# Patient Record
Sex: FEMALE | Race: BLACK OR AFRICAN AMERICAN | NOT HISPANIC OR LATINO | Employment: OTHER | URBAN - METROPOLITAN AREA
[De-identification: names, ages, dates, MRNs, and addresses within clinical notes are randomized per-mention and may not be internally consistent; named-entity substitution may affect disease eponyms.]

---

## 2017-01-02 ENCOUNTER — TELEPHONE (OUTPATIENT)
Dept: FAMILY MEDICINE | Facility: CLINIC | Age: 52
End: 2017-01-02

## 2017-01-03 ENCOUNTER — TRANSFERRED RECORDS (OUTPATIENT)
Dept: HEALTH INFORMATION MANAGEMENT | Facility: CLINIC | Age: 52
End: 2017-01-03

## 2017-01-03 NOTE — TELEPHONE ENCOUNTER
"Received an answering service page from Ms. Álvarez for chest pain which she says started about 5pm and has been constant.  She was not doing anything active at the time.  Has not had pain like this before, but does not think it is her heart and rather more involving her muscles.  She says it feels like her \"blood is boiling on the inside.\" Hx of PE which felt different.     She also is short of breath and feels dizzy like she is constantly going to pass out.  She vomited after dinner and has only been able to drink sips of water since.  She took her insulin, but no other meds since then.  She has a mild cough and no wheezing.  She says the shortness of breath and dizziness are scary to her and because she does not know what's going on and keeps having to catch herself from falling over/passing out.    She also has pain in her left leg described as sharp pain in the inner part of her leg with associated numbness.  She also has dull pain on the outside of her leg.  She has chronic pain in this leg, but says this is much worse than usual.  Her leg does not seem more swollen.    No fevers. Has had chills last few days.  No sore throat.  Has chronic generalized myalgias that is not any different today.  Has had a lot of \"hot flashes\" over the last few days, went through menopause 3 years ago.    Given her history including DM, HTN, HLD, and hx of PE I told her that her symptoms were concerning enough that she should come to the emergency department for evaluation.    Lyndon Matos, DO  PGY2 [facility]    "

## 2017-01-09 DIAGNOSIS — G89.4 CHRONIC PAIN SYNDROME: Primary | ICD-10-CM

## 2017-01-10 ENCOUNTER — OFFICE VISIT (OUTPATIENT)
Dept: FAMILY MEDICINE | Facility: CLINIC | Age: 52
End: 2017-01-10

## 2017-01-10 VITALS — SYSTOLIC BLOOD PRESSURE: 96 MMHG | TEMPERATURE: 97.4 F | DIASTOLIC BLOOD PRESSURE: 65 MMHG | HEART RATE: 85 BPM

## 2017-01-10 DIAGNOSIS — M25.572 BILATERAL ANKLE JOINT PAIN: Primary | ICD-10-CM

## 2017-01-10 DIAGNOSIS — G89.4 CHRONIC PAIN SYNDROME: ICD-10-CM

## 2017-01-10 DIAGNOSIS — R60.0 PEDAL EDEMA: ICD-10-CM

## 2017-01-10 DIAGNOSIS — Z12.11 COLON CANCER SCREENING: ICD-10-CM

## 2017-01-10 DIAGNOSIS — M25.571 BILATERAL ANKLE JOINT PAIN: Primary | ICD-10-CM

## 2017-01-10 DIAGNOSIS — Z00.00 PREVENTATIVE HEALTH CARE: ICD-10-CM

## 2017-01-10 LAB
AMPHETAMINES QUAL: NEGATIVE
BARBITURATES QUAL URINE: NEGATIVE
BENZODIAZEPINE QUAL URINE: NEGATIVE
BUPRENORPHINE QUAL URINE: NEGATIVE
CANNABINOIDS UR QL SCN: NEGATIVE
COCAINE QUAL URINE: NEGATIVE
METHAMPHETAMINE: NEGATIVE
METHODONE QUAL: NEGATIVE
MORPHINE QUAL: NEGATIVE
OXYCODONE QUAL: POSITIVE
TEMPERATURE OF URINE WAS BETWEEN 90-100 DEGREES F: YES

## 2017-01-10 RX ORDER — OXYCODONE AND ACETAMINOPHEN 5; 325 MG/1; MG/1
TABLET ORAL
Qty: 120 TABLET | Refills: 0 | Status: SHIPPED | OUTPATIENT
Start: 2017-01-10 | End: 2017-02-07

## 2017-01-10 RX ORDER — FUROSEMIDE 20 MG
TABLET ORAL
Qty: 90 TABLET | Refills: 3 | Status: SHIPPED | OUTPATIENT
Start: 2017-01-10 | End: 2018-12-11

## 2017-01-10 NOTE — PROGRESS NOTES
Patient Active Problem List    Diagnosis Date Noted     Chronic pain syndrome 10/07/2016     Priority: High     Chronic Pain Diagnosis:  neuropathy  DIRE Total Score(s):14     ORT: 5    4 - 7 =  Moderate Risk   of future problems with Opioids  FAQ5: No flowsheet data found.  Behavioral Health Consultation: date   Personal Care Plan for Chronic Pain: most recent update    Opioid medication:oxycodone   Dose:10mg  Number of pills per month:60  Patient is being prescribed 10mg of oxycodone IR (Percocet) per day this is 15 mg Morphine Equivalents  Benzodiazepines Prescribed? No  Naloxone prescribed? No       Clinic visit frequency required: Q 1 month Next visit due:   Controlled Substance/Opioid Treatment agreement on file (dated <12 months ago)?:    Date(s): pending  Last [place] verification: last visit  Items in red to be updated at each visit  Patient is followed by Data Unavailable for ongoing prescription of pain medication.  All refills should be approved by this provider, or covering partner.Chronic Pain          Microalbuminuria 10/12/2015     Priority: High     Type 2 diabetes mellitus with diabetic polyneuropathy (H) 10/12/2015     Priority: High     Bilateral ankle joint pain 01/15/2016     Priority: Medium     Esophageal reflux 04/07/2015     Priority: Medium     Urinary incontinence 02/19/2015     Priority: Medium     Insomnia 02/19/2015     Priority: Medium     HTN (hypertension) 08/26/2013     Priority: Medium     Hyperlipidemia LDL goal <100 06/21/2013     Priority: Medium     S/P bariatric surgery 06/21/2013     Priority: Medium     Kim-en-Y in 2006       Neck pain 05/16/2013     Priority: Medium     Moderate major depression (H) 01/24/2013     Priority: Medium     Moderate persistent asthma 11/15/2012     Priority: Medium     Pulmonary embolism and infarction (H) 11/15/2012     Priority: Medium     Problem list name updated by automated process. Provider to review       Health Care Home 11/15/2012      Priority: Medium     Tier 3    Status: Accept  Care Coordination Start Date: 02/27/12  State Tier Level: Level 3  Language/Barrier to Learning: No  Significant Mental Health Issues: Yes  Date Care Coordination Discontinued: 05/28/15    DX V65.8 REPLACED WITH 75411 HEALTH CARE HOME (04/08/2013)         S/P hysterectomy 06/21/2013     Priority: Low     2008, For fibroids.  Cervix removed, but still has ovaries.       Carpal tunnel syndrome 11/15/2012     Priority: Low     Necrotizing fasciitis (H) 11/15/2012     Priority: Low     Abnormal Pap smear of cervix      Priority: Low     3-12-15: Pap/HPV neg.  Plan: Given hx (mildly abn pap, hyster), recheck in 5 yrs.  10-11-11: Pap/HPV neg.  Plan: Given ASUS pap in 2010, recommend repeating pap in 1 year.  8/3/2010 ASCUS Pap with +BV- pt treated with Metrogel.  Pt needs repeat Pap in 1yr.  8/4/2008 Pt had total hyst for menorrhagia, pelvic pain, scar tissue, and fibroids.       There are no exam notes on file for this visit.  Chief Complaint   Patient presents with     Medication Refill     Pain medication refill     Referral     would like some PT to help with her leg pain, help strengthen that up      Cough     has pnemonia wants to double check to make sure it is gone, she feels better after the medicaiton is complete      Blood pressure 96/65, pulse 85, temperature 97.4  F (36.3  C), temperature source Oral, not currently breastfeeding.  Results for orders placed or performed in visit on 01/10/17   Rapid Urine Drug Screen ([facility])   Result Value Ref Range    Amphetamines Qual NEGATIVE NEGATIVE    Barbiturates Qual Urine NEGATIVE NEGATIVE    Buprenorphine Qual Urine NEGATIVE NEGATIVE    Benzodiazepine Qual Urine NEGATIVE NEGATIVE    Cocaine Qual Urine NEGATIVE NEGATIVE    Cannabinoids Qual Urine NEGATIVE NEGATIVE    Methamphetamine Qual NEGATIVE NEGATIVE    Methadone Qual NEGATIVE NEGATIVE    Morphine Qual NEGATIVE NEGATIVE    Oxycodone Qual POSITIVE (A) NEGATIVE     Temperature of Urine was Between  Degrees F YES YES     Had pneumonia:  Dizzy, falling, and sob.  Having CP, too.  Sent to ER.    Dizziness gone.  No fever.  No cough--was mild and is now better.  Had n/v, now eating fine.    XR CHEST PA AND LATERAL  1/3/2017 9:45 PM  Normal sinus rhythm  Normal ECG  When compared with ECG of 24-OCT-2014 11:13,  Nonspecific T wave abnormality has replaced inverted T waves in Lateral leads  QT has shortened  Confirmed by TUAN CLAY MD LOC: ([id number]) on 1/4/2017 10:59:36 AM  Basic Metabolic Panel1/3/2017   [facility]   Component Name Value Range   Sodium 139 136 - 145 mmol/L   Potassium 4.8 3.5 - 5.0 mmol/L   Chloride 110 (H) 98 - 107 mmol/L   CO2 22 22 - 31 mmol/L   Anion Gap, Calculation 7 5 - 18 mmol/L   Glucose 276 (H) 70 - 125 mg/dL   Calcium 9.3 8.5 - 10.5 mg/dL   BUN 23 (H) 8 - 22 mg/dL   Creatinine 0.83 0.60 - 1.10 mg/dL   GFR MDRD Af Amer >60 >60 mL/min/1.73m2    GFR MDRD Non Af Amer >60 >60 mL/min/1.73m2      INDICATION: chest pain, cough  COMPARISON: 9/19/2014.  FINDINGS: Heart size appears normal. Right lung appears clear. Mild streaky density left lung could represent atelectasis, scar, or pneumonia. No effusion  SUBJECTIVE:  Ruthy Álvarez is here for followup of chronic pain syndrome.  She is stable on four oxycodone tablets daily.  She takes two at night and one during the day p.r.n.  Her constipation is manageable.  No sedation.  Urine drug screen is appropriate today.  Functionally, she is able to walk short distances with a walker.  Her moods are better.  She is able to engage in ADLs more effectively.  She would like to go to physical therapy, as she has left leg weakness.  The bursitis that she had last time is resolved.  She has edema in the left leg.  She had a fracture there in the remote past.  She takes furosemide daily and needs refill.     She was in the hospital one week ago for cough, chills, and dizziness.  They found a small  pneumonia; white count was elevated at 11.  Her BMP was normal.  Dizziness is resolved and the cough is absent.  She finished the antibiotics.  No further dizziness or lightheadedness.   OBJECTIVE:     GENERAL:  The patient is alert, pleasant, and in no acute distress.  She has a walker today.  She moves slowly but independently from the chair to the exam table.   CHEST:  Clear.   HEART:  Regular rate and rhythm.  Normal S1 and S2.  No murmur.   EXTREMITIES:  She has trace edema on the left.   ASSESSMENT AND PLAN:   1.  Chronic pain syndrome.  This is stable.  We'll review the prescription database.  Urine drug screen is appropriate today.  Prescription was given for one month.  She'll follow up monthly.  She'll need an A1C the next time she returns for diabetes.   2.  Pneumonia.  I reviewed records from [facility].  Her symptoms are resolved.  Her chest x-ray was only mildly abnormal.  No further followup needed.   3.  Hypertension.  Blood pressure is a little soft today, but she has no symptoms.  She is on lisinopril and chlorthalidone.  Her BMP was normal at [facility] one week ago.  She'll follow up monthly.  If her blood pressure remains low, we'll cut back on chlorthalidone from 25 mg to 12.5 mg daily.  We'll get a BMP and A1C when she returns next time.  I refilled furosemide today. She takes it daily for edema, but can use it prn going forward.    Colonoscopy referral written today  PT referral for LE strengthening.

## 2017-01-10 NOTE — PATIENT INSTRUCTIONS
Today we put a referral in for a Colonoscopy, if you do not hear from [facility] in 2 weeks please call and speak to Letitia, she will call [facility] to try and get that set up for you or have them call you to set up the appointment.     If you are not having any swelling you do not have to take the Furosamide everyday you can take it as needed.     Stop at the Referral desk to make an appointment for Physical Therapy and talk about the Colonoscopy Referral.     Also refilled your Pain medication and also your water pills.    Follow up in 1 month for your pain medication refill  and diabetes.     [facility]  Colonoscopy  [phone]  Referral has been faxed they will contact pt to schedule     [facility]  Physical Therapy  [phone]      Addendum 2:49 PM 1/11/2017 : Per [facility] pt scheduled 1/17 @ 3PM Que IRBY/ANH

## 2017-01-10 NOTE — Clinical Note
January 10, 2017      Ruthy TJ Henri  [address]   [place] [place] [address]          Ms Henri,     Your urine test results were as expected.    Please see below for your test results.    Resulted Orders   Rapid Urine Drug Screen ([facility])   Result Value Ref Range    Amphetamines Qual NEGATIVE NEGATIVE    Barbiturates Qual Urine NEGATIVE NEGATIVE    Buprenorphine Qual Urine NEGATIVE NEGATIVE    Benzodiazepine Qual Urine NEGATIVE NEGATIVE    Cocaine Qual Urine NEGATIVE NEGATIVE    Cannabinoids Qual Urine NEGATIVE NEGATIVE    Methamphetamine Qual NEGATIVE NEGATIVE    Methadone Qual NEGATIVE NEGATIVE    Morphine Qual NEGATIVE NEGATIVE    Oxycodone Qual POSITIVE (A) NEGATIVE    Temperature of Urine was Between  Degrees F YES YES      Comment:      This is a preliminary screening test that detects drugs-of-abuse in urine at   specified detection levels.  To confirm preliminary results, a more specific   method such as Gas Chromatography/Mass Spectrometry (GC/MS) must be used.            If you have any questions, please call the clinic to make an appointment.    Sincerely,    Jose Angel Meyer MD

## 2017-01-10 NOTE — MR AVS SNAPSHOT
After Visit Summary   1/10/2017    Ruthy Álvarez    MRN: [id number]           Patient Information     Date Of Birth          1965        Visit Information        Provider Department      1/10/2017 10:00 AM Jose Angel Meyer MD [facility]        Today's Diagnoses     Bilateral ankle joint pain    -  1     Chronic pain syndrome         Preventative health care         Pedal edema           Care Instructions    Today we put a referral in for a Colonoscopy, if you do not hear from [facility] in 2 weeks please call and speak to Letitia, she will call [facility] to try and get that set up for you or have them call you to set up the appointment.     If you are not having any swelling you do not have to take the Furosamide everyday you can take it as needed.     Stop at the Referral desk to make an appointment for Physical Therapy and talk about the Colonoscopy Referral.     Also refilled your Pain medication and also your water pills.    Follow up in 1 month for your pain medication refill  and diabetes.             Follow-ups after your visit        Additional Services     PCS Use: screening colonoscopy (GI) referral       Procedure: colonoscopy  Diagnosis: routine screening  Ordering physician: Dr. Meyer            PHYSICAL THERAPY REFERRAL       PT/OT REFERRAL  Ruthy Álvarez  1965  Phone #: [phone] (home)    needed: No  Language: English    PT/OT  Facility:   [facility], P: [phone], F: [phone]    History: Chronic Leg Pain    Precautions/Contraindications: None    Imaging Studies: EMG    Surgical Procedure/Test Results: None    Treatment Goals:   Increase: Strength    Prognosis: good    Visits: Up to 10    Evaluate: Evaluate and treat    Plan: Flexibility Exercise and Strength Exercise                  Future tests that were ordered for you today     Open Future Orders        Priority Expected Expires Ordered    PHYSICAL THERAPY  REFERRAL Routine  1/10/2018 1/10/2017    PCS Use: screening colonoscopy (GI) referral Routine  1/10/2018 1/10/2017            Who to contact     Please call your clinic at [phone] to:    Ask questions about your health    Make or cancel appointments    Discuss your medicines    Learn about your test results    Speak to your doctor   If you have compliments or concerns about an experience at your clinic, or if you wish to file a complaint, please contact [facility] Patient Relations at [phone] or email us at [email or web address]         Additional Information About Your Visit        VOIP DepotharTraitWare Information     [employer] is an electronic gateway that provides easy, online access to your medical records. With Sprinkle, you can request a clinic appointment, read your test results, renew a prescription or communicate with your care team.     To sign up for Sprinkle visit the website at [email or web address]   You will be asked to enter the access code listed below, as well as some personal information. Please follow the directions to create your username and password.     Your access code is: [email or web address]  Expires: 2017  5:06 PM     Your access code will  in 90 days. If you need help or a new code, please contact your [facility] Clinic or call [phone] for assistance.        Care EveryWhere ID     This is your Care EveryWhere ID. This could be used by other organizations to access your [place] medical records  [id number]        Your Vitals Were     Pulse Temperature                85 97.4  F (36.3  C) (Oral)           Blood Pressure from Last 3 Encounters:   01/10/17 96/65   16 117/73   11/15/16 148/65    Weight from Last 3 Encounters:   16 239 lb 12.8 oz (108.773 kg)   16 237 lb 4 oz (107.616 kg)   16 237 lb 4 oz (107.616 kg)              We Performed the Following     Rapid Urine Drug Screen ([facility])           Where to get your medicines      [facility] [place], [place] - [address], [place] [address]     Phone:  [phone]    - furosemide 20 MG tablet      Some of these will need a paper prescription and others can be bought over the counter.  Ask your nurse if you have questions.     Bring a paper prescription for each of these medications    - oxyCODONE-acetaminophen 5-325 MG per tablet       Primary Care Provider Office Phone # Fax #    Jose Angel Meyer [phone]       [facility] [address] [facility] [address]        Thank you!     Thank you for choosing [facility]  for your care. Our goal is always to provide you with excellent care. Hearing back from our patients is one way we can continue to improve our services. Please take a few minutes to complete the written survey that you may receive in the mail after your visit with us. Thank you!             Your Updated Medication List - Protect others around you: Learn how to safely use, store and throw away your medicines at www.disposemymeds.org.          This list is accurate as of: 1/10/17 11:08 AM.  Always use your most recent med list.                   Brand Name Dispense Instructions for use    * albuterol (2.5 MG/3ML) 0.083% neb solution     1 Box    Take 1 vial (2.5 mg) by nebulization every 6 hours as needed for shortness of breath / dyspnea       * albuterol 108 (90 BASE) MCG/ACT Inhaler    VENTOLIN HFA    3 Inhaler    Inhale 2 puffs into the lungs 4 times daily Inhale 2 puffs every 4-6 hours as needed       aspirin 81 MG tablet     100 tablet    Take 1 tablet (81 mg) by mouth daily       atorvastatin 10 MG tablet    LIPITOR    30 tablet    1 po qday       * B-12 1000 MCG Tbcr     100 tablet    Take 1,000 mcg by mouth daily       * cyanocobalamin 1000 MCG Subl     100 tablet    1 po qd       B-D SINGLE USE SWABS REGULAR Pads     100 each    Apply 1  pad topically 3 times daily Use as directed       benazepril 40 MG tablet    LOTENSIN    30 tablet    Take 1 tablet (40 mg) by mouth daily       blood glucose monitoring test strip    no brand specified    1 Box    1 strip by In Vitro route 3 times daily Test blood glucose 4x daily with meals and at night       capsaicin 0.025 % Crea cream    ZOSTRIX    45 g    Apply 1 g topically 3 times daily       chlorthalidone 25 MG tablet    HYGROTON    30 tablet    Take 1 tablet (25 mg) by mouth daily       diclofenac 1 % Gel topical gel    VOLTAREN    100 g    Apply 4 grams 4 x daily as needed       DULoxetine 60 MG EC capsule    CYMBALTA    90 capsule    Take 1 capsule (60 mg) by mouth daily       famotidine 20 MG tablet    PEPCID    60 tablet    Take 1 tablet (20 mg) by mouth 2 times daily       ferrous sulfate 325 (65 FE) MG tablet    IRON    30 tablet    Take 1 tablet (325 mg) by mouth daily (with breakfast)       fluticasone-salmeterol 230-21 MCG/ACT inhaler    ADVAIR-HFA    36 g    Inhale 2 puffs into the lungs 2 times daily       furosemide 20 MG tablet    LASIX    90 tablet    One daily as needed for swelling       gabapentin 250 MG/5ML solution    NEURONTIN    900 mL    Take 1.5 tsp tid for one week, then increase to 2 tsp tid.       insulin pen needle 29G X 12.7MM    BD ULTRA-FINE    100 each    Use 4 times daily       LANTUS SOLOSTAR 100 UNIT/ML injection   Generic drug:  insulin glargine     3 Month    Take 40 units at bedtime daily       lidocaine 5 % Patch    LIDODERM    40 patch    Apply up to 3 patches to painful area at once for up to 12 h within a 24 h period.  Remove after 12 hours.       [employer] FINEPOINT LANCETS Misc     100 each    Use to test blood sugars 2 times daily or as directed.       loratadine 10 MG tablet    CLARITIN    30 tablet    Take 1 tablet (10 mg) by mouth daily       magnesium oxide 400 (241.3 MG) MG tablet    MAG-OX    30 tablet    Take 1 tablet (400 mg) by mouth daily       NovoLOG  FLEXPEN 100 UNIT/ML injection   Generic drug:  insulin aspart     3 Month    18 units before meals (do not take without a meal).  Plus sliding scale:  Add 2 units for every 40 mg/dl above 140.       * order for DME     1 Device    Equipment being ordered: Wheelchair       * order for DME     1 Device    Equipment being ordered: one shower chair       * order for DME     1 Device    Equipment being ordered: 4 wheeled walker with seat       * order for DME     1 Device    Equipment being ordered: one wheeled walker with seat       oxyCODONE-acetaminophen 5-325 MG per tablet    PERCOCET    120 tablet    2 tabs po qhs, and 1 tab po bid during day, as needed.       risperDAL 0.25 MG tablet   Generic drug:  risperiDONE      Take 1 tablet (0.25 mg) by mouth At Bedtime       senna-docusate 8.6-50 MG per tablet    SENOKOT-S;PERICOLACE    60 tablet    Take 1 tablet by mouth 2 times daily       SUMAtriptan 25 MG tablet    IMITREX    24 tablet    One tab at onset of HA.  May repeat dose in 2 hours.  Do not exceed 200 mg in 24 hours       tolterodine 2 MG 24 hr capsule    DETROL LA    90 capsule    Take 1 capsule (2 mg) by mouth daily       vitamin D 2000 UNITS tablet     100 tablet    Take 2,000 Units by mouth daily       * Notice:  This list has 8 medication(s) that are the same as other medications prescribed for you. Read the directions carefully, and ask your doctor or other care provider to review them with you.

## 2017-01-11 ENCOUNTER — AMBULATORY - HEALTHEAST (OUTPATIENT)
Dept: ADMINISTRATIVE | Facility: REHABILITATION | Age: 52
End: 2017-01-11

## 2017-01-11 DIAGNOSIS — M79.605 LOWER EXTREMITY PAIN, LEFT: ICD-10-CM

## 2017-01-11 DIAGNOSIS — G89.4 CHRONIC PAIN SYNDROME: ICD-10-CM

## 2017-01-17 ENCOUNTER — OFFICE VISIT - HEALTHEAST (OUTPATIENT)
Dept: PHYSICAL THERAPY | Facility: REHABILITATION | Age: 52
End: 2017-01-17

## 2017-01-17 DIAGNOSIS — M79.605 LOWER EXTREMITY PAIN, LEFT: ICD-10-CM

## 2017-01-17 DIAGNOSIS — R26.89 POOR BALANCE: ICD-10-CM

## 2017-01-17 DIAGNOSIS — R29.898 MUSCULAR DECONDITIONING: ICD-10-CM

## 2017-01-17 DIAGNOSIS — Z74.09 DECREASED FUNCTIONAL MOBILITY AND ENDURANCE: ICD-10-CM

## 2017-01-24 ENCOUNTER — TRANSFERRED RECORDS (OUTPATIENT)
Dept: HEALTH INFORMATION MANAGEMENT | Facility: CLINIC | Age: 52
End: 2017-01-24

## 2017-01-24 ENCOUNTER — OFFICE VISIT - HEALTHEAST (OUTPATIENT)
Dept: PHYSICAL THERAPY | Facility: REHABILITATION | Age: 52
End: 2017-01-24

## 2017-01-24 DIAGNOSIS — Z74.09 DECREASED FUNCTIONAL MOBILITY AND ENDURANCE: ICD-10-CM

## 2017-01-24 DIAGNOSIS — R29.898 MUSCULAR DECONDITIONING: ICD-10-CM

## 2017-01-24 DIAGNOSIS — R26.89 POOR BALANCE: ICD-10-CM

## 2017-01-24 DIAGNOSIS — M79.605 LOWER EXTREMITY PAIN, LEFT: ICD-10-CM

## 2017-01-25 ENCOUNTER — TRANSFERRED RECORDS (OUTPATIENT)
Dept: HEALTH INFORMATION MANAGEMENT | Facility: CLINIC | Age: 52
End: 2017-01-25

## 2017-02-07 ENCOUNTER — OFFICE VISIT (OUTPATIENT)
Dept: FAMILY MEDICINE | Facility: CLINIC | Age: 52
End: 2017-02-07

## 2017-02-07 VITALS
HEART RATE: 80 BPM | TEMPERATURE: 97.9 F | SYSTOLIC BLOOD PRESSURE: 134 MMHG | BODY MASS INDEX: 47.8 KG/M2 | WEIGHT: 236.8 LBS | DIASTOLIC BLOOD PRESSURE: 84 MMHG

## 2017-02-07 DIAGNOSIS — R73.9 HYPERGLYCEMIA: ICD-10-CM

## 2017-02-07 DIAGNOSIS — E11.42 TYPE 2 DIABETES MELLITUS WITH DIABETIC POLYNEUROPATHY, WITHOUT LONG-TERM CURRENT USE OF INSULIN (H): ICD-10-CM

## 2017-02-07 DIAGNOSIS — E11.8 TYPE 2 DIABETES MELLITUS WITH COMPLICATION, UNSPECIFIED LONG TERM INSULIN USE STATUS: ICD-10-CM

## 2017-02-07 DIAGNOSIS — I10 ESSENTIAL HYPERTENSION: ICD-10-CM

## 2017-02-07 DIAGNOSIS — G89.4 CHRONIC PAIN SYNDROME: Primary | ICD-10-CM

## 2017-02-07 DIAGNOSIS — G89.4 CHRONIC PAIN SYNDROME: ICD-10-CM

## 2017-02-07 DIAGNOSIS — E78.5 HYPERLIPIDEMIA LDL GOAL <100: ICD-10-CM

## 2017-02-07 DIAGNOSIS — E78.5 HYPERLIPIDEMIA LDL GOAL <100: Primary | ICD-10-CM

## 2017-02-07 LAB
AMPHETAMINES QUAL: NEGATIVE
BARBITURATES QUAL URINE: NEGATIVE
BENZODIAZEPINE QUAL URINE: NEGATIVE
BUN SERPL-MCNC: 41 MG/DL (ref 7–19)
BUPRENORPHINE QUAL URINE: NEGATIVE
CALCIUM SERPL-MCNC: 9.1 MG/DL (ref 8.5–10.1)
CANNABINOIDS UR QL SCN: NEGATIVE
CHLORIDE SERPLBLD-SCNC: 102 MMOL/L (ref 98–110)
CHOLEST SERPL-MCNC: 210.9 MG/DL (ref 0–200)
CHOLEST/HDLC SERPL: 4 {RATIO} (ref 0–5)
CO2 SERPL-SCNC: 18.9 MMOL/L (ref 20–32)
COCAINE QUAL URINE: NEGATIVE
CREAT SERPL-MCNC: 1.1 MG/DL (ref 0.5–1)
GFR SERPL CREATININE-BSD FRML MDRD: 55.7 ML/MIN/1.7 M2
GLUCOSE SERPL-MCNC: 494 MG'DL (ref 70–99)
GLUCOSE SERPL-MCNC: 655 MG'DL (ref 70–99)
HBA1C MFR BLD: 8.6 % (ref 4.1–5.7)
HDLC SERPL-MCNC: 53.1 MG/DL
LDLC SERPL CALC-MCNC: 108 MG/DL (ref 0–129)
METHAMPHETAMINE: NEGATIVE
METHODONE QUAL: NEGATIVE
MORPHINE QUAL: NEGATIVE
OXYCODONE QUAL: POSITIVE
POTASSIUM SERPL-SCNC: 4.6 MMOL/DL (ref 3.2–4.6)
SODIUM SERPL-SCNC: 129.6 MMOL/L (ref 132–142)
TEMPERATURE OF URINE WAS BETWEEN 90-100 DEGREES F: YES
TRIGL SERPL-MCNC: 250.7 MG/DL (ref 0–150)
VLDL CHOLESTEROL: 50.1 MG/DL (ref 7–32)

## 2017-02-07 RX ORDER — OXYCODONE AND ACETAMINOPHEN 5; 325 MG/1; MG/1
TABLET ORAL
Qty: 120 TABLET | Refills: 0 | Status: SHIPPED | OUTPATIENT
Start: 2017-02-07 | End: 2017-03-08

## 2017-02-07 NOTE — PROGRESS NOTES
Patient Active Problem List    Diagnosis Date Noted     Chronic pain syndrome 10/07/2016     Priority: High     Chronic Pain Diagnosis:  neuropathy  DIRE Total Score(s):14     ORT: 5    4 - 7 =  Moderate Risk   of future problems with Opioids  FAQ5: No flowsheet data found.  Behavioral Health Consultation: date   Personal Care Plan for Chronic Pain: most recent update    Opioid medication:oxycodone   Dose:10mg  Number of pills per month:60  Patient is being prescribed 10mg of oxycodone IR (Percocet) per day this is 15 mg Morphine Equivalents  Benzodiazepines Prescribed? No  Naloxone prescribed? No       Clinic visit frequency required: Q 1 month Next visit due:   Controlled Substance/Opioid Treatment agreement on file (dated <12 months ago)?:    Date(s): pending  Last [place] verification: last visit  Items in red to be updated at each visit  Patient is followed by Data Unavailable for ongoing prescription of pain medication.  All refills should be approved by this provider, or covering partner.Chronic Pain          Microalbuminuria 10/12/2015     Priority: High     Type 2 diabetes mellitus with diabetic polyneuropathy (H) 10/12/2015     Priority: High     Bilateral ankle joint pain 01/15/2016     Priority: Medium     Esophageal reflux 04/07/2015     Priority: Medium     Urinary incontinence 02/19/2015     Priority: Medium     Insomnia 02/19/2015     Priority: Medium     HTN (hypertension) 08/26/2013     Priority: Medium     Hyperlipidemia LDL goal <100 06/21/2013     Priority: Medium     S/P bariatric surgery 06/21/2013     Priority: Medium     Kim-en-Y in 2006       Neck pain 05/16/2013     Priority: Medium     Moderate major depression (H) 01/24/2013     Priority: Medium     Moderate persistent asthma 11/15/2012     Priority: Medium     Pulmonary embolism and infarction (H) 11/15/2012     Priority: Medium     Problem list name updated by automated process. Provider to review       Health Care Home 11/15/2012      Priority: Medium     Tier 3    Status: Accept  Care Coordination Start Date: 02/27/12  State Tier Level: Level 3  Language/Barrier to Learning: No  Significant Mental Health Issues: Yes  Date Care Coordination Discontinued: 05/28/15    DX V65.8 REPLACED WITH 29043 HEALTH CARE HOME (04/08/2013)         S/P hysterectomy 06/21/2013     Priority: Low     2008, For fibroids.  Cervix removed, but still has ovaries.       Carpal tunnel syndrome 11/15/2012     Priority: Low     Necrotizing fasciitis (H) 11/15/2012     Priority: Low     Abnormal Pap smear of cervix      Priority: Low     3-12-15: Pap/HPV neg.  Plan: Given hx (mildly abn pap, hyster), recheck in 5 yrs.  10-11-11: Pap/HPV neg.  Plan: Given ASUS pap in 2010, recommend repeating pap in 1 year.  8/3/2010 ASCUS Pap with +BV- pt treated with Metrogel.  Pt needs repeat Pap in 1yr.  8/4/2008 Pt had total hyst for menorrhagia, pelvic pain, scar tissue, and fibroids.       There are no exam notes on file for this visit.  Chief Complaint   Patient presents with     Refill Request     Refill on pain medicaiton      Other     Pt. states the 3 days ago she had a black scab like sore that fell off of her pinky toe on right foot, since then her toe has been very painful      Blood pressure 134/84, pulse 80, temperature 97.9  F (36.6  C), temperature source Oral, weight 236 lb 12.8 oz (107.412 kg), not currently breastfeeding.  Walking 1.5 blocks.  Smiled!  Constipation.  SUBJECTIVE:  Ruthy Álvarez is here for multiple issues.  She has chronic pain syndrome and pain control has been reasonable on oxycodone.  She takes two tablets at night and one tablet b.i.d. p.r.n. during the day.  She now walks about 1-1/2 blocks and is quite proud about that.  She has no chest pain or shortness of breath.  She has blurred vision, polydipsia and polyuria.  She drank two glasses of orange juice today, but she didn't take her insulin.   She also mentions a scab along the right fifth toe  on the medial side that is painful to the touch.   OBJECTIVE   CHEST:  Clear.   HEART:  Regular rate and rhythm.  Normal S1 and S2.  No murmur.   PSYCHIATRIC:  Her affect is bright and appropriate.   GENERAL:  She is alert and in absolutely no acute distress.   EXTREMITIES:  Examination of the foot reveals a callus along the nail medially of the fifth toe.   ASSESSMENT AND PLAN:   1.  Chronic pain syndrome.  I refilled oxycodone.  UDS was appropriate.   2.  Diabetes.  We checked BMP and A1C today because she was due.  Her A1C is surprisingly only mildly elevatd, but blood sugar this morning was 650.  We gave her 28 units of NovoLog and she remained asymptomatic.  Her blood sugar should return to around 400.  She is asymptomatic and not markedly acidotic and I gave her a new sliding scale to use at home.  We'll continue her current dose of Lantus.  She needs to be more faithful in taking NovoLog on the new sliding scale.  She'll follow up in a month with weekly t.i.d.  Accu-Cheks to go through to see if she is responding to this.     3.  Corn.  I gave her reassurance and recommended some corn pads.   4.  Hypertension.  It is well-controlled.     5.  Hyperlipidemia.  Lipid profile was checked.  We'll discuss results next week.     Results for orders placed or performed in visit on 02/07/17   Lipid Panel ([place])   Result Value Ref Range    Cholesterol 210.9 (H) 0.0 - 200.0 mg/dL    Cholesterol/HDL Ratio 4.0 0.0 - 5.0    HDL Cholesterol 53.1 >40.0 mg/dL    LDL Cholesterol Calculated 108 0 - 129 mg/dL    Triglycerides 250.7 (H) 0.0 - 150.0 mg/dL    VLDL Cholesterol 50.1 (H) 7.0 - 32.0 mg/dL   Basic Metabolic Panel ([place])   Result Value Ref Range    Urea Nitrogen 41.0 (H) 7.0 - 19.0 mg/dL    Calcium 9.1 8.5 - 10.1 mg/dL    Chloride 102.0 98.0 - 110.0 mmol/L    Carbon Dioxide 18.9 (L) 20.0 - 32.0 mmol/L    Creatinine 1.1 (H) 0.5 - 1.0 mg/dL    Glucose 655.0 (HH) 70.0 - 99.0 mg'dL    Potassium 4.6 3.2 - 4.6 mmol/dL     Sodium 129.6 (L) 132.0 - 142.0 mmol/L    GFR Estimate 55.7 mL/min/1.7 m2    GFR Estimate If Black 67.3 mL/min/1.7 m2    Narrative    Panic Value reported and repeated back at 2/7/2017 10:22 AM to  Nuzhat .by    Oleksandr   .   Hemoglobin A1c ([facility])   Result Value Ref Range    Hemoglobin A1C 8.6 (H) 4.1 - 5.7 %   Rapid Urine Drug Screen ([facility])   Result Value Ref Range    Amphetamines Qual NEGATIVE NEGATIVE    Barbiturates Qual Urine NEGATIVE NEGATIVE    Buprenorphine Qual Urine NEGATIVE NEGATIVE    Benzodiazepine Qual Urine NEGATIVE NEGATIVE    Cocaine Qual Urine NEGATIVE NEGATIVE    Cannabinoids Qual Urine NEGATIVE NEGATIVE    Methamphetamine Qual NEGATIVE NEGATIVE    Methadone Qual NEGATIVE NEGATIVE    Morphine Qual NEGATIVE NEGATIVE    Oxycodone Qual POSITIVE (A) NEGATIVE    Temperature of Urine was Between  Degrees F YES YES   Glucose (Only)   Result Value Ref Range    Glucose 494.0 (HH) 70.0 - 99.0 mg'dL    Narrative    Panic Value reported and repeated back at 2/7/2017 11:47 AM to Nuzhat  .by      MELQUIADES.

## 2017-02-07 NOTE — MR AVS SNAPSHOT
After Visit Summary   2/7/2017    Ruthy Álvarez    MRN: [id number]           Patient Information     Date Of Birth          1965        Visit Information        Provider Department      2/7/2017 10:00 AM Jose Angel Meyer MD [facility]        Today's Diagnoses     Chronic pain syndrome    -  1     Type 2 diabetes mellitus with complication, unspecified long term insulin use status (H)         Hyperlipidemia LDL goal <100         Essential hypertension         Type 2 diabetes mellitus with diabetic polyneuropathy, without long-term current use of insulin (H)         Bilateral ankle joint pain           Care Instructions    Try corn pads on little toe  Not infected:  Watch for redness, swelling, or pus draining.    :  Give 18 units  141-180:  Give 20  181-220:  Give 22  221-260:  Give 24  261-300:  Give 26  301-340:  Give 28.  >340:  Give 30    Check 3 times daily, before meals, and f/up in 1 week.              Follow-ups after your visit        Who to contact     Please call your clinic at [phone] to:    Ask questions about your health    Make or cancel appointments    Discuss your medicines    Learn about your test results    Speak to your doctor   If you have compliments or concerns about an experience at your clinic, or if you wish to file a complaint, please contact [facility] Patient Relations at [phone] or email us at [email or web address]         Additional Information About Your Visit        MyChart Information     Posterbee is an electronic gateway that provides easy, online access to your medical records. With Posterbee, you can request a clinic appointment, read your test results, renew a prescription or communicate with your care team.     To sign up for [employer] visit the website at [email or web address]   You will be asked to enter the access code listed below, as well as some personal information. Please follow the  directions to create your username and password.     Your access code is: [email or web address]  Expires: 2017  5:06 PM     Your access code will  in 90 days. If you need help or a new code, please contact your [facility] or call [phone] for assistance.        Care EveryWhere ID     This is your Care EveryWhere ID. This could be used by other organizations to access your [facility] medical records  [id number]        Your Vitals Were     Pulse Temperature                80 97.9  F (36.6  C) (Oral)           Blood Pressure from Last 3 Encounters:   17 134/84   01/10/17 96/65   16 117/73    Weight from Last 3 Encounters:   17 236 lb 12.8 oz (107.412 kg)   16 239 lb 12.8 oz (108.773 kg)   16 237 lb 4 oz (107.616 kg)              We Performed the Following     Basic Metabolic Panel ([place])     Glucose ([place])     Hemoglobin A1c ([facility])     Lipid Panel ([place])     Rapid Urine Drug Screen ([facility])          Where to get your medicines      Some of these will need a paper prescription and others can be bought over the counter.  Ask your nurse if you have questions.     Bring a paper prescription for each of these medications    - oxyCODONE-acetaminophen 5-325 MG per tablet       Primary Care Provider Office Phone # Fax #    Jose Angel Meyer [phone]       [address] [address]        Thank you!     Thank you for choosing [facility]  for your care. Our goal is always to provide you with excellent care. Hearing back from our patients is one way we can continue to improve our services. Please take a few minutes to complete the written survey that you may receive in the mail after your visit with us. Thank you!             Your Updated Medication List - Protect others around you: Learn how to safely use, store and throw away your medicines at www.disposemymeds.org.          This list is  accurate as of: 2/7/17 11:56 AM.  Always use your most recent med list.                   Brand Name Dispense Instructions for use    * albuterol (2.5 MG/3ML) 0.083% neb solution     1 Box    Take 1 vial (2.5 mg) by nebulization every 6 hours as needed for shortness of breath / dyspnea       * albuterol 108 (90 BASE) MCG/ACT Inhaler    VENTOLIN HFA    3 Inhaler    Inhale 2 puffs into the lungs 4 times daily Inhale 2 puffs every 4-6 hours as needed       aspirin 81 MG tablet     100 tablet    Take 1 tablet (81 mg) by mouth daily       atorvastatin 10 MG tablet    LIPITOR    30 tablet    1 po qday       * B-12 1000 MCG Tbcr     100 tablet    Take 1,000 mcg by mouth daily       * cyanocobalamin 1000 MCG Subl     100 tablet    1 po qd       B-D SINGLE USE SWABS REGULAR Pads     100 each    Apply 1 pad topically 3 times daily Use as directed       benazepril 40 MG tablet    LOTENSIN    30 tablet    Take 1 tablet (40 mg) by mouth daily       blood glucose monitoring test strip    no brand specified    1 Box    1 strip by In Vitro route 3 times daily Test blood glucose 4x daily with meals and at night       capsaicin 0.025 % Crea cream    ZOSTRIX    45 g    Apply 1 g topically 3 times daily       chlorthalidone 25 MG tablet    HYGROTON    30 tablet    Take 1 tablet (25 mg) by mouth daily       diclofenac 1 % Gel topical gel    VOLTAREN    100 g    Apply 4 grams 4 x daily as needed       DULoxetine 60 MG EC capsule    CYMBALTA    90 capsule    Take 1 capsule (60 mg) by mouth daily       famotidine 20 MG tablet    PEPCID    60 tablet    Take 1 tablet (20 mg) by mouth 2 times daily       ferrous sulfate 325 (65 FE) MG tablet    IRON    30 tablet    Take 1 tablet (325 mg) by mouth daily (with breakfast)       fluticasone-salmeterol 230-21 MCG/ACT inhaler    ADVAIR-HFA    36 g    Inhale 2 puffs into the lungs 2 times daily       furosemide 20 MG tablet    LASIX    90 tablet    One daily as needed for swelling       gabapentin  250 MG/5ML solution    NEURONTIN    900 mL    Take 1.5 tsp tid for one week, then increase to 2 tsp tid.       insulin pen needle 29G X 12.7MM    BD ULTRA-FINE    100 each    Use 4 times daily       LANTUS SOLOSTAR 100 UNIT/ML injection   Generic drug:  insulin glargine     3 Month    Take 40 units at bedtime daily       lidocaine 5 % Patch    LIDODERM    40 patch    Apply up to 3 patches to painful area at once for up to 12 h within a 24 h period.  Remove after 12 hours.       [employer] FINEPOINT LANCETS Misc     100 each    Use to test blood sugars 2 times daily or as directed.       loratadine 10 MG tablet    CLARITIN    30 tablet    Take 1 tablet (10 mg) by mouth daily       magnesium oxide 400 (241.3 MG) MG tablet    MAG-OX    30 tablet    Take 1 tablet (400 mg) by mouth daily       NovoLOG FLEXPEN 100 UNIT/ML injection   Generic drug:  insulin aspart     3 Month    18 units before meals (do not take without a meal).  Plus sliding scale:  Add 2 units for every 40 mg/dl above 140.       * order for DME     1 Device    Equipment being ordered: Wheelchair       * order for DME     1 Device    Equipment being ordered: one shower chair       * order for DME     1 Device    Equipment being ordered: 4 wheeled walker with seat       * order for DME     1 Device    Equipment being ordered: one wheeled walker with seat       oxyCODONE-acetaminophen 5-325 MG per tablet    PERCOCET    120 tablet    2 tabs po qhs, and 1 tab po bid during day, as needed.       risperDAL 0.25 MG tablet   Generic drug:  risperiDONE      Take 1 tablet (0.25 mg) by mouth At Bedtime       senna-docusate 8.6-50 MG per tablet    SENOKOT-S;PERICOLACE    60 tablet    Take 1 tablet by mouth 2 times daily       SUMAtriptan 25 MG tablet    IMITREX    24 tablet    One tab at onset of HA.  May repeat dose in 2 hours.  Do not exceed 200 mg in 24 hours       tolterodine 2 MG 24 hr capsule    DETROL LA    90 capsule    Take 1 capsule (2 mg) by mouth daily        vitamin D 2000 UNITS tablet     100 tablet    Take 2,000 Units by mouth daily       * Notice:  This list has 8 medication(s) that are the same as other medications prescribed for you. Read the directions carefully, and ask your doctor or other care provider to review them with you.

## 2017-02-07 NOTE — PATIENT INSTRUCTIONS
Try corn pads on little toe  Not infected:  Watch for redness, swelling, or pus draining.    :  Give 18 units  141-180:  Give 20  181-220:  Give 22  221-260:  Give 24  261-300:  Give 26  301-340:  Give 28.  >340:  Give 30    Check 3 times daily, before meals, and f/up in 1 week.

## 2017-02-08 NOTE — PROGRESS NOTES
"Quick Note:    This is better. No longer in trouble, thankfully.  She should stay on lantus 40 units daily. She should now check her blood glucose before each meal and adjust her novolog dose based on that reading as we discussed yesterday, using the sliding scale I gave her:  :  Give 18 units novolog  141-180:  Give 20 \"  181-220:  Give 22 \"  221-260:  Give 24 \"  261-300:  Give 26 \"  301-340:  Give 28. \"  >340:  Give 30 \"    Record blood sugar readings and follow-up in 1 week, as planned.  Thanks!    ______  "

## 2017-02-08 NOTE — PROGRESS NOTES
Quick Note:    Please call pt today to check on her blood sugars in the past 24 hours, and to see if she has any questions.  C Betsy  ______

## 2017-02-10 ENCOUNTER — DOCUMENTATION ONLY (OUTPATIENT)
Dept: FAMILY MEDICINE | Facility: CLINIC | Age: 52
End: 2017-02-10

## 2017-02-10 DIAGNOSIS — E11.42 TYPE 2 DIABETES MELLITUS WITH DIABETIC POLYNEUROPATHY, WITH LONG-TERM CURRENT USE OF INSULIN (H): Primary | ICD-10-CM

## 2017-02-10 DIAGNOSIS — Z79.4 TYPE 2 DIABETES MELLITUS WITH DIABETIC POLYNEUROPATHY, WITH LONG-TERM CURRENT USE OF INSULIN (H): Primary | ICD-10-CM

## 2017-02-10 RX ORDER — BLOOD-GLUCOSE METER
EACH MISCELLANEOUS
Qty: 100 EACH | Status: SHIPPED | OUTPATIENT
Start: 2017-02-10 | End: 2019-09-11

## 2017-02-21 ENCOUNTER — OFFICE VISIT - HEALTHEAST (OUTPATIENT)
Dept: PHYSICAL THERAPY | Facility: REHABILITATION | Age: 52
End: 2017-02-21

## 2017-02-21 DIAGNOSIS — R29.898 MUSCULAR DECONDITIONING: ICD-10-CM

## 2017-02-21 DIAGNOSIS — M79.605 LOWER EXTREMITY PAIN, LEFT: ICD-10-CM

## 2017-02-21 DIAGNOSIS — Z74.09 DECREASED FUNCTIONAL MOBILITY AND ENDURANCE: ICD-10-CM

## 2017-02-21 DIAGNOSIS — R26.89 POOR BALANCE: ICD-10-CM

## 2017-03-08 ENCOUNTER — OFFICE VISIT (OUTPATIENT)
Dept: FAMILY MEDICINE | Facility: CLINIC | Age: 52
End: 2017-03-08

## 2017-03-08 VITALS
WEIGHT: 239.2 LBS | DIASTOLIC BLOOD PRESSURE: 84 MMHG | TEMPERATURE: 97.5 F | BODY MASS INDEX: 48.31 KG/M2 | HEART RATE: 80 BPM | SYSTOLIC BLOOD PRESSURE: 141 MMHG

## 2017-03-08 DIAGNOSIS — Z12.11 SCREENING FOR COLON CANCER: ICD-10-CM

## 2017-03-08 DIAGNOSIS — Z79.4 TYPE 2 DIABETES MELLITUS WITH HYPERGLYCEMIA, WITH LONG-TERM CURRENT USE OF INSULIN (H): ICD-10-CM

## 2017-03-08 DIAGNOSIS — G89.4 CHRONIC PAIN SYNDROME: Primary | ICD-10-CM

## 2017-03-08 DIAGNOSIS — E11.8 TYPE 2 DIABETES MELLITUS WITH COMPLICATION, WITH LONG-TERM CURRENT USE OF INSULIN (H): ICD-10-CM

## 2017-03-08 DIAGNOSIS — E11.65 TYPE 2 DIABETES MELLITUS WITH HYPERGLYCEMIA, WITH LONG-TERM CURRENT USE OF INSULIN (H): ICD-10-CM

## 2017-03-08 DIAGNOSIS — Z79.4 TYPE 2 DIABETES MELLITUS WITH COMPLICATION, WITH LONG-TERM CURRENT USE OF INSULIN (H): ICD-10-CM

## 2017-03-08 LAB
AMPHETAMINES QUAL: NEGATIVE
BARBITURATES QUAL URINE: NEGATIVE
BENZODIAZEPINE QUAL URINE: NEGATIVE
BUPRENORPHINE QUAL URINE: NEGATIVE
CANNABINOIDS UR QL SCN: NEGATIVE
COCAINE QUAL URINE: NEGATIVE
METHAMPHETAMINE: NEGATIVE
METHODONE QUAL: NEGATIVE
MORPHINE QUAL: NEGATIVE
OXYCODONE QUAL: NEGATIVE
TEMPERATURE OF URINE WAS BETWEEN 90-100 DEGREES F: YES

## 2017-03-08 RX ORDER — INSULIN ASPART 100 [IU]/ML
INJECTION, SOLUTION INTRAVENOUS; SUBCUTANEOUS
Qty: 180 ML | Refills: 11 | Status: SHIPPED | OUTPATIENT
Start: 2017-03-08 | End: 2018-12-11

## 2017-03-08 RX ORDER — INSULIN GLARGINE 100 [IU]/ML
INJECTION, SOLUTION SUBCUTANEOUS
Qty: 1500 ML | Refills: 11 | Status: SHIPPED | OUTPATIENT
Start: 2017-03-08 | End: 2018-12-11

## 2017-03-08 RX ORDER — METFORMIN HYDROCHLORIDE 500 MG/5ML
500 SOLUTION ORAL 2 TIMES DAILY
Qty: 300 ML | Refills: 12 | Status: SHIPPED | OUTPATIENT
Start: 2017-03-08 | End: 2018-12-11

## 2017-03-08 RX ORDER — OXYCODONE AND ACETAMINOPHEN 5; 325 MG/1; MG/1
TABLET ORAL
Qty: 120 TABLET | Refills: 0 | Status: SHIPPED | OUTPATIENT
Start: 2017-03-08 | End: 2017-04-05

## 2017-03-08 RX ORDER — PEG-3350, SODIUM SULFATE, SODIUM CHLORIDE, POTASSIUM CHLORIDE, SODIUM ASCORBATE AND ASCORBIC ACID 7.5-2.691G
KIT ORAL
Qty: 1 EACH | Refills: 0 | Status: SHIPPED
Start: 2017-03-08 | End: 2018-12-11

## 2017-03-08 NOTE — MR AVS SNAPSHOT
After Visit Summary   3/8/2017    Ruthy Álvarez    MRN: [id number]           Patient Information     Date Of Birth          1965        Visit Information        Provider Department      3/8/2017 11:00 AM Jose Angel Meyer MD [facility]        Today's Diagnoses     Chronic pain syndrome    -  1    Screening for colon cancer        Type 2 diabetes mellitus with hyperglycemia, with long-term current use of insulin (H)        Type 2 diabetes mellitus with complication, with long-term current use of insulin (H)          Care Instructions    Colon prep has been sent to your pharmacy today ([facility]).    Make sure next time you come you bring your LOG BOOK for your blood sugars (PUT BOOK IN YOUR PURSE RIGHT AWAY)!!!!    Increase your Lantus from 40 units to 50 units a day.    Follow up with Dr. Meyer in 1 month.    Start taking 1 tsp two times daily of the Metformin.    Start taking 1 tsp three times daily of the Gabapentin.      Thank you for coming to [facility].  **If you had lab testing today and your results are reassuring or normal they will be be mailed to you within 7 days.   **If the lab tests need quick action we will call you with the results.  The phone number we will call with results is # [phone] (home) . If this is not the best number please call our clinic and change the number.  If you need any refills please call your pharmacy and they will contact us.  If you have any concerns about today's visit or wish to schedule another appointment please call our office during normal business hours [phone] (8-5:00 M-F)  If you have urgent medical concerns please call [phone] at any time of the day.  If you a medical emergency please call [phone]  Again thank you for choosing [facility] and please let us know how we can best partner with you to improve you and your family's health.                      Follow-ups after your visit        Who to contact      Please call your clinic at [phone] to:    Ask questions about your health    Make or cancel appointments    Discuss your medicines    Learn about your test results    Speak to your doctor   If you have compliments or concerns about an experience at your clinic, or if you wish to file a complaint, please contact [facility] Patient Relations at [phone] or email us at [email or web address]         Additional Information About Your Visit        [email or web address] Information     Stublisher is an electronic gateway that provides easy, online access to your medical records. With Stublisher, you can request a clinic appointment, read your test results, renew a prescription or communicate with your care team.     To sign up for Stublisher visit the website at [email or web address]   You will be asked to enter the access code listed below, as well as some personal information. Please follow the directions to create your username and password.     Your access code is: [email or web address]  Expires: 2017 12:08 PM     Your access code will  in 90 days. If you need help or a new code, please contact your [facility] Clinic or call [phone] for assistance.        Care EveryWhere ID     This is your Care EveryWhere ID. This could be used by other organizations to access your [place] medical records  [id number]        Your Vitals Were     Pulse Temperature BMI (Body Mass Index)             80 97.5  F (36.4  C) (Oral) 48.31 kg/m2          Blood Pressure from Last 3 Encounters:   17 141/84   17 134/84   01/10/17 96/65    Weight from Last 3 Encounters:   17 239 lb 3.2 oz (108.5 kg)   17 236 lb 12.8 oz (107.4 kg)   16 239 lb 12.8 oz (108.8 kg)              We Performed the Following     Rapid Urine Drug Screen ([facility])          Today's Medication Changes          These changes are accurate as of: 3/8/17 12:18 PM.  If you have any  questions, ask your nurse or doctor.               Start taking these medicines.        Dose/Directions    metFORMIN 500 MG/5ML Soln solution   Commonly known as:  GLUCOPHAGE   Used for:  Type 2 diabetes mellitus with hyperglycemia, with long-term current use of insulin (H)   Started by:  Jose Angel Meyer MD        Dose:  500 mg   Take 5 mLs (500 mg) by mouth 2 times daily   Quantity:  300 mL   Refills:  12       PEG-KCl-NaCl-NaSulf-Na Asc-C 100 G Solr   Commonly known as:  MOVIPREP   Used for:  Screening for colon cancer   Started by:  Jose Angel Meyer MD        100mg SIml as directed. Use for patients with kidney disease, etc.   Quantity:  1 each   Refills:  0         These medicines have changed or have updated prescriptions.        Dose/Directions    LANTUS SOLOSTAR 100 UNIT/ML injection   This may have changed:  additional instructions   Used for:  Type 2 diabetes mellitus with complication, with long-term current use of insulin (H)   Generic drug:  insulin glargine   Changed by:  Jose Angel Meyer MD        Take 50 units at bedtime daily   Quantity:  1500 mL   Refills:  11       NovoLOG FLEXPEN 100 UNIT/ML injection   This may have changed:  additional instructions   Used for:  Type 2 diabetes mellitus with hyperglycemia, with long-term current use of insulin (H)   Generic drug:  insulin aspart   Changed by:  Jose Angel Meyer MD        18 units before meals (do not take without a meal).   Quantity:  180 mL   Refills:  11            Where to get your medicines      These medications were sent to [facility] - [place], [place] - [address], [place] [address]     Phone:  [phone]     LANTUS SOLOSTAR 100 UNIT/ML injection    metFORMIN 500 MG/5ML Soln solution    NovoLOG FLEXPEN 100 UNIT/ML injection    PEG-KCl-NaCl-NaSulf-Na Asc-C 100 G Solr         Some of these will need a paper prescription and others can be bought over the counter.  Ask your nurse  if you have questions.     Bring a paper prescription for each of these medications     oxyCODONE-acetaminophen 5-325 MG per tablet                Primary Care Provider Office Phone # Fax #    Jose Angel Meyer [phone]       [address] [address]        Thank you!     Thank you for choosing [facility]  for your care. Our goal is always to provide you with excellent care. Hearing back from our patients is one way we can continue to improve our services. Please take a few minutes to complete the written survey that you may receive in the mail after your visit with us. Thank you!             Your Updated Medication List - Protect others around you: Learn how to safely use, store and throw away your medicines at www.disposemymeds.org.          This list is accurate as of: 3/8/17 12:18 PM.  Always use your most recent med list.                   Brand Name Dispense Instructions for use    * albuterol (2.5 MG/3ML) 0.083% neb solution     1 Box    Take 1 vial (2.5 mg) by nebulization every 6 hours as needed for shortness of breath / dyspnea       * albuterol 108 (90 BASE) MCG/ACT Inhaler    VENTOLIN HFA    3 Inhaler    Inhale 2 puffs into the lungs 4 times daily Inhale 2 puffs every 4-6 hours as needed       aspirin 81 MG tablet     100 tablet    Take 1 tablet (81 mg) by mouth daily       atorvastatin 10 MG tablet    LIPITOR    30 tablet    1 po qday       * B-12 1000 MCG Tbcr     100 tablet    Take 1,000 mcg by mouth daily       * cyanocobalamin 1000 MCG Subl     100 tablet    1 po qd       B-D SINGLE USE SWABS REGULAR Pads     100 each    Apply 1 pad topically 3 times daily Use as directed       benazepril 40 MG tablet    LOTENSIN    30 tablet    Take 1 tablet (40 mg) by mouth daily       blood glucose monitoring test strip    no brand specified    1 Box    1 strip by In Vitro route 3 times daily Test blood glucose 4x daily with meals and at night        capsaicin 0.025 % Crea cream    ZOSTRIX    45 g    Apply 1 g topically 3 times daily       chlorthalidone 25 MG tablet    HYGROTON    30 tablet    Take 1 tablet (25 mg) by mouth daily       diclofenac 1 % Gel topical gel    VOLTAREN    100 g    Apply 4 grams 4 x daily as needed       DULoxetine 60 MG EC capsule    CYMBALTA    90 capsule    Take 1 capsule (60 mg) by mouth daily       famotidine 20 MG tablet    PEPCID    60 tablet    Take 1 tablet (20 mg) by mouth 2 times daily       ferrous sulfate 325 (65 FE) MG tablet    IRON    30 tablet    Take 1 tablet (325 mg) by mouth daily (with breakfast)       fluticasone-salmeterol 230-21 MCG/ACT inhaler    ADVAIR-HFA    36 g    Inhale 2 puffs into the lungs 2 times daily       furosemide 20 MG tablet    LASIX    90 tablet    One daily as needed for swelling       gabapentin 250 MG/5ML solution    NEURONTIN    900 mL    Take 1.5 tsp tid for one week, then increase to 2 tsp tid.       insulin pen needle 29G X 12.7MM    BD ULTRA-FINE    100 each    Use 4 times daily       LANTUS SOLOSTAR 100 UNIT/ML injection   Generic drug:  insulin glargine     1500 mL    Take 50 units at bedtime daily       lidocaine 5 % Patch    LIDODERM    40 patch    Apply up to 3 patches to painful area at once for up to 12 h within a 24 h period.  Remove after 12 hours.       ControlCircle FINEPOINT LANCETS Misc     100 each    Use to test blood sugars 2 times daily or as directed.       loratadine 10 MG tablet    CLARITIN    30 tablet    Take 1 tablet (10 mg) by mouth daily       magnesium oxide 400 (241.3 MG) MG tablet    MAG-OX    30 tablet    Take 1 tablet (400 mg) by mouth daily       metFORMIN 500 MG/5ML Soln solution    GLUCOPHAGE    300 mL    Take 5 mLs (500 mg) by mouth 2 times daily       NovoLOG FLEXPEN 100 UNIT/ML injection   Generic drug:  insulin aspart     180 mL    18 units before meals (do not take without a meal).       * order for DME     1 Device    Equipment being ordered: Wheelchair        * order for DME     1 Device    Equipment being ordered: one shower chair       * order for DME     1 Device    Equipment being ordered: 4 wheeled walker with seat       * order for DME     1 Device    Equipment being ordered: one wheeled walker with seat       oxyCODONE-acetaminophen 5-325 MG per tablet    PERCOCET    120 tablet    2 tabs po qhs, and 1 tab po bid during day, as needed.       PEG-KCl-NaCl-NaSulf-Na Asc-C 100 G Solr    MOVIPREP    1 each    100mg SIml as directed. Use for patients with kidney disease, etc.       risperDAL 0.25 MG tablet   Generic drug:  risperiDONE      Take 1 tablet (0.25 mg) by mouth At Bedtime       senna-docusate 8.6-50 MG per tablet    SENOKOT-S;PERICOLACE    60 tablet    Take 1 tablet by mouth 2 times daily       SUMAtriptan 25 MG tablet    IMITREX    24 tablet    One tab at onset of HA.  May repeat dose in 2 hours.  Do not exceed 200 mg in 24 hours       tolterodine 2 MG 24 hr capsule    DETROL LA    90 capsule    Take 1 capsule (2 mg) by mouth daily       vitamin D 2000 UNITS tablet     100 tablet    Take 2,000 Units by mouth daily       * Notice:  This list has 8 medication(s) that are the same as other medications prescribed for you. Read the directions carefully, and ask your doctor or other care provider to review them with you.

## 2017-03-08 NOTE — PROGRESS NOTES
Patient Active Problem List    Diagnosis Date Noted     Chronic pain syndrome 10/07/2016     Priority: High     Chronic Pain Diagnosis:  neuropathy  DIRE Total Score(s):14     ORT: 5    4 - 7 =  Moderate Risk   of future problems with Opioids  FAQ5: No flowsheet data found.  Behavioral Health Consultation: date   Personal Care Plan for Chronic Pain: most recent update    Opioid medication:oxycodone   Dose:10mg  Number of pills per month:60  Patient is being prescribed 10mg of oxycodone IR (Percocet) per day this is 15 mg Morphine Equivalents  Benzodiazepines Prescribed? No  Naloxone prescribed? No       Clinic visit frequency required: Q 1 month Next visit due:   Controlled Substance/Opioid Treatment agreement on file (dated <12 months ago)?:    Date(s): pending  Last [place] verification: last visit  Items in red to be updated at each visit  Patient is followed by Data Unavailable for ongoing prescription of pain medication.  All refills should be approved by this provider, or covering partner.Chronic Pain          Microalbuminuria 10/12/2015     Priority: High     Type 2 diabetes mellitus with diabetic polyneuropathy (H) 10/12/2015     Priority: High     Bilateral ankle joint pain 01/15/2016     Priority: Medium     Esophageal reflux 04/07/2015     Priority: Medium     Urinary incontinence 02/19/2015     Priority: Medium     Insomnia 02/19/2015     Priority: Medium     HTN (hypertension) 08/26/2013     Priority: Medium     Hyperlipidemia LDL goal <100 06/21/2013     Priority: Medium     S/P bariatric surgery 06/21/2013     Priority: Medium     Kim-en-Y in 2006       Neck pain 05/16/2013     Priority: Medium     Moderate major depression (H) 01/24/2013     Priority: Medium     Moderate persistent asthma 11/15/2012     Priority: Medium     Pulmonary embolism and infarction (H) 11/15/2012     Priority: Medium     Problem list name updated by automated process. Provider to review       Health Care Home 11/15/2012      Priority: Medium     Tier 3    Status: Accept  Care Coordination Start Date: 02/27/12  State Tier Level: Level 3  Language/Barrier to Learning: No  Significant Mental Health Issues: Yes  Date Care Coordination Discontinued: 05/28/15    DX V65.8 REPLACED WITH 72289 HEALTH CARE HOME (04/08/2013)         S/P hysterectomy 06/21/2013     Priority: Low     2008, For fibroids.  Cervix removed, but still has ovaries.       Carpal tunnel syndrome 11/15/2012     Priority: Low     Necrotizing fasciitis (H) 11/15/2012     Priority: Low     Abnormal Pap smear of cervix      Priority: Low     3-12-15: Pap/HPV neg.  Plan: Given hx (mildly abn pap, hyster), recheck in 5 yrs.  10-11-11: Pap/HPV neg.  Plan: Given ASUS pap in 2010, recommend repeating pap in 1 year.  8/3/2010 ASCUS Pap with +BV- pt treated with Metrogel.  Pt needs repeat Pap in 1yr.  8/4/2008 Pt had total hyst for menorrhagia, pelvic pain, scar tissue, and fibroids.       There are no exam notes on file for this visit.  Chief Complaint   Patient presents with     Diabetes     DM check up      Refill Request     Medicaiton refill on pain medication      Blood pressure 141/84, pulse 80, temperature 97.5  F (36.4  C), temperature source Oral, weight 239 lb 3.2 oz (108.5 kg), not currently breastfeeding.   SUBJECTIVE: Ruthy Álvarez is here for followup of chronic pain syndrome and diabetes.  Her pain control during the day is adequate.  She takes one or two oxycodone at night.  It is more of a problematic pain at night.  It is a burning, aching, discomfort that affects her sleep, and it is associated with spasm.  Gabapentin doesn't really help.     She takes metformin b.i.d. as well and would like to cut back on the dose because of the GI upset it causes.  I reviewed her blood sugars.  In the morning, they are very high, often in the 300s and sometimes in the 400s.  Postprandial in the morning, they about the same.  In the afternoon, they are in the 200s-300s.  In the  evening, before bedtime, they are a little bit lower, in the 100s.  She can't do sliding scale because she can't figure out what insulin dose to take.  She remembers to take Lantus each night and then Humalog 18 units t.i.d. during the day.  She doesn't have hypoglycemia.   OBJECTIVE:  Exam not repeated today other than to note blood pressure   ASSESSMENT AND PLAN:   1.  Type II diabetes mellitus, suboptimal control.  We'll increase her Lantus insulin from 40 units to 50 units daily and monitor it again in one month.  We'll continue NovoLog.  For the GI intolerance of metformin, we'll cut back the dose from 1000 mg to 500 mg b.i.d.  She takes this in suspension.   2.  Chronic pain.  We're trying to help control pain while using the least amount of medications.  She prioritizes ?? at present, so we'll cut back gabapentin from current dose to one teaspoon t.i.d., but we'll refill oxycodone.  UDS is pending.  There is some constipation.  Her functional status is unchanged.  She is able to walk about a block and a half, which is a source of pride for her.         Take 1 tsp tid of jacquelin  Dec metfromin to 500 bid (was 1000)

## 2017-03-08 NOTE — PATIENT INSTRUCTIONS
Colon prep has been sent to your pharmacy today ([facility]).    Make sure next time you come you bring your LOG BOOK for your blood sugars (PUT BOOK IN YOUR PURSE RIGHT AWAY)!!!!    Increase your Lantus from 40 units to 50 units a day.    Follow up with Dr. Meyer in 1 month.    Start taking 1 tsp two times daily of the Metformin.    Start taking 1 tsp three times daily of the Gabapentin.      Thank you for coming to [facility].  **If you had lab testing today and your results are reassuring or normal they will be be mailed to you within 7 days.   **If the lab tests need quick action we will call you with the results.  The phone number we will call with results is # [phone] (home) . If this is not the best number please call our clinic and change the number.  If you need any refills please call your pharmacy and they will contact us.  If you have any concerns about today's visit or wish to schedule another appointment please call our office during normal business hours [phone] (8-5:00 M-F)  If you have urgent medical concerns please call [phone] at any time of the day.  If you a medical emergency please call [phone]  Again thank you for choosing [facility] and please let us know how we can best partner with you to improve you and your family's health.

## 2017-03-14 ENCOUNTER — OFFICE VISIT (OUTPATIENT)
Dept: FAMILY MEDICINE | Facility: CLINIC | Age: 52
End: 2017-03-14

## 2017-03-14 VITALS
OXYGEN SATURATION: 98 % | WEIGHT: 240 LBS | SYSTOLIC BLOOD PRESSURE: 132 MMHG | DIASTOLIC BLOOD PRESSURE: 78 MMHG | HEIGHT: 60 IN | BODY MASS INDEX: 47.12 KG/M2 | HEART RATE: 93 BPM | TEMPERATURE: 97.9 F

## 2017-03-14 DIAGNOSIS — Z01.818 PREOP GENERAL PHYSICAL EXAM: Primary | ICD-10-CM

## 2017-03-14 ASSESSMENT — MIFFLIN-ST. JEOR: SCORE: 1593.8

## 2017-03-14 NOTE — MR AVS SNAPSHOT
After Visit Summary   3/14/2017    Ruthy Álvarez    MRN: [id number]           Patient Information     Date Of Birth          1965        Visit Information        Provider Department      3/14/2017 2:30 PM Sandi Quick MD [facility]        Today's Diagnoses     Preop general physical exam    -  1      Care Instructions      Presurgery Checklist  You are scheduled to have surgery. The healthcare staff will try to make your stay comfortable. Use the guidelines below to remind yourself what to do before surgery. Be sure to follow any specific pre-op instructions from your surgeon or nurse.   Preparing for Surgery  Ask your surgeon if you ll need a blood transfusion during surgery and if so, how to prepare for it. In some cases, you can donate blood before surgery. If needed, this blood can be given back (transfused) to you during or after surgery.  If you are having abdominal surgery, ask what you need to do to clear your bowel.  Tell your surgeon if you have allergies to any medications or foods.  Arrange for an adult family member or friend to drive you home after surgery. If possible, have someone ready to help you at home as you recover.  Call the surgeon if you get a cold, fever, sore throat, diarrhea, or other health problem just before surgery. Your surgeon can decide whether or not to postpone the surgery.  Medications  Tell your surgeon about all medications you take, including prescription and over-the-counter products such as herbal remedies and vitamins. Ask if you should continue taking them.  If you take ibuprofen, naproxen, or  blood thinners  such as aspirin, clopidogrel (Plavix), or warfarin (Coumadin), ask your surgeon whether you should stop taking them and how long before surgery you should stop.  You may be told to take antibiotics just before surgery to prevent infection. If so, follow instructions carefully on how to take them.  If you are told to take medications  called anticoagulants to prevent blood clots after surgery, be sure to follow the instructions on how to take them.  Stop Smoking  If you smoke, healing may take longer. So at least 2 week(s) before surgery, stop smoking.  Bathing or Showering Before Surgery  If instructed, wash with antibacterial soap. Afterward, do not use lotions or powders.  If you are having surgery on the head, you may be asked to shampoo with antibacterial soap. Follow instructions for doing so.  Do Not Remove Hair from the Surgery Site  Do not shave hair from the incision site, unless you are given specific instructions to do so. Usually, if hair needs to be removed, it will be done at the hospital right before surgery.  Don t Eat or Drink  Your doctor will tell you when to stop eating and drinking. If you do not follow your doctor's instructions, your procedure may be postponed or rescheduled for another day.  If your surgeon tells you to continue any medications, take them with small sips of water.  You can brush your teeth and rinse your mouth, but don t swallow any water.  Day of Surgery  Do not wear makeup. Do not use perfume, deodorant, or hairspray. Remove nail polish and artificial nails.  Leave jewelry (including rings), watches, and other valuables at home.  Be sure to bring health insurance cards or forms and a photo ID.  Bring a list of your medications (include the name, dose, how often you take them, and the time last dose was taken).  Arrive on time at the hospital or surgery facility.        Follow-ups after your visit        Your next 10 appointments already scheduled     Apr 05, 2017 10:20 AM CDT   Return Visit with Jose Angel Meyer MD   [facility])    [address] [place] [address]   [phone]              Who to contact     Please call your clinic at [phone] to:    Ask questions about your health    Make or cancel appointments    Discuss your medicines    Learn about your test  "results    Speak to your doctor   If you have compliments or concerns about an experience at your clinic, or if you wish to file a complaint, please contact [facility] Patient Relations at [phone] or email us at [email or web address]         Additional Information About Your Visit        [employer] Information     My[employer]Palt is an electronic gateway that provides easy, online access to your medical records. With [employer], you can request a clinic appointment, read your test results, renew a prescription or communicate with your care team.     To sign up for [employer] visit the website at [email or web address]   You will be asked to enter the access code listed below, as well as some personal information. Please follow the directions to create your username and password.     Your access code is: [email or web address]  Expires: 2017  1:08 PM     Your access code will  in 90 days. If you need help or a new code, please contact your [facility] Clinic or call [phone] for assistance.        Care EveryWhere ID     This is your Care EveryWhere ID. This could be used by other organizations to access your [place] medical records  [id number]        Your Vitals Were     Pulse Temperature Height Pulse Oximetry BMI (Body Mass Index)       93 97.9  F (36.6  C) (Oral) 4' 11.5\" (151.1 cm) 98% 47.66 kg/m2        Blood Pressure from Last 3 Encounters:   17 132/78   17 141/84   17 134/84    Weight from Last 3 Encounters:   17 240 lb (108.9 kg)   17 239 lb 3.2 oz (108.5 kg)   17 236 lb 12.8 oz (107.4 kg)              Today, you had the following     No orders found for display       Primary Care Provider Office Phone # Fax #    Jose Angel Meyer [phone]       [address] [address]        Thank you!     Thank you for choosing [facility]  for your care. Our goal is always " to provide you with excellent care. Hearing back from our patients is one way we can continue to improve our services. Please take a few minutes to complete the written survey that you may receive in the mail after your visit with us. Thank you!             Your Updated Medication List - Protect others around you: Learn how to safely use, store and throw away your medicines at www.disposemymeds.org.          This list is accurate as of: 3/14/17  3:05 PM.  Always use your most recent med list.                   Brand Name Dispense Instructions for use    * albuterol (2.5 MG/3ML) 0.083% neb solution     1 Box    Take 1 vial (2.5 mg) by nebulization every 6 hours as needed for shortness of breath / dyspnea       * albuterol 108 (90 BASE) MCG/ACT Inhaler    VENTOLIN HFA    3 Inhaler    Inhale 2 puffs into the lungs 4 times daily Inhale 2 puffs every 4-6 hours as needed       aspirin 81 MG tablet     100 tablet    Take 1 tablet (81 mg) by mouth daily       atorvastatin 10 MG tablet    LIPITOR    30 tablet    1 po qday       * B-12 1000 MCG Tbcr     100 tablet    Take 1,000 mcg by mouth daily       * cyanocobalamin 1000 MCG Subl     100 tablet    1 po qd       B-D SINGLE USE SWABS REGULAR Pads     100 each    Apply 1 pad topically 3 times daily Use as directed       benazepril 40 MG tablet    LOTENSIN    30 tablet    Take 1 tablet (40 mg) by mouth daily       blood glucose monitoring test strip    no brand specified    1 Box    1 strip by In Vitro route 3 times daily Test blood glucose 4x daily with meals and at night       capsaicin 0.025 % Crea cream    ZOSTRIX    45 g    Apply 1 g topically 3 times daily       chlorthalidone 25 MG tablet    HYGROTON    30 tablet    Take 1 tablet (25 mg) by mouth daily       diclofenac 1 % Gel topical gel    VOLTAREN    100 g    Apply 4 grams 4 x daily as needed       DULoxetine 60 MG EC capsule    CYMBALTA    90 capsule    Take 1 capsule (60 mg) by mouth daily       famotidine 20 MG  tablet    PEPCID    60 tablet    Take 1 tablet (20 mg) by mouth 2 times daily       ferrous sulfate 325 (65 FE) MG tablet    IRON    30 tablet    Take 1 tablet (325 mg) by mouth daily (with breakfast)       fluticasone-salmeterol 230-21 MCG/ACT inhaler    ADVAIR-HFA    36 g    Inhale 2 puffs into the lungs 2 times daily       furosemide 20 MG tablet    LASIX    90 tablet    One daily as needed for swelling       gabapentin 250 MG/5ML solution    NEURONTIN    900 mL    Take 1.5 tsp tid for one week, then increase to 2 tsp tid.       insulin pen needle 29G X 12.7MM    BD ULTRA-FINE    100 each    Use 4 times daily       LANTUS SOLOSTAR 100 UNIT/ML injection   Generic drug:  insulin glargine     1500 mL    Take 50 units at bedtime daily       lidocaine 5 % Patch    LIDODERM    40 patch    Apply up to 3 patches to painful area at once for up to 12 h within a 24 h period.  Remove after 12 hours.       [employer] [place] FINEPOINT LANCETS Misc     100 each    Use to test blood sugars 2 times daily or as directed.       loratadine 10 MG tablet    CLARITIN    30 tablet    Take 1 tablet (10 mg) by mouth daily       magnesium oxide 400 (241.3 MG) MG tablet    MAG-OX    30 tablet    Take 1 tablet (400 mg) by mouth daily       metFORMIN 500 MG/5ML Soln solution    GLUCOPHAGE    300 mL    Take 5 mLs (500 mg) by mouth 2 times daily       NovoLOG FLEXPEN 100 UNIT/ML injection   Generic drug:  insulin aspart     180 mL    18 units before meals (do not take without a meal).       * order for DME     1 Device    Equipment being ordered: Wheelchair       * order for DME     1 Device    Equipment being ordered: one shower chair       * order for DME     1 Device    Equipment being ordered: 4 wheeled walker with seat       * order for DME     1 Device    Equipment being ordered: one wheeled walker with seat       oxyCODONE-acetaminophen 5-325 MG per tablet    PERCOCET    120 tablet    2 tabs po qhs, and 1 tab po bid during day, as needed.        PEG-KCl-NaCl-NaSulf-Na Asc-C 100 G Solr    MOVIPREP    1 each    100mg SIml as directed. Use for patients with kidney disease, etc.       risperDAL 0.25 MG tablet   Generic drug:  risperiDONE      Take 1 tablet (0.25 mg) by mouth At Bedtime       senna-docusate 8.6-50 MG per tablet    SENOKOT-S;PERICOLACE    60 tablet    Take 1 tablet by mouth 2 times daily       SUMAtriptan 25 MG tablet    IMITREX    24 tablet    One tab at onset of HA.  May repeat dose in 2 hours.  Do not exceed 200 mg in 24 hours       tolterodine 2 MG 24 hr capsule    DETROL LA    90 capsule    Take 1 capsule (2 mg) by mouth daily       vitamin D 2000 UNITS tablet     100 tablet    Take 2,000 Units by mouth daily       * Notice:  This list has 8 medication(s) that are the same as other medications prescribed for you. Read the directions carefully, and ask your doctor or other care provider to review them with you.

## 2017-03-14 NOTE — PROGRESS NOTES
.      [address] [address]  Phone: [phone]  Fax: [phone]    3/14/2017    Adult PRE-OP Evaluation:    Ruthy Álvarez, 1965 presents for pre-operative evaluation and assessment as requested by Dr. Street, prior to undergoing surgery/procedure for treatment of  Colon cancer screening .    Proposed procedure: Colonoscopy     Date of Surgery/ Procedure: 3/15/17  Hospital/Surgical Facility: [facility], Fax: [phone]     Primary Physician: Jose Angel Meyer  Type of Anesthesia Anticipated: MAC  History of anesthesia complications: NONE  History of  abnormal bleeding: NONE   History of blood transfusions: NO  Patient has a Health Care Directive or Living Will:  NO    Preoperative Questions   1. NO - Do you have a history of heart attack, stroke, stent, bypass or surgery on an artery in the head, neck, heart or legs?  3. YES - Have you ever had a severe pain across the front of your chest lasting for half an hour or more? YES - does have occasional chest pains, worked up two months ago and not thought to be her heart   3. NO - Have you ever had a severe pain across the front of your chest lasting for half an hour or more?  4. NO - Do you have a history of Congestive Heart Failure?  5. NO - Are you troubled by shortness of breath when: walking on the level/ up a slight hill/ at night?  6. NO - Does your chest ever sound wheezy or whistling?  7. NO - Do you currently have a cold, bronchitis or other respiratory infection?  8. NO - Have you had a cold, bronchitis or other respiratory infection within the last 2 weeks?  9. NO - Do you usually have a cough?  11. NO - Do you or anyone in your family have previous history of blood clots?  12. NO - Do you or does anyone in your family have a serious bleeding problem such as prolonged bleeding following surgeries or cuts?  13. YES - Have you ever had problems with anemia or been told to take iron pills? Currently on iron  pills.   14. NO - Have you had any abnormal blood loss such as black, tarry or bloody stools, or abnormal vaginal bleeding?  15. YES - Have you ever had a blood transfusion? YES - 2008, with history of necrotizing fascitiis, per patient.  Not since.   16. NO - Have you or any of your relatives ever had problems with anesthesia?  17. NO - Do you have sleep apnea, excessive snoring or daytime drowsiness?  18. NO - Do you have any prosthetic heart valves?  19. NO - Do you have prosthetic joints?  20. NO - Is there any chance that you may be pregnant?    Patient Active Problem List   Diagnosis     Abnormal Pap smear of cervix     Moderate persistent asthma     Carpal tunnel syndrome     Necrotizing fasciitis (H)     Pulmonary embolism and infarction (H)     Health Care Home     Moderate major depression (H)     Neck pain     S/P hysterectomy     Hyperlipidemia LDL goal <100     S/P bariatric surgery     HTN (hypertension)     Urinary incontinence     Insomnia     Esophageal reflux     Microalbuminuria     Type 2 diabetes mellitus with diabetic polyneuropathy (H)     Bilateral ankle joint pain     Chronic pain syndrome         Current Outpatient Prescriptions on File Prior to Visit:  PEG-KCl-NaCl-NaSulf-Na Asc-C (MOVIPREP) 100 G SOLR 100mgSIml as directed.Use for patients with kidney disease, etc.   NOVOLOG FLEXPEN 100 UNIT/ML soln 18 units before meals (do not take without a meal).   LANTUS SOLOSTAR 100 UNIT/ML soln Take 50 units at bedtime daily   oxyCODONE-acetaminophen (PERCOCET) 5-325 MG per tablet 2 tabs po qhs, and 1 tab po bid during day, as needed.   metFORMIN (GLUCOPHAGE) 500 MG/5ML SOLN solution Take 5 mLs (500 mg) by mouth 2 times daily   [place] FINEPOINT LANCETS MISC Use to test blood sugars 2 times daily or as directed.   furosemide (LASIX) 20 MG tablet One daily as needed for swelling   atorvastatin (LIPITOR) 10 MG tablet 1 po qday   senna-docusate (SENOKOT-S;PERICOLACE) 8.6-50 MG per tablet Take  1 tablet by mouth 2 times daily   benazepril (LOTENSIN) 40 MG tablet Take 1 tablet (40 mg) by mouth daily   chlorthalidone (HYGROTON) 25 MG tablet Take 1 tablet (25 mg) by mouth daily   capsaicin (ZOSTRIX) 0.025 % CREA Apply 1 g topically 3 times daily   cyanocobalamin 1000 MCG SUBL 1 po qd   magnesium oxide (MAG-OX) 400 (241.3 MG) MG tablet Take 1 tablet (400 mg) by mouth daily   Cyanocobalamin (B-12) 1000 MCG TBCR Take 1,000 mcg by mouth daily   aspirin 81 MG tablet Take 1 tablet (81 mg) by mouth daily   Cholecalciferol (VITAMIN D) 2000 UNITS tablet Take 2,000 Units by mouth daily   famotidine (PEPCID) 20 MG tablet Take 1 tablet (20 mg) by mouth 2 times daily   gabapentin (NEURONTIN) 250 MG/5ML solution Take 1.5 tsp tid for one week, then increase to 2 tsp tid.   order for DME Equipment being ordered: one wheeled walker with seat   order for DME Equipment being ordered: 4 wheeled walker with seat   insulin pen needle (BD ULTRA-FINE) 29G X 12.7MM Use 4 times daily   order for DME Equipment being ordered: Wheelchair   order for DME Equipment being ordered: one shower chair   tolterodine (DETROL LA) 2 MG 24 hr capsule Take 1 capsule (2 mg) by mouth daily   diclofenac (VOLTAREN) 1 % GEL Apply 4 grams 4 x daily as needed   fluticasone-salmeterol (ADVAIR-HFA) 230-21 MCG/ACT inhaler Inhale 2 puffs into the lungs 2 times daily   lidocaine (LIDODERM) 5 % patch Apply up to 3 patches to painful area at once for up to 12 h within a 24 h period.  Remove after 12 hours.   albuterol (VENTOLIN HFA) 108 (90 BASE) MCG/ACT inhaler Inhale 2 puffs into the lungs 4 times daily Inhale 2 puffs every 4-6 hours as needed   Alcohol Swabs (B-D SINGLE USE SWABS REGULAR) PADS Apply 1 pad topically 3 times daily Use as directed   blood glucose test strip 1 strip by In Vitro route 3 times daily Test blood glucose 4x daily with meals and at night   loratadine (CLARITIN) 10 MG tablet Take 1 tablet (10 mg) by mouth daily   SUMAtriptan (IMITREX) 25  "MG tablet One tab at onset of HA.  May repeat dose in 2 hours.  Do not exceed 200 mg in 24 hours   risperiDONE (RISPERDAL) 0.25 MG tablet Take 1 tablet (0.25 mg) by mouth At Bedtime   ferrous sulfate (IRON) 325 (65 FE) MG tablet Take 1 tablet (325 mg) by mouth daily (with breakfast)   albuterol (2.5 MG/3ML) 0.083% nebulizer solution Take 1 vial (2.5 mg) by nebulization every 6 hours as needed for shortness of breath / dyspnea   DULoxetine (CYMBALTA) 60 MG capsule Take 1 capsule (60 mg) by mouth daily     No current facility-administered medications on file prior to visit.     OTC products: None, except as noted above    Allergies   Allergen Reactions     Nkda [No Known Drug Allergies]      Latex Allergy: NO  Not a smoker     REVIEW OF SYSTEMS:   Constitutional, HEENT, cardiovascular, pulmonary, gi and gu systems are negative, except as otherwise noted.    EXAM:     Patient Vitals for the past 24 hrs:   BP Temp Temp src Pulse SpO2 Height Weight   03/14/17 1416 132/78 97.9  F (36.6  C) Oral 93 98 % 4' 11.5\" (151.1 cm) 240 lb (108.9 kg)     Body mass index is 47.66 kg/(m^2).  GENERAL: healthy, alert and no distress  EYES: Eyes grossly normal to inspection, extraocular movements - intact, and PERRL  HENT: ear canals- normal; TMs- normal; Nose- normal; Mouth- no ulcers, no lesions  RESP: lungs clear to auscultation - no rales, no rhonchi, no wheezes  CV: regular rates and rhythm, normal S1 S2, no S3 or S4 and no murmur, no click or rub -  ABDOMEN: soft, obese   MS: extremities- no gross deformities noted, no edema  SKIN: no suspicious lesions, no rashes  NEURO: strength and tone- normal, sensory exam- grossly normal, mentation- intact, speech- normal, reflexes- symmetric      DIAGNOSTICS:      No labs or EKG required for low risk surgery (cataract, skin procedure, breast biopsy, etc)    Patient with normal electrolyed     RISK ASSESSMENT:     Cardiovascular Risk:  -Patient is able to perform ADL's without assistance " without chest pain. She has chronic leg pain which inhibits her ability to walk   -The patient does not have chest pain with exertion.  -Patient does not have a history of congestive heart failure.    -The patient does not have a history of stroke and does not have a history of valvular disease.    Pulmonary Risk:  -In terms of risk factors for pulmonary complication, the patient has no risk factors    Perioperative Complications:  -The patient does not have a history of bleeding or clotting problems in the past.    -The patient has not had complications from surgeries.    -The patient does not have a family history of any anesthesia or surgical complications.      IMPRESSION:   Reason for surgery/procedure: Colonoscopy     The proposed surgical procedure is considered LOW risk.    For above listed surgery and anesthesia:   Patient is at moderate risk for surgery/procedure and perioperative/procedure complications.    RECOMMENDATIONS:     Labs:  Patient with BMP on 2/7/17  Hemoglobin 10.6 on 9/16 - no concerns for active bleed  EKG not recommended for low risk procedure.     Fasting:  NPO for 12 hours prior to surgery    Preop Plan:  --Approval given to proceed with proposed procedure, without further diagnostic evaluation    Medications:  Patient should take their regular medications the morning of surgery unless otherwise instructed.    Patient is on ASA but has stopped it temporarily for surgery.       Sandi Quick MD    Please contact our office if there are any further questions or information required about this patient.

## 2017-03-15 ENCOUNTER — SURGERY - HEALTHEAST (OUTPATIENT)
Dept: SURGERY | Facility: HOSPITAL | Age: 52
End: 2017-03-15

## 2017-03-15 ENCOUNTER — TRANSFERRED RECORDS (OUTPATIENT)
Dept: HEALTH INFORMATION MANAGEMENT | Facility: CLINIC | Age: 52
End: 2017-03-15

## 2017-03-15 ENCOUNTER — ANESTHESIA - HEALTHEAST (OUTPATIENT)
Dept: SURGERY | Facility: HOSPITAL | Age: 52
End: 2017-03-15

## 2017-03-16 ENCOUNTER — TRANSFERRED RECORDS (OUTPATIENT)
Dept: HEALTH INFORMATION MANAGEMENT | Facility: CLINIC | Age: 52
End: 2017-03-16

## 2017-03-16 ENCOUNTER — SURGERY - HEALTHEAST (OUTPATIENT)
Dept: SURGERY | Facility: HOSPITAL | Age: 52
End: 2017-03-16

## 2017-03-16 ENCOUNTER — ANESTHESIA - HEALTHEAST (OUTPATIENT)
Dept: SURGERY | Facility: HOSPITAL | Age: 52
End: 2017-03-16

## 2017-04-05 ENCOUNTER — OFFICE VISIT (OUTPATIENT)
Dept: FAMILY MEDICINE | Facility: CLINIC | Age: 52
End: 2017-04-05

## 2017-04-05 VITALS
WEIGHT: 237.8 LBS | DIASTOLIC BLOOD PRESSURE: 77 MMHG | BODY MASS INDEX: 47.23 KG/M2 | SYSTOLIC BLOOD PRESSURE: 117 MMHG | HEART RATE: 79 BPM | OXYGEN SATURATION: 98 % | TEMPERATURE: 97.5 F

## 2017-04-05 DIAGNOSIS — R53.81 PHYSICAL DECONDITIONING: ICD-10-CM

## 2017-04-05 DIAGNOSIS — J45.909 UNCOMPLICATED ASTHMA, UNSPECIFIED ASTHMA SEVERITY: ICD-10-CM

## 2017-04-05 DIAGNOSIS — E83.42 HYPOMAGNESEMIA: ICD-10-CM

## 2017-04-05 DIAGNOSIS — I10 ESSENTIAL HYPERTENSION WITH GOAL BLOOD PRESSURE LESS THAN 140/90: ICD-10-CM

## 2017-04-05 DIAGNOSIS — K43.9 VENTRAL HERNIA WITHOUT OBSTRUCTION OR GANGRENE: Primary | ICD-10-CM

## 2017-04-05 DIAGNOSIS — G89.4 CHRONIC PAIN SYNDROME: ICD-10-CM

## 2017-04-05 RX ORDER — OXYCODONE AND ACETAMINOPHEN 5; 325 MG/1; MG/1
TABLET ORAL
Qty: 120 TABLET | Refills: 0 | Status: SHIPPED | OUTPATIENT
Start: 2017-04-05 | End: 2017-05-02

## 2017-04-05 NOTE — LETTER
April 10, 2017      Ruthy Álvarez  [address]   [facility] [address]        Dear Ruthy,    Your urine test was acceptable.   Additionally, upon review of your chart, I don't think I have yet referred you to our behavioral health team.  We require a one-time consult for people with chronic pain, as a part of our pain program.   I have placed a referral, so please expect to hear from our referral team about that as well.     Please see below for your test results.    Resulted Orders   Rapid Urine Drug Screen ([facility])   Result Value Ref Range    Amphetamines Qual NEGATIVE NEGATIVE    Barbiturates Qual Urine NEGATIVE NEGATIVE    Buprenorphine Qual Urine NEGATIVE NEGATIVE    Benzodiazepine Qual Urine NEGATIVE NEGATIVE    Cocaine Qual Urine NEGATIVE NEGATIVE    Cannabinoids Qual Urine NEGATIVE NEGATIVE    Methamphetamine Qual NEGATIVE NEGATIVE    Methadone Qual NEGATIVE NEGATIVE    Morphine Qual NEGATIVE NEGATIVE    Oxycodone Qual POSITIVE (A) NEGATIVE    Temperature of Urine was Between  Degrees F YES YES      Comment:      This is a preliminary screening test that detects drugs-of-abuse in urine at   specified detection levels.  To confirm preliminary results, a more specific   method such as Gas Chromatography/Mass Spectrometry (GC/MS) must be used.            If you have any questions, please call the clinic to make an appointment.    Sincerely,    Jose Angel Meyer MD

## 2017-04-05 NOTE — PROGRESS NOTES
Medication Management Note                                                       Ruthy was referred by Dr. Meyer for pharmacy services for Medication Management    MEDICATION REVIEW:  Discussed all medication indications, dosage and effectiveness, adverse effects, and adherence with patient/caregiver.    Pt had meds with them: no  Pt had med list with them: no  Pt was knowledgeable about meds: Somewhat  Medications set up by: PCA  Medications administered by someone else (e.g., LTC[facility]): No  Pt uses a medication box or automated dispenser: yes  Called pharmacy to obtain or clarify med list:  no  Called HHN or [facility] to obtain or clarify med list:  no      Medication Discrepancies  Medications on EMR med list that pt is NOT taking:  none  Medications pt IS taking that are NOT on EMR med list (e.g., from specialist, hospital): none  OTC meds/ dietary supplements pt taking on own that are NOT on EMR med list:  none  Dosage listed differently than how patient is taking: none  Frequency listed differently than how patient is taking: none  Duplicate medication on list (two occurrences of the same medication):  yes, B12  TOTAL NUMBER OF MEDICATION DISCREPANCIES:  1    Subjective                                                       Patient reports the following problems or concerns with their medications:  none  Patient reports the following adverse reactions to medications:  none  Pt reports missing doses:  2 to 3 times per week  Additional subjective information (e.g., reason for visit, frequency of PRNs, reasons meds were D/C ed):    Pt reports testing blood glucose 3-4 times per day     Averages: 110-130 (last 2 weeks)    Pt reports no hypoglycemia or issues with excessively elevated blood glucose outside of her average range    Pt reports increased use of albuterol nebulizer over the last week (related to being around smoker)    Advair compliance issues: 6-7 doses per week (Prescribed BID dosing)    Pt reports  compliance issues when her PCA is not available to remind her to take her medications.     Current prescription benefits through Medicare Silver Script Choice     Objective                                                       Patient Active Problem List   Diagnosis     Abnormal Pap smear of cervix     Moderate persistent asthma     Carpal tunnel syndrome     Necrotizing fasciitis (H)     Pulmonary embolism and infarction (H)     Health Care Home     Moderate major depression (H)     Neck pain     S/P hysterectomy     Hyperlipidemia LDL goal <100     S/P bariatric surgery     HTN (hypertension)     Urinary incontinence     Insomnia     Esophageal reflux     Microalbuminuria     Type 2 diabetes mellitus with diabetic polyneuropathy (H)     Bilateral ankle joint pain     Chronic pain syndrome       Current Outpatient Prescriptions   Medication Sig Dispense Refill     oxyCODONE-acetaminophen (PERCOCET) 5-325 MG per tablet 2 tabs po qhs, and 1 tab po bid during day, as needed. 120 tablet 0     fluticasone-vilanterol (BREO ELLIPTA) 200-25 MCG/INH oral inhaler Inhale 1 puff into the lungs daily 1 Inhaler 11     PEG-KCl-NaCl-NaSulf-Na Asc-C (MOVIPREP) 100 G SOLR 100mg  SIml as directed.  Use for patients with kidney disease, etc. 1 each 0     NOVOLOG FLEXPEN 100 UNIT/ML soln 18 units before meals (do not take without a meal). 180 mL 11     LANTUS SOLOSTAR 100 UNIT/ML soln Take 50 units at bedtime daily 1500 mL 11     metFORMIN (GLUCOPHAGE) 500 MG/5ML SOLN solution Take 5 mLs (500 mg) by mouth 2 times daily 300 mL 12     [employer] TechnologiesCAN FINEPOINT LANCETS MISC Use to test blood sugars 2 times daily or as directed. 100 each prn     furosemide (LASIX) 20 MG tablet One daily as needed for swelling 90 tablet 3     atorvastatin (LIPITOR) 10 MG tablet 1 po qday 30 tablet 11     senna-docusate (SENOKOT-S;PERICOLACE) 8.6-50 MG per tablet Take 1 tablet by mouth 2 times daily 60 tablet 11     benazepril (LOTENSIN) 40 MG tablet Take 1  tablet (40 mg) by mouth daily 30 tablet 11     chlorthalidone (HYGROTON) 25 MG tablet Take 1 tablet (25 mg) by mouth daily 30 tablet 11     capsaicin (ZOSTRIX) 0.025 % CREA Apply 1 g topically 3 times daily 45 g 2     cyanocobalamin 1000 MCG SUBL 1 po qd 100 tablet 3     magnesium oxide (MAG-OX) 400 (241.3 MG) MG tablet Take 1 tablet (400 mg) by mouth daily 30 tablet 11     Cyanocobalamin (B-12) 1000 MCG TBCR Take 1,000 mcg by mouth daily 100 tablet 3     aspirin 81 MG tablet Take 1 tablet (81 mg) by mouth daily 100 tablet 3     Cholecalciferol (VITAMIN D) 2000 UNITS tablet Take 2,000 Units by mouth daily 100 tablet 3     famotidine (PEPCID) 20 MG tablet Take 1 tablet (20 mg) by mouth 2 times daily 60 tablet 11     gabapentin (NEURONTIN) 250 MG/5ML solution Take 1.5 tsp tid for one week, then increase to 2 tsp tid. 900 mL prn     order for DME Equipment being ordered: one wheeled walker with seat 1 Device 0     order for DME Equipment being ordered: 4 wheeled walker with seat 1 Device 0     insulin pen needle (BD ULTRA-FINE) 29G X 12.7MM Use 4 times daily 100 each prn     order for DME Equipment being ordered: Wheelchair 1 Device 0     order for DME Equipment being ordered: one shower chair 1 Device 0     tolterodine (DETROL LA) 2 MG 24 hr capsule Take 1 capsule (2 mg) by mouth daily 90 capsule 3     diclofenac (VOLTAREN) 1 % GEL Apply 4 grams 4 x daily as needed 100 g 4     lidocaine (LIDODERM) 5 % patch Apply up to 3 patches to painful area at once for up to 12 h within a 24 h period.  Remove after 12 hours. 40 patch 1     albuterol (VENTOLIN HFA) 108 (90 BASE) MCG/ACT inhaler Inhale 2 puffs into the lungs 4 times daily Inhale 2 puffs every 4-6 hours as needed 3 Inhaler 3     Alcohol Swabs (B-D SINGLE USE SWABS REGULAR) PADS Apply 1 pad topically 3 times daily Use as directed 100 each 12     blood glucose test strip 1 strip by In Vitro route 3 times daily Test blood glucose 4x daily with meals and at night 1 Box  prn     loratadine (CLARITIN) 10 MG tablet Take 1 tablet (10 mg) by mouth daily 30 tablet 12     SUMAtriptan (IMITREX) 25 MG tablet One tab at onset of HA.  May repeat dose in 2 hours.  Do not exceed 200 mg in 24 hours 24 tablet 1     risperiDONE (RISPERDAL) 0.25 MG tablet Take 1 tablet (0.25 mg) by mouth At Bedtime       ferrous sulfate (IRON) 325 (65 FE) MG tablet Take 1 tablet (325 mg) by mouth daily (with breakfast) 30 tablet 2     albuterol (2.5 MG/3ML) 0.083% nebulizer solution Take 1 vial (2.5 mg) by nebulization every 6 hours as needed for shortness of breath / dyspnea 1 Box prn     DULoxetine (CYMBALTA) 60 MG capsule Take 1 capsule (60 mg) by mouth daily 90 capsule 3       Social History   Substance Use Topics     Smoking status: Never Smoker     Smokeless tobacco: Never Used     Alcohol use No       Estimated Creatinine Clearance: 103.1 mL/min (based on Cr of 1.1).    Lab Results   Component Value Date    A1C 8.6 02/07/2017    A1C 8.4 10/07/2016    A1C 7.9 07/14/2016    A1C 9.1 02/16/2016    A1C 7.8 10/22/2015     Last Basic Metabolic Panel:  Lab Results   Component Value Date    .6 02/07/2017      Lab Results   Component Value Date    POTASSIUM 4.6 02/07/2017     Lab Results   Component Value Date    CHLORIDE 102.0 02/07/2017     Lab Results   Component Value Date    MIKAELA 9.1 02/07/2017     Lab Results   Component Value Date    CO2 18.9 02/07/2017     Lab Results   Component Value Date    BUN 41.0 02/07/2017     Lab Results   Component Value Date    CR 1.1 02/07/2017     Lab Results   Component Value Date    .0 02/07/2017       /77 (BP Location: Left arm, Patient Position: Chair)  Pulse 79  Temp 97.5  F (36.4  C) (Oral)  Wt 237 lb 12.8 oz (107.9 kg)  SpO2 98%  BMI 47.23 kg/m2  BP Readings from Last 3 Encounters:   04/05/17 117/77   03/14/17 132/78   03/08/17 141/84       The 10-year ASCVD risk score (Framingham JOSE Jr, et al., 2013) is: 6.7%    Values used to calculate the score:      Age:  "51 years      Sex: Female      Is Non- : Yes      Diabetic: Yes      Tobacco smoker: No      Systolic Blood Pressure: 117 mmHg      Is BP treated: Yes      HDL Cholesterol: 53.1 mg/dL      Total Cholesterol: 210.9 mg/dL    PHQ-9 score:    PHQ-9 SCORE 10/7/2016   Total Score -   Total Score 18     Assessment                                                       Asthma:  controlled    Pt has had compliance issues with her maintenance medication (dosed BID)    Alternative maintenance medications available that are dosed once daily    Per pt's insurance formulary Advair is Tier 4    Per pt's insurance formulary Breo Ellipta is Tier 3    Diabetes:  uncontrolled    Goal A1c<7%    Pt reports being compliant with all of her medication, but is getting sick of having to \"stick\" herself 4 times per day.    Plan/Recommendations                                                       Updated medication list in the EMR; deleted meds patient no longer taking and added meds patient is now taking, and changed doses where there was a dose discrepancy.    All medications were reviewed and found to be indicated, effective, safe and convenient/ affordable unless drug therapy problem(s) was/were identified, as are described below.      Completed at this visit  Asthma    Recommend switching to Breo Ellipta from Advair     Switch Advair 230-21mcg puffs BID dose = Breo Ellipta 225mcg (Fluticasone 200mcg and Vilanterol 25 mcg) after discussion    To be completed at a future visit  Diabetes    Possibly consider GLP-1 RA in place of rapid acting insulin if patient struggles to be compliant with meal timing insulin and once next lab work is completed    Options for treatment and/or follow-up care were reviewed with the patient.  Ruthy was engaged and actively involved in the decision making process, verbalized understanding of the options discussed, and was satisfied with the final plan.      Follow-up "                                                       Patient should follow up in 2-3 months with Dr. Meyer.  Patient was provided with written instructions/medication list via AVS.     Dr. Meyer was provided the recommendations above  in clinic today and Dr. Meyer was available for supervision during this visit and is the authorizing prescriber for this visit through the pharmacist collaborative practice agreement.    Denilson Jackson, PharmD Student      Drug therapy problems identified  1. Med: Advair  - Compliance - Pt struggles with remembering to take PM dose - Resolution: Switched to Breo Ellipta; resolved    # of medical conditions addressed: 2  # of medications addressed: 28  # of medication discrepancies identified: 1  # of DTP identified: 1  Time spent: 20 minutes  Level of service: 2nc    I have reviewed and verified the student s documentation and found it to be correct and complete.  Yarelis Dela Cruz, Pharm.D. Resident

## 2017-04-05 NOTE — PROGRESS NOTES
Patient Active Problem List    Diagnosis Date Noted     Chronic pain syndrome 10/07/2016     Priority: High     Chronic Pain Diagnosis:  neuropathy  DIRE Total Score(s):14     ORT: 5    4 - 7 =  Moderate Risk   of future problems with Opioids  FAQ5: No flowsheet data found.  Behavioral Health Consultation: date   Personal Care Plan for Chronic Pain: most recent update    Opioid medication:oxycodone   Dose:10mg  Number of pills per month:60  Patient is being prescribed 10mg of oxycodone IR (Percocet) per day this is 15 mg Morphine Equivalents  Benzodiazepines Prescribed? No  Naloxone prescribed? No       Clinic visit frequency required: Q 1 month Next visit due:   Controlled Substance/Opioid Treatment agreement on file (dated <12 months ago)?:    Date(s): pending  Last [place] verification: last visit  Items in red to be updated at each visit  Patient is followed by Data Unavailable for ongoing prescription of pain medication.  All refills should be approved by this provider, or covering partner.Chronic Pain          Microalbuminuria 10/12/2015     Priority: High     Type 2 diabetes mellitus with diabetic polyneuropathy (H) 10/12/2015     Priority: High     Bilateral ankle joint pain 01/15/2016     Priority: Medium     Esophageal reflux 04/07/2015     Priority: Medium     Urinary incontinence 02/19/2015     Priority: Medium     Insomnia 02/19/2015     Priority: Medium     HTN (hypertension) 08/26/2013     Priority: Medium     Hyperlipidemia LDL goal <100 06/21/2013     Priority: Medium     S/P bariatric surgery 06/21/2013     Priority: Medium     Kim-en-Y in 2006       Neck pain 05/16/2013     Priority: Medium     Moderate major depression (H) 01/24/2013     Priority: Medium     Moderate persistent asthma 11/15/2012     Priority: Medium     Pulmonary embolism and infarction (H) 11/15/2012     Priority: Medium     Problem list name updated by automated process. Provider to review       Health Care Home 11/15/2012      Priority: Medium     Tier 3    Status: Accept  Care Coordination Start Date: 02/27/12  State Tier Level: Level 3  Language/Barrier to Learning: No  Significant Mental Health Issues: Yes  Date Care Coordination Discontinued: 05/28/15    DX V65.8 REPLACED WITH 71542 HEALTH CARE HOME (04/08/2013)         S/P hysterectomy 06/21/2013     Priority: Low     2008, For fibroids.  Cervix removed, but still has ovaries.       Carpal tunnel syndrome 11/15/2012     Priority: Low     Necrotizing fasciitis (H) 11/15/2012     Priority: Low     Abnormal Pap smear of cervix      Priority: Low     3-12-15: Pap/HPV neg.  Plan: Given hx (mildly abn pap, hyster), recheck in 5 yrs.  10-11-11: Pap/HPV neg.  Plan: Given ASUS pap in 2010, recommend repeating pap in 1 year.  8/3/2010 ASCUS Pap with +BV- pt treated with Metrogel.  Pt needs repeat Pap in 1yr.  8/4/2008 Pt had total hyst for menorrhagia, pelvic pain, scar tissue, and fibroids.       There are no exam notes on file for this visit.  Chief Complaint   Patient presents with     Refill Request     Blood pressure 117/77, pulse 79, temperature 97.5  F (36.4  C), temperature source Oral, weight 237 lb 12.8 oz (107.9 kg), SpO2 98 %, not currently breastfeeding.  walking 2 blocks daily.  Interested in pool therapy  Pain control ok  G-kids:  18-4.  Expecting 12th.  Re:  anxiety  Moods ok.  Avoiding problems (Elizabeth gets on nerves).    Sees psychologist @ [facility].  Psychiatrist there, too.  Took oxycodone this morning.   recently had colonoscopy  SUBJECTIVE:  Ruthy Álvarez is here to f/u CPS.  She also needs f/u of hyperlipidemia, hypertension, and asthma.   I reviewed her medication list and she really doesn't take Advair regularly.  She takes it about seven times per week.  She remembers the morning inhaler, but not very often the evening inhaler.   Her chronic pain is reasonably controlled.  She is able to walk about two blocks before stopping to rest.  She takes opioids b.i.d.; the  last time she took it was this morning.  She feels that she gets good relief.  She doesn't have constipation.  She recently had a colonoscopy and has some questions about an abdominal hernia.  She was hospitalized at [facility] for influenza and they noted on a CT a fat-filled, epigastric abdominal wall hernia, possibly a ventral hernia.  She doesn't have symptoms.   OBJECTIVE:     GENERAL:  The patient is alert, pleasant, and in no acute distress.     VITAL SIGNS:  Blood pressure is excellent.   CHEST:  Clear.   HEART:  Regular rate and rhythm.  Normal S1 and S2.  No murmur.   ABDOMEN:  She is obese w/well-healed midline scar.  It is hard to appreciate a hernia, but I reviewed the CAT scan which documented this.   ASSESSMENT AND PLAN:   1.  CPS from uncontrolled neuropathy.  Her prescription database is intact.  FEQ-5 is stable.  UDS is appropriate.  I refilled her chronic pain medications.      2.  Asthma.  We'll switch her to Brilinta for the sake of compliance, even though her control is pretty good.     3.  Ventral hernia.  She really doesn't have symptoms.  I recommended watchful waiting.

## 2017-04-05 NOTE — PATIENT INSTRUCTIONS
Look at home to see if you are taking atorvastatin 10 mg daily.  Please call Yessica to confirm.  Call [phone].      Keep walking!      What Is a Hernia?  A hernia is a weakness or defect in the wall of the abdomen. This weakness may be present at birth. Or, it can be caused by the wear and tear of daily living. If left untreated, a hernia can get worse with time and physical stress.  When a Bulge Forms  A weak area in the abdominal wall allows the contents of the abdomen to push outward. This often causes a noticeable bulge under the skin. The bulge may get bigger when you stand and go away when you lie down. You may also feel pressure or discomfort when lifting, coughing, urinating, or doing other activities.  Type of Hernias  The type of hernia you have depends on its location. Most hernias form in the groin at or near the internal ring. This is the entrance to a canal between the abdomen and groin. Hernias can also occur in the abdomen, thigh, or genitals.    Types of Hernias    An incisional hernia occurs at the site of a previous surgical incision.    An umbilical hernia occurs at the navel.    An indirect inguinal hernia occurs in the groin at the internal ring.    A direct inguinal hernia occurs in the groin near the internal ring.    A femoral hernia occurs just below the groin.    An epigastric hernia occurs in the upper abdomen at the midline.  Surgery: The Best Treatment  A hernia will not heal on its own. Surgery is needed to repair the defect in the abdominal wall. If not treated, a hernia can get larger. It can also lead to serious medical complications. The good news is that hernia surgery can be done quickly and safely. In some cases, you can go home the same day as your surgery.    [phone] [employer]. [address] [place] [address]. All rights reserved. This information is not intended as a substitute for professional medical care. Always follow your healthcare  professional's instructions.      [facility]  Pool Therapy  [phone]  Referral has been faxed they will contact pt to schedule  Lucita Cardona 7:54 AM 4/6/2017    Mental Health Referral  CPM Consult   Date Friday May 12 2017  Time: 9:00 AM Dr Quezada  Pt notified. / TAMMY 9:11 AM 4/12/2017

## 2017-04-05 NOTE — MR AVS SNAPSHOT
After Visit Summary   4/5/2017    Ruthy Álvarez    MRN: [id number]           Patient Information     Date Of Birth          1965        Visit Information        Provider Department      4/5/2017 10:20 AM Jose Angel Meyer MD [facility]        Today's Diagnoses     Ventral hernia without obstruction or gangrene    -  1    Chronic pain syndrome          Care Instructions    Look at home to see if you are taking atorvastatin 10 mg daily.  Please call Yessica to confirm.  Call [phone].      Keep walking!      What Is a Hernia?  A hernia is a weakness or defect in the wall of the abdomen. This weakness may be present at birth. Or, it can be caused by the wear and tear of daily living. If left untreated, a hernia can get worse with time and physical stress.  When a Bulge Forms  A weak area in the abdominal wall allows the contents of the abdomen to push outward. This often causes a noticeable bulge under the skin. The bulge may get bigger when you stand and go away when you lie down. You may also feel pressure or discomfort when lifting, coughing, urinating, or doing other activities.  Type of Hernias  The type of hernia you have depends on its location. Most hernias form in the groin at or near the internal ring. This is the entrance to a canal between the abdomen and groin. Hernias can also occur in the abdomen, thigh, or genitals.    Types of Hernias    An incisional hernia occurs at the site of a previous surgical incision.    An umbilical hernia occurs at the navel.    An indirect inguinal hernia occurs in the groin at the internal ring.    A direct inguinal hernia occurs in the groin near the internal ring.    A femoral hernia occurs just below the groin.    An epigastric hernia occurs in the upper abdomen at the midline.  Surgery: The Best Treatment  A hernia will not heal on its own. Surgery is needed to repair the defect in the abdominal wall. If not treated, a hernia can get  larger. It can also lead to serious medical complications. The good news is that hernia surgery can be done quickly and safely. In some cases, you can go home the same day as your surgery.    [id number] [employer]. [address] [place] [address], [place] [address]. All rights reserved. This information is not intended as a substitute for professional medical care. Always follow your healthcare professional's instructions.              Follow-ups after your visit        Who to contact     Please call your clinic at [phone] to:    Ask questions about your health    Make or cancel appointments    Discuss your medicines    Learn about your test results    Speak to your doctor   If you have compliments or concerns about an experience at your clinic, or if you wish to file a complaint, please contact [facility] Patient Relations at [phone] or email us at [email or web address]         Additional Information About Your Visit        WhatsNexxharHackMyPic Information     [facility] is an electronic gateway that provides easy, online access to your medical records. With [facility], you can request a clinic appointment, read your test results, renew a prescription or communicate with your care team.     To sign up for [facility] visit the website at [email or web address]   You will be asked to enter the access code listed below, as well as some personal information. Please follow the directions to create your username and password.     Your access code is: [email or web address]  Expires: 2017  1:08 PM     Your access code will  in 90 days. If you need help or a new code, please contact your [facility] or call [phone] for assistance.        Care EveryWhere ID     This is your Care EveryWhere ID. This could be used by other organizations to access your [facility] medical records  [id number]        Your Vitals Were     Pulse Temperature Pulse Oximetry BMI  (Body Mass Index)          79 97.5  F (36.4  C) (Oral) 98% 47.23 kg/m2         Blood Pressure from Last 3 Encounters:   04/05/17 117/77   03/14/17 132/78   03/08/17 141/84    Weight from Last 3 Encounters:   04/05/17 237 lb 12.8 oz (107.9 kg)   03/14/17 240 lb (108.9 kg)   03/08/17 239 lb 3.2 oz (108.5 kg)              We Performed the Following     Rapid Urine Drug Screen ([facility])          Where to get your medicines      Some of these will need a paper prescription and others can be bought over the counter.  Ask your nurse if you have questions.     Bring a paper prescription for each of these medications     oxyCODONE-acetaminophen 5-325 MG per tablet          Primary Care Provider Office Phone # Fax #    Jose Angel Meyer [phone]       [address] [address]        Thank you!     Thank you for choosing [facility]  for your care. Our goal is always to provide you with excellent care. Hearing back from our patients is one way we can continue to improve our services. Please take a few minutes to complete the written survey that you may receive in the mail after your visit with us. Thank you!             Your Updated Medication List - Protect others around you: Learn how to safely use, store and throw away your medicines at www.disposemymeds.org.          This list is accurate as of: 4/5/17 11:58 AM.  Always use your most recent med list.                   Brand Name Dispense Instructions for use    * albuterol (2.5 MG/3ML) 0.083% neb solution     1 Box    Take 1 vial (2.5 mg) by nebulization every 6 hours as needed for shortness of breath / dyspnea       * albuterol 108 (90 BASE) MCG/ACT Inhaler    VENTOLIN HFA    3 Inhaler    Inhale 2 puffs into the lungs 4 times daily Inhale 2 puffs every 4-6 hours as needed       aspirin 81 MG tablet     100 tablet    Take 1 tablet (81 mg) by mouth daily       atorvastatin 10 MG tablet    LIPITOR    30 tablet    1 po  qday       * B-12 1000 MCG Tbcr     100 tablet    Take 1,000 mcg by mouth daily       * cyanocobalamin 1000 MCG Subl     100 tablet    1 po qd       B-D SINGLE USE SWABS REGULAR Pads     100 each    Apply 1 pad topically 3 times daily Use as directed       benazepril 40 MG tablet    LOTENSIN    30 tablet    Take 1 tablet (40 mg) by mouth daily       blood glucose monitoring test strip    no brand specified    1 Box    1 strip by In Vitro route 3 times daily Test blood glucose 4x daily with meals and at night       capsaicin 0.025 % Crea cream    ZOSTRIX    45 g    Apply 1 g topically 3 times daily       chlorthalidone 25 MG tablet    HYGROTON    30 tablet    Take 1 tablet (25 mg) by mouth daily       diclofenac 1 % Gel topical gel    VOLTAREN    100 g    Apply 4 grams 4 x daily as needed       DULoxetine 60 MG EC capsule    CYMBALTA    90 capsule    Take 1 capsule (60 mg) by mouth daily       famotidine 20 MG tablet    PEPCID    60 tablet    Take 1 tablet (20 mg) by mouth 2 times daily       ferrous sulfate 325 (65 FE) MG tablet    IRON    30 tablet    Take 1 tablet (325 mg) by mouth daily (with breakfast)       fluticasone-salmeterol 230-21 MCG/ACT inhaler    ADVAIR-HFA    36 g    Inhale 2 puffs into the lungs 2 times daily       furosemide 20 MG tablet    LASIX    90 tablet    One daily as needed for swelling       gabapentin 250 MG/5ML solution    NEURONTIN    900 mL    Take 1.5 tsp tid for one week, then increase to 2 tsp tid.       insulin pen needle 29G X 12.7MM    BD ULTRA-FINE    100 each    Use 4 times daily       LANTUS SOLOSTAR 100 UNIT/ML injection   Generic drug:  insulin glargine     1500 mL    Take 50 units at bedtime daily       lidocaine 5 % Patch    LIDODERM    40 patch    Apply up to 3 patches to painful area at once for up to 12 h within a 24 h period.  Remove after 12 hours.       Curious.comCAN FINEPOINT LANCETS Misc     100 each    Use to test blood sugars 2 times daily or as directed.        loratadine 10 MG tablet    CLARITIN    30 tablet    Take 1 tablet (10 mg) by mouth daily       magnesium oxide 400 (241.3 MG) MG tablet    MAG-OX    30 tablet    Take 1 tablet (400 mg) by mouth daily       metFORMIN 500 MG/5ML Soln solution    GLUCOPHAGE    300 mL    Take 5 mLs (500 mg) by mouth 2 times daily       NovoLOG FLEXPEN 100 UNIT/ML injection   Generic drug:  insulin aspart     180 mL    18 units before meals (do not take without a meal).       * order for DME     1 Device    Equipment being ordered: Wheelchair       * order for DME     1 Device    Equipment being ordered: one shower chair       * order for DME     1 Device    Equipment being ordered: 4 wheeled walker with seat       * order for DME     1 Device    Equipment being ordered: one wheeled walker with seat       oxyCODONE-acetaminophen 5-325 MG per tablet    PERCOCET    120 tablet    2 tabs po qhs, and 1 tab po bid during day, as needed.       PEG-KCl-NaCl-NaSulf-Na Asc-C 100 G Solr    MOVIPREP    1 each    100mg SIml as directed. Use for patients with kidney disease, etc.       risperDAL 0.25 MG tablet   Generic drug:  risperiDONE      Take 1 tablet (0.25 mg) by mouth At Bedtime       senna-docusate 8.6-50 MG per tablet    SENOKOT-S;PERICOLACE    60 tablet    Take 1 tablet by mouth 2 times daily       SUMAtriptan 25 MG tablet    IMITREX    24 tablet    One tab at onset of HA.  May repeat dose in 2 hours.  Do not exceed 200 mg in 24 hours       tolterodine 2 MG 24 hr capsule    DETROL LA    90 capsule    Take 1 capsule (2 mg) by mouth daily       vitamin D 2000 UNITS tablet     100 tablet    Take 2,000 Units by mouth daily       * Notice:  This list has 8 medication(s) that are the same as other medications prescribed for you. Read the directions carefully, and ask your doctor or other care provider to review them with you.

## 2017-04-06 DIAGNOSIS — N32.81 OVERACTIVE BLADDER: ICD-10-CM

## 2017-04-07 RX ORDER — TOLTERODINE 2 MG/1
2 CAPSULE, EXTENDED RELEASE ORAL DAILY
Qty: 90 CAPSULE | Refills: 3 | Status: SHIPPED | OUTPATIENT
Start: 2017-04-07 | End: 2018-12-11

## 2017-04-07 NOTE — PROGRESS NOTES
Patient s case reviewed. I agree with the written assessment and plan of care.    Angelina Martines, PharmD.

## 2017-04-09 NOTE — PROGRESS NOTES
Ms Álvarez  Your urine test was acceptable  Additionally, upon review of your chart, I don't think I have yet referred you to our behavioral health team.  We require a one-time consult for people with chronic pain, as a part of our pain program.  I have placed a referral, so please expect to hear from our referral team about that, as well.    JIGNESH Meyer

## 2017-04-13 ENCOUNTER — TELEPHONE (OUTPATIENT)
Dept: FAMILY MEDICINE | Facility: CLINIC | Age: 52
End: 2017-04-13

## 2017-04-13 DIAGNOSIS — K21.9 GASTROESOPHAGEAL REFLUX DISEASE WITHOUT ESOPHAGITIS: Primary | ICD-10-CM

## 2017-04-13 NOTE — TELEPHONE ENCOUNTER
"PATIENT REQUESTED OMEPRAZOLE DR 20 MG CAPSULE.  PHARMACY NOTE, \"URGENT REQUEST.  NEED REFILLS OKAY ASAP BY TODAY (04/13/2017) IF POSSIBLE.  THANKS.\"   "

## 2017-05-02 ENCOUNTER — OFFICE VISIT (OUTPATIENT)
Dept: FAMILY MEDICINE | Facility: CLINIC | Age: 52
End: 2017-05-02

## 2017-05-02 VITALS
WEIGHT: 237.2 LBS | DIASTOLIC BLOOD PRESSURE: 75 MMHG | TEMPERATURE: 98.4 F | HEART RATE: 83 BPM | BODY MASS INDEX: 47.11 KG/M2 | OXYGEN SATURATION: 95 % | SYSTOLIC BLOOD PRESSURE: 115 MMHG

## 2017-05-02 DIAGNOSIS — R14.3 FLATULENCE, ERUCTATION AND GAS PAIN: ICD-10-CM

## 2017-05-02 DIAGNOSIS — G89.4 CHRONIC PAIN SYNDROME: Primary | ICD-10-CM

## 2017-05-02 DIAGNOSIS — R14.1 FLATULENCE, ERUCTATION AND GAS PAIN: ICD-10-CM

## 2017-05-02 DIAGNOSIS — R14.2 FLATULENCE, ERUCTATION AND GAS PAIN: ICD-10-CM

## 2017-05-02 DIAGNOSIS — E11.42 TYPE 2 DIABETES MELLITUS WITH DIABETIC POLYNEUROPATHY, WITHOUT LONG-TERM CURRENT USE OF INSULIN (H): ICD-10-CM

## 2017-05-02 LAB
AMPHETAMINES QUAL: NEGATIVE
BARBITURATES QUAL URINE: NEGATIVE
BENZODIAZEPINE QUAL URINE: NEGATIVE
BUN SERPL-MCNC: 24 MG/DL (ref 7–19)
BUPRENORPHINE QUAL URINE: NEGATIVE
CALCIUM SERPL-MCNC: 8.9 MG/DL (ref 8.5–10.1)
CANNABINOIDS UR QL SCN: NEGATIVE
CHLORIDE SERPLBLD-SCNC: 109.7 MMOL/L (ref 98–110)
CO2 SERPL-SCNC: 20.6 MMOL/L (ref 20–32)
COCAINE QUAL URINE: NEGATIVE
CREAT SERPL-MCNC: 0.9 MG/DL (ref 0.5–1)
GFR SERPL CREATININE-BSD FRML MDRD: 70.2 ML/MIN/1.7 M2
GLUCOSE SERPL-MCNC: 209.1 MG'DL (ref 70–99)
HBA1C MFR BLD: 8.7 % (ref 4.1–5.7)
METHAMPHETAMINE: NEGATIVE
METHODONE QUAL: NEGATIVE
MORPHINE QUAL: NEGATIVE
OXYCODONE QUAL: POSITIVE
POTASSIUM SERPL-SCNC: 4.4 MMOL/DL (ref 3.2–4.6)
SODIUM SERPL-SCNC: 141.7 MMOL/L (ref 132–142)
TEMPERATURE OF URINE WAS BETWEEN 90-100 DEGREES F: YES

## 2017-05-02 RX ORDER — SIMETHICONE 180 MG
CAPSULE ORAL
Qty: 60 CAPSULE | Refills: 11 | Status: SHIPPED | OUTPATIENT
Start: 2017-05-02 | End: 2018-12-11

## 2017-05-02 RX ORDER — OXYCODONE AND ACETAMINOPHEN 5; 325 MG/1; MG/1
TABLET ORAL
Qty: 120 TABLET | Refills: 0 | Status: SHIPPED | OUTPATIENT
Start: 2017-05-02 | End: 2017-06-06

## 2017-05-02 NOTE — PROGRESS NOTES
Patient Active Problem List    Diagnosis Date Noted     Chronic pain syndrome 10/07/2016     Priority: High     Chronic Pain Diagnosis:  neuropathy  DIRE Total Score(s):14     ORT: 5    4 - 7 =  Moderate Risk   of future problems with Opioids  FAQ5: No flowsheet data found.  Behavioral Health Consultation: date   Personal Care Plan for Chronic Pain: most recent update    Opioid medication:oxycodone   Dose:10mg  Number of pills per month:60  Patient is being prescribed 10mg of oxycodone IR (Percocet) per day this is 15 mg Morphine Equivalents  Benzodiazepines Prescribed? No  Naloxone prescribed? No       Clinic visit frequency required: Q 1 month Next visit due:   Controlled Substance/Opioid Treatment agreement on file (dated <12 months ago)?:    Date(s): pending  Last [place] verification: last visit  Items in red to be updated at each visit  Patient is followed by Data Unavailable for ongoing prescription of pain medication.  All refills should be approved by this provider, or covering partner.Chronic Pain          Microalbuminuria 10/12/2015     Priority: High     Type 2 diabetes mellitus with diabetic polyneuropathy (H) 10/12/2015     Priority: High     Bilateral ankle joint pain 01/15/2016     Priority: Medium     Esophageal reflux 04/07/2015     Priority: Medium     Urinary incontinence 02/19/2015     Priority: Medium     Insomnia 02/19/2015     Priority: Medium     HTN (hypertension) 08/26/2013     Priority: Medium     Hyperlipidemia LDL goal <100 06/21/2013     Priority: Medium     S/P bariatric surgery 06/21/2013     Priority: Medium     Kim-en-Y in 2006       Neck pain 05/16/2013     Priority: Medium     Moderate major depression (H) 01/24/2013     Priority: Medium     Moderate persistent asthma 11/15/2012     Priority: Medium     Pulmonary embolism and infarction (H) 11/15/2012     Priority: Medium     Problem list name updated by automated process. Provider to review       Health Care Home 11/15/2012      Priority: Medium     Tier 3    Status: Accept  Care Coordination Start Date: 02/27/12  State Tier Level: Level 3  Language/Barrier to Learning: No  Significant Mental Health Issues: Yes  Date Care Coordination Discontinued: 05/28/15    DX V65.8 REPLACED WITH 14291 HEALTH CARE HOME (04/08/2013)         S/P hysterectomy 06/21/2013     Priority: Low     2008, For fibroids.  Cervix removed, but still has ovaries.       Carpal tunnel syndrome 11/15/2012     Priority: Low     Necrotizing fasciitis (H) 11/15/2012     Priority: Low     Abnormal Pap smear of cervix      Priority: Low     3-12-15: Pap/HPV neg.  Plan: Given hx (mildly abn pap, hyster), recheck in 5 yrs.  10-11-11: Pap/HPV neg.  Plan: Given ASUS pap in 2010, recommend repeating pap in 1 year.  8/3/2010 ASCUS Pap with +BV- pt treated with Metrogel.  Pt needs repeat Pap in 1yr.  8/4/2008 Pt had total hyst for menorrhagia, pelvic pain, scar tissue, and fibroids.       There are no exam notes on file for this visit.  Chief Complaint   Patient presents with     Refill Request     percocet, new medication for gas relief      Blood pressure 115/75, pulse 83, temperature 98.4  F (36.9  C), temperature source Oral, weight 237 lb 3.2 oz (107.6 kg), SpO2 95 %, not currently breastfeeding.  Results for orders placed or performed in visit on 04/05/17   Rapid Urine Drug Screen ([facility])   Result Value Ref Range    Amphetamines Qual NEGATIVE NEGATIVE    Barbiturates Qual Urine NEGATIVE NEGATIVE    Buprenorphine Qual Urine NEGATIVE NEGATIVE    Benzodiazepine Qual Urine NEGATIVE NEGATIVE    Cocaine Qual Urine NEGATIVE NEGATIVE    Cannabinoids Qual Urine NEGATIVE NEGATIVE    Methamphetamine Qual NEGATIVE NEGATIVE    Methadone Qual NEGATIVE NEGATIVE    Morphine Qual NEGATIVE NEGATIVE    Oxycodone Qual POSITIVE (A) NEGATIVE    Temperature of Urine was Between  Degrees F YES YES     Simethicone.    Planning on moving near daugther in [place] ([place]).  Feels like joint pain  will be better.  meds are working ok.  Needing just a little more, due to foot pain worsening (shooting ) quality.    Using more heat to control it.    No nausea/constipation.  Controlled.    Wants to check kidneys--concerned because pain on left side.  No rash.  Urine sx.    18 tid novolog.  Took 50  -but felt like too much, so went back to 40    SUBJECTIVE:  Ruthy Álvarez is here for followup of her CPS.  First, she has neuropathy that has been refractory to traditional therapies.  She is on oxycodone, a total of 20 mg daily, and tolerates it well.  It helps with sleep and her ability to walk.  She has Simethicone 180 mg daily that she uses for flatulence and she would like that refilled.  She also is worried about her kidneys because she has an achy discomfort in her left side.  She doesn't have any hematuria, frequency. or fever.  Because of the location of the pain, she is worried about her kidneys.  She had normal renal function in the past, but she does have diabetic nephropathy.   REVIEW OF SYSTEMS:  Otherwise unremarkable.   OBJECTIVE:   GENERAL:  Patient is alert, pleasant, and in no acute distress.  She is appropriately groomed.  Eye contact is normal.   CHEST:  Clear.   HEART:  Regular rate and rhythm.  Normal S1 and S2.  No murmur.   EXTREMITIES:  No edema.   ASSESSMENT/PLAN:   1.  CPS.  This is stable.  I refilled her pain medications.  She'll follow up in a month.  She noted that she is going to be moving to [place] in the fall and she is hopeful that her joint pain will be a lot better then.     2.  Flank pain.  I think it is musculoskeletal.  She is due for creatinine and A1C anyway.  She promises that if her clinical glycemic control isn't ideal, she'll start checking blood sugars more often, so that we can monitor her insulin.  She confesses that she is only taking Lantus 40 units and NovoLog 18 units t.i.d. at this point.  We may need to go up on her insulin dosing in the future.     3.   Flatulence.  I refilled her simethicone.  Her insurance only covered ?? tablets.  She can take two at a time.     4.  Hypertension.  Her hypertension is well-controlled.    5.  Microalbuminuria.  She is on maximum dose ACE inhibitor.  She needs an A1C that is as optimal as possible.     6.  Flank pain.  I gave her reassurance that this is unlikely related to her kidneys.

## 2017-05-02 NOTE — MR AVS SNAPSHOT
After Visit Summary   5/2/2017    Ruthy Álvarez    MRN: [id number]           Patient Information     Date Of Birth          1965        Visit Information        Provider Department      5/2/2017 1:50 PM Jose Angel Meyer MD [facility]        Today's Diagnoses     Chronic pain syndrome    -  1    Flatulence, eructation and gas pain        Type 2 diabetes mellitus with diabetic polyneuropathy, without long-term current use of insulin (H)          Care Instructions    Will check AIC today.  If > 8, will need you to do some monitoring 2 times daily before breakfast and supper and send them them to me so that I know how to adjust your insulin.            Follow-ups after your visit        Your next 10 appointments already scheduled     May 12, 2017  9:00 AM CDT   Return Lifestyle with Daisy Quezada PhD   [facility] ([facility])    [address] [place] [address]   [phone]              Who to contact     Please call your clinic at [phone] to:    Ask questions about your health    Make or cancel appointments    Discuss your medicines    Learn about your test results    Speak to your doctor   If you have compliments or concerns about an experience at your clinic, or if you wish to file a complaint, please contact [facility] Patient Relations at [phone] or email us at [email or web address]         Additional Information About Your Visit        MyChart Information     ShootHome is an electronic gateway that provides easy, online access to your medical records. With ShootHome, you can request a clinic appointment, read your test results, renew a prescription or communicate with your care team.     To sign up for Easy Voyaget visit the website at [email or web address]   You will be asked to enter the access code listed below, as well as some personal information. Please follow the directions to create your username and  password.     Your access code is: [email or web address]  Expires: 2017  1:08 PM     Your access code will  in 90 days. If you need help or a new code, please contact your [facility] or call [phone] for assistance.        Care EveryWhere ID     This is your Care EveryWhere ID. This could be used by other organizations to access your [place] medical records  [id number]        Your Vitals Were     Pulse Temperature Pulse Oximetry BMI (Body Mass Index)          83 98.4  F (36.9  C) (Oral) 95% 47.11 kg/m2         Blood Pressure from Last 3 Encounters:   17 115/75   17 117/77   17 132/78    Weight from Last 3 Encounters:   17 237 lb 3.2 oz (107.6 kg)   17 237 lb 12.8 oz (107.9 kg)   17 240 lb (108.9 kg)              We Performed the Following     Basic Metabolic Panel ([facility])     Hemoglobin A1c ([facility])     Rapid Urine Drug Screen ([facility])          Today's Medication Changes          These changes are accurate as of: 17  2:35 PM.  If you have any questions, ask your nurse or doctor.               Start taking these medicines.        Dose/Directions    Simethicone 180 MG Caps   Used for:  Flatulence, eructation and gas pain   Started by:  Jose Angel Meyer MD        Use 1 softgel as needed for gas   Quantity:  60 capsule   Refills:  11            Where to get your medicines      Some of these will need a paper prescription and others can be bought over the counter.  Ask your nurse if you have questions.     Bring a paper prescription for each of these medications     oxyCODONE-acetaminophen 5-325 MG per tablet    Simethicone 180 MG Caps                Primary Care Provider Office Phone # Fax #    Jose Angel Meyer [phone]       [address] [address]        Thank you!     Thank you for choosing [facility]  for your care. Our goal is always to provide you with excellent care.  Hearing back from our patients is one way we can continue to improve our services. Please take a few minutes to complete the written survey that you may receive in the mail after your visit with us. Thank you!             Your Updated Medication List - Protect others around you: Learn how to safely use, store and throw away your medicines at www.disposemymeds.org.          This list is accurate as of: 5/2/17  2:35 PM.  Always use your most recent med list.                   Brand Name Dispense Instructions for use    * albuterol (2.5 MG/3ML) 0.083% neb solution     1 Box    Take 1 vial (2.5 mg) by nebulization every 6 hours as needed for shortness of breath / dyspnea       * albuterol 108 (90 BASE) MCG/ACT Inhaler    VENTOLIN HFA    3 Inhaler    Inhale 2 puffs into the lungs 4 times daily Inhale 2 puffs every 4-6 hours as needed       aspirin 81 MG tablet     100 tablet    Take 1 tablet (81 mg) by mouth daily       atorvastatin 10 MG tablet    LIPITOR    30 tablet    1 po qday       B-12 1000 MCG Tbcr     100 tablet    Take 1,000 mcg by mouth daily       B-D SINGLE USE SWABS REGULAR Pads     100 each    Apply 1 pad topically 3 times daily Use as directed       benazepril 40 MG tablet    LOTENSIN    30 tablet    Take 1 tablet (40 mg) by mouth daily       blood glucose monitoring test strip    no brand specified    1 Box    1 strip by In Vitro route 3 times daily Test blood glucose 4x daily with meals and at night       capsaicin 0.025 % Crea cream    ZOSTRIX    45 g    Apply 1 g topically 3 times daily       chlorthalidone 25 MG tablet    HYGROTON    30 tablet    Take 1 tablet (25 mg) by mouth daily       diclofenac 1 % Gel topical gel    VOLTAREN    100 g    Apply 4 grams 4 x daily as needed       DULoxetine 60 MG EC capsule    CYMBALTA    90 capsule    Take 1 capsule (60 mg) by mouth daily       famotidine 20 MG tablet    PEPCID    60 tablet    Take 1 tablet (20 mg) by mouth 2 times daily       ferrous sulfate 325  (65 FE) MG tablet    IRON    30 tablet    Take 1 tablet (325 mg) by mouth daily (with breakfast)       fluticasone-vilanterol 200-25 MCG/INH oral inhaler    BREO ELLIPTA    1 Inhaler    Inhale 1 puff into the lungs daily       furosemide 20 MG tablet    LASIX    90 tablet    One daily as needed for swelling       gabapentin 250 MG/5ML solution    NEURONTIN    900 mL    Take 1.5 tsp tid for one week, then increase to 2 tsp tid.       insulin pen needle 29G X 12.7MM    BD ULTRA-FINE    100 each    Use 4 times daily       LANTUS SOLOSTAR 100 UNIT/ML injection   Generic drug:  insulin glargine     1500 mL    Take 50 units at bedtime daily       lidocaine 5 % Patch    LIDODERM    40 patch    Apply up to 3 patches to painful area at once for up to 12 h within a 24 h period.  Remove after 12 hours.       [employer] FINEPOINT LANCETS Misc     100 each    Use to test blood sugars 2 times daily or as directed.       loratadine 10 MG tablet    CLARITIN    30 tablet    Take 1 tablet (10 mg) by mouth daily       magnesium oxide 400 (241.3 MG) MG tablet    MAG-OX    30 tablet    Take 1 tablet (400 mg) by mouth daily       metFORMIN 500 MG/5ML Soln solution    GLUCOPHAGE    300 mL    Take 5 mLs (500 mg) by mouth 2 times daily       NovoLOG FLEXPEN 100 UNIT/ML injection   Generic drug:  insulin aspart     180 mL    18 units before meals (do not take without a meal).       omeprazole 20 MG CR capsule    priLOSEC    30 capsule    Take 1 capsule (20 mg) by mouth daily       * order for DME     1 Device    Equipment being ordered: Wheelchair       * order for DME     1 Device    Equipment being ordered: one shower chair       * order for DME     1 Device    Equipment being ordered: 4 wheeled walker with seat       * order for DME     1 Device    Equipment being ordered: one wheeled walker with seat       oxyCODONE-acetaminophen 5-325 MG per tablet    PERCOCET    120 tablet    2 tabs po qhs, and 1 tab po bid during day, as needed.        PEG-KCl-NaCl-NaSulf-Na Asc-C 100 G Solr    MOVIPREP    1 each    100mg SIml as directed. Use for patients with kidney disease, etc.       risperDAL 0.25 MG tablet   Generic drug:  risperiDONE      Take 1 tablet (0.25 mg) by mouth At Bedtime       senna-docusate 8.6-50 MG per tablet    SENOKOT-S;PERICOLACE    60 tablet    Take 1 tablet by mouth 2 times daily       Simethicone 180 MG Caps     60 capsule    Use 1 softgel as needed for gas       SUMAtriptan 25 MG tablet    IMITREX    24 tablet    One tab at onset of HA.  May repeat dose in 2 hours.  Do not exceed 200 mg in 24 hours       tolterodine 2 MG 24 hr capsule    DETROL LA    90 capsule    Take 1 capsule (2 mg) by mouth daily       vitamin D 2000 UNITS tablet     100 tablet    Take 2,000 Units by mouth daily       * Notice:  This list has 6 medication(s) that are the same as other medications prescribed for you. Read the directions carefully, and ask your doctor or other care provider to review them with you.

## 2017-05-02 NOTE — PATIENT INSTRUCTIONS
Will check AIC today.  If > 8, will need you to do some monitoring 2 times daily before breakfast and supper and send them them to me so that I know how to adjust your insulin.

## 2017-05-03 ENCOUNTER — MEDICAL CORRESPONDENCE (OUTPATIENT)
Dept: HEALTH INFORMATION MANAGEMENT | Facility: CLINIC | Age: 52
End: 2017-05-03

## 2017-05-03 NOTE — PROGRESS NOTES
Please call pt:  Her kidney tests and potassium tests were normal, but her AIC test is higher than I would like.    It's 8.7 and I'd like it under 8, if possible.  She'd close.    I'd like her to increase her lantus from 40 to 45 units daily.  I'd like her to check her blood sugars each morning before breakfast and each evening before supper, write them down for a week, then call us with the results so that we can make additional adjustments.    JIGNESH Meyer

## 2017-05-10 DIAGNOSIS — K21.9 GASTROESOPHAGEAL REFLUX DISEASE WITHOUT ESOPHAGITIS: ICD-10-CM

## 2017-05-12 ENCOUNTER — OFFICE VISIT (OUTPATIENT)
Dept: PSYCHOLOGY | Facility: CLINIC | Age: 52
End: 2017-05-12

## 2017-05-12 DIAGNOSIS — G89.4 CHRONIC PAIN SYNDROME: Primary | ICD-10-CM

## 2017-05-12 ASSESSMENT — PATIENT HEALTH QUESTIONNAIRE - PHQ9: 5. POOR APPETITE OR OVEREATING: NEARLY EVERY DAY

## 2017-05-12 ASSESSMENT — ANXIETY QUESTIONNAIRES
5. BEING SO RESTLESS THAT IT IS HARD TO SIT STILL: NEARLY EVERY DAY
3. WORRYING TOO MUCH ABOUT DIFFERENT THINGS: MORE THAN HALF THE DAYS
6. BECOMING EASILY ANNOYED OR IRRITABLE: NEARLY EVERY DAY
2. NOT BEING ABLE TO STOP OR CONTROL WORRYING: MORE THAN HALF THE DAYS
1. FEELING NERVOUS, ANXIOUS, OR ON EDGE: NOT AT ALL
GAD7 TOTAL SCORE: 13
IF YOU CHECKED OFF ANY PROBLEMS ON THIS QUESTIONNAIRE, HOW DIFFICULT HAVE THESE PROBLEMS MADE IT FOR YOU TO DO YOUR WORK, TAKE CARE OF THINGS AT HOME, OR GET ALONG WITH OTHER PEOPLE: SOMEWHAT DIFFICULT
7. FEELING AFRAID AS IF SOMETHING AWFUL MIGHT HAPPEN: NOT AT ALL

## 2017-05-12 NOTE — Clinical Note
See my note for CPM assessment and personal care plan today.  Happy to see her for follow up if she would like this, but my schedule is tight in May and June.  Seeing you on 6/6.  Sleep is terrible and I am wondering what we might be able to do for her there as this can certainly make pain worse (and vice versa).  Let me know if you have any questions.  Thanks!  Daisy

## 2017-05-12 NOTE — MR AVS SNAPSHOT
After Visit Summary   5/12/2017    Ruthy Álvarez    MRN: [id number]           Patient Information     Date Of Birth          1965        Visit Information        Provider Department      5/12/2017 9:00 AM Daisy Quezada, PhD [facility]        Care Instructions    Personal Care Plan for Chronic Pain    1.  Personal Goals:     * Walking more.   * More time with grandchildren.   * I'd like to go back to work.    Consider contacting [employer] at [phone] to get support in reconnecting with employment some time when you feel ready to do so.    2.  Sleep:  Dr. Quezada will consult with Dr. Meyer about how we might start working on a sleep plan.   *  Basic sleep plan:    *reduce or eliminate caffeine and daytime naps    * relaxation before bed    * limit screen time 1-2 hours prior to bed    * establish dark/quiet sleep environment    * go to bed at target bedtime    * keep consistent wake time   *  Nighttime medications including the following: none    3.  Physical Activity:     * Formal physical rehabilitation:    * Pool therapy 1 day/week.  Ask if you can go more than once per week at your next session.    * Strength training may also help boost metabolism and help with healthy weight management.   * Home/community based activity:    * Daily walking.    * Sit and Stand activities.      * Consider starting to get in your pool at home on a regular basis.     * Listen to your body.  Pace yourself for success.  Don't over-do it.     4.  Nutrition/Weight:     * Limit processed foods and foods high in sugar, sodium and fat.   * Maintain healthy daily diet   * Continue to pursue a healthy weight, but be kind and patient with ourself in the process.    5.  Mood/Stress Management:     * Formal interventions:  Schedule follow up with Dr. Quezada in about one month so we can keep working on your pain plan and ways of managing the stress of this experience.   *  Home/community based interventions:  * Relaxation techniques  * Meditation  * Creative activity - cross stitch  * Spiritual /prayer  * Service-based activity.   * Medications: As prescribed by Dr. Meyer.    6.  Tobacco/Alcohol/Drug Use:       * Keep up the great job being tobacco, drug and alcohol free.      7.  Pain:     * Non-medication treatments:    * heating pads    8.  Pain Medications: As prescribed by Dr. Meyer              Follow-ups after your visit        Your next 10 appointments already scheduled     2017  1:10 PM CDT   Return Visit with Jose Angel Meyer MD   [facility] ([facility])    [address] [place] [address]   [phone]              Who to contact     Please call your clinic at [phone] to:    Ask questions about your health    Make or cancel appointments    Discuss your medicines    Learn about your test results    Speak to your doctor   If you have compliments or concerns about an experience at your clinic, or if you wish to file a complaint, please contact [facility] Patient Relations at [phone] or email us at [email or web address]         Additional Information About Your Visit        Itaroh[employer] Information     EcoIntense is an electronic gateway that provides easy, online access to your medical records. With EcoIntense, you can request a clinic appointment, read your test results, renew a prescription or communicate with your care team.     To sign up for EcoIntense visit the website at [email or web address]   You will be asked to enter the access code listed below, as well as some personal information. Please follow the directions to create your username and password.     Your access code is: [email or web address]  Expires: 2017  1:08 PM     Your access code will  in 90 days. If you need help or a new code, please contact your [facility] or call [phone] for assistance.         Care EveryWhere ID     This is your Care EveryWhere ID. This could be used by other organizations to access your [place] medical records  [id number]         Blood Pressure from Last 3 Encounters:   05/02/17 115/75   04/05/17 117/77   03/14/17 132/78    Weight from Last 3 Encounters:   05/02/17 237 lb 3.2 oz (107.6 kg)   04/05/17 237 lb 12.8 oz (107.9 kg)   03/14/17 240 lb (108.9 kg)              Today, you had the following     No orders found for display       Primary Care Provider Office Phone # Fax #    Jose Angel Meyer [phone]       [address] [address]        Thank you!     Thank you for choosing [facility]  for your care. Our goal is always to provide you with excellent care. Hearing back from our patients is one way we can continue to improve our services. Please take a few minutes to complete the written survey that you may receive in the mail after your visit with us. Thank you!             Your Updated Medication List - Protect others around you: Learn how to safely use, store and throw away your medicines at www.disposemymeds.org.          This list is accurate as of: 5/12/17 10:06 AM.  Always use your most recent med list.                   Brand Name Dispense Instructions for use    * albuterol (2.5 MG/3ML) 0.083% neb solution     1 Box    Take 1 vial (2.5 mg) by nebulization every 6 hours as needed for shortness of breath / dyspnea       * albuterol 108 (90 BASE) MCG/ACT Inhaler    VENTOLIN HFA    3 Inhaler    Inhale 2 puffs into the lungs 4 times daily Inhale 2 puffs every 4-6 hours as needed       aspirin 81 MG tablet     100 tablet    Take 1 tablet (81 mg) by mouth daily       atorvastatin 10 MG tablet    LIPITOR    30 tablet    1 po qday       B-12 1000 MCG Tbcr     100 tablet    Take 1,000 mcg by mouth daily       B-D SINGLE USE SWABS REGULAR Pads     100 each    Apply 1 pad topically 3 times daily Use as directed        benazepril 40 MG tablet    LOTENSIN    30 tablet    Take 1 tablet (40 mg) by mouth daily       blood glucose monitoring test strip    no brand specified    1 Box    1 strip by In Vitro route 3 times daily Test blood glucose 4x daily with meals and at night       capsaicin 0.025 % Crea cream    ZOSTRIX    45 g    Apply 1 g topically 3 times daily       chlorthalidone 25 MG tablet    HYGROTON    30 tablet    Take 1 tablet (25 mg) by mouth daily       diclofenac 1 % Gel topical gel    VOLTAREN    100 g    Apply 4 grams 4 x daily as needed       DULoxetine 60 MG EC capsule    CYMBALTA    90 capsule    Take 1 capsule (60 mg) by mouth daily       famotidine 20 MG tablet    PEPCID    60 tablet    Take 1 tablet (20 mg) by mouth 2 times daily       ferrous sulfate 325 (65 FE) MG tablet    IRON    30 tablet    Take 1 tablet (325 mg) by mouth daily (with breakfast)       fluticasone-vilanterol 200-25 MCG/INH oral inhaler    BREO ELLIPTA    1 Inhaler    Inhale 1 puff into the lungs daily       furosemide 20 MG tablet    LASIX    90 tablet    One daily as needed for swelling       gabapentin 250 MG/5ML solution    NEURONTIN    900 mL    Take 1.5 tsp tid for one week, then increase to 2 tsp tid.       insulin pen needle 29G X 12.7MM    BD ULTRA-FINE    100 each    Use 4 times daily       LANTUS SOLOSTAR 100 UNIT/ML injection   Generic drug:  insulin glargine     1500 mL    Take 50 units at bedtime daily       lidocaine 5 % Patch    LIDODERM    40 patch    Apply up to 3 patches to painful area at once for up to 12 h within a 24 h period.  Remove after 12 hours.       [employer] FINEPOINT LANCETS Misc     100 each    Use to test blood sugars 2 times daily or as directed.       loratadine 10 MG tablet    CLARITIN    30 tablet    Take 1 tablet (10 mg) by mouth daily       magnesium oxide 400 (241.3 MG) MG tablet    MAG-OX    30 tablet    Take 1 tablet (400 mg) by mouth daily       metFORMIN 500 MG/5ML Soln solution    GLUCOPHAGE    300  mL    Take 5 mLs (500 mg) by mouth 2 times daily       NovoLOG FLEXPEN 100 UNIT/ML injection   Generic drug:  insulin aspart     180 mL    18 units before meals (do not take without a meal).       omeprazole 20 MG CR capsule    priLOSEC    30 capsule    Take 1 capsule (20 mg) by mouth daily       * order for DME     1 Device    Equipment being ordered: Wheelchair       * order for DME     1 Device    Equipment being ordered: one shower chair       * order for DME     1 Device    Equipment being ordered: 4 wheeled walker with seat       * order for DME     1 Device    Equipment being ordered: one wheeled walker with seat       oxyCODONE-acetaminophen 5-325 MG per tablet    PERCOCET    120 tablet    2 tabs po qhs, and 1 tab po bid during day, as needed.       PEG-KCl-NaCl-NaSulf-Na Asc-C 100 G Solr    MOVIPREP    1 each    100mg SIml as directed. Use for patients with kidney disease, etc.       risperDAL 0.25 MG tablet   Generic drug:  risperiDONE      Take 1 tablet (0.25 mg) by mouth At Bedtime       senna-docusate 8.6-50 MG per tablet    SENOKOT-S;PERICOLACE    60 tablet    Take 1 tablet by mouth 2 times daily       * Simethicone 180 MG Caps     60 capsule    Use 1 softgel as needed for gas       * simethicone 80 MG Tabs     90 tablet    1-2 po q 6 hours prn flatulence       SUMAtriptan 25 MG tablet    IMITREX    24 tablet    One tab at onset of HA.  May repeat dose in 2 hours.  Do not exceed 200 mg in 24 hours       tolterodine 2 MG 24 hr capsule    DETROL LA    90 capsule    Take 1 capsule (2 mg) by mouth daily       vitamin D 2000 UNITS tablet     100 tablet    Take 2,000 Units by mouth daily       * Notice:  This list has 8 medication(s) that are the same as other medications prescribed for you. Read the directions carefully, and ask your doctor or other care provider to review them with you.

## 2017-05-12 NOTE — PROGRESS NOTES
"Behavioral Health Consult for Chronic Pain Management    Visit type: initial  Meeting lasted: 60 minutes  Others present: none     Anthonyshan TJ Álvarez is a 51 year old,  female referred by Dr. Meyer for behavioral health evaluation as part of the Chronic Pain Management protocol at [facility].  Ms. Álvarez is familiar to this provider as we met previously in lifestyle clinic back in August 2014 in order to address weight concerns.  The goal of today's behavioral health visit is to assist PCP with assessment and to co-create a plan to help patient with psychosocial aspects of pain.     Topics Discussed:  Oriented patient to team model of care for chronic pain and scope and purpose of assessment today.    Patient Goals:  \"I love walking.\"  Would love to walk more.  Has 12 grandkids - would love to spend more time with them.    Pain History/Understanding of Illness: Ms. Álvarez reports that she experiences pain from the middle of her feet to the back of her heel. Had surgery \"to open my nerves up\" (had tarsal tunnel syndrome in May 2016) and this has \"made things worse.\"  Had neuropathy in both feet before, but much more tolerable prior to surgery.  Ms. Álvarez describes being more functional before this surgery (e.g., could walk with just a cane rather than walker in the past).  Experiences tingling and numbness in feet and feels like someone is \"cutting off my ankle.\"  Has constant burning sensation in left leg.  In worse pain today than usual.  Left leg is more swollen.  Has tried to talk to surgeon about her concerns related to tarsal tunnel surgery, but feels he is not listening to her.  Also experiences migraines 1-2 per week (more often with stress) for which she takes Imitrex.     Self-management strategies:    Past:  Has had acupuncture which she did not find helpful.  Has seen chiropractor which helped a little initially, but then pulled a muscle on machine that seemed to " "exacerbate pain and did not return.  Physical therapy (one course)   Current:  Started pool therapy last week (5/3, has had one session - will be weekly).   Walks.  Uses heating pad.  Wraps feet tightly and this helps a little sometimes.  Uses lidocaine patch, but thinks this doesn't help.  Uses 3 different creams at night (Abundio Vidal, two prescribed by Dr. Meyer).  Takes pain medicine, Percocet at night to see if this will help with sleep.  Tries to limit Percocet in day as she is worried about addiction potential (Rx 2 pills in AM and 2 at night, but only takes 1 in AM and 2 at night if she can tolerate).   Cuts morning dose in half 2-3 days per week - may take second pill later in afternoon, followed by regular pm dose.    Sleep: Has terrible sleep.  Reports she did not get any sleep last night and is exhausted.  On other nights, she may get one hour of sleep.  Up and down with pain all night.  Has tried to use pillows to get more comfortable, but this isn't helping. Laying down hurts more than pacing, so often paces at night.    Nutrition:  When asked about nutrition, Ms. Álvarez initially stated \"I hate my diet.\"  Has gained 100 lbs since gastric bypass in 2006 and is very frustrated by this.  However, additional review of nutrition reveals that she has pretty good nutrition overall.  Doesn't eat sweets.  Rarely eats out.  Potato chips perhaps 2-3 times per month.  Ice cream perhaps once per month.  Eats a lot of boiled and baked chicken.  Loves vegetables (brussel sprouts, green beans, carrots) - steamed.  Loves water.  No sweet drinks.  Perceives herself as continuing to gain weight and we did review graph of weight in the past year which revealed that weight has leveled off and come down slightly in the past couple of years.  Encouraged self-compassion for this challenge and strategies for boosting metabolism (strength training) given already relatively healthy diet.  Could consider food journal in the future " "for better assessment of portion sizes, etc.     Physical Activity:  Has stated pool therapy once per week recently, but would like to go more often.  Developed plan to ask about this at next pool therapy visit.  She also has a pool at her building and would consider using this more often now that the weather is warming up.  Ms. Álvarez does enjoy walking and spend a good amount of time \"pacing.\"  She also tries to do some \"sit to stand\" exercises at home (recommended by previous physical therapist).  Given concerns about balance, strength and flexibility, we discussed a possible referral for yoga, but she has tried this in the past and did not like it.  Given weight concerns we discussed potential benefits of adding strength training to boost metabolism and she agreed to discuss this with her physical therapist.    Psychiatric History:  Had been attending weekly therapy visits at [facility], but did not find this helpful.  \"He never said anything.\"  She discontinued therapy about 6 months ago per her report.  She had also been seeing a psychiatrist at [facility] (a woman, but did not recall name), but did not find this helpful either and is no longer working with this provider.  She does not appear interested in another referral for therapy at this time, but would likely benefit from a therapist with a more active/problem solving orientation in the future should we re-refer.  Review of PHQ9 and GAD7 today suggests that she is still having some problems with depression and anxiety, but Ms. Álvarez reports her mood and anxiety are better than in the past.  She attributes some of this positive change to setting limits with a daughter with whom she had been experiencing conflict.  She reports she does take some medication prescribed by Dr. Meyer for her mood, but this picture is a bit unclear to me.  She has both Risperdal and Cymbalta on her med list, but thinks she is only taking Resperidone at this time.  " "Reports taking this twice per day.  She did take Cymbalta in the past, but discontinued this as it did not help her.     ANA for current providers?  No    Trauma History:  Ms. Álvarez has denied past trauma history on a number of occasions in the past and did not re-ask this question today in light of this history.     History of Substance Use:      Alcohol: Denies any current alcohol use or history of problematic alcohol use.    Tobacco: Denies any history of or current tobacco use.  She does endorse weekly second hand exposure (her son smokes).   Caffeine: Denies any caffeine use.   Illicit Drugs: Denies any drug use.   Misuse of Prescription Drugs: Denies any prescription drug abuse.  Voices concern about getting addicted to pain medicines and tries to limit use when able.     Family History of Substance Use:  Not explored today.     Work History:  Hasn't worked since 2002.  Would love to go back to work.  Worked at a bank in the past.  Stopped working due to poorly controlled diabetes (had to go bathroom too often).  Likes to be independent.  We discussed possible referral for vocational rehabilitation services.    Other Relevant Information: Ms. Álvarez appears quite socially isolated at least in part due to her pain.  She states that she gets tired of people commenting that she is \"sick all the time\" and doesn't want to \"be a burden to anyone.\"  She denies having any supportive friends or family that she can talk to.  We discussed the risks of social isolation and the potential value of reconnecting to social support.  Ms. Álvarez does have 12 grandchildren and enjoys time with them.  Will see if we can continue to build on that.    Patient Active Problem List   Diagnosis     Abnormal Pap smear of cervix     Moderate persistent asthma     Carpal tunnel syndrome     Necrotizing fasciitis (H)     Pulmonary embolism and infarction (H)     Health Care Home     Moderate major depression (H)     Neck pain     S/P " hysterectomy     Hyperlipidemia LDL goal <100     S/P bariatric surgery     HTN (hypertension)     Urinary incontinence     Insomnia     Esophageal reflux     Microalbuminuria     Type 2 diabetes mellitus with diabetic polyneuropathy (H)     Bilateral ankle joint pain     Chronic pain syndrome       Current Outpatient Prescriptions   Medication     omeprazole (PRILOSEC) 20 MG CR capsule     Simethicone 180 MG CAPS     oxyCODONE-acetaminophen (PERCOCET) 5-325 MG per tablet     simethicone 80 MG TABS     tolterodine (DETROL LA) 2 MG 24 hr capsule     fluticasone-vilanterol (BREO ELLIPTA) 200-25 MCG/INH oral inhaler     aspirin 81 MG tablet     magnesium oxide (MAG-OX) 400 (241.3 MG) MG tablet     PEG-KCl-NaCl-NaSulf-Na Asc-C (MOVIPREP) 100 G SOLR     NOVOLOG FLEXPEN 100 UNIT/ML soln     LANTUS SOLOSTAR 100 UNIT/ML soln     metFORMIN (GLUCOPHAGE) 500 MG/5ML SOLN solution     LIFESCAN FINEPOINT LANCETS MISC     furosemide (LASIX) 20 MG tablet     atorvastatin (LIPITOR) 10 MG tablet     senna-docusate (SENOKOT-S;PERICOLACE) 8.6-50 MG per tablet     benazepril (LOTENSIN) 40 MG tablet     chlorthalidone (HYGROTON) 25 MG tablet     capsaicin (ZOSTRIX) 0.025 % CREA     Cyanocobalamin (B-12) 1000 MCG TBCR     Cholecalciferol (VITAMIN D) 2000 UNITS tablet     famotidine (PEPCID) 20 MG tablet     gabapentin (NEURONTIN) 250 MG/5ML solution     order for DME     order for DME     insulin pen needle (BD ULTRA-FINE) 29G X 12.7MM     order for DME     order for DME     diclofenac (VOLTAREN) 1 % GEL     lidocaine (LIDODERM) 5 % patch     albuterol (VENTOLIN HFA) 108 (90 BASE) MCG/ACT inhaler     Alcohol Swabs (B-D SINGLE USE SWABS REGULAR) PADS     blood glucose test strip     loratadine (CLARITIN) 10 MG tablet     SUMAtriptan (IMITREX) 25 MG tablet     risperiDONE (RISPERDAL) 0.25 MG tablet     ferrous sulfate (IRON) 325 (65 FE) MG tablet     albuterol (2.5 MG/3ML) 0.083% nebulizer solution     DULoxetine (CYMBALTA) 60 MG capsule      No current facility-administered medications for this visit.        Objective: Ms. Álvarez appears to be awake and alert, but somewhat lethargic initially for today's visit.    Functional Assessment  Questionnaire:  40/100 on 11/8/16      Lifestyle Risk Screening Tool  5/12/2017   How many hours of sleep do you get most days? 6 or less   How many times a day do you eat sweets or fried/processed foods? 0   How many 8 oz servings of sugared drinks (soda, juice, etc.) do you have per day? 0   How many servings of fruit and vegetables do you eat a day? 3   How many hours of screen time (TV, Tablet, Video Games, phone, etc.) do you have per day? 1   How many days a week do you exercise enough to make your heart beat faster? 7   How many minutes a day do you exercise enough to make your heart beat faster? 30 - 60   How often are you around others who are smoking? Weekly   How often do you use tobacco products of any kind? Never   How many alcoholic drinks do you have in one week? 0 to 7   How many alcoholic drinks do you have in one day? 0-1       PHQ-9 SCORE 2/16/2016 10/7/2016 5/12/2017   Total Score - - -   Total Score 16 18 19     JUAN-7 SCORE 8/1/2014 5/12/2017   Total Score 15 -   Total Score - 13       Assessment:   Ms. Álvarez was referred by Dr. Meyer for a behavioral health evaluation to address chronic pain syndrome. Ms. Álvarez would likely benefit from continued support in her management of chronic pain.  In particular, help with sleep, mood/stress management and social isolation may be helpful.  Ms. Álvarez was open to this recommendation and voiced willingness to schedule follow up with this provider to work on these areas of concern.    Stage of change: Action  Diagnosis: chronic pain syndrome.    Plan:   1. Provided psychoeducation about the relationships between chronic pain, sleep, physical activity, tobacco and other alcohol/drug use, nutrition/weight and stress.   2. Developed Personal Care Plan for  Chronic Pain for self-management strategies (see patient care instructions).  3. While Ms. Álvarez was not interested in additional psychotherapy for mood and anxiety, she was open to the idea of additional health and behavior visits with this provider to focus on functional management of her current pain experience.  Agreed to schedule this in about one month given limitations in this providers schedule in May and June.  Review of EPIC after our visit indicated that she did not schedule this on her way out of clinic today.  Will reach out to her on this if not scheduled by early June.  4.  Follow up with PCP as recommended.  She is currently scheduled to see Dr. Meyer on 6/6/17.  Will route the note from today so he can be aware of our conversation and plan from today.  Will ask for Dr. Meyer's thoughts on how we might better address sleep given current problems in this area and interaction between sleep and pain experience.

## 2017-05-12 NOTE — PATIENT INSTRUCTIONS
Personal Care Plan for Chronic Pain    1.  Personal Goals:     * Walking more.   * More time with grandchildren.   * I'd like to go back to work.    Consider contacting [facility] [facility] at [phone] to get support in reconnecting with employment some time when you feel ready to do so.    2.  Sleep:  Dr. Quezada will consult with Dr. Meyer about how we might start working on a sleep plan.   *  Basic sleep plan:    *reduce or eliminate caffeine and daytime naps    * relaxation before bed    * limit screen time 1-2 hours prior to bed    * establish dark/quiet sleep environment    * go to bed at target bedtime    * keep consistent wake time   *  Nighttime medications including the following: none    3.  Physical Activity:     * Formal physical rehabilitation:    * Pool therapy 1 day/week.  Ask if you can go more than once per week at your next session.    * Strength training may also help boost metabolism and help with healthy weight management.   * Home/community based activity:    * Daily walking.    * Sit and Stand activities.      * Consider starting to get in your pool at home on a regular basis.     * Listen to your body.  Pace yourself for success.  Don't over-do it.     4.  Nutrition/Weight:     * Limit processed foods and foods high in sugar, sodium and fat.   * Maintain healthy daily diet   * Continue to pursue a healthy weight, but be kind and patient with ourself in the process.    5.  Mood/Stress Management:     * Formal interventions:  Schedule follow up with Dr. Quezada in about one month so we can keep working on your pain plan and ways of managing the stress of this experience.   * Home/community based interventions:  * Relaxation techniques  * Meditation  * Creative activity - cross stitch  * Spiritual /prayer  * Service-based activity.   * Medications: As prescribed by Dr. Meyer.    6.  Tobacco/Alcohol/Drug Use:       * Keep up the great job being tobacco, drug and  alcohol free.      7.  Pain:     * Non-medication treatments:    * heating pads    8.  Pain Medications: As prescribed by Dr. Meyer

## 2017-05-13 ASSESSMENT — PATIENT HEALTH QUESTIONNAIRE - PHQ9: SUM OF ALL RESPONSES TO PHQ QUESTIONS 1-9: 19

## 2017-05-13 ASSESSMENT — ANXIETY QUESTIONNAIRES: GAD7 TOTAL SCORE: 13

## 2017-06-06 ENCOUNTER — OFFICE VISIT (OUTPATIENT)
Dept: FAMILY MEDICINE | Facility: CLINIC | Age: 52
End: 2017-06-06

## 2017-06-06 VITALS
WEIGHT: 245 LBS | BODY MASS INDEX: 48.66 KG/M2 | SYSTOLIC BLOOD PRESSURE: 122 MMHG | DIASTOLIC BLOOD PRESSURE: 81 MMHG | HEART RATE: 84 BPM | TEMPERATURE: 98.1 F

## 2017-06-06 DIAGNOSIS — G89.4 CHRONIC PAIN SYNDROME: ICD-10-CM

## 2017-06-06 DIAGNOSIS — E11.42 DIABETIC POLYNEUROPATHY ASSOCIATED WITH TYPE 2 DIABETES MELLITUS (H): ICD-10-CM

## 2017-06-06 RX ORDER — GABAPENTIN 250 MG/5ML
500 SOLUTION ORAL 2 TIMES DAILY
Qty: 900 ML | Refills: 2 | Status: SHIPPED | OUTPATIENT
Start: 2017-06-06 | End: 2017-07-10

## 2017-06-06 RX ORDER — OXYCODONE AND ACETAMINOPHEN 5; 325 MG/1; MG/1
TABLET ORAL
Qty: 120 TABLET | Refills: 0 | Status: SHIPPED | OUTPATIENT
Start: 2017-06-06 | End: 2017-07-10

## 2017-06-06 NOTE — PROGRESS NOTES
Patient Active Problem List    Diagnosis Date Noted     Chronic pain syndrome 10/07/2016     Priority: High     Chronic Pain Diagnosis:  neuropathy  DIRE Total Score(s):14     ORT: 5    4 - 7 =  Moderate Risk   of future problems with Opioids  FAQ5: 45/100 on 9/9/16, 40/100 on 11/15/16  Behavioral Health Consultation: 5/12/17 with Dr. Daisy Quezada  Personal Care Plan for Chronic Pain: 5/12/17  Opioid medication:oxycodone   Dose:10mg  Number of pills per month:60  Patient is being prescribed 10mg of oxycodone IR (Percocet) per day this is 15 mg Morphine Equivalents  Benzodiazepines Prescribed? No  Naloxone prescribed? No       Clinic visit frequency required: Q 1 month Next visit due:   Controlled Substance/Opioid Treatment agreement on file (dated <12 months ago)?:    Date(s): pending  Last [place] verification: last visit  Items in red to be updated at each visit  Patient is followed by Data Unavailable for ongoing prescription of pain medication.  All refills should be approved by this provider, or covering partner.Chronic Pain          Microalbuminuria 10/12/2015     Priority: High     Type 2 diabetes mellitus with diabetic polyneuropathy (H) 10/12/2015     Priority: High     Bilateral ankle joint pain 01/15/2016     Priority: Medium     Esophageal reflux 04/07/2015     Priority: Medium     Urinary incontinence 02/19/2015     Priority: Medium     Insomnia 02/19/2015     Priority: Medium     HTN (hypertension) 08/26/2013     Priority: Medium     Hyperlipidemia LDL goal <100 06/21/2013     Priority: Medium     S/P bariatric surgery 06/21/2013     Priority: Medium     Kim-en-Y in 2006       Neck pain 05/16/2013     Priority: Medium     Moderate major depression (H) 01/24/2013     Priority: Medium     Moderate persistent asthma 11/15/2012     Priority: Medium     Pulmonary embolism and infarction (H) 11/15/2012     Priority: Medium     Problem list name updated by automated process. Provider to review       Health  Care Home 11/15/2012     Priority: Medium     Tier 3    Status: Accept  Care Coordination Start Date: 02/27/12  State Tier Level: Level 3  Language/Barrier to Learning: No  Significant Mental Health Issues: Yes  Date Care Coordination Discontinued: 05/28/15    DX V65.8 REPLACED WITH 80027 HEALTH CARE HOME (04/08/2013)         S/P hysterectomy 06/21/2013     Priority: Low     2008, For fibroids.  Cervix removed, but still has ovaries.       Carpal tunnel syndrome 11/15/2012     Priority: Low     Necrotizing fasciitis (H) 11/15/2012     Priority: Low     Abnormal Pap smear of cervix      Priority: Low     3-12-15: Pap/HPV neg.  Plan: Given hx (mildly abn pap, hyster), recheck in 5 yrs.  10-11-11: Pap/HPV neg.  Plan: Given ASUS pap in 2010, recommend repeating pap in 1 year.  8/3/2010 ASCUS Pap with +BV- pt treated with Metrogel.  Pt needs repeat Pap in 1yr.  8/4/2008 Pt had total hyst for menorrhagia, pelvic pain, scar tissue, and fibroids.       Nursing Notes:   Zbigniew Hoffman, CMA  6/6/2017  1:17 PM  Signed  Decline height today, does not want shoes off  Decline PHQ9 today  Chief Complaint   Patient presents with     Refill Request     refill of pain medication     Blood pressure 122/81, pulse 84, temperature 98.1  F (36.7  C), temperature source Oral, weight 245 lb (111.1 kg), not currently breastfeeding.     .SUBJECTIVE:  Ruthy Álvarez is here for chronic pain syndrome that is polyfactorial, primarily from OA in multiple joints.  She is functioning reasonably well.  She has pain medication that without it she has a hard time sleeping.  She is obese and in pool therapy, currently going about once a week.  She has missed a few visits, but she is planning on doing it two weeks, if at all possible.  She finds it helps with her balance, strength, and stamina.  She doesn't have constipation.  She provided UDS today.   OBJECTIVE:  The patient is alert, pleasant, and in no acute distress.  She is appropriately groomed.   Eye contact is normal.  She is a bit tired today, but otherwise she is at her baseline.  Blood pressure is 122/81.  Exam is otherwise not done.  She mentioned that her blood sugars are better, but she didn't bring any records today.  She is going to send them to me via mail or fax.   ASSESSMENT/PLAN:   1.  Chronic pain syndrome, controlled.  Continue oxycodone 5 mg a.m. and afternoon and 10 mg at night, quantity of 120 w/no refills given.  UDS is pending.  Follow up in one month.  At that time, she'll need an A1C.      2.  Diabetes.  She'll show me her readings and we'll adjust her insulin doses accordingly.

## 2017-06-06 NOTE — MR AVS SNAPSHOT
After Visit Summary   2017    Ruthy Álvarez    MRN: [id number]           Patient Information     Date Of Birth          1965        Visit Information        Provider Department      2017 1:10 PM Jose Angel Meyer MD [facility]        Today's Diagnoses     Diabetic polyneuropathy associated with type 2 diabetes mellitus (H)        Chronic pain syndrome          Care Instructions    No change in meds    Send me blood sugar readings!          Follow-ups after your visit        Follow-up notes from your care team     Return in about 1 month (around 2017).      Who to contact     Please call your clinic at [phone] to:    Ask questions about your health    Make or cancel appointments    Discuss your medicines    Learn about your test results    Speak to your doctor   If you have compliments or concerns about an experience at your clinic, or if you wish to file a complaint, please contact [facility] Patient Relations at [phone] or email us at [email or web address]         Additional Information About Your Visit        MyChart Information     Deemelo is an electronic gateway that provides easy, online access to your medical records. With Deemelo, you can request a clinic appointment, read your test results, renew a prescription or communicate with your care team.     To sign up for Deemelo visit the website at [email or web address]   You will be asked to enter the access code listed below, as well as some personal information. Please follow the directions to create your username and password.     Your access code is: [email or web address]  Expires: 2017  1:32 PM     Your access code will  in 90 days. If you need help or a new code, please contact your [facility] or call [phone] for assistance.        Care EveryWhere ID     This is your Care EveryWhere ID. This could be used by other  organizations to access your [place] medical records  [id number]        Your Vitals Were     Pulse Temperature BMI (Body Mass Index)             84 98.1  F (36.7  C) (Oral) 48.66 kg/m2          Blood Pressure from Last 3 Encounters:   06/06/17 122/81   05/02/17 115/75   04/05/17 117/77    Weight from Last 3 Encounters:   06/06/17 245 lb (111.1 kg)   05/02/17 237 lb 3.2 oz (107.6 kg)   04/05/17 237 lb 12.8 oz (107.9 kg)              Today, you had the following     No orders found for display         Today's Medication Changes          These changes are accurate as of: 6/6/17  1:32 PM.  If you have any questions, ask your nurse or doctor.               These medicines have changed or have updated prescriptions.        Dose/Directions    gabapentin 250 MG/5ML solution   Commonly known as:  NEURONTIN   This may have changed:    - how much to take  - how to take this  - when to take this  - additional instructions   Used for:  Diabetic polyneuropathy associated with type 2 diabetes mellitus (H)   Changed by:  Jose Angel Meyer MD        Dose:  500 mg   Take 10 mLs (500 mg) by mouth 2 times daily   Quantity:  900 mL   Refills:  2            Where to get your medicines      [facility] [place] [place] - [address], [place] [address]     Phone:  [phone]     gabapentin 250 MG/5ML solution         Some of these will need a paper prescription and others can be bought over the counter.  Ask your nurse if you have questions.     Bring a paper prescription for each of these medications     oxyCODONE-acetaminophen 5-325 MG per tablet                Primary Care Provider Office Phone # Fax #    Jose Angel Meyer [phone]       [address] [address]        Thank you!     Thank you for choosing [facility]  for your care. Our goal is always to provide you with excellent care. Hearing back from our  patients is one way we can continue to improve our services. Please take a few minutes to complete the written survey that you may receive in the mail after your visit with us. Thank you!             Your Updated Medication List - Protect others around you: Learn how to safely use, store and throw away your medicines at www.disposemymeds.org.          This list is accurate as of: 6/6/17  1:32 PM.  Always use your most recent med list.                   Brand Name Dispense Instructions for use    * albuterol (2.5 MG/3ML) 0.083% neb solution     1 Box    Take 1 vial (2.5 mg) by nebulization every 6 hours as needed for shortness of breath / dyspnea       * albuterol 108 (90 BASE) MCG/ACT Inhaler    VENTOLIN HFA    3 Inhaler    Inhale 2 puffs into the lungs 4 times daily Inhale 2 puffs every 4-6 hours as needed       aspirin 81 MG tablet     100 tablet    Take 1 tablet (81 mg) by mouth daily       atorvastatin 10 MG tablet    LIPITOR    30 tablet    1 po qday       B-12 1000 MCG Tbcr     100 tablet    Take 1,000 mcg by mouth daily       B-D SINGLE USE SWABS REGULAR Pads     100 each    Apply 1 pad topically 3 times daily Use as directed       benazepril 40 MG tablet    LOTENSIN    30 tablet    Take 1 tablet (40 mg) by mouth daily       blood glucose monitoring test strip    no brand specified    1 Box    1 strip by In Vitro route 3 times daily Test blood glucose 4x daily with meals and at night       capsaicin 0.025 % Crea cream    ZOSTRIX    45 g    Apply 1 g topically 3 times daily       chlorthalidone 25 MG tablet    HYGROTON    30 tablet    Take 1 tablet (25 mg) by mouth daily       diclofenac 1 % Gel topical gel    VOLTAREN    100 g    Apply 4 grams 4 x daily as needed       DULoxetine 60 MG EC capsule    CYMBALTA    90 capsule    Take 1 capsule (60 mg) by mouth daily       famotidine 20 MG tablet    PEPCID    60 tablet    Take 1 tablet (20 mg) by mouth 2 times daily       ferrous sulfate 325 (65 FE) MG tablet     IRON    30 tablet    Take 1 tablet (325 mg) by mouth daily (with breakfast)       fluticasone-vilanterol 200-25 MCG/INH oral inhaler    BREO ELLIPTA    1 Inhaler    Inhale 1 puff into the lungs daily       furosemide 20 MG tablet    LASIX    90 tablet    One daily as needed for swelling       gabapentin 250 MG/5ML solution    NEURONTIN    900 mL    Take 10 mLs (500 mg) by mouth 2 times daily       insulin pen needle 29G X 12.7MM    BD ULTRA-FINE    100 each    Use 4 times daily       LANTUS SOLOSTAR 100 UNIT/ML injection   Generic drug:  insulin glargine     1500 mL    Take 50 units at bedtime daily       lidocaine 5 % Patch    LIDODERM    40 patch    Apply up to 3 patches to painful area at once for up to 12 h within a 24 h period.  Remove after 12 hours.       Whistlestop FINEPOINT LANCETS Misc     100 each    Use to test blood sugars 2 times daily or as directed.       loratadine 10 MG tablet    CLARITIN    30 tablet    Take 1 tablet (10 mg) by mouth daily       magnesium oxide 400 (241.3 MG) MG tablet    MAG-OX    30 tablet    Take 1 tablet (400 mg) by mouth daily       metFORMIN 500 MG/5ML Soln solution    GLUCOPHAGE    300 mL    Take 5 mLs (500 mg) by mouth 2 times daily       NovoLOG FLEXPEN 100 UNIT/ML injection   Generic drug:  insulin aspart     180 mL    18 units before meals (do not take without a meal).       omeprazole 20 MG CR capsule    priLOSEC    30 capsule    Take 1 capsule (20 mg) by mouth daily       * order for DME     1 Device    Equipment being ordered: Wheelchair       * order for DME     1 Device    Equipment being ordered: one shower chair       * order for DME     1 Device    Equipment being ordered: 4 wheeled walker with seat       * order for DME     1 Device    Equipment being ordered: one wheeled walker with seat       oxyCODONE-acetaminophen 5-325 MG per tablet    PERCOCET    120 tablet    2 tabs po qhs, and 1 tab po bid during day, as needed.       PEG-KCl-NaCl-NaSulf-Na Asc-C 100 G  Solr    MOVIPREP    1 each    100mg SIml as directed. Use for patients with kidney disease, etc.       risperDAL 0.25 MG tablet   Generic drug:  risperiDONE      Take 1 tablet (0.25 mg) by mouth At Bedtime       senna-docusate 8.6-50 MG per tablet    SENOKOT-S;PERICOLACE    60 tablet    Take 1 tablet by mouth 2 times daily       * Simethicone 180 MG Caps     60 capsule    Use 1 softgel as needed for gas       * simethicone 80 MG Tabs     90 tablet    1-2 po q 6 hours prn flatulence       SUMAtriptan 25 MG tablet    IMITREX    24 tablet    One tab at onset of HA.  May repeat dose in 2 hours.  Do not exceed 200 mg in 24 hours       tolterodine 2 MG 24 hr capsule    DETROL LA    90 capsule    Take 1 capsule (2 mg) by mouth daily       vitamin D 2000 UNITS tablet     100 tablet    Take 2,000 Units by mouth daily       * Notice:  This list has 8 medication(s) that are the same as other medications prescribed for you. Read the directions carefully, and ask your doctor or other care provider to review them with you.

## 2017-06-07 ASSESSMENT — ASTHMA QUESTIONNAIRES: ACT_TOTALSCORE: 12

## 2017-07-10 ENCOUNTER — OFFICE VISIT (OUTPATIENT)
Dept: FAMILY MEDICINE | Facility: CLINIC | Age: 52
End: 2017-07-10

## 2017-07-10 VITALS
DIASTOLIC BLOOD PRESSURE: 69 MMHG | TEMPERATURE: 97.6 F | WEIGHT: 239.5 LBS | SYSTOLIC BLOOD PRESSURE: 105 MMHG | BODY MASS INDEX: 47.56 KG/M2

## 2017-07-10 DIAGNOSIS — E11.42 DIABETIC POLYNEUROPATHY ASSOCIATED WITH TYPE 2 DIABETES MELLITUS (H): ICD-10-CM

## 2017-07-10 DIAGNOSIS — G89.4 CHRONIC PAIN SYNDROME: ICD-10-CM

## 2017-07-10 DIAGNOSIS — R10.9 FLANK PAIN: Primary | ICD-10-CM

## 2017-07-10 DIAGNOSIS — G89.4 CHRONIC PAIN SYNDROME: Primary | ICD-10-CM

## 2017-07-10 LAB
AMPHETAMINES QUAL: NEGATIVE
BARBITURATES QUAL URINE: NEGATIVE
BENZODIAZEPINE QUAL URINE: NEGATIVE
BILIRUBIN UR: NEGATIVE
BLOOD UR: NEGATIVE
BUPRENORPHINE QUAL URINE: NEGATIVE
CANNABINOIDS UR QL SCN: NEGATIVE
COCAINE QUAL URINE: NEGATIVE
GLUCOSE URINE: NEGATIVE
KETONES UR QL: NEGATIVE
LEUKOCYTE ESTERASE UR: NEGATIVE
METHAMPHETAMINE: NEGATIVE
METHODONE QUAL: NEGATIVE
MORPHINE QUAL: NEGATIVE
NITRITE UR QL STRIP: NEGATIVE
OXYCODONE QUAL: POSITIVE
PH UR STRIP: 5 [PH] (ref 5–7)
PROTEIN UR: ABNORMAL
SP GR UR STRIP: 1.02
TEMPERATURE OF URINE WAS BETWEEN 90-100 DEGREES F: YES
UROBILINOGEN UR STRIP-ACNC: ABNORMAL

## 2017-07-10 RX ORDER — GABAPENTIN 250 MG/5ML
500 SOLUTION ORAL 3 TIMES DAILY
Qty: 940 ML | Refills: 2 | Status: SHIPPED | OUTPATIENT
Start: 2017-07-10 | End: 2018-12-11

## 2017-07-10 RX ORDER — OXYCODONE AND ACETAMINOPHEN 5; 325 MG/1; MG/1
TABLET ORAL
Qty: 120 TABLET | Refills: 0 | Status: SHIPPED | OUTPATIENT
Start: 2017-07-10 | End: 2017-09-01

## 2017-07-10 NOTE — PROGRESS NOTES
Mrs Henri  Your urine test did NOT show any sign of kidney stone or infection, so I think the side pain is from muscle strain, but not a kidney problem  Your urine drug test was normal.  JIGNESH Meyer

## 2017-07-10 NOTE — MR AVS SNAPSHOT
After Visit Summary   7/10/2017    Ruthy Álvarez    MRN: [id number]           Patient Information     Date Of Birth          1965        Visit Information        Provider Department      7/10/2017 10:40 AM Jose Angel Meyer MD [facility]        Today's Diagnoses     Flank pain    -  1    Chronic pain syndrome        Diabetic polyneuropathy associated with type 2 diabetes mellitus (H)           Follow-ups after your visit        Who to contact     Please call your clinic at [phone] to:    Ask questions about your health    Make or cancel appointments    Discuss your medicines    Learn about your test results    Speak to your doctor   If you have compliments or concerns about an experience at your clinic, or if you wish to file a complaint, please contact [facility] Patient Relations at [phone] or email us at [email or web address]         Additional Information About Your Visit        MyChart Information     [email or web address] is an electronic gateway that provides easy, online access to your medical records. With [email or web address], you can request a clinic appointment, read your test results, renew a prescription or communicate with your care team.     To sign up for [employer] visit the website at [email or web address]   You will be asked to enter the access code listed below, as well as some personal information. Please follow the directions to create your username and password.     Your access code is: [email or web address]  Expires: 2017  1:32 PM     Your access code will  in 90 days. If you need help or a new code, please contact your [facility] or call [phone] for assistance.        Care EveryWhere ID     This is your Care EveryWhere ID. This could be used by other organizations to access your [facility] medical records  [id number]        Your Vitals Were     Temperature Breastfeeding? BMI (Body Mass Index)              97.6  F (36.4  C) (Oral) No 47.56 kg/m2          Blood Pressure from Last 3 Encounters:   07/10/17 105/69   06/06/17 122/81   05/02/17 115/75    Weight from Last 3 Encounters:   07/10/17 239 lb 8 oz (108.6 kg)   06/06/17 245 lb (111.1 kg)   05/02/17 237 lb 3.2 oz (107.6 kg)              We Performed the Following     Rapid Urine Drug Screen ([facility])     Urinalysis([facility])          Today's Medication Changes          These changes are accurate as of: 7/10/17 12:15 PM.  If you have any questions, ask your nurse or doctor.               These medicines have changed or have updated prescriptions.        Dose/Directions    gabapentin 250 MG/5ML solution   Commonly known as:  NEURONTIN   This may have changed:  when to take this   Used for:  Diabetic polyneuropathy associated with type 2 diabetes mellitus (H)   Changed by:  Jose Angel Meyer MD        Dose:  500 mg   Take 10 mLs (500 mg) by mouth 3 times daily   Quantity:  940 mL   Refills:  2            Where to get your medicines      These medications were sent to [facility] - [place], [place] - [address], [place] [address]     Phone:  [phone]     gabapentin 250 MG/5ML solution         Some of these will need a paper prescription and others can be bought over the counter.  Ask your nurse if you have questions.     Bring a paper prescription for each of these medications     oxyCODONE-acetaminophen 5-325 MG per tablet                Primary Care Provider Office Phone # Fax #    Jose Angel Meyer [phone]       [address] [address]        Equal Access to Services     KELLEY AUGUSTE AH: Hadcaden rincon Sovarghese, waaxda luqadaha, qaybta kaalmada vijaya kenney. So [facility] [phone].    ATENCIÓN: Si habla español, tiene a willis disposición servicios gratuitos de asistencia lingüística. Llame al [phone].    We comply with  applicable federal civil rights laws and [place] laws. We do not discriminate on the basis of race, color, national origin, age, disability sex, sexual orientation or gender identity.            Thank you!     Thank you for choosing [facility]  for your care. Our goal is always to provide you with excellent care. Hearing back from our patients is one way we can continue to improve our services. Please take a few minutes to complete the written survey that you may receive in the mail after your visit with us. Thank you!             Your Updated Medication List - Protect others around you: Learn how to safely use, store and throw away your medicines at www.disposemymeds.org.          This list is accurate as of: 7/10/17 12:15 PM.  Always use your most recent med list.                   Brand Name Dispense Instructions for use Diagnosis    * albuterol (2.5 MG/3ML) 0.083% neb solution     1 Box    Take 1 vial (2.5 mg) by nebulization every 6 hours as needed for shortness of breath / dyspnea    Moderate persistent asthma       * albuterol 108 (90 BASE) MCG/ACT Inhaler    VENTOLIN HFA    3 Inhaler    Inhale 2 puffs into the lungs 4 times daily Inhale 2 puffs every 4-6 hours as needed    Moderate persistent asthma       aspirin 81 MG tablet     100 tablet    Take 1 tablet (81 mg) by mouth daily    Essential hypertension with goal blood pressure less than 140/90       atorvastatin 10 MG tablet    LIPITOR    30 tablet    1 po qday    Benign essential hypertension       B-12 1000 MCG Tbcr     100 tablet    Take 1,000 mcg by mouth daily    Vitamin B12 deficiency (non anemic)       B-D SINGLE USE SWABS REGULAR Pads     100 each    Apply 1 pad topically 3 times daily Use as directed    Type II or unspecified type diabetes mellitus without mention of complication, not stated as uncontrolled       benazepril 40 MG tablet    LOTENSIN    30 tablet    Take 1 tablet (40 mg) by mouth daily    Essential hypertension with goal  blood pressure less than 140/90       blood glucose monitoring test strip    no brand specified    1 Box    1 strip by In Vitro route 3 times daily Test blood glucose 4x daily with meals and at night    Type II or unspecified type diabetes mellitus without mention of complication, not stated as uncontrolled       capsaicin 0.025 % Crea cream    ZOSTRIX    45 g    Apply 1 g topically 3 times daily    Neuropathy (H)       chlorthalidone 25 MG tablet    HYGROTON    30 tablet    Take 1 tablet (25 mg) by mouth daily    Essential hypertension with goal blood pressure less than 140/90       diclofenac 1 % Gel topical gel    VOLTAREN    100 g    Apply 4 grams 4 x daily as needed    S/P gastric bypass       DULoxetine 60 MG EC capsule    CYMBALTA    90 capsule    Take 1 capsule (60 mg) by mouth daily    Moderate major depression (H)       famotidine 20 MG tablet    PEPCID    60 tablet    Take 1 tablet (20 mg) by mouth 2 times daily    Gastroesophageal reflux disease, esophagitis presence not specified       ferrous sulfate 325 (65 FE) MG tablet    IRON    30 tablet    Take 1 tablet (325 mg) by mouth daily (with breakfast)        fluticasone-vilanterol 200-25 MCG/INH oral inhaler    BREO ELLIPTA    1 Inhaler    Inhale 1 puff into the lungs daily    Uncomplicated asthma, unspecified asthma severity       furosemide 20 MG tablet    LASIX    90 tablet    One daily as needed for swelling    Pedal edema       gabapentin 250 MG/5ML solution    NEURONTIN    940 mL    Take 10 mLs (500 mg) by mouth 3 times daily    Diabetic polyneuropathy associated with type 2 diabetes mellitus (H)       insulin pen needle 29G X 12.7MM    BD ULTRA-FINE    100 each    Use 4 times daily    Type 2 diabetes mellitus with diabetic neuropathy (H)       LANTUS SOLOSTAR 100 UNIT/ML injection   Generic drug:  insulin glargine     1500 mL    Take 50 units at bedtime daily    Type 2 diabetes mellitus with complication, with long-term current use of insulin (H)        lidocaine 5 % Patch    LIDODERM    40 patch    Apply up to 3 patches to painful area at once for up to 12 h within a 24 h period.  Remove after 12 hours.    Back pain       LIFESCAN FINEPOINT LANCETS Misc     100 each    Use to test blood sugars 2 times daily or as directed.    Type 2 diabetes mellitus with diabetic polyneuropathy, with long-term current use of insulin (H)       loratadine 10 MG tablet    CLARITIN    30 tablet    Take 1 tablet (10 mg) by mouth daily    Seasonal allergies       magnesium oxide 400 (241.3 MG) MG tablet    MAG-OX    30 tablet    Take 1 tablet (400 mg) by mouth daily    Hypomagnesemia       metFORMIN 500 MG/5ML Soln solution    GLUCOPHAGE    300 mL    Take 5 mLs (500 mg) by mouth 2 times daily    Type 2 diabetes mellitus with hyperglycemia, with long-term current use of insulin (H)       NovoLOG FLEXPEN 100 UNIT/ML injection   Generic drug:  insulin aspart     180 mL    18 units before meals (do not take without a meal).    Type 2 diabetes mellitus with hyperglycemia, with long-term current use of insulin (H)       omeprazole 20 MG CR capsule    priLOSEC    30 capsule    Take 1 capsule (20 mg) by mouth daily    Gastroesophageal reflux disease without esophagitis       * order for DME     1 Device    Equipment being ordered: Wheelchair    Left foot pain       * order for DME     1 Device    Equipment being ordered: one shower chair    Left foot pain       * order for DME     1 Device    Equipment being ordered: 4 wheeled walker with seat    Left foot pain       * order for DME     1 Device    Equipment being ordered: one wheeled walker with seat    Left foot pain, Neuropathy (H)       oxyCODONE-acetaminophen 5-325 MG per tablet    PERCOCET    120 tablet    2 tabs po qhs, and 1 tab po bid during day, as needed.    Chronic pain syndrome       PEG-KCl-NaCl-NaSulf-Na Asc-C 100 G Solr    MOVIPREP    1 each    100mg SIml as directed. Use for patients with kidney disease, etc.     Screening for colon cancer       risperDAL 0.25 MG tablet   Generic drug:  risperiDONE      Take 1 tablet (0.25 mg) by mouth At Bedtime    Diabetes mellitus, type 2 (H), Benign essential hypertension, Type II or unspecified type diabetes mellitus without mention of complication, not stated as uncontrolled       senna-docusate 8.6-50 MG per tablet    SENOKOT-S;PERICOLACE    60 tablet    Take 1 tablet by mouth 2 times daily    Chronic pain syndrome       * Simethicone 180 MG Caps     60 capsule    Use 1 softgel as needed for gas    Flatulence, eructation and gas pain       * simethicone 80 MG Tabs     90 tablet    1-2 po q 6 hours prn flatulence    Flatulence, eructation and gas pain       SUMAtriptan 25 MG tablet    IMITREX    24 tablet    One tab at onset of HA.  May repeat dose in 2 hours.  Do not exceed 200 mg in 24 hours    Headache(784.0)       tolterodine 2 MG 24 hr capsule    DETROL LA    90 capsule    Take 1 capsule (2 mg) by mouth daily    Overactive bladder       vitamin D 2000 UNITS tablet     100 tablet    Take 2,000 Units by mouth daily    Vitamin D deficiency       * Notice:  This list has 8 medication(s) that are the same as other medications prescribed for you. Read the directions carefully, and ask your doctor or other care provider to review them with you.

## 2017-07-10 NOTE — LETTER
July 11, 2017      Ruthy Álvarez  [address]   [facility] [address]        Dear Ruthy,     Your urine test did NOT show any sign of kidney stone or infection, so I think the side pain is from muscle strain, but not a kidney problem   Your urine drug test was normal.     Resulted Orders   Rapid Urine Drug Screen ([facility])   Result Value Ref Range    Amphetamines Qual NEGATIVE NEGATIVE    Barbiturates Qual Urine NEGATIVE NEGATIVE    Buprenorphine Qual Urine NEGATIVE NEGATIVE    Benzodiazepine Qual Urine NEGATIVE NEGATIVE    Cocaine Qual Urine NEGATIVE NEGATIVE    Cannabinoids Qual Urine NEGATIVE NEGATIVE    Methamphetamine Qual NEGATIVE NEGATIVE    Methadone Qual NEGATIVE NEGATIVE    Morphine Qual NEGATIVE NEGATIVE    Oxycodone Qual POSITIVE (A) NEGATIVE    Temperature of Urine was Between  Degrees F YES YES      Comment:      This is a preliminary screening test that detects drugs-of-abuse in urine at   specified detection levels.  To confirm preliminary results, a more specific   method such as Gas Chromatography/Mass Spectrometry (GC/MS) must be used.        Urinalysis([facility])   Result Value Ref Range    Specific Gravity Urine 1.025 1.005 - 1.030    pH Urine 5.0 4.5 - 8.0    Leukocyte Esterase UR Negative NEGATIVE    Nitrite Urine Negative NEGATIVE    Protein UR 2+ (A) NEGATIVE    Glucose Urine Negative NEGATIVE    Ketones Urine Negative NEGATIVE    Urobilinogen mg/dL 0.2 E.U./dL 0.2 E.U./dL    Bilirubin UR Negative NEGATIVE    Blood UR Negative NEGATIVE       If you have any questions, please call the clinic to make an appointment.    Sincerely,    Jose Angel Meyer MD

## 2017-07-10 NOTE — PROGRESS NOTES
ASSESSMENT AND PLAN   (R10.9) Flank pain  (primary encounter diagnosis)  Comment: likely muscular, but will get UA  Plan: Urinalysis([facility])        Ordered.  cx if abnormal    (G89.4) Chronic pain syndrome  Comment: stable.  New pain is likely meralgia parestheca. Will try increasing gabapentin to 500 mg tid (was bid).  Refilled oxycodone.  Need to consider PT again to improve functional status  Plan: oxyCODONE-acetaminophen (PERCOCET) 5-325 MG per        tablet            (E11.42) Diabetic polyneuropathy associated with type 2 diabetes mellitus (H)  Comment: see above  Plan: gabapentin (NEURONTIN) 250 MG/5ML solution        Increased to tid.              Chief Complaint   Patient presents with     Pain Management     burning/stabbing pain in left thigh with numberness for 3 or 4 days now starting from the outside of the left knee     Body Feels Heavy     like when getting out of the pool x 2-3 weeks          SUBJECTIVE       Ruthy Álvarez is a 51 year old  female with a PMH significant for:     Patient Active Problem List    Diagnosis Date Noted     Chronic pain syndrome 10/07/2016     Priority: High     Chronic Pain Diagnosis:  neuropathy  DIRE Total Score(s):14     ORT: 5    4 - 7 =  Moderate Risk   of future problems with Opioids  FAQ5: 45/100 on 9/9/16, 40/100 on 11/15/16  Behavioral Health Consultation: 5/12/17 with Dr. Daisy Quezada  Personal Care Plan for Chronic Pain: 5/12/17  Opioid medication:oxycodone   Dose:10mg  Number of pills per month:60  Patient is being prescribed 10mg of oxycodone IR (Percocet) per day this is 15 mg Morphine Equivalents  Benzodiazepines Prescribed? No  Naloxone prescribed? No       Clinic visit frequency required: Q 1 month Next visit due:   Controlled Substance/Opioid Treatment agreement on file (dated <12 months ago)?:    Date(s): pending  Last [place] verification: last visit  Items in red to be updated at each visit  Patient is followed by Data Unavailable for  ongoing prescription of pain medication.  All refills should be approved by this provider, or covering partner.Chronic Pain          Microalbuminuria 10/12/2015     Priority: High     Type 2 diabetes mellitus with diabetic polyneuropathy (H) 10/12/2015     Priority: High     Bilateral ankle joint pain 01/15/2016     Priority: Medium     Esophageal reflux 04/07/2015     Priority: Medium     Urinary incontinence 02/19/2015     Priority: Medium     Insomnia 02/19/2015     Priority: Medium     HTN (hypertension) 08/26/2013     Priority: Medium     Hyperlipidemia LDL goal <100 06/21/2013     Priority: Medium     S/P bariatric surgery 06/21/2013     Priority: Medium     Kim-en-Y in 2006       Neck pain 05/16/2013     Priority: Medium     Moderate major depression (H) 01/24/2013     Priority: Medium     Moderate persistent asthma 11/15/2012     Priority: Medium     Pulmonary embolism and infarction (H) 11/15/2012     Priority: Medium     Problem list name updated by automated process. Provider to review       Health Care Home 11/15/2012     Priority: Medium     Tier 3    Status: Accept  Care Coordination Start Date: 02/27/12  State Tier Level: Level 3  Language/Barrier to Learning: No  Significant Mental Health Issues: Yes  Date Care Coordination Discontinued: 05/28/15    DX V65.8 REPLACED WITH 00435 HEALTH CARE HOME (04/08/2013)         S/P hysterectomy 06/21/2013     Priority: Low     2008, For fibroids.  Cervix removed, but still has ovaries.       Carpal tunnel syndrome 11/15/2012     Priority: Low     Necrotizing fasciitis (H) 11/15/2012     Priority: Low     Abnormal Pap smear of cervix      Priority: Low     3-12-15: Pap/HPV neg.  Plan: Given hx (mildly abn pap, hyster), recheck in 5 yrs.  10-11-11: Pap/HPV neg.  Plan: Given ASUS pap in 2010, recommend repeating pap in 1 year.  8/3/2010 ASCUS Pap with +BV- pt treated with Metrogel.  Pt needs repeat Pap in 1yr.  8/4/2008 Pt had total hyst for menorrhagia, pelvic  pain, scar tissue, and fibroids.       Current Outpatient Prescriptions   Medication     gabapentin (NEURONTIN) 250 MG/5ML solution     oxyCODONE-acetaminophen (PERCOCET) 5-325 MG per tablet     omeprazole (PRILOSEC) 20 MG CR capsule     Simethicone 180 MG CAPS     simethicone 80 MG TABS     tolterodine (DETROL LA) 2 MG 24 hr capsule     fluticasone-vilanterol (BREO ELLIPTA) 200-25 MCG/INH oral inhaler     aspirin 81 MG tablet     magnesium oxide (MAG-OX) 400 (241.3 MG) MG tablet     PEG-KCl-NaCl-NaSulf-Na Asc-C (MOVIPREP) 100 G SOLR     NOVOLOG FLEXPEN 100 UNIT/ML soln     LANTUS SOLOSTAR 100 UNIT/ML soln     metFORMIN (GLUCOPHAGE) 500 MG/5ML SOLN solution     [employer] FINEPOINT LANCETS MISC     furosemide (LASIX) 20 MG tablet     atorvastatin (LIPITOR) 10 MG tablet     senna-docusate (SENOKOT-S;PERICOLACE) 8.6-50 MG per tablet     benazepril (LOTENSIN) 40 MG tablet     chlorthalidone (HYGROTON) 25 MG tablet     capsaicin (ZOSTRIX) 0.025 % CREA     Cyanocobalamin (B-12) 1000 MCG TBCR     Cholecalciferol (VITAMIN D) 2000 UNITS tablet     famotidine (PEPCID) 20 MG tablet     order for DME     order for DME     insulin pen needle (BD ULTRA-FINE) 29G X 12.7MM     order for DME     order for DME     diclofenac (VOLTAREN) 1 % GEL     lidocaine (LIDODERM) 5 % patch     albuterol (VENTOLIN HFA) 108 (90 BASE) MCG/ACT inhaler     Alcohol Swabs (B-D SINGLE USE SWABS REGULAR) PADS     blood glucose test strip     loratadine (CLARITIN) 10 MG tablet     SUMAtriptan (IMITREX) 25 MG tablet     risperiDONE (RISPERDAL) 0.25 MG tablet     ferrous sulfate (IRON) 325 (65 FE) MG tablet     albuterol (2.5 MG/3ML) 0.083% nebulizer solution     DULoxetine (CYMBALTA) 60 MG capsule     No current facility-administered medications for this visit.      She presents with f/up of chronic pain.  FAQ 5 30.  More leg pain--located on left anterior and lateral thigh and assoc w/ numbness.  Constant and present for weeks.  Tingling/stabbing/burning  pain on side of left leg.  Feels numbOn gabapentin 500 mg bid already and not clearly helping, although hasnt tried stopping this.  No back or leg pain.  Overall generally satisfied with pain control.    Soc:  Moving to [place] in September--hoping abel twarmer weather will help painfaq 5 is just 30 today--less than in past.        REVIEW OF SYSTEMS     General: No fevers, chills, sweats, unexplained weight loss  Endo:  Polydypsia, polyuria, hypoglycemia  CV: No chest pain, palpitations  Resp: No cough or shortness of breath.    GI:  No nausea or vomiting. No constipation or diarrhea.  No blood in stool  : No urinary c/o, but has left flank pain.    Neuro:  No headache, weakness, or numbness          OBJECTIVE     Vitals:    07/10/17 1129   BP: 105/69   Temp: 97.6  F (36.4  C)   TempSrc: Oral   Weight: 239 lb 8 oz (108.6 kg)     Body mass index is 47.56 kg/(m^2).    Gen:  Well nourished and in NAD.  Appears tired  CV:  RRR  - no murmurs, rubs, or gallups,   Pulm:  CTAB, no wheezes/rales/rhonchi   ABD: Non-distended.  Normal BS.  Soft, nontender, no masses or HSM.  Mild left cva tenderness   Extrem: no cyanosis, no edema   Psych: alert, affect appropriate.    Neuro:  Numbness to light touch over anterior and lateral thigh.  Extends onto upper portion of lower leg.  Strength normal.    Results for orders placed or performed in visit on 07/10/17 (from the past 24 hour(s))   Rapid Urine Drug Screen ([facility])   Result Value Ref Range    Amphetamines Qual NEGATIVE NEGATIVE    Barbiturates Qual Urine NEGATIVE NEGATIVE    Buprenorphine Qual Urine NEGATIVE NEGATIVE    Benzodiazepine Qual Urine NEGATIVE NEGATIVE    Cocaine Qual Urine NEGATIVE NEGATIVE    Cannabinoids Qual Urine NEGATIVE NEGATIVE    Methamphetamine Qual NEGATIVE NEGATIVE    Methadone Qual NEGATIVE NEGATIVE    Morphine Qual NEGATIVE NEGATIVE    Oxycodone Qual POSITIVE (A) NEGATIVE    Temperature of Urine was Between  Degrees F YES YES            Jose Angel Meyer

## 2017-08-07 ENCOUNTER — TELEPHONE (OUTPATIENT)
Dept: FAMILY MEDICINE | Facility: CLINIC | Age: 52
End: 2017-08-07

## 2017-08-07 NOTE — TELEPHONE ENCOUNTER
[facility] phone call message- general phone call:    Reason for call: Pt out of town and needs a refill of her pain meds, I explained to pt that she would need appt for this, she made appt for 08/22/2017. Would like meds up to this date.     Return call needed: Yes    OK to leave a message on voice mail? Yes    Primary language: English      needed? No    Call taken on August 7, 2017 at 10:21 AM by Livia Westbrook

## 2017-08-26 ENCOUNTER — HEALTH MAINTENANCE LETTER (OUTPATIENT)
Age: 52
End: 2017-08-26

## 2017-09-01 ENCOUNTER — OFFICE VISIT (OUTPATIENT)
Dept: FAMILY MEDICINE | Facility: CLINIC | Age: 52
End: 2017-09-01

## 2017-09-01 VITALS
HEART RATE: 88 BPM | RESPIRATION RATE: 16 BRPM | SYSTOLIC BLOOD PRESSURE: 153 MMHG | DIASTOLIC BLOOD PRESSURE: 84 MMHG | TEMPERATURE: 98.2 F

## 2017-09-01 DIAGNOSIS — E55.9 VITAMIN D DEFICIENCY: ICD-10-CM

## 2017-09-01 DIAGNOSIS — E11.42 TYPE 2 DIABETES MELLITUS WITH DIABETIC POLYNEUROPATHY, WITHOUT LONG-TERM CURRENT USE OF INSULIN (H): ICD-10-CM

## 2017-09-01 DIAGNOSIS — G89.4 CHRONIC PAIN SYNDROME: ICD-10-CM

## 2017-09-01 DIAGNOSIS — G89.4 CHRONIC PAIN SYNDROME: Primary | ICD-10-CM

## 2017-09-01 RX ORDER — OXYCODONE AND ACETAMINOPHEN 5; 325 MG/1; MG/1
TABLET ORAL
Qty: 120 TABLET | Refills: 0 | Status: SHIPPED | OUTPATIENT
Start: 2017-09-01 | End: 2018-12-11

## 2017-09-01 RX ORDER — CHOLECALCIFEROL (VITAMIN D3) 50 MCG
2000 TABLET ORAL DAILY
Qty: 100 TABLET | Refills: 3 | Status: SHIPPED | OUTPATIENT
Start: 2017-09-01 | End: 2017-09-05

## 2017-09-01 ASSESSMENT — PAIN SCALES - GENERAL: PAINLEVEL: SEVERE PAIN (6)

## 2017-09-01 NOTE — MR AVS SNAPSHOT
After Visit Summary   2017    Ruthy Álvarez    MRN: [id number]           Patient Information     Date Of Birth          1965        Visit Information        Provider Department      2017 11:00 AM Jose Angel Meyer MD [facility]        Today's Diagnoses     Chronic pain syndrome        Type 2 diabetes mellitus with diabetic polyneuropathy, without long-term current use of insulin (H)          Care Instructions    Apply bacitracin to hand 2 times daily.      Stop the naproxen, ibu, and excedrine:  Can really upset stomach and cause ulcers.      Restart the oxycodone,    See you next month.          Follow-ups after your visit        Follow-up notes from your care team     Return in about 1 month (around 10/1/2017).      Who to contact     Please call your clinic at [phone] to:    Ask questions about your health    Make or cancel appointments    Discuss your medicines    Learn about your test results    Speak to your doctor   If you have compliments or concerns about an experience at your clinic, or if you wish to file a complaint, please contact [facility] Patient Relations at [phone] or email us at [email or web address]         Additional Information About Your Visit        MyChart Information     [employer] is an electronic gateway that provides easy, online access to your medical records. With [employer], you can request a clinic appointment, read your test results, renew a prescription or communicate with your care team.     To sign up for My[employer]t visit the website at [email or web address]   You will be asked to enter the access code listed below, as well as some personal information. Please follow the directions to create your username and password.     Your access code is: [email or web address]  Expires: 2017  1:32 PM     Your access code will  in 90 days. If you need help or a new code, please contact your [facility] [facility] or call [phone] for assistance.        Care EveryWhere ID     This is your Care EveryWhere ID. This could be used by other organizations to access your [place] medical records  [id number]        Your Vitals Were     Pulse Temperature Respirations Breastfeeding?          88 98.2  F (36.8  C) (Oral) 16 No         Blood Pressure from Last 3 Encounters:   09/01/17 153/84   07/10/17 105/69   06/06/17 122/81    Weight from Last 3 Encounters:   07/10/17 239 lb 8 oz (108.6 kg)   06/06/17 245 lb (111.1 kg)   05/02/17 237 lb 3.2 oz (107.6 kg)              We Performed the Following     Basic Metabolic Panel ([facility])     Hemoglobin A1c ([facility])     Rapid Urine Drug Screen ([facility])          Where to get your medicines      Some of these will need a paper prescription and others can be bought over the counter.  Ask your nurse if you have questions.     Bring a paper prescription for each of these medications     oxyCODONE-acetaminophen 5-325 MG per tablet          Primary Care Provider Office Phone # Fax #    Jose Angel Meyer [phone]       [place] [address]        Equal Access to Services     KELLEY AUGUSTE : Gaetano youngo Sovarghese, waaxda troy, qaybta kaalmada pablito, vijaya brannon. So [facility] [phone].    ATENCIÓN: Si habla español, tiene a willis disposición servicios gratuitos de asistencia lingüística. Llame al [phone].    We comply with applicable federal civil rights laws and [place] laws. We do not discriminate on the basis of race, color, national origin, age, disability sex, sexual orientation or gender identity.            Thank you!     Thank you for choosing [facility]  for your care. Our goal is always to provide you with excellent care. Hearing back from our patients is one way we can continue to improve our services. Please take a few minutes to complete the written  survey that you may receive in the mail after your visit with us. Thank you!             Your Updated Medication List - Protect others around you: Learn how to safely use, store and throw away your medicines at www.disposemymeds.org.          This list is accurate as of: 9/1/17 12:14 PM.  Always use your most recent med list.                   Brand Name Dispense Instructions for use Diagnosis    * albuterol (2.5 MG/3ML) 0.083% neb solution     1 Box    Take 1 vial (2.5 mg) by nebulization every 6 hours as needed for shortness of breath / dyspnea    Moderate persistent asthma       * albuterol 108 (90 BASE) MCG/ACT Inhaler    VENTOLIN HFA    3 Inhaler    Inhale 2 puffs into the lungs 4 times daily Inhale 2 puffs every 4-6 hours as needed    Moderate persistent asthma       aspirin 81 MG tablet     100 tablet    Take 1 tablet (81 mg) by mouth daily    Essential hypertension with goal blood pressure less than 140/90       atorvastatin 10 MG tablet    LIPITOR    30 tablet    1 po qday    Benign essential hypertension       B-12 1000 MCG Tbcr     100 tablet    Take 1,000 mcg by mouth daily    Vitamin B12 deficiency (non anemic)       B-D SINGLE USE SWABS REGULAR Pads     100 each    Apply 1 pad topically 3 times daily Use as directed    Type II or unspecified type diabetes mellitus without mention of complication, not stated as uncontrolled       benazepril 40 MG tablet    LOTENSIN    30 tablet    Take 1 tablet (40 mg) by mouth daily    Essential hypertension with goal blood pressure less than 140/90       blood glucose monitoring test strip    no brand specified    1 Box    1 strip by In Vitro route 3 times daily Test blood glucose 4x daily with meals and at night    Type II or unspecified type diabetes mellitus without mention of complication, not stated as uncontrolled       capsaicin 0.025 % Crea cream    ZOSTRIX    45 g    Apply 1 g topically 3 times daily    Neuropathy (H)       chlorthalidone 25 MG tablet     HYGROTON    30 tablet    Take 1 tablet (25 mg) by mouth daily    Essential hypertension with goal blood pressure less than 140/90       diclofenac 1 % Gel topical gel    VOLTAREN    100 g    Apply 4 grams 4 x daily as needed    S/P gastric bypass       DULoxetine 60 MG EC capsule    CYMBALTA    90 capsule    Take 1 capsule (60 mg) by mouth daily    Moderate major depression (H)       famotidine 20 MG tablet    PEPCID    60 tablet    Take 1 tablet (20 mg) by mouth 2 times daily    Gastroesophageal reflux disease, esophagitis presence not specified       ferrous sulfate 325 (65 FE) MG tablet    IRON    30 tablet    Take 1 tablet (325 mg) by mouth daily (with breakfast)        fluticasone-vilanterol 200-25 MCG/INH oral inhaler    BREO ELLIPTA    1 Inhaler    Inhale 1 puff into the lungs daily    Uncomplicated asthma, unspecified asthma severity       furosemide 20 MG tablet    LASIX    90 tablet    One daily as needed for swelling    Pedal edema       gabapentin 250 MG/5ML solution    NEURONTIN    940 mL    Take 10 mLs (500 mg) by mouth 3 times daily    Diabetic polyneuropathy associated with type 2 diabetes mellitus (H)       insulin pen needle 29G X 12.7MM    BD ULTRA-FINE    100 each    Use 4 times daily    Type 2 diabetes mellitus with diabetic neuropathy (H)       LANTUS SOLOSTAR 100 UNIT/ML injection   Generic drug:  insulin glargine     1500 mL    Take 50 units at bedtime daily    Type 2 diabetes mellitus with complication, with long-term current use of insulin (H)       lidocaine 5 % Patch    LIDODERM    40 patch    Apply up to 3 patches to painful area at once for up to 12 h within a 24 h period.  Remove after 12 hours.    Back pain       [employer] FINEPOINT LANCETS Misc     100 each    Use to test blood sugars 2 times daily or as directed.    Type 2 diabetes mellitus with diabetic polyneuropathy, with long-term current use of insulin (H)       loratadine 10 MG tablet    CLARITIN    30 tablet    Take 1 tablet  (10 mg) by mouth daily    Seasonal allergies       magnesium oxide 400 (241.3 MG) MG tablet    MAG-OX    30 tablet    Take 1 tablet (400 mg) by mouth daily    Hypomagnesemia       metFORMIN 500 MG/5ML Soln solution    GLUCOPHAGE    300 mL    Take 5 mLs (500 mg) by mouth 2 times daily    Type 2 diabetes mellitus with hyperglycemia, with long-term current use of insulin (H)       [employer] FLEXPEN 100 UNIT/ML injection   Generic drug:  insulin aspart     180 mL    18 units before meals (do not take without a meal).    Type 2 diabetes mellitus with hyperglycemia, with long-term current use of insulin (H)       omeprazole 20 MG CR capsule    priLOSEC    30 capsule    Take 1 capsule (20 mg) by mouth daily    Gastroesophageal reflux disease without esophagitis       * order for DME     1 Device    Equipment being ordered: Wheelchair    Left foot pain       * order for DME     1 Device    Equipment being ordered: one shower chair    Left foot pain       * order for DME     1 Device    Equipment being ordered: 4 wheeled walker with seat    Left foot pain       * order for DME     1 Device    Equipment being ordered: one wheeled walker with seat    Left foot pain, Neuropathy (H)       oxyCODONE-acetaminophen 5-325 MG per tablet    PERCOCET    120 tablet    2 tabs po qhs, and 1 tab po bid during day, as needed.    Chronic pain syndrome       PEG-KCl-NaCl-NaSulf-Na Asc-C 100 G Solr    MOVIPREP    1 each    100mg SIml as directed. Use for patients with kidney disease, etc.    Screening for colon cancer       risperDAL 0.25 MG tablet   Generic drug:  risperiDONE      Take 1 tablet (0.25 mg) by mouth At Bedtime    Diabetes mellitus, type 2 (H), Benign essential hypertension, Type II or unspecified type diabetes mellitus without mention of complication, not stated as uncontrolled       senna-docusate 8.6-50 MG per tablet    SENOKOT-S;PERICOLACE    60 tablet    Take 1 tablet by mouth 2 times daily    Chronic pain syndrome        * Simethicone 180 MG Caps     60 capsule    Use 1 softgel as needed for gas    Flatulence, eructation and gas pain       * simethicone 80 MG Tabs     90 tablet    1-2 po q 6 hours prn flatulence    Flatulence, eructation and gas pain       SUMAtriptan 25 MG tablet    IMITREX    24 tablet    One tab at onset of HA.  May repeat dose in 2 hours.  Do not exceed 200 mg in 24 hours    Headache(784.0)       tolterodine 2 MG 24 hr capsule    DETROL LA    90 capsule    Take 1 capsule (2 mg) by mouth daily    Overactive bladder       vitamin D 2000 UNITS tablet     100 tablet    Take 2,000 Units by mouth daily    Vitamin D deficiency       * Notice:  This list has 8 medication(s) that are the same as other medications prescribed for you. Read the directions carefully, and ask your doctor or other care provider to review them with you.

## 2017-09-01 NOTE — PROGRESS NOTES
Patient Active Problem List    Diagnosis Date Noted     Chronic pain syndrome 10/07/2016     Priority: High     Chronic Pain Diagnosis:  neuropathy  DIRE Total Score(s):14     ORT: 5    4 - 7 =  Moderate Risk   of future problems with Opioids  FAQ5: 45/100 on 9/9/16, 40/100 on 11/15/16  Behavioral Health Consultation: 5/12/17 with Dr. Daisy Quezada  Personal Care Plan for Chronic Pain: 5/12/17  Opioid medication:oxycodone   Dose:10mg  Number of pills per month:60  Patient is being prescribed 10mg of oxycodone IR (Percocet) per day this is 15 mg Morphine Equivalents  Benzodiazepines Prescribed? No  Naloxone prescribed? No       Clinic visit frequency required: Q 1 month Next visit due:   Controlled Substance/Opioid Treatment agreement on file (dated <12 months ago)?:    Date(s): pending  Last [place] verification: last visit  Items in red to be updated at each visit  Patient is followed by Data Unavailable for ongoing prescription of pain medication.  All refills should be approved by this provider, or covering partner.Chronic Pain          Microalbuminuria 10/12/2015     Priority: High     Type 2 diabetes mellitus with diabetic polyneuropathy (H) 10/12/2015     Priority: High     Bilateral ankle joint pain 01/15/2016     Priority: Medium     Esophageal reflux 04/07/2015     Priority: Medium     Urinary incontinence 02/19/2015     Priority: Medium     Insomnia 02/19/2015     Priority: Medium     HTN (hypertension) 08/26/2013     Priority: Medium     Hyperlipidemia LDL goal <100 06/21/2013     Priority: Medium     S/P bariatric surgery 06/21/2013     Priority: Medium     Kim-en-Y in 2006       Neck pain 05/16/2013     Priority: Medium     Moderate major depression (H) 01/24/2013     Priority: Medium     Moderate persistent asthma 11/15/2012     Priority: Medium     Pulmonary embolism and infarction (H) 11/15/2012     Priority: Medium     Problem list name updated by automated process. Provider to review       Health  Care Home 11/15/2012     Priority: Medium     Tier 3    Status: Accept  Care Coordination Start Date: 02/27/12  State Tier Level: Level 3  Language/Barrier to Learning: No  Significant Mental Health Issues: Yes  Date Care Coordination Discontinued: 05/28/15    DX V65.8 REPLACED WITH [id number] HEALTH CARE HOME (04/08/2013)         S/P hysterectomy 06/21/2013     Priority: Low     2008, For fibroids.  Cervix removed, but still has ovaries.       Carpal tunnel syndrome 11/15/2012     Priority: Low     Necrotizing fasciitis (H) 11/15/2012     Priority: Low     Abnormal Pap smear of cervix      Priority: Low     3-12-15: Pap/HPV neg.  Plan: Given hx (mildly abn pap, hyster), recheck in 5 yrs.  10-11-11: Pap/HPV neg.  Plan: Given ASUS pap in 2010, recommend repeating pap in 1 year.  8/3/2010 ASCUS Pap with +BV- pt treated with Metrogel.  Pt needs repeat Pap in 1yr.  8/4/2008 Pt had total hyst for menorrhagia, pelvic pain, scar tissue, and fibroids.       There are no exam notes on file for this visit.  Chief Complaint   Patient presents with     Pain Management     Med Rec     unable to complete     Burn     top of right hand a week ago on a skillet     Blood pressure 153/84, pulse 88, temperature 98.2  F (36.8  C), temperature source Oral, resp. rate 16, not currently breastfeeding.    Taking 4 excedrine and 4 aleve every 2 hours, then takes oxy.  Last oxy 2 weeks ago.          Chronic Pain Follow-Up Visit    Pain Update:  Location of pain: ankles and back  Analgesia/pain control: Recent changes:  same    Overall control: but worse with recent burn on hand.  No blistering or draining    Taking 4 excedrine and 4 aleve every 2 hours, then takes oxy.  Last oxy 2 weeks ago  Adherance     How often do you take extra pain medicine:Never    Did you take your pain medication today? No    Adverse effects: No      Database checked today? Yes. Details: no changes    PHQ-9 SCORE 2/16/2016 10/7/2016 5/12/2017   Total Score - - -    Total Score 16 18 19     JUAN-7 SCORE 8/1/2014 5/12/2017   Total Score 15 -   Total Score - 13           Functional Assessment  Questionnaire -5    Physical Functional Ability Questionnaire:      Physical Functional Ability (FAQ5) Score: 40/100    FUNCTIONAL ASSESSMENT QUESTIONNAIRE SCORE 9/1/2017   Total Score 40          Problem, Medication and Allergy Lists were   reviewed and are current.     Patient Active Problem List    Diagnosis Date Noted     Chronic pain syndrome 10/07/2016     Priority: High     Chronic Pain Diagnosis:  neuropathy  DIRE Total Score(s):14     ORT: 5    4 - 7 =  Moderate Risk   of future problems with Opioids  FAQ5: 45/100 on 9/9/16, 40/100 on 11/15/16  Behavioral Health Consultation: 5/12/17 with Dr. Daisy Quezada  Personal Care Plan for Chronic Pain: 5/12/17  Opioid medication:oxycodone   Dose:10mg  Number of pills per month:60  Patient is being prescribed 10mg of oxycodone IR (Percocet) per day this is 15 mg Morphine Equivalents  Benzodiazepines Prescribed? No  Naloxone prescribed? No       Clinic visit frequency required: Q 1 month Next visit due:   Controlled Substance/Opioid Treatment agreement on file (dated <12 months ago)?:    Date(s): pending  Last MNPMP verification: last visit  Items in red to be updated at each visit  Patient is followed by Data Unavailable for ongoing prescription of pain medication.  All refills should be approved by this provider, or covering partner.Chronic Pain          Microalbuminuria 10/12/2015     Priority: High     Type 2 diabetes mellitus with diabetic polyneuropathy (H) 10/12/2015     Priority: High     Bilateral ankle joint pain 01/15/2016     Priority: Medium     Esophageal reflux 04/07/2015     Priority: Medium     Urinary incontinence 02/19/2015     Priority: Medium     Insomnia 02/19/2015     Priority: Medium     HTN (hypertension) 08/26/2013     Priority: Medium     Hyperlipidemia LDL goal <100 06/21/2013     Priority: Medium     S/P  bariatric surgery 06/21/2013     Priority: Medium     Kim-en-Y in 2006       Neck pain 05/16/2013     Priority: Medium     Moderate major depression (H) 01/24/2013     Priority: Medium     Moderate persistent asthma 11/15/2012     Priority: Medium     Pulmonary embolism and infarction (H) 11/15/2012     Priority: Medium     Problem list name updated by automated process. Provider to review       Health Care Home 11/15/2012     Priority: Medium     Tier 3    Status: Accept  Care Coordination Start Date: 02/27/12  State Tier Level: Level 3  Language/Barrier to Learning: No  Significant Mental Health Issues: Yes  Date Care Coordination Discontinued: 05/28/15    DX V65.8 REPLACED WITH [id number] HEALTH CARE HOME (04/08/2013)         S/P hysterectomy 06/21/2013     Priority: Low     2008, For fibroids.  Cervix removed, but still has ovaries.       Carpal tunnel syndrome 11/15/2012     Priority: Low     Necrotizing fasciitis (H) 11/15/2012     Priority: Low     Abnormal Pap smear of cervix      Priority: Low     3-12-15: Pap/HPV neg.  Plan: Given hx (mildly abn pap, hyster), recheck in 5 yrs.  10-11-11: Pap/HPV neg.  Plan: Given ASUS pap in 2010, recommend repeating pap in 1 year.  8/3/2010 ASCUS Pap with +BV- pt treated with Metrogel.  Pt needs repeat Pap in 1yr.  8/4/2008 Pt had total hyst for menorrhagia, pelvic pain, scar tissue, and fibroids.     .           Physical Exam:     Vitals:    09/01/17 1140 09/01/17 1144   BP: 159/90 153/84   Pulse: 88    Resp: 16    Temp: 98.2  F (36.8  C)    TempSrc: Oral      There is no height or weight on file to calculate BMI.  Vitals were reviewed and were normal  GENERAL: healthy, alert, well nourished, well hydrated, no distress  RESP: lungs clear to auscultation - no rales, no rhonchi, no wheezes  CV: regular rates and rhythm, normal S1 S2, no S3 or S4 and no murmur, no click or rub -  ABDOMEN: soft, no tenderness, no  hepatosplenomegaly, no masses, normal bowel sounds  PSYCH: Alert  and oriented times 3; speech- coherent , normal rate and volume; able to articulate logical thoughts, able to abstract reason, no tangential thoughts, no hallucinations or delusions, affect- normal        Results:     Results for orders placed or performed in visit on 09/01/17   Rapid Urine Drug Screen ([facility])   Result Value Ref Range    Amphetamines Qual NEGATIVE NEGATIVE    Barbiturates Qual Urine NEGATIVE NEGATIVE    Buprenorphine Qual Urine NEGATIVE NEGATIVE    Benzodiazepine Qual Urine NEGATIVE NEGATIVE    Cocaine Qual Urine NEGATIVE NEGATIVE    Cannabinoids Qual Urine NEGATIVE NEGATIVE    Methamphetamine Qual NEGATIVE NEGATIVE    Methadone Qual NEGATIVE NEGATIVE    Morphine Qual NEGATIVE NEGATIVE    Oxycodone Qual NEGATIVE NEGATIVE    Temperature of Urine was Between  Degrees F YES YES      rapid urine drug screen obtained today: Yes    Assessment and Plan    Ruthy Álvarez is here for follow up of chronic pain caused by neuropathy and djd of spine.    Ruthy was seen today for pain management, med rec and burn.    Diagnoses and all orders for this visit:    Chronic pain syndrome  -     Rapid Urine Drug Screen ([facility])  -     oxyCODONE-acetaminophen (PERCOCET) 5-325 MG per tablet; 2 tabs po qhs, and 1 tab po bid during day, as needed.    Type 2 diabetes mellitus with diabetic polyneuropathy, without long-term current use of insulin (H)  -     Cancel: Basic Metabolic Panel ([facility])  -     Cancel: Hemoglobin A1c ([facility])    Vitamin D deficiency  -     Discontinue: Cholecalciferol (VITAMIN D) 2000 UNITS tablet; Take 2,000 Units by mouth daily      Burn:  Bacitracin prn.  Disc sx of infection and reassured it is not infected now.     Jose Angel Meyer MD    Patient Instructions   Apply bacitracin to hand 2 times daily.      Stop the naproxen, ibu, and excedrine:  Can really upset stomach and cause ulcers.      Restart the oxycodone,    See you next month.

## 2017-09-01 NOTE — LETTER
September 5, 2017      Ped[address] E Beckom  [address]   [facility] [address]        Ms Beckesvin  Your test was normal.  JIGNESH Meyer    Please see below for your test results.    Resulted Orders   Rapid Urine Drug Screen ([facility])   Result Value Ref Range    Amphetamines Qual NEGATIVE NEGATIVE    Barbiturates Qual Urine NEGATIVE NEGATIVE    Buprenorphine Qual Urine NEGATIVE NEGATIVE    Benzodiazepine Qual Urine NEGATIVE NEGATIVE    Cocaine Qual Urine NEGATIVE NEGATIVE    Cannabinoids Qual Urine NEGATIVE NEGATIVE    Methamphetamine Qual NEGATIVE NEGATIVE    Methadone Qual NEGATIVE NEGATIVE    Morphine Qual NEGATIVE NEGATIVE    Oxycodone Qual NEGATIVE NEGATIVE    Temperature of Urine was Between  Degrees F YES YES      Comment:      This is a preliminary screening test that detects drugs-of-abuse in urine at   specified detection levels.  To confirm preliminary results, a more specific   method such as Gas Chromatography/Mass Spectrometry (GC/MS) must be used.            If you have any questions, please call the clinic to make an appointment.    Sincerely,    Jose Angel Meyer MD

## 2017-09-05 DIAGNOSIS — E55.9 VITAMIN D DEFICIENCY: ICD-10-CM

## 2017-09-06 RX ORDER — CHOLECALCIFEROL (VITAMIN D3) 50 MCG
2000 TABLET ORAL DAILY
Qty: 100 TABLET | Refills: 3 | Status: SHIPPED | OUTPATIENT
Start: 2017-09-06 | End: 2018-12-11

## 2017-09-07 DIAGNOSIS — Z79.4 TYPE 2 DIABETES MELLITUS WITH COMPLICATION, WITH LONG-TERM CURRENT USE OF INSULIN (H): ICD-10-CM

## 2017-09-07 DIAGNOSIS — E11.8 TYPE 2 DIABETES MELLITUS WITH COMPLICATION, WITH LONG-TERM CURRENT USE OF INSULIN (H): ICD-10-CM

## 2017-09-07 RX ORDER — ISOPROPYL ALCOHOL 0.75 G/1
1 SWAB TOPICAL 3 TIMES DAILY
Qty: 100 EACH | Refills: 12 | Status: SHIPPED | OUTPATIENT
Start: 2017-09-07 | End: 2018-12-11

## 2017-09-18 ENCOUNTER — TRANSFERRED RECORDS (OUTPATIENT)
Dept: HEALTH INFORMATION MANAGEMENT | Facility: CLINIC | Age: 52
End: 2017-09-18

## 2017-11-03 DIAGNOSIS — I10 ESSENTIAL HYPERTENSION WITH GOAL BLOOD PRESSURE LESS THAN 140/90: ICD-10-CM

## 2017-11-03 RX ORDER — CHLORTHALIDONE 25 MG/1
25 TABLET ORAL DAILY
Qty: 90 TABLET | Refills: 3 | Status: SHIPPED | OUTPATIENT
Start: 2017-11-03 | End: 2018-12-11

## 2017-11-03 RX ORDER — BENAZEPRIL HYDROCHLORIDE 40 MG/1
40 TABLET ORAL DAILY
Qty: 90 TABLET | Refills: 3 | Status: SHIPPED | OUTPATIENT
Start: 2017-11-03 | End: 2018-12-11

## 2018-07-05 ENCOUNTER — TRANSFERRED RECORDS (OUTPATIENT)
Dept: HEALTH INFORMATION MANAGEMENT | Facility: CLINIC | Age: 53
End: 2018-07-05

## 2018-07-06 ENCOUNTER — TRANSFERRED RECORDS (OUTPATIENT)
Dept: HEALTH INFORMATION MANAGEMENT | Facility: CLINIC | Age: 53
End: 2018-07-06

## 2018-07-07 ENCOUNTER — TRANSFERRED RECORDS (OUTPATIENT)
Dept: HEALTH INFORMATION MANAGEMENT | Facility: CLINIC | Age: 53
End: 2018-07-07

## 2018-07-08 ENCOUNTER — TRANSFERRED RECORDS (OUTPATIENT)
Dept: HEALTH INFORMATION MANAGEMENT | Facility: CLINIC | Age: 53
End: 2018-07-08

## 2018-07-09 ENCOUNTER — TRANSFERRED RECORDS (OUTPATIENT)
Dept: HEALTH INFORMATION MANAGEMENT | Facility: CLINIC | Age: 53
End: 2018-07-09

## 2018-09-02 ENCOUNTER — HEALTH MAINTENANCE LETTER (OUTPATIENT)
Age: 53
End: 2018-09-02

## 2018-12-11 ENCOUNTER — OFFICE VISIT (OUTPATIENT)
Dept: FAMILY MEDICINE | Facility: CLINIC | Age: 53
End: 2018-12-11
Payer: COMMERCIAL

## 2018-12-11 VITALS
SYSTOLIC BLOOD PRESSURE: 125 MMHG | TEMPERATURE: 98 F | OXYGEN SATURATION: 100 % | RESPIRATION RATE: 16 BRPM | HEART RATE: 96 BPM | DIASTOLIC BLOOD PRESSURE: 85 MMHG

## 2018-12-11 DIAGNOSIS — G89.4 CHRONIC PAIN SYNDROME: ICD-10-CM

## 2018-12-11 DIAGNOSIS — R14.2 FLATULENCE, ERUCTATION AND GAS PAIN: ICD-10-CM

## 2018-12-11 DIAGNOSIS — I10 ESSENTIAL HYPERTENSION WITH GOAL BLOOD PRESSURE LESS THAN 140/90: ICD-10-CM

## 2018-12-11 DIAGNOSIS — E11.65 TYPE 2 DIABETES MELLITUS WITH HYPERGLYCEMIA, WITH LONG-TERM CURRENT USE OF INSULIN (H): ICD-10-CM

## 2018-12-11 DIAGNOSIS — I10 BENIGN ESSENTIAL HYPERTENSION: ICD-10-CM

## 2018-12-11 DIAGNOSIS — G89.4 CHRONIC PAIN SYNDROME: Primary | ICD-10-CM

## 2018-12-11 DIAGNOSIS — E11.42 TYPE 2 DIABETES MELLITUS WITH DIABETIC POLYNEUROPATHY, WITHOUT LONG-TERM CURRENT USE OF INSULIN (H): Primary | ICD-10-CM

## 2018-12-11 DIAGNOSIS — F32.1 MODERATE MAJOR DEPRESSION (H): ICD-10-CM

## 2018-12-11 DIAGNOSIS — E11.8 TYPE 2 DIABETES MELLITUS WITH COMPLICATION, WITH LONG-TERM CURRENT USE OF INSULIN (H): ICD-10-CM

## 2018-12-11 DIAGNOSIS — R14.1 FLATULENCE, ERUCTATION AND GAS PAIN: ICD-10-CM

## 2018-12-11 DIAGNOSIS — Z79.4 TYPE 2 DIABETES MELLITUS WITH COMPLICATION, WITH LONG-TERM CURRENT USE OF INSULIN (H): ICD-10-CM

## 2018-12-11 DIAGNOSIS — E53.8 VITAMIN B12 DEFICIENCY (NON ANEMIC): ICD-10-CM

## 2018-12-11 DIAGNOSIS — R60.0 PEDAL EDEMA: ICD-10-CM

## 2018-12-11 DIAGNOSIS — E11.42 DIABETIC POLYNEUROPATHY ASSOCIATED WITH TYPE 2 DIABETES MELLITUS (H): ICD-10-CM

## 2018-12-11 DIAGNOSIS — G62.9 NEUROPATHY: ICD-10-CM

## 2018-12-11 DIAGNOSIS — R14.3 FLATULENCE, ERUCTATION AND GAS PAIN: ICD-10-CM

## 2018-12-11 DIAGNOSIS — Z79.4 TYPE 2 DIABETES MELLITUS WITH HYPERGLYCEMIA, WITH LONG-TERM CURRENT USE OF INSULIN (H): ICD-10-CM

## 2018-12-11 DIAGNOSIS — N32.81 OVERACTIVE BLADDER: ICD-10-CM

## 2018-12-11 DIAGNOSIS — E55.9 VITAMIN D DEFICIENCY: ICD-10-CM

## 2018-12-11 LAB
AMPHETAMINES QUAL: NEGATIVE
ANION GAP SERPL CALCULATED.3IONS-SCNC: 9 MMOL/L (ref 5–18)
BARBITURATES QUAL URINE: NEGATIVE
BENZODIAZEPINE QUAL URINE: NEGATIVE
BUN SERPL-MCNC: 43 MG/DL (ref 8–22)
BUPRENORPHINE QUAL URINE: NEGATIVE
CALCIUM SERPL-MCNC: 8.7 MG/DL (ref 8.5–10.5)
CANNABINOIDS UR QL SCN: NEGATIVE
CHLORIDE SERPL-SCNC: 111 MMOL/L (ref 98–107)
CO2 SERPL-SCNC: 20 MMOL/L (ref 22–31)
COCAINE QUAL URINE: NEGATIVE
CREAT SERPL-MCNC: 1.46 MG/DL (ref 0.6–1.1)
GLUCOSE SERPL-MCNC: 234 MG/DL (ref 70–125)
METHAMPHETAMINE: NEGATIVE
METHODONE QUAL: NEGATIVE
MORPHINE QUAL: NEGATIVE
OXYCODONE QUAL: NEGATIVE
PHENCYCLIDINE: NEGATIVE
POTASSIUM SERPL-SCNC: 5.2 MMOL/L (ref 3.5–5)
PROPOXYPHENE: NEGATIVE
SODIUM SERPL-SCNC: 140 MMOL/L (ref 136–145)
TEMPERATURE OF URINE WAS BETWEEN 90-100 DEGREES F: YES
TRICYCLIC ANTIDEPRESSANTS: NEGATIVE

## 2018-12-11 RX ORDER — BENAZEPRIL HYDROCHLORIDE 40 MG/1
40 TABLET ORAL DAILY
Qty: 90 TABLET | Refills: 3 | Status: SHIPPED | OUTPATIENT
Start: 2018-12-11 | End: 2019-04-29 | Stop reason: SINTOL

## 2018-12-11 RX ORDER — OXYCODONE AND ACETAMINOPHEN 5; 325 MG/1; MG/1
TABLET ORAL
Qty: 120 TABLET | Refills: 0 | Status: SHIPPED | OUTPATIENT
Start: 2018-12-11 | End: 2019-01-10

## 2018-12-11 RX ORDER — GABAPENTIN 250 MG/5ML
500 SOLUTION ORAL 3 TIMES DAILY
Qty: 940 ML | Refills: 2 | Status: SHIPPED | OUTPATIENT
Start: 2018-12-11 | End: 2019-04-02

## 2018-12-11 RX ORDER — DULOXETIN HYDROCHLORIDE 60 MG/1
60 CAPSULE, DELAYED RELEASE ORAL DAILY
Qty: 90 CAPSULE | Refills: 3 | Status: SHIPPED | OUTPATIENT
Start: 2018-12-11 | End: 2019-06-06

## 2018-12-11 RX ORDER — INSULIN GLARGINE 100 [IU]/ML
INJECTION, SOLUTION SUBCUTANEOUS
Qty: 18 ML | Refills: 11 | Status: SHIPPED | OUTPATIENT
Start: 2018-12-11 | End: 2018-12-14

## 2018-12-11 RX ORDER — CHLORTHALIDONE 25 MG/1
25 TABLET ORAL DAILY
Qty: 90 TABLET | Refills: 3 | Status: SHIPPED | OUTPATIENT
Start: 2018-12-11 | End: 2019-11-04 | Stop reason: ALTCHOICE

## 2018-12-11 RX ORDER — ISOPROPYL ALCOHOL 0.75 G/1
1 SWAB TOPICAL 3 TIMES DAILY
Qty: 100 EACH | Refills: 12 | Status: SHIPPED | OUTPATIENT
Start: 2018-12-11 | End: 2021-10-21

## 2018-12-11 RX ORDER — FUROSEMIDE 20 MG
TABLET ORAL
Qty: 90 TABLET | Refills: 3 | Status: SHIPPED | OUTPATIENT
Start: 2018-12-11 | End: 2019-04-01

## 2018-12-11 RX ORDER — INSULIN ASPART 100 [IU]/ML
18 INJECTION, SOLUTION INTRAVENOUS; SUBCUTANEOUS
Qty: 18 ML | Refills: 11 | Status: SHIPPED | OUTPATIENT
Start: 2018-12-11 | End: 2019-01-10

## 2018-12-11 RX ORDER — ATORVASTATIN CALCIUM 10 MG/1
TABLET, FILM COATED ORAL
Qty: 30 TABLET | Refills: 11 | Status: SHIPPED | OUTPATIENT
Start: 2018-12-11 | End: 2018-12-14

## 2018-12-11 RX ORDER — CAPSAICIN 0.025 %
1 CREAM (GRAM) TOPICAL 3 TIMES DAILY
Qty: 45 G | Refills: 2 | Status: SHIPPED | OUTPATIENT
Start: 2018-12-11 | End: 2019-08-27

## 2018-12-11 RX ORDER — TOLTERODINE 2 MG/1
2 CAPSULE, EXTENDED RELEASE ORAL DAILY
Qty: 90 CAPSULE | Refills: 3 | Status: SHIPPED | OUTPATIENT
Start: 2018-12-11 | End: 2019-12-03

## 2018-12-11 RX ORDER — AMOXICILLIN 250 MG
1 CAPSULE ORAL 2 TIMES DAILY
Qty: 60 TABLET | Refills: 11 | Status: SHIPPED | OUTPATIENT
Start: 2018-12-11 | End: 2019-10-03

## 2018-12-11 RX ORDER — SIMETHICONE 180 MG
CAPSULE ORAL
Qty: 60 CAPSULE | Refills: 11 | Status: SHIPPED | OUTPATIENT
Start: 2018-12-11 | End: 2019-07-25

## 2018-12-11 RX ORDER — CHOLECALCIFEROL (VITAMIN D3) 50 MCG
2000 TABLET ORAL DAILY
Qty: 100 TABLET | Refills: 3 | Status: SHIPPED | OUTPATIENT
Start: 2018-12-11 | End: 2019-11-25

## 2018-12-11 NOTE — PROGRESS NOTES
Patient Active Problem List    Diagnosis Date Noted     Chronic pain syndrome 10/07/2016     Priority: High     Chronic Pain Diagnosis:  neuropathy  DIRE Total Score(s):14     ORT: 5    4 - 7 =  Moderate Risk   of future problems with Opioids  FAQ5: 45/100 on 9/9/16, 40/100 on 11/15/16  Behavioral Health Consultation: 5/12/17 with Dr. Daisy Quezada  Personal Care Plan for Chronic Pain: 5/12/17  Opioid medication:oxycodone   Dose:10mg  Number of pills per month:60  Patient is being prescribed 10mg of oxycodone IR (Percocet) per day this is 15 mg Morphine Equivalents  Benzodiazepines Prescribed? No  Naloxone prescribed? No       Clinic visit frequency required: Q 1 month Next visit due:   Controlled Substance/Opioid Treatment agreement on file (dated <12 months ago)?:    Date(s): pending  Last [place]NPMP verification: last visit  Items in red to be updated at each visit  Patient is followed by Data Unavailable for ongoing prescription of pain medication.  All refills should be approved by this provider, or covering partner.Chronic Pain          Microalbuminuria 10/12/2015     Priority: High     Type 2 diabetes mellitus with diabetic polyneuropathy (H) 10/12/2015     Priority: High     Bilateral ankle joint pain 01/15/2016     Priority: Medium     Esophageal reflux 04/07/2015     Priority: Medium     Urinary incontinence 02/19/2015     Priority: Medium     Insomnia 02/19/2015     Priority: Medium     HTN (hypertension) 08/26/2013     Priority: Medium     Hyperlipidemia LDL goal <100 06/21/2013     Priority: Medium     S/P bariatric surgery 06/21/2013     Priority: Medium     Kim-en-Y in 2006       Neck pain 05/16/2013     Priority: Medium     Moderate major depression (H) 01/24/2013     Priority: Medium     Moderate persistent asthma 11/15/2012     Priority: Medium     Pulmonary embolism and infarction (H) 11/15/2012     Priority: Medium     Problem list name updated by automated process. Provider to review       Health  Care Home 11/15/2012     Priority: Medium     Tier 3    Status: Accept  Care Coordination Start Date: 02/27/12  State Tier Level: Level 3  Language/Barrier to Learning: No  Significant Mental Health Issues: Yes  Date Care Coordination Discontinued: 05/28/15    DX V65.8 REPLACED WITH [id number] [facility]ALTH CARE HOME (04/08/2013)         S/P hysterectomy 06/21/2013     Priority: Low     2008, For fibroids.  Cervix removed, but still has ovaries.       Carpal tunnel syndrome 11/15/2012     Priority: Low     Necrotizing fasciitis (H) 11/15/2012     Priority: Low     Abnormal Pap smear of cervix      Priority: Low     3-12-15: Pap/HPV neg.  Plan: Given hx (mildly abn pap, hyster), recheck in 5 yrs.  10-11-11: Pap/HPV neg.  Plan: Given ASUS pap in 2010, recommend repeating pap in 1 year.  8/3/2010 ASCUS Pap with +BV- pt treated with Metrogel.  Pt needs repeat Pap in 1yr.  8/4/2008 Pt had total hyst for menorrhagia, pelvic pain, scar tissue, and fibroids.       There are no exam notes on file for this visit.  Chief Complaint   Patient presents with     Pain Management     CPM and refill meds     other     Pt wanted A1C      Pain     Pt has pain on hips and lower back. Pt would like X-rays done     Knee Pain     When pt turn she gets sharp pain that aches. Pt would like X-rays done     Blood pressure 125/85, pulse 96, temperature 98  F (36.7  C), temperature source Oral, resp. rate 16, SpO2 100 %, not currently breastfeeding.   SUBJECTIVE:  Ruthy Álvarez is here to reestablish care.  She had been a longtime patient of [facility], with both me and Dr. Patel in the past.  She moved to [place] in the fall of 2017 and is now back.  She said that, essentially, with a few exceptions her medications have been about the same as when she left, and she needs refills of all of them.  She has chronic pain that is polyfactorial in place.  She has degenerative arthritis in multiple locations, specifically in her knees and back.   She tried steroid injections most recently in December, without complete relief.  She had been on oxycodone five mg pills t.i.d. while in [place].  When she was here, she took two pills at night and one pill b.i.d. during the day and had adequate relief of her symptoms.  She has to go back on that dose.  She denies any other drugs of abuse.  Her last dose of medications was several days ago.  There is no information in the [place] Prescription database and no old records.  She doesn't have a history of substance abuse.  She feels like her pain is actually getting worse.  It is in her back, hips, and knees.  She would actually like x-rays of all of those things today, and we can accomplish that.  Recently, she had x-rays at the orthopedist and I don't have those reports quite yet.     She has diabetes and requests an A1C.  She doesn't complain of any polydipsia, polyuria, or hypoglycemia.  She didn't bring any blood sugars in.  She is on Lantus 55 units at night and NovoLog 18 units with meals.  She isn't on metformin because the liquid preparation wasn't covered in [place].   She has hypertension with microalbuminuria.  She is on chlorthalidone and benazepril and needs refills of those.  She has dyslipidemia and has taken atorvastatin in the past.     She has reflux but doesn't know exactly what she has been taking.     She has peripheral neuropathy and gets some help with capsaicin cream, and she would like that refilled.  She has edema, probably as a result of her body habitus and vitamin B12 insufficiency.  She takes Lasix regularly for that, and is due for a BMP today.     She feels that her depression is reasonably well-controlled, and she would like refills of duloxetine, which also should help the peripheral neuropathy.  When I reviewed her medication list, she stated that she would like a refill for Risperdal, but I don't have record that she has had that prescription filled since 2015, so I didn't  refill that today.   REVIEW OF SYSTEMS:  Negative for nausea or vomiting.  Positive for overall body pain, particularly in her hips and lower back.  I reviewed her chart.  She had x-rays of the left hip that were negative.  Bilateral knees in 2015 showed medial joint line narrowing.   OBJECTIVE:  Patient is alert and in no acute distress.     VITAL SIGNS:  Her BMI is elevated.  Blood pressure is 125/80.     PSYCH:  Her affect is appropriate.  Eye contact is normal.  Speech is normal.   CHEST:  Clear.   HEART:  Regular rate and rhythm.  Normal S1 and S2.  No murmur.   EXTREMITIES:  1+ ankle edema bilaterally.     LABS:  UDS is appropriate given her history.     ASSESSMENT/PLAN:    1.  Chronic pain syndrome.  Database shows no information, which is expected.  She has been a reliable patient the past.  I simply renewed her oxycodone as I had been prescribing it in the past, two 5 mg tablets at night and then one tablet b.i.d. during the day.  She'll follow up in one month.  We'll need UDS at that time as well.  We'll ask for old records from [place], which I didn't do today.     2.  Diabetes with neuropathy that is part of the pain, I refilled her gabapentin liquid and refilled her insulin.  She'll stay off of metformin for now, pending A1C.     3.  Hypertension.  She is at high risk.  I refilled benazepril and chlorthalidone and checked BMP today.    4.  Recent B12 deficiency, I put her on B12 replacement, given context of her remote gastric bypass surgery.    5.  Depression.  I refilled her Cymbalta.     I also refilled her simethicone, vitamin D, and Detrol today as well.   I spent more than 45 minutes with the patient, with greater than 50% in counseling and coordination of care.       Results for orders placed or performed in visit on 12/11/18   Rapid Urine Drug Screen ([facility])   Result Value Ref Range    Phencyclidine NEGATIVE NEGATIVE    Propoxyphene NEGATIVE NEGATIVE    Tricyclic Antidepressants NEGATIVE  NEGATIVE    Amphetamines Qual NEGATIVE NEGATIVE    Barbiturates Qual Urine NEGATIVE NEGATIVE    Buprenorphine Qual Urine NEGATIVE NEGATIVE    Benzodiazepine Qual Urine NEGATIVE NEGATIVE    Cocaine Qual Urine NEGATIVE NEGATIVE    Cannabinoids Qual Urine NEGATIVE NEGATIVE    Methamphetamine Qual NEGATIVE NEGATIVE    Methadone Qual NEGATIVE NEGATIVE    Morphine Qual NEGATIVE NEGATIVE    Oxycodone Qual NEGATIVE NEGATIVE    Temperature of Urine was Between  Degrees F YES YES

## 2018-12-12 LAB — HBA1C MFR BLD: 9 % (ref 4.2–6.1)

## 2018-12-12 NOTE — RESULT ENCOUNTER NOTE
Please call pt re:  Her results.  1.  Her AIC was 9.0, which is too high--we'd like it under 8 to reduce her risk of complications of diabetes over the long term.  Her insulin may need adjustment, and I'd like there to monitor her blood sugars before breakfast, lunch, and dinner, and at bed time for the next week, write them ALL down, and bring them in to review in 10-14 days to make sure we make the right adjustment.  No change in insulin dose for now.  2.  Her kidney tests showed some significant changes since checked a year ago.  It could be due to several things (dehydration from too many water pills/diabetes not under control, and taking too many anti-inflammatory pills like ibuprofen/aleve/aspirin).  I want her to stop taking furosemide and any stop any ibuprofen/aleve/asa for pain, particularly since we restarted her oxycodone.  I want her to follow-unit(s) pin 2 weeks to go over her blood sugars and recheck her kidney tests.  Thanks  JIGNESH Meyer

## 2018-12-13 DIAGNOSIS — I10 BENIGN ESSENTIAL HYPERTENSION: ICD-10-CM

## 2018-12-13 DIAGNOSIS — E11.8 TYPE 2 DIABETES MELLITUS WITH COMPLICATION, WITH LONG-TERM CURRENT USE OF INSULIN (H): ICD-10-CM

## 2018-12-13 DIAGNOSIS — Z79.4 TYPE 2 DIABETES MELLITUS WITH COMPLICATION, WITH LONG-TERM CURRENT USE OF INSULIN (H): ICD-10-CM

## 2018-12-13 NOTE — TELEPHONE ENCOUNTER
LANTUS SOLOSTAR 100 UNIT/ML soln is not covered by insurance. Alternatives are: Levemirinjflextouch, basaglarinjunit, levermirinj, tresibaflexinjunit

## 2018-12-14 DIAGNOSIS — J45.40 MODERATE PERSISTENT ASTHMA WITHOUT COMPLICATION: ICD-10-CM

## 2018-12-14 DIAGNOSIS — J45.40 MODERATE PERSISTENT ASTHMA: ICD-10-CM

## 2018-12-14 RX ORDER — ATORVASTATIN CALCIUM 10 MG/1
TABLET, FILM COATED ORAL
Qty: 30 TABLET | Refills: 11 | Status: SHIPPED | OUTPATIENT
Start: 2018-12-14 | End: 2019-12-30

## 2018-12-14 RX ORDER — INSULIN GLARGINE 100 [IU]/ML
INJECTION, SOLUTION SUBCUTANEOUS
Qty: 18 ML | Refills: 11 | Status: SHIPPED | OUTPATIENT
Start: 2018-12-14 | End: 2018-12-20

## 2018-12-14 RX ORDER — ALBUTEROL SULFATE 90 UG/1
2 AEROSOL, METERED RESPIRATORY (INHALATION) 4 TIMES DAILY
Qty: 3 INHALER | Refills: 3 | Status: SHIPPED | OUTPATIENT
Start: 2018-12-14 | End: 2020-05-21

## 2018-12-14 NOTE — TELEPHONE ENCOUNTER
[facility] phone call message- patient requesting a refill:    Full Medication Name: inhaler     Dose: ?    Pharmacy confirmed as   [id number] - [place], [place] - [address] [place] [address]  Phone: [phone] Fax: [phone]  : Yes    Additional Comments: She needs this refilled.     OK to leave a message on voice mail? Yes    Primary language: English      needed? No    Call taken on December 14, 2018 at 11:07 AM by Ginger Ventura

## 2018-12-20 DIAGNOSIS — E11.8 TYPE 2 DIABETES MELLITUS WITH COMPLICATION, WITH LONG-TERM CURRENT USE OF INSULIN (H): ICD-10-CM

## 2018-12-20 DIAGNOSIS — Z79.4 TYPE 2 DIABETES MELLITUS WITH COMPLICATION, WITH LONG-TERM CURRENT USE OF INSULIN (H): ICD-10-CM

## 2018-12-20 RX ORDER — INSULIN GLARGINE 100 [IU]/ML
INJECTION, SOLUTION SUBCUTANEOUS
Qty: 18 ML | Refills: 11 | Status: SHIPPED | OUTPATIENT
Start: 2018-12-20 | End: 2018-12-21

## 2018-12-21 ENCOUNTER — TELEPHONE (OUTPATIENT)
Dept: FAMILY MEDICINE | Facility: CLINIC | Age: 53
End: 2018-12-21

## 2018-12-21 DIAGNOSIS — E11.8 TYPE 2 DIABETES MELLITUS WITH COMPLICATION, WITH LONG-TERM CURRENT USE OF INSULIN (H): ICD-10-CM

## 2018-12-21 DIAGNOSIS — Z79.4 TYPE 2 DIABETES MELLITUS WITH COMPLICATION, WITH LONG-TERM CURRENT USE OF INSULIN (H): ICD-10-CM

## 2018-12-21 RX ORDER — INSULIN GLARGINE 100 [IU]/ML
INJECTION, SOLUTION SUBCUTANEOUS
Qty: 18 ML | Refills: 11 | Status: SHIPPED | OUTPATIENT
Start: 2018-12-21 | End: 2018-12-31

## 2018-12-21 NOTE — TELEPHONE ENCOUNTER
LANTUS SOLOSTAR 100 UNIT/ML soln is not covered by insurance.     Levemirnijflextouc, Basaglarinjunit, levemrinij, tresibaflexinjunit is covered by insurance

## 2018-12-21 NOTE — TELEPHONE ENCOUNTER
[facility] phone call message- medication clarification/question:    Full Medication Name:     LANTUS SOLOSTAR 100 UNIT/ML soln    Question: This medication is not covered under their insurance plan, please send a new RX change.     Pharmacy confirmed as      [id number] - [place], [place] - [address] AT [facility]: Yes    OK to leave a message on voice mail? Yes    Primary language: English      needed? No    Call taken on December 21, 2018 at 3:05 PM by Lenore Patton

## 2018-12-24 ENCOUNTER — TRANSFERRED RECORDS (OUTPATIENT)
Dept: HEALTH INFORMATION MANAGEMENT | Facility: CLINIC | Age: 53
End: 2018-12-24

## 2018-12-28 ENCOUNTER — TELEPHONE (OUTPATIENT)
Dept: FAMILY MEDICINE | Facility: CLINIC | Age: 53
End: 2018-12-28

## 2018-12-28 DIAGNOSIS — I26.99 OTHER ACUTE PULMONARY EMBOLISM WITHOUT ACUTE COR PULMONALE (H): Primary | ICD-10-CM

## 2018-12-28 DIAGNOSIS — Z79.4 TYPE 2 DIABETES MELLITUS WITH DIABETIC POLYNEUROPATHY, WITH LONG-TERM CURRENT USE OF INSULIN (H): ICD-10-CM

## 2018-12-28 DIAGNOSIS — E11.42 TYPE 2 DIABETES MELLITUS WITH DIABETIC POLYNEUROPATHY, WITH LONG-TERM CURRENT USE OF INSULIN (H): ICD-10-CM

## 2018-12-28 DIAGNOSIS — I26.99 PULMONARY EMBOLISM AND INFARCTION (H): Primary | ICD-10-CM

## 2018-12-28 DIAGNOSIS — I26.99 OTHER ACUTE PULMONARY EMBOLISM WITHOUT ACUTE COR PULMONALE (H): ICD-10-CM

## 2018-12-28 LAB
INR PPP: 1.6
INR PPP: 1.6

## 2018-12-28 RX ORDER — WARFARIN SODIUM 7.5 MG/1
TABLET ORAL
Qty: 30 TABLET | Refills: 0 | Status: SHIPPED | OUTPATIENT
Start: 2018-12-28 | End: 2019-01-03

## 2018-12-28 NOTE — RESULT ENCOUNTER NOTE
Please create flow sheet for future INR's--thanks    Documentation:    Pt was d/c'd from [facility] for a PE.  Tx w/ lovenox 1mg/kg bid and warfarin 7.5 mg daily beginning on 12/25.  INR today subtherapeutic, but rising.  Plan:  Rx sent for lovenox @ same dose.  Cont warfarin 7.5 mg daily--rx for #30 sent to her pharmacy.  Please create flow sheet--thanks.  F/up planned on 12/31/2018 w/ me.  Pt did stop by clinic and was with a family member when she requested refills for both the warfarin and the lovenox.  I insisted on the INR today, which is listed as above.  Pt left before we could discuss warfarin dosing, but I gave specific dosing on the rx sent to pharmacy.  I attempted calling her listed phone number, her son Gerhard's phone number, and her daughter Elizabeth--no answer possible.  Pt stated that she has an appt w/ me on 12/31 at time of discussion today.

## 2018-12-28 NOTE — TELEPHONE ENCOUNTER
Attempted to call  to see how pt is doing after hospital stay but unable to leave message. Both phone numbers on file is a non working number. Ronni TESFAYEA

## 2018-12-28 NOTE — TELEPHONE ENCOUNTER
Lantus Solostar pen injection are not covered by pts insurance. Alternatives are Levemir Inj Flex touch, Basaglar inj unit or Tresiba inj unit. Please send new RX to pt pharmacy.       Cathleen Awan MA

## 2018-12-31 ENCOUNTER — OFFICE VISIT (OUTPATIENT)
Dept: FAMILY MEDICINE | Facility: CLINIC | Age: 53
End: 2018-12-31
Payer: COMMERCIAL

## 2018-12-31 ENCOUNTER — DOCUMENTATION ONLY (OUTPATIENT)
Dept: FAMILY MEDICINE | Facility: CLINIC | Age: 53
End: 2018-12-31

## 2018-12-31 ENCOUNTER — TELEPHONE (OUTPATIENT)
Dept: FAMILY MEDICINE | Facility: CLINIC | Age: 53
End: 2018-12-31

## 2018-12-31 VITALS
SYSTOLIC BLOOD PRESSURE: 108 MMHG | DIASTOLIC BLOOD PRESSURE: 70 MMHG | HEART RATE: 90 BPM | OXYGEN SATURATION: 93 % | TEMPERATURE: 97.9 F | RESPIRATION RATE: 16 BRPM

## 2018-12-31 DIAGNOSIS — Z79.4 TYPE 2 DIABETES MELLITUS WITH DIABETIC POLYNEUROPATHY, WITH LONG-TERM CURRENT USE OF INSULIN (H): Primary | ICD-10-CM

## 2018-12-31 DIAGNOSIS — E11.42 TYPE 2 DIABETES MELLITUS WITH DIABETIC POLYNEUROPATHY, WITH LONG-TERM CURRENT USE OF INSULIN (H): Primary | ICD-10-CM

## 2018-12-31 DIAGNOSIS — I26.99 PULMONARY EMBOLISM AND INFARCTION (H): ICD-10-CM

## 2018-12-31 DIAGNOSIS — I26.99 OTHER ACUTE PULMONARY EMBOLISM WITHOUT ACUTE COR PULMONALE (H): ICD-10-CM

## 2018-12-31 LAB — INR PPP: 2.3

## 2018-12-31 RX ORDER — INSULIN GLARGINE 100 [IU]/ML
55 INJECTION, SOLUTION SUBCUTANEOUS DAILY
Qty: 18 ML | Refills: 3 | Status: SHIPPED | OUTPATIENT
Start: 2018-12-31 | End: 2019-01-10

## 2018-12-31 NOTE — PROGRESS NOTES
Patient Active Problem List    Diagnosis Date Noted     Chronic pain syndrome 10/07/2016     Priority: High     Chronic Pain Diagnosis:  neuropathy  DIRE Total Score(s):14     ORT: 5    4 - 7 =  Moderate Risk   of future problems with Opioids  FAQ5: 45/100 on 9/9/16, 40/100 on 11/15/16  Behavioral Health Consultation: 5/12/17 with Dr. Daisy Quezada  Personal Care Plan for Chronic Pain: 5/12/17  Opioid medication:oxycodone   Dose:10mg  Number of pills per month:60  Patient is being prescribed 10mg of oxycodone IR (Percocet) per day this is 15 mg Morphine Equivalents  Benzodiazepines Prescribed? No  Naloxone prescribed? No       Clinic visit frequency required: Q 1 month Next visit due:   Controlled Substance/Opioid Treatment agreement on file (dated <12 months ago)?:    Date(s): pending  Last [place] verification: last visit  Items in red to be updated at each visit  Patient is followed by Data Unavailable for ongoing prescription of pain medication.  All refills should be approved by this provider, or covering partner.Chronic Pain          Microalbuminuria 10/12/2015     Priority: High     Type 2 diabetes mellitus with diabetic polyneuropathy (H) 10/12/2015     Priority: High     Bilateral ankle joint pain 01/15/2016     Priority: Medium     Esophageal reflux 04/07/2015     Priority: Medium     Urinary incontinence 02/19/2015     Priority: Medium     Insomnia 02/19/2015     Priority: Medium     HTN (hypertension) 08/26/2013     Priority: Medium     Hyperlipidemia LDL goal <100 06/21/2013     Priority: Medium     S/P bariatric surgery 06/21/2013     Priority: Medium     Kim-en-Y in 2006       Neck pain 05/16/2013     Priority: Medium     Moderate major depression (H) 01/24/2013     Priority: Medium     Moderate persistent asthma without complication 11/15/2012     Priority: Medium     Pulmonary embolism and infarction (H) 11/15/2012     Priority: Medium     Problem list name updated by automated process. Provider  to review       Health Care Home 11/15/2012     Priority: Medium     Tier 3    Status: Accept  Care Coordination Start Date: 02/27/12  State Tier Level: Level 3  Language/Barrier to Learning: No  Significant Mental Health Issues: Yes  Date Care Coordination Discontinued: 05/28/15    DX V65.8 REPLACED WITH 45132 HEALTH CARE HOME (04/08/2013)         S/P hysterectomy 06/21/2013     Priority: Low     2008, For fibroids.  Cervix removed, but still has ovaries.       Carpal tunnel syndrome 11/15/2012     Priority: Low     Necrotizing fasciitis (H) 11/15/2012     Priority: Low     Abnormal Pap smear of cervix      Priority: Low     3-12-15: Pap/HPV neg.  Plan: Given hx (mildly abn pap, hyster), recheck in 5 yrs.  10-11-11: Pap/HPV neg.  Plan: Given ASUS pap in 2010, recommend repeating pap in 1 year.  8/3/2010 ASCUS Pap with +BV- pt treated with Metrogel.  Pt needs repeat Pap in 1yr.  8/4/2008 Pt had total hyst for menorrhagia, pelvic pain, scar tissue, and fibroids.       There are no exam notes on file for this visit.  Chief Complaint   Patient presents with     RECHECK     Pt following up for kidney and recheck a1c     Blood pressure 108/70, pulse 90, temperature 97.9  F (36.6  C), temperature source Oral, resp. rate 16, SpO2 93 %, not currently breastfeeding.   Mild chest pain here and there.  Breathing is nearly back to normal.  Having a lot of leg swelling--more in left than in right.  Having some pain around heel, as well.  Presented to ER--with chest pain and left arm pain and shortness of breath..   Had been under pressure due to sister's death.  Traveled from [place] to [place] in November.  Had been having swelling in both legs.    2 sisters and mom have had blood clots  No fam hx.      SUBJECTIVE:  Ruthy Álvarez has no prior history of DVT.  Her primary risk factor seems to be family history of DVT, but there is no proven coagulopathy.  She also has obesity.  She had prolonged immobilization, but that was way  back in November about six weeks before this event.  She is still complaining of some leg swelling, but she had a negative ultrasound in the hospital.  She has been taking warfarin and Lovenox, and she would like to stop both.   Regarding her diabetes, she is taking Basaglar instead of Lantus.  She is also taking NovoLog.  Her insurance didn't cover Lantus.  She hasn't been doing any monitoring, and she needs equipment to do so.   Her chronic pain is reasonably controlled on her current regimen.   OBJECTIVE:  Patient is alert, pleasant, and in no acute distress.   CHEST:  Clear.   HEART:  Regular rate and rhythm.  Normal S1 and S2.  No murmur.     VITAL SIGNS:  Her O2 sats are 93%.  She isn't tachycardic.   EXTREMITIES:  She has asymmetric edema, 1+ on the left and absent on the right.     I reviewed her chest CT and lower extremity ultrasound today.   ASSESSMENT/PLAN:   1.  Acute PE, clinically improving.  She has a therapeutic INR today.  She has been essentially on 7.5 mg of warfarin daily since she was admitted on 12/25.  She has a therapeutic INR, so I stopped her Lovenox today.  She'll follow up Thursday for another INR and additional education by our team.  She has an adequate supply of Warfarin 7.5 mg tablets.  I'll plan to continue this for six months.     2.  Diabetes.  I gave her a prescription for a glucometer, test strips, and lancets.  She'll check t.i.d., record them, and follow up in two weeks.  Her last A1C was 9, which is suboptimal.         Results for orders placed or performed in visit on 12/31/18   INR ([facility])   Result Value Ref Range    INR 2.3

## 2018-12-31 NOTE — PATIENT INSTRUCTIONS
Stop the shot  Continue warfarin 7.5 mg (1 pill) every day.  F/up with our pharmacist on Thursday    Patient Education   Diet Guidelines for Patients Taking Warfarin (Coumadin)  The foods you eat and drink can affect how your medicine works. Here is what you should know about warfarin and your diet.  Vitamin K in foods  While you are on warfarin, your intake of vitamin K should stay the same from day to day. Your warfarin dose is based on your daily intake of vitamin K foods. So it is important to get the same amount of vitamin K each day.  Warfarin helps to thin your blood. Vitamin K helps to clot your blood. When there is a sudden increase or decrease in vitamin K intake, the warfarin may not work as well.  If you ate vitamin K foods before starting warfarin, you should continue to eat them. If you plan to eat more or less of the vitamin K foods, you must see your doctor. Your doctor will change your dose to match your vitamin K intake.   Dietary supplements  Some dietary supplements have vitamin K. We do not know how these interact with warfarin. To be safe, don't take dietary supplements (including your daily multi-vitamin) unless your doctor approves.  Vitamin E and fish oil are often taken by people with heart problems. Both of these have blood-thinning effects. If you take these, be sure to tell your doctor.  These foods are high in vitamin K   (serving size is   cup) These foods are medium-high in Vitamin K  (serving size is 1 cup, except brussels sprouts =   cup)   Collards (cooked) [place] sprouts (cooked)   Kale (cooked) Broccoli   Mustard greens (cooked) Endive (raw)   Parsley (raw) Green leaf lettuce   Spinach (cooked) [place] lettuce   Swiss chard (cooked) Spinach (raw)   Turnip greens (cooked) Turnip greens (raw)   Alcohol   If you choose to drink, have no more than 1 to 2 drinks in 24 hours. One drink equals:     5 ounces of wine    12 ounces of beer    1 1/2 ounces of hard liquor  Drinking too much  alcohol will increase your risk for bleeding. Ask your doctor how much alcohol is safe for you. Some doctors advise no alcohol while taking warfarin.  Cranberry juice  You may wish to limit how much cranberry juice you drink each day.  The makers of warfarin state that cranberry juice may increase your risk of bleeding. Studies do not support this. If you have questions about cranberry juice, please speak with your doctor or pharmacist.  Green tea  Green tea is often found on lists of foods that are high in Vitamin K. The tea leaves themselves are high in Vitamin K, but the tea provides only a small amount. You can drink a couple cups of green tea--just do not drink gallons of it.  Resources  American Dietetic Association. ADA Nutrition Care Manual. Available at http://nutritioncaremanual.org. Accessed 2005.  [employer] Patient's Guide to Using Coumadin, [place], [place]: 2003.  National [place] of Health. Drug-nutrient interactions: Coumadin (warfarin) and vitamin K. Last updated: December 2003. Accessed October 2006.  USDA, Human Nutrition Information Service. Provisional table on the vitamin K content of foods. HNIS/PT-104. Revised 1994.  For informational purposes only. Not to replace the advice of your health care provider.   Copyright   2006 [facility]. All rights reserved. [employer] 141777 - REV 09/15.

## 2019-01-03 ENCOUNTER — OFFICE VISIT (OUTPATIENT)
Dept: FAMILY MEDICINE | Facility: CLINIC | Age: 54
End: 2019-01-03
Payer: COMMERCIAL

## 2019-01-03 ENCOUNTER — OFFICE VISIT (OUTPATIENT)
Dept: PHARMACY | Facility: CLINIC | Age: 54
End: 2019-01-03
Payer: COMMERCIAL

## 2019-01-03 ENCOUNTER — RECORDS - HEALTHEAST (OUTPATIENT)
Dept: ADMINISTRATIVE | Facility: OTHER | Age: 54
End: 2019-01-03

## 2019-01-03 ENCOUNTER — DOCUMENTATION ONLY (OUTPATIENT)
Dept: FAMILY MEDICINE | Facility: CLINIC | Age: 54
End: 2019-01-03

## 2019-01-03 VITALS
TEMPERATURE: 98.1 F | SYSTOLIC BLOOD PRESSURE: 125 MMHG | OXYGEN SATURATION: 99 % | RESPIRATION RATE: 20 BRPM | HEART RATE: 92 BPM | DIASTOLIC BLOOD PRESSURE: 77 MMHG

## 2019-01-03 VITALS
RESPIRATION RATE: 20 BRPM | OXYGEN SATURATION: 99 % | DIASTOLIC BLOOD PRESSURE: 77 MMHG | TEMPERATURE: 98.1 F | HEART RATE: 92 BPM | SYSTOLIC BLOOD PRESSURE: 125 MMHG

## 2019-01-03 DIAGNOSIS — I26.99 OTHER PULMONARY EMBOLISM WITHOUT ACUTE COR PULMONALE, UNSPECIFIED CHRONICITY (H): Primary | ICD-10-CM

## 2019-01-03 DIAGNOSIS — Z79.4 TYPE 2 DIABETES MELLITUS WITH DIABETIC POLYNEUROPATHY, WITH LONG-TERM CURRENT USE OF INSULIN (H): ICD-10-CM

## 2019-01-03 DIAGNOSIS — I26.99 PULMONARY EMBOLISM AND INFARCTION (H): Primary | ICD-10-CM

## 2019-01-03 DIAGNOSIS — I26.99 OTHER ACUTE PULMONARY EMBOLISM WITHOUT ACUTE COR PULMONALE (H): ICD-10-CM

## 2019-01-03 DIAGNOSIS — E11.42 TYPE 2 DIABETES MELLITUS WITH DIABETIC POLYNEUROPATHY, WITH LONG-TERM CURRENT USE OF INSULIN (H): ICD-10-CM

## 2019-01-03 DIAGNOSIS — Z00.00 PREVENTATIVE HEALTH CARE: Primary | ICD-10-CM

## 2019-01-03 PROBLEM — E66.9 OBESITY: Status: ACTIVE | Noted: 2019-01-03

## 2019-01-03 PROBLEM — K46.9 HERNIA: Status: ACTIVE | Noted: 2017-01-27

## 2019-01-03 PROBLEM — K52.9 GASTROENTERITIS: Status: ACTIVE | Noted: 2019-01-03

## 2019-01-03 PROBLEM — G62.9 NEUROPATHY: Status: ACTIVE | Noted: 2019-01-03

## 2019-01-03 PROBLEM — J10.1 INFLUENZA A: Status: ACTIVE | Noted: 2017-01-27

## 2019-01-03 PROBLEM — G89.29 CHRONIC PAIN: Status: ACTIVE | Noted: 2017-01-27

## 2019-01-03 PROBLEM — R19.7 DIARRHEA: Status: ACTIVE | Noted: 2017-01-27

## 2019-01-03 PROBLEM — K43.9 HERNIA OF ANTERIOR ABDOMINAL WALL: Status: ACTIVE | Noted: 2017-01-28

## 2019-01-03 PROBLEM — R07.89 CHEST WALL PAIN: Status: ACTIVE | Noted: 2019-01-03

## 2019-01-03 PROBLEM — R11.2 NAUSEA & VOMITING: Status: ACTIVE | Noted: 2017-01-25

## 2019-01-03 PROBLEM — I82.403 DVT OF LOWER EXTREMITY, BILATERAL (H): Status: ACTIVE | Noted: 2019-01-03

## 2019-01-03 PROBLEM — R10.84 GENERALIZED ABDOMINAL PAIN: Status: ACTIVE | Noted: 2019-01-03

## 2019-01-03 LAB
GLUCOSE BLDC GLUCOMTR-MCNC: 125 MG/DL (ref 51–109)
INR PPP: 2.2

## 2019-01-03 NOTE — PROGRESS NOTES
"Patient was here to see me for INR follow up.  Patient does not like her warfarin and thinks it is causing her \"delerium\" or \"delusions\". She describes feeling \"out of her head\" and very confused daily. When she gets these, she has loss of speech as well. She describes it kind of like having too low of a blood glucose. She describes a situation yesterday when she was at [employer], and she didn't know where she was and what she was there for. States that her sister was able to come and get her. They cat in her car for about an hour before she felt better.    Also states that this AM she decided to not take the warfarin and took a shot of enoxaparin instead.  She has not had one of these episodes today.    Because her symptoms were atypical of warfarin, and somewhat consistent with either hypoglycemia or a CVA/TIA I triaged her to Dr. Ahn, who was able to see her and rule out such causes. Please see his note for further detail.     Discussed the option of a DOAC with Dr. Ahn, and Dr. Meyer was consulted by phone.  Patient is 105kg, and about 5', which puts her BMI at 45 kg/m2. This is higher than the package labeling.  There is an up to date article that suggests that Eliquis can be dosed up to 120kg, and there is this article: https://www.ncbi.nlm.nih.gov/pubmed/34352331 that states that anti-factor Xa levels stay consistent to wnl body weights as weight increases.  All parties agreed that this would be an appropriate switch.  Patient was instructed to stop warfarin and start Eliquis.     Angelina Martines, Pharm.D.    "

## 2019-01-03 NOTE — NURSING NOTE
Chief Complaint   Patient presents with     Recheck Medication     Medication Side Effects        Vitals transferred from Dr. Alfaro's Visit today.    Bijan Brewer, CMA

## 2019-01-03 NOTE — PATIENT INSTRUCTIONS
- Stop Warfarin and Lovenox  - Start Eliquis, 1 tablet, 2 times a day, for the next 6 months  - Use website below for a coupon  - Follow up if any concerns about bleeding/side effects  - Follow up in at least 2 months with PCP    [email or web address]    Go to link above to print coupon for $10 copay.      MAMMOGRAM  January 3, 2019 faxed order and demographics to [facility] Scheduling at [phone] who will contact patient to assist.   Bijan Brewer, CMA

## 2019-01-03 NOTE — PROGRESS NOTES
Preceptor Attestation:   Patient seen, evaluated and discussed with the resident. I have verified the content of the note, which accurately reflects my assessment of the patient and the plan of care.   Supervising Physician:  Isaias Carreon MD

## 2019-01-03 NOTE — PROGRESS NOTES
[facility] Visit    Subjective:  Ruthy Álvarez is a 53 year old female with a PMHx significant for   Patient Active Problem List   Diagnosis     Abnormal Pap smear of cervix     Moderate persistent asthma without complication     Carpal tunnel syndrome     Necrotizing fasciitis (H)     Pulmonary embolism and infarction (H)     Health Care Home     Moderate major depression (H)     Neck pain     S/P hysterectomy     Hyperlipidemia LDL goal <100     S/P bariatric surgery     HTN (hypertension)     Urinary incontinence     Insomnia     Esophageal reflux     Microalbuminuria     Type 2 diabetes mellitus with diabetic polyneuropathy (H)     Bilateral ankle joint pain     Chronic pain syndrome     Other acute pulmonary embolism without acute cor pulmonale (H)     Asthma     Chest wall pain     Chronic pain     Compression neuropathy of left lower extremity     Diarrhea     Generalized abdominal pain     Hernia     Gastroenteritis     DVT of lower extremity, bilateral (H)     Hernia of anterior abdominal wall     History of injury     History of pulmonary embolism     Influenza A     Nausea & vomiting     Neuropathy     Obesity     Osteoarthritis of both knees     Pain in left lower leg     Polyneuropathy due to secondary diabetes mellitus (H)     Tarsal tunnel syndrome of left side    who presents with concerns about a medication.     Patient was diagnosed with an ?unprovoked PE on 12/24/18. Of note, her EMR indicates a history of DVT/PE in 2011 but patient is unaware of this. She was discharged on subcutaneous Lovenox and Warfarin. She followed up 3 days ago on 12/31/18 with PCP Dr. Meyer, and stopped the Lovenox and continued the Warfarin 7.5mg daily.     She reports several odd symptoms over the past 2 days after taking the Warfarin. Two days ago, about 1 hr after her Warfarin dose, she reports gradual development of tongue tingling and face/lip numbness. Then she feels like her upper  extremities start to feel shaky. Patient is worried because she has minimal memory of these episodes. She reports that her sisters have witnessed these abnormal behaviors and think that she's hallucinating. These symptoms spontaneously resolve over the day and she feels back to normal eventually. This entire history was repeated yesterday as well. Denies any falls, head injury, loss of consciousness, current focal neurologic deficits. No significant leafy vegetable consumption.     Today, patient was so distressed that she decided to take the Lovenox again and not the Warfarin. She denies any of the strange symptoms today and feels normal. Currently denies any chest pain, shortness of breath. Has a dry cough but no hemoptysis. Currently satting 99% on room air.     Objective:  Vitals:    01/03/19 1447   BP: 125/77   Pulse: 92   Resp: 20   Temp: 98.1  F (36.7  C)   TempSrc: Oral   SpO2: 99%     GEN: NAD, alert, pleasant, cooperative  RESP: CTAB, no w/r/r  CV: RRR, nl S1/S2, no m/r/g, no peripheral edema  ABD: soft, NT/ND, no rebound  MSK: no MSK defects noted, gait is age appropriate w/o ataxia  SKIN: no suspicious lesions or rashes, no obvious bruising or bleeding  NEURO: CN2-12 grossly intact, normal strength and tone of upper extremities, sensory exam intact, mentation intact, speech normal w/o slurring, no facial droop, normal pupils   PSYCH: mentation appears normal, affect normal/bright    Results for orders placed or performed in visit on 01/03/19 (from the past 24 hour(s))   INR ([facility])   Result Value Ref Range    INR 2.2        Assessment/Plan:  Ruthy was seen today for recheck medication.    Diagnoses and all orders for this visit:    Acute pulmonary embolism without acute cor pulmonale (H)  Appears patient cannot tolerate Warfarin due to transient neurologic/psychologic side effects that resolve spontaneously. Repeated exactly the same symptoms 2 days in a row, and no symptoms today without taking  Warfarin. Concern for acute bleed causing these symptoms in setting of anticoagulation but unlikely to resolve in this pattern and no history of head injury. Reassuring normal neuro exam today. I could not find any listing of this constellation of side effects on a brief online search. No obvious medication or diet interaction. Nonetheless, patient is quite distressed and would rather take Lovenox than return to Warfarin. Will try Eliquis instead. Patient is obese with BMI >40, but her weight is below 120kg. Discussed with PharmD Dr. Martines and patient is fine to skip loading dose and go straight to maintenance dosing. Given website for coupon. Follow up with PCP in at least 2 months. Could explore further work up of underlying clotting etiology.   -     INR ([facility])  -     apixaban ANTICOAGULANT (ELIQUIS) 5 MG tablet; Take 1 tablet (5 mg) by mouth 2 times daily    Preventative health care  [facility] will contact patient to set up.   -     PCS Use: Screening Digital Bilateral Mammogram; Future    Options for treatment and follow-up care were reviewed with the patient who was engaged and actively involved in the decision making process, verbalized understanding of the options discussed, and satisfied with the final plan.    Patient was staffed with supervising physician, Dr. Carreon.     Rafael Ahn MD, PGY-2  [facility]

## 2019-01-04 ENCOUNTER — TRANSFERRED RECORDS (OUTPATIENT)
Dept: HEALTH INFORMATION MANAGEMENT | Facility: CLINIC | Age: 54
End: 2019-01-04

## 2019-01-07 ENCOUNTER — TELEPHONE (OUTPATIENT)
Dept: FAMILY MEDICINE | Facility: CLINIC | Age: 54
End: 2019-01-07

## 2019-01-07 NOTE — TELEPHONE ENCOUNTER
Called patient and left a message, checking in to see how she was doing since she was discharged from [facility]. Will contact her later this afternoon.

## 2019-01-10 ENCOUNTER — OFFICE VISIT (OUTPATIENT)
Dept: PHARMACY | Facility: CLINIC | Age: 54
End: 2019-01-10
Payer: COMMERCIAL

## 2019-01-10 ENCOUNTER — OFFICE VISIT (OUTPATIENT)
Dept: FAMILY MEDICINE | Facility: CLINIC | Age: 54
End: 2019-01-10
Payer: COMMERCIAL

## 2019-01-10 VITALS
OXYGEN SATURATION: 94 % | HEART RATE: 87 BPM | WEIGHT: 233.4 LBS | TEMPERATURE: 97.7 F | DIASTOLIC BLOOD PRESSURE: 67 MMHG | BODY MASS INDEX: 46.35 KG/M2 | SYSTOLIC BLOOD PRESSURE: 120 MMHG | RESPIRATION RATE: 16 BRPM

## 2019-01-10 VITALS
WEIGHT: 233.4 LBS | OXYGEN SATURATION: 94 % | BODY MASS INDEX: 46.35 KG/M2 | TEMPERATURE: 97.7 F | RESPIRATION RATE: 16 BRPM | SYSTOLIC BLOOD PRESSURE: 120 MMHG | DIASTOLIC BLOOD PRESSURE: 67 MMHG | HEART RATE: 87 BPM

## 2019-01-10 DIAGNOSIS — E11.42 TYPE 2 DIABETES MELLITUS WITH DIABETIC POLYNEUROPATHY, WITH LONG-TERM CURRENT USE OF INSULIN (H): Primary | ICD-10-CM

## 2019-01-10 DIAGNOSIS — E11.65 TYPE 2 DIABETES MELLITUS WITH HYPERGLYCEMIA, WITH LONG-TERM CURRENT USE OF INSULIN (H): ICD-10-CM

## 2019-01-10 DIAGNOSIS — Z79.4 TYPE 2 DIABETES MELLITUS WITH DIABETIC POLYNEUROPATHY, WITH LONG-TERM CURRENT USE OF INSULIN (H): Primary | ICD-10-CM

## 2019-01-10 DIAGNOSIS — Z79.4 TYPE 2 DIABETES MELLITUS WITH HYPERGLYCEMIA, WITH LONG-TERM CURRENT USE OF INSULIN (H): ICD-10-CM

## 2019-01-10 DIAGNOSIS — G89.4 CHRONIC PAIN SYNDROME: Primary | ICD-10-CM

## 2019-01-10 DIAGNOSIS — E16.2 HYPOGLYCEMIA: ICD-10-CM

## 2019-01-10 DIAGNOSIS — Z79.4 TYPE 2 DIABETES MELLITUS WITH DIABETIC POLYNEUROPATHY, WITH LONG-TERM CURRENT USE OF INSULIN (H): ICD-10-CM

## 2019-01-10 DIAGNOSIS — E11.42 TYPE 2 DIABETES MELLITUS WITH DIABETIC POLYNEUROPATHY, WITH LONG-TERM CURRENT USE OF INSULIN (H): ICD-10-CM

## 2019-01-10 LAB
AMPHETAMINES QUAL: NEGATIVE
BARBITURATES QUAL URINE: NEGATIVE
BENZODIAZEPINE QUAL URINE: NEGATIVE
BUPRENORPHINE QUAL URINE: NEGATIVE
CANNABINOIDS UR QL SCN: NEGATIVE
COCAINE QUAL URINE: NEGATIVE
METHAMPHETAMINE: NEGATIVE
METHODONE QUAL: NEGATIVE
MORPHINE QUAL: NEGATIVE
OXYCODONE QUAL: POSITIVE
PHENCYCLIDINE: NEGATIVE
PROPOXYPHENE: NEGATIVE
TEMPERATURE OF URINE WAS BETWEEN 90-100 DEGREES F: YES
TRICYCLIC ANTIDEPRESSANTS: NEGATIVE

## 2019-01-10 RX ORDER — INSULIN GLARGINE 100 [IU]/ML
40 INJECTION, SOLUTION SUBCUTANEOUS DAILY
Qty: 18 ML | Refills: 3 | Status: SHIPPED | OUTPATIENT
Start: 2019-01-10 | End: 2019-09-05

## 2019-01-10 RX ORDER — OXYCODONE AND ACETAMINOPHEN 5; 325 MG/1; MG/1
TABLET ORAL
Qty: 120 TABLET | Refills: 0 | Status: SHIPPED | OUTPATIENT
Start: 2019-01-10 | End: 2019-02-08

## 2019-01-10 RX ORDER — INSULIN ASPART 100 [IU]/ML
12 INJECTION, SOLUTION INTRAVENOUS; SUBCUTANEOUS
Qty: 18 ML | Refills: 11 | Status: SHIPPED | OUTPATIENT
Start: 2019-01-10 | End: 2019-08-27 | Stop reason: ALTCHOICE

## 2019-01-10 ASSESSMENT — PAIN SCALES - GENERAL: PAINLEVEL: EXTREME PAIN (8)

## 2019-01-10 NOTE — PROGRESS NOTES
OBP30 Study Note                                                       Ruthy is a 53 year old female here for the OBP-30 Study, IRB [id number], Dr. Angelina Martines PharmD, Principle Investigator.    Informed consent addressed with patient and patient signed: {yes no:[id number]}  Patient number: ***  Visit number: ***    SUBJECTIVE:  Current blood pressure medications, including dose, directions and last dose:    ***    Total number of blood pressure medications: ***  Race/ethnicity: ***  Language: ***    Assessment of adherence  Over the past 7 days...    I took all doses of my blood pressure medication: {stronglydisagree5:221019}    I missed or skipped at least one dose of my blood pressure medication: {stronglyagree5:649983}    I was not able to take all of my blood pressure medication: {stronglyagree5:978943}  Average Score: ***    The following were positive reasons identified for non-adherence:   {reasonsnonadherencereal:273688}      OBJECTIVE:  Patient has the following medical conditions:  Atrial fibrillation or irregular heart rate: {yes no:037031}  Obstructive sleep apnea:  {yes no:445806}  Chronic kidney disease:  {yes no:276105}, Stage: ***    [place] Readings  Panel: {[facility] panel:[id number]}  BP: ***/***     Last three clinic blood pressure readings  BP Readings from Last 3 Encounters:   01/10/19 120/67   01/10/19 120/67   01/03/19 125/77       Vitals today:   /67   Pulse 87   Temp 97.7  F (36.5  C) (Oral)   Resp 16   Wt 105.9 kg (233 lb 6.4 oz)   SpO2 94%   BMI 46.35 kg/m      OBP-30 Readings  BP 1: ***/*** (not included in average)  BP 2: ***/*** (not included in average)  BP 3: ***/***   BP 4: ***/***   BP 5: ***/***   BP 6: ***/***   BP 7: ***/***     OBP30 BP: ***/***     ASSESSMENT AND PLAN:  Ruthy has {met / not met:6346371} the [facility] Measures blood pressure goal of <140/90 based on OBP30 reading.    Pharmacotherapy intervention if not met: ***    This note has been routed to  Jose Angel Meyer for their consideration.    Aviva Dahl, {[place] Title:104727}

## 2019-01-10 NOTE — PROGRESS NOTES
"Pt presented to clinic for appt with Dr Meyer. She had forgotten we had scheduled her OBP30 study 60 minutes beforehand. Ellyn (student researcher) consented her and attempted to get readings with the monitor. Attempted twice and got errors both times.      At that time I went in to see patient as Dr. Meyer was close to seeing her. Asked her how the Eliquis was going and she said fine.  Also asked about her ER visit. This visit was a \"pre-syncopal\" event and her blood sugar was wnl.  Asked her if this was similar to how she was feeling when she was on the warfarin. She said yes and proceeded to tell me that Tuesday she was viewing an apartment and started to feel the same way and almost passed out.  EMS was called and she was found to have a blood sugar of 19.  She states that she was treated in the ambulance with glucose tablets and refused transport to the hospital. She tells me this is a frequent occurrence where her blood sugars will continue to drop after taking her insulin, despite a substantial meal. During the telling of this story she began to become confused, like she couldn't find her words.  Asked that she test her blood sugar; it was found to be 56. Patient had a granola bar in her purse and ate that.  I procured her 2 boxes of apple juice. Within 30 minutes her sugar was at 191.      Dr. Meyer relayed to patient that we will call her and reschedule her OBP appt.    Angelina Martines, Pharm.D.    "

## 2019-01-10 NOTE — PATIENT INSTRUCTIONS
Keep checking blood sugars before each meal and at bed time.    Change basaglar from 55 to 40 units daily.  Change novolog from 18 to 12 with each meal.    Call me Monday--ask for triage nurse. And give them blood sugar readings.

## 2019-01-10 NOTE — PROGRESS NOTES
Patient Active Problem List    Diagnosis Date Noted     Chronic pain syndrome 10/07/2016     Priority: High     Chronic Pain Diagnosis:  neuropathy  DIRE Total Score(s):14     ORT: 5    4 - 7 =  Moderate Risk   of future problems with Opioids  FAQ5: 45/100 on 9/9/16, 40/100 on 11/15/16  Behavioral Health Consultation: 5/12/17 with Dr. Daisy Quezada  Personal Care Plan for Chronic Pain: 5/12/17  Opioid medication:oxycodone   Dose:10mg  Number of pills per month:60  Patient is being prescribed 10mg of oxycodone IR (Percocet) per day this is 15 mg Morphine Equivalents  Benzodiazepines Prescribed? No  Naloxone prescribed? No       Clinic visit frequency required: Q 1 month Next visit due:   Controlled Substance/Opioid Treatment agreement on file (dated <12 months ago)?:    Date(s): pending  Last [place]MP verification: last visit  Items in red to be updated at each visit  Patient is followed by [facility] for ongoing prescription of pain medication.  All refills should be approved by this provider, or covering partner.Chronic Pain          Microalbuminuria 10/12/2015     Priority: High     Type 2 diabetes mellitus with diabetic polyneuropathy (H) 10/12/2015     Priority: High     Chest wall pain 01/03/2019     Priority: Medium     Generalized abdominal pain 01/03/2019     Priority: Medium     Gastroenteritis 01/03/2019     Priority: Medium     DVT of lower extremity, bilateral (H) 01/03/2019     Priority: Medium     Neuropathy 01/03/2019     Priority: Medium     Obesity 01/03/2019     Priority: Medium     Other acute pulmonary embolism without acute cor pulmonale (H) 12/31/2018     Priority: Medium     Hernia of anterior abdominal wall 01/28/2017     Priority: Medium     Chronic pain 01/27/2017     Priority: Medium     Diarrhea 01/27/2017     Priority: Medium     Hernia 01/27/2017     Priority: Medium     Influenza A 01/27/2017     Priority: Medium     Nausea & vomiting 01/25/2017     Priority: Medium      Osteoarthritis of both knees 10/31/2016     Priority: Medium     Pain in left lower leg 10/31/2016     Priority: Medium     Polyneuropathy due to secondary diabetes mellitus (H) 10/31/2016     Priority: Medium     Compression neuropathy of left lower extremity 02/19/2016     Priority: Medium     Tarsal tunnel syndrome of left side 02/19/2016     Priority: Medium     Bilateral ankle joint pain 01/15/2016     Priority: Medium     Esophageal reflux 04/07/2015     Priority: Medium     Urinary incontinence 02/19/2015     Priority: Medium     Insomnia 02/19/2015     Priority: Medium     HTN (hypertension) 08/26/2013     Priority: Medium     Asthma 08/04/2013     Priority: Medium     History of injury 08/04/2013     Priority: Medium     History of pulmonary embolism 08/04/2013     Priority: Medium     Hyperlipidemia LDL goal <100 06/21/2013     Priority: Medium     S/P bariatric surgery 06/21/2013     Priority: Medium     Kim-en-Y in 2006       Neck pain 05/16/2013     Priority: Medium     Moderate major depression (H) 01/24/2013     Priority: Medium     Moderate persistent asthma without complication 11/15/2012     Priority: Medium     Pulmonary embolism and infarction (H) 11/15/2012     Priority: Medium     12/24/2018  Unprovoked.  + fam hx of DVT.  Plan warfarin for 6 months.       Health Care Home 11/15/2012     Priority: Medium     Tier 3    Status: Accept  Care Coordination Start Date: 02/27/12  State Tier Level: Level 3  Language/Barrier to Learning: No  Significant Mental Health Issues: Yes  Date Care Coordination Discontinued: 05/28/15    DX V65.8 REPLACED WITH 14913 [facility] CARE HOME (04/08/2013)         S/P hysterectomy 06/21/2013     Priority: Low     2008, For fibroids.  Cervix removed, but still has ovaries.       Carpal tunnel syndrome 11/15/2012     Priority: Low     Necrotizing fasciitis (H) 11/15/2012     Priority: Low     Abnormal Pap smear of cervix      Priority: Low     3-12-15: Pap/HPV neg.  Plan:  Given hx (mildly abn pap, hyster), recheck in 5 yrs.  10-11-11: Pap/HPV neg.  Plan: Given ASUS pap in 2010, recommend repeating pap in 1 year.  8/3/2010 ASCUS Pap with +BV- pt treated with Metrogel.  Pt needs repeat Pap in 1yr.  8/4/2008 Pt had total hyst for menorrhagia, pelvic pain, scar tissue, and fibroids.       There are no exam notes on file for this visit.  Chief Complaint   Patient presents with     Other     Chronic Pain Management per patient      Blood pressure 120/67, pulse 87, temperature 97.7  F (36.5  C), temperature source Oral, resp. rate 16, weight 105.9 kg (233 lb 6.4 oz), SpO2 94 %, not currently breastfeeding.   SUBJECTIVE:  Ruthy Álvarez is here for several issues.  First of all, she has had short periods of confusion on a daily basis for several days.  It starts with tingling in the tongue and then she has a sense of confusion and dizziness.  Sometimes they are self-limited.  She notes that her blood sugars have been dropping after every single meal.  She takes NovoLog 18 units with meals.  Recently, she is in the process of switching from glargine two ays ago, I prescribed Basaglar for her on 12/31.  She hasn't picked it up yet, but she did borrow her sister's insulin.  I'm not sure which one it was.  She thinks it is glargine, but she isn't sure; nevertheless, she didn't even take her basal insulin yesterday.  Just before I came into the room, she was having a reoccurrence of her symptoms. with confusion and tingling of her tongue.  We checked her blood sugar at that time and it was 51.  She states that she had a similar episode and the paramedics were called and she had a blood sugar of 19.  She was hospitalized with another episode of confusion, but we couldn't correlate that with hypoglycemia.  She had an extensive neurologic workup that was unremarkable.  Incidentally, she had a CT PE run at that time and it actually showed resolution of her PE.  She thinks a lot of her symptoms are  due to the switches between warfarin, Lovenox, and apixaban.  Her first episode of hypoglycemia actually began when she was on warfarin and Lovenox before she began apixaban, so I can't see a clearcut correlation here.  She has had no fever or UTI symptoms.  No cough.   Regarding chronic pain, she is actually quite pleased with how she is doing on the current dose of opioids.  She takes oxycodone, a total of 20 mg daily and feels that this has substantially improved her quality of life substantially.  Her UDS is appropriate today.  Database was reviewed and it is appropriate as well.  Her FAQ-5 today is 35, but this also is hard to interpret in light of her hypoglycemic symptoms.   OBJECTIVE:     GENERAL:  By the time I saw her she had two glasses of juice and was feeling better.  Sugar is 191.  She is alert, pleasant, and in no acute distress.   CHEST:  Clear.   HEART:  Regular rate and rhythm without murmurs.   PSYCH:  Mental status was completely clear and intact and she seemed to have insight.   ASSESSMENT/PLAN:   1.  Chronic pain syndrome, stable.  Database was reviewed.  UDS was appropriate.  She reports functional improvement as a result of her pain treatment, without clearly adverse effects.  We'll continue oxycodone at current doses.  Follow up monthly.     2.  Hypoglycemia.  I think we need to cut back dramatically on her insulin for now to resolve this problem.  I changed the Basaglar prescription from what was a planned 55 units to only 40 units.  I recommended that she cut back and NovoLog from 18 units to 12 units t.i.d. I gave her a book to monitor her blood sugars in and asked her to follow up on Monday with a phone call about how her blood sugars are doing.  Follow up with me in a month.  If she runs a little high (i.e. 100s-200s over the next few days) I think that is satisfactory as long as we resolve her severe hypoglycemia.     I spent more than 30 minutes with the patient, with greater than 50%  in counseling and coordination of care.         Results for orders placed or performed in visit on 01/10/19   Rapid Urine Drug Screen ([facility])   Result Value Ref Range    Phencyclidine NEGATIVE NEGATIVE    Propoxyphene NEGATIVE NEGATIVE    Tricyclic Antidepressants NEGATIVE NEGATIVE    Amphetamines Qual NEGATIVE NEGATIVE    Barbiturates Qual Urine NEGATIVE NEGATIVE    Buprenorphine Qual Urine NEGATIVE NEGATIVE    Benzodiazepine Qual Urine NEGATIVE NEGATIVE    Cocaine Qual Urine NEGATIVE NEGATIVE    Cannabinoids Qual Urine NEGATIVE NEGATIVE    Methamphetamine Qual NEGATIVE NEGATIVE    Methadone Qual NEGATIVE NEGATIVE    Morphine Qual NEGATIVE NEGATIVE    Oxycodone Qual POSITIVE (A) NEGATIVE    Temperature of Urine was Between  Degrees F YES YES

## 2019-01-24 ENCOUNTER — TELEPHONE (OUTPATIENT)
Dept: FAMILY MEDICINE | Facility: CLINIC | Age: 54
End: 2019-01-24

## 2019-02-08 ENCOUNTER — OFFICE VISIT (OUTPATIENT)
Dept: FAMILY MEDICINE | Facility: CLINIC | Age: 54
End: 2019-02-08
Payer: COMMERCIAL

## 2019-02-08 VITALS
HEART RATE: 92 BPM | TEMPERATURE: 98 F | BODY MASS INDEX: 47.38 KG/M2 | DIASTOLIC BLOOD PRESSURE: 67 MMHG | WEIGHT: 238.6 LBS | SYSTOLIC BLOOD PRESSURE: 122 MMHG | RESPIRATION RATE: 16 BRPM | OXYGEN SATURATION: 100 %

## 2019-02-08 DIAGNOSIS — G89.4 CHRONIC PAIN SYNDROME: Primary | ICD-10-CM

## 2019-02-08 DIAGNOSIS — L84 CORNS AND CALLOSITIES: ICD-10-CM

## 2019-02-08 DIAGNOSIS — Z98.84 S/P BARIATRIC SURGERY: ICD-10-CM

## 2019-02-08 DIAGNOSIS — E11.42 TYPE 2 DIABETES MELLITUS WITH DIABETIC POLYNEUROPATHY, WITH LONG-TERM CURRENT USE OF INSULIN (H): ICD-10-CM

## 2019-02-08 DIAGNOSIS — Z79.4 TYPE 2 DIABETES MELLITUS WITH DIABETIC POLYNEUROPATHY, WITH LONG-TERM CURRENT USE OF INSULIN (H): ICD-10-CM

## 2019-02-08 DIAGNOSIS — M54.50 MIDLINE LOW BACK PAIN WITHOUT SCIATICA, UNSPECIFIED CHRONICITY: ICD-10-CM

## 2019-02-08 RX ORDER — OXYCODONE AND ACETAMINOPHEN 5; 325 MG/1; MG/1
TABLET ORAL
Qty: 150 TABLET | Refills: 0 | Status: SHIPPED | OUTPATIENT
Start: 2019-02-08 | End: 2019-03-07

## 2019-02-08 RX ORDER — CYANOCOBALAMIN 1000 UG/ML
1000 INJECTION, SOLUTION INTRAMUSCULAR; SUBCUTANEOUS
Status: DISCONTINUED | OUTPATIENT
Start: 2019-02-08 | End: 2021-09-09

## 2019-02-08 RX ADMIN — CYANOCOBALAMIN 1000 MCG: 1000 INJECTION, SOLUTION INTRAMUSCULAR; SUBCUTANEOUS at 11:03

## 2019-02-08 NOTE — LETTER
February 8, 2019      Ruthy Álvarez  [address]  [place] [address]        Dear Ms Henri Bliss   Your urine test was normal.   JIGNESH Meyer     Please see below for your test results.    Resulted Orders   Rapid Urine Drug Screen ([facility])   Result Value Ref Range    Phencyclidine NEGATIVE NEGATIVE    Propoxyphene NEGATIVE NEGATIVE    Tricyclic Antidepressants NEGATIVE NEGATIVE    Amphetamines Qual NEGATIVE NEGATIVE    Barbiturates Qual Urine NEGATIVE NEGATIVE    Buprenorphine Qual Urine NEGATIVE NEGATIVE    Benzodiazepine Qual Urine NEGATIVE NEGATIVE    Cocaine Qual Urine NEGATIVE NEGATIVE    Cannabinoids Qual Urine NEGATIVE NEGATIVE    Methamphetamine Qual NEGATIVE NEGATIVE    Methadone Qual NEGATIVE NEGATIVE    Morphine Qual NEGATIVE NEGATIVE    Oxycodone Qual POSITIVE (A) NEGATIVE    Temperature of Urine was Between  Degrees F YES YES      Comment:      This is a preliminary screening test that detects drugs-of-abuse in urine at   specified detection levels.  To confirm preliminary results, a more specific   method such as Gas Chromatography/Mass Spectrometry (GC/MS) must be used.            If you have any questions, please call the clinic to make an appointment.    Sincerely,    Jose Angel Meyer MD

## 2019-02-08 NOTE — PATIENT INSTRUCTIONS
Increase basaglar from 40 to 44 units daily.    Check before breakfast, before lunch, before supper, and at bedtime   And record them.    PT REFERRAL for pool therapy  February 8, 2019 at 11:29 am Faxed referral and demographics to [facility] at [phone] they will contact patient to schedule. [facility]   Phone:  [phone]  Fax: [phone]

## 2019-02-08 NOTE — RESULT ENCOUNTER NOTE
Please mail labs to patient with this message.    Ms Álvarez  Your urine test was normal.  JIGNESH Meyer

## 2019-02-08 NOTE — NURSING NOTE
Prior to injection, verified patient identity using patient's name and date of birth.  Due to injection administration, patient instructed to remain in clinic for 15 minutes  afterwards, and to report any adverse reaction to me immediately.    Cyanocobalamin 1000mcg    Drug Amount Wasted:  None.  Vial/Syringe: Single dose vial  Expiration Date:  02/19 Lot [id number]

## 2019-02-08 NOTE — PROGRESS NOTES
Patient Active Problem List    Diagnosis Date Noted     Chronic pain syndrome 10/07/2016     Priority: High     Chronic Pain Diagnosis:  neuropathy  DIRE Total Score(s):14     ORT: 5    4 - 7 =  Moderate Risk   of future problems with Opioids  FAQ5: 45/100 on 9/9/16, 40/100 on 11/15/16  Behavioral Health Consultation: 5/12/17 with Dr. Daisy Quezada  Personal Care Plan for Chronic Pain: 5/12/17  Opioid medication:oxycodone   Dose:10mg  Number of pills per month:60  Patient is being prescribed 10mg of oxycodone IR (Percocet) per day this is 15 mg Morphine Equivalents  Benzodiazepines Prescribed? No  Naloxone prescribed? No       Clinic visit frequency required: Q 1 month Next visit due:   Controlled Substance/Opioid Treatment agreement on file (dated <12 months ago)?:    Date(s): pending  Last MNPMP verification: last visit  Items in red to be updated at each visit  Patient is followed by [facility] for ongoing prescription of pain medication.  All refills should be approved by this provider, or covering partner.Chronic Pain          Microalbuminuria 10/12/2015     Priority: High     Type 2 diabetes mellitus with diabetic polyneuropathy (H) 10/12/2015     Priority: High     Chest wall pain 01/03/2019     Priority: Medium     Generalized abdominal pain 01/03/2019     Priority: Medium     Gastroenteritis 01/03/2019     Priority: Medium     DVT of lower extremity, bilateral (H) 01/03/2019     Priority: Medium     Neuropathy 01/03/2019     Priority: Medium     Obesity 01/03/2019     Priority: Medium     Other acute pulmonary embolism without acute cor pulmonale (H) 12/31/2018     Priority: Medium     Hernia of anterior abdominal wall 01/28/2017     Priority: Medium     Chronic pain 01/27/2017     Priority: Medium     Diarrhea 01/27/2017     Priority: Medium     Hernia 01/27/2017     Priority: Medium     Influenza A 01/27/2017     Priority: Medium     Nausea & vomiting 01/25/2017     Priority: Medium      Osteoarthritis of both knees 10/31/2016     Priority: Medium     Pain in left lower leg 10/31/2016     Priority: Medium     Polyneuropathy due to secondary diabetes mellitus (H) 10/31/2016     Priority: Medium     Compression neuropathy of left lower extremity 02/19/2016     Priority: Medium     Tarsal tunnel syndrome of left side 02/19/2016     Priority: Medium     Bilateral ankle joint pain 01/15/2016     Priority: Medium     Esophageal reflux 04/07/2015     Priority: Medium     Urinary incontinence 02/19/2015     Priority: Medium     Insomnia 02/19/2015     Priority: Medium     HTN (hypertension) 08/26/2013     Priority: Medium     Asthma 08/04/2013     Priority: Medium     History of injury 08/04/2013     Priority: Medium     History of pulmonary embolism 08/04/2013     Priority: Medium     Hyperlipidemia LDL goal <100 06/21/2013     Priority: Medium     S/P bariatric surgery 06/21/2013     Priority: Medium     Kim-en-Y in 2006       Neck pain 05/16/2013     Priority: Medium     Moderate major depression (H) 01/24/2013     Priority: Medium     Moderate persistent asthma without complication 11/15/2012     Priority: Medium     Pulmonary embolism and infarction (H) 11/15/2012     Priority: Medium     12/24/2018  Unprovoked.  + fam hx of DVT.  Plan warfarin for 6 months.       Health Care Home 11/15/2012     Priority: Medium     Tier 3    Status: Accept  Care Coordination Start Date: 02/27/12  State Tier Level: Level 3  Language/Barrier to Learning: No  Significant Mental Health Issues: Yes  Date Care Coordination Discontinued: 05/28/15    DX V65.8 REPLACED WITH 23490 [facility] (04/08/2013)         S/P hysterectomy 06/21/2013     Priority: Low     2008, For fibroids.  Cervix removed, but still has ovaries.       Carpal tunnel syndrome 11/15/2012     Priority: Low     Necrotizing fasciitis (H) 11/15/2012     Priority: Low     Abnormal Pap smear of cervix      Priority: Low     3-12-15: Pap/HPV neg.  Plan:  Given hx (mildly abn pap, hyster), recheck in 5 yrs.  10-11-11: Pap/HPV neg.  Plan: Given ASUS pap in 2010, recommend repeating pap in 1 year.  8/3/2010 ASCUS Pap with +BV- pt treated with Metrogel.  Pt needs repeat Pap in 1yr.  8/4/2008 Pt had total hyst for menorrhagia, pelvic pain, scar tissue, and fibroids.       There are no exam notes on file for this visit.  Chief Complaint   Patient presents with     Pain Management     CPM     other     Waist down to her hip. She has a hard time getting up. She feels like she is falling     other     Hard callice on left foot and toe on right foot. Pt feels sharp on right toe. Throbbing pain on left foot. Pt clipped some off     Refill Request     Pt wants the shot for vitamin b-12. Also refill meds. Refill aspirin     Blood pressure 122/67, pulse 92, temperature 98  F (36.7  C), temperature source Oral, resp. rate 16, weight 108.2 kg (238 lb 9.6 oz), SpO2 100 %, not currently breastfeeding.  Results for orders placed or performed in visit on 01/10/19   Rapid Urine Drug Screen ([facility])   Result Value Ref Range    Phencyclidine NEGATIVE NEGATIVE    Propoxyphene NEGATIVE NEGATIVE    Tricyclic Antidepressants NEGATIVE NEGATIVE    Amphetamines Qual NEGATIVE NEGATIVE    Barbiturates Qual Urine NEGATIVE NEGATIVE    Buprenorphine Qual Urine NEGATIVE NEGATIVE    Benzodiazepine Qual Urine NEGATIVE NEGATIVE    Cocaine Qual Urine NEGATIVE NEGATIVE    Cannabinoids Qual Urine NEGATIVE NEGATIVE    Methamphetamine Qual NEGATIVE NEGATIVE    Methadone Qual NEGATIVE NEGATIVE    Morphine Qual NEGATIVE NEGATIVE    Oxycodone Qual POSITIVE (A) NEGATIVE    Temperature of Urine was Between  Degrees F YES YES     Few weeks.  Feels it in spine and waist.  Left more than right.  'waves' will last 30-45 min and occur 2-3 times/week.  Seems to start in the LS spine and wraps around waist feeling like it locks up her body.  Then goes away on it's own.  Pain meds seem to help, too.  Look at  feet--callouses.  Trimmed on own and helped.    Re:  B12--hard to get them.  Would like shot.    SUBJECTIVE:  Ruthy Álvarez is here for multiple issues.   First, she is complaining of episodic pain that begins at the waistline and radiates down the left leg.  It will last about 45 minutes and occur about 2-3 times a week.  It wraps around the waist, feels like it locks the body up, and then goes away on its own.  Pain medication seemed to help a little bit.  There is no bowel or bladder incontinence.  No radiculopathy.  There is no clear trigger or relieving factor.   She would like me look at her feet as well.  She has diabetes, and she has some calluses on her left great toe and her right fifth toe.  She trimmed them on her own.  She has had no redness or swelling.  She has had foot surgery in the past and wants to avoid that desperately.   She has diabetes.  She brings in profuse blood sugar readings.  She checked 4-5 times a day over the past few weeks.  She is on Basaglar 40 units and NovoLog 12 units with meals.  No hypoglycemia.  Blood sugars varied, but were typically in the 200s.  There is no clear change pre or postprandial.  Average morning blood sugar is in the low 200s at this point in time.  Thankfully, she has had no hypoglycemia or spells as she has had in the past.   Regarding her chronic pain, she feels that generally the control is okay, but her oxycodone doesn't quite last throughout the day as is.  She would like to stay in pool therapy and she wonders if a stronger dose would be helpful.  Her sleep is okay, but not optimal.   OBJECTIVE:     GENERAL:  Patient is alert, pleasant, and in no acute distress.   VITAL SIGNS:  Blood pressures are excellent.   CHEST:  Clear.   HEART:  Regular rate and rhythm.  Normal S1 and S2.  No murmur.   ABDOMEN:  Soft and nontender.   MUSCULOSKELETAL:  She has increased lumbar lordosis.  Negative straight leg raise.  No pain with hip and external rotation.   Negative Ren's maneuver.  Neurologically, she is strong in the lower extremity.     EXTEMITIES.  On the first MTP joint she has a bony prominence with a callus over the top of it.  It appears clean, dry and intact.  There is also a callus vs a corn on the fifth toe [unclear] medially, neither of which look infected.   ASSESSMENT/PLAN:   1.  Chronic pain syndrome, suboptimal control.   We'll refer her to pool therapy.  I'm going to increase her oxycodone to 5 tablets q4h during the day and 10 at night, quantity of 150 per month.  UDS was appropriate today.  Database was reviewed and is appropriate as well.  She is very interested in pool therapy, which is outstanding.   2.  History of bariatric surgery.  She wishes to switch from oral B12 to IM B12, which is completely reasonable.  I reviewed her chart.  She had a mild macrocytic anemia, which would suggest that she might be mildly B12 deficient.  She states that at one point she was told she was copper deficient, which puzzles me.  Nevertheless, we'll give her B12 and recheck her CBC in about three months and see how things go.     3.  Corns and calluses.  I discussed appropriate care for them.  I recommended some corn pads and showed them to her via [employer].     4.  Episodic back pain.  The etiology is unclear.  We'll watch and wait.     5.  Diabetes.  Increase her Basaglar from 40 units to 44 units.  Continue NovoLog at the same doses.  Decrease the frequency of checking to q.i.d.  Reevaluate in one month.

## 2019-02-25 ENCOUNTER — COMMUNICATION - HEALTHEAST (OUTPATIENT)
Dept: TELEHEALTH | Facility: CLINIC | Age: 54
End: 2019-02-25

## 2019-02-25 ENCOUNTER — HOSPITAL ENCOUNTER (OUTPATIENT)
Dept: MAMMOGRAPHY | Facility: CLINIC | Age: 54
Discharge: HOME | End: 2019-02-25
Attending: FAMILY MEDICINE

## 2019-02-25 DIAGNOSIS — Z12.31 VISIT FOR SCREENING MAMMOGRAM: ICD-10-CM

## 2019-03-07 ENCOUNTER — OFFICE VISIT (OUTPATIENT)
Dept: FAMILY MEDICINE | Facility: CLINIC | Age: 54
End: 2019-03-07
Payer: COMMERCIAL

## 2019-03-07 VITALS
OXYGEN SATURATION: 99 % | TEMPERATURE: 97.4 F | DIASTOLIC BLOOD PRESSURE: 81 MMHG | HEART RATE: 89 BPM | SYSTOLIC BLOOD PRESSURE: 119 MMHG | RESPIRATION RATE: 16 BRPM

## 2019-03-07 DIAGNOSIS — Z98.84 S/P BARIATRIC SURGERY: Primary | ICD-10-CM

## 2019-03-07 DIAGNOSIS — G89.4 CHRONIC PAIN SYNDROME: ICD-10-CM

## 2019-03-07 DIAGNOSIS — M17.0 PRIMARY OSTEOARTHRITIS OF BOTH KNEES: ICD-10-CM

## 2019-03-07 DIAGNOSIS — I10 ESSENTIAL HYPERTENSION: ICD-10-CM

## 2019-03-07 DIAGNOSIS — E11.42 TYPE 2 DIABETES MELLITUS WITH DIABETIC POLYNEUROPATHY, WITH LONG-TERM CURRENT USE OF INSULIN (H): ICD-10-CM

## 2019-03-07 DIAGNOSIS — E11.42 TYPE 2 DIABETES MELLITUS WITH DIABETIC POLYNEUROPATHY, WITH LONG-TERM CURRENT USE OF INSULIN (H): Primary | ICD-10-CM

## 2019-03-07 DIAGNOSIS — Z79.4 TYPE 2 DIABETES MELLITUS WITH DIABETIC POLYNEUROPATHY, WITH LONG-TERM CURRENT USE OF INSULIN (H): Primary | ICD-10-CM

## 2019-03-07 DIAGNOSIS — G89.4 CHRONIC PAIN SYNDROME: Primary | ICD-10-CM

## 2019-03-07 DIAGNOSIS — Z79.4 TYPE 2 DIABETES MELLITUS WITH DIABETIC POLYNEUROPATHY, WITH LONG-TERM CURRENT USE OF INSULIN (H): ICD-10-CM

## 2019-03-07 LAB
AMPHETAMINES QUAL: NEGATIVE
BARBITURATES QUAL URINE: NEGATIVE
BENZODIAZEPINE QUAL URINE: NEGATIVE
BUN SERPL-MCNC: 36.1 MG/DL (ref 7–19)
BUPRENORPHINE QUAL URINE: NEGATIVE
CALCIUM SERPL-MCNC: 8.9 MG/DL (ref 8.5–10.1)
CANNABINOIDS UR QL SCN: NEGATIVE
CHLORIDE SERPLBLD-SCNC: 107.4 MMOL/L (ref 98–110)
CHOLEST SERPL-MCNC: 172.1 MG/DL (ref 0–200)
CHOLEST/HDLC SERPL: 3.6 {RATIO} (ref 0–5)
CO2 SERPL-SCNC: 16.6 MMOL/L (ref 20–32)
COCAINE QUAL URINE: NEGATIVE
CREAT SERPL-MCNC: 1.2 MG/DL (ref 0.5–1)
GFR SERPL CREATININE-BSD FRML MDRD: 49.9 ML/MIN/1.7 M2
GLUCOSE SERPL-MCNC: 336.8 MG'DL (ref 70–99)
HBA1C MFR BLD: 7.7 % (ref 4.1–5.7)
HDLC SERPL-MCNC: 47.2 MG/DL
LDLC SERPL CALC-MCNC: 86 MG/DL (ref 0–129)
METHAMPHETAMINE: NEGATIVE
METHODONE QUAL: NEGATIVE
MORPHINE QUAL: NEGATIVE
OXYCODONE QUAL: POSITIVE
PHENCYCLIDINE: NEGATIVE
POTASSIUM SERPL-SCNC: 5.6 MMOL/DL (ref 3.2–4.6)
PROPOXYPHENE: NEGATIVE
SODIUM SERPL-SCNC: 135.8 MMOL/L (ref 132–142)
TEMPERATURE OF URINE WAS BETWEEN 90-100 DEGREES F: YES
TRICYCLIC ANTIDEPRESSANTS: NEGATIVE
TRIGL SERPL-MCNC: 194.2 MG/DL (ref 0–150)
VLDL CHOLESTEROL: 38.8 MG/DL (ref 7–32)

## 2019-03-07 RX ORDER — OXYCODONE AND ACETAMINOPHEN 5; 325 MG/1; MG/1
TABLET ORAL
Qty: 150 TABLET | Refills: 0 | Status: SHIPPED | OUTPATIENT
Start: 2019-03-07 | End: 2019-04-01

## 2019-03-07 RX ADMIN — CYANOCOBALAMIN 1000 MCG: 1000 INJECTION, SOLUTION INTRAMUSCULAR; SUBCUTANEOUS at 14:30

## 2019-03-07 ASSESSMENT — PAIN SCALES - GENERAL: PAINLEVEL: SEVERE PAIN (7)

## 2019-03-07 NOTE — NURSING NOTE
Prior to medication administration, I verified the patient identity using patient's name and date of birth. Patient was instructed to report any adverse reaction to me immediately.    I administered cyanocobalamin 100mcg/mL to Ruthy Álvarez.    For injections only, was entire vial of medication used? Yes    Did the patient bring this medication to the clinic to be administered? No    Name of provider who requested the medication administration: Dr. Meyer  Name of provider on site (faculty or community preceptor) at the time of performing the medication administration: Dr. Meyer    Date of next administration: 04/27/2019  Date of next office visit with provider to renew medication plan (must be seen annually)  Viridiana Loaiza MA

## 2019-03-07 NOTE — PATIENT INSTRUCTIONS
BARIATRIC ADULT REFERRAL  March 7, 2019 [facility] [phone] will contact patient to schedule appointment. giorgio

## 2019-03-07 NOTE — PROGRESS NOTES
Patient Active Problem List    Diagnosis Date Noted     Chronic pain syndrome 10/07/2016     Priority: High     Chronic Pain Diagnosis:  neuropathy  DIRE Total Score(s):14     ORT: 5    4 - 7 =  Moderate Risk   of future problems with Opioids  FAQ5: 45/100 on 9/9/16, 40/100 on 11/15/16  Behavioral Health Consultation: 5/12/17 with Dr. Daisy Quezada  Personal Care Plan for Chronic Pain: 5/12/17  Opioid medication:oxycodone   Dose:10mg  Number of pills per month:60  Patient is being prescribed 10mg of oxycodone IR (Percocet) per day this is 15 mg Morphine Equivalents  Benzodiazepines Prescribed? No  Naloxone prescribed? No       Clinic visit frequency required: Q 1 month Next visit due:   Controlled Substance/Opioid Treatment agreement on file (dated <12 months ago)?:    Date(s): pending  Last MNPMP verification: last visit  Items in red to be updated at each visit  Patient is followed by Data Unavailable for ongoing prescription of pain medication.  All refills should be approved by this provider, or covering partner.Chronic Pain          Microalbuminuria 10/12/2015     Priority: High     Type 2 diabetes mellitus with diabetic polyneuropathy (H) 10/12/2015     Priority: High     Chest wall pain 01/03/2019     Priority: Medium     Generalized abdominal pain 01/03/2019     Priority: Medium     Gastroenteritis 01/03/2019     Priority: Medium     DVT of lower extremity, bilateral (H) 01/03/2019     Priority: Medium     Neuropathy 01/03/2019     Priority: Medium     Obesity 01/03/2019     Priority: Medium     Other acute pulmonary embolism without acute cor pulmonale (H) 12/31/2018     Priority: Medium     Hernia of anterior abdominal wall 01/28/2017     Priority: Medium     Chronic pain 01/27/2017     Priority: Medium     Diarrhea 01/27/2017     Priority: Medium     Hernia 01/27/2017     Priority: Medium     Influenza A 01/27/2017     Priority: Medium     Nausea & vomiting 01/25/2017     Priority: Medium      Osteoarthritis of both knees 10/31/2016     Priority: Medium     Pain in left lower leg 10/31/2016     Priority: Medium     Polyneuropathy due to secondary diabetes mellitus (H) 10/31/2016     Priority: Medium     Compression neuropathy of left lower extremity 02/19/2016     Priority: Medium     Tarsal tunnel syndrome of left side 02/19/2016     Priority: Medium     Bilateral ankle joint pain 01/15/2016     Priority: Medium     Esophageal reflux 04/07/2015     Priority: Medium     Urinary incontinence 02/19/2015     Priority: Medium     Insomnia 02/19/2015     Priority: Medium     HTN (hypertension) 08/26/2013     Priority: Medium     Asthma 08/04/2013     Priority: Medium     History of injury 08/04/2013     Priority: Medium     History of pulmonary embolism 08/04/2013     Priority: Medium     Hyperlipidemia LDL goal <100 06/21/2013     Priority: Medium     S/P bariatric surgery 06/21/2013     Priority: Medium     Kim-en-Y in 2006       Neck pain 05/16/2013     Priority: Medium     Moderate major depression (H) 01/24/2013     Priority: Medium     Moderate persistent asthma without complication 11/15/2012     Priority: Medium     Pulmonary embolism and infarction (H) 11/15/2012     Priority: Medium     12/24/2018  Unprovoked.  + fam hx of DVT.  Plan warfarin for 6 months.       Health Care Home 11/15/2012     Priority: Medium     Tier 3    Status: Accept  Care Coordination Start Date: 02/27/12  State Tier Level: Level 3  Language/Barrier to Learning: No  Significant Mental Health Issues: Yes  Date Care Coordination Discontinued: 05/28/15    DX V65.8 REPLACED WITH [id number] [facility]ALTH CARE HOME (04/08/2013)         S/P hysterectomy 06/21/2013     Priority: Low     2008, For fibroids.  Cervix removed, but still has ovaries.       Carpal tunnel syndrome 11/15/2012     Priority: Low     Necrotizing fasciitis (H) 11/15/2012     Priority: Low     Abnormal Pap smear of cervix      Priority: Low     3-12-15: Pap/HPV neg.  Plan:  Given hx (mildly abn pap, hyster), recheck in 5 yrs.  10-11-11: Pap/HPV neg.  Plan: Given ASUS pap in 2010, recommend repeating pap in 1 year.  8/3/2010 ASCUS Pap with +BV- pt treated with Metrogel.  Pt needs repeat Pap in 1yr.  8/4/2008 Pt had total hyst for menorrhagia, pelvic pain, scar tissue, and fibroids.       There are no exam notes on file for this visit.  Chief Complaint   Patient presents with     Pain Management     CPM     Imm/Inj     B-12 shot     Blood pressure 119/81, pulse 89, temperature 97.4  F (36.3  C), temperature source Oral, resp. rate 16, SpO2 99 %, not currently breastfeeding.   Started B12  Going to pool therapy 2 times/week.  Can sit better/straighter  rigth shoulder not as bad, but left is a little more than mild.  PT has definitely helped, only with 1 visit, but it was really long fvisit.   meds are ok--constipation controlled  Most painful joints are knees and ankles. But back and hip are troublesome, too.  Hands hurt, too--thumb is the worst area.  Bariatric surg  [employer]    [id number]  Used to file claims for schools      SUBJECTIVE:  Rutyh Álvarez is here for followup of chronic pain syndrome.  She is on oxycodone, a total of 150 per month, and tolerates it well and it gives her functional improvement.  She had a real nice response to her first visit with pool therapy, which has already helped her right shoulder pain, and the left shoulder pain has lessened.  She has bilateral knee pain from arthritis and wonders if she needs an x-ray.  Her last DEXA in 2015 showed mild arthritis in both joints.     She has some sores on her hand as well that I didn't examine today.     Her breathing is well-controlled.     Her constipation is manageable with Senokot.     She said that her blood sugars are under better control.  She is due for A1C today.  She didn't mention any hypoglycemia.     She is very interested in going back to the bariatric team.  She lost a tremendous amount of weight  with Kim-en-Y gastric bypass, but she has gained some of it back.  She wants to consider other options.  She wonders if a gastric sleeve on top of her gastric bypass would be reasonable.   OBJECTIVE:  The patient alert, pleasant, and in no acute distress.  UDS was reviewed and is negative.  Her A1C and BMP are pending.   ASSESSMENT/PLAN:   1.  Chronic pain syndrome.  She is in appropriate therapy for the pain.  She is engaged in a treatment plan.  She'll follow up in one month.  I refilled oxycodone today, 5 mg tablets # ??.   2.  Hypertension.  It is well-controlled.  Blood pressure was reviewed.   3.  Diabetes.  We'll check A1C, lipids, and BMP and contact her with results.  We'll discuss these the next time if these are markedly abnormal.   4.  History of B12 deficiency due to gastric bypass.  Injection was given today.  She'll follow up monthly for that.     5.  Obesity.  Referral to Bariatric Surgery at [facility] is entirely reasonable.  She had a previous surgery at [place].  I think she'll be refreshed by their fairly comprehensive approach.  I encouraged her to consider options, both pharmaceutical and surgical.       Results for orders placed or performed in visit on 02/08/19   Rapid Urine Drug Screen ([facility])   Result Value Ref Range    Phencyclidine NEGATIVE NEGATIVE    Propoxyphene NEGATIVE NEGATIVE    Tricyclic Antidepressants NEGATIVE NEGATIVE    Amphetamines Qual NEGATIVE NEGATIVE    Barbiturates Qual Urine NEGATIVE NEGATIVE    Buprenorphine Qual Urine NEGATIVE NEGATIVE    Benzodiazepine Qual Urine NEGATIVE NEGATIVE    Cocaine Qual Urine NEGATIVE NEGATIVE    Cannabinoids Qual Urine NEGATIVE NEGATIVE    Methamphetamine Qual NEGATIVE NEGATIVE    Methadone Qual NEGATIVE NEGATIVE    Morphine Qual NEGATIVE NEGATIVE    Oxycodone Qual POSITIVE (A) NEGATIVE    Temperature of Urine was Between  Degrees F YES YES

## 2019-03-10 DIAGNOSIS — R80.9 MICROALBUMINURIA: Primary | ICD-10-CM

## 2019-03-10 NOTE — RESULT ENCOUNTER NOTE
Please call on 3/11:    Her urine test was appropriate.  Her AIC test was much better--goal is less than 8 and she was 7.7--that's great news.  Her cholesterol test looks much better than two year ago.  She should continue to take atorvastatin 10 mg daily to keep it that way.  Her kidney tests were slightly better than last time, but still not correct, and her potassium is high and her CO2 is low, meaning she probably has something called 'renal tubular acidosis'.  I need to clarify her medications before I make any changes:  Please confirm that she takes benazepril 40 mg daily, chlorthalidone 25 mg daily, and furosemide 20 mg as needed (please clarify how often she takes it, as well).  Additionally, I'd like her to come in and leave us a urine sample next week--she has had protein in her urine in the past, and we haven't checked it since late 2016.  She is on the proper treat her kidneys, but that treatment may be causing her high potassium, and we need the information we can get from her urine.  Lab orders are in.  Tx!  CF

## 2019-03-12 ENCOUNTER — AMBULATORY - HEALTHEAST (OUTPATIENT)
Dept: SURGERY | Facility: CLINIC | Age: 54
End: 2019-03-12

## 2019-03-12 DIAGNOSIS — Z98.84 S/P BARIATRIC SURGERY: ICD-10-CM

## 2019-03-13 ENCOUNTER — TRANSFERRED RECORDS (OUTPATIENT)
Dept: HEALTH INFORMATION MANAGEMENT | Facility: CLINIC | Age: 54
End: 2019-03-13

## 2019-03-18 ENCOUNTER — OFFICE VISIT (OUTPATIENT)
Dept: FAMILY MEDICINE | Facility: CLINIC | Age: 54
End: 2019-03-18
Payer: COMMERCIAL

## 2019-03-18 VITALS
BODY MASS INDEX: 46.23 KG/M2 | HEART RATE: 93 BPM | DIASTOLIC BLOOD PRESSURE: 74 MMHG | RESPIRATION RATE: 16 BRPM | TEMPERATURE: 98 F | OXYGEN SATURATION: 99 % | SYSTOLIC BLOOD PRESSURE: 114 MMHG | WEIGHT: 232.8 LBS

## 2019-03-18 DIAGNOSIS — R80.9 MICROALBUMINURIA: ICD-10-CM

## 2019-03-18 DIAGNOSIS — Z79.4 TYPE 2 DIABETES MELLITUS WITH DIABETIC POLYNEUROPATHY, WITH LONG-TERM CURRENT USE OF INSULIN (H): ICD-10-CM

## 2019-03-18 DIAGNOSIS — E11.42 TYPE 2 DIABETES MELLITUS WITH DIABETIC POLYNEUROPATHY, WITH LONG-TERM CURRENT USE OF INSULIN (H): ICD-10-CM

## 2019-03-18 DIAGNOSIS — D64.9 ANEMIA, UNSPECIFIED TYPE: ICD-10-CM

## 2019-03-18 DIAGNOSIS — E87.5 HYPERKALEMIA: Primary | ICD-10-CM

## 2019-03-18 DIAGNOSIS — Z79.4 TYPE 2 DIABETES MELLITUS WITH DIABETIC POLYNEUROPATHY, WITH LONG-TERM CURRENT USE OF INSULIN (H): Primary | ICD-10-CM

## 2019-03-18 DIAGNOSIS — E11.42 TYPE 2 DIABETES MELLITUS WITH DIABETIC POLYNEUROPATHY, WITH LONG-TERM CURRENT USE OF INSULIN (H): Primary | ICD-10-CM

## 2019-03-18 LAB
BILIRUBIN UR: ABNORMAL
BLOOD UR: ABNORMAL
BUN SERPL-MCNC: 27.5 MG/DL (ref 7–19)
CALCIUM SERPL-MCNC: 8.5 MG/DL (ref 8.5–10.1)
CHLORIDE SERPLBLD-SCNC: 109 MMOL/L (ref 98–110)
CO2 SERPL-SCNC: 22 MMOL/L (ref 20–32)
CREAT SERPL-MCNC: 1.2 MG/DL (ref 0.5–1)
CREAT UR-MCNC: 194.1 MG/DL
FERRITIN SERPL-MCNC: 83 NG/ML (ref 10–130)
FOLATE SERPL-MCNC: 7.6 NG/ML
GFR SERPL CREATININE-BSD FRML MDRD: 49.9 ML/MIN/1.7 M2
GLUCOSE SERPL-MCNC: 194 MG'DL (ref 70–99)
GLUCOSE URINE: NEGATIVE
HCT VFR BLD AUTO: 34 % (ref 35–47)
HEMOGLOBIN: 10.8 G/DL (ref 11.7–15.7)
IRON SATN MFR SERPL: 24 % (ref 20–50)
IRON SERPL-MCNC: 78 UG/DL (ref 42–175)
KETONES UR QL: NEGATIVE
LEUKOCYTE ESTERASE UR: NEGATIVE
MCH RBC QN AUTO: 32.6 PG (ref 26.5–35)
MCHC RBC AUTO-ENTMCNC: 31.8 G/DL (ref 32–36)
MCV RBC AUTO: 102.7 FL (ref 78–100)
MICROALBUMIN UR-MCNC: 72.46 MG/DL (ref 0–1.99)
MICROALBUMIN/CREAT UR: 373.3 MG/G
NITRITE UR QL STRIP: NEGATIVE
PH UR STRIP: 5 [PH] (ref 5–7)
PLATELET # BLD AUTO: 293 K/UL (ref 150–450)
POTASSIUM SERPL-SCNC: 4.3 MMOL/DL (ref 3.2–4.6)
PROTEIN UR: ABNORMAL
RBC # BLD AUTO: 3.3 M/UL (ref 3.8–5.2)
SODIUM SERPL-SCNC: 137.9 MMOL/L (ref 132–142)
SP GR UR STRIP: >=1.03
TIBC SERPL-MCNC: 328 UG/DL (ref 313–563)
TRANSFERRIN SERPL-MCNC: 263 MG/DL (ref 212–360)
UROBILINOGEN UR STRIP-ACNC: ABNORMAL
WBC # BLD AUTO: 9 K/UL (ref 4–11)

## 2019-03-18 NOTE — RESULT ENCOUNTER NOTE
Please call 3/19 AM (I tried to call Mon night, but numbers in demographics are not answering or are disconnected)  Good news:  Her kidney tests and potassium are now normal, so the abnormalities we were seeing before seem to be false--this is great news.  She can continue her current medications for her blood pressure.  Her urine test did show protein in the urine,but it's less than in the past, and controlling her BP with benazepril is the best treatment, so we should continue it!    Her anemia is better now that she is on B12 shots--so we should continue them monthly.  Her iron and folic acid levels seem acceptable.  She does NOT need to make any changes and can follow-up in early April, as planned.  JIGNESH Meyer

## 2019-03-18 NOTE — PROGRESS NOTES
Patient Active Problem List    Diagnosis Date Noted     Chronic pain syndrome 10/07/2016     Priority: High     Chronic Pain Diagnosis:  neuropathy  DIRE Total Score(s):14     ORT: 5    4 - 7 =  Moderate Risk   of future problems with Opioids  FAQ5: 45/100 on 9/9/16, 40/100 on 11/15/16  Behavioral Health Consultation: 5/12/17 with Dr. Daisy Quezada  Personal Care Plan for Chronic Pain: 5/12/17  Opioid medication:oxycodone   Dose:10mg  Number of pills per month:60  Patient is being prescribed 10mg of oxycodone IR (Percocet) per day this is 15 mg Morphine Equivalents  Benzodiazepines Prescribed? No  Naloxone prescribed? No       Clinic visit frequency required: Q 1 month Next visit due:   Controlled Substance/Opioid Treatment agreement on file (dated <12 months ago)?:    Date(s): pending  Last MNPMP verification: last visit  Items in red to be updated at each visit  Patient is followed by Data Unavailable for ongoing prescription of pain medication.  All refills should be approved by this provider, or covering partner.Chronic Pain          Microalbuminuria 10/12/2015     Priority: High     Type 2 diabetes mellitus with diabetic polyneuropathy (H) 10/12/2015     Priority: High     Chest wall pain 01/03/2019     Priority: Medium     Generalized abdominal pain 01/03/2019     Priority: Medium     Gastroenteritis 01/03/2019     Priority: Medium     DVT of lower extremity, bilateral (H) 01/03/2019     Priority: Medium     Neuropathy 01/03/2019     Priority: Medium     Obesity 01/03/2019     Priority: Medium     Other acute pulmonary embolism without acute cor pulmonale (H) 12/31/2018     Priority: Medium     Hernia of anterior abdominal wall 01/28/2017     Priority: Medium     Chronic pain 01/27/2017     Priority: Medium     Diarrhea 01/27/2017     Priority: Medium     Hernia 01/27/2017     Priority: Medium     Influenza A 01/27/2017     Priority: Medium     Nausea & vomiting 01/25/2017     Priority: Medium      Osteoarthritis of both knees 10/31/2016     Priority: Medium     Pain in left lower leg 10/31/2016     Priority: Medium     Polyneuropathy due to secondary diabetes mellitus (H) 10/31/2016     Priority: Medium     Compression neuropathy of left lower extremity 02/19/2016     Priority: Medium     Tarsal tunnel syndrome of left side 02/19/2016     Priority: Medium     Bilateral ankle joint pain 01/15/2016     Priority: Medium     Esophageal reflux 04/07/2015     Priority: Medium     Urinary incontinence 02/19/2015     Priority: Medium     Insomnia 02/19/2015     Priority: Medium     Essential hypertension 08/26/2013     Priority: Medium     Asthma 08/04/2013     Priority: Medium     History of injury 08/04/2013     Priority: Medium     History of pulmonary embolism 08/04/2013     Priority: Medium     Hyperlipidemia LDL goal <100 06/21/2013     Priority: Medium     S/P bariatric surgery 06/21/2013     Priority: Medium     Kim-en-Y in 2006       Neck pain 05/16/2013     Priority: Medium     Moderate major depression (H) 01/24/2013     Priority: Medium     Moderate persistent asthma without complication 11/15/2012     Priority: Medium     Pulmonary embolism and infarction (H) 11/15/2012     Priority: Medium     12/24/2018  Unprovoked.  + fam hx of DVT.  Plan warfarin for 6 months.       Health Care Home 11/15/2012     Priority: Medium     Tier 3    Status: Accept  Care Coordination Start Date: 02/27/12  State Tier Level: Level 3  Language/Barrier to Learning: No  Significant Mental Health Issues: Yes  Date Care Coordination Discontinued: 05/28/15    DX V65.8 REPLACED WITH [id number] [facility] CARE [facility] (04/08/2013)         S/P hysterectomy 06/21/2013     Priority: Low     2008, For fibroids.  Cervix removed, but still has ovaries.       Carpal tunnel syndrome 11/15/2012     Priority: Low     Necrotizing fasciitis (H) 11/15/2012     Priority: Low     Abnormal Pap smear of cervix      Priority: Low     3-12-15: Pap/HPV neg.   Plan: Given hx (mildly abn pap, hyster), recheck in 5 yrs.  10-11-11: Pap/HPV neg.  Plan: Given ASUS pap in 2010, recommend repeating pap in 1 year.  8/3/2010 ASCUS Pap with +BV- pt treated with Metrogel.  Pt needs repeat Pap in 1yr.  8/4/2008 Pt had total hyst for menorrhagia, pelvic pain, scar tissue, and fibroids.       There are no exam notes on file for this visit.  Chief Complaint   Patient presents with     other     Follow up  from last visit     Blood pressure 114/74, pulse 93, temperature 98  F (36.7  C), temperature source Oral, resp. rate 16, weight 105.6 kg (232 lb 12.8 oz), SpO2 99 %, not currently breastfeeding.  Results for orders placed or performed in visit on 03/18/19   Urinalysis([facility])   Result Value Ref Range    Specific Gravity Urine >=1.030 1.005 - 1.030    pH Urine 5.0 4.5 - 8.0    Leukocyte Esterase UR Negative NEGATIVE    Nitrite Urine Negative NEGATIVE    Protein UR 2+ (A) NEGATIVE    Glucose Urine Negative NEGATIVE    Ketones Urine Negative NEGATIVE    Urobilinogen mg/dL 1.0 E.U./dL (A) 0.2 E.U./dL    Bilirubin UR 1+ (A) NEGATIVE    Blood UR Trace-intact (A) NEGATIVE

## 2019-03-18 NOTE — PROGRESS NOTES
Patient Active Problem List    Diagnosis Date Noted     Chronic pain syndrome 10/07/2016     Priority: High     Chronic Pain Diagnosis:  neuropathy  DIRE Total Score(s):14     ORT: 5    4 - 7 =  Moderate Risk   of future problems with Opioids  FAQ5: 45/100 on 9/9/16, 40/100 on 11/15/16  Behavioral Health Consultation: 5/12/17 with Dr. Daisy Quezada  Personal Care Plan for Chronic Pain: 5/12/17  Opioid medication:oxycodone   Dose:10mg  Number of pills per month:60  Patient is being prescribed 10mg of oxycodone IR (Percocet) per day this is 15 mg Morphine Equivalents  Benzodiazepines Prescribed? No  Naloxone prescribed? No       Clinic visit frequency required: Q 1 month Next visit due:   Controlled Substance/Opioid Treatment agreement on file (dated <12 months ago)?:    Date(s): pending  Last MNPMP verification: last visit  Items in red to be updated at each visit  Patient is followed by Data Unavailable for ongoing prescription of pain medication.  All refills should be approved by this provider, or covering partner.Chronic Pain          Microalbuminuria 10/12/2015     Priority: High     Type 2 diabetes mellitus with diabetic polyneuropathy (H) 10/12/2015     Priority: High     Chest wall pain 01/03/2019     Priority: Medium     Generalized abdominal pain 01/03/2019     Priority: Medium     Gastroenteritis 01/03/2019     Priority: Medium     DVT of lower extremity, bilateral (H) 01/03/2019     Priority: Medium     Neuropathy 01/03/2019     Priority: Medium     Obesity 01/03/2019     Priority: Medium     Other acute pulmonary embolism without acute cor pulmonale (H) 12/31/2018     Priority: Medium     Hernia of anterior abdominal wall 01/28/2017     Priority: Medium     Chronic pain 01/27/2017     Priority: Medium     Diarrhea 01/27/2017     Priority: Medium     Hernia 01/27/2017     Priority: Medium     Influenza A 01/27/2017     Priority: Medium     Nausea & vomiting 01/25/2017     Priority: Medium      Osteoarthritis of both knees 10/31/2016     Priority: Medium     Pain in left lower leg 10/31/2016     Priority: Medium     Polyneuropathy due to secondary diabetes mellitus (H) 10/31/2016     Priority: Medium     Compression neuropathy of left lower extremity 02/19/2016     Priority: Medium     Tarsal tunnel syndrome of left side 02/19/2016     Priority: Medium     Bilateral ankle joint pain 01/15/2016     Priority: Medium     Esophageal reflux 04/07/2015     Priority: Medium     Urinary incontinence 02/19/2015     Priority: Medium     Insomnia 02/19/2015     Priority: Medium     Essential hypertension 08/26/2013     Priority: Medium     Asthma 08/04/2013     Priority: Medium     History of injury 08/04/2013     Priority: Medium     History of pulmonary embolism 08/04/2013     Priority: Medium     Hyperlipidemia LDL goal <100 06/21/2013     Priority: Medium     S/P bariatric surgery 06/21/2013     Priority: Medium     Kim-en-Y in 2006       Neck pain 05/16/2013     Priority: Medium     Moderate major depression (H) 01/24/2013     Priority: Medium     Moderate persistent asthma without complication 11/15/2012     Priority: Medium     Pulmonary embolism and infarction (H) 11/15/2012     Priority: Medium     12/24/2018  Unprovoked.  + fam hx of DVT.  Plan warfarin for 6 months.       Health Care Home 11/15/2012     Priority: Medium     Tier 3    Status: Accept  Care Coordination Start Date: 02/27/12  State Tier Level: Level 3  Language/Barrier to Learning: No  Significant Mental Health Issues: Yes  Date Care Coordination Discontinued: 05/28/15    DX V65.8 REPLACED WITH [id number] [facility] (04/08/2013)         S/P hysterectomy 06/21/2013     Priority: Low     2008, For fibroids.  Cervix removed, but still has ovaries.       Carpal tunnel syndrome 11/15/2012     Priority: Low     Necrotizing fasciitis (H) 11/15/2012     Priority: Low     Abnormal Pap smear of cervix      Priority: Low     3-12-15: Pap/HPV neg.   Plan: Given hx (mildly abn pap, hyster), recheck in 5 yrs.  10-11-11: Pap/HPV neg.  Plan: Given ASUS pap in 2010, recommend repeating pap in 1 year.  8/3/2010 ASCUS Pap with +BV- pt treated with Metrogel.  Pt needs repeat Pap in 1yr.  8/4/2008 Pt had total hyst for menorrhagia, pelvic pain, scar tissue, and fibroids.       There are no exam notes on file for this visit.  Chief Complaint   Patient presents with     other     Follow up  from last visit     Blood pressure 114/74, pulse 93, temperature 98  F (36.7  C), temperature source Oral, resp. rate 16, weight 105.6 kg (232 lb 12.8 oz), SpO2 99 %, not currently breastfeeding.   SUBJECTIVE:  Ruthy Álvarez is here for followup of hyperkalemia, microalbuminuria, and CKD.  For her hypertension, she is on benazepril 40 mg, chlorthalidone 25 mg, and Lasix 20 mg p.r.n.  She feels fine.  No palpitation, SOB, or dizziness.  She doesn't complain of any problems with urination.  She has substantial microalbuminuria which hasn't been checked for a while and I want to check this again in light of the hyperkalemia and metabolic acidosis on her last labs.     She feels fine.  She has a little bit more energy now that she is getting her B12 back.  She was found to have mildly macrocytic anemia.  She feels a little bit better with more energy now that she is back on her B12 shots.  She has a series of three in January, February, and March.  She hasn't had her hemoglobin checked since then.  Interestingly, she says she was hospitalized at [facility] in [place] with anemia and was treated with copper.  I don't have those records.      OBJECTIVE:  The patient is alert, pleasant, and in no distress.   VITAL SIGNS:  Blood pressure is outstanding today.   CHEST:  Clear.   HEART:  Regular rate and rhythm without murmurs.   EXTREMITIES:  Extremities have just trace ankle edema.     LABS:  Her urine is quite concentrated today, but does show signs of proteinuria.  [facility] is back at this  time and is thankfully now normal.  Her hyperkalemia has resolved.  Her creatinine is stable around 1.2, but overall she is much better.  Her CBC is improved as well.  Her hemoglobin has gone from the 9 range to the upper 10 range.  Her MCV remains elevated.  I ordered folate and iron studies and these are quite normal as well.      ASSESSMENT/PLAN:   1.  Hyperkalemia, resolved.  This may have been a false positive from hemolysis, which is the most likely diagnosis at this point in that her potassium has normalized.  Hopefully, we can continue her ACE inhibitor and thiazide diuretic, as her microalbuminuria has improved and this is the appropriate treatment.     2.  Anemia . It is likely due to B12 deficiency and I think she just needs to stay on B12 monthly indefinitely.  She might be able to do home injections in the future.  She'll follow up in April.  We'll call her with results on 03/19.       Results for orders placed or performed in visit on 03/18/19   Urinalysis([facility])   Result Value Ref Range    Specific Gravity Urine >=1.030 1.005 - 1.030    pH Urine 5.0 4.5 - 8.0    Leukocyte Esterase UR Negative NEGATIVE    Nitrite Urine Negative NEGATIVE    Protein UR 2+ (A) NEGATIVE    Glucose Urine Negative NEGATIVE    Ketones Urine Negative NEGATIVE    Urobilinogen mg/dL 1.0 E.U./dL (A) 0.2 E.U./dL    Bilirubin UR 1+ (A) NEGATIVE    Blood UR Trace-intact (A) NEGATIVE

## 2019-03-18 NOTE — PATIENT INSTRUCTIONS
Will call you with blood tests.  Likely change:  Stop benazepril  Start losartan 50 mg daily.  Probably will recheck the blood in April when you come back.  We are checking the anemia, b12 levels, iron levels, too.    Kidney tests back today--will call you this evening.

## 2019-03-20 LAB — MMA SERUM/PLASMA, VITAMIN B12 STATUS: 0.15 UMOL/L (ref 0–0.4)

## 2019-03-21 DIAGNOSIS — E11.8 TYPE 2 DIABETES MELLITUS WITH COMPLICATION, WITH LONG-TERM CURRENT USE OF INSULIN (H): ICD-10-CM

## 2019-03-21 DIAGNOSIS — Z79.4 TYPE 2 DIABETES MELLITUS WITH COMPLICATION, WITH LONG-TERM CURRENT USE OF INSULIN (H): ICD-10-CM

## 2019-04-01 ENCOUNTER — OFFICE VISIT (OUTPATIENT)
Dept: FAMILY MEDICINE | Facility: CLINIC | Age: 54
End: 2019-04-01
Payer: COMMERCIAL

## 2019-04-01 VITALS
WEIGHT: 239.8 LBS | TEMPERATURE: 97.5 F | OXYGEN SATURATION: 99 % | BODY MASS INDEX: 47.62 KG/M2 | HEART RATE: 88 BPM | SYSTOLIC BLOOD PRESSURE: 100 MMHG | RESPIRATION RATE: 16 BRPM | DIASTOLIC BLOOD PRESSURE: 64 MMHG

## 2019-04-01 DIAGNOSIS — M25.50 ARTHRALGIA, UNSPECIFIED JOINT: Primary | ICD-10-CM

## 2019-04-01 DIAGNOSIS — I10 ESSENTIAL HYPERTENSION: ICD-10-CM

## 2019-04-01 DIAGNOSIS — M25.512 CHRONIC LEFT SHOULDER PAIN: ICD-10-CM

## 2019-04-01 DIAGNOSIS — G89.29 CHRONIC LEFT SHOULDER PAIN: ICD-10-CM

## 2019-04-01 DIAGNOSIS — G89.4 CHRONIC PAIN SYNDROME: ICD-10-CM

## 2019-04-01 RX ORDER — OXYCODONE AND ACETAMINOPHEN 5; 325 MG/1; MG/1
TABLET ORAL
Qty: 150 TABLET | Refills: 0 | Status: SHIPPED | OUTPATIENT
Start: 2019-04-01 | End: 2019-05-02

## 2019-04-01 ASSESSMENT — PAIN SCALES - GENERAL: PAINLEVEL: SEVERE PAIN (7)

## 2019-04-01 NOTE — LETTER
April 5, 2019      Ruthy Álvarez  [address]  [place] [address]        Dear Ruthy,    Your urine test was appropriate.   Your hip x-rays show some evidence of arthritis--they describe it as mild (x-rays sometimes don't correlate well with symptoms).   Your knee and shoulder x-rays also show moderate amounts of arthritis.   So, we have osteoarthritis (from aging and 'wear and tear') in multiple places.   We can talk more about it next time, when you return.   JIGNESH Meyer     Please see below for your test results.    No results found from the In Basket message.    If you have any questions, please call the clinic to make an appointment.    Sincerely,    Jose Angel Meyer MD

## 2019-04-01 NOTE — PROGRESS NOTES
ASSESSMENT AND PLAN              CHAZ Álvarez is a 53 year old  female with a PMH significant for:     Patient Active Problem List    Diagnosis Date Noted     Chronic pain syndrome 10/07/2016     Priority: High     Chronic Pain Diagnosis:  neuropathy  DIRE Total Score(s):14     ORT: 5    4 - 7 =  Moderate Risk   of future problems with Opioids  FAQ5: 45/100 on 9/9/16, 40/100 on 11/15/16  Behavioral Health Consultation: 5/12/17 with Dr. Daisy Quezada  Personal Care Plan for Chronic Pain: 5/12/17  Opioid medication:oxycodone   Dose:10mg  Number of pills per month:60  Patient is being prescribed 10mg of oxycodone IR (Percocet) per day this is 15 mg Morphine Equivalents  Benzodiazepines Prescribed? No  Naloxone prescribed? No       Clinic visit frequency required: Q 1 month Next visit due:   Controlled Substance/Opioid Treatment agreement on file (dated <12 months ago)?:    Date(s): pending  Last [place] verification: last visit  Items in red to be updated at each visit  Patient is followed by Data Unavailable for ongoing prescription of pain medication.  All refills should be approved by this provider, or covering partner.Chronic Pain          Microalbuminuria 10/12/2015     Priority: High     Type 2 diabetes mellitus with diabetic polyneuropathy (H) 10/12/2015     Priority: High     Chest wall pain 01/03/2019     Priority: Medium     Generalized abdominal pain 01/03/2019     Priority: Medium     Gastroenteritis 01/03/2019     Priority: Medium     DVT of lower extremity, bilateral (H) 01/03/2019     Priority: Medium     Neuropathy 01/03/2019     Priority: Medium     Obesity 01/03/2019     Priority: Medium     Other acute pulmonary embolism without acute cor pulmonale (H) 12/31/2018     Priority: Medium     Hernia of anterior abdominal wall 01/28/2017     Priority: Medium     Chronic pain 01/27/2017     Priority: Medium     Diarrhea 01/27/2017     Priority: Medium     Hernia 01/27/2017      Priority: Medium     Influenza A 01/27/2017     Priority: Medium     Nausea & vomiting 01/25/2017     Priority: Medium     Osteoarthritis of both knees 10/31/2016     Priority: Medium     Pain in left lower leg 10/31/2016     Priority: Medium     Polyneuropathy due to secondary diabetes mellitus (H) 10/31/2016     Priority: Medium     Compression neuropathy of left lower extremity 02/19/2016     Priority: Medium     Tarsal tunnel syndrome of left side 02/19/2016     Priority: Medium     Bilateral ankle joint pain 01/15/2016     Priority: Medium     Esophageal reflux 04/07/2015     Priority: Medium     Urinary incontinence 02/19/2015     Priority: Medium     Insomnia 02/19/2015     Priority: Medium     Essential hypertension 08/26/2013     Priority: Medium     Asthma 08/04/2013     Priority: Medium     History of injury 08/04/2013     Priority: Medium     History of pulmonary embolism 08/04/2013     Priority: Medium     Hyperlipidemia LDL goal <100 06/21/2013     Priority: Medium     S/P bariatric surgery 06/21/2013     Priority: Medium     Kim-en-Y in 2006       Neck pain 05/16/2013     Priority: Medium     Moderate major depression (H) 01/24/2013     Priority: Medium     Moderate persistent asthma without complication 11/15/2012     Priority: Medium     Pulmonary embolism and infarction (H) 11/15/2012     Priority: Medium     12/24/2018  Unprovoked.  + fam hx of DVT.  Plan warfarin for 6 months.       Health Care Home 11/15/2012     Priority: Medium     Tier 3    Status: Accept  Care Coordination Start Date: 02/27/12  State Tier Level: Level 3  Language/Barrier to Learning: No  Significant Mental Health Issues: Yes  Date Care Coordination Discontinued: 05/28/15    DX V65.8 REPLACED WITH [id number] CARE HOME (04/08/2013)         S/P hysterectomy 06/21/2013     Priority: Low     2008, For fibroids.  Cervix removed, but still has ovaries.       Carpal tunnel syndrome 11/15/2012     Priority: Low     Necrotizing  fasciitis (H) 11/15/2012     Priority: Low     Abnormal Pap smear of cervix      Priority: Low     3-12-15: Pap/HPV neg.  Plan: Given hx (mildly abn pap, hyster), recheck in 5 yrs.  10-11-11: Pap/HPV neg.  Plan: Given ASUS pap in 2010, recommend repeating pap in 1 year.  8/3/2010 ASCUS Pap with +BV- pt treated with Metrogel.  Pt needs repeat Pap in 1yr.  8/4/2008 Pt had total hyst for menorrhagia, pelvic pain, scar tissue, and fibroids.       Current Outpatient Medications   Medication     albuterol (2.5 MG/3ML) 0.083% nebulizer solution     albuterol (VENTOLIN HFA) 108 (90 Base) MCG/ACT inhaler     Alcohol Swabs (B-D SINGLE USE SWABS REGULAR) PADS     apixaban ANTICOAGULANT (ELIQUIS) 5 MG tablet     atorvastatin (LIPITOR) 10 MG tablet     benazepril (LOTENSIN) 40 MG tablet     blood glucose (VIC CONTOUR) test strip     blood glucose (NO BRAND SPECIFIED) lancets standard     blood glucose monitoring (VIC CONTOUR MONITOR) meter device kit     capsaicin (ZOSTRIX) 0.025 % external cream     chlorthalidone (HYGROTON) 25 MG tablet     DULoxetine (CYMBALTA) 60 MG capsule     famotidine (PEPCID) 20 MG tablet     ferrous sulfate (IRON) 325 (65 FE) MG tablet     fluticasone-vilanterol (BREO ELLIPTA) 200-25 MCG/INH oral inhaler     furosemide (LASIX) 20 MG tablet     gabapentin (NEURONTIN) 250 MG/5ML solution     insulin glargine (BASAGLAR KWIKPEN) 100 UNIT/ML pen     insulin pen needle (BD ULTRA-FINE) 29G X 12.7MM miscellaneous     LIFESCAN FINEPOINT LANCETS MISC     magnesium oxide (MAG-OX) 400 (241.3 MG) MG tablet     NOVOLOG FLEXPEN 100 UNIT/ML soln     omeprazole (PRILOSEC) 20 MG CR capsule     order for DME     order for DME     order for DME     order for DME     oxyCODONE-acetaminophen (PERCOCET) 5-325 MG tablet     risperiDONE (RISPERDAL) 0.25 MG tablet     senna-docusate (SENOKOT-S/PERICOLACE) 8.6-50 MG tablet     Simethicone 180 MG CAPS     SUMAtriptan (IMITREX) 25 MG tablet     tolterodine ER (DETROL LA) 2 MG  "24 hr capsule     vitamin D3 (CHOLECALCIFEROL) 2000 units tablet     Current Facility-Administered Medications   Medication     vitamin B-12 (CYANOCOBALAMIN) injection 1,000 mcg     Chief Complaint   Patient presents with     Pain Management     CPM     Pain     Pt wants Xrays of waist down. When pt sits down everything hurts.      She presents with 'feels like body is falling off from below the waist\".  Sharp/dull/throbbing/stabbing pain located from waist, across buttocks, down back and side of hip. Hurts to get up from bed.  Duration about a month.  Getting up from sitting is hard.  Has felt like falling.    Has some bowel urgency and difficulaty w/ control at times.  Some double voiding.   5 deliveries.  Some stress incont.    Pain in front of groin and knees.     Knee xray 10/2015:  Arthritis.  Left>right  Left shoulder pain for 2 weeks.  Hands get numb an tingly.  + empty can. .    PMH, Medications and Allergies were reviewed and updated as needed.        REVIEW OF SYSTEMS     General: No fevers, chills, sweats, unexplained weight loss  Endo:  Polydypsia, polyuria, hypoglycemia, heat or cold intolerance  CV: No chest pain, palpitations, orthopnea or pnd  Resp: No cough or shortness of breath.    GI:  No abdominal pain, nausea or vomiting. No constipation or diarrhea.  No blood in stool.  : No nocturia, frequency, urgency,   Neuro:  No headache, weakness, or numbness          OBJECTIVE     Vitals:    04/01/19 1329   BP: 100/64   Pulse: 88   Resp: 16   Temp: 97.5  F (36.4  C)   TempSrc: Oral   SpO2: 99%   Weight: 108.8 kg (239 lb 12.8 oz)     Body mass index is 47.62 kg/m .  OBJECTIVE:  Affect appears flat.  Today, her blood pressure is excellent.  Her left shoulder is tender diffusely over her achromium.   She has pain with empty can maneuver.  She did not have much pain with Dee maneuver.  She has limited range of motion but also limited effort.  Her gait is very slow.  She has excessive lordosis of her " lumbar spine.  She is tender diffusely over her lower back.  Exam of her hips reveals difficulty flexing her hips beyond about 80 degrees.  She has pain and limited internal rotation.  She also has pain along the medial joint line of her knee.   EXTREMITIES:  Left hip range of motion is much better, only slight discomfort.  The left knee is mildly tender medially.      ASSESSMENT/PLAN:   1.  Chronic pain syndrome.  Her UDS is appropriate.  Her database review is appropriate.  Refilled her oxycodone.  Given her shoulder, hip and knee pain I chose x-ray all 3 areas, left shoulder, bilateral knees and bilateral pelvis.  She was concerned about what the diagnosis might be and I think we needed to take some diagnostic steps in that direction.   Regarding her  hypertension she had some conflicting data points with recent kidney tests.  I ordered a BMP today, but she was unable to get a blood drawn today, so we will cancel that test and get it done next time.         Results for orders placed or performed in visit on 04/01/19 (from the past 24 hour(s))   Rapid Urine Drug Screen ([facility])   Result Value Ref Range    Phencyclidine NEGATIVE NEGATIVE    Propoxyphene NEGATIVE NEGATIVE    Tricyclic Antidepressants NEGATIVE NEGATIVE    Amphetamines Qual NEGATIVE NEGATIVE    Barbiturates Qual Urine NEGATIVE NEGATIVE    Buprenorphine Qual Urine NEGATIVE NEGATIVE    Benzodiazepine Qual Urine NEGATIVE NEGATIVE    Cocaine Qual Urine NEGATIVE NEGATIVE    Cannabinoids Qual Urine NEGATIVE NEGATIVE    Methamphetamine Qual NEGATIVE NEGATIVE    Methadone Qual NEGATIVE NEGATIVE    Morphine Qual NEGATIVE NEGATIVE    Oxycodone Qual POSITIVE (A) NEGATIVE    Temperature of Urine was Between  Degrees F YES YES       Jose Angel Meyer

## 2019-04-02 ENCOUNTER — TRANSFERRED RECORDS (OUTPATIENT)
Dept: HEALTH INFORMATION MANAGEMENT | Facility: CLINIC | Age: 54
End: 2019-04-02

## 2019-04-02 DIAGNOSIS — E11.42 DIABETIC POLYNEUROPATHY ASSOCIATED WITH TYPE 2 DIABETES MELLITUS (H): ICD-10-CM

## 2019-04-02 RX ORDER — GABAPENTIN 250 MG/5ML
500 SOLUTION ORAL 3 TIMES DAILY
Qty: 940 ML | Refills: 2 | Status: SHIPPED | OUTPATIENT
Start: 2019-04-02 | End: 2019-08-01

## 2019-04-05 ENCOUNTER — TELEPHONE (OUTPATIENT)
Dept: FAMILY MEDICINE | Facility: CLINIC | Age: 54
End: 2019-04-05

## 2019-04-05 DIAGNOSIS — E61.0 COPPER DEFICIENCY: Primary | ICD-10-CM

## 2019-04-05 NOTE — TELEPHONE ENCOUNTER
Spoke with patient and gave her Dr. Meyer's message (see below).  Pt verbalizes understanding of the directions and information. Gave pt opportunity to ask additional questions or address concerns. Pt is directed to call back if any further questions or concerns.    Routed to Dr. Meyer/JOE Gleason RN

## 2019-04-05 NOTE — RESULT ENCOUNTER NOTE
Please mail labs to patient with this message.    Ms Álvarez  Your urine test was appropriate.  Your hip x-rays show some evidence of arthritis--they describe it as mild (x-rays sometimes don't correlate well with symptoms).  Your knee and shoulder x-rays also show moderate amounts of arthritis.  So, we have osteoarthritis (from aging and 'wear and tear') in multiple places.   We can talk more about it next time, when you return.  JIGNESH Meyer

## 2019-04-05 NOTE — TELEPHONE ENCOUNTER
Please call pt:  I did rev her discharge sum from [facility] in [place].  They did find a copper deficiency, which may be from your previous gastric surgery.    This can cause a nerve problems, balance problems, and anemia. So I do think it is important to treat this!  I have sent a prescription for copper gluconate to her pharmacy.  She should take it 3 times daily for 2 weeks, then once daily indefinitely.  We should recheck this level in 2-3 months.  JIGNESH Meyer

## 2019-04-20 ENCOUNTER — TRANSFERRED RECORDS (OUTPATIENT)
Dept: HEALTH INFORMATION MANAGEMENT | Facility: CLINIC | Age: 54
End: 2019-04-20

## 2019-04-22 ENCOUNTER — TRANSFERRED RECORDS (OUTPATIENT)
Dept: HEALTH INFORMATION MANAGEMENT | Facility: CLINIC | Age: 54
End: 2019-04-22

## 2019-04-22 ENCOUNTER — OFFICE VISIT - HEALTHEAST (OUTPATIENT)
Dept: SURGERY | Facility: CLINIC | Age: 54
End: 2019-04-22

## 2019-04-22 DIAGNOSIS — E66.01 OBESITY, CLASS III, BMI 40-49.9 (MORBID OBESITY) (H): ICD-10-CM

## 2019-04-22 DIAGNOSIS — E65 BUFFALO HUMP: ICD-10-CM

## 2019-04-22 DIAGNOSIS — Z98.84 WEIGHT GAIN FOLLOWING GASTRIC BYPASS SURGERY: ICD-10-CM

## 2019-04-22 DIAGNOSIS — F32.A DEPRESSION, UNSPECIFIED DEPRESSION TYPE: ICD-10-CM

## 2019-04-22 DIAGNOSIS — K91.2 POSTOPERATIVE MALABSORPTION: ICD-10-CM

## 2019-04-22 DIAGNOSIS — Z98.84 HX OF GASTRIC BYPASS: ICD-10-CM

## 2019-04-22 DIAGNOSIS — G47.19 EXCESSIVE DAYTIME SLEEPINESS: ICD-10-CM

## 2019-04-22 DIAGNOSIS — G62.9 NEUROPATHY: ICD-10-CM

## 2019-04-22 DIAGNOSIS — R63.5 WEIGHT GAIN FOLLOWING GASTRIC BYPASS SURGERY: ICD-10-CM

## 2019-04-22 ASSESSMENT — MIFFLIN-ST. JEOR: SCORE: 1551.83

## 2019-04-24 ENCOUNTER — TRANSFERRED RECORDS (OUTPATIENT)
Dept: HEALTH INFORMATION MANAGEMENT | Facility: CLINIC | Age: 54
End: 2019-04-24

## 2019-04-24 ENCOUNTER — AMBULATORY - HEALTHEAST (OUTPATIENT)
Dept: SLEEP MEDICINE | Facility: CLINIC | Age: 54
End: 2019-04-24

## 2019-04-26 DIAGNOSIS — M25.50 ARTHRALGIA, UNSPECIFIED JOINT: ICD-10-CM

## 2019-04-26 DIAGNOSIS — I10 ESSENTIAL HYPERTENSION: ICD-10-CM

## 2019-04-26 LAB
ANION GAP SERPL CALCULATED.3IONS-SCNC: 13 MMOL/L (ref 5–18)
BUN SERPL-MCNC: 48 MG/DL (ref 8–22)
CALCIUM SERPL-MCNC: 9.2 MG/DL (ref 8.5–10.5)
CHLORIDE SERPL-SCNC: 113 MMOL/L (ref 98–107)
CK SERPL-CCNC: 117 U/L (ref 30–190)
CO2 SERPL-SCNC: 15 MMOL/L (ref 22–31)
CREAT SERPL-MCNC: 1.31 MG/DL (ref 0.6–1.1)
GLUCOSE SERPL-MCNC: 217 MG/DL (ref 70–125)
POTASSIUM SERPL-SCNC: 5.9 MMOL/L (ref 3.5–5)
SODIUM SERPL-SCNC: 141 MMOL/L (ref 136–145)

## 2019-04-29 ENCOUNTER — TELEPHONE (OUTPATIENT)
Dept: FAMILY MEDICINE | Facility: CLINIC | Age: 54
End: 2019-04-29

## 2019-04-29 ENCOUNTER — OFFICE VISIT (OUTPATIENT)
Dept: FAMILY MEDICINE | Facility: CLINIC | Age: 54
End: 2019-04-29

## 2019-04-29 DIAGNOSIS — Z53.9 ERRONEOUS ENCOUNTER--DISREGARD: Primary | ICD-10-CM

## 2019-04-30 NOTE — TELEPHONE ENCOUNTER
Please call:  I reviewed her kidney tests from last week, and she has high potassium levels, which are to the point that we need to adjust her medications, at least temporarily, but stopping benazpril.  We will recheck this on 5/2 when she returns for a follow-up visit.  CF

## 2019-04-30 NOTE — TELEPHONE ENCOUNTER
Spoke with patient and relayed Dr. Meyer's message below Re: adjusting medications d/t high Potassium level.  Instructed to stop her benazpril and that we will recheck this on 5/2 when she comes for a f/u visit.    Pt verbalizes understanding of the directions and information. Gave pt opportunity to ask additional questions or address concerns. Pt is directed to call back if any questions or concerns.    Routed to Dr. Meyer/JOE Gleason RN

## 2019-05-01 ENCOUNTER — MEDICAL CORRESPONDENCE (OUTPATIENT)
Dept: HEALTH INFORMATION MANAGEMENT | Facility: CLINIC | Age: 54
End: 2019-05-01

## 2019-05-02 ENCOUNTER — OFFICE VISIT (OUTPATIENT)
Dept: FAMILY MEDICINE | Facility: CLINIC | Age: 54
End: 2019-05-02
Payer: COMMERCIAL

## 2019-05-02 VITALS
TEMPERATURE: 97.9 F | RESPIRATION RATE: 16 BRPM | HEART RATE: 77 BPM | DIASTOLIC BLOOD PRESSURE: 75 MMHG | SYSTOLIC BLOOD PRESSURE: 157 MMHG | WEIGHT: 235 LBS | OXYGEN SATURATION: 94 % | BODY MASS INDEX: 46.67 KG/M2

## 2019-05-02 DIAGNOSIS — I10 ESSENTIAL HYPERTENSION: ICD-10-CM

## 2019-05-02 DIAGNOSIS — G89.4 CHRONIC PAIN SYNDROME: ICD-10-CM

## 2019-05-02 DIAGNOSIS — E11.42 TYPE 2 DIABETES MELLITUS WITH DIABETIC POLYNEUROPATHY, WITH LONG-TERM CURRENT USE OF INSULIN (H): Primary | ICD-10-CM

## 2019-05-02 DIAGNOSIS — Z79.4 TYPE 2 DIABETES MELLITUS WITH DIABETIC POLYNEUROPATHY, WITH LONG-TERM CURRENT USE OF INSULIN (H): Primary | ICD-10-CM

## 2019-05-02 DIAGNOSIS — E87.5 HYPERKALEMIA: ICD-10-CM

## 2019-05-02 LAB
AMPHETAMINES QUAL: NEGATIVE
BARBITURATES QUAL URINE: NEGATIVE
BENZODIAZEPINE QUAL URINE: NEGATIVE
BUN SERPL-MCNC: 35.5 MG/DL (ref 7–19)
BUPRENORPHINE QUAL URINE: NEGATIVE
CALCIUM SERPL-MCNC: 8.7 MG/DL (ref 8.5–10.1)
CANNABINOIDS UR QL SCN: NEGATIVE
CHLORIDE SERPLBLD-SCNC: 109.1 MMOL/L (ref 98–110)
CO2 SERPL-SCNC: 21.4 MMOL/L (ref 20–32)
COCAINE QUAL URINE: NEGATIVE
CREAT SERPL-MCNC: 1 MG/DL (ref 0.5–1)
GFR SERPL CREATININE-BSD FRML MDRD: 61.6 ML/MIN/1.7 M2
GLUCOSE SERPL-MCNC: 162.1 MG'DL (ref 70–99)
METHAMPHETAMINE: NEGATIVE
METHODONE QUAL: NEGATIVE
MORPHINE QUAL: NEGATIVE
OXYCODONE QUAL: POSITIVE
PHENCYCLIDINE: NEGATIVE
POTASSIUM SERPL-SCNC: 5.9 MMOL/DL (ref 3.2–4.6)
PROPOXYPHENE: NEGATIVE
SODIUM SERPL-SCNC: 142.1 MMOL/L (ref 132–142)
TEMPERATURE OF URINE WAS BETWEEN 90-100 DEGREES F: YES
TRICYCLIC ANTIDEPRESSANTS: NEGATIVE

## 2019-05-02 RX ORDER — OXYCODONE AND ACETAMINOPHEN 5; 325 MG/1; MG/1
TABLET ORAL
Qty: 150 TABLET | Refills: 0 | Status: SHIPPED | OUTPATIENT
Start: 2019-05-02 | End: 2019-06-06

## 2019-05-02 RX ORDER — DILTIAZEM HYDROCHLORIDE 240 MG/1
240 CAPSULE, EXTENDED RELEASE ORAL DAILY
Qty: 30 CAPSULE | Refills: 1 | Status: SHIPPED | OUTPATIENT
Start: 2019-05-02 | End: 2019-06-06

## 2019-05-02 RX ADMIN — CYANOCOBALAMIN 1000 MCG: 1000 INJECTION, SOLUTION INTRAMUSCULAR; SUBCUTANEOUS at 14:34

## 2019-05-02 NOTE — RESULT ENCOUNTER NOTE
Please call on 5/3 ( I tried 5/2 @ 6 pm but phone not connected)  Her blood kidney test was better (Cr went down from 1.3 to 1.0--now on upper end of normal!), but her potassium remains high.  I want her to continue chlorthalidone 25 mg daily and start diltiazem 240 mg daily.  The diltiazem is new and will not increase her potassium but should help protect her kidneys.  She should stay off of the benazepril, as recommended earlier this week.  I want her to follow-up for a lab visit in 2 weeks to recheck the potassium/kidney tests and in 1 month with me again for a med check.  JIGNESH Meyer

## 2019-05-02 NOTE — PATIENT INSTRUCTIONS
Try the diclofenac gel--use it 4 times daily    Will start losartan 25 mg daily if your potassium is better.

## 2019-05-02 NOTE — NURSING NOTE
Prior to medication administration, I verified the patient identity using patient's name and date of birth. Patient was instructed to report any adverse reaction to me immediately.    I administered 1,000 mcg/mL Cyanocobalamin to Ruthy Álvarez.    For injections only, was entire vial of medication used? Yes    Did the patient bring this medication to the clinic to be administered? No    Name of provider who requested the medication administration: Dr. Meyer  Name of provider on site (faculty or community preceptor) at the time of performing the medication administration: Dr. Meyer    Date of next administration: 2 weeks  Date of next office visit with provider to renew medication plan (must be seen annually): 1 year    BARBARA Garcia

## 2019-05-02 NOTE — PROGRESS NOTES
Patient Active Problem List    Diagnosis Date Noted     Chronic pain syndrome 10/07/2016     Priority: High     Chronic Pain Diagnosis:  neuropathy  DIRE Total Score(s):14     ORT: 5    4 - 7 =  Moderate Risk   of future problems with Opioids  FAQ5: 45/100 on 9/9/16, 40/100 on 11/15/16  Behavioral Health Consultation: 5/12/17 with Dr. Daisy Quezada  Personal Care Plan for Chronic Pain: 5/12/17  Opioid medication:oxycodone   Dose:10mg  Number of pills per month:60  Patient is being prescribed 10mg of oxycodone IR (Percocet) per day this is 15 mg Morphine Equivalents  Benzodiazepines Prescribed? No  Naloxone prescribed? No       Clinic visit frequency required: Q 1 month Next visit due:   Controlled Substance/Opioid Treatment agreement on file (dated <12 months ago)?:    Date(s): pending  Last MNPMP verification: last visit  Items in red to be updated at each visit  Patient is followed by Data Unavailable for ongoing prescription of pain medication.  All refills should be approved by this provider, or covering partner.Chronic Pain          Microalbuminuria 10/12/2015     Priority: High     Type 2 diabetes mellitus with diabetic polyneuropathy (H) 10/12/2015     Priority: High     Copper deficiency 04/05/2019     Priority: Medium     Identified in Aug, 2018--see discharge summary from [facility] in [place].       Chest wall pain 01/03/2019     Priority: Medium     Generalized abdominal pain 01/03/2019     Priority: Medium     Gastroenteritis 01/03/2019     Priority: Medium     DVT of lower extremity, bilateral (H) 01/03/2019     Priority: Medium     Neuropathy 01/03/2019     Priority: Medium     Obesity 01/03/2019     Priority: Medium     Other acute pulmonary embolism without acute cor pulmonale (H) 12/31/2018     Priority: Medium     Hernia of anterior abdominal wall 01/28/2017     Priority: Medium     Chronic pain 01/27/2017     Priority: Medium     Diarrhea 01/27/2017     Priority: Medium     Hernia 01/27/2017      Priority: Medium     Influenza A 01/27/2017     Priority: Medium     Nausea & vomiting 01/25/2017     Priority: Medium     Osteoarthritis of both knees 10/31/2016     Priority: Medium     Pain in left lower leg 10/31/2016     Priority: Medium     Polyneuropathy due to secondary diabetes mellitus (H) 10/31/2016     Priority: Medium     Compression neuropathy of left lower extremity 02/19/2016     Priority: Medium     Tarsal tunnel syndrome of left side 02/19/2016     Priority: Medium     Bilateral ankle joint pain 01/15/2016     Priority: Medium     Esophageal reflux 04/07/2015     Priority: Medium     Urinary incontinence 02/19/2015     Priority: Medium     Insomnia 02/19/2015     Priority: Medium     Essential hypertension 08/26/2013     Priority: Medium     Asthma 08/04/2013     Priority: Medium     History of injury 08/04/2013     Priority: Medium     History of pulmonary embolism 08/04/2013     Priority: Medium     Hyperlipidemia LDL goal <100 06/21/2013     Priority: Medium     S/P bariatric surgery 06/21/2013     Priority: Medium     Kim-en-Y in 2006       Neck pain 05/16/2013     Priority: Medium     Moderate major depression (H) 01/24/2013     Priority: Medium     Moderate persistent asthma without complication 11/15/2012     Priority: Medium     Pulmonary embolism and infarction (H) 11/15/2012     Priority: Medium     12/24/2018  Unprovoked.  + fam hx of DVT.  Plan warfarin for 6 months.       Health Care Home 11/15/2012     Priority: Medium     Tier 3    Status: Accept  Care Coordination Start Date: 02/27/12  State Tier Level: Level 3  Language/Barrier to Learning: No  Significant Mental Health Issues: Yes  Date Care Coordination Discontinued: 05/28/15    DX V65.8 REPLACED WITH [id number] (04/08/2013)         S/P hysterectomy 06/21/2013     Priority: Low     2008, For fibroids.  Cervix removed, but still has ovaries.       Carpal tunnel syndrome 11/15/2012     Priority: Low     Necrotizing  fasciitis (H) 11/15/2012     Priority: Low     Abnormal Pap smear of cervix      Priority: Low     3-12-15: Pap/HPV neg.  Plan: Given hx (mildly abn pap, hyster), recheck in 5 yrs.  10-11-11: Pap/HPV neg.  Plan: Given ASUS pap in 2010, recommend repeating pap in 1 year.  8/3/2010 ASCUS Pap with +BV- pt treated with Metrogel.  Pt needs repeat Pap in 1yr.  8/4/2008 Pt had total hyst for menorrhagia, pelvic pain, scar tissue, and fibroids.       There are no exam notes on file for this visit.  Chief Complaint   Patient presents with     Pain Management     CPM     other     Recheck kidney     Imm/Inj     B-12 shot     pain     Pt has been feeling dizzy, adn having chest pains     Blood pressure 157/75, pulse 77, temperature 97.9  F (36.6  C), temperature source Oral, resp. rate 16, weight 106.6 kg (235 lb), SpO2 94 %, not currently breastfeeding.   SUBJECTIVE:  Ruthy lÁvarez is here for her hypertension and chronic pain.  Chronic pain is managed on her current regimen.  She is up to five oxycodone tablets per day.  She needs followup of her hypertension and her diabetic nephropathy.  I called her earlier this week and stopped benazepril because she had potassium of 5.9 and an increase in creatinine.  She is still taking her chlorthalidone, but she has only been off of the benazepril for a few days.  She wants to know what is going on and what she can do to help prevent that.  I reviewed her chart extensively, and her creatinine in the past had been normal between 0.6 and 1.0 over the past two years, but her creatinine has crept from 1.1 to the mid-1 and the GFR has decreased to the 40-60 range.  She said that for a while she was without her blood pressure medication, which may have contributed to this.      OBJECTIVE:   GENERAL:  Patient is alert, pleasant, and in no acute distress.   VITAL SIGNS:  Blood pressure is higher again today at 157/77.  Exam is otherwise not repeated.      ASSESSMENT:   1.  Hypertension  not controlled.   2.  Diabetic nephropathy.   3.  Hyperkalemia.   4.  Chronic pain syndrome.     PLAN:  Regarding her chronic pain, I simply refilled her oxycodone, as UDS was appropriate and database was negative.  She has been reliable in our chronic pain process.     Regarding her hyperkalemia and diabetic nephropathy, ACEs and ARBs are likely to be contraindicated given that potassium remains at 5.9 today.  I'm going to start diltiazem  mg daily now and have her follow up in one month.  I would expect that her potassium and blood pressure would be improved by then. I'll ask her to return for labs in between, at two weeks, to make sure that her potassium is down trending in the interim.  I discussed with her that glycemic control and blood pressure control with appropriate medications (I would actually prefer ACEs and ARBs) is the best up-to-date, given the reference that diltiazem and verapamil are also associated with reductions proteinuria, so diltiazem would be appropriate for her to start today. Follow up with labs in two weeks and an office visit with me in one month.  I reviewed her kidney tests and her metabolic acidosis is clinically improved.       Results for orders placed or performed in visit on 05/02/19   Rapid Urine Drug Screen ([facility])   Result Value Ref Range    Phencyclidine NEGATIVE -ATIVE    Propoxyphene NEGATIVE -ATIVE    Tricyclic Antidepressants NEGATIVE -ATIVE    Amphetamines Qual NEGATIVE -ATIVE    Barbiturates Qual Urine NEGATIVE -ATIVE    Buprenorphine Qual Urine NEGATIVE -ATIVE    Benzodiazepine Qual Urine NEGATIVE -ATIVE    Cocaine Qual Urine NEGATIVE -ATIVE    Cannabinoids Qual Urine NEGATIVE -ATIVE    Methamphetamine Qual NEGATIVE -ATIVE    Methadone Qual NEGATIVE -ATIVE    Morphine Qual NEGATIVE -ATIVE    Oxycodone Qual POSITIVE (A) -ATIVE    Temperature of Urine was Between  Degrees F YES YES

## 2019-05-08 ENCOUNTER — OFFICE VISIT - HEALTHEAST (OUTPATIENT)
Dept: BEHAVIORAL HEALTH | Facility: CLINIC | Age: 54
End: 2019-05-08

## 2019-05-08 DIAGNOSIS — F25.9 SCHIZOAFFECTIVE DISORDER, CHRONIC CONDITION (H): ICD-10-CM

## 2019-05-08 DIAGNOSIS — F33.9 MAJOR DEPRESSION, RECURRENT, CHRONIC (H): ICD-10-CM

## 2019-05-20 ENCOUNTER — COMMUNICATION - HEALTHEAST (OUTPATIENT)
Dept: SURGERY | Facility: CLINIC | Age: 54
End: 2019-05-20

## 2019-05-20 ENCOUNTER — HOSPITAL ENCOUNTER (OUTPATIENT)
Dept: RADIOLOGY | Facility: HOSPITAL | Age: 54
Discharge: HOME | End: 2019-05-20
Attending: EMERGENCY MEDICINE

## 2019-05-20 DIAGNOSIS — K91.2 POSTOPERATIVE MALABSORPTION: ICD-10-CM

## 2019-05-28 ENCOUNTER — AMBULATORY - HEALTHEAST (OUTPATIENT)
Dept: BEHAVIORAL HEALTH | Facility: CLINIC | Age: 54
End: 2019-05-28

## 2019-06-04 ENCOUNTER — OFFICE VISIT - HEALTHEAST (OUTPATIENT)
Dept: BEHAVIORAL HEALTH | Facility: CLINIC | Age: 54
End: 2019-06-04

## 2019-06-04 ENCOUNTER — RECORDS - HEALTHEAST (OUTPATIENT)
Dept: SLEEP MEDICINE | Facility: CLINIC | Age: 54
End: 2019-06-04

## 2019-06-04 DIAGNOSIS — E66.01 MORBID (SEVERE) OBESITY DUE TO EXCESS CALORIES (H): ICD-10-CM

## 2019-06-04 DIAGNOSIS — Z98.84 BARIATRIC SURGERY STATUS: ICD-10-CM

## 2019-06-04 DIAGNOSIS — G47.19 OTHER HYPERSOMNIA: ICD-10-CM

## 2019-06-04 DIAGNOSIS — F25.9 SCHIZOAFFECTIVE DISORDER, CHRONIC CONDITION (H): ICD-10-CM

## 2019-06-06 ENCOUNTER — TELEPHONE (OUTPATIENT)
Dept: FAMILY MEDICINE | Facility: CLINIC | Age: 54
End: 2019-06-06

## 2019-06-06 ENCOUNTER — OFFICE VISIT (OUTPATIENT)
Dept: FAMILY MEDICINE | Facility: CLINIC | Age: 54
End: 2019-06-06
Payer: COMMERCIAL

## 2019-06-06 VITALS
WEIGHT: 237.4 LBS | SYSTOLIC BLOOD PRESSURE: 137 MMHG | HEART RATE: 105 BPM | TEMPERATURE: 98.3 F | RESPIRATION RATE: 20 BRPM | BODY MASS INDEX: 47.15 KG/M2 | DIASTOLIC BLOOD PRESSURE: 79 MMHG

## 2019-06-06 DIAGNOSIS — I10 ESSENTIAL HYPERTENSION: ICD-10-CM

## 2019-06-06 DIAGNOSIS — G89.4 CHRONIC PAIN SYNDROME: ICD-10-CM

## 2019-06-06 DIAGNOSIS — Z79.4 TYPE 2 DIABETES MELLITUS WITH DIABETIC POLYNEUROPATHY, WITH LONG-TERM CURRENT USE OF INSULIN (H): Primary | ICD-10-CM

## 2019-06-06 DIAGNOSIS — E11.42 TYPE 2 DIABETES MELLITUS WITH DIABETIC POLYNEUROPATHY, WITH LONG-TERM CURRENT USE OF INSULIN (H): Primary | ICD-10-CM

## 2019-06-06 DIAGNOSIS — D64.9 ANEMIA, UNSPECIFIED TYPE: ICD-10-CM

## 2019-06-06 DIAGNOSIS — E87.5 HYPERKALEMIA: ICD-10-CM

## 2019-06-06 LAB
% GRANULOCYTES: 69.7 %G (ref 40–75)
AMPHETAMINES QUAL: NEGATIVE
BARBITURATES QUAL URINE: NEGATIVE
BENZODIAZEPINE QUAL URINE: NEGATIVE
BUN SERPL-MCNC: 36.8 MG/DL (ref 7–19)
BUPRENORPHINE QUAL URINE: NEGATIVE
CALCIUM SERPL-MCNC: 9.1 MG/DL (ref 8.5–10.1)
CANNABINOIDS UR QL SCN: NEGATIVE
CHLORIDE SERPLBLD-SCNC: 117.9 MMOL/L (ref 98–110)
CO2 SERPL-SCNC: 14.6 MMOL/L (ref 20–32)
COCAINE QUAL URINE: NEGATIVE
CREAT SERPL-MCNC: 1.1 MG/DL (ref 0.5–1)
GFR SERPL CREATININE-BSD FRML MDRD: 55.2 ML/MIN/1.7 M2
GLUCOSE SERPL-MCNC: 192.4 MG'DL (ref 70–99)
GRANULOCYTES #: 9.1 K/UL (ref 1.6–8.3)
HBA1C MFR BLD: 7.8 % (ref 4.1–5.7)
HCT VFR BLD AUTO: 36.8 % (ref 35–47)
HEMOGLOBIN: 10.4 G/DL (ref 11.7–15.7)
LYMPHOCYTES # BLD AUTO: 3 K/UL (ref 0.8–5.3)
LYMPHOCYTES NFR BLD AUTO: 23 %L (ref 20–48)
MCH RBC QN AUTO: 31.8 PG (ref 26.5–35)
MCHC RBC AUTO-ENTMCNC: 28.3 G/DL (ref 32–36)
MCV RBC AUTO: 112.5 FL (ref 78–100)
METHAMPHETAMINE: NEGATIVE
METHODONE QUAL: NEGATIVE
MID #: 0.9 K/UL (ref 0–2.2)
MID %: 7.3 %M (ref 0–20)
MORPHINE QUAL: NEGATIVE
OXYCODONE QUAL: POSITIVE
PHENCYCLIDINE: NEGATIVE
PLATELET # BLD AUTO: 280 K/UL (ref 150–450)
POTASSIUM SERPL-SCNC: 5.5 MMOL/DL (ref 3.2–4.6)
PROPOXYPHENE: NEGATIVE
RBC # BLD AUTO: 3.3 M/UL (ref 3.8–5.2)
SODIUM SERPL-SCNC: 141.4 MMOL/L (ref 132–142)
TEMPERATURE OF URINE WAS BETWEEN 90-100 DEGREES F: YES
TRICYCLIC ANTIDEPRESSANTS: NEGATIVE
WBC # BLD AUTO: 13 K/UL (ref 4–11)

## 2019-06-06 RX ORDER — DILTIAZEM HYDROCHLORIDE 240 MG/1
240 CAPSULE, EXTENDED RELEASE ORAL DAILY
Qty: 30 CAPSULE | Refills: 11 | Status: SHIPPED | OUTPATIENT
Start: 2019-06-06 | End: 2019-09-25

## 2019-06-06 RX ORDER — OXYCODONE AND ACETAMINOPHEN 5; 325 MG/1; MG/1
TABLET ORAL
Qty: 150 TABLET | Refills: 0 | Status: SHIPPED | OUTPATIENT
Start: 2019-06-06 | End: 2019-08-27

## 2019-06-06 RX ORDER — CYANOCOBALAMIN 1000 UG/ML
1000 INJECTION, SOLUTION INTRAMUSCULAR; SUBCUTANEOUS
Status: ACTIVE | OUTPATIENT
Start: 2019-06-06

## 2019-06-06 NOTE — PROGRESS NOTES
Patient Active Problem List    Diagnosis Date Noted     Chronic pain syndrome 10/07/2016     Priority: High     Chronic Pain Diagnosis:  neuropathy  DIRE Total Score(s):14     ORT: 5    4 - 7 =  Moderate Risk   of future problems with Opioids  FAQ5: 45/100 on 9/9/16, 40/100 on 11/15/16  Behavioral Health Consultation: 5/12/17 with Dr. Daisy Quezada  Personal Care Plan for Chronic Pain: 5/12/17  Opioid medication:oxycodone   Dose:10mg  Number of pills per month:60  Patient is being prescribed 10mg of oxycodone IR (Percocet) per day this is 15 mg Morphine Equivalents  Benzodiazepines Prescribed? No  Naloxone prescribed? No       Clinic visit frequency required: Q 1 month Next visit due:   Controlled Substance/Opioid Treatment agreement on file (dated <12 months ago)?:    Date(s): pending  Last MNPMP verification: last visit  Items in red to be updated at each visit  Patient is followed by Data Unavailable for ongoing prescription of pain medication.  All refills should be approved by this provider, or covering partner.Chronic Pain          Microalbuminuria 10/12/2015     Priority: High     Type 2 diabetes mellitus with diabetic polyneuropathy (H) 10/12/2015     Priority: High     Copper deficiency 04/05/2019     Priority: Medium     Identified in Aug, 2018--see discharge summary from [facility] in [place].       Chest wall pain 01/03/2019     Priority: Medium     Generalized abdominal pain 01/03/2019     Priority: Medium     Gastroenteritis 01/03/2019     Priority: Medium     DVT of lower extremity, bilateral (H) 01/03/2019     Priority: Medium     Neuropathy 01/03/2019     Priority: Medium     Obesity 01/03/2019     Priority: Medium     Other acute pulmonary embolism without acute cor pulmonale (H) 12/31/2018     Priority: Medium     Hernia of anterior abdominal wall 01/28/2017     Priority: Medium     Chronic pain 01/27/2017     Priority: Medium     Diarrhea 01/27/2017     Priority: Medium     Hernia 01/27/2017      Priority: Medium     Influenza A 01/27/2017     Priority: Medium     Nausea & vomiting 01/25/2017     Priority: Medium     Osteoarthritis of both knees 10/31/2016     Priority: Medium     Pain in left lower leg 10/31/2016     Priority: Medium     Polyneuropathy due to secondary diabetes mellitus (H) 10/31/2016     Priority: Medium     Compression neuropathy of left lower extremity 02/19/2016     Priority: Medium     Tarsal tunnel syndrome of left side 02/19/2016     Priority: Medium     Bilateral ankle joint pain 01/15/2016     Priority: Medium     Esophageal reflux 04/07/2015     Priority: Medium     Urinary incontinence 02/19/2015     Priority: Medium     Insomnia 02/19/2015     Priority: Medium     Essential hypertension 08/26/2013     Priority: Medium     Asthma 08/04/2013     Priority: Medium     History of injury 08/04/2013     Priority: Medium     History of pulmonary embolism 08/04/2013     Priority: Medium     Hyperlipidemia LDL goal <100 06/21/2013     Priority: Medium     S/P bariatric surgery 06/21/2013     Priority: Medium     Kim-en-Y in 2006       Neck pain 05/16/2013     Priority: Medium     Moderate major depression (H) 01/24/2013     Priority: Medium     Moderate persistent asthma without complication 11/15/2012     Priority: Medium     Pulmonary embolism and infarction (H) 11/15/2012     Priority: Medium     12/24/2018  Unprovoked.  + fam hx of DVT.  Plan warfarin for 6 months.       Health Care Home 11/15/2012     Priority: Medium     Tier 3    Status: Accept  Care Coordination Start Date: 02/27/12  State Tier Level: Level 3  Language/Barrier to Learning: No  Significant Mental Health Issues: Yes  Date Care Coordination Discontinued: 05/28/15    DX V65.8 REPLACED WITH [id number] HEALTH CARE HOME (04/08/2013)         S/P hysterectomy 06/21/2013     Priority: Low     2008, For fibroids.  Cervix removed, but still has ovaries.       Carpal tunnel syndrome 11/15/2012     Priority: Low     Necrotizing  fasciitis (H) 11/15/2012     Priority: Low     Abnormal Pap smear of cervix      Priority: Low     3-12-15: Pap/HPV neg.  Plan: Given hx (mildly abn pap, hyster), recheck in 5 yrs.  10-11-11: Pap/HPV neg.  Plan: Given ASUS pap in 2010, recommend repeating pap in 1 year.  8/3/2010 ASCUS Pap with +BV- pt treated with Metrogel.  Pt needs repeat Pap in 1yr.  8/4/2008 Pt had total hyst for menorrhagia, pelvic pain, scar tissue, and fibroids.       There are no exam notes on file for this visit.  Chief Complaint   Patient presents with     Pain     CPM     Blood pressure 137/79, pulse 105, temperature 98.3  F (36.8  C), resp. rate 20, weight 107.7 kg (237 lb 6.4 oz), not currently breastfeeding.   SUBJECTIVE: Ruthy Álvarez is here for followup of her chronic pain syndrome.  She feels that the pain control is okay when it is on board, but it does wear off after 2-3 hours.  She has back pain that radiates around the lower abdomen.  She has no new neurologic symptoms.  It doesn't wake her from sleep.  She is on oxycodone and has had no aberrant behavior.   Regarding het hypertension, she is on diltiazem and chlorthalidone.     She needs followup of her hyperkalemia.  This should have resolved when we stopped her benazepril.   She also is due for an A1C and a CBC.  She has been on B12 injections monthly and is due for that today.  We didn't discuss her home blood sugars today.  She is a difficult drawn and she would like the A1C draw today as well.   She is interested in pool therapy, but her insurance isn't covering it at this point in time.  She had some nice pain relief in the past when she was able to attend.     She denies any problems with constipation.  No dizziness or lightheadedness.      OBJECTIVE:   GENERAL:  Vitals were reviewed.  Exam is otherwise not done.  She is appropriately groomed.  Her affect is appropriate.  Eye contact is normal.   ASSESSMENT/PLAN:   1.  Chronic pain syndrome, stable.  UDS is  appropriate.  Database was reviewed.  She'll follow up monthly.  No changes were made.  Hopefully, therapy will help her get better pain control.  We could consider switching to a long-acting preparation, but I'm reluctant to do that at this point in time since she carries some risk.   2.  Hyperkalemia.  This should resolve will discontinuation of her ACE inhibitor.  We'll recheck that today.   3.  Hypertension currently controlled.  Continue diltiazem and chlorthalidone.   4.  Diabetes.  I didn't review her blood sugars, but did check an A1C today.  We'll contact her with results.   5.  Anemia.  B12 injection was given today.  CBC is pending.  We'll contact her with the results.       Results for orders placed or performed in visit on 06/06/19   Rapid Urine Drug Screen ([facility])   Result Value Ref Range    Phencyclidine NEGATIVE -ATIVE    Propoxyphene NEGATIVE -ATIVE    Tricyclic Antidepressants NEGATIVE -ATIVE    Amphetamines Qual NEGATIVE -ATIVE    Barbiturates Qual Urine NEGATIVE -ATIVE    Buprenorphine Qual Urine NEGATIVE -ATIVE    Benzodiazepine Qual Urine NEGATIVE -ATIVE    Cocaine Qual Urine NEGATIVE -ATIVE    Cannabinoids Qual Urine NEGATIVE -ATIVE    Methamphetamine Qual NEGATIVE -ATIVE    Methadone Qual NEGATIVE -ATIVE    Morphine Qual NEGATIVE -ATIVE    Oxycodone Qual POSITIVE (A) -ATIVE    Temperature of Urine was Between  Degrees F YES YES   not doing pool therapy anymore.  Waiting for insurance to cover it.

## 2019-06-06 NOTE — TELEPHONE ENCOUNTER
[facility] phone call message- general phone call:    Reason for call: Dr Meyer wrote this patient two prescriptions today. When she took them to the pharmacy, they told her that her doctor needed to give a PA on both of them. Please call pt     Return call needed: Yes    OK to leave a message on voice mail? Yes    Primary language: English      needed? No    Call taken on June 6, 2019 at 1:11 PM by Aristides Bueno

## 2019-06-07 ENCOUNTER — TELEPHONE (OUTPATIENT)
Dept: FAMILY MEDICINE | Facility: CLINIC | Age: 54
End: 2019-06-07

## 2019-06-07 NOTE — NURSING NOTE
I administered the following to Ruthy Álvarez.    MEDICATION: Vitamin B12  1000mcg  ROUTE: IM  SITE: Deltoid - Left  DOSE: 1000mcg  LOT #: [id number]  :  [employer]  EXPIRATION DATE:  08/20  NDC#: 7457-6178-23     Was entire vial of medication used? Yes    Did the patient bring this medication to the clinic to be injected? No    Name of provider who requested the injection: Dr. JIGNESH Meyer  Name of provider on site (faculty or community preceptor) at the time of performing the injection: Dr. ANTONIO Robb, [facility]

## 2019-06-07 NOTE — TELEPHONE ENCOUNTER
Pavel from [facility] is calling to get clarifications on diltiazem ER (DILT-XR) 240 MG 24 hr ER beaded capsule.     [phone]

## 2019-06-11 ENCOUNTER — COMMUNICATION - HEALTHEAST (OUTPATIENT)
Dept: SLEEP MEDICINE | Facility: CLINIC | Age: 54
End: 2019-06-11

## 2019-06-12 NOTE — RESULT ENCOUNTER NOTE
Triage  Please call pt with her labs:  1.  Her AIC is great--no change in her insulin is needed.  2.  Her urine test is totally fine.  3.  Her anemia is stable.  I'd expected her labs to improve on B12 shots, and they haven't improved as much as expected.  We'll recheck them again in 3months.  She should continue her B12 shots.  4.  Her kidney tests and blood salts are confusing.  I think they are slightly off because she is a very tough stick, which can increase the potassium and perhaps lower the HCo3, but they are better than before.  I want to repeat them again next month.  Please ask her to come in well-hydrated, if possible.    Thanks  CF

## 2019-06-13 ENCOUNTER — COMMUNICATION - HEALTHEAST (OUTPATIENT)
Dept: SLEEP MEDICINE | Facility: CLINIC | Age: 54
End: 2019-06-13

## 2019-06-13 ENCOUNTER — TRANSFERRED RECORDS (OUTPATIENT)
Dept: HEALTH INFORMATION MANAGEMENT | Facility: CLINIC | Age: 54
End: 2019-06-13

## 2019-06-14 ENCOUNTER — TRANSFERRED RECORDS (OUTPATIENT)
Dept: HEALTH INFORMATION MANAGEMENT | Facility: CLINIC | Age: 54
End: 2019-06-14

## 2019-06-17 ENCOUNTER — COMMUNICATION - HEALTHEAST (OUTPATIENT)
Dept: SURGERY | Facility: CLINIC | Age: 54
End: 2019-06-17

## 2019-06-26 ENCOUNTER — OFFICE VISIT - HEALTHEAST (OUTPATIENT)
Dept: BEHAVIORAL HEALTH | Facility: CLINIC | Age: 54
End: 2019-06-26

## 2019-06-26 DIAGNOSIS — F33.9 MAJOR DEPRESSION, RECURRENT, CHRONIC (H): ICD-10-CM

## 2019-06-26 DIAGNOSIS — F25.9 SCHIZOAFFECTIVE DISORDER, CHRONIC CONDITION (H): ICD-10-CM

## 2019-07-08 ENCOUNTER — OFFICE VISIT (OUTPATIENT)
Dept: FAMILY MEDICINE | Facility: CLINIC | Age: 54
End: 2019-07-08
Payer: COMMERCIAL

## 2019-07-08 VITALS
DIASTOLIC BLOOD PRESSURE: 84 MMHG | HEART RATE: 98 BPM | RESPIRATION RATE: 16 BRPM | TEMPERATURE: 98.8 F | SYSTOLIC BLOOD PRESSURE: 131 MMHG | OXYGEN SATURATION: 97 %

## 2019-07-08 DIAGNOSIS — G89.4 CHRONIC PAIN SYNDROME: Primary | ICD-10-CM

## 2019-07-08 DIAGNOSIS — E11.42 TYPE 2 DIABETES MELLITUS WITH DIABETIC POLYNEUROPATHY, WITH LONG-TERM CURRENT USE OF INSULIN (H): Primary | ICD-10-CM

## 2019-07-08 DIAGNOSIS — D50.9 IRON DEFICIENCY ANEMIA, UNSPECIFIED IRON DEFICIENCY ANEMIA TYPE: ICD-10-CM

## 2019-07-08 DIAGNOSIS — G89.4 CHRONIC PAIN SYNDROME: ICD-10-CM

## 2019-07-08 DIAGNOSIS — E87.20 METABOLIC ACIDOSIS: ICD-10-CM

## 2019-07-08 DIAGNOSIS — Z79.4 TYPE 2 DIABETES MELLITUS WITH DIABETIC POLYNEUROPATHY, WITH LONG-TERM CURRENT USE OF INSULIN (H): Primary | ICD-10-CM

## 2019-07-08 LAB
AMPHETAMINES QUAL: NEGATIVE
BARBITURATES QUAL URINE: NEGATIVE
BENZODIAZEPINE QUAL URINE: NEGATIVE
BUN SERPL-MCNC: 28.3 MG/DL (ref 7–19)
BUPRENORPHINE QUAL URINE: NEGATIVE
CALCIUM SERPL-MCNC: 8.5 MG/DL (ref 8.5–10.1)
CANNABINOIDS UR QL SCN: NEGATIVE
CHLORIDE SERPLBLD-SCNC: 111.4 MMOL/L (ref 98–110)
CO2 SERPL-SCNC: 19.2 MMOL/L (ref 20–32)
COCAINE QUAL URINE: NEGATIVE
CREAT SERPL-MCNC: 1 MG/DL (ref 0.5–1)
GFR SERPL CREATININE-BSD FRML MDRD: 61.6 ML/MIN/1.7 M2
GLUCOSE SERPL-MCNC: 172.1 MG'DL (ref 70–99)
HEMOGLOBIN: 10.2 G/DL (ref 11.7–15.7)
METHAMPHETAMINE: NEGATIVE
METHODONE QUAL: NEGATIVE
MORPHINE QUAL: NEGATIVE
OXYCODONE QUAL: POSITIVE
PHENCYCLIDINE: NEGATIVE
POTASSIUM SERPL-SCNC: 5.1 MMOL/DL (ref 3.2–4.6)
PROPOXYPHENE: NEGATIVE
SODIUM SERPL-SCNC: 137.6 MMOL/L (ref 132–142)
TEMPERATURE OF URINE WAS BETWEEN 90-100 DEGREES F: YES
TRICYCLIC ANTIDEPRESSANTS: NEGATIVE

## 2019-07-08 RX ORDER — CYANOCOBALAMIN 1000 UG/ML
1000 INJECTION, SOLUTION INTRAMUSCULAR; SUBCUTANEOUS
Status: DISCONTINUED | OUTPATIENT
Start: 2019-07-08 | End: 2021-09-09

## 2019-07-08 RX ORDER — OXYCODONE AND ACETAMINOPHEN 10; 325 MG/1; MG/1
1 TABLET ORAL EVERY 6 HOURS PRN
Qty: 90 TABLET | Refills: 0 | Status: SHIPPED | OUTPATIENT
Start: 2019-07-08 | End: 2019-07-25

## 2019-07-08 RX ADMIN — CYANOCOBALAMIN 1000 MCG: 1000 INJECTION, SOLUTION INTRAMUSCULAR; SUBCUTANEOUS at 12:07

## 2019-07-08 NOTE — PROGRESS NOTES
Patient Active Problem List    Diagnosis Date Noted     Chronic pain syndrome 10/07/2016     Priority: High     Chronic Pain Diagnosis:  neuropathy  DIRE Total Score(s):14     ORT: 5    4 - 7 =  Moderate Risk   of future problems with Opioids  FAQ5: 45/100 on 9/9/16, 40/100 on 11/15/16  Behavioral Health Consultation: 5/12/17 with Dr. Daisy Quezada  Personal Care Plan for Chronic Pain: 5/12/17  Opioid medication:oxycodone   Dose:10mg  Number of pills per month:60  Patient is being prescribed 10mg of oxycodone IR (Percocet) per day this is 15 mg Morphine Equivalents  Benzodiazepines Prescribed? No  Naloxone prescribed? No       Clinic visit frequency required: Q 1 month Next visit due:   Controlled Substance/Opioid Treatment agreement on file (dated <12 months ago)?:    Date(s): pending  Last MNPMP verification: last visit  Items in red to be updated at each visit  Patient is followed by Data Unavailable for ongoing prescription of pain medication.  All refills should be approved by this provider, or covering partner.Chronic Pain          Microalbuminuria 10/12/2015     Priority: High     Type 2 diabetes mellitus with diabetic polyneuropathy (H) 10/12/2015     Priority: High     Copper deficiency 04/05/2019     Priority: Medium     Identified in Aug, 2018--see discharge summary from [facility] in [place].       Chest wall pain 01/03/2019     Priority: Medium     Generalized abdominal pain 01/03/2019     Priority: Medium     Gastroenteritis 01/03/2019     Priority: Medium     DVT of lower extremity, bilateral (H) 01/03/2019     Priority: Medium     Neuropathy 01/03/2019     Priority: Medium     Obesity 01/03/2019     Priority: Medium     Other acute pulmonary embolism without acute cor pulmonale (H) 12/31/2018     Priority: Medium     Hernia of anterior abdominal wall 01/28/2017     Priority: Medium     Chronic pain 01/27/2017     Priority: Medium     Diarrhea 01/27/2017     Priority: Medium     Hernia 01/27/2017      Priority: Medium     Influenza A 01/27/2017     Priority: Medium     Nausea & vomiting 01/25/2017     Priority: Medium     Osteoarthritis of both knees 10/31/2016     Priority: Medium     Pain in left lower leg 10/31/2016     Priority: Medium     Polyneuropathy due to secondary diabetes mellitus (H) 10/31/2016     Priority: Medium     Compression neuropathy of left lower extremity 02/19/2016     Priority: Medium     Tarsal tunnel syndrome of left side 02/19/2016     Priority: Medium     Bilateral ankle joint pain 01/15/2016     Priority: Medium     Esophageal reflux 04/07/2015     Priority: Medium     Urinary incontinence 02/19/2015     Priority: Medium     Insomnia 02/19/2015     Priority: Medium     Essential hypertension 08/26/2013     Priority: Medium     Asthma 08/04/2013     Priority: Medium     History of injury 08/04/2013     Priority: Medium     History of pulmonary embolism 08/04/2013     Priority: Medium     Hyperlipidemia LDL goal <100 06/21/2013     Priority: Medium     S/P bariatric surgery 06/21/2013     Priority: Medium     Kim-en-Y in 2006       Neck pain 05/16/2013     Priority: Medium     Moderate major depression (H) 01/24/2013     Priority: Medium     Moderate persistent asthma without complication 11/15/2012     Priority: Medium     Pulmonary embolism and infarction (H) 11/15/2012     Priority: Medium     12/24/2018  Unprovoked.  + fam hx of DVT.  Plan warfarin for 6 months.       Health Care Home 11/15/2012     Priority: Medium     Tier 3    Status: Accept  Care Coordination Start Date: 02/27/12  State Tier Level: Level 3  Language/Barrier to Learning: No  Significant Mental Health Issues: Yes  Date Care Coordination Discontinued: 05/28/15    DX V65.8 REPLACED WITH [id number] HEALTH CARE HOME (04/08/2013)         S/P hysterectomy 06/21/2013     Priority: Low     2008, For fibroids.  Cervix removed, but still has ovaries.       Carpal tunnel syndrome 11/15/2012     Priority: Low     Necrotizing  fasciitis (H) 11/15/2012     Priority: Low     Abnormal Pap smear of cervix      Priority: Low     3-12-15: Pap/HPV neg.  Plan: Given hx (mildly abn pap, hyster), recheck in 5 yrs.  10-11-11: Pap/HPV neg.  Plan: Given ASUS pap in 2010, recommend repeating pap in 1 year.  8/3/2010 ASCUS Pap with +BV- pt treated with Metrogel.  Pt needs repeat Pap in 1yr.  8/4/2008 Pt had total hyst for menorrhagia, pelvic pain, scar tissue, and fibroids.       There are no exam notes on file for this visit.  Chief Complaint   Patient presents with     RECHECK     Follow up meds/ kidney check     Blood pressure 131/84, pulse 98, temperature 98.8  F (37.1  C), temperature source Oral, resp. rate 16, SpO2 97 %, not currently breastfeeding.     SUBJECTIVE:  Ruthy Álvarez is here for followup of chronic pain syndrome.  She has polyarticular OA and a previous gastric bypass, which prevents the use of NSAIDs and acetaminophen hasn't been helpful.  Despite that, she has been taking Excedrin, two tablets in the morning, one tablet in the afternoon, and two tablets at hs.  This helps some, but causes some GI upset.  She had one black stool, but no hematemesis.  No dizziness or lightheadedness.  She finds that the oxycodone that she takes lasts only 2-3 hours at most.  It does help her sleep and helps her general sense of function.  She has had no abnormal urine drug tests or aberrant behavior in the past.   She is in a fair amount of stress in that she isn't getting along with her daughter.  Her daughter hasn't talked to her in the last three months; apparently, her daughter has mood swings are severe.  Thankfully, she does have a psychologist that she sees three times a month, Brandie Tello at [facility].  Otherwise, her moods seem to be down and she stays in the house quite a bit.  She also has difficulty with transportation, and it is a barrier for her as well.  She likes to go to [place], but there is no [place] bus.  She has a pool at  home and thankfully, she is using exercises at home from her previous pool therapy experience, and she is happy with us.  She wonders if CBD oil might be effective or safe for her to use.   She has hypertension and a curious metabolic acidosis that we've  been following.  The metabolic acidosis has been associated with hyperkalemia.  I've tried stopping her ACE to at least treat the hyperkalemia.  She is due for a metabolic profile again today.  Her current meds for hypertension include diltiazem and chlorthalidone.  The diltiazem is new as of 06/06 and the chlorthalidone is chronic.  She doesn't take metformin.      OBJECTIVE:   GENERAL:  Patient is alert, pleasant, and nontoxic.  Blood pressure is excellent.  Moods seem to be at her baseline.  She seems somewhat tired, but it is nonspecific.   The rest of the exam isn't done.  I reviewed her UDS which is appropriately positive for oxycodone.  She deferred a B12 shot today.      ASSESSMENT/PLAN:   1.  Chronic pain syndrome.  Her symptoms are relatively stable, but she has been using Excedrin and I wonder if that is the source of her metabolic acidosis.  I'm going to stop the Excedrin because of potential dyspepsia and metabolic acidosis, and increase oxycodone to 10 mg t.i.d.  We'll reevaluate in one month when she returns.     2.  Diabetes.  No changes were made.   3.  Anemia.  B12 wasn't given because of her black stool.  We'll check a CBC today. Results are pending.   4.  Situational stress.  She is coping with it well, with her current psychologist.  I supported her self-use of pain of pool therapy, even though suboptimal.  She asked me about CBD oil topically. I think she can give it a try, although from a safety perspective I don't see any clearly proven benefit.  She'll try it over the next month to see what happens.     Results for orders placed or performed in visit on 07/08/19   Rapid Urine Drug Screen ([facility])   Result Value Ref Range    Phencyclidine  NEGATIVE NEGATIVE    Propoxyphene NEGATIVE NEGATIVE    Tricyclic Antidepressants NEGATIVE NEGATIVE    Amphetamines Qual NEGATIVE NEGATIVE    Barbiturates Qual Urine NEGATIVE NEGATIVE    Buprenorphine Qual Urine NEGATIVE NEGATIVE    Benzodiazepine Qual Urine NEGATIVE NEGATIVE    Cocaine Qual Urine NEGATIVE NEGATIVE    Cannabinoids Qual Urine NEGATIVE NEGATIVE    Methamphetamine Qual NEGATIVE NEGATIVE    Methadone Qual NEGATIVE NEGATIVE    Morphine Qual NEGATIVE NEGATIVE    Oxycodone Qual POSITIVE (A) NEGATIVE    Temperature of Urine was Between  Degrees F YES YES   Basic Metabolic Panel ([place])   Result Value Ref Range    Urea Nitrogen 28.3 (H) 7.0 - 19.0 mg/dL    Calcium 8.5 8.5 - 10.1 mg/dL    Chloride 111.4 (H) 98.0 - 110.0 mmol/L    Carbon Dioxide 19.2 (L) 20.0 - 32.0 mmol/L    Creatinine 1.0 0.5 - 1.0 mg/dL    Glucose 172.1 (H) 70.0 - 99.0 mg'dL    Potassium 5.1 (H) 3.2 - 4.6 mmol/dL    Sodium 137.6 132.0 - 142.0 mmol/L    GFR Estimate 61.6 >60.0 mL/min/1.7 m2    GFR Estimate If Black 74.6 >60.0 mL/min/1.7 m2   Hemoglobin (HGB) ([facility])   Result Value Ref Range    Hemoglobin 10.2 (L) 11.7 - 15.7 g/dL

## 2019-07-08 NOTE — LETTER
July 10, 2019      Ruthy Álvarez  [address]  [place] [id number]        Dear Ruthy Bliss   Your blood tests are better.  Your kidney function is now normal, thankfully.  Your potassium is high, but not as high, which is good.  You still have something called 'metabolic acidosis', and the cause is not yet clear.  One potential cause is the aspirin that you were taking, and that we have now stopped.  If that is the cause, it should be better next time when we recheck it, which will be again next month.     So, no additional changes, other than stopping the aspirin.   Please call if you have questions.   See you next month.   C Fallert     Please see below for your test results.    Resulted Orders   Rapid Urine Drug Screen ([facility])   Result Value Ref Range    Phencyclidine NEGATIVE NEGATIVE    Propoxyphene NEGATIVE NEGATIVE    Tricyclic Antidepressants NEGATIVE NEGATIVE    Amphetamines Qual NEGATIVE NEGATIVE    Barbiturates Qual Urine NEGATIVE NEGATIVE    Buprenorphine Qual Urine NEGATIVE NEGATIVE    Benzodiazepine Qual Urine NEGATIVE NEGATIVE    Cocaine Qual Urine NEGATIVE NEGATIVE    Cannabinoids Qual Urine NEGATIVE NEGATIVE    Methamphetamine Qual NEGATIVE NEGATIVE    Methadone Qual NEGATIVE NEGATIVE    Morphine Qual NEGATIVE NEGATIVE    Oxycodone Qual POSITIVE (A) NEGATIVE    Temperature of Urine was Between  Degrees F YES YES      Comment:      This is a preliminary screening test that detects drugs-of-abuse in urine at   specified detection levels.  To confirm preliminary results, a more specific   method such as Gas Chromatography/Mass Spectrometry (GC/MS) must be used.        Basic Metabolic Panel ([place])   Result Value Ref Range    Urea Nitrogen 28.3 (H) 7.0 - 19.0 mg/dL    Calcium 8.5 8.5 - 10.1 mg/dL    Chloride 111.4 (H) 98.0 - 110.0 mmol/L    Carbon Dioxide 19.2 (L) 20.0 - 32.0 mmol/L    Creatinine 1.0 0.5 - 1.0 mg/dL    Glucose 172.1 (H) 70.0 - 99.0 mg'dL     Potassium 5.1 (H) 3.2 - 4.6 mmol/dL    Sodium 137.6 132.0 - 142.0 mmol/L    GFR Estimate 61.6 >60.0 mL/min/1.7 m2    GFR Estimate If Black 74.6 >60.0 mL/min/1.7 m2   Hemoglobin (HGB) (P )   Result Value Ref Range    Hemoglobin 10.2 (L) 11.7 - 15.7 g/dL       If you have any questions, please call the clinic to make an appointment.    Sincerely,    Jose Angel Meyer MD

## 2019-07-10 NOTE — RESULT ENCOUNTER NOTE
Please mail labs to patient with this message.    Anthonyauraissa  Your blood tests are better.  Your kidney function is now normal, thankfully.  Your potassium is high, but not as high, which is good.  You still have something called 'metabolic acidosis', and the cause is not yet clear.  One potential cause is the aspirin that you were taking, and that we have now stopped.  If that is the cause, it should be better next time when we recheck it, which will be again next month.    So, no additional changes, other than stopping the aspirin.  Please call if you have questions.   See you next month.  JIGNESH Meyer

## 2019-07-17 ENCOUNTER — TRANSFERRED RECORDS (OUTPATIENT)
Dept: HEALTH INFORMATION MANAGEMENT | Facility: CLINIC | Age: 54
End: 2019-07-17

## 2019-07-18 ENCOUNTER — COMMUNICATION - HEALTHEAST (OUTPATIENT)
Dept: BEHAVIORAL HEALTH | Facility: CLINIC | Age: 54
End: 2019-07-18

## 2019-07-18 ENCOUNTER — AMBULATORY - HEALTHEAST (OUTPATIENT)
Dept: BEHAVIORAL HEALTH | Facility: CLINIC | Age: 54
End: 2019-07-18

## 2019-07-25 ENCOUNTER — OFFICE VISIT (OUTPATIENT)
Dept: FAMILY MEDICINE | Facility: CLINIC | Age: 54
End: 2019-07-25
Payer: COMMERCIAL

## 2019-07-25 VITALS
OXYGEN SATURATION: 96 % | WEIGHT: 237 LBS | RESPIRATION RATE: 16 BRPM | BODY MASS INDEX: 47.07 KG/M2 | SYSTOLIC BLOOD PRESSURE: 117 MMHG | DIASTOLIC BLOOD PRESSURE: 73 MMHG | HEART RATE: 84 BPM | TEMPERATURE: 98.1 F

## 2019-07-25 DIAGNOSIS — R14.3 FLATULENCE, ERUCTATION AND GAS PAIN: ICD-10-CM

## 2019-07-25 DIAGNOSIS — G89.4 CHRONIC PAIN SYNDROME: ICD-10-CM

## 2019-07-25 DIAGNOSIS — E87.20 METABOLIC ACIDOSIS: Primary | ICD-10-CM

## 2019-07-25 DIAGNOSIS — R13.13 PHARYNGEAL DYSPHAGIA: ICD-10-CM

## 2019-07-25 DIAGNOSIS — Z23 NEED FOR VACCINATION: ICD-10-CM

## 2019-07-25 DIAGNOSIS — R14.2 FLATULENCE, ERUCTATION AND GAS PAIN: ICD-10-CM

## 2019-07-25 DIAGNOSIS — R14.1 FLATULENCE, ERUCTATION AND GAS PAIN: ICD-10-CM

## 2019-07-25 LAB
AMPHETAMINES QUAL: NEGATIVE
BARBITURATES QUAL URINE: NEGATIVE
BENZODIAZEPINE QUAL URINE: NEGATIVE
BUN SERPL-MCNC: 31.7 MG/DL (ref 7–19)
BUPRENORPHINE QUAL URINE: NEGATIVE
CALCIUM SERPL-MCNC: 8.6 MG/DL (ref 8.5–10.1)
CANNABINOIDS UR QL SCN: NEGATIVE
CHLORIDE SERPLBLD-SCNC: 106.4 MMOL/L (ref 98–110)
CO2 SERPL-SCNC: 20.6 MMOL/L (ref 20–32)
COCAINE QUAL URINE: NEGATIVE
CREAT SERPL-MCNC: 1 MG/DL (ref 0.5–1)
GFR SERPL CREATININE-BSD FRML MDRD: 61.6 ML/MIN/1.7 M2
GLUCOSE SERPL-MCNC: 35.5 MG'DL (ref 70–99)
METHAMPHETAMINE: NEGATIVE
METHODONE QUAL: NEGATIVE
MORPHINE QUAL: NEGATIVE
OXYCODONE QUAL: POSITIVE
PHENCYCLIDINE: NEGATIVE
POTASSIUM SERPL-SCNC: 3.4 MMOL/DL (ref 3.2–4.6)
PROPOXYPHENE: NEGATIVE
SODIUM SERPL-SCNC: 140.4 MMOL/L (ref 132–142)
TEMPERATURE OF URINE WAS BETWEEN 90-100 DEGREES F: YES
TRICYCLIC ANTIDEPRESSANTS: NEGATIVE

## 2019-07-25 RX ORDER — SIMETHICONE 180 MG
CAPSULE ORAL
Qty: 60 CAPSULE | Refills: 11 | Status: SHIPPED | OUTPATIENT
Start: 2019-07-25 | End: 2024-04-18

## 2019-07-25 RX ORDER — OXYCODONE AND ACETAMINOPHEN 10; 325 MG/1; MG/1
1 TABLET ORAL EVERY 6 HOURS PRN
Qty: 90 TABLET | Refills: 0 | Status: SHIPPED | OUTPATIENT
Start: 2019-07-25 | End: 2019-09-05

## 2019-07-25 RX ORDER — CYANOCOBALAMIN 1000 UG/ML
1000 INJECTION, SOLUTION INTRAMUSCULAR; SUBCUTANEOUS
Status: DISCONTINUED | OUTPATIENT
Start: 2019-07-25 | End: 2021-09-09

## 2019-07-25 RX ADMIN — CYANOCOBALAMIN 1000 MCG: 1000 INJECTION, SOLUTION INTRAMUSCULAR; SUBCUTANEOUS at 14:47

## 2019-07-25 NOTE — PATIENT INSTRUCTIONS
Consider trying amitriptylline for sleep.      OTOLARYNGOLOGY REFERRAL   [facility]  Phone: [phone]  Fax: [phone]    [place] [address]      Appointment:  Tuesday August 6th  Arrival Time: 8:30am  Provider: Dr. Lea    Please bring a copy of your insurance card and photo ID    If you cannot make this appointment, please call [phone] to reschedule    Referral, demographics and office note faxed to [phone].    Anaid Jerome

## 2019-07-25 NOTE — PROGRESS NOTES
Patient Active Problem List    Diagnosis Date Noted     Chronic pain syndrome 10/07/2016     Priority: High     Chronic Pain Diagnosis:  neuropathy  DIRE Total Score(s):14     ORT: 5    4 - 7 =  Moderate Risk   of future problems with Opioids  FAQ5: 45/100 on 9/9/16, 40/100 on 11/15/16  Behavioral Health Consultation: 5/12/17 with Dr. Daisy Quezada  Personal Care Plan for Chronic Pain: 5/12/17  Opioid medication:oxycodone   Dose:10mg  Number of pills per month:60  Patient is being prescribed 10mg of oxycodone IR (Percocet) per day this is 15 mg Morphine Equivalents  Benzodiazepines Prescribed? No  Naloxone prescribed? No       Clinic visit frequency required: Q 1 month Next visit due:   Controlled Substance/Opioid Treatment agreement on file (dated <12 months ago)?:    Date(s): pending  Last [place] verification: last visit  Items in red to be updated at each visit  Patient is followed by Data Unavailable for ongoing prescription of pain medication.  All refills should be approved by this provider, or covering partner.Chronic Pain          Microalbuminuria 10/12/2015     Priority: High     Type 2 diabetes mellitus with diabetic polyneuropathy (H) 10/12/2015     Priority: High     Copper deficiency 04/05/2019     Priority: Medium     Identified in Aug, 2018--see discharge summary from [facility] in [place].       Chest wall pain 01/03/2019     Priority: Medium     Generalized abdominal pain 01/03/2019     Priority: Medium     Gastroenteritis 01/03/2019     Priority: Medium     DVT of lower extremity, bilateral (H) 01/03/2019     Priority: Medium     Neuropathy 01/03/2019     Priority: Medium     Obesity 01/03/2019     Priority: Medium     Other acute pulmonary embolism without acute cor pulmonale (H) 12/31/2018     Priority: Medium     Hernia of anterior abdominal wall 01/28/2017     Priority: Medium     Chronic pain 01/27/2017     Priority: Medium     Diarrhea 01/27/2017     Priority: Medium     Hernia 01/27/2017      Priority: Medium     Influenza A 01/27/2017     Priority: Medium     Nausea & vomiting 01/25/2017     Priority: Medium     Osteoarthritis of both knees 10/31/2016     Priority: Medium     Pain in left lower leg 10/31/2016     Priority: Medium     Polyneuropathy due to secondary diabetes mellitus (H) 10/31/2016     Priority: Medium     Compression neuropathy of left lower extremity 02/19/2016     Priority: Medium     Tarsal tunnel syndrome of left side 02/19/2016     Priority: Medium     Bilateral ankle joint pain 01/15/2016     Priority: Medium     Esophageal reflux 04/07/2015     Priority: Medium     Urinary incontinence 02/19/2015     Priority: Medium     Insomnia 02/19/2015     Priority: Medium     Essential hypertension 08/26/2013     Priority: Medium     Asthma 08/04/2013     Priority: Medium     History of injury 08/04/2013     Priority: Medium     History of pulmonary embolism 08/04/2013     Priority: Medium     Hyperlipidemia LDL goal <100 06/21/2013     Priority: Medium     S/P bariatric surgery 06/21/2013     Priority: Medium     Kim-en-Y in 2006       Neck pain 05/16/2013     Priority: Medium     Moderate major depression (H) 01/24/2013     Priority: Medium     Moderate persistent asthma without complication 11/15/2012     Priority: Medium     Pulmonary embolism and infarction (H) 11/15/2012     Priority: Medium     12/24/2018  Unprovoked.  + fam hx of DVT.  Plan warfarin for 6 months.       Health Care Home 11/15/2012     Priority: Medium     Tier 3    Status: Accept  Care Coordination Start Date: 02/27/12  State Tier Level: Level 3  Language/Barrier to Learning: No  Significant Mental Health Issues: Yes  Date Care Coordination Discontinued: 05/28/15    DX V65.8 REPLACED WITH [facility] (04/08/2013)         S/P hysterectomy 06/21/2013     Priority: Low     2008, For fibroids.  Cervix removed, but still has ovaries.       Carpal tunnel syndrome 11/15/2012     Priority: Low     Necrotizing  fasciitis (H) 11/15/2012     Priority: Low     Abnormal Pap smear of cervix      Priority: Low     3-12-15: Pap/HPV neg.  Plan: Given hx (mildly abn pap, hyster), recheck in 5 yrs.  10-11-11: Pap/HPV neg.  Plan: Given ASUS pap in 2010, recommend repeating pap in 1 year.  8/3/2010 ASCUS Pap with +BV- pt treated with Metrogel.  Pt needs repeat Pap in 1yr.  8/4/2008 Pt had total hyst for menorrhagia, pelvic pain, scar tissue, and fibroids.       Nursing Notes:   Viridiana Loaiza CMA  7/25/2019  2:49 PM  Signed  Clinic Administered Medication Documentation      Injectable Medication Documentation    Patient was given Cyanocobalamin (B-12). Prior to medication administration, verified patients identity using patient s name and date of birth. Please see MAR and medication order for additional information. Patient instructed to remain in clinic for 15 minutes.      Was entire vial of medication used? Yes  Vial/Syringe: Single dose vial  Expiration Date:  08/30/2020  Was this medication supplied by the patient? No  Chief Complaint   Patient presents with     Pain Management     CPM     Blood pressure 117/73, pulse 84, temperature 98.1  F (36.7  C), temperature source Oral, resp. rate 16, weight 107.5 kg (237 lb), SpO2 96 %, not currently breastfeeding.   'choke a lot'  Feels like choking on saliva during sleep.  Happens when sleeping primarily.  If sleeping and wake up choking/coughing/gagging.    Live alone  Sleep 8 PM.  Wakes up hourly.  Get up 5-6 AM  Sleep apnea  Test negative.  No problem with eating.    Heartburn controlled.  No acid brash.    SUBJECTIVE:  Ruthy Álvarez is here for followup of her chronic pain syndrome, refills of some of her medications, followup of her metabolic acidosis, and to discuss the above problems.  Of note, she had a sleep study within the past two months that was negative.   Her chronic pain is well-controlled with her current regimen, although her sleep is still suboptimal.  She would  like to get that a little bit better if she could.     She has flatulence and bloating and would like a refill of simethicone, which has been helpful.     She has a metabolic acidosis that has been difficult to sort out.  She was taking aspirin, but she did stop that at last visit.  She is due for followup.  She doesn't have orthopnea, PND, or worsening edema.  She denies any palpitations.  She also denies any sense of sweating or unusual hunger.      OBJECTIVE:   VITAL SIGNS:  Blood pressure is excellent.   CHEST:  Clear.   HEART:  Regular rate and rhythm.  No murmur.   EXTREMITIES:  Trace edema.   LABS: Labs are below. Her UDS is appropriate.  Her BMP is normal, with the exception of hypoglycemia.  Her hyperkalemia and metabolic acidosis have resolved.      ASSESSMENT/PLAN:   1.  Chronic pain syndrome, stable.  I refilled her oxycodone at current doses.  I reviewed her prescription database and this is stable as well.     2.  Flatulence and gas pains.  I refilled her simethicone.     3.  Metabolic acidosis.  It seems to be resolved with discontinuation of the aspirin.  We'll continue to follow this for another month and then space out visits to q.mos.     4.  Dysphagia.  The etiology isn't clear to me.  She has a sense of choking on her saliva during sleep. I'm going to have her seen by [facility] and ask them to do a direct laryngoscopy to see if they have an explanation or to explore other options.   She doesn't have sleep apnea.   5.  Health maintenance.  She needs a B12 injection today, which was ordered and given.       ENT eval for dysphagia--referral given.      Results for orders placed or performed in visit on 07/25/19   Rapid Urine Drug Screen ([facility])   Result Value Ref Range    Phencyclidine NEGATIVE NEGATIVE    Propoxyphene NEGATIVE NEGATIVE    Tricyclic Antidepressants NEGATIVE NEGATIVE    Amphetamines Qual NEGATIVE NEGATIVE    Barbiturates Qual Urine NEGATIVE NEGATIVE    Buprenorphine Qual Urine  NEGATIVE NEGATIVE    Benzodiazepine Qual Urine NEGATIVE NEGATIVE    Cocaine Qual Urine NEGATIVE NEGATIVE    Cannabinoids Qual Urine NEGATIVE NEGATIVE    Methamphetamine Qual NEGATIVE NEGATIVE    Methadone Qual NEGATIVE NEGATIVE    Morphine Qual NEGATIVE NEGATIVE    Oxycodone Qual POSITIVE (A) NEGATIVE    Temperature of Urine was Between  Degrees F YES YES   Basic Metabolic Panel ([facility] [facility] [place])   Result Value Ref Range    Urea Nitrogen 31.7 (H) 7.0 - 19.0 mg/dL    Calcium 8.6 8.5 - 10.1 mg/dL    Chloride 106.4 98.0 - 110.0 mmol/L    Carbon Dioxide 20.6 20.0 - 32.0 mmol/L    Creatinine 1.0 0.5 - 1.0 mg/dL    Glucose 35.5 (LL) 70.0 - 99.0 mg'dL    Potassium 3.4 3.2 - 4.6 mmol/dL    Sodium 140.4 132.0 - 142.0 mmol/L    GFR Estimate 61.6 >60.0 mL/min/1.7 m2    GFR Estimate If Black 74.6 >60.0 mL/min/1.7 m2    Narrative    Critical Value was reported to and read back by Dr. Meyer at 7/25/2019 3:32   PM. (lx/cma)

## 2019-07-25 NOTE — LETTER
July 26, 2019      Ruthy Álvarez  [address]  [place] [address]        Dear Ruthy,    Please see below for your test results.    Resulted Orders   Rapid Urine Drug Screen ([facility])   Result Value Ref Range    Phencyclidine NEGATIVE NEGATIVE    Propoxyphene NEGATIVE NEGATIVE    Tricyclic Antidepressants NEGATIVE NEGATIVE    Amphetamines Qual NEGATIVE NEGATIVE    Barbiturates Qual Urine NEGATIVE NEGATIVE    Buprenorphine Qual Urine NEGATIVE NEGATIVE    Benzodiazepine Qual Urine NEGATIVE NEGATIVE    Cocaine Qual Urine NEGATIVE NEGATIVE    Cannabinoids Qual Urine NEGATIVE NEGATIVE    Methamphetamine Qual NEGATIVE NEGATIVE    Methadone Qual NEGATIVE NEGATIVE    Morphine Qual NEGATIVE NEGATIVE    Oxycodone Qual POSITIVE (A) NEGATIVE    Temperature of Urine was Between  Degrees F YES YES      Comment:      This is a preliminary screening test that detects drugs-of-abuse in urine at   specified detection levels.  To confirm preliminary results, a more specific   method such as Gas Chromatography/Mass Spectrometry (GC/MS) must be used.        Basic Metabolic Panel ([place])   Result Value Ref Range    Urea Nitrogen 31.7 (H) 7.0 - 19.0 mg/dL    Calcium 8.6 8.5 - 10.1 mg/dL    Chloride 106.4 98.0 - 110.0 mmol/L    Carbon Dioxide 20.6 20.0 - 32.0 mmol/L    Creatinine 1.0 0.5 - 1.0 mg/dL    Glucose 35.5 (LL) 70.0 - 99.0 mg'dL    Potassium 3.4 3.2 - 4.6 mmol/dL    Sodium 140.4 132.0 - 142.0 mmol/L    GFR Estimate 61.6 >60.0 mL/min/1.7 m2    GFR Estimate If Black 74.6 >60.0 mL/min/1.7 m2    Narrative    Critical Value was reported to and read back by Dr. Meyer at 7/25/2019 3:32   PM. (lx/cma)     Ruthy   Your blood sugar was very low at the time of your visit.  If this is a repeated pattern in the future, we will definitely need to adjust your insulin doses!   Your kidney tests, acidosis, and high potassium have all returned to normal since you've stopped taking the aspirin, so that is the most  likely cause.  This is not an 'allergy', but an intolerance, and you should definitely not take it in the future.  We'll need to keep an eye on this in the future.     JIGNESH Meyer

## 2019-07-25 NOTE — NURSING NOTE
Clinic Administered Medication Documentation      Injectable Medication Documentation    Patient was given Cyanocobalamin (B-12). Prior to medication administration, verified patients identity using patient s name and date of birth. Please see MAR and medication order for additional information. Patient instructed to remain in clinic for 15 minutes.      Was entire vial of medication used? Yes  Vial/Syringe: Single dose vial  Expiration Date:  08/30/2020  Was this medication supplied by the patient? No

## 2019-07-26 PROBLEM — J10.1 INFLUENZA A: Status: RESOLVED | Noted: 2017-01-27 | Resolved: 2019-07-26

## 2019-07-26 NOTE — RESULT ENCOUNTER NOTE
Please mail labs to patient with this message.    Twshan  Your blood sugar was very low at the time of your visit.  If this is a repeated pattern in the future, we will definitely need to adjust your insulin doses!  Your kidney tests, acidosis, and high potassium have all returned to normal since you've stopped taking the aspirin, so that is the most likely cause.  This is not an 'allergy', but an intolerance, and you should definitely not take it in the future.  We'll need to keep an eye on this in the future.    JIGNESH Meyer

## 2019-07-31 DIAGNOSIS — E11.42 DIABETIC POLYNEUROPATHY ASSOCIATED WITH TYPE 2 DIABETES MELLITUS (H): ICD-10-CM

## 2019-08-01 RX ORDER — GABAPENTIN 250 MG/5ML
500 SOLUTION ORAL 3 TIMES DAILY
Qty: 940 ML | Refills: 2 | Status: SHIPPED | OUTPATIENT
Start: 2019-08-01 | End: 2019-11-04

## 2019-08-02 ENCOUNTER — AMBULATORY - HEALTHEAST (OUTPATIENT)
Dept: ADMINISTRATIVE | Facility: CLINIC | Age: 54
End: 2019-08-02

## 2019-08-02 DIAGNOSIS — R13.13 PHARYNGEAL DYSPHAGIA: ICD-10-CM

## 2019-08-06 ENCOUNTER — OFFICE VISIT - HEALTHEAST (OUTPATIENT)
Dept: OTOLARYNGOLOGY | Facility: CLINIC | Age: 54
End: 2019-08-06

## 2019-08-06 ENCOUNTER — TRANSFERRED RECORDS (OUTPATIENT)
Dept: HEALTH INFORMATION MANAGEMENT | Facility: CLINIC | Age: 54
End: 2019-08-06

## 2019-08-06 DIAGNOSIS — E66.813 CLASS 3 SEVERE OBESITY DUE TO EXCESS CALORIES WITH SERIOUS COMORBIDITY AND BODY MASS INDEX (BMI) OF 40.0 TO 44.9 IN ADULT (H): ICD-10-CM

## 2019-08-06 DIAGNOSIS — E66.01 CLASS 3 SEVERE OBESITY DUE TO EXCESS CALORIES WITH SERIOUS COMORBIDITY AND BODY MASS INDEX (BMI) OF 40.0 TO 44.9 IN ADULT (H): ICD-10-CM

## 2019-08-06 DIAGNOSIS — J37.0 CHRONIC LARYNGITIS: ICD-10-CM

## 2019-08-06 DIAGNOSIS — K21.9 GASTROESOPHAGEAL REFLUX DISEASE, ESOPHAGITIS PRESENCE NOT SPECIFIED: ICD-10-CM

## 2019-08-14 ENCOUNTER — OFFICE VISIT - HEALTHEAST (OUTPATIENT)
Dept: BEHAVIORAL HEALTH | Facility: CLINIC | Age: 54
End: 2019-08-14

## 2019-08-14 DIAGNOSIS — F33.9 MAJOR DEPRESSION, RECURRENT, CHRONIC (H): ICD-10-CM

## 2019-08-14 DIAGNOSIS — F25.9 SCHIZOAFFECTIVE DISORDER, CHRONIC CONDITION (H): ICD-10-CM

## 2019-08-21 ENCOUNTER — TRANSFERRED RECORDS (OUTPATIENT)
Dept: HEALTH INFORMATION MANAGEMENT | Facility: CLINIC | Age: 54
End: 2019-08-21

## 2019-08-22 ENCOUNTER — TRANSFERRED RECORDS (OUTPATIENT)
Dept: HEALTH INFORMATION MANAGEMENT | Facility: CLINIC | Age: 54
End: 2019-08-22

## 2019-08-22 DIAGNOSIS — I26.99 OTHER ACUTE PULMONARY EMBOLISM WITHOUT ACUTE COR PULMONALE (H): ICD-10-CM

## 2019-08-23 ENCOUNTER — TELEPHONE (OUTPATIENT)
Dept: FAMILY MEDICINE | Facility: CLINIC | Age: 54
End: 2019-08-23

## 2019-08-23 NOTE — TELEPHONE ENCOUNTER
[facility] phone call message- general phone call:    Reason for call:     TCM    Admitted on 08/21/2019  Discharge estimated: 08/24/2019    Pt will see Dr. Verde 08/27/2019 @ 9:20am and 10AM with Dr. Hernandez.    Action desired: fyi    Return call needed: Yes    OK to leave a message on voice mail? Yes    Advised patient to response may take up to 2 business days: Yes    Primary language: English      needed? No    Call taken on August 23, 2019 at 11:47 AM by Megan Quezada CMA

## 2019-08-26 ENCOUNTER — TELEPHONE (OUTPATIENT)
Dept: FAMILY MEDICINE | Facility: CLINIC | Age: 54
End: 2019-08-26

## 2019-08-26 NOTE — TELEPHONE ENCOUNTER
Date of discharge: 8/24/19  Facility of discharge: [facility]  Patient concerns about condition: Concerns include just a little tired.  Patient concerns about medications: No concerns at this time.  Full med reconciliation will be completed at clinic visit.  Patient concerns about transitioning: No concerns at this time.  Clinic office visit appointment date: 8/27/19  Patient reminded to bring all medications (prescription and over-the-counter) to clinic appointment: Yes

## 2019-08-27 ENCOUNTER — OFFICE VISIT (OUTPATIENT)
Dept: FAMILY MEDICINE | Facility: CLINIC | Age: 54
End: 2019-08-27
Payer: COMMERCIAL

## 2019-08-27 ENCOUNTER — OFFICE VISIT (OUTPATIENT)
Dept: PHARMACY | Facility: PHYSICIAN GROUP | Age: 54
End: 2019-08-27
Payer: COMMERCIAL

## 2019-08-27 VITALS
HEART RATE: 92 BPM | BODY MASS INDEX: 47.07 KG/M2 | DIASTOLIC BLOOD PRESSURE: 78 MMHG | RESPIRATION RATE: 16 BRPM | WEIGHT: 237 LBS | TEMPERATURE: 97.5 F | OXYGEN SATURATION: 98 % | SYSTOLIC BLOOD PRESSURE: 123 MMHG

## 2019-08-27 DIAGNOSIS — G89.4 CHRONIC PAIN SYNDROME: ICD-10-CM

## 2019-08-27 DIAGNOSIS — I10 ESSENTIAL HYPERTENSION: ICD-10-CM

## 2019-08-27 DIAGNOSIS — Z79.4 TYPE 2 DIABETES MELLITUS WITH DIABETIC POLYNEUROPATHY, WITH LONG-TERM CURRENT USE OF INSULIN (H): Primary | ICD-10-CM

## 2019-08-27 DIAGNOSIS — I82.403 DVT OF LOWER EXTREMITY, BILATERAL (H): ICD-10-CM

## 2019-08-27 DIAGNOSIS — Z09 HOSPITAL DISCHARGE FOLLOW-UP: Primary | ICD-10-CM

## 2019-08-27 DIAGNOSIS — E11.42 TYPE 2 DIABETES MELLITUS WITH DIABETIC POLYNEUROPATHY, WITH LONG-TERM CURRENT USE OF INSULIN (H): Primary | ICD-10-CM

## 2019-08-27 DIAGNOSIS — M54.2 NECK PAIN: ICD-10-CM

## 2019-08-27 DIAGNOSIS — G62.9 NEUROPATHY: ICD-10-CM

## 2019-08-27 DIAGNOSIS — Z79.4 TYPE 2 DIABETES MELLITUS WITH DIABETIC POLYNEUROPATHY, WITH LONG-TERM CURRENT USE OF INSULIN (H): ICD-10-CM

## 2019-08-27 DIAGNOSIS — K21.9 ESOPHAGEAL REFLUX: ICD-10-CM

## 2019-08-27 DIAGNOSIS — E11.42 TYPE 2 DIABETES MELLITUS WITH DIABETIC POLYNEUROPATHY, WITH LONG-TERM CURRENT USE OF INSULIN (H): ICD-10-CM

## 2019-08-27 PROCEDURE — 99207 ZZC NO CHARGE LOS: CPT | Performed by: PHARMACIST

## 2019-08-27 RX ORDER — CAPSAICIN 0.025 %
1 CREAM (GRAM) TOPICAL 3 TIMES DAILY
Qty: 45 G | Refills: 2 | Status: SHIPPED | OUTPATIENT
Start: 2019-08-27 | End: 2020-04-20

## 2019-08-27 NOTE — PROGRESS NOTES
Hospitalization Follow-up Visit         [facility]       Hospital Follow-up Visit:    Hospital:  [facility]   Date of Admission: 8/21/19  Date of Discharge: 8/24/19  Reason(s) for Admission: Hypoglycemia, ACS rule out            Problems taking medications regularly:  None       Post Discharge Medication Reconciliation: discharge medications reconciled and changed, per note/orders (see AVS).       Problems adhering to non-medication therapy:  None       Medications reviewed by: by PharmD    Summary of hospitalization:  [facility] hospital discharge summary reviewed  Diagnostic Tests/Treatments reviewed.  Follow up needed: none  Other Healthcare Providers Involved in Patient s Care:         None  Update since discharge: stable.   Plan of care communicated with patient                     Review of Systems:   CONSTITUTIONAL: no fatigue, no unexpected change in weight  SKIN: no worrisome rashes, no worrisome moles, no worrisome lesions  EYES: no acute vision problems or changes  ENT: no ear problems, no mouth problems, no throat problems  RESP: no significant cough, no shortness of breath  CV: no chest pain, no palpitations, no new or worsening peripheral edema  GI: no nausea, no vomiting, no constipation, no diarrhea            Physical Exam:     Vitals:    08/27/19 0900   BP: 123/78   BP Location: Left arm   Patient Position: Sitting   Cuff Size: Adult Large   Pulse: 92   Resp: 16   Temp: 97.5  F (36.4  C)   TempSrc: Oral   SpO2: 98%   Weight: 107.5 kg (237 lb)     Body mass index is 47.07 kg/m .    GENERAL: alert and no distress, obese, requiring walker  EYES: Eyes grossly normal to inspection, extraocular movements - intact  HENT: Nose- normal; Mouth- no ulcers, no lesions  NECK: Large posterior neck fat pad, tender to palpation.  RESP: lungs clear to auscultation - no rales, no rhonchi, no wheezes  CV: regular rates and rhythm, normal S1 S2, no S3 or S4 and no murmur, no click or rub   ABDOMEN: obese, soft, no  tenderness, no  hepatosplenomegaly, no masses, normal bowel sounds  MS: extremities- no gross deformities noted, trace edema  SKIN: no suspicious lesions, no rashes  NEURO: strength and tone- normal, sensory exam- grossly normal, mentation- intact, speech- normal  PSYCH: Alert and oriented times 3; speech- coherent , normal rate and volume; able to articulate logical thoughts, able to abstract reason, no tangential thoughts, no hallucinations or delusions, affect- normal      Assessment and Plan      Ruthy was seen today for hospital f/u and medication reconciliation.    Diagnoses and all orders for this visit:    Hospital discharge follow-up  Type 2 diabetes mellitus with diabetic polyneuropathy, with long-term current use of insulin (H)  Doing well, states that her fasting blood sugar checks have been between 160 and 260s.  Denies any hypoglycemic episodes since hospitalization.  Omeprazole discontinued, patient still on famotidine.  Mealtime insulin dosing discontinued and replaced with Trulicity 0.75 mg once weekly.  Continue Lantus 10 units nightly.  Patient not interested in oral antihyperglycemic agents.  -     dulaglutide (TRULICITY) 0.75 MG/0.5ML pen; Inject 0.75 mg Subcutaneous every 7 days    Neck pain  Ongoing for at least 3 months.  Associated symptoms include headache.  Patient has tried Tylenol and Percocet--prescribed for her hip and joint pains, which has not helped.  Patient unable to take NSAIDs due to being on anticoagulation.  Patient is interested in PT.  -     PHYSICAL THERAPY REFERRAL; Future        Options for treatment and follow-up care were reviewed with the patient  Ruthy Álvarez engaged in the decision making process and verbalized understanding of the options discussed and agreed with the final plan.    Follow-up with PCP, Dr. Meyer on September 5--will need to address if patient still needs to be on apixaban.    Parker Verde MD  8/27/2019      Staffed with Dr. He who  agree with assessment and plan

## 2019-08-27 NOTE — PATIENT INSTRUCTIONS
Stop your meal time insulin Novolog.  Stop your omeprazole.    Continue taking lantus 10 units.  Start Trulicity 0.75mg once weekly.       I have placed a referral for physical therapy for your neck pain. Please schedule an appointment with them.    Ask Dr. Meyer about your blood thinner medication to see if you still need to be on them when you meet with him next week.     PHYSICAL THERAPY REFERRAL   August 28, 2019   Demographics and referral for Physical Therapy faxed to [facility] at [phone], they will contact patient to schedule.     [facility]  Phone: [phone]  Fax: [phone]    Anaid Jerome

## 2019-08-27 NOTE — PROGRESS NOTES
SUBJECTIVE/OBJECTIVE:                Ruthy Álvarez is a 53 year old female coming in for a TCM visit.  She was discharged from [facility] on 8/24 for Type 2 DM with hypoglycemia & chest pain and hypomagnesemia.    Chief Complaint: Transition of Care Follow-Up.    Allergies/ADRs: Reviewed in Epic    History   Smoking Status     Never Smoker   Smokeless Tobacco     Never Used     Social History    Substance and Sexual Activity      Alcohol use: No    Medication Adherence/Access:  Patient takes medications 1 time(s) per day.   Medication barriers: remembering to take medications, no pillbox.     Patient reports that she only takes her medications once daily in the morning. She know that she is supposed to take some of her medications twice daily, but she rarely remembers. She might remember to take the evening dose once or twice a week if her PCA reminds her.    Patient had a pillbox in the past but did not find it helpful and declines another one or use of her current one.    Patient also asked for a refill of her sumatriptan, but this was last prescribed at the clinic in 2015. [facility] not have any recent fills for this medication.    Medication changes in the hospital:  -Novolog changed to 4 units TID  -Basaglar changed to 10 units daily    Diabetes:  Pt currently taking Novolog 4 units TID, Basaglar 10 units daily. Patient is experiencing headaches that she thinks might be when her BS begins to get low, but she is not experiencing any lows with symptoms since the hospital.  SMBG: three times daily.  Ranges (patient reported): 160-low 300's  Patient is not experiencing hypoglycemia  Recent symptoms of high blood sugar? None    Patient is concerned that they decreased her insulin by too much after her hospital visit, and she is now having elevated BS's.    Patient has been having frequent low BS levels during the past several months    Patient would be interested in trying a different  agent to manage her diabetes as long as it is not another pill. She would prefer to have an injectable.    ACEi/ARB: No. - stopped recently due to concerns of hyperkalemia, reduced kidney function, metabolic acidosis     Urine Albumin:   Lab Results   Component Value Date    UMALCR 373.3 (H) 03/18/2019      Aspirin: Not taking due to patient's decreased kidney function, metabolic acidosis, and hyperkalemia resolved after stopping aspirin.    Most Recent Immunizations   Administered Date(s) Administered     FLU 6-35 months 03/09/2012     Flu, Unspecified 09/05/2012     Flu-nasal, Unspecified 10/07/2016     HEPA 07/24/2003     HepB-Adult 01/03/2003     Influenza (IIV3) PF 09/24/2013     Influenza Vaccine, 3 YRS +, IM (QUADRIVALENT W/PRESERVATIVES) 10/07/2016     Pneumococcal 23 valent 02/19/2015     Tdap (Adacel,Boostrix) 09/24/2009     Typhoid IM 01/16/2003     The 10-year ASCVD risk score (Alberto GARCIA Jr., et al., 2013) is: 8.9%    Values used to calculate the score:      Age: 53 years      Sex: Female      Is Non- : Yes      Diabetic: Yes      Tobacco smoker: No      Systolic Blood Pressure: 123 mmHg      Is BP treated: Yes      HDL Cholesterol: 47.2 mg/dL      Total Cholesterol: 172.1 mg/dL    GERD: Current medications include: Prilosec (omeprazole) 20 mg daily and Pepcid (famotidine) 20 mg BID but taking once daily. Patient feels that current regimen is effective.    The patient has been on omeprazole for several years. She states that she still has some heartburn but it is mostly controlled and she does not have any complaints about this.    We discussed the negative side effects of omeprazole and how this medication is not recommended long term. Due to the negative side effects the patient expressed that she no longer wanted to take the omeprazole.    Chronic pain:  Current medications include: Percocet  mg TID; she takes this regularly TID every single day.    There is a potnetial  "interaction between Percocet and Diltiazem. Diltiazem (started May 2019) may cause an increase in the oxycodone AUC by 170% and the Cmax by 100%. This was shown with ketoconazole, an other 3A4 inhibitor. Diltiazem is a potent 3A4 inhibitor, so this is a likely interaction with diltiazem.     Patient is currently not prescribed narcan.    Hypertension: Current medications include diltiazem 240 mg daily, chlorthalidone 25 MG daily.  Patient does not self-monitor BP.  Patient reports no current medication side effects.    Patient was previously on an ACEi, but was discontinued due to elevated potassium, metabolic acidosis, and decreased kidney function. Diltiazem was started upon the discontinuation of the ACEi.     DVT:  Current medications include: apixaban 5 mg BID, which she is only taking daily.      The patient was supposed to be taking apixaban BID, but only takes her medications once daily. She reports that she does not double up any of her doses, but is only taking the morning dose. Once daily dosing is not a therapeutic dose for this medication.  After being told it is a twice daily medication, it appears she will still only take pills once daily    It is unclear how long the patient is supposed to take apixaban. She had an unprovoked PE on 12/24/18. It is also reported in the EMR that she had a DVT/PE in 2011 but it is reported that the patient does not remember this and it is not clear. In fact, a note from Dr. Meyer from an office visit on 12/21/18 states no prior DVT and that the plan is to continue anticoagulation for 6 months.    Today's Vitals:   BP Readings from Last 1 Encounters:   08/27/19 123/78     Pulse Readings from Last 1 Encounters:   08/27/19 92     Wt Readings from Last 1 Encounters:   08/27/19 237 lb (107.5 kg)     Ht Readings from Last 1 Encounters:   03/14/17 4' 11.5\" (1.511 m)     Estimated body mass index is 47.07 kg/m  as calculated from the following:    Height as of 3/14/17: 4' " "11.5\" (1.511 m).    Weight as of an earlier encounter on 8/27/19: 237 lb (107.5 kg).    Temp Readings from Last 1 Encounters:   08/27/19 97.5  F (36.4  C) (Oral)       ASSESSMENT:                 Current medications were reviewed today.      Medication Adherence: poor, needs improvement - see below    Patient has trouble remembering to take her evening doses. Discussed how to use a pillbox to help remember, set an alarm on her phone, and working with her PCA to remember.  It appears patient has not been taking sumatriptan, need to review whether this is indicated and appropriate, since we have not prescribed it since 2015.    Diabetes: Needs Improvement. Patient is not meeting A1c goal of < 7%.    Due to patient's recent hospitalization for hypoglycemia, and long time complications with low BS, considered switching to an agent that will decrease the risk for hypoglycemia.    Patient was very receptive to the idea of once weekly trulicity, since it was not another pill and would allow her to stop her mealtime insulin.    GERD: Needs Improvement.  Current treatment is effective, but not necessary.    Patient was wanting to stop her omeprazole after learning about the potential negative side effects. She is not having any bothersome symptoms that she is concerned about.    Patient wanted to trying managing GERD exclusively with famotidine.     Chronic Pain: Need Improvement. Pain is controlled with current medications but would benefit from review of whether the current dose is appropriate or too high.    Diltiazem and Percocet interact and may be resulting in the patient receiving double the dose of oxycodone. This needs to be looked into at future visits.    Hypertension: Stable. Patient is meeting BP goal of < 130/80mmHg.    BP is currently controlled on diltiazem and chlorthalidone    Diltiazem may not be the best agent to control the patient's BP since it is interacting with her Percocet and may be increasing the " levels/effect of oxycodone that she is receiving.    DVT: Unclear if anticoagulation is still needed for DVT.    Need to look into this more, because if the patient has had two unprovoked DVTs then life long anticoagulation may be appropriate. If there is really only 1 DVT/PE that happened 12/2018, the patient may no longer be indicated for anti-coagulation therapy.  Will discuss at next visit with Dr. Meyer.      PLAN:                Post Discharge Medication Reconciliation Status: discharge medications reconciled and changed, per note/orders (see AVS).    1. Stop omeprazole, but continue the famotidine.  2. Start taking Trulicity 0.75 mg every 7 days. Patient will take a morning dose tomorrow (Wednesday), and then continue taking weekly on Mondays (best day for her to remember).  3. Stop Novolog, since starting Trulicity.    Medication issues to be addressed at a future visit      Check patient's BS's now on Trulicity. Determine if the patient has been experiencing any hypoglycemia. After several weeks on the current dose of Trulicity, consider increasing the dose as appropriate.    Need to address the potential interaction between diltiazem and oxycodone, enzyme inhibition may be leading to excess oxycodone exposure. At next visit consider  lowering Percocet dose. Need to exercise caution as if diltiazem is ever discontinued, this could lead to a rapid decline in oxycodone exposure and could lead to withdrawal.    Recommend starting narcan, as her MME is higher than her actual does due to the drug interaction.    Determine whether patient still needs to be on apixaban or if it can be discontinued.  If it is continued, recommend switching to a once daily anticoagulant such as Xarelto, as she prefers to take pills only once daily.     I spent 30 minutes with this patient today. All changes were made via collaborative practice agreement with Dr. Verde.     Will follow up on 9/5 with Dr. Meyer during clinic  visit.    The patient was given a summary of these recommendations as an after visit summary.    Yomaira Colon, PharmD Student    I was present with the pharmacy student who participated in the service and in the documentation of this note. I have verified the history, personally performed the medical decision making, and have verified the content of the note, which accurately reflects my assessment of the patient and the plan of care.   Leatha Hernandez [facility], PharmD

## 2019-08-27 NOTE — PROGRESS NOTES
Preceptor Attestation:   Patient seen, evaluated and discussed with the resident. I have verified the content of the note, which accurately reflects my assessment of the patient and the plan of care.   Supervising Physician:  Santos He MD

## 2019-08-29 ENCOUNTER — AMBULATORY - HEALTHEAST (OUTPATIENT)
Dept: BEHAVIORAL HEALTH | Facility: CLINIC | Age: 54
End: 2019-08-29

## 2019-08-29 ENCOUNTER — AMBULATORY - HEALTHEAST (OUTPATIENT)
Dept: ADMINISTRATIVE | Facility: REHABILITATION | Age: 54
End: 2019-08-29

## 2019-08-29 ENCOUNTER — OFFICE VISIT - HEALTHEAST (OUTPATIENT)
Dept: BEHAVIORAL HEALTH | Facility: CLINIC | Age: 54
End: 2019-08-29

## 2019-08-29 DIAGNOSIS — M54.2 NECK PAIN: ICD-10-CM

## 2019-08-29 DIAGNOSIS — F33.9 MAJOR DEPRESSION, RECURRENT, CHRONIC (H): ICD-10-CM

## 2019-08-29 DIAGNOSIS — F25.9 SCHIZOAFFECTIVE DISORDER, CHRONIC CONDITION (H): ICD-10-CM

## 2019-08-29 DIAGNOSIS — Z09 HOSPITAL DISCHARGE FOLLOW-UP: ICD-10-CM

## 2019-09-05 ENCOUNTER — OFFICE VISIT (OUTPATIENT)
Dept: FAMILY MEDICINE | Facility: CLINIC | Age: 54
End: 2019-09-05
Payer: COMMERCIAL

## 2019-09-05 VITALS
HEART RATE: 68 BPM | TEMPERATURE: 97.8 F | WEIGHT: 231.8 LBS | DIASTOLIC BLOOD PRESSURE: 62 MMHG | SYSTOLIC BLOOD PRESSURE: 118 MMHG | BODY MASS INDEX: 46.03 KG/M2 | RESPIRATION RATE: 12 BRPM | OXYGEN SATURATION: 95 %

## 2019-09-05 DIAGNOSIS — Z79.4 TYPE 2 DIABETES MELLITUS WITH DIABETIC POLYNEUROPATHY, WITH LONG-TERM CURRENT USE OF INSULIN (H): ICD-10-CM

## 2019-09-05 DIAGNOSIS — D17.30 LIPOMA OF SKIN AND SUBCUTANEOUS TISSUE: ICD-10-CM

## 2019-09-05 DIAGNOSIS — M17.0 PRIMARY OSTEOARTHRITIS OF BOTH KNEES: ICD-10-CM

## 2019-09-05 DIAGNOSIS — E11.42 TYPE 2 DIABETES MELLITUS WITH DIABETIC POLYNEUROPATHY, WITH LONG-TERM CURRENT USE OF INSULIN (H): ICD-10-CM

## 2019-09-05 DIAGNOSIS — G89.4 CHRONIC PAIN SYNDROME: Primary | ICD-10-CM

## 2019-09-05 DIAGNOSIS — I10 BENIGN ESSENTIAL HYPERTENSION: ICD-10-CM

## 2019-09-05 DIAGNOSIS — I26.99 PULMONARY EMBOLISM AND INFARCTION (H): ICD-10-CM

## 2019-09-05 LAB
AMPHETAMINES QUAL: NEGATIVE
BARBITURATES QUAL URINE: NEGATIVE
BENZODIAZEPINE QUAL URINE: NEGATIVE
BUPRENORPHINE QUAL URINE: NEGATIVE
CANNABINOIDS UR QL SCN: NEGATIVE
COCAINE QUAL URINE: NEGATIVE
METHAMPHETAMINE: POSITIVE
METHODONE QUAL: NEGATIVE
MORPHINE QUAL: NEGATIVE
OXYCODONE QUAL: POSITIVE
PHENCYCLIDINE: NEGATIVE
PROPOXYPHENE: NEGATIVE
TEMPERATURE OF URINE WAS BETWEEN 90-100 DEGREES F: YES
TRICYCLIC ANTIDEPRESSANTS: NEGATIVE

## 2019-09-05 RX ORDER — OXYCODONE AND ACETAMINOPHEN 10; 325 MG/1; MG/1
1 TABLET ORAL EVERY 6 HOURS PRN
Qty: 90 TABLET | Refills: 0 | Status: SHIPPED | OUTPATIENT
Start: 2019-09-05 | End: 2019-10-03

## 2019-09-05 RX ORDER — INSULIN GLARGINE 100 [IU]/ML
15 INJECTION, SOLUTION SUBCUTANEOUS DAILY
Qty: 0.1 ML | Refills: 0 | COMMUNITY
Start: 2019-09-05 | End: 2019-10-09

## 2019-09-05 NOTE — PROGRESS NOTES
Patient Active Problem List    Diagnosis Date Noted     Chronic pain syndrome 10/07/2016     Priority: High     Chronic Pain Diagnosis:  neuropathy  DIRE Total Score(s):14     ORT: 5    4 - 7 =  Moderate Risk   of future problems with Opioids  FAQ5: 45/100 on 9/9/16, 40/100 on 11/15/16  Behavioral Health Consultation: 5/12/17 with Dr. Daisy Quezada  Personal Care Plan for Chronic Pain: 5/12/17  Opioid medication:oxycodone   Dose:10mg  Number of pills per month:60  Patient is being prescribed 10mg of oxycodone IR (Percocet) per day this is 15 mg Morphine Equivalents  Benzodiazepines Prescribed? No  Naloxone prescribed? No       Clinic visit frequency required: Q 1 month Next visit due:   Controlled Substance/Opioid Treatment agreement on file (dated <12 months ago)?:    Date(s): pending  Last [place] verification: last visit  Items in red to be updated at each visit  Patient is followed by Data Unavailable for ongoing prescription of pain medication.  All refills should be approved by this provider, or covering partner.Chronic Pain          Microalbuminuria 10/12/2015     Priority: High     Type 2 diabetes mellitus with diabetic polyneuropathy (H) 10/12/2015     Priority: High     Pulmonary embolism and infarction (H) 11/15/2012     Priority: High     12/24/2018  Unprovoked.  + fam hx of DVT.  Plan warfarin for 6 months.       Metabolic acidosis 07/26/2019     Priority: Medium     reolved w/ discontinuation of ASA       Copper deficiency 04/05/2019     Priority: Medium     Identified in Aug, 2018--see discharge summary from [facility] in [place].       Chest wall pain 01/03/2019     Priority: Medium     Generalized abdominal pain 01/03/2019     Priority: Medium     Gastroenteritis 01/03/2019     Priority: Medium     DVT of lower extremity, bilateral (H) 01/03/2019     Priority: Medium     Neuropathy 01/03/2019     Priority: Medium     Obesity 01/03/2019     Priority: Medium     Other acute pulmonary embolism without  acute cor pulmonale (H) 12/31/2018     Priority: Medium     Hernia of anterior abdominal wall 01/28/2017     Priority: Medium     Chronic pain 01/27/2017     Priority: Medium     Diarrhea 01/27/2017     Priority: Medium     Hernia 01/27/2017     Priority: Medium     Osteoarthritis of both knees 10/31/2016     Priority: Medium     Pain in left lower leg 10/31/2016     Priority: Medium     Polyneuropathy due to secondary diabetes mellitus (H) 10/31/2016     Priority: Medium     Compression neuropathy of left lower extremity 02/19/2016     Priority: Medium     Bilateral ankle joint pain 01/15/2016     Priority: Medium     Esophageal reflux 04/07/2015     Priority: Medium     Urinary incontinence 02/19/2015     Priority: Medium     Insomnia 02/19/2015     Priority: Medium     Essential hypertension 08/26/2013     Priority: Medium     Asthma 08/04/2013     Priority: Medium     History of injury 08/04/2013     Priority: Medium     Hyperlipidemia LDL goal <100 06/21/2013     Priority: Medium     S/P bariatric surgery 06/21/2013     Priority: Medium     Kim-en-Y in 2006       Neck pain 05/16/2013     Priority: Medium     Moderate major depression (H) 01/24/2013     Priority: Medium     Moderate persistent asthma without complication 11/15/2012     Priority: Medium     S/P hysterectomy 06/21/2013     Priority: Low     2008, For fibroids.  Cervix removed, but still has ovaries.       Carpal tunnel syndrome 11/15/2012     Priority: Low     Necrotizing fasciitis (H) 11/15/2012     Priority: Low     Health Care Home 11/15/2012     Priority: Low     Tier 3    Status: Accept  Care Coordination Start Date: 02/27/12  State Tier Level: Level 3  Language/Barrier to Learning: No  Significant Mental Health Issues: Yes  Date Care Coordination Discontinued: 05/28/15    DX V65.8 REPLACED WITH [facility] (04/08/2013)         Abnormal Pap smear of cervix      Priority: Low     3-12-15: Pap/HPV neg.  Plan: Given hx (mildly abn  pap, hyster), recheck in 5 yrs.  10-11-11: Pap/HPV neg.  Plan: Given ASUS pap in 2010, recommend repeating pap in 1 year.  8/3/2010 ASCUS Pap with +BV- pt treated with Metrogel.  Pt needs repeat Pap in 1yr.  8/4/2008 Pt had total hyst for menorrhagia, pelvic pain, scar tissue, and fibroids.       There are no exam notes on file for this visit.  Chief Complaint   Patient presents with     Follow Up     CPM follow up      Blood pressure 118/62, pulse 68, temperature 97.8  F (36.6  C), temperature source Oral, resp. rate 12, weight 105.1 kg (231 lb 12.8 oz), SpO2 95 %, not currently breastfeeding.  Results for orders placed or performed in visit on 09/05/19   Rapid Urine Drug Screen ([facility])   Result Value Ref Range    Phencyclidine NEGATIVE NEGATIVE    Propoxyphene NEGATIVE NEGATIVE    Tricyclic Antidepressants NEGATIVE NEGATIVE    Amphetamines Qual NEGATIVE NEGATIVE    Barbiturates Qual Urine NEGATIVE NEGATIVE    Buprenorphine Qual Urine NEGATIVE NEGATIVE    Benzodiazepine Qual Urine NEGATIVE NEGATIVE    Cocaine Qual Urine NEGATIVE NEGATIVE    Cannabinoids Qual Urine NEGATIVE NEGATIVE    Methamphetamine Qual POSITIVE (A) NEGATIVE    Methadone Qual NEGATIVE NEGATIVE    Morphine Qual NEGATIVE NEGATIVE    Oxycodone Qual POSITIVE (A) NEGATIVE    Temperature of Urine was Between  Degrees F YES YES   SUBJECTIVE:  Ruthy Álvarez is here for several issues.  She needs followup of her diabetes.  She needs a new prescription for lancets.  Her blood sugars that she brought in today aren't on a glucometer, but they have at remendous improvement from Thursday to Monday.  She is on 10 units of Lantus q.h.s.  She has polyuria, but not polydipsia.   She needs followup of hypertension.  She denies intolerance, lightheadedness, syncope, or chest pain.   She has a lump that has grown on her upper back over the past year or so.  It started as a small bump, but it is increasing in size and feels fluid-filled.  It is associated  with increasing headaches.   She is here to discuss her anticoagulation as well.  She had an unprovoked PE in 12/2018.  She had a prior DVT/PE in 2012, but I don't know the details, but it seemed to be provoked at that time.  She has a negative family history of thromboembolic disease.  She is off of her aspirin.  She is off of her PPI.  She indeed adheres to her Eliquis therapy, and takes it b.i.d.      OBJECTIVE:   VITAL SIGNS:  Patient's blood pressure when taken with a manual cuff at the biceps area is 118/82.  When checked over the wrist, which was the patient's request, it was 81 systolic.  Her pulse is regular.   CHEST:  Clear.     NECK:  She does have a large soft tissue mass just to the left of midline in the upper C-spine that isn't tender to touch.  Her neck ROM is full.      ASSESSMENT/PLAN:   1.  Type II diabetes mellitus, suboptimal control, with considerably less insulin.  She is quite convinced that her blood sugars have been high and that she has been symptomatic over the past two weeks since her insulin has been changed.  I'm going to continue to increase her from 10 units Lantus to 15 units Lantus q.h.s.   We'll check again in two weeks.  I would like to give her a book to record her blood sugars and a prescription for lancets.     2.  Lipoma.  I do think it needs to be excised at some point in time when she is off of anticoagulation. We'll refer her to General Surgery, given its size.   3.  Hypertension.  It is controlled.   4.  Chronic pain.  Urine drug test presently shows methamphetamines today.  We'll confirm this.  She denies any history of drug use outside of what has been prescribed.  I refilled her oxycodone, as I didn't have a UDS at the time of the visit, but we'll contact her when I have the confirmation available.   5.  DVT.  My plan initially in December was to continue anticoagulation for six months only; now that she has had two DVTs, one seemingly provoked and one unprovoked,  I'm  going to rethink this by reviewing the chart and getting back to her when I have all of her test results back next week.   6.  Right shoulder pain.  She has some burning right shoulder pain, but we didn't have time to discuss it.  We'll defer this to the future.   I did educate her about Narcan today and I prescribed this for her in event of an emergency.  PharmD was able to educate her regarding its use.

## 2019-09-05 NOTE — PATIENT INSTRUCTIONS
Increase lantus from 10 to 15 units at night.    Record blood sugars in book    Will review your history of blood clots to determine duration of treatment.    F/up in 1 month.    OPIOID OVERDOSE SAFETY PLAN  Patients taking prescription opioids are at risk for accidental overdose. Overdose from prescription opioid pain medications is a national epidemic. Opioids include: Vicodin (hydrocodone), OxyContin (oxycodone), Dilaudid (hydromorphone), MS Contin (morphine), Fentanyl, Percocet, Methadone, Suboxone, heroin, and others.    Steps to Avoid Overdose  1. Only take medication prescribed to you  2. Don t take more medication than instructed  3. NEVER mix pain medications with alcohol  4. Avoid sleeping pills when taking pain medications  5. Dispose of unused medications  6. Store your medication in a secure place  7. Teach your family and friends how to respond to an overdose      Narcan is being prescribed as part of your opioid overdose safety plan. Narcan is a medication that reverses opioid overdose and saves lives. Opioid overdoses are life threatening and must be handled right away. Narcan reverses overdose for 30-90 minutes, and you must call 911 immediately if you suspect overdose.     STEP 1: RECOGNIZE OVERDOSE  Not breathing or breathing very slowly (less than 1 breath every 5 seconds)  Snoring, gasping, or gurgling sounds  Lips or fingertips turning blue  Very limp body and pale face  Not responding to hard rub of the chest or yelling their name     STEP 2: CALL FOR HELP (DIAL 911)  Always call 911 and tell them  someone is not breathing   You are legally protected when calling for help in [place]     STEP 3: SUPPORT BREATHING  1. Check airway - make sure there is nothing inside their mouth stopping breathing  2. One hand on chin, tilt head back, pinch nose closed  3. Make a seal over mouth and give 2 slow breaths. You should see the chest rise, not stomach.  4. Keep going with one breath every 5 seconds      STEP 4: GIVE NARCAN  Give Narcan if you can give it quickly enough so that the person won t go for too long without your breathing assistance  Follow directions on the package  Spray Narcan into one nostril     STEP 5: MONITOR  Continue rescue breathing until they are breathing on their own  Give another Narcan spray if they are not breathing on their own or still unresponsive within 3 minutes of the first spray  Narcan wears off within 30-90 minutes and the person can overdose again once it wears off because the opioids are still in their system! Be sure to get them medical care right away.

## 2019-09-05 NOTE — LETTER
September 10, 2019      Ruthy Álvarez  [address]  [place] [address]        Dear Ruthy,    Your initial urine test showed possible amphetamines (ie. Meth), but the confirmation test, which is more accurate, was NEGATIVE, thankfully. So, everything's ok.   See you soon.     Please see below for your test results.    Resulted Orders   Rapid Urine Drug Screen ([facility])   Result Value Ref Range    Phencyclidine NEGATIVE NEGATIVE    Propoxyphene NEGATIVE NEGATIVE    Tricyclic Antidepressants NEGATIVE NEGATIVE    Amphetamines Qual NEGATIVE NEGATIVE    Barbiturates Qual Urine NEGATIVE NEGATIVE    Buprenorphine Qual Urine NEGATIVE NEGATIVE    Benzodiazepine Qual Urine NEGATIVE NEGATIVE    Cocaine Qual Urine NEGATIVE NEGATIVE    Cannabinoids Qual Urine NEGATIVE NEGATIVE    Methamphetamine Qual POSITIVE (A) NEGATIVE    Methadone Qual NEGATIVE NEGATIVE    Morphine Qual NEGATIVE NEGATIVE    Oxycodone Qual POSITIVE (A) NEGATIVE    Temperature of Urine was Between  Degrees F YES YES      Comment:      This is a preliminary screening test that detects drugs-of-abuse in urine at   specified detection levels.  To confirm preliminary results, a more specific   method such as Gas Chromatography/Mass Spectrometry (GC/MS) must be used.        Amphetamines, Urine, Quantitative ([facility])   Result Value Ref Range    Amphetamine, Urn, Quant <50 ng/mL      Comment:      INTERPRETIVE INFORMATION: Amphetamines, Urine,                             Quantitative     Methodology: Quantitative Liquid Chromatography-Tandem Mass   Spectrometry     Positive cutoff: 200 ng/mL unless specified below:  Amphetamine     50 ng/mL     For medical purposes only; not valid for forensic use.      The absence of expected drug(s) and/or drug metabolite(s) may   indicate non-compliance, inappropriate timing of specimen   collection relative to drug administration, poor drug absorption,   diluted/adulterated urine, or limitations of  testing. The   concentration value must be greater than or equal to the cutoff to   be reported as positive. Interpretive questions should be directed   to the laboratory.     Test developed and characteristics determined by [facility]. See Compliance Statement B: [employer]/      MDA, Urn, Quant <200 ng/mL    MDEA, Urn, Quant <200 ng/mL    MDMA, Urn, Quant <200 ng/mL    Methamphetamine, Urn, Quant <200 ng/mL    Phentermine, Urn, Quant <200 ng/mL      Comment:      Performed by [facility],  [address] [place] [address] [id number] [phone]  [email or web address], Dayron Lockett MD, Lab. Director      Narrative    Test performed by:  [facility]  [address] [place] [phone]       If you have any questions, please call the clinic to make an appointment.    Sincerely,    Jose Angel Meyer MD

## 2019-09-07 ENCOUNTER — TRANSFERRED RECORDS (OUTPATIENT)
Dept: HEALTH INFORMATION MANAGEMENT | Facility: CLINIC | Age: 54
End: 2019-09-07

## 2019-09-09 LAB
AMPHETAMINE, URN, QUANT: <50 NG/ML
MDA, URN, QUANT: <200 NG/ML
MDEA, URN, QUANT: <200 NG/ML
MDMA, URN, QUANT: <200 NG/ML
METHAMPHETAMINE, URN, QUANT: <200 NG/ML
PHENTERMINE, URN, QUANT: <200 NG/ML

## 2019-09-10 DIAGNOSIS — Z79.4 TYPE 2 DIABETES MELLITUS WITH DIABETIC POLYNEUROPATHY, WITH LONG-TERM CURRENT USE OF INSULIN (H): ICD-10-CM

## 2019-09-10 DIAGNOSIS — E11.42 TYPE 2 DIABETES MELLITUS WITH DIABETIC POLYNEUROPATHY, WITH LONG-TERM CURRENT USE OF INSULIN (H): ICD-10-CM

## 2019-09-10 NOTE — TELEPHONE ENCOUNTER
I am sending you this refill because it came under your preceptor's name or your PCS is out.  Please route it back to your primary PCS.  Althea Real CMA    Pt's insurance or pharmacy is requesting a 90 day supply for Acu-chek Fastclix Lancets.  Please send a new prescription to reflect this request.  Althea Real CMA

## 2019-09-10 NOTE — RESULT ENCOUNTER NOTE
Please mail labs to patient with this message.    Twaundalymejia  Your initial urine test showed possible amphetamines (ie. Meth), but the confirmation test, which is more accurate, was NEGATIVE, thankfully.  So, everything's ok.  See you soon.  JIGNESH Meyer

## 2019-09-11 ENCOUNTER — OFFICE VISIT - HEALTHEAST (OUTPATIENT)
Dept: BEHAVIORAL HEALTH | Facility: CLINIC | Age: 54
End: 2019-09-11

## 2019-09-11 DIAGNOSIS — F25.9 SCHIZOAFFECTIVE DISORDER, CHRONIC CONDITION (H): ICD-10-CM

## 2019-09-11 DIAGNOSIS — F33.9 MAJOR DEPRESSION, RECURRENT, CHRONIC (H): ICD-10-CM

## 2019-09-11 RX ORDER — BLOOD-GLUCOSE METER
EACH MISCELLANEOUS
Qty: 100 EACH | Status: SHIPPED | OUTPATIENT
Start: 2019-09-11 | End: 2019-09-19

## 2019-09-18 ENCOUNTER — TRANSFERRED RECORDS (OUTPATIENT)
Dept: HEALTH INFORMATION MANAGEMENT | Facility: CLINIC | Age: 54
End: 2019-09-18

## 2019-09-18 ENCOUNTER — OFFICE VISIT - HEALTHEAST (OUTPATIENT)
Dept: PHYSICAL THERAPY | Facility: REHABILITATION | Age: 54
End: 2019-09-18

## 2019-09-18 DIAGNOSIS — M54.2 CHRONIC NECK PAIN: ICD-10-CM

## 2019-09-18 DIAGNOSIS — G89.29 CHRONIC NECK PAIN: ICD-10-CM

## 2019-09-18 DIAGNOSIS — M62.81 GENERALIZED MUSCLE WEAKNESS: ICD-10-CM

## 2019-09-18 DIAGNOSIS — R29.898 DECREASED RANGE OF MOTION OF NECK: ICD-10-CM

## 2019-09-19 DIAGNOSIS — E11.42 TYPE 2 DIABETES MELLITUS WITH DIABETIC POLYNEUROPATHY, WITH LONG-TERM CURRENT USE OF INSULIN (H): ICD-10-CM

## 2019-09-19 DIAGNOSIS — Z79.4 TYPE 2 DIABETES MELLITUS WITH DIABETIC POLYNEUROPATHY, WITH LONG-TERM CURRENT USE OF INSULIN (H): ICD-10-CM

## 2019-09-19 RX ORDER — BLOOD-GLUCOSE METER
EACH MISCELLANEOUS
Qty: 100 EACH | Status: SHIPPED | OUTPATIENT
Start: 2019-09-19 | End: 2019-11-25

## 2019-09-20 ENCOUNTER — MEDICAL CORRESPONDENCE (OUTPATIENT)
Dept: HEALTH INFORMATION MANAGEMENT | Facility: CLINIC | Age: 54
End: 2019-09-20

## 2019-09-20 DIAGNOSIS — E11.42 TYPE 2 DIABETES MELLITUS WITH DIABETIC POLYNEUROPATHY, WITH LONG-TERM CURRENT USE OF INSULIN (H): ICD-10-CM

## 2019-09-20 DIAGNOSIS — Z79.4 TYPE 2 DIABETES MELLITUS WITH DIABETIC POLYNEUROPATHY, WITH LONG-TERM CURRENT USE OF INSULIN (H): ICD-10-CM

## 2019-09-23 ENCOUNTER — DOCUMENTATION ONLY (OUTPATIENT)
Dept: FAMILY MEDICINE | Facility: CLINIC | Age: 54
End: 2019-09-23

## 2019-09-23 NOTE — PROGRESS NOTES
To be completed in Nursing note:    Please reference list for forms that require a visit for completion.  Please remind patients that providers are given 3-5 business days to complete and return forms.      Form type:[employer]    Date form received: 2019    Date form completed by Physician:2019    How was form returned to patient (mailed, faxed, or at  for patient to ):[phone]    Date form mailed/faxed/left at  for patient and sent to HIM for [email or web address]      Once form is left for patient, faxed, or mailed PCS will then close the documentation only encounter.

## 2019-09-24 DIAGNOSIS — I10 ESSENTIAL HYPERTENSION: ICD-10-CM

## 2019-09-25 DIAGNOSIS — Z79.4 TYPE 2 DIABETES MELLITUS WITH DIABETIC POLYNEUROPATHY, WITH LONG-TERM CURRENT USE OF INSULIN (H): ICD-10-CM

## 2019-09-25 DIAGNOSIS — E11.42 TYPE 2 DIABETES MELLITUS WITH DIABETIC POLYNEUROPATHY, WITH LONG-TERM CURRENT USE OF INSULIN (H): ICD-10-CM

## 2019-09-25 RX ORDER — DILTIAZEM HYDROCHLORIDE 240 MG/1
240 CAPSULE, EXTENDED RELEASE ORAL DAILY
Qty: 30 CAPSULE | Refills: 11 | Status: SHIPPED | OUTPATIENT
Start: 2019-09-25 | End: 2020-08-10

## 2019-10-03 ENCOUNTER — OFFICE VISIT - HEALTHEAST (OUTPATIENT)
Dept: BEHAVIORAL HEALTH | Facility: CLINIC | Age: 54
End: 2019-10-03

## 2019-10-03 ENCOUNTER — OFFICE VISIT (OUTPATIENT)
Dept: FAMILY MEDICINE | Facility: CLINIC | Age: 54
End: 2019-10-03
Payer: COMMERCIAL

## 2019-10-03 VITALS
DIASTOLIC BLOOD PRESSURE: 70 MMHG | RESPIRATION RATE: 16 BRPM | HEART RATE: 92 BPM | OXYGEN SATURATION: 95 % | TEMPERATURE: 98.3 F | SYSTOLIC BLOOD PRESSURE: 112 MMHG

## 2019-10-03 DIAGNOSIS — G89.4 CHRONIC PAIN SYNDROME: ICD-10-CM

## 2019-10-03 DIAGNOSIS — F25.9 SCHIZOAFFECTIVE DISORDER, CHRONIC CONDITION (H): ICD-10-CM

## 2019-10-03 DIAGNOSIS — I26.99 PULMONARY EMBOLISM AND INFARCTION (H): ICD-10-CM

## 2019-10-03 DIAGNOSIS — D17.30 LIPOMA OF SKIN AND SUBCUTANEOUS TISSUE: ICD-10-CM

## 2019-10-03 DIAGNOSIS — F33.9 MAJOR DEPRESSION, RECURRENT, CHRONIC (H): ICD-10-CM

## 2019-10-03 DIAGNOSIS — Z23 NEED FOR PROPHYLACTIC VACCINATION AND INOCULATION AGAINST INFLUENZA: Primary | ICD-10-CM

## 2019-10-03 RX ORDER — ASPIRIN 325 MG
325 TABLET, DELAYED RELEASE (ENTERIC COATED) ORAL DAILY
Qty: 90 TABLET | Refills: 3 | Status: SHIPPED | OUTPATIENT
Start: 2019-10-03 | End: 2021-09-09

## 2019-10-03 RX ORDER — AMOXICILLIN 250 MG
1 CAPSULE ORAL 2 TIMES DAILY
Qty: 60 TABLET | Refills: 11 | Status: SHIPPED | OUTPATIENT
Start: 2019-10-03 | End: 2020-02-27

## 2019-10-03 RX ORDER — OXYCODONE AND ACETAMINOPHEN 10; 325 MG/1; MG/1
1 TABLET ORAL EVERY 6 HOURS PRN
Qty: 90 TABLET | Refills: 0 | Status: SHIPPED | OUTPATIENT
Start: 2019-10-03 | End: 2019-11-04

## 2019-10-03 RX ADMIN — CYANOCOBALAMIN 1000 MCG: 1000 INJECTION, SOLUTION INTRAMUSCULAR; SUBCUTANEOUS at 12:01

## 2019-10-03 NOTE — LETTER
October 4, 2019      Ruthy Álvarez  [address]  [place] [address]        Dear Ruthy,    [facility]  Phone: [phone]    [facility]  [facility] [address]    Appointment:  Friday October 11th  Arrival Time:  1:00pm   Provider:  Deandre     Please bring a copy of your insurance card and photo ID    If you cannot make this appointment please call [phone] to reschedule    Sincerely,    Anaid Jerome

## 2019-10-03 NOTE — NURSING NOTE
Clinic Administered Medication Documentation    MEDICATION LIST:   Injectable Medication Documentation    Patient was given Cyanocobalamin (B-12). Prior to medication administration, verified patients identity using patient s name and date of birth. Please see MAR and medication order for additional information. Patient instructed to remain in clinic for 15 minutes.      Was entire vial of medication used? Yes  Vial/Syringe: Single dose vial  Expiration Date:  08/20  Was this medication supplied by the patient? No

## 2019-10-04 ENCOUNTER — AMBULATORY - HEALTHEAST (OUTPATIENT)
Dept: SURGERY | Facility: CLINIC | Age: 54
End: 2019-10-04

## 2019-10-04 DIAGNOSIS — D17.30 LIPOMA OF SKIN AND SUBCUTANEOUS TISSUE: ICD-10-CM

## 2019-10-04 NOTE — PATIENT INSTRUCTIONS
GENERAL SURG ADULT REFERRAL  [facility]  Phone: [phone]  Fax: [phone]    [facility]  [address]  [place] [address]    Appointment:  Friday October 11th  Arrival Time:  1:00pm   Provider:  Deandre     Please bring a copy of your insurance card and photo ID    If you cannot make this appointment please call [phone] to reschedule     Referral, demographics and office note faxed to [phone].     Anaid Jerome

## 2019-10-05 PROBLEM — I82.403 DVT OF LOWER EXTREMITY, BILATERAL (H): Status: RESOLVED | Noted: 2019-01-03 | Resolved: 2019-10-05

## 2019-10-05 PROBLEM — R07.89 CHEST WALL PAIN: Status: RESOLVED | Noted: 2019-01-03 | Resolved: 2019-10-05

## 2019-10-05 NOTE — PROGRESS NOTES
Patient Active Problem List    Diagnosis Date Noted     Chronic pain syndrome 10/07/2016     Priority: High     Chronic Pain Diagnosis:  neuropathy  DIRE Total Score(s):14     ORT: 5    4 - 7 =  Moderate Risk   of future problems with Opioids  FAQ5: 45/100 on 9/9/16, 40/100 on 11/15/16  Behavioral Health Consultation: 5/12/17 with Dr. Daisy Quezada  Personal Care Plan for Chronic Pain: 5/12/17  Opioid medication:oxycodone   Dose:10mg  Number of pills per month:60  Patient is being prescribed 10mg of oxycodone IR (Percocet) per day this is 15 mg Morphine Equivalents  Benzodiazepines Prescribed? No  Naloxone prescribed? No       Clinic visit frequency required: Q 1 month Next visit due:   Controlled Substance/Opioid Treatment agreement on file (dated <12 months ago)?:    Date(s): pending  Last [place] verification: last visit  Items in red to be updated at each visit  Patient is followed by Data Unavailable for ongoing prescription of pain medication.  All refills should be approved by this provider, or covering partner.Chronic Pain          Microalbuminuria 10/12/2015     Priority: High     Type 2 diabetes mellitus with diabetic polyneuropathy (H) 10/12/2015     Priority: High     Pulmonary embolism and infarction (H) 11/15/2012     Priority: High     12/24/2018  Unprovoked.  + fam hx of DVT.  Plan warfarin for 6 months.       Metabolic acidosis 07/26/2019     Priority: Medium     reolved w/ discontinuation of ASA       Copper deficiency 04/05/2019     Priority: Medium     Identified in Aug, 2018--see discharge summary from [facility] in [place].       Chest wall pain 01/03/2019     Priority: Medium     Generalized abdominal pain 01/03/2019     Priority: Medium     Gastroenteritis 01/03/2019     Priority: Medium     DVT of lower extremity, bilateral (H) 01/03/2019     Priority: Medium     Neuropathy 01/03/2019     Priority: Medium     Obesity 01/03/2019     Priority: Medium     Other acute pulmonary embolism without  acute cor pulmonale (H) 12/31/2018     Priority: Medium     Hernia of anterior abdominal wall 01/28/2017     Priority: Medium     Chronic pain 01/27/2017     Priority: Medium     Diarrhea 01/27/2017     Priority: Medium     Hernia 01/27/2017     Priority: Medium     Osteoarthritis of both knees 10/31/2016     Priority: Medium     Pain in left lower leg 10/31/2016     Priority: Medium     Polyneuropathy due to secondary diabetes mellitus (H) 10/31/2016     Priority: Medium     Compression neuropathy of left lower extremity 02/19/2016     Priority: Medium     Bilateral ankle joint pain 01/15/2016     Priority: Medium     Esophageal reflux 04/07/2015     Priority: Medium     Urinary incontinence 02/19/2015     Priority: Medium     Insomnia 02/19/2015     Priority: Medium     Essential hypertension 08/26/2013     Priority: Medium     Asthma 08/04/2013     Priority: Medium     History of injury 08/04/2013     Priority: Medium     Hyperlipidemia LDL goal <100 06/21/2013     Priority: Medium     S/P bariatric surgery 06/21/2013     Priority: Medium     Kim-en-Y in 2006       Neck pain 05/16/2013     Priority: Medium     Moderate major depression (H) 01/24/2013     Priority: Medium     Moderate persistent asthma without complication 11/15/2012     Priority: Medium     S/P hysterectomy 06/21/2013     Priority: Low     2008, For fibroids.  Cervix removed, but still has ovaries.       Carpal tunnel syndrome 11/15/2012     Priority: Low     Necrotizing fasciitis (H) 11/15/2012     Priority: Low     Health Care Home 11/15/2012     Priority: Low     Tier 3    Status: Accept  Care Coordination Start Date: 02/27/12  State Tier Level: Level 3  Language/Barrier to Learning: No  Significant Mental Health Issues: Yes  Date Care Coordination Discontinued: 05/28/15    DX V65.8 REPLACED WITH [facility] (04/08/2013)         Abnormal Pap smear of cervix      Priority: Low     3-12-15: Pap/HPV neg.  Plan: Given hx (mildly abn  pap, hyster), recheck in 5 yrs.  10-11-11: Pap/HPV neg.  Plan: Given ASUS pap in 2010, recommend repeating pap in 1 year.  8/3/2010 ASCUS Pap with +BV- pt treated with Metrogel.  Pt needs repeat Pap in 1yr.  8/4/2008 Pt had total hyst for menorrhagia, pelvic pain, scar tissue, and fibroids.       Nursing Notes:   Viridiana Loaiza CMA  10/3/2019 12:12 PM  Signed  Clinic Administered Medication Documentation    MEDICATION LIST:   Injectable Medication Documentation    Patient was given Cyanocobalamin (B-12). Prior to medication administration, verified patients identity using patient s name and date of birth. Please see MAR and medication order for additional information. Patient instructed to remain in clinic for 15 minutes.      Was entire vial of medication used? Yes  Vial/Syringe: Single dose vial  Expiration Date:  08/20  Was this medication supplied by the patient? No    Chief Complaint   Patient presents with     Pain Management     CPM     other     Vitamin B-12 shot needed     Referral     tumor on back of neck     Imm/Inj     Flu Shot     Blood pressure 112/70, pulse 92, temperature 98.3  F (36.8  C), temperature source Oral, resp. rate 16, SpO2 95 %, not currently breastfeeding.  Results for orders placed or performed in visit on 10/03/19   Rapid Urine Drug Screen ([facility])   Result Value Ref Range    Cannabinoids Qual Urine Negative NEGATIVE    Phencyclidine Negative NEGATIVE    Cocaine Qual Urine Negative NEGATIVE    Methamphetamine Qual Negative NEGATIVE    Morphine Qual Negative NEGATIVE    Amphetamines Qual Negative NEGATIVE    Benzodiazepine Qual Urine Negative NEGATIVE    Tricyclic Antidepressants Negative NEGATIVE    Methadone Qual Negative NEGATIVE    Barbiturates Qual Urine Negative NEGATIVE    Oxycodone Qual Positive (A) NEGATIVE    Propoxyphene Negative NEGATIVE    Buprenorphine Qual Urine Negative NEGATIVE    Temperature of Urine was Between  Degrees F YES YES   SUBJECTIVE:   Twaundalyn  Henri is here for chronic pain syndrome, diabetes, evaluation of anticoagulation, and a lipoma.     Re: chronic pain, the etiology is OA in the knees and back.  It is stable on low dose oxycodone.  She gets 90 a month and takes 30 mg daily.  She tolerates this well.  Constipation is managed.   I reviewed the database and it is normal.  UDS is appropriate.   Her blood sugars are as follows:  On Trulicity 7.5 mg weekly and Lantus insulin 20 units daily, in the morning, she is in the 130s.  In the evening, she checked infrequently.  At times, it was normal and at times it was in the 200s.  There has been no hypoglycemia.  No readings higher than 300.  Her last A1C in August was 7.8.     Re: PE, she had aeemingly unprovoked PE in 12/2018, and has been on anticoagulants since then.  She tolerates them well.  She has no family history of known clotting disorder or personal history of clotting disorder.  She had been on a long car ride in the preceding weeks before the event, but had no surgery before, so we're considering it unprovoked, although that isn't entirely clear.   Pertinent to this discussion is that she has a likely lipoma on the left side of her back that she would like taken care of if at all possible.  She has seen PT and she says they are contributing to her neck stiffness and I agree.      OBJECTIVE:   VITAL SIGNS:  Blood pressure is outstanding.  Pulse is normal.     SKIN:  Patient does have a prominent soft tissue mass, midline and left of her lower cervical/ upper thoracic area of the neck and back.   CHEST:  Clear.   HEART:  Regular rate and rhythm without murmur.   LABS:  UDS is appropriate.  Database was reviewed and is appropriate as well.      ASSESSMENT/PLAN:   1.  Chronic pain syndrome, stable.  Continue oxycodone 10 mg t.i.d., which was refilled today.   2.  Health maintenance.  Flu shot was given.   3.  PE.  She had a single event that was unprovoked.  We'll stop her current anticoagulant and  start aspirin 325 mg a day as an alternative.   4.  Lipoma.  I do think it is symptomatic.  We'll refer her for excision.

## 2019-10-09 RX ORDER — INSULIN GLARGINE 100 [IU]/ML
15 INJECTION, SOLUTION SUBCUTANEOUS DAILY
Qty: 6 ML | Refills: 11 | Status: SHIPPED | OUTPATIENT
Start: 2019-10-09 | End: 2019-10-11

## 2019-10-11 ENCOUNTER — OFFICE VISIT - HEALTHEAST (OUTPATIENT)
Dept: SURGERY | Facility: CLINIC | Age: 54
End: 2019-10-11

## 2019-10-11 ENCOUNTER — TRANSFERRED RECORDS (OUTPATIENT)
Dept: HEALTH INFORMATION MANAGEMENT | Facility: CLINIC | Age: 54
End: 2019-10-11

## 2019-10-11 DIAGNOSIS — R22.1 NECK MASS: ICD-10-CM

## 2019-10-11 DIAGNOSIS — Z79.4 TYPE 2 DIABETES MELLITUS WITH DIABETIC POLYNEUROPATHY, WITH LONG-TERM CURRENT USE OF INSULIN (H): ICD-10-CM

## 2019-10-11 DIAGNOSIS — E11.42 TYPE 2 DIABETES MELLITUS WITH DIABETIC POLYNEUROPATHY, WITH LONG-TERM CURRENT USE OF INSULIN (H): ICD-10-CM

## 2019-10-11 ASSESSMENT — MIFFLIN-ST. JEOR: SCORE: 1539.37

## 2019-10-11 NOTE — TELEPHONE ENCOUNTER
Called pt to phylicia and she  wanted 25 units for her insulin glargine (BASAGLAR KWIKPEN) 100 UNIT/ML pen

## 2019-10-15 ENCOUNTER — RESULTS ONLY (OUTPATIENT)
Dept: FAMILY MEDICINE | Facility: CLINIC | Age: 54
End: 2019-10-15

## 2019-10-15 ENCOUNTER — RECORDS - HEALTHEAST (OUTPATIENT)
Dept: ADMINISTRATIVE | Facility: OTHER | Age: 54
End: 2019-10-15

## 2019-10-15 ENCOUNTER — OFFICE VISIT (OUTPATIENT)
Dept: FAMILY MEDICINE | Facility: CLINIC | Age: 54
End: 2019-10-15
Payer: COMMERCIAL

## 2019-10-15 DIAGNOSIS — Z01.818 PRE-OPERATIVE EXAMINATION: Primary | ICD-10-CM

## 2019-10-15 LAB
ANION GAP SERPL CALCULATED.3IONS-SCNC: 10 MMOL/L (ref 5–18)
BUN SERPL-MCNC: 27 MG/DL (ref 8–22)
CALCIUM SERPL-MCNC: 9.2 MG/DL (ref 8.5–10.5)
CHLORIDE SERPL-SCNC: 110 MMOL/L (ref 98–107)
CO2 SERPL-SCNC: 19 MMOL/L (ref 22–31)
CREAT SERPL-MCNC: 1.48 MG/DL (ref 0.6–1.1)
ERYTHROCYTE [DISTWIDTH] IN BLOOD BY AUTOMATED COUNT: 12.3 % (ref 11–14.5)
GLUCOSE SERPL-MCNC: 202 MG/DL (ref 70–125)
HCT VFR BLD AUTO: 37 % (ref 35–47)
HGB BLD-MCNC: 11.5 G/DL (ref 12–16)
MCH RBC QN AUTO: 31.3 PG (ref 27–34)
MCHC RBC AUTO-ENTMCNC: 31.1 G/DL (ref 32–36)
MCV RBC AUTO: 101 FL (ref 80–100)
PLATELET # BLD AUTO: 279 THOU/UL (ref 140–440)
PMV BLD AUTO: 12.2 FL (ref 8.5–12.5)
POTASSIUM SERPL-SCNC: 5.2 MMOL/L (ref 3.5–5)
RBC # BLD AUTO: 3.67 MILL/UL (ref 3.8–5.4)
SODIUM SERPL-SCNC: 139 MMOL/L (ref 136–145)
WBC # BLD AUTO: 11.1 THOU/UL (ref 4–11)

## 2019-10-15 ASSESSMENT — MIFFLIN-ST. JEOR: SCORE: 1544.37

## 2019-10-15 NOTE — LETTER
October 17, 2019      Ruthy Álvarez  [address]  [place] [address]        Dear Ruthy,  Here are the results of the recent lab tests taken at [facility]. Your hemoglobin level was stable at 11.5 g/dL. Your platelet count was normal.     Please call [facility] with any questions or concerns.   Please see below for your test results.    Resulted Orders   CBC w/ Plt. ([facility])   Result Value Ref Range    WBC 11.1 (H) 4.0 - 11.0 thou/uL    RBC 3.67 (L) 3.80 - 5.40 mill/uL    Hemoglobin 11.5 (L) 12.0 - 16.0 g/dL    Hematocrit 37.0 35.0 - 47.0 %     (H) 80 - 100 fL    MCH 31.3 27.0 - 34.0 pg    MCHC 31.1 (L) 32.0 - 36.0 g/dL    RDW 12.3 11.0 - 14.5 %    Platelets 279 140 - 440 thou/uL    Mean Platelet Volume 12.2 8.5 - 12.5 fL    Narrative    Test performed by:  [facility]  [address] [place] [address]       If you have any questions, please call the clinic to make an appointment.    Sincerely,    Israel Sandoval MD  [facility]

## 2019-10-15 NOTE — LETTER
October 17, 2019      Ruthy Álvarez  [address]  [place] [address]        Dear Ruthy,  Here are the results of the recent lab tests taken at [facility]. The electrolyte panel revealed normal sodium level and slightly elevated potassium level. It appears that the potassium level has been this high in the past however. The creatinine level was higher than normal as well which indicates that the kidneys have been stressed. Usually this resolves with time but is something that should be monitored.     Please call [facility] with any questions or concerns.   Please see below for your test results.    Resulted Orders   Basic Metabolic Profile ([facility])   Result Value Ref Range    Sodium 139 136 - 145 mmol/L    Potassium 5.2 (H) 3.5 - 5.0 mmol/L    Chloride 110 (H) 98 - 107 mmol/L    CO2, Total 19 (L) 22 - 31 mmol/L    Anion Gap 10 5 - 18 mmol/L    Glucose 202 (H) 70 - 125 mg/dL    Calcium 9.2 8.5 - 10.5 mg/dL    Urea Nitrogen 27 (H) 8 - 22 mg/dL    Creatinine 1.48 (H) 0.60 - 1.10 mg/dL    GFR Estimate If Black 45 (L) >60 mL/min/1.73m2    GFR Estimate 37 (L) >60 mL/min/1.73m2    Narrative    Test performed by:  [facility]  [address] [place] [address]  Fasting Glucose reference range is 70-99 mg/dL per  American Diabetes Association (ADA) guidelines.       If you have any questions, please call the clinic to make an appointment.    Sincerely,    Israel Sandoval  [facility]

## 2019-10-16 ENCOUNTER — ANESTHESIA - HEALTHEAST (OUTPATIENT)
Dept: SURGERY | Facility: AMBULATORY SURGERY CENTER | Age: 54
End: 2019-10-16

## 2019-10-16 ENCOUNTER — DOCUMENTATION ONLY (OUTPATIENT)
Dept: FAMILY MEDICINE | Facility: CLINIC | Age: 54
End: 2019-10-16

## 2019-10-16 RX ORDER — INSULIN GLARGINE 100 [IU]/ML
25 INJECTION, SOLUTION SUBCUTANEOUS DAILY
Qty: 6 ML | Refills: 11 | Status: SHIPPED | OUTPATIENT
Start: 2019-10-16 | End: 2020-06-22

## 2019-10-16 ASSESSMENT — MIFFLIN-ST. JEOR: SCORE: 1539.37

## 2019-10-16 NOTE — PROGRESS NOTES
[facility] Medicine: Preoperative History and Physical Examination    Date of Examination: 10/15/2019   Proposed Surgery: Lipoma removal  Surgeon: Rhys Lozano MD  Date of Surgery: 10/17/2019  Location of Surgery: [facility]  Fax Number for H&P: [phone]    Pre-operative diagnosis: Lipoma    Dr. Martinez has asked to have Ruthy Álvarez see us for a pre-operative evaluation for the above-mentioned procedure. She  is otherwise in her usual state of health.     Indication for Surgery: Lipoma of the neck    Previous diagnostic and therapeutic evaluation: Referred to [facility] general surgery for neck mass.  Surgical evaluation revealed a 8 x 8 cm midline fatty mass of the upper back.  Patient requested removal of the lipoma.    Past Medical History:   Past Medical History:   Diagnosis Date     Arthritis      Depressive disorder      Diabetes (H)      Gastroenteritis      History of blood transfusion      Hypertension      Necrotizing fasciitis (H) 11/15/2012     Osteoarthritis of both knees 10/31/2016     Uncomplicated asthma         Patient Active Problem List   Diagnosis     Abnormal Pap smear of cervix     Moderate persistent asthma without complication     Carpal tunnel syndrome     Necrotizing fasciitis (H)     Pulmonary embolism and infarction (H)     Health Care Home     Moderate major depression (H)     Neck pain     S/P hysterectomy     Hyperlipidemia LDL goal <100     S/P bariatric surgery     Essential hypertension     Urinary incontinence     Insomnia     Esophageal reflux     Microalbuminuria     Type 2 diabetes mellitus with diabetic polyneuropathy (H)     Bilateral ankle joint pain     Chronic pain syndrome     Other acute pulmonary embolism without acute cor pulmonale (H)     Asthma     Chronic pain     Compression neuropathy of left lower extremity     Diarrhea     Generalized abdominal pain     Hernia     Gastroenteritis     Hernia of  anterior abdominal wall     History of injury     Neuropathy     Obesity     Osteoarthritis of both knees     Polyneuropathy due to secondary diabetes mellitus (H)     Copper deficiency     Metabolic acidosis        Current Outpatient Medications   Medication     albuterol (2.5 MG/3ML) 0.083% nebulizer solution     albuterol (VENTOLIN HFA) 108 (90 Base) MCG/ACT inhaler     Alcohol Swabs (B-D SINGLE USE SWABS REGULAR) PADS     aspirin (ASA) 325 MG EC tablet     atorvastatin (LIPITOR) 10 MG tablet     blood glucose (VIC CONTOUR) test strip     blood glucose (iLoop Mobile FINEPOINT) lancets     blood glucose (NO BRAND SPECIFIED) lancets standard     blood glucose monitoring (VIC CONTOUR MONITOR) meter device kit     capsaicin (ZOSTRIX) 0.025 % external cream     chlorthalidone (HYGROTON) 25 MG tablet     Copper Gluconate 2 MG CAPS     diltiazem ER (DILT-XR) 240 MG 24 hr ER beaded capsule     dulaglutide (TRULICITY) 0.75 MG/0.5ML pen     famotidine (PEPCID) 20 MG tablet     ferrous sulfate (IRON) 325 (65 FE) MG tablet     fluticasone-vilanterol (BREO ELLIPTA) 200-25 MCG/INH oral inhaler     gabapentin (NEURONTIN) 250 MG/5ML solution     insulin glargine (BASAGLAR KWIKPEN) 100 UNIT/ML pen     insulin pen needle (BD ULTRA-FINE) 29G X 12.7MM miscellaneous     magnesium oxide (MAG-OX) 400 (241.3 MG) MG tablet     naloxone (NARCAN) 4 MG/0.1ML nasal spray     order for DME     oxyCODONE-acetaminophen (PERCOCET)  MG per tablet     risperiDONE (RISPERDAL) 0.25 MG tablet     senna-docusate (SENOKOT-S/PERICOLACE) 8.6-50 MG tablet     Simethicone 180 MG CAPS     SUMAtriptan (IMITREX) 25 MG tablet     tolterodine ER (DETROL LA) 2 MG 24 hr capsule     vitamin D3 (CHOLECALCIFEROL) 2000 units tablet     Current Facility-Administered Medications   Medication     cyanocobalamin injection 1,000 mcg     cyanocobalamin injection 1,000 mcg     cyanocobalamin injection 1,000 mcg     vitamin B-12 (CYANOCOBALAMIN) injection 1,000 mcg         Allergies   Allergen Reactions     Sulfamethoxazole-Trimethoprim Other (See Comments)     Other reaction(s): Renal Failure  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 No   Do you have any chest pain on exertion (anginal type)? No   Personal/Family History of Anesthetic Reaction? No   Personal/Family History of easy bruising/bleeding disorder? No   Personal History of Snoring/Sleep Apnea? No   Personal/Family History of VTE/PE? Yes - DVT/PE treated with Eliquis  Current Aspirin Use? Yes - daily aspirin 325 mg  Recent Steroid Use? No     Family History:   Family History   Problem Relation Age of Onset     Diabetes Mother      Diabetes Brother      Diabetes Sister      No Known Problems Father      No Known Problems Maternal Grandmother      No Known Problems Maternal Grandfather      No Known Problems Paternal Grandmother      No Known Problems Paternal Grandfather      No Known Problems Son      No Known Problems Daughter      No Known Problems Maternal Half-Brother      No Known Problems Maternal Half-Sister      No Known Problems Paternal Half-Brother      No Known Problems Paternal Half-Sister      No Known Problems Niece      No Known Problems Nephew      No Known Problems Cousin      No Known Problems Other      Coronary Artery Disease No family hx of      Cancer No family hx of      Heart Disease No family hx of      Hypertension No family hx of      Hyperlipidemia No family hx of      Kidney Disease No family hx of      Cerebrovascular Disease No family hx of      Obesity No family hx of      Thrombosis No family hx of      Asthma No family hx of      Arthritis No family hx of      Thyroid Disease No family hx of       Depression No family hx of      Mental Illness No family hx of      Substance Abuse No family hx of      Cystic Fibrosis No family hx of      Early Death No family hx of      Coronary Artery Disease Early Onset No family hx of      Heart Failure No family hx of      Bleeding Diathesis No family hx of      Dementia No family hx of      Breast Cancer No family hx of      Ovarian Cancer No family hx of      Uterine Cancer No family hx of      Prostate Cancer No family hx of      Colorectal Cancer No family hx of      Pancreatic Cancer No family hx of      Lung Cancer No family hx of      Melanoma No family hx of      Autoimmune Disease No family hx of      Unknown/Adopted No family hx of      Genetic Disorder No family hx of         Social History:  Social History     Socioeconomic History     Marital status: Single     Spouse name: Not on file     Number of children: Not on file     Years of education: Not on file     Highest education level: Not on file   Occupational History     Not on file   Social Needs     Financial resource strain: Not on file     Food insecurity:     Worry: Not on file     Inability: Not on file     Transportation needs:     Medical: Not on file     Non-medical: Not on file   Tobacco Use     Smoking status: Never Smoker     Smokeless tobacco: Never Used   Substance and Sexual Activity     Alcohol use: No     Drug use: No     Sexual activity: Never   Lifestyle     Physical activity:     Days per week: Not on file     Minutes per session: Not on file     Stress: Not on file   Relationships     Social connections:     Talks on phone: Not on file     Gets together: Not on file     Attends [employer] service: Not on file     Active member of club or organization: Not on file     Attends meetings of clubs or organizations: Not on file     Relationship status: Not on file     Intimate partner violence:     Fear of current or ex partner: Not on file     Emotionally abused: Not on file     Physically  abused: Not on file     Forced sexual activity: Not on file   Other Topics Concern     Not on file   Social History Narrative     Not on file       Review of Systems:  CONSTITUTIONAL: No fever or chills. No fatigue.  HEENT: No headache or neck pain. No recent changes in vision, eye redness, dry eyes, or eye irritation. No runny nose, nasal congestion, sinus pain, or sore throat.  SKIN: No rashes, bruises, jaundice, or skin lesions.  RESPIRATORY: Occasional shortness of breath, wheezing, and cough secondary to asthma.  Asthma well controlled.  CARDIOVASCULAR: No chest pain or chest tightness. No palpitations or irregular heart beats. No syncope. No lower extremity swelling.  GASTROINTESTINAL: No nausea, vomiting, reflux, diarrhea, or constipation. No abdominal pain, cramping, or bloating.  GENITOURINARY: No hematuria, dysuria, changes in frequency, or leaking of urine.  MUSCULOSKELETAL: Chronic lower extremity pain secondary to neuropathy.  NEUROLOGIC: No numbness, tingling, or tremors. No seizures, fainting, or loss of consciousness.  HEMATOLOGIC: Recently treated PE.  No easy bruising or bleeding.  ENDOCRINE: No heat or cold intolerance. No polydyspsia, polyphagia, or polyuria.  PSYCHIATRIC: No depressed mood or excessively elevated mood. No changes in sleep, appetite, energy, concentration. No loss of interest. No hallucinations or delusions.       Physical Exam:    Vitals:   T: 97.9 F    BP: 115/73 mmHg    HR: 103 bpm    RR: 20/min    SpO2: 96%    GENERAL: Awake, alert.  Well-developed.  No acute distress. Appears comfortable.  HEENT: Head: Normocephalic and atraumatic; no dysmorphic features. Eyes: Eye lids and lashes normal; pupils equal, round and reactive to light; extra ocular eye movements intact in all directions; no scleral icterus or conjunctival injection. Nose: nares patent and without discharge; frontal and maxillary sinuses non-tender to palpation. Oropharynx: mucus membranes pink and moist; tonsils  without enlargement, erythema or exudates.  NECK: Supple and symmetric. Trachea midline. No anterior or posterior cervical lymphadenopathy, no supraclavicular lymphadenopathy.   SKIN: Warm and dry.  No rashes, lesions, erythema, petechiae, purpura, ecchymosis, or jaundice.  LUNGS: No increased work of breathing; good air movement throughout all lung fields; breath sounds clear to auscultation bilaterally throughout all lung fields with no crackles or wheezing.  CARDIOVASCULAR: Regular rate and rhythm; normal S1 and S2; no S3 or S4; no murmur, rub or click. Radial and dorsalis pedis/posterior tibial pulses intact; normal capillary refill. No peripheral edema.  ABDOMEN: Non-distended. Soft and non-tender in all quadrants; no masses or hepatosplenomegally.  BACK: There is a prominent, large soft mass palpable just left of the upper thoracic spine that is approximately 6 x 6 cm that is nontender to palpation.  Cervical, thoracic and lumbar spinous processes are non-tender to palpation; paraspinous muscles are non-tender to palpation. No costal vertebral tenderness.  MUSCULOSKELETAL: No gross deformity, erythema, warmth or effusion of the joints.   NEUROLOGIC: Awake, alert. Cranial nerves II-XII are  intact. Motor strength is 5/5 throughout.  PSYCH: Normal affect, mood, orientation, memory and insight.      No results found for this or any previous visit (from the past 24 hour(s)).    EKG: indicated and ordered  CXR: not indicated    Assessment/Plan:  Ruthy Álvarez is a 54 year old female here for preoperative evaluation. Patient is scheduled for lipoma removal on 10/17/2019 with Dr. Rhys Martinez at [facility]. Approval given to proceed with the proposed procedure, without further diagnostic evaluation.       1. Take her usual medications except for aspirin on the morning of surgery with a sip of water.  2.  Continue to hold Eliquis until after procedure.  3.  EKG  revealed normal sinus rhythm with no acute concerning findings.  4.  BMP was obtained, results pending.  5.  Approval given to proceed with removal of lipoma.    Dr. Martinez, please see the above history and physical on our mutual patient, Ruthy Álvarez. Thank you for allowing me to participate in the pre-operative evaluation of this patient.        Israel Sandoval, PGY-2  [facility] Medicine Residency  10/15/2019    Patient was discussed with attending physician, Dr. Beatrice Burks MD, who agrees with the assessment and plan.

## 2019-10-16 NOTE — RESULT ENCOUNTER NOTE
Girma Coy,    When convenient, could you please mail these results to this patient with the following message. Thank you.    Dear Ruthy,    Here are the results of the recent lab tests taken at [facility]. The electrolyte panel revealed normal sodium level and slightly elevated potassium level. It appears that the potassium level has been this high in the past however. The creatinine level was higher than normal as well which indicates that the kidneys have been stressed. Usually this resolves with time but is something that should be monitored.     Please call [facility] with any questions or concerns.    Sincerely,  Israel Sandoval MD

## 2019-10-16 NOTE — PROGRESS NOTES
Preceptor Attestation:   Patient seen, evaluated and discussed with the resident. I have verified the content of the note, which accurately reflects my assessment of the patient and the plan of care.   Supervising Physician:  Abdirahman Mcdonald MD.

## 2019-10-16 NOTE — TELEPHONE ENCOUNTER
Dr. Meyer already reorder the insulin and change to 25 units daily on 10/16/19.    BRIEN ArroyoA

## 2019-10-16 NOTE — RESULT ENCOUNTER NOTE
Girma Coy,    When convenient, could you please mail these results to this patient with the following message. Thank you.    Dear Ruthy,    Here are the results of the recent lab tests taken at [facility]. Your hemoglobin level was stable at 11.5 g/dL. Your platelet count was normal.    Please call [facility] with any questions or concerns.    Sincerely,  Israel Sandoval MD

## 2019-10-16 NOTE — TELEPHONE ENCOUNTER
[facility] phone call message- general phone call:    Reason for call: Re the Basaglar insulin the direction says to use 15 units every day but the patient was expecting 25 units a day.    Action desired: call back    Return call needed: Yes    OK to leave a message on voice mail? Yes    Advised patient to response may take up to 2 business days: Yes    Primary language: English      needed? No    Call taken on October 16, 2019 at 10:24 AM by Ginger Ventura

## 2019-10-16 NOTE — PROGRESS NOTES
To be completed in Nursing note:    Please reference list for forms that require a visit for completion.  Please remind patients that providers are given 3-5 business days to complete and return forms.      Form type:[facility]    Date form received: 10/16/2019    Date form completed by Physician:10/16/2019    How was form returned to patient (mailed, faxed, or at  for patient to ):[phone]    Date form mailed/faxed/left at  for patient and sent to HIM for scanning:10/16/2019      Once form is left for patient, faxed, or mailed PCS will then close the documentation only encounter.

## 2019-10-17 ENCOUNTER — TRANSFERRED RECORDS (OUTPATIENT)
Dept: HEALTH INFORMATION MANAGEMENT | Facility: CLINIC | Age: 54
End: 2019-10-17

## 2019-10-17 ENCOUNTER — SURGERY - HEALTHEAST (OUTPATIENT)
Dept: SURGERY | Facility: AMBULATORY SURGERY CENTER | Age: 54
End: 2019-10-17

## 2019-10-17 VITALS
WEIGHT: 229 LBS | RESPIRATION RATE: 20 BRPM | HEART RATE: 103 BPM | OXYGEN SATURATION: 96 % | DIASTOLIC BLOOD PRESSURE: 73 MMHG | BODY MASS INDEX: 46.16 KG/M2 | TEMPERATURE: 97.9 F | SYSTOLIC BLOOD PRESSURE: 115 MMHG | HEIGHT: 59 IN

## 2019-10-17 NOTE — PROGRESS NOTES
Preceptor Attestation:   Patient seen, evaluated and discussed with the resident. I personally viewed the EKG and agree with the interpretation documented by the resident. I have verified the content of the note, which accurately reflects my assessment of the patient and the plan of care.   Supervising Physician:  Beatrice Burks MD.

## 2019-10-18 ENCOUNTER — COMMUNICATION - HEALTHEAST (OUTPATIENT)
Dept: SURGERY | Facility: CLINIC | Age: 54
End: 2019-10-18

## 2019-10-18 ENCOUNTER — AMBULATORY - HEALTHEAST (OUTPATIENT)
Dept: SURGERY | Facility: CLINIC | Age: 54
End: 2019-10-18

## 2019-10-18 DIAGNOSIS — R22.1 NECK MASS: ICD-10-CM

## 2019-10-18 NOTE — RESULT ENCOUNTER NOTE
I spoke with the Patient during her clinic visit and communicated these results.     Israel Sandoval MD

## 2019-10-22 ENCOUNTER — OFFICE VISIT - HEALTHEAST (OUTPATIENT)
Dept: SURGERY | Facility: CLINIC | Age: 54
End: 2019-10-22

## 2019-10-22 DIAGNOSIS — Z48.89 POSTOPERATIVE VISIT: ICD-10-CM

## 2019-10-22 ASSESSMENT — MIFFLIN-ST. JEOR: SCORE: 1539.37

## 2019-10-24 ENCOUNTER — AMBULATORY - HEALTHEAST (OUTPATIENT)
Dept: LAB | Facility: CLINIC | Age: 54
End: 2019-10-24

## 2019-10-24 ENCOUNTER — COMMUNICATION - HEALTHEAST (OUTPATIENT)
Dept: BEHAVIORAL HEALTH | Facility: CLINIC | Age: 54
End: 2019-10-24

## 2019-10-24 ENCOUNTER — TRANSFERRED RECORDS (OUTPATIENT)
Dept: HEALTH INFORMATION MANAGEMENT | Facility: CLINIC | Age: 54
End: 2019-10-24

## 2019-10-24 ENCOUNTER — OFFICE VISIT - HEALTHEAST (OUTPATIENT)
Dept: SURGERY | Facility: CLINIC | Age: 54
End: 2019-10-24

## 2019-10-24 ENCOUNTER — AMBULATORY - HEALTHEAST (OUTPATIENT)
Dept: SURGERY | Facility: CLINIC | Age: 54
End: 2019-10-24

## 2019-10-24 ENCOUNTER — COMMUNICATION - HEALTHEAST (OUTPATIENT)
Dept: SURGERY | Facility: CLINIC | Age: 54
End: 2019-10-24

## 2019-10-24 DIAGNOSIS — R79.89 INCREASED PTH LEVEL: ICD-10-CM

## 2019-10-24 DIAGNOSIS — K91.2 POSTOPERATIVE MALABSORPTION: ICD-10-CM

## 2019-10-24 DIAGNOSIS — Z71.3 NUTRITIONAL COUNSELING: ICD-10-CM

## 2019-10-24 DIAGNOSIS — E66.01 MORBID OBESITY WITH BMI OF 45.0-49.9, ADULT (H): ICD-10-CM

## 2019-10-24 DIAGNOSIS — E66.01 OBESITY, CLASS III, BMI 40-49.9 (MORBID OBESITY) (H): ICD-10-CM

## 2019-10-24 DIAGNOSIS — K90.9 INTESTINAL MALABSORPTION, UNSPECIFIED TYPE: ICD-10-CM

## 2019-10-24 DIAGNOSIS — Z98.84 BARIATRIC SURGERY STATUS: ICD-10-CM

## 2019-10-24 DIAGNOSIS — D64.9 ANEMIA, UNSPECIFIED TYPE: ICD-10-CM

## 2019-10-24 LAB
ALBUMIN SERPL-MCNC: 3.6 G/DL (ref 3.5–5)
ALP SERPL-CCNC: 271 U/L (ref 45–120)
ALT SERPL W P-5'-P-CCNC: 14 U/L (ref 0–45)
ANION GAP SERPL CALCULATED.3IONS-SCNC: 13 MMOL/L (ref 5–18)
AST SERPL W P-5'-P-CCNC: 18 U/L (ref 0–40)
BILIRUB SERPL-MCNC: 0.6 MG/DL (ref 0–1)
BUN SERPL-MCNC: 45 MG/DL (ref 8–22)
CALCIUM SERPL-MCNC: 9 MG/DL (ref 8.5–10.5)
CHLORIDE BLD-SCNC: 106 MMOL/L (ref 98–107)
CO2 SERPL-SCNC: 16 MMOL/L (ref 22–31)
CREAT SERPL-MCNC: 1.33 MG/DL (ref 0.6–1.1)
ERYTHROCYTE [DISTWIDTH] IN BLOOD BY AUTOMATED COUNT: 11 % (ref 11–14.5)
FERRITIN SERPL-MCNC: 127 NG/ML (ref 10–130)
FOLATE SERPL-MCNC: 14.9 NG/ML
GFR SERPL CREATININE-BSD FRML MDRD: 42 ML/MIN/1.73M2
GLUCOSE BLD-MCNC: 429 MG/DL (ref 70–125)
HBA1C MFR BLD: 9.3 % (ref 3.5–6)
HCT VFR BLD AUTO: 34.4 % (ref 35–47)
HGB BLD-MCNC: 11.3 G/DL (ref 12–16)
MCH RBC QN AUTO: 31.6 PG (ref 27–34)
MCHC RBC AUTO-ENTMCNC: 32.9 G/DL (ref 32–36)
MCV RBC AUTO: 96 FL (ref 80–100)
PLATELET # BLD AUTO: 315 THOU/UL (ref 140–440)
PMV BLD AUTO: 9.2 FL (ref 7–10)
POTASSIUM BLD-SCNC: 6 MMOL/L (ref 3.5–5)
PROT SERPL-MCNC: 7 G/DL (ref 6–8)
PTH-INTACT SERPL-MCNC: 319 PG/ML (ref 10–86)
RBC # BLD AUTO: 3.58 MILL/UL (ref 3.8–5.4)
SODIUM SERPL-SCNC: 135 MMOL/L (ref 136–145)
VIT B12 SERPL-MCNC: 1724 PG/ML (ref 213–816)
WBC: 12.9 THOU/UL (ref 4–11)

## 2019-10-24 ASSESSMENT — MIFFLIN-ST. JEOR
SCORE: 1539.37
SCORE: 1539.37

## 2019-10-25 ENCOUNTER — TRANSFERRED RECORDS (OUTPATIENT)
Dept: HEALTH INFORMATION MANAGEMENT | Facility: CLINIC | Age: 54
End: 2019-10-25

## 2019-10-25 ENCOUNTER — AMBULATORY - HEALTHEAST (OUTPATIENT)
Dept: SURGERY | Facility: CLINIC | Age: 54
End: 2019-10-25

## 2019-10-25 LAB — 25(OH)D3 SERPL-MCNC: 24.7 NG/ML (ref 30–80)

## 2019-10-27 LAB — ZINC SERPL-MCNC: 66.7 UG/DL (ref 60–120)

## 2019-10-28 LAB
ANNOTATION COMMENT IMP: NORMAL
VIT A SERPL-MCNC: 0.76 MG/L (ref 0.3–1.2)
VIT B1 PYROPHOSHATE BLD-SCNC: 69 NMOL/L (ref 70–180)
VITAMIN A (RETINYL PALMITATE): <0.02 MG/L (ref 0–0.1)

## 2019-10-30 DIAGNOSIS — Z79.4 TYPE 2 DIABETES MELLITUS WITH DIABETIC POLYNEUROPATHY, WITH LONG-TERM CURRENT USE OF INSULIN (H): ICD-10-CM

## 2019-10-30 DIAGNOSIS — Z09 HOSPITAL DISCHARGE FOLLOW-UP: ICD-10-CM

## 2019-10-30 DIAGNOSIS — E11.42 TYPE 2 DIABETES MELLITUS WITH DIABETIC POLYNEUROPATHY, WITH LONG-TERM CURRENT USE OF INSULIN (H): ICD-10-CM

## 2019-11-01 ENCOUNTER — TRANSFERRED RECORDS (OUTPATIENT)
Dept: HEALTH INFORMATION MANAGEMENT | Facility: CLINIC | Age: 54
End: 2019-11-01

## 2019-11-01 ENCOUNTER — AMBULATORY - HEALTHEAST (OUTPATIENT)
Dept: SURGERY | Facility: CLINIC | Age: 54
End: 2019-11-01

## 2019-11-04 ENCOUNTER — OFFICE VISIT (OUTPATIENT)
Dept: FAMILY MEDICINE | Facility: CLINIC | Age: 54
End: 2019-11-04
Payer: COMMERCIAL

## 2019-11-04 VITALS
OXYGEN SATURATION: 98 % | DIASTOLIC BLOOD PRESSURE: 63 MMHG | SYSTOLIC BLOOD PRESSURE: 98 MMHG | BODY MASS INDEX: 46.01 KG/M2 | RESPIRATION RATE: 16 BRPM | HEART RATE: 102 BPM | TEMPERATURE: 97.8 F | WEIGHT: 227.8 LBS

## 2019-11-04 DIAGNOSIS — E11.42 TYPE 2 DIABETES MELLITUS WITH DIABETIC POLYNEUROPATHY, WITH LONG-TERM CURRENT USE OF INSULIN (H): ICD-10-CM

## 2019-11-04 DIAGNOSIS — Z09 HOSPITAL DISCHARGE FOLLOW-UP: ICD-10-CM

## 2019-11-04 DIAGNOSIS — E11.42 DIABETIC POLYNEUROPATHY ASSOCIATED WITH TYPE 2 DIABETES MELLITUS (H): ICD-10-CM

## 2019-11-04 DIAGNOSIS — Z79.4 TYPE 2 DIABETES MELLITUS WITH DIABETIC POLYNEUROPATHY, WITH LONG-TERM CURRENT USE OF INSULIN (H): ICD-10-CM

## 2019-11-04 DIAGNOSIS — G89.4 CHRONIC PAIN SYNDROME: ICD-10-CM

## 2019-11-04 LAB
AMPHETAMINES QUAL: POSITIVE
BARBITURATES QUAL URINE: NEGATIVE
BENZODIAZEPINE QUAL URINE: NEGATIVE
BUPRENORPHINE QUAL URINE: NEGATIVE
CANNABINOIDS UR QL SCN: NEGATIVE
COCAINE QUAL URINE: NEGATIVE
METHAMPHETAMINE: NEGATIVE
METHODONE QUAL: NEGATIVE
MORPHINE QUAL: NEGATIVE
OXYCODONE QUAL: POSITIVE
PHENCYCLIDINE: NEGATIVE
PROPOXYPHENE: NEGATIVE
TEMPERATURE OF URINE WAS BETWEEN 90-100 DEGREES F: YES
TRICYCLIC ANTIDEPRESSANTS: NEGATIVE

## 2019-11-04 RX ORDER — OXYCODONE AND ACETAMINOPHEN 10; 325 MG/1; MG/1
1 TABLET ORAL EVERY 6 HOURS PRN
Qty: 90 TABLET | Refills: 0 | Status: SHIPPED | OUTPATIENT
Start: 2019-11-04 | End: 2019-11-25

## 2019-11-04 RX ORDER — GABAPENTIN 250 MG/5ML
750 SOLUTION ORAL 3 TIMES DAILY
Qty: 1350 ML | Refills: 2 | Status: SHIPPED | OUTPATIENT
Start: 2019-11-04 | End: 2019-11-20

## 2019-11-04 RX ADMIN — CYANOCOBALAMIN 1000 MCG: 1000 INJECTION, SOLUTION INTRAMUSCULAR; SUBCUTANEOUS at 15:05

## 2019-11-04 NOTE — PATIENT INSTRUCTIONS
Stop chlothalidone (BP pills)--it's not needed  Increase trulicity from 0.75 to 1.5 mg per dose.      Call [facility] when home Re:  Kidney pill--will review.

## 2019-11-04 NOTE — PROGRESS NOTES
Patient Active Problem List    Diagnosis Date Noted     Chronic pain syndrome 10/07/2016     Priority: High     Chronic Pain Diagnosis:  neuropathy  DIRE Total Score(s):14     ORT: 5    4 - 7 =  Moderate Risk   of future problems with Opioids  FAQ5: 45/100 on 9/9/16, 40/100 on 11/15/16  Behavioral Health Consultation: 5/12/17 with Dr. Daisy Quezada  Personal Care Plan for Chronic Pain: 5/12/17  Opioid medication:oxycodone   Dose:10mg  Number of pills per month:60  Patient is being prescribed 10mg of oxycodone IR (Percocet) per day this is 15 mg Morphine Equivalents  Benzodiazepines Prescribed? No  Naloxone prescribed? No       Clinic visit frequency required: Q 1 month Next visit due:   Controlled Substance/Opioid Treatment agreement on file (dated <12 months ago)?:    Date(s): pending  Last [place] verification: last visit  Items in red to be updated at each visit  Patient is followed by [facility] Unavailable for ongoing prescription of pain medication.  All refills should be approved by this provider, or covering partner.Chronic Pain          Type 2 diabetes mellitus with diabetic polyneuropathy (H) 10/12/2015     Priority: High     Metabolic acidosis 07/26/2019     Priority: Medium     reolved w/ discontinuation of ASA       Copper deficiency 04/05/2019     Priority: Medium     Identified in Aug, 2018--see discharge summary from [facility] in [place].       Generalized abdominal pain 01/03/2019     Priority: Medium     Gastroenteritis 01/03/2019     Priority: Medium     Neuropathy 01/03/2019     Priority: Medium     Obesity 01/03/2019     Priority: Medium     Other acute pulmonary embolism without acute cor pulmonale (H) 12/31/2018     Priority: Medium     Hernia of anterior abdominal wall 01/28/2017     Priority: Medium     Chronic pain 01/27/2017     Priority: Medium     Diarrhea 01/27/2017     Priority: Medium     Hernia 01/27/2017     Priority: Medium     Osteoarthritis of both knees 10/31/2016     Priority: Medium      Polyneuropathy due to secondary diabetes mellitus (H) 10/31/2016     Priority: Medium     Compression neuropathy of left lower extremity 02/19/2016     Priority: Medium     Bilateral ankle joint pain 01/15/2016     Priority: Medium     Microalbuminuria 10/12/2015     Priority: Medium     Esophageal reflux 04/07/2015     Priority: Medium     Urinary incontinence 02/19/2015     Priority: Medium     Insomnia 02/19/2015     Priority: Medium     Essential hypertension 08/26/2013     Priority: Medium     Asthma 08/04/2013     Priority: Medium     History of injury 08/04/2013     Priority: Medium     Hyperlipidemia LDL goal <100 06/21/2013     Priority: Medium     S/P bariatric surgery 06/21/2013     Priority: Medium     Kim-en-Y in 2006       Neck pain 05/16/2013     Priority: Medium     Moderate major depression (H) 01/24/2013     Priority: Medium     Moderate persistent asthma without complication 11/15/2012     Priority: Medium     Pulmonary embolism and infarction (H) 11/15/2012     Priority: Medium     12/24/2018  Unprovoked.  + fam hx of DVT.  Treated w/ DOAC 12/2018through 9/2019       S/P hysterectomy 06/21/2013     Priority: Low     2008, For fibroids.  Cervix removed, but still has ovaries.       Carpal tunnel syndrome 11/15/2012     Priority: Low     Necrotizing fasciitis (H) 11/15/2012     Priority: Low     Health Care Home 11/15/2012     Priority: Low     Tier 3    Status: Accept  Care Coordination Start Date: 02/27/12  State Tier Level: Level 3  Language/Barrier to Learning: No  Significant Mental Health Issues: Yes  Date Care Coordination Discontinued: 05/28/15    DX V65.8 REPLACED WITH [facility] (04/08/2013)         Abnormal Pap smear of cervix      Priority: Low     3-12-15: Pap/HPV neg.  Plan: Given hx (mildly abn pap, hyster), recheck in 5 yrs.  10-11-11: Pap/HPV neg.  Plan: Given ASUS pap in 2010, recommend repeating pap in 1 year.  8/3/2010 ASCUS Pap with +BV- pt treated with  Metrogel.  Pt needs repeat Pap in 1yr.  8/4/2008 Pt had total hyst for menorrhagia, pelvic pain, scar tissue, and fibroids.       There are no exam notes on file for this visit.  Chief Complaint   Patient presents with     Pain Management     CPM     Blood pressure 98/63, pulse 102, temperature 97.8  F (36.6  C), temperature source Oral, resp. rate 16, weight 103.3 kg (227 lb 12.8 oz), SpO2 98 %, not currently breastfeeding.  Results for orders placed or performed in visit on 11/04/19   Rapid Urine Drug Screen ([facility])     Status: Abnormal   Result Value Ref Range    Cannabinoids Qual Urine NEGATIVE NEGATIVE    Phencyclidine NEGATIVE NEGATIVE    Cocaine Qual Urine NEGATIVE NEGATIVE    Methamphetamine Qual NEGATIVE NEGATIVE    Morphine Qual NEGATIVE NEGATIVE    Amphetamines Qual POSITIVE (A) NEGATIVE    Benzodiazepine Qual Urine NEGATIVE NEGATIVE    Tricyclic Antidepressants NEGATIVE NEGATIVE    Methadone Qual NEGATIVE NEGATIVE    Barbiturates Qual Urine NEGATIVE NEGATIVE    Oxycodone Qual POSITIVE (A) NEGATIVE    Propoxyphene NEGATIVE NEGATIVE    Buprenorphine Qual Urine NEGATIVE NEGATIVE    Temperature of Urine was Between  Degrees F YES YES   SUBJECTIVE:  Ruthy Álvarez is here for multiple issues.     First of all, she needs followup of diabetes.  Her last A1C came back at 9.3.  Her blood sugars in the morning are in the 200s, in the afternoon they are in the 100s, and in the evening they are in the 200s.  She is on Trulicity 0.75 per week and Basaglar 25 units daily.  She is working with the [facility] to cut back on her weight.  She is now on phentermine and tolerates this well.  She had one episode of severe dizziness that lasted a day or so, when she felt weak and almost fell to her knees.  She is bothered by urinary frequency and increasing burning and numbness in her feet.  She is on B12.  She was told at the [facility]ric Clinic to take B12 twice weekly, which she has been doing.  Her most  recent hemoglobin was 11.3, with a normal MCV.  B12 levels were supratherapeutic according to the labs.     She is on chlorthalidone and diltiazem for her hypertension.   Re: chronic pain, she is satisfied that a lipoma was removed from her neck.  Her headaches and neck stiffness have been helped dramatically.  The wound seems to be healing, according to her.  There has been no redness or swelling.  Her pain control is stable.  She requests refill of oxycodone.  She denies any other drug use, except for a stimulant that she was started on for her appetite and the hydrocodone that she was briefly given postoperatively.      OBJECTIVE:   GENERAL:  Patient is alert, pleasant, and in no acute distress.   VITAL SIGNS:  Blood pressure is soft today at 90/63.  Pulse is 102.   CHEST:  Clear.   HEART:  Regular rate and rhythm.  Normal S1, S2   No murmur    EXTREMITIES:  I didn't examine her feet today.     MSK:  The skin over her back shows a well-healed incision just to the left of midline in the lower cervical/upper thoracic region.      ASSESSMENT/PLAN:   1.  Chronic pain syndrome, stable.  I refilled her oxycodone at 10 mg t.i.d., as UDS was appropriate today, including the phentermine, which is causing amphetamines to turn positive.   2.  Hypertension.  She seems to be currently overtreated.  I think her spell of lightheadedness is probably related to that.  Stop chlorthalidone.  Continue diltiazem.     3.  Diabetes.  We'll increase Trulicity from 0.75 to 1.5 daily.  Continue her current insulin dose.     4.  Neuropathy.  We'll increase gabapentin from 500 mg to 750 mg t.i.d.   5.  CKD.  I'm hoping that cutting back her diuretic will help with this.  We'll check a BMP when she returns next time.   4.  B12 deficiency.  This was administered today.  I don't think she needs it twice a week and I'll  communicate my concerns to Dr. Carson at [facility].

## 2019-11-04 NOTE — NURSING NOTE
Clinic Administered Medication Documentation    MEDICATION LIST:   Injectable Medication Documentation    Patient was given Cyanocobalamin (B-12). Prior to medication administration, verified patients identity using patient s name and date of birth. Please see MAR and medication order for additional information. Patient instructed to remain in clinic for 15 minutes.      Was entire vial of medication used? Yes  Vial/Syringe: Single dose vial  Expiration Date:  01/21   Was this medication supplied by the patient? No    Name of provider who requested the medication administration: Dr. Barron  Name of provider on site (faculty or community preceptor) at the time of performing the medication administration: Dr. barron    Date of next administration: 2 weeks  Date of next office visit with provider to renew medication plan (must be seen annually): 07/25/2020

## 2019-11-13 NOTE — NURSING NOTE
I suspect she was out longer than four days to get her TSH as high as it was but continue the same dose, 112 mcg, and recheck a TSH in eight weeks.    Order is in for TSH   Prior to medication administration, I verified the patient identity using patient's name and date of birth. Patient was instructed to report any adverse reaction to me immediately.    I administered cyanocobalamin 1000 mcg/mL to Ruthy Álvarez.    For injections only, was entire vial of medication used? Yes    Did the patient bring this medication to the clinic to be administered? No    Name of provider who requested the medication administration: Dr. Meyer   Name of provider on site (faculty or community preceptor) at the time of performing the medication administration: Dr. Meyer    Date of next administration: One time  Date of next office visit with provider to renew medication plan (must be seen annually): one time    BARBARA Garcia

## 2019-11-14 ENCOUNTER — OFFICE VISIT - HEALTHEAST (OUTPATIENT)
Dept: BEHAVIORAL HEALTH | Facility: CLINIC | Age: 54
End: 2019-11-14

## 2019-11-14 DIAGNOSIS — F33.1 DEPRESSION, MAJOR, RECURRENT, MODERATE (H): ICD-10-CM

## 2019-11-19 DIAGNOSIS — E11.42 DIABETIC POLYNEUROPATHY ASSOCIATED WITH TYPE 2 DIABETES MELLITUS (H): ICD-10-CM

## 2019-11-20 RX ORDER — GABAPENTIN 250 MG/5ML
750 SOLUTION ORAL 3 TIMES DAILY
Qty: 1350 ML | Refills: 2 | Status: SHIPPED | OUTPATIENT
Start: 2019-11-20 | End: 2020-04-20

## 2019-11-25 ENCOUNTER — OFFICE VISIT (OUTPATIENT)
Dept: FAMILY MEDICINE | Facility: CLINIC | Age: 54
End: 2019-11-25
Payer: COMMERCIAL

## 2019-11-25 ENCOUNTER — OFFICE VISIT (OUTPATIENT)
Dept: PHARMACY | Facility: PHYSICIAN GROUP | Age: 54
End: 2019-11-25
Payer: COMMERCIAL

## 2019-11-25 VITALS
OXYGEN SATURATION: 99 % | SYSTOLIC BLOOD PRESSURE: 116 MMHG | HEART RATE: 105 BPM | RESPIRATION RATE: 16 BRPM | TEMPERATURE: 98.1 F | DIASTOLIC BLOOD PRESSURE: 76 MMHG

## 2019-11-25 DIAGNOSIS — Z79.4 TYPE 2 DIABETES MELLITUS WITH DIABETIC POLYNEUROPATHY, WITH LONG-TERM CURRENT USE OF INSULIN (H): Primary | ICD-10-CM

## 2019-11-25 DIAGNOSIS — I10 ESSENTIAL HYPERTENSION: ICD-10-CM

## 2019-11-25 DIAGNOSIS — E11.42 TYPE 2 DIABETES MELLITUS WITH DIABETIC POLYNEUROPATHY, WITH LONG-TERM CURRENT USE OF INSULIN (H): Primary | ICD-10-CM

## 2019-11-25 DIAGNOSIS — G89.4 CHRONIC PAIN SYNDROME: Primary | ICD-10-CM

## 2019-11-25 DIAGNOSIS — G89.29 OTHER CHRONIC PAIN: ICD-10-CM

## 2019-11-25 DIAGNOSIS — G89.4 CHRONIC PAIN SYNDROME: ICD-10-CM

## 2019-11-25 DIAGNOSIS — E87.20 METABOLIC ACIDOSIS: ICD-10-CM

## 2019-11-25 LAB
AMPHETAMINES QUAL: POSITIVE
ANION GAP SERPL CALCULATED.3IONS-SCNC: 11 MMOL/L (ref 5–18)
BARBITURATES QUAL URINE: NEGATIVE
BENZODIAZEPINE QUAL URINE: NEGATIVE
BUN SERPL-MCNC: 32 MG/DL (ref 8–22)
BUPRENORPHINE QUAL URINE: NEGATIVE
CALCIUM SERPL-MCNC: 9.2 MG/DL (ref 8.5–10.5)
CANNABINOIDS UR QL SCN: NEGATIVE
CHLORIDE SERPL-SCNC: 111 MMOL/L (ref 98–107)
CO2 SERPL-SCNC: 21 MMOL/L (ref 22–31)
COCAINE QUAL URINE: NEGATIVE
CREAT SERPL-MCNC: 1.23 MG/DL (ref 0.6–1.1)
GLUCOSE SERPL-MCNC: 194 MG/DL (ref 70–125)
METHAMPHETAMINE: NEGATIVE
METHODONE QUAL: NEGATIVE
MORPHINE QUAL: NEGATIVE
OXYCODONE QUAL: POSITIVE
PHENCYCLIDINE: NEGATIVE
POTASSIUM SERPL-SCNC: 5.2 MMOL/L (ref 3.5–5)
PROPOXYPHENE: NEGATIVE
SODIUM SERPL-SCNC: 143 MMOL/L (ref 136–145)
TEMPERATURE OF URINE WAS BETWEEN 90-100 DEGREES F: YES
TRICYCLIC ANTIDEPRESSANTS: NEGATIVE

## 2019-11-25 PROCEDURE — 99207 ZZC NO CHARGE LOS: CPT | Performed by: PHARMACIST

## 2019-11-25 RX ORDER — DULOXETIN HYDROCHLORIDE 60 MG/1
60 CAPSULE, DELAYED RELEASE ORAL DAILY
COMMUNITY
Start: 2015-02-25 | End: 2019-12-11

## 2019-11-25 RX ORDER — OXYCODONE AND ACETAMINOPHEN 10; 325 MG/1; MG/1
1 TABLET ORAL EVERY 6 HOURS PRN
Qty: 90 TABLET | Refills: 0 | Status: SHIPPED | OUTPATIENT
Start: 2019-12-03 | End: 2020-01-02

## 2019-11-25 RX ORDER — PHENTERMINE HYDROCHLORIDE 37.5 MG/1
.5-1 TABLET ORAL EVERY MORNING
COMMUNITY
Start: 2019-10-24 | End: 2021-07-28

## 2019-11-25 RX ORDER — ERGOCALCIFEROL 1.25 MG/1
1 CAPSULE, LIQUID FILLED ORAL
Refills: 0 | COMMUNITY
Start: 2019-11-19 | End: 2022-06-23

## 2019-11-25 NOTE — PROGRESS NOTES
Clinical Pharmacy Consult:                                                    Ruthy Álvarez is a 54 year old female coming in for a clinical pharmacist consult.  She was referred to me from Dr. Meyer.     Reason for Consult: Medication Reconciliation     Discussion: Attempted to interview patient. However, based on time constraints, I was unable to complete the entire Med Rec.   Information that was gathered from patient interview and dispense report in highlighted below.       Medication Adherence: Patient stated that she has a PCA who sets up a medication box. She denies any missed doses.       Asthma:     Patient indicated that she is using Advair inhaler (1 puff twice daily). This medication has not been filled recently. Prescription was last written in .     Patient indicated that she is using albuterol inhaler and nebulizers. She states that these products are used 3-4 times a week, but these medications have not been filled recently.     Patient stated that she is no longer using Breo Ellipta, and this is an  scripts. This medication was removed from medication list.       Pain:     Patient stated that she is using capsaicin topical, gabapentin, and oxycodone-APAP. These medications were all verified with the dispense report. Patient stated that her oxycodone-APAP has been effective in treating her pain.     Patient said she is taking duloxetine, and this script was verified on dispense report. This medication was added to medication list.    Patient stated that she is taking two creams for pain, and Voltaren gel may be the second product. This medication could not be verified and was not added to medication list at this time.       Supplementation:     Patient reported that she is taking ergocalciferol 50,000 international unit(s) three times weekly. This script was found in the dispense report, but it was filled for once weekly. Prescription was not added to medication list at this time  due to dose uncertainty.     Patient indicated that she receives copper supplement from [facility]. She was diagnosed with deficiency on 4/5/19 in [place].     Patient reported that she is still using prescription Magnesium. This prescription was last written in 2017, and it was not found in the dispense report.     Dispense report did not have recent fills for ferrous sulfate, multivitamin, calcium citrate, and vitamin B12. These medication appear to be written by a Bariatric provider. I did not have time to interview the patient on these medications.       History of Pulmonary Embolism:     Patient was discontinued on Eliquis on 10/3/19 and was initiated on  mg. This change was verified by the patient.        Hypertension:     Patient's chlorthalidone was stopped on 11/4/19 for hypotension/dizziness. Of note, patient has not filled her diltiazem recently. She may not be taking any antihypertensive medications at this time.       Diabetes (unable to discuss with patient):     Patient's Trulicity was increased to 1.5 mg on 11/4/19. Duplicate prescription was removed from medication list.     Patient has been filling insulin glargine.     Based on chart review, patient is currently not taking metformin due to insurance coverage issues and GI upset.       Mental Health (unable to discuss with patient):     Patient is currently prescribed risperidone. This prescription was last written in 2015 and has not been dispensed recently.       Other/Swelling (unable to discuss with patient):     Dispense report indicates that patient recently filled Furosemide 20 mg daily.     Plan:  1. Request that patient bring in her medications for a full medication reconciliation with next visit.   2. Consider switching Tolterodine LA to IR based on geriatric surgery.     Rehana Lara, PharmD Student

## 2019-11-25 NOTE — PROGRESS NOTES
Patient Active Problem List    Diagnosis Date Noted     Chronic pain syndrome 10/07/2016     Priority: High     Chronic Pain Diagnosis:  neuropathy  DIRE Total Score(s):14     ORT: 5    4 - 7 =  Moderate Risk   of future problems with Opioids  FAQ5: 45/100 on 9/9/16, 40/100 on 11/15/16  Behavioral Health Consultation: 5/12/17 with Dr. Daisy Quezada  Personal Care Plan for Chronic Pain: 5/12/17  Opioid medication:oxycodone   Dose:10mg  Number of pills per month:60  Patient is being prescribed 10mg of oxycodone IR (Percocet) per day this is 15 mg Morphine Equivalents  Benzodiazepines Prescribed? No  Naloxone prescribed? No       Clinic visit frequency required: Q 1 month Next visit due:   Controlled Substance/Opioid Treatment agreement on file (dated <12 months ago)?:    Date(s): pending  Last [place] verification: last visit  Items in red to be updated at each visit  Patient is followed by Data Unavailable for ongoing prescription of pain medication.  All refills should be approved by this provider, or covering partner.Chronic Pain          Type 2 diabetes mellitus with diabetic polyneuropathy (H) 10/12/2015     Priority: High     Metabolic acidosis 07/26/2019     Priority: Medium     reolved w/ discontinuation of ASA       Copper deficiency 04/05/2019     Priority: Medium     Identified in Aug, 2018--see discharge summary from [facility] in [place].       Generalized abdominal pain 01/03/2019     Priority: Medium     Gastroenteritis 01/03/2019     Priority: Medium     Neuropathy 01/03/2019     Priority: Medium     Obesity 01/03/2019     Priority: Medium     Other acute pulmonary embolism without acute cor pulmonale (H) 12/31/2018     Priority: Medium     Hernia of anterior abdominal wall 01/28/2017     Priority: Medium     Chronic pain 01/27/2017     Priority: Medium     Diarrhea 01/27/2017     Priority: Medium     Hernia 01/27/2017     Priority: Medium     Osteoarthritis of both knees 10/31/2016     Priority: Medium      Polyneuropathy due to secondary diabetes mellitus (H) 10/31/2016     Priority: Medium     Compression neuropathy of left lower extremity 02/19/2016     Priority: Medium     Bilateral ankle joint pain 01/15/2016     Priority: Medium     Microalbuminuria 10/12/2015     Priority: Medium     Esophageal reflux 04/07/2015     Priority: Medium     Urinary incontinence 02/19/2015     Priority: Medium     Insomnia 02/19/2015     Priority: Medium     Essential hypertension 08/26/2013     Priority: Medium     Asthma 08/04/2013     Priority: Medium     History of injury 08/04/2013     Priority: Medium     Hyperlipidemia LDL goal <100 06/21/2013     Priority: Medium     S/P bariatric surgery 06/21/2013     Priority: Medium     Kim-en-Y in 2006       Neck pain 05/16/2013     Priority: Medium     Moderate major depression (H) 01/24/2013     Priority: Medium     Moderate persistent asthma without complication 11/15/2012     Priority: Medium     Pulmonary embolism and infarction (H) 11/15/2012     Priority: Medium     12/24/2018  Unprovoked.  + fam hx of DVT.  Treated w/ DOAC 12/2018through 9/2019       S/P hysterectomy 06/21/2013     Priority: Low     2008, For fibroids.  Cervix removed, but still has ovaries.       Carpal tunnel syndrome 11/15/2012     Priority: Low     Necrotizing fasciitis (H) 11/15/2012     Priority: Low     Health Care Home 11/15/2012     Priority: Low     Tier 3    Status: Accept  Care Coordination Start Date: 02/27/12  State Tier Level: Level 3  Language/Barrier to Learning: No  Significant Mental Health Issues: Yes  Date Care Coordination Discontinued: 05/28/15    DX V65.8 REPLACED WITH 87670 [facility]ALTH CARE HOME (04/08/2013)         Abnormal Pap smear of cervix      Priority: Low     3-12-15: Pap/HPV neg.  Plan: Given hx (mildly abn pap, hyster), recheck in 5 yrs.  10-11-11: Pap/HPV neg.  Plan: Given ASUS pap in 2010, recommend repeating pap in 1 year.  8/3/2010 ASCUS Pap with +BV- pt treated with  Metrogel.  Pt needs repeat Pap in 1yr.  8/4/2008 Pt had total hyst for menorrhagia, pelvic pain, scar tissue, and fibroids.       There are no exam notes on file for this visit.  Chief Complaint   Patient presents with     Pain Management     CPM     Blood pressure 116/76, pulse 105, temperature 98.1  F (36.7  C), temperature source Oral, resp. rate 16, SpO2 99 %, not currently breastfeeding.  Results for orders placed or performed in visit on 11/25/19   Rapid Urine Drug Screen ([facility])     Status: Abnormal   Result Value Ref Range    Cannabinoids Qual Urine NEGATIVE NEGATIVE    Phencyclidine NEGATIVE NEGATIVE    Cocaine Qual Urine NEGATIVE NEGATIVE    Methamphetamine Qual NEGATIVE NEGATIVE    Morphine Qual NEGATIVE NEGATIVE    Amphetamines Qual POSITIVE (A) NEGATIVE    Benzodiazepine Qual Urine NEGATIVE NEGATIVE    Tricyclic Antidepressants NEGATIVE NEGATIVE    Methadone Qual NEGATIVE NEGATIVE    Barbiturates Qual Urine NEGATIVE NEGATIVE    Oxycodone Qual POSITIVE (A) NEGATIVE    Propoxyphene NEGATIVE NEGATIVE    Buprenorphine Qual Urine NEGATIVE NEGATIVE    Temperature of Urine was Between  Degrees F YES YES   Basic Metabolic Profile ([facility])     Status: Abnormal   Result Value Ref Range    Sodium 143 136 - 145 mmol/L    Potassium 5.2 (H) 3.5 - 5.0 mmol/L    Chloride 111 (H) 98 - 107 mmol/L    CO2, Total 21 (L) 22 - 31 mmol/L    Anion Gap 11 5 - 18 mmol/L    Glucose 194 (H) 70 - 125 mg/dL    Calcium 9.2 8.5 - 10.5 mg/dL    Urea Nitrogen 32 (H) 8 - 22 mg/dL    Creatinine 1.23 (H) 0.60 - 1.10 mg/dL    GFR Estimate If Black 55 (L) >60 mL/min/1.73m2    GFR Estimate 46 (L) >60 mL/min/1.73m2   SUBJECTIVE:  Ruthy Álvarez is here for followup of chronic pain syndrome.     The etiology of her pain is OA.  She has had a gastric bypass, making NSAIDs relatively contraindicated, and they have been clinically unhelpful in the past.     She is relatively unsure about a number of her medications, so she found  [facility]'s helpful notes today for details.  She essentially wants refills of her medications.  She is here a little bit early today because of my unavailability for the month of December.  Her last refill of the oxycodone was on 11/04.  She denies any other drugs of abuse.  She is currently on phentermine through [facility] to help her lose weight, and she tolerates it well.  I reviewed this in the prescription database and she filled it about one month ago.  Otherwise, she has no constipation.  No other substance use.  No alcohol use.   She is due for followup for reassessment of her renal function in light of her previous hyperkalemia and metabolic acidosis.  She has no complaints of edema, nausea, or unusual weakness.      OBJECTIVE:   GENERAL:  Patient is alert, pleasant, and in no acute distress.  She is a bit guarded in her Hx today, but otherwise unremarkable.  She isn't much different than her baseline.   VITAL SIGNS:  Her blood pressure is excellent.  Her pulse is mildly tachycardic.     Exam is otherwise not done today, other than to note her affect.   LABS:  Appropriate.  The amphetamines in her urine areexplained by her phentermine use, and the oxycodone as appropriate.  Her BMP is actually improved.  She still looks a bit prerenal.      ASSESSMENT/PLAN:   1.  Chronic pain syndrome, stable.  I refilled oxycodone with predated prescription that she can fill on 12/03.  She'll follow up with me in early January.   2.   Impaired renal function and hypertension.  Her blood pressure is excellent.  We need to clarify her current medications.  We'll contact her to ask her to bring all of her medications in with her next time.  At this point there is no need for any medication changes, based on her kidney labs which I've reviewed.

## 2019-11-25 NOTE — Clinical Note
Nehemias,Please see the amazing detailed summary of the pharmacy student Rehana's chart review (and partial patient visit).Leatha

## 2019-11-26 NOTE — PROGRESS NOTES
Clinical Pharmacy Consult:                                                    Ruthy Álvarez is a 54 year old female coming in for a clinical pharmacist consult.  She was referred to me from Dr. Meyer.     Reason for Consult: Medication Reconciliation     Discussion: Attempted to interview patient. However, based on time constraints, I was unable to complete the entire Med Rec.   Information that was gathered from patient interview and dispense report in Epic in highlighted below. Not able to verify much of the reports from the dispense report, and it is not accurate 100% of the time (e.g., if meds are given after hospitalization), but it provides good information. Patient should bring in all her meds for a complete med rec.      Medication Adherence: Patient stated that she has a PCA who sets up a medication box. She denies any missed doses.       Asthma:     Patient indicated that she is using Advair diskus inhaler (1 puff twice daily) (described as a purple disk inhaler). This medication has not been filled recently. Prescription was last written in , but it was for the Advair HFA inhaler).     Patient indicated that she is using albuterol inhaler and nebulizers. She states that these products are used 3-4 times a week, but these medications have not been filled recently.     Patient stated that she is no longer using Breo Ellipta, and this is an  script. This medication was removed from medication list.       Pain:     Patient stated that she is using capsaicin topical, gabapentin, and oxycodone-APAP. These medications were all verified with the dispense report. Patient stated that her oxycodone-APAP has been effective in treating her pain.     Patient said she is taking duloxetine, and this script was verified on dispense report. This medication was added to medication list.    Patient stated that she is taking two creams for pain, and Voltaren gel may be the second product. Voltaren gel was  "not in the dispense report but was in the \"reconcile outside meds\" so it was prescribed and may have been filled from a hospital.    Dr. Meyer is prescribing oxycodone-APAP.  Per dispense report in Epic, Dr. Rhys Martinez has recently prescribed/she has filled a small amount of oral dilaudid (#20 2mg tabs on 10/18/19) and Norco 5-325mg (#20 on 10/17/19)      Bariatric surgery/obesity    She was recently prescribed phentermine by bariatric physician.  This was verified in the \"reconcile outside meds\" but it was not verified in the dispense report in Epic.      Supplements for post-bariatric surgery:     Patient reported that she is taking ergocalciferol 50,000 international unit(s) three times weekly. This script was found in the dispense report, and was added to medication list.    Patient indicated that she receives copper supplement from [facility]. She was diagnosed with deficiency on 4/5/19 in [place].     Patient reported that she is still using prescription Magnesium. This prescription was last written in 2017, and it was not found in the dispense report.     Dispense report did not have recent fills for ferrous sulfate, multivitamin with iron BID, calcium citrate 400-600mg BID, and vitamin B12 1000-2500mg SL. These medication appear to be written by a Bariatric provider. I did not have time to interview the patient on these medications. She could be getting them over the counter.      History of Pulmonary Embolism:     Eliquis was discontinued on 10/3/19 and she was initiated on  mg. This change was verified by the patient.        Hypertension:     Patient's chlorthalidone was stopped on 11/4/19 for hypotension/dizziness. The note said to continue diltiazem. Of note, dispense report shows that patient has not filled her diltiazem recently. She may not be taking any antihypertensive medications at this time. BP is controlled.      Diabetes (unable to discuss with patient):     Patient's Trulicity was " increased to 1.5 mg on 11/4/19.     Patient has been filling insulin glargine.     Based on chart review, patient is currently not taking metformin due to insurance coverage issues and GI upset.       Mental Health (unable to discuss with patient):     Patient is currently prescribed risperidone. This prescription was last written in 2015 and has not been dispensed recently.       Other/Swelling (unable to discuss with patient):     Dispense report indicates that patient recently filled Furosemide 20 mg daily PRN..     Plan:  1. Request that patient bring in her medications for a full medication reconciliation with next visit.   2. At a future visit, switching Tolterodine LA to IR based on bariatric surger surgery and that it is recommended to avoid LA/ER meds.    Rehana Lara, PharmD Student    I was present with the pharmacy student who participated in the service and in the documentation of this note. I have verified the history, personally performed the medical decision making, and have verified the content of the note, which accurately reflects my assessment of the patient and the plan of care.   Leatha Hernandez [facility], PharmD

## 2019-11-27 ENCOUNTER — TELEPHONE (OUTPATIENT)
Dept: FAMILY MEDICINE | Facility: CLINIC | Age: 54
End: 2019-11-27

## 2019-11-27 DIAGNOSIS — Z79.4 TYPE 2 DIABETES MELLITUS WITH DIABETIC POLYNEUROPATHY, WITH LONG-TERM CURRENT USE OF INSULIN (H): ICD-10-CM

## 2019-11-27 DIAGNOSIS — E11.42 TYPE 2 DIABETES MELLITUS WITH DIABETIC POLYNEUROPATHY, WITH LONG-TERM CURRENT USE OF INSULIN (H): ICD-10-CM

## 2019-11-27 RX ORDER — INSULIN GLARGINE 100 [IU]/ML
25 INJECTION, SOLUTION SUBCUTANEOUS DAILY
Qty: 6 ML | Refills: 11 | Status: CANCELLED | OUTPATIENT
Start: 2019-11-27

## 2019-12-03 DIAGNOSIS — G89.4 CHRONIC PAIN SYNDROME: Primary | ICD-10-CM

## 2019-12-03 DIAGNOSIS — N32.81 OVERACTIVE BLADDER: ICD-10-CM

## 2019-12-03 RX ORDER — TOLTERODINE 2 MG/1
2 CAPSULE, EXTENDED RELEASE ORAL DAILY
Qty: 90 CAPSULE | Refills: 3 | Status: SHIPPED | OUTPATIENT
Start: 2019-12-03 | End: 2020-03-19

## 2019-12-05 ENCOUNTER — AMBULATORY - HEALTHEAST (OUTPATIENT)
Dept: BEHAVIORAL HEALTH | Facility: CLINIC | Age: 54
End: 2019-12-05

## 2019-12-05 ENCOUNTER — OFFICE VISIT - HEALTHEAST (OUTPATIENT)
Dept: BEHAVIORAL HEALTH | Facility: CLINIC | Age: 54
End: 2019-12-05

## 2019-12-05 DIAGNOSIS — F43.23 ADJUSTMENT DISORDER WITH MIXED ANXIETY AND DEPRESSED MOOD: ICD-10-CM

## 2019-12-05 DIAGNOSIS — F33.1 DEPRESSION, MAJOR, RECURRENT, MODERATE (H): ICD-10-CM

## 2019-12-05 ASSESSMENT — ANXIETY QUESTIONNAIRES
4. TROUBLE RELAXING: MORE THAN HALF THE DAYS
7. FEELING AFRAID AS IF SOMETHING AWFUL MIGHT HAPPEN: NOT AT ALL
1. FEELING NERVOUS, ANXIOUS, OR ON EDGE: SEVERAL DAYS
3. WORRYING TOO MUCH ABOUT DIFFERENT THINGS: NEARLY EVERY DAY
5. BEING SO RESTLESS THAT IT IS HARD TO SIT STILL: MORE THAN HALF THE DAYS
IF YOU CHECKED OFF ANY PROBLEMS ON THIS QUESTIONNAIRE, HOW DIFFICULT HAVE THESE PROBLEMS MADE IT FOR YOU TO DO YOUR WORK, TAKE CARE OF THINGS AT HOME, OR GET ALONG WITH OTHER PEOPLE: VERY DIFFICULT
GAD7 TOTAL SCORE: 12
6. BECOMING EASILY ANNOYED OR IRRITABLE: MORE THAN HALF THE DAYS
2. NOT BEING ABLE TO STOP OR CONTROL WORRYING: MORE THAN HALF THE DAYS

## 2019-12-05 ASSESSMENT — PATIENT HEALTH QUESTIONNAIRE - PHQ9: SUM OF ALL RESPONSES TO PHQ QUESTIONS 1-9: 19

## 2019-12-11 ENCOUNTER — COMMUNICATION - HEALTHEAST (OUTPATIENT)
Dept: SURGERY | Facility: CLINIC | Age: 54
End: 2019-12-11

## 2019-12-11 DIAGNOSIS — G89.4 CHRONIC PAIN SYNDROME: Primary | ICD-10-CM

## 2019-12-11 RX ORDER — DULOXETIN HYDROCHLORIDE 60 MG/1
60 CAPSULE, DELAYED RELEASE ORAL DAILY
Qty: 30 CAPSULE | Refills: 11 | Status: SHIPPED | OUTPATIENT
Start: 2019-12-11 | End: 2023-01-16

## 2019-12-11 RX ORDER — DULOXETIN HYDROCHLORIDE 60 MG/1
60 CAPSULE, DELAYED RELEASE ORAL DAILY
Qty: 30 CAPSULE | Refills: 11 | Status: SHIPPED | OUTPATIENT
Start: 2019-12-11 | End: 2021-02-03

## 2019-12-17 ENCOUNTER — TELEPHONE (OUTPATIENT)
Dept: FAMILY MEDICINE | Facility: CLINIC | Age: 54
End: 2019-12-17

## 2019-12-30 DIAGNOSIS — I10 BENIGN ESSENTIAL HYPERTENSION: ICD-10-CM

## 2019-12-30 RX ORDER — ATORVASTATIN CALCIUM 10 MG/1
TABLET, FILM COATED ORAL
Qty: 90 TABLET | Refills: 3 | Status: SHIPPED | OUTPATIENT
Start: 2019-12-30 | End: 2021-01-18

## 2020-01-02 ENCOUNTER — OFFICE VISIT (OUTPATIENT)
Dept: FAMILY MEDICINE | Facility: CLINIC | Age: 55
End: 2020-01-02
Payer: COMMERCIAL

## 2020-01-02 VITALS
RESPIRATION RATE: 16 BRPM | WEIGHT: 220 LBS | HEART RATE: 109 BPM | HEIGHT: 59 IN | SYSTOLIC BLOOD PRESSURE: 108 MMHG | DIASTOLIC BLOOD PRESSURE: 65 MMHG | TEMPERATURE: 98.2 F | BODY MASS INDEX: 44.35 KG/M2 | OXYGEN SATURATION: 94 %

## 2020-01-02 DIAGNOSIS — M17.0 PRIMARY OSTEOARTHRITIS OF BOTH KNEES: ICD-10-CM

## 2020-01-02 DIAGNOSIS — Z79.4 TYPE 2 DIABETES MELLITUS WITH DIABETIC POLYNEUROPATHY, WITH LONG-TERM CURRENT USE OF INSULIN (H): ICD-10-CM

## 2020-01-02 DIAGNOSIS — G89.4 CHRONIC PAIN SYNDROME: Primary | ICD-10-CM

## 2020-01-02 DIAGNOSIS — I10 ESSENTIAL HYPERTENSION: ICD-10-CM

## 2020-01-02 DIAGNOSIS — Z23 NEED FOR VACCINATION: Primary | ICD-10-CM

## 2020-01-02 DIAGNOSIS — E11.42 TYPE 2 DIABETES MELLITUS WITH DIABETIC POLYNEUROPATHY, WITH LONG-TERM CURRENT USE OF INSULIN (H): ICD-10-CM

## 2020-01-02 DIAGNOSIS — G89.4 CHRONIC PAIN SYNDROME: ICD-10-CM

## 2020-01-02 RX ORDER — OXYCODONE AND ACETAMINOPHEN 10; 325 MG/1; MG/1
1 TABLET ORAL EVERY 6 HOURS PRN
Qty: 90 TABLET | Refills: 0 | Status: SHIPPED | OUTPATIENT
Start: 2020-01-02 | End: 2020-01-30

## 2020-01-02 RX ADMIN — CYANOCOBALAMIN 1000 MCG: 1000 INJECTION, SOLUTION INTRAMUSCULAR; SUBCUTANEOUS at 12:22

## 2020-01-02 ASSESSMENT — MIFFLIN-ST. JEOR: SCORE: 1503.79

## 2020-01-02 NOTE — PROGRESS NOTES
Patient Active Problem List    Diagnosis Date Noted     Chronic pain syndrome 10/07/2016     Priority: High     Chronic Pain Diagnosis:  neuropathy  DIRE Total Score(s):14     ORT: 5    4 - 7 =  Moderate Risk   of future problems with Opioids  FAQ5: 45/100 on 9/9/16, 40/100 on 11/15/16  Behavioral Health Consultation: 5/12/17 with Dr. Daisy Quezada  Personal Care Plan for Chronic Pain: 5/12/17  Opioid medication:oxycodone   Dose:10mg  Number of pills per month:60  Patient is being prescribed 10mg of oxycodone IR (Percocet) per day this is 15 mg Morphine Equivalents  Benzodiazepines Prescribed? No  Naloxone prescribed? No       Clinic visit frequency required: Q 1 month Next visit due:   Controlled Substance/Opioid Treatment agreement on file (dated <12 months ago)?:    Date(s): pending  Last [place]NPMP verification: last visit  Items in red to be updated at each visit  Patient is followed by Data Unavailable for ongoing prescription of pain medication.  All refills should be approved by this provider, or covering partner.Chronic Pain          Type 2 diabetes mellitus with diabetic polyneuropathy (H) 10/12/2015     Priority: High     Metabolic acidosis 07/26/2019     Priority: Medium     reolved w/ discontinuation of ASA       Copper deficiency 04/05/2019     Priority: Medium     Identified in Aug, 2018--see discharge summary from [facility] in [place].       Generalized abdominal pain 01/03/2019     Priority: Medium     Gastroenteritis 01/03/2019     Priority: Medium     Neuropathy 01/03/2019     Priority: Medium     Obesity 01/03/2019     Priority: Medium     Other acute pulmonary embolism without acute cor pulmonale (H) 12/31/2018     Priority: Medium     Hernia of anterior abdominal wall 01/28/2017     Priority: Medium     Chronic pain 01/27/2017     Priority: Medium     Diarrhea 01/27/2017     Priority: Medium     Hernia 01/27/2017     Priority: Medium     Osteoarthritis of both knees 10/31/2016     Priority: Medium      Polyneuropathy due to secondary diabetes mellitus (H) 10/31/2016     Priority: Medium     Compression neuropathy of left lower extremity 02/19/2016     Priority: Medium     Bilateral ankle joint pain 01/15/2016     Priority: Medium     Microalbuminuria 10/12/2015     Priority: Medium     Esophageal reflux 04/07/2015     Priority: Medium     Urinary incontinence 02/19/2015     Priority: Medium     Insomnia 02/19/2015     Priority: Medium     Essential hypertension 08/26/2013     Priority: Medium     Asthma 08/04/2013     Priority: Medium     History of injury 08/04/2013     Priority: Medium     Hyperlipidemia LDL goal <100 06/21/2013     Priority: Medium     S/P bariatric surgery 06/21/2013     Priority: Medium     Kim-en-Y in 2006       Neck pain 05/16/2013     Priority: Medium     Moderate major depression (H) 01/24/2013     Priority: Medium     Moderate persistent asthma without complication 11/15/2012     Priority: Medium     Pulmonary embolism and infarction (H) 11/15/2012     Priority: Medium     12/24/2018  Unprovoked.  + fam hx of DVT.  Treated w/ DOAC 12/2018through 9/2019       S/P hysterectomy 06/21/2013     Priority: Low     2008, For fibroids.  Cervix removed, but still has ovaries.       Carpal tunnel syndrome 11/15/2012     Priority: Low     Necrotizing fasciitis (H) 11/15/2012     Priority: Low     Health Care Home 11/15/2012     Priority: Low     Tier 3    Status: Accept  Care Coordination Start Date: 02/27/12  State Tier Level: Level 3  Language/Barrier to Learning: No  Significant Mental Health Issues: Yes  Date Care Coordination Discontinued: 05/28/15    DX V65.8 REPLACED WITH [id number] [facility]ALTH CARE HOME (04/08/2013)         Abnormal Pap smear of cervix      Priority: Low     3-12-15: Pap/HPV neg.  Plan: Given hx (mildly abn pap, hyster), recheck in 5 yrs.  10-11-11: Pap/HPV neg.  Plan: Given ASUS pap in 2010, recommend repeating pap in 1 year.  8/3/2010 ASCUS Pap with +BV- pt treated with  "Metrogel.  Pt needs repeat Pap in 1yr.  8/4/2008 Pt had total hyst for menorrhagia, pelvic pain, scar tissue, and fibroids.       There are no exam notes on file for this visit.  Chief Complaint   Patient presents with     Pain Management     CPM     Refill Request     pt wants to increase gabapentin, refill trulicity     Pain     pain on left calf muscle that feels like it gets locked.      Pain     pt has pain on both arms      Blood pressure 108/65, pulse 109, temperature 98.2  F (36.8  C), temperature source Oral, resp. rate 16, height 1.499 m (4' 11.02\"), weight 99.8 kg (220 lb), SpO2 94 %, not currently breastfeeding.  No results found for any visits on 01/02/20.   oth upper arms hurt--loose skin.  Phentermine is diet pill    Getting trulicity and now phentermine for weight loss.  17# in 6 months.    basaglar 25 at bedtime.  Gluc mid-upper 100s.  Checking up to 3 times daily.      SUBJECTIVE:  Ruthy Álvarez is here for chronic pain syndrome.  Her symptoms are stable to improving.     She has been able to lose weight on a combination of Dulaglutide 1.5 mg subq every week and phentermine.  Dr. Carson is managing the phentermine.  She has some occasional nausea that is worse with Trulicity, but she tolerates it well.  She does complain of fatigue.  She has about 17 pounds of weight loss over the past six months.  She doesn't have significant abdominal pain.     She has increasing discomfort around her arms in the triceps area that she attributes to the size of the skin in that area.     She has some left leg cramps that are occurring at rest, and she has some medial joint line pain.     Overall, she is satisfied with her pain control and is hoping to get off of it at some point in the future as her weight loss continues.  Her goal weight is 137 pounds.     I confirmed her medication list, which is present today.  She is off of her thiazide diuretic and is taking diltiazem.      OBJECTIVE:   GENERAL:  Patient is " alert, pleasant, and in no acute distress.  She is appropriately groomed.  She walks with a walker.   CHEST:  Clear.   HEART:  Regular rate and rhythm.   EXTREMITIES:  No edema.     MSK:  She has tenderness along the medial joint line on the left.  She has normal pulses in her feet.      ASSESSMENT/PLAN:   1.  Chronic pain syndrome, stable.  I refilled her pain medications.  She'll continue to follow up monthly.  UDS is appropriate.  Database was reviewed and is appropriate as well.  She is on oxycodone 10 mg t.i.d., quantity of 90 was given.   2.  Diabetes.  She is on Basaglar 25 units and is getting blood sugars in the mid to upper 100s.  She is checking up to 3 times per day.  We'll continue that for now and check an A1C when she returns next time.   3.  Hypertension.  She has great control today.  We'll need a BMP when she returns as she has had difficulty with hyperkalemia in the past.   4.  Weight loss is excellent.  Her goal may be a bit aggressive, but nevertheless, she is making some very nice progress.  I refilled her Trulicity today.  She is tolerating it reasonably well.  If the nausea gets worse, we'll have to consider alternatives.

## 2020-01-02 NOTE — NURSING NOTE
Clinic Administered Medication Documentation    MEDICATION LIST:   Injectable Medication Documentation    Patient was given Cyanocobalamin (B-12). Prior to medication administration, verified patients identity using patient s name and date of birth. Please see MAR and medication order for additional information. Patient instructed to remain in clinic for 15 minutes.      Was entire vial of medication used? Yes  Vial/Syringe: Single dose vial  Expiration Date:  1/21  Was this medication supplied by the patient? No    Name of provider who requested the medication administration: Dr. Meyer  Name of provider on site (faculty or community preceptor) at the time of performing the medication administration: Dr. Meyer     Date of next administration: 2/1/2020  Date of next office visit with provider to renew medication plan (must be seen annually): 1/2021

## 2020-01-02 NOTE — PATIENT INSTRUCTIONS
Should have refills through Oct, 2020 for trulicity.    Try some tonic water for leg cramps--it can help.   Use as needed.

## 2020-01-20 DIAGNOSIS — E11.42 TYPE 2 DIABETES MELLITUS WITH DIABETIC POLYNEUROPATHY, WITH LONG-TERM CURRENT USE OF INSULIN (H): ICD-10-CM

## 2020-01-20 DIAGNOSIS — Z79.4 TYPE 2 DIABETES MELLITUS WITH DIABETIC POLYNEUROPATHY, WITH LONG-TERM CURRENT USE OF INSULIN (H): ICD-10-CM

## 2020-01-30 ENCOUNTER — OFFICE VISIT (OUTPATIENT)
Dept: FAMILY MEDICINE | Facility: CLINIC | Age: 55
End: 2020-01-30
Payer: COMMERCIAL

## 2020-01-30 VITALS
HEART RATE: 114 BPM | RESPIRATION RATE: 16 BRPM | SYSTOLIC BLOOD PRESSURE: 112 MMHG | WEIGHT: 216.8 LBS | OXYGEN SATURATION: 95 % | BODY MASS INDEX: 43.76 KG/M2 | TEMPERATURE: 98 F | DIASTOLIC BLOOD PRESSURE: 76 MMHG

## 2020-01-30 DIAGNOSIS — I10 ESSENTIAL HYPERTENSION: ICD-10-CM

## 2020-01-30 DIAGNOSIS — E11.42 TYPE 2 DIABETES MELLITUS WITH DIABETIC POLYNEUROPATHY, WITH LONG-TERM CURRENT USE OF INSULIN (H): Primary | ICD-10-CM

## 2020-01-30 DIAGNOSIS — G89.4 CHRONIC PAIN SYNDROME: Primary | ICD-10-CM

## 2020-01-30 DIAGNOSIS — G56.03 BILATERAL CARPAL TUNNEL SYNDROME: ICD-10-CM

## 2020-01-30 DIAGNOSIS — Z79.4 TYPE 2 DIABETES MELLITUS WITH DIABETIC POLYNEUROPATHY, WITH LONG-TERM CURRENT USE OF INSULIN (H): Primary | ICD-10-CM

## 2020-01-30 DIAGNOSIS — G89.4 CHRONIC PAIN SYNDROME: ICD-10-CM

## 2020-01-30 LAB
AMPHETAMINES QUAL: POSITIVE
BARBITURATES QUAL URINE: NEGATIVE
BENZODIAZEPINE QUAL URINE: NEGATIVE
BUN SERPL-MCNC: 30.8 MG/DL (ref 7–19)
BUPRENORPHINE QUAL URINE: NEGATIVE
CALCIUM SERPL-MCNC: 9.1 MG/DL (ref 8.5–10.1)
CANNABINOIDS UR QL SCN: NEGATIVE
CHLORIDE SERPLBLD-SCNC: 105.6 MMOL/L (ref 98–110)
CHOLEST SERPL-MCNC: 153.6 MG/DL (ref 0–200)
CHOLEST/HDLC SERPL: 3.5 {RATIO} (ref 0–5)
CO2 SERPL-SCNC: 21.4 MMOL/L (ref 20–32)
COCAINE QUAL URINE: NEGATIVE
CREAT SERPL-MCNC: 0.9 MG/DL (ref 0.5–1)
GFR SERPL CREATININE-BSD FRML MDRD: 67.4 ML/MIN/1.7 M2
GLUCOSE SERPL-MCNC: 169 MG'DL (ref 70–99)
HBA1C MFR BLD: 7.3 % (ref 4.1–5.7)
HDLC SERPL-MCNC: 44.3 MG/DL
LDLC SERPL CALC-MCNC: 81 MG/DL (ref 0–129)
METHAMPHETAMINE: NEGATIVE
METHODONE QUAL: NEGATIVE
MORPHINE QUAL: NEGATIVE
OXYCODONE QUAL: POSITIVE
PHENCYCLIDINE: NEGATIVE
POTASSIUM SERPL-SCNC: 4.5 MMOL/L (ref 3.2–4.6)
PROPOXYPHENE: NEGATIVE
SODIUM SERPL-SCNC: 136.9 MMOL/L (ref 132–142)
TEMPERATURE OF URINE WAS BETWEEN 90-100 DEGREES F: YES
TRICYCLIC ANTIDEPRESSANTS: NEGATIVE
TRIGL SERPL-MCNC: 142.4 MG/DL (ref 0–150)
VLDL CHOLESTEROL: 28.5 MG/DL (ref 7–32)

## 2020-01-30 RX ORDER — OXYCODONE AND ACETAMINOPHEN 10; 325 MG/1; MG/1
1 TABLET ORAL EVERY 6 HOURS PRN
Qty: 90 TABLET | Refills: 0 | Status: SHIPPED | OUTPATIENT
Start: 2020-01-30 | End: 2020-02-27

## 2020-01-30 RX ADMIN — CYANOCOBALAMIN 1000 MCG: 1000 INJECTION, SOLUTION INTRAMUSCULAR; SUBCUTANEOUS at 11:50

## 2020-01-30 NOTE — LETTER
February 3, 2020      Ruthy Álvarez  [address]  [place] [address]        Dear Ruthy,    Your kidney tests are now back to normal, thankfully.  I think the problems we saw a few months ago were related to the diuretic meds that we have now stopped.   Your cholesterol is well controlled, too.  And, we discussed your diabetes, which is well-controlled, also.!   Great job!   See you in February.    JIGNESH Meyer     Please see below for your test results.    Resulted Orders   Rapid Urine Drug Screen ([facility])   Result Value Ref Range    Cannabinoids Qual Urine NEGATIVE NEGATIVE    Phencyclidine NEGATIVE NEGATIVE    Cocaine Qual Urine NEGATIVE NEGATIVE    Methamphetamine Qual NEGATIVE NEGATIVE    Morphine Qual NEGATIVE NEGATIVE    Amphetamines Qual POSITIVE (A) NEGATIVE    Benzodiazepine Qual Urine NEGATIVE NEGATIVE    Tricyclic Antidepressants NEGATIVE NEGATIVE    Methadone Qual NEGATIVE NEGATIVE    Barbiturates Qual Urine NEGATIVE NEGATIVE    Oxycodone Qual POSITIVE (A) NEGATIVE    Propoxyphene NEGATIVE NEGATIVE    Buprenorphine Qual Urine NEGATIVE NEGATIVE    Temperature of Urine was Between  Degrees F YES YES      Comment:      This is a preliminary screening test that detects drugs-of-abuse in urine at   specified detection levels.  To confirm preliminary results, a more specific   method such as Gas Chromatography/Mass Spectrometry (GC/MS) must be used.        Lipid Panel ([place])   Result Value Ref Range    Cholesterol 153.6 0.0 - 200.0 mg/dL    Cholesterol/HDL Ratio 3.5 0.0 - 5.0    HDL Cholesterol 44.3 >40.0 mg/dL    LDL Cholesterol Calculated 81 0 - 129 mg/dL    Triglycerides 142.4 0.0 - 150.0 mg/dL    VLDL Cholesterol 28.5 7.0 - 32.0 mg/dL   Hemoglobin A1c ([facility])   Result Value Ref Range    Hemoglobin A1C 7.3 (H) 4.1 - 5.7 %   Basic Metabolic Panel ([place])   Result Value Ref Range    Urea Nitrogen 30.8 (H) 7.0 - 19.0 mg/dL    Calcium 9.1 8.5 - 10.1 mg/dL    Chloride 105.6 98.0 -  110.0 mmol/L    Carbon Dioxide 21.4 20.0 - 32.0 mmol/L    Creatinine 0.9 0.5 - 1.0 mg/dL    Glucose 169.0 (H) 70.0 - 99.0 mg'dL    Potassium 4.5 3.2 - 4.6 mmol/L    Sodium 136.9 132.0 - 142.0 mmol/L    GFR Estimate 67.4 >60.0 mL/min/1.7 m2    GFR Estimate If Black 81.5 >60.0 mL/min/1.7 m2       If you have any questions, please call the clinic to make an appointment.    Sincerely,    Jose Angel Meyer MD

## 2020-01-30 NOTE — NURSING NOTE
Clinic Administered Medication Documentation    MEDICATION LIST:   Injectable Medication Documentation    Patient was given Cyanocobalamin (B-12). Prior to medication administration, verified patients identity using patient s name and date of birth. Please see MAR and medication order for additional information. Patient instructed to remain in clinic for 15 minutes.      Was entire vial of medication used? Yes  Vial/Syringe: Single dose vial  Expiration Date:  01/21  Was this medication supplied by the patient? No     Name of provider who requested the medication administration: Dr. Meyer  Name of provider on site (faculty or community preceptor) at the time of performing the medication administration: Dr. Meyer

## 2020-01-30 NOTE — PROGRESS NOTES
Patient Active Problem List    Diagnosis Date Noted     Chronic pain syndrome 10/07/2016     Priority: High     Chronic Pain Diagnosis:  neuropathy  DIRE Total Score(s):14     ORT: 5    4 - 7 =  Moderate Risk   of future problems with Opioids  FAQ5: 45/100 on 9/9/16, 40/100 on 11/15/16  Behavioral Health Consultation: 5/12/17 with Dr. Daisy Quezada  Personal Care Plan for Chronic Pain: 5/12/17  Opioid medication:oxycodone   Dose:10mg  Number of pills per month:60  Patient is being prescribed 10mg of oxycodone IR (Percocet) per day this is 15 mg Morphine Equivalents  Benzodiazepines Prescribed? No  Naloxone prescribed? No       Clinic visit frequency required: Q 1 month Next visit due:   Controlled Substance/Opioid Treatment agreement on file (dated <12 months ago)?:    Date(s): pending  Last MNPMP verification: last visit  Items in red to be updated at each visit  Patient is followed by Data Unavailable for ongoing prescription of pain medication.  All refills should be approved by this provider, or covering partner.Chronic Pain          Type 2 diabetes mellitus with diabetic polyneuropathy (H) 10/12/2015     Priority: High     Metabolic acidosis 07/26/2019     Priority: Medium     reolved w/ discontinuation of ASA       Copper deficiency 04/05/2019     Priority: Medium     Identified in Aug, 2018--see discharge summary from [facility] in [place].       Generalized abdominal pain 01/03/2019     Priority: Medium     Gastroenteritis 01/03/2019     Priority: Medium     Neuropathy 01/03/2019     Priority: Medium     Obesity 01/03/2019     Priority: Medium     Other acute pulmonary embolism without acute cor pulmonale (H) 12/31/2018     Priority: Medium     Hernia of anterior abdominal wall 01/28/2017     Priority: Medium     Chronic pain 01/27/2017     Priority: Medium     Diarrhea 01/27/2017     Priority: Medium     Hernia 01/27/2017     Priority: Medium     Osteoarthritis of both knees 10/31/2016     Priority: Medium      Polyneuropathy due to secondary diabetes mellitus (H) 10/31/2016     Priority: Medium     Compression neuropathy of left lower extremity 02/19/2016     Priority: Medium     Bilateral ankle joint pain 01/15/2016     Priority: Medium     Microalbuminuria 10/12/2015     Priority: Medium     Esophageal reflux 04/07/2015     Priority: Medium     Urinary incontinence 02/19/2015     Priority: Medium     Insomnia 02/19/2015     Priority: Medium     Essential hypertension 08/26/2013     Priority: Medium     Asthma 08/04/2013     Priority: Medium     History of injury 08/04/2013     Priority: Medium     Hyperlipidemia LDL goal <100 06/21/2013     Priority: Medium     S/P bariatric surgery 06/21/2013     Priority: Medium     Kim-en-Y in 2006       Neck pain 05/16/2013     Priority: Medium     Moderate major depression (H) 01/24/2013     Priority: Medium     Moderate persistent asthma without complication 11/15/2012     Priority: Medium     Pulmonary embolism and infarction (H) 11/15/2012     Priority: Medium     12/24/2018  Unprovoked.  + fam hx of DVT.  Treated w/ DOAC 12/2018through 9/2019       S/P hysterectomy 06/21/2013     Priority: Low     2008, For fibroids.  Cervix removed, but still has ovaries.       Carpal tunnel syndrome 11/15/2012     Priority: Low     Necrotizing fasciitis (H) 11/15/2012     Priority: Low     Health Care Home 11/15/2012     Priority: Low     Tier 3    Status: Accept  Care Coordination Start Date: 02/27/12  State Tier Level: Level 3  Language/Barrier to Learning: No  Significant Mental Health Issues: Yes  Date Care Coordination Discontinued: 05/28/15    DX V65.8 REPLACED WITH [id number] [facility] CARE HOME (04/08/2013)         Abnormal Pap smear of cervix      Priority: Low     3-12-15: Pap/HPV neg.  Plan: Given hx (mildly abn pap, hyster), recheck in 5 yrs.  10-11-11: Pap/HPV neg.  Plan: Given ASUS pap in 2010, recommend repeating pap in 1 year.  8/3/2010 ASCUS Pap with +BV- pt treated with  Metrogel.  Pt needs repeat Pap in 1yr.  8/4/2008 Pt had total hyst for menorrhagia, pelvic pain, scar tissue, and fibroids.       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  Chief Complaint   Patient presents with     Pain Management     CPM     other     Pt wants a new walker     Blood pressure 112/76, pulse 114, temperature 98  F (36.7  C), temperature source Oral, resp. rate 16, weight 98.3 kg (216 lb 12.8 oz), SpO2 95 %, not currently breastfeeding.  Results for orders placed or performed in visit on 01/30/20   Rapid Urine Drug Screen ([facility])     Status: Abnormal   Result Value Ref Range    Cannabinoids Qual Urine NEGATIVE NEGATIVE    Phencyclidine NEGATIVE NEGATIVE    Cocaine Qual Urine NEGATIVE NEGATIVE    Methamphetamine Qual NEGATIVE NEGATIVE    Morphine Qual NEGATIVE NEGATIVE    Amphetamines Qual POSITIVE (A) NEGATIVE    Benzodiazepine Qual Urine NEGATIVE NEGATIVE    Tricyclic Antidepressants NEGATIVE NEGATIVE    Methadone Qual NEGATIVE NEGATIVE    Barbiturates Qual Urine NEGATIVE NEGATIVE    Oxycodone Qual POSITIVE (A) NEGATIVE    Propoxyphene NEGATIVE NEGATIVE    Buprenorphine Qual Urine NEGATIVE NEGATIVE    Temperature of Urine was Between  Degrees F YES YES   Hemoglobin A1c ([facility])     Status: Abnormal   Result Value Ref Range    Hemoglobin A1C 7.3 (H) 4.1 - 5.7 %   (poor audio)      SUBJECTIVE:  Ruthy Álvarez is here for followup of multiple issues.  Her primary issue is chronic pain.  The etiology of her pain is underlying osteoarthritis of her knees.  I reviewed her chronic pain problem list.  ORT is 5.  Her DIRE is 14.  She has done quite well on oxycodone 10 mg t.i.d.  She does not have constipation.  She does have functional improvement in her ability to move.  She walks with a walker.  She is working very hard on weight loss and is on 4lb in the last month.  She bilateral hand numbness.  It starts in her hands and radiates to the upper arms.  She has had a  CTR on the right and still has residual numbness on her right  She feels that she is dropping things more often.  She does not associate this with any cardiac symptoms.        She is also due for followup of her hypertension, Mg deficiency and diabetes.  She is on diltiazme for her hypertension,  on insulin glargine 25 units, but No bolus insulin.  She is on an GLP-1 for weight loss and diabetes     OBJECTIVE:   GENERAL:  The patient is alert, pleasant and nontoxic.   CHEST:  Clear.   HEART:  Regular rate and rhythm.  No murmurs.   Hands>   is normal, but unable to hold a piece of paper against resistance bilat.  No thenar atrophy.  Scar on left.  +phalens bilat--L>R      ASSESSMENT:   1.  Chronic pain syndrome.  Stable on oxycodone 10 mg  t.i.d.  We will continue to have her consider  lowering the dose in the future   2.  Diabetes well controlled  No changes were made.     3.  Hand numbness  It is interesting that she had carpal tunnel symptoms on the right despite surgery.  She definitely has a positive EMG, but this was almost 10 years ago.  I am going to refer her to a hand specialist before doing any further diagnostic workup  4.  Renal insufficiency--improved.  Induced by diuretics.  Cont to hold them.             Checking TID.  As low as 89, but generally in 100s.

## 2020-02-03 ENCOUNTER — COMMUNICATION - HEALTHEAST (OUTPATIENT)
Dept: SURGERY | Facility: CLINIC | Age: 55
End: 2020-02-03

## 2020-02-03 DIAGNOSIS — K91.2 POSTOPERATIVE MALABSORPTION: ICD-10-CM

## 2020-02-03 DIAGNOSIS — Z79.4 TYPE 2 DIABETES MELLITUS WITH DIABETIC POLYNEUROPATHY, WITH LONG-TERM CURRENT USE OF INSULIN (H): ICD-10-CM

## 2020-02-03 DIAGNOSIS — R79.89 INCREASED PTH LEVEL: ICD-10-CM

## 2020-02-03 DIAGNOSIS — E11.42 TYPE 2 DIABETES MELLITUS WITH DIABETIC POLYNEUROPATHY, WITH LONG-TERM CURRENT USE OF INSULIN (H): ICD-10-CM

## 2020-02-03 RX ORDER — LANCETS 30 GAUGE
EACH MISCELLANEOUS
Qty: 100 EACH | Refills: 11 | Status: SHIPPED | OUTPATIENT
Start: 2020-02-03 | End: 2021-11-16

## 2020-02-03 NOTE — PATIENT INSTRUCTIONS
ORTHOPEDICS ADULT REFERRAL  [facility]  Phone: [phone]  Fax: [phone]    Online referral placed. They will contact patient to schedule.     Anaid Jerome

## 2020-02-22 ENCOUNTER — COMMUNICATION - HEALTHEAST (OUTPATIENT)
Dept: SURGERY | Facility: CLINIC | Age: 55
End: 2020-02-22

## 2020-02-22 DIAGNOSIS — K91.2 POSTOPERATIVE MALABSORPTION: ICD-10-CM

## 2020-02-22 DIAGNOSIS — K90.9 INTESTINAL MALABSORPTION, UNSPECIFIED TYPE: ICD-10-CM

## 2020-02-22 DIAGNOSIS — E66.01 MORBID OBESITY WITH BMI OF 45.0-49.9, ADULT (H): ICD-10-CM

## 2020-02-27 ENCOUNTER — OFFICE VISIT (OUTPATIENT)
Dept: FAMILY MEDICINE | Facility: CLINIC | Age: 55
End: 2020-02-27
Payer: COMMERCIAL

## 2020-02-27 VITALS
RESPIRATION RATE: 16 BRPM | TEMPERATURE: 97.9 F | HEART RATE: 123 BPM | DIASTOLIC BLOOD PRESSURE: 83 MMHG | OXYGEN SATURATION: 97 % | SYSTOLIC BLOOD PRESSURE: 134 MMHG | BODY MASS INDEX: 42.31 KG/M2 | WEIGHT: 209.6 LBS

## 2020-02-27 DIAGNOSIS — G89.29 CHRONIC RADICULAR LOW BACK PAIN: Primary | ICD-10-CM

## 2020-02-27 DIAGNOSIS — G89.4 CHRONIC PAIN SYNDROME: ICD-10-CM

## 2020-02-27 DIAGNOSIS — M17.0 PRIMARY OSTEOARTHRITIS OF BOTH KNEES: ICD-10-CM

## 2020-02-27 DIAGNOSIS — D17.30 LIPOMA OF SKIN AND SUBCUTANEOUS TISSUE: ICD-10-CM

## 2020-02-27 DIAGNOSIS — M54.16 CHRONIC RADICULAR LOW BACK PAIN: Primary | ICD-10-CM

## 2020-02-27 DIAGNOSIS — G89.4 CHRONIC PAIN SYNDROME: Primary | ICD-10-CM

## 2020-02-27 RX ORDER — LIDOCAINE 40 MG/G
CREAM TOPICAL
Qty: 45 G | Refills: 2 | Status: SHIPPED | OUTPATIENT
Start: 2020-02-27 | End: 2022-05-23

## 2020-02-27 RX ORDER — AMOXICILLIN 250 MG
1 CAPSULE ORAL 2 TIMES DAILY
Qty: 60 TABLET | Refills: 11 | Status: SHIPPED | OUTPATIENT
Start: 2020-02-27 | End: 2021-02-11

## 2020-02-27 RX ORDER — OXYCODONE AND ACETAMINOPHEN 10; 325 MG/1; MG/1
1 TABLET ORAL EVERY 6 HOURS PRN
Qty: 90 TABLET | Refills: 0 | Status: SHIPPED | OUTPATIENT
Start: 2020-02-27 | End: 2020-03-19

## 2020-02-27 RX ADMIN — CYANOCOBALAMIN 1000 MCG: 1000 INJECTION, SOLUTION INTRAMUSCULAR; SUBCUTANEOUS at 11:55

## 2020-02-27 NOTE — NURSING NOTE
Clinic Administered Medication Documentation      Injectable Medication Documentation    Patient was given Cyanocobalamin (B-12). Prior to medication administration, verified patients identity using patient s name and date of birth. Please see MAR and medication order for additional information. Patient instructed to remain in clinic for 15 minutes.      Was entire vial of medication used? Yes  Vial/Syringe: Single dose vial  Expiration Date:  01/21  Was this medication supplied by the patient? No    Name of provider who requested the medication administration: Dr. Meyer  Name of provider on site (faculty or community preceptor) at the time of performing the medication administration: Dr. Meyer    Date of next administration: monthly  Date of next office visit with provider to renew medication plan (must be seen annually): n/a

## 2020-02-27 NOTE — PROGRESS NOTES
Patient Active Problem List    Diagnosis Date Noted     Chronic pain syndrome 10/07/2016     Priority: High     Chronic Pain Diagnosis:  neuropathy  DIRE Total Score(s):14     ORT: 5    4 - 7 =  Moderate Risk   of future problems with Opioids  FAQ5: 45/100 on 9/9/16, 40/100 on 11/15/16  Behavioral Health Consultation: 5/12/17 with Dr. Daisy Quezada  Personal Care Plan for Chronic Pain: 5/12/17  Opioid medication:oxycodone   Dose:10mg  Number of pills per month:60  Patient is being prescribed 10mg of oxycodone IR (Percocet) per day this is 15 mg Morphine Equivalents  Benzodiazepines Prescribed? No  Naloxone prescribed? No       Clinic visit frequency required: Q 1 month Next visit due:   Controlled Substance/Opioid Treatment agreement on file (dated <12 months ago)?:    Date(s): pending  Last [place] verification: last visit  Items in red to be updated at each visit  Patient is followed by Data Unavailable for ongoing prescription of pain medication.  All refills should be approved by this provider, or covering partner.Chronic Pain          Type 2 diabetes mellitus with diabetic polyneuropathy (H) 10/12/2015     Priority: High     Metabolic acidosis 07/26/2019     Priority: Medium     reolved w/ discontinuation of ASA       Copper deficiency 04/05/2019     Priority: Medium     Identified in Aug, 2018--see discharge summary from [facility] in [place].       Generalized abdominal pain 01/03/2019     Priority: Medium     Gastroenteritis 01/03/2019     Priority: Medium     Neuropathy 01/03/2019     Priority: Medium     Obesity 01/03/2019     Priority: Medium     Other acute pulmonary embolism without acute cor pulmonale (H) 12/31/2018     Priority: Medium     Hernia of anterior abdominal wall 01/28/2017     Priority: Medium     Chronic pain 01/27/2017     Priority: Medium     Diarrhea 01/27/2017     Priority: Medium     Hernia 01/27/2017     Priority: Medium     Osteoarthritis of both knees 10/31/2016     Priority: Medium      Polyneuropathy due to secondary diabetes mellitus (H) 10/31/2016     Priority: Medium     Compression neuropathy of left lower extremity 02/19/2016     Priority: Medium     Bilateral ankle joint pain 01/15/2016     Priority: Medium     Microalbuminuria 10/12/2015     Priority: Medium     Esophageal reflux 04/07/2015     Priority: Medium     Urinary incontinence 02/19/2015     Priority: Medium     Insomnia 02/19/2015     Priority: Medium     Essential hypertension 08/26/2013     Priority: Medium     Asthma 08/04/2013     Priority: Medium     History of injury 08/04/2013     Priority: Medium     Hyperlipidemia LDL goal <100 06/21/2013     Priority: Medium     S/P bariatric surgery 06/21/2013     Priority: Medium     Kim-en-Y in 2006       Neck pain 05/16/2013     Priority: Medium     Moderate major depression (H) 01/24/2013     Priority: Medium     Moderate persistent asthma without complication 11/15/2012     Priority: Medium     Pulmonary embolism and infarction (H) 11/15/2012     Priority: Medium     12/24/2018  Unprovoked.  + fam hx of DVT.  Treated w/ DOAC 12/2018through 9/2019       S/P hysterectomy 06/21/2013     Priority: Low     2008, For fibroids.  Cervix removed, but still has ovaries.       Carpal tunnel syndrome 11/15/2012     Priority: Low     Necrotizing fasciitis (H) 11/15/2012     Priority: Low     Health Care Home 11/15/2012     Priority: Low     Tier 3    Status: Accept  Care Coordination Start Date: 02/27/12  State Tier Level: Level 3  Language/Barrier to Learning: No  Significant Mental Health Issues: Yes  Date Care Coordination Discontinued: 05/28/15    DX V65.8 REPLACED WITH [id number] (04/08/2013)         Abnormal Pap smear of cervix      Priority: Low     3-12-15: Pap/HPV neg.  Plan: Given hx (mildly abn pap, hyster), recheck in 5 yrs.  10-11-11: Pap/HPV neg.  Plan: Given ASUS pap in 2010, recommend repeating pap in 1 year.  8/3/2010 ASCUS Pap with +BV- pt treated with  Metrogel.  Pt needs repeat Pap in 1yr.  8/4/2008 Pt had total hyst for menorrhagia, pelvic pain, scar tissue, and fibroids.       There are no exam notes on file for this visit.  Chief Complaint   Patient presents with     Pain Management     CPM     Refill Request     Stool softner, new walker     Pain     left leg pain. Hurts to touch (feels burning /tinglish sensation) At times hurts to stand on legs     Blood pressure 134/83, pulse 123, temperature 97.9  F (36.6  C), temperature source Oral, resp. rate 16, weight 95.1 kg (209 lb 9.6 oz), SpO2 97 %, not currently breastfeeding.  Results for orders placed or performed in visit on 02/27/20   Rapid Urine Drug Screen ([facility])     Status: Abnormal   Result Value Ref Range    Cannabinoids Qual Urine Negative NEGATIVE    Phencyclidine Negative NEGATIVE    Cocaine Qual Urine Negative NEGATIVE    Methamphetamine Qual Negative NEGATIVE    Morphine Qual Negative NEGATIVE    Amphetamines Qual Positive (A) NEGATIVE    Benzodiazepine Qual Urine Negative NEGATIVE    Tricyclic Antidepressants Negative NEGATIVE    Methadone Qual Negative NEGATIVE    Barbiturates Qual Urine Negative NEGATIVE    Oxycodone Qual Positive (A) NEGATIVE    Propoxyphene Negative NEGATIVE    Buprenorphine Qual Urine Negative NEGATIVE    Temperature of Urine was Between  Degrees F YES YES   SUBJECTIVE:  Ruthy Álvarez is here for chronic pain syndrome.  The primary etiology of her pain is osteoarthritis in her knees.  She feels that is generally controlled with her oxycodone and she has no complaints about that.  However, she has some new numbness and pain on the left side of her leg.  It is just in her lateral leg and goes down to her ankle laterally.  She also has some burning discomfort in the upper back where she had excision of a large lipoma.  She feels like her knee pops when she straightens it.  She is able to control her bowel or bladder.  She had steroid injections in her lumbar spine  when she was in [place] that did not really help this pain.  However, this seems to be a newer recurrence of the pain over the past 6-8 weeks.  She is already on opiates, Cymbalta.  She has no bowel or bladder incontinence.  She does subjectively feel it is weak at times, but denies consistent weakness.  The pain doesn't wake her from sleep.  She does have difficulty with sleep in general.      OBJECTIVE:   GENERAL:  Patient is alert, pleasant, and in no acute distress.   CHEST:  Clear.   HEART:  Regular rate and rhythm, normal S1, S2, no murmur.   ABDOMEN:  Her abdomen is soft and nontender.   MUSCULOSKELETAL:  Her back has some hypersensitivity over the scar in the upper thoracic region just left of midline.  The scar is completely intact and well healed.  Exam of her back reveals increased lordosis.  She has pain with straight leg maneuver radiating down the leg at about 30 degrees on the left side.  Hip range of motion does not reproduce her pain.  She does not have clear cut piriformis tenderness.       ASSESSMENT:   1.  Radicular back pain.   2.  Chronic pain syndrome.   3.  Hypersensitivity over the scar of her lipoma excision.   4.  Controlled hypertension.      PLAN:   1.  Database was reviewed.  Urine drug screen is appropriate.  I refilled her oxycodone.   2.  MRI of her back to reinvestigate her radicular pain as her symptoms have been going on 6+ weeks and are refractory to therapy.  She does not want surgery but would consider an injection even though prior injections in [place] did not help her.     3.  Regarding the allodynia over the lipoma excision, we will try some lidocaine in that area.     4.  She did ask for refills of her Senokot, which was done today, and for a prescription for a new walker.  Her previous walker has broken brakes so I gladly did that.     rev'd chart--weight has gone from 235 to 210 in last 6 months, which is outstanding!  Is on trulicity and phentermine.  Pulse tachy  today but regular--reassess next visit.

## 2020-03-03 ENCOUNTER — RECORDS - HEALTHEAST (OUTPATIENT)
Dept: ADMINISTRATIVE | Facility: OTHER | Age: 55
End: 2020-03-03

## 2020-03-03 NOTE — PATIENT INSTRUCTIONS
20   MRI- Lumbar Spine   [facility]   [phone]  Fax Orders to [phone]     Order faxed to [phone], they will contact patient to schedule.     Anaid Jerome

## 2020-03-11 ENCOUNTER — HOSPITAL ENCOUNTER (OUTPATIENT)
Dept: MRI IMAGING | Facility: HOSPITAL | Age: 55
Discharge: HOME | End: 2020-03-11
Attending: FAMILY MEDICINE

## 2020-03-11 DIAGNOSIS — M54.16 CHRONIC RADICULAR LOW BACK PAIN: ICD-10-CM

## 2020-03-11 DIAGNOSIS — G89.29 CHRONIC RADICULAR LOW BACK PAIN: ICD-10-CM

## 2020-03-19 ENCOUNTER — VIRTUAL VISIT (OUTPATIENT)
Dept: FAMILY MEDICINE | Facility: CLINIC | Age: 55
End: 2020-03-19
Payer: MEDICARE

## 2020-03-19 DIAGNOSIS — E53.8 VITAMIN B12 DEFICIENCY (NON ANEMIC): ICD-10-CM

## 2020-03-19 DIAGNOSIS — N32.81 OVERACTIVE BLADDER: ICD-10-CM

## 2020-03-19 DIAGNOSIS — G89.4 CHRONIC PAIN SYNDROME: ICD-10-CM

## 2020-03-19 DIAGNOSIS — Z98.84 S/P BARIATRIC SURGERY: Primary | ICD-10-CM

## 2020-03-19 RX ORDER — TOLTERODINE 2 MG/1
2 CAPSULE, EXTENDED RELEASE ORAL DAILY
Qty: 90 CAPSULE | Refills: 3 | Status: SHIPPED | OUTPATIENT
Start: 2020-03-19 | End: 2020-03-23

## 2020-03-19 RX ORDER — CYANOCOBALAMIN 1000 UG/ML
1 INJECTION, SOLUTION INTRAMUSCULAR; SUBCUTANEOUS
Qty: 10 ML | Refills: 3 | Status: SHIPPED | OUTPATIENT
Start: 2020-03-19 | End: 2020-05-21

## 2020-03-19 RX ORDER — OXYCODONE AND ACETAMINOPHEN 10; 325 MG/1; MG/1
1 TABLET ORAL EVERY 6 HOURS PRN
Qty: 120 TABLET | Refills: 0 | Status: SHIPPED | OUTPATIENT
Start: 2020-03-19 | End: 2020-04-20

## 2020-03-19 NOTE — PROGRESS NOTES
"Family Medicine Telephone Visit Note               Telephone Visit Consent   Patient was verbally read the following and verbal consent was obtained.  \"I understand that I may revoke this request for a phone visit at any time.  This consent will automatically  3 months from the signed date and time.\"    Name person giving consent:  Patient   Date verbal consent given:  3/19/2020  Time verbal consent given:  8:47 AM          Chief Complaint   Patient presents with     Pain Management     CPM     Current Outpatient Medications   Medication Sig Dispense Refill     albuterol (2.5 MG/3ML) 0.083% nebulizer solution Take 1 vial (2.5 mg) by nebulization every 6 hours as needed for shortness of breath / dyspnea 1 Box prn     albuterol (VENTOLIN HFA) 108 (90 Base) MCG/ACT inhaler Inhale 2 puffs into the lungs 4 times daily Inhale 2 puffs every 4-6 hours as needed 3 Inhaler 3     Alcohol Swabs (B-D SINGLE USE SWABS REGULAR) PADS Apply 1 pad topically 3 times daily Use as directed 100 each 12     aspirin (ASA) 325 MG EC tablet Take 1 tablet (325 mg) by mouth daily 90 tablet 3     atorvastatin (LIPITOR) 10 MG tablet 1 po qday 90 tablet 3     blood glucose (NO BRAND SPECIFIED) lancets standard Check fasting glucose 4 times daily.  For Accu check monitor 360 each 11     capsaicin (ZOSTRIX) 0.025 % external cream Apply 1 g topically 3 times daily 45 g 2     Copper Gluconate 2 MG CAPS Take 1 capsule by mouth 3 times daily for 14 days, THEN 1 capsule daily. 90 capsule 3     diclofenac (VOLTAREN) 1 % topical gel        diltiazem ER (DILT-XR) 240 MG 24 hr ER beaded capsule Take 1 capsule (240 mg) by mouth daily 30 capsule 11     dulaglutide (TRULICITY) 1.5 MG/0.5ML pen Inject 1.5 mg Subcutaneous every 7 days 2 mL 11     DULoxetine (CYMBALTA) 60 MG capsule Take 1 capsule (60 mg) by mouth daily 30 capsule 11     DULoxetine (CYMBALTA) 60 MG capsule Take 1 capsule (60 mg) by mouth daily 30 capsule 11     famotidine (PEPCID) 20 MG " tablet Take 1 tablet (20 mg) by mouth 2 times daily 60 tablet 11     ferrous sulfate (IRON) 325 (65 FE) MG tablet Take 1 tablet (325 mg) by mouth daily (with breakfast) 30 tablet 2     gabapentin (NEURONTIN) 250 MG/5ML solution Take 15 mLs (750 mg) by mouth 3 times daily 1350 mL 2     insulin glargine (BASAGLAR KWIKPEN) 100 UNIT/ML pen Inject 25 Units Subcutaneous daily 6 mL 11     insulin pen needle (BD ULTRA-FINE) 29G X 12.7MM miscellaneous Use 4 times daily 100 each prn     Lancets (ONETOUCH DELICA PLUS [id number]) MISC CHECK FASTING GLUCOSE THREE TIMES DAILY 100 each 11     lidocaine (LMX4) 4 % external cream Apply topically once as needed for mild pain (on upper back) 45 g 2     magnesium oxide (MAG-OX) 400 (241.3 MG) MG tablet Take 1 tablet (400 mg) by mouth daily 30 tablet 11     naloxone (NARCAN) 4 MG/0.1ML nasal spray Spray 1 spray (4 mg) into one nostril alternating nostrils once as needed for opioid reversal Every 2-3 minutes until patient responsive or EMS arrives 0.2 mL 0     order for DME Equipment being ordered: wheeled walker with seat 1 Device 0     oxyCODONE-acetaminophen (PERCOCET)  MG per tablet Take 1 tablet by mouth every 6 hours as needed for severe pain 90 tablet 0     phentermine (ADIPEX-P) 37.5 MG tablet Take 0.5-1 tablets by mouth every morning       risperiDONE (RISPERDAL) 0.25 MG tablet Take 1 tablet (0.25 mg) by mouth At Bedtime       senna-docusate (SENOKOT-S/PERICOLACE) 8.6-50 MG tablet Take 1 tablet by mouth 2 times daily 60 tablet 11     Simethicone 180 MG CAPS Use 1 softgel as needed for gas 60 capsule 11     SUMAtriptan (IMITREX) 25 MG tablet One tab at onset of HA.  May repeat dose in 2 hours.  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  Was taking 2 extra pills per day.  [place] teeth removed 15 days ago.  Has 2 pills left. Back to normal dose.  Pain control is now ok.  Working on weight loss.    Mild HA off and on.  Breathing fine.  Non CP.  Slight abd pain.  Was on amox for dental pain--had some diarrhea, but now better.    Moods 'tiresome'.  Mild edema in legs.  Blood sugars 180-190s  No low readings.  Needs bladder pills and needs bladder pills--ran out and sx are worse.  Tolerates them well.              Assessment and Plan   Ruthy was seen today for pain management.    Diagnoses and all orders for this visit:    S/P bariatric surgery  -     cyanocobalamin (CYANOCOBALAMIN) 1000 MCG/ML injection; Inject 1 mL (1,000 mcg) into the muscle every 30 days    Chronic pain syndrome  -     oxyCODONE-acetaminophen (PERCOCET)  MG per tablet; Take 1 tablet by mouth every 6 hours as needed for severe pain (new dose)    Overactive bladder  -     tolterodine ER (DETROL LA) 2 MG 24 hr capsule; Take 1 capsule (2 mg) by mouth daily    Vitamin B12 deficiency (non anemic)  -     cyanocobalamin (CYANOCOBALAMIN) 1000 MCG/ML injection; Inject 1 mL (1,000 mcg) into the muscle every 30 days    PLAN:   1. For bariatric surgery and B12, we switched to IM injections at home and so I called in a prescription for a 25-gauge x 1 inch needles to the pharmacy and a prescription for 10 mL vial of B12 for her.  She will continue to do that at home.   2. Chronic pain.  Unfortunately, the dentist gave her instructions to increase her meds at home.  We will temporarily increase her supply from a total of 90 to 120 to help her  get back on schedule.  Then in 1 month, we will go back to 90 pills per month.  She is comfortable with that plan.   3. Overactive bladder.  Refilled Detrol.   4. Hypertension.  She will check blood pressures at home and we will discuss at next visit.   5. Diabetes.  Continue current care.         Refilled medications that would be required in the next 3 months.     After Visit Information:  Patient declined AVS     Appointment end time: 907 AM AM  This is a telephone visit that took 14 minutes.  Clinician location:  [facility]

## 2020-03-23 ENCOUNTER — TELEPHONE (OUTPATIENT)
Dept: FAMILY MEDICINE | Facility: CLINIC | Age: 55
End: 2020-03-23

## 2020-03-23 DIAGNOSIS — N32.81 OVERACTIVE BLADDER: ICD-10-CM

## 2020-03-23 RX ORDER — TOLTERODINE 2 MG/1
2 CAPSULE, EXTENDED RELEASE ORAL DAILY
Qty: 90 CAPSULE | Refills: 3 | Status: SHIPPED | OUTPATIENT
Start: 2020-03-23 | End: 2021-07-29

## 2020-04-20 ENCOUNTER — VIRTUAL VISIT (OUTPATIENT)
Dept: FAMILY MEDICINE | Facility: CLINIC | Age: 55
End: 2020-04-20
Payer: MEDICARE

## 2020-04-20 DIAGNOSIS — G89.4 CHRONIC PAIN SYNDROME: ICD-10-CM

## 2020-04-20 DIAGNOSIS — E11.42 DIABETIC POLYNEUROPATHY ASSOCIATED WITH TYPE 2 DIABETES MELLITUS (H): ICD-10-CM

## 2020-04-20 RX ORDER — AMITRIPTYLINE HYDROCHLORIDE 10 MG/1
10 TABLET ORAL AT BEDTIME
Qty: 30 TABLET | Refills: 0 | Status: SHIPPED | OUTPATIENT
Start: 2020-04-20 | End: 2020-05-19

## 2020-04-20 RX ORDER — OXYCODONE AND ACETAMINOPHEN 10; 325 MG/1; MG/1
1 TABLET ORAL EVERY 6 HOURS PRN
Qty: 120 TABLET | Refills: 0 | Status: SHIPPED | OUTPATIENT
Start: 2020-04-20 | End: 2020-05-21

## 2020-04-20 RX ORDER — GABAPENTIN 250 MG/5ML
750 SOLUTION ORAL 3 TIMES DAILY
Qty: 1350 ML | Refills: 2 | Status: SHIPPED | OUTPATIENT
Start: 2020-04-20 | End: 2020-05-14

## 2020-04-20 NOTE — PROGRESS NOTES
"Family Medicine Telephone Visit Note               Telephone Visit Consent   Patient was verbally read the following and verbal consent was obtained.    \"This telephone visit will be conducted via a call between you and your physician/provider. We have found that certain health care needs can be provided without the need for a physical exam.  This service lets us provide the care you need with a short phone conversation.  If a prescription is necessary we can send it directly to your pharmacy.  If lab work is needed we can place an order for that and you can then stop by our lab to have the test done at a later time.    Telephone visits are billed at different rates depending on your insurance coverage. During this emergency period, for some insurers they may be billed the same as an in-person visit.  Please reach out to your insurance provider with any questions.    If during the course of the call the physician/provider feels a telephone visit is not appropriate, you will not be charged for this service.\"    Name person giving consent:  Patient   Date verbal consent given:  4/20/2020  Time verbal consent given:  9:55 AM           No chief complaint on file.           HPI   Patients name: Ruthy  Appointment start time:  10:27 AM        Current Outpatient Medications   Medication Sig Dispense Refill     albuterol (2.5 MG/3ML) 0.083% nebulizer solution Take 1 vial (2.5 mg) by nebulization every 6 hours as needed for shortness of breath / dyspnea 1 Box prn     albuterol (VENTOLIN HFA) 108 (90 Base) MCG/ACT inhaler Inhale 2 puffs into the lungs 4 times daily Inhale 2 puffs every 4-6 hours as needed 3 Inhaler 3     Alcohol Swabs (B-D SINGLE USE SWABS REGULAR) PADS Apply 1 pad topically 3 times daily Use as directed 100 each 12     aspirin (ASA) 325 MG EC tablet Take 1 tablet (325 mg) by mouth daily 90 tablet 3     atorvastatin (LIPITOR) 10 MG tablet 1 po qday 90 tablet 3     blood glucose (NO BRAND SPECIFIED) " lancets standard Check fasting glucose 4 times daily.  For Accu check monitor 360 each 11     capsaicin (ZOSTRIX) 0.025 % external cream Apply 1 g topically 3 times daily 45 g 2     cyanocobalamin (CYANOCOBALAMIN) 1000 MCG/ML injection Inject 1 mL (1,000 mcg) into the muscle every 30 days 10 mL 3     diclofenac (VOLTAREN) 1 % topical gel        diltiazem ER (DILT-XR) 240 MG 24 hr ER beaded capsule Take 1 capsule (240 mg) by mouth daily 30 capsule 11     dulaglutide (TRULICITY) 1.5 MG/0.5ML pen Inject 1.5 mg Subcutaneous every 7 days 2 mL 11     DULoxetine (CYMBALTA) 60 MG capsule Take 1 capsule (60 mg) by mouth daily 30 capsule 11     DULoxetine (CYMBALTA) 60 MG capsule Take 1 capsule (60 mg) by mouth daily 30 capsule 11     famotidine (PEPCID) 20 MG tablet Take 1 tablet (20 mg) by mouth 2 times daily 60 tablet 11     ferrous sulfate (IRON) 325 (65 FE) MG tablet Take 1 tablet (325 mg) by mouth daily (with breakfast) 30 tablet 2     gabapentin (NEURONTIN) 250 MG/5ML solution Take 15 mLs (750 mg) by mouth 3 times daily 1350 mL 2     insulin glargine (BASAGLAR KWIKPEN) 100 UNIT/ML pen Inject 25 Units Subcutaneous daily 6 mL 11     insulin pen needle (BD ULTRA-FINE) 29G X 12.7MM miscellaneous Use 4 times daily 100 each prn     Lancets (ONETOUCH DELICA PLUS [id number]) MISC CHECK FASTING GLUCOSE THREE TIMES DAILY 100 each 11     lidocaine (LMX4) 4 % external cream Apply topically once as needed for mild pain (on upper back) 45 g 2     magnesium oxide (MAG-OX) 400 (241.3 MG) MG tablet Take 1 tablet (400 mg) by mouth daily 30 tablet 11     naloxone (NARCAN) 4 MG/0.1ML nasal spray Spray 1 spray (4 mg) into one nostril alternating nostrils once as needed for opioid reversal Every 2-3 minutes until patient responsive or EMS arrives 0.2 mL 0     order for DME Equipment being ordered: wheeled walker with seat 1 Device 0     oxyCODONE-acetaminophen (PERCOCET)  MG per tablet Take 1 tablet by mouth every 6 hours as needed for  "severe pain (new dose) 120 tablet 0     phentermine (ADIPEX-P) 37.5 MG tablet Take 0.5-1 tablets by mouth every morning       risperiDONE (RISPERDAL) 0.25 MG tablet Take 1 tablet (0.25 mg) by mouth At Bedtime       senna-docusate (SENOKOT-S/PERICOLACE) 8.6-50 MG tablet Take 1 tablet by mouth 2 times daily 60 tablet 11     Simethicone 180 MG CAPS Use 1 softgel as needed for gas 60 capsule 11     SUMAtriptan (IMITREX) 25 MG tablet One tab at onset of HA.  May repeat dose in 2 hours.  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  Legs and arms and hips are very bad  Has been out of them for 2-3 days.  Pain is burning and has numbness/sharp pain shooting down left leg.  Re:  DM/obesity  Wt is now 192.--  Diabetes is excellent.    Last reading 130s  Taking trulicity q week and basal insulin 25 at bedtime. No hypoglycemia.  Feels controlled.  Has lost more than 50 lb!    Did B12 on own--needs refills.               Review of Systems:     ROS COMP: has no weakness, but has burning pain in both feet, r>l  No incontinence  MRI rev'd--facet arthropathy L4-5 but no clear nerve root imp.         Physical Exam:     There were no vitals taken for this visit.  Estimated body mass index is 42.31 kg/m  as calculated from the following:    Height as of 1/2/20: 1.499 m (4' 11.02\").    Weight as of 2/27/20: 95.1 kg (209 lb 9.6 oz).    Exam:  Constitutional: healthy  Psychiatric: mentation appears normal            Assessment and Plan   1. Diabetic polyneuropathy associated with type " 2 diabetes mellitus (H)  Severe--adding elavil.  Consider tegretol in future  - gabapentin (NEURONTIN) 250 MG/5ML solution; Take 15 mLs (750 mg) by mouth 3 times daily  Dispense: 1350 mL; Refill: 2  - amitriptyline (ELAVIL) 10 MG tablet; Take 1 tablet (10 mg) by mouth At Bedtime  Dispense: 30 tablet; Refill: 0    2. Chronic pain syndrome  Stable.  Would benefit from back consult to consider injections post-covid. Refilled pain meds for now.    - oxyCODONE-acetaminophen (PERCOCET)  MG per tablet; Take 1 tablet by mouth every 6 hours as needed for severe pain (new dose)  Dispense: 120 tablet; Refill: 0  3.  B12 def.  I called pharmacy--has refills for monthly B12 @ home.  4.  DM 2--controlled based on hx.  Refilled medications that would be required in the next 3 months.     After Visit Information:  Patient declined AVS     No follow-ups on file.    Appointment end time: 1046  This is a telephone visit that took 19 minutes.      Clinician location:  Ritika Meyer MD

## 2020-05-14 DIAGNOSIS — E11.42 DIABETIC POLYNEUROPATHY ASSOCIATED WITH TYPE 2 DIABETES MELLITUS (H): ICD-10-CM

## 2020-05-14 RX ORDER — GABAPENTIN 250 MG/5ML
750 SOLUTION ORAL 3 TIMES DAILY
Qty: 1350 ML | Refills: 2 | Status: SHIPPED | OUTPATIENT
Start: 2020-05-14 | End: 2020-12-17

## 2020-05-17 DIAGNOSIS — E11.42 DIABETIC POLYNEUROPATHY ASSOCIATED WITH TYPE 2 DIABETES MELLITUS (H): ICD-10-CM

## 2020-05-19 RX ORDER — AMITRIPTYLINE HYDROCHLORIDE 10 MG/1
TABLET ORAL
Qty: 30 TABLET | Refills: 0 | Status: SHIPPED | OUTPATIENT
Start: 2020-05-19 | End: 2020-06-17

## 2020-05-21 ENCOUNTER — VIRTUAL VISIT (OUTPATIENT)
Dept: FAMILY MEDICINE | Facility: CLINIC | Age: 55
End: 2020-05-21
Payer: MEDICARE

## 2020-05-21 VITALS — HEIGHT: 59 IN | WEIGHT: 194 LBS | BODY MASS INDEX: 39.11 KG/M2

## 2020-05-21 DIAGNOSIS — Z79.4 TYPE 2 DIABETES MELLITUS WITH DIABETIC POLYNEUROPATHY, WITH LONG-TERM CURRENT USE OF INSULIN (H): Primary | ICD-10-CM

## 2020-05-21 DIAGNOSIS — J45.40 MODERATE PERSISTENT ASTHMA WITHOUT COMPLICATION: ICD-10-CM

## 2020-05-21 DIAGNOSIS — Z11.3 SCREEN FOR STD (SEXUALLY TRANSMITTED DISEASE): ICD-10-CM

## 2020-05-21 DIAGNOSIS — Z98.84 S/P BARIATRIC SURGERY: ICD-10-CM

## 2020-05-21 DIAGNOSIS — G89.4 CHRONIC PAIN SYNDROME: ICD-10-CM

## 2020-05-21 DIAGNOSIS — E53.8 VITAMIN B12 DEFICIENCY (NON ANEMIC): ICD-10-CM

## 2020-05-21 DIAGNOSIS — E11.42 TYPE 2 DIABETES MELLITUS WITH DIABETIC POLYNEUROPATHY, WITH LONG-TERM CURRENT USE OF INSULIN (H): Primary | ICD-10-CM

## 2020-05-21 RX ORDER — OXYCODONE AND ACETAMINOPHEN 10; 325 MG/1; MG/1
1 TABLET ORAL EVERY 6 HOURS PRN
Qty: 120 TABLET | Refills: 0 | Status: SHIPPED | OUTPATIENT
Start: 2020-05-21 | End: 2020-06-22

## 2020-05-21 RX ORDER — ALBUTEROL SULFATE 90 UG/1
2 AEROSOL, METERED RESPIRATORY (INHALATION) 4 TIMES DAILY
Qty: 2 INHALER | Refills: 1 | Status: SHIPPED | OUTPATIENT
Start: 2020-05-21 | End: 2022-06-23 | Stop reason: ALTCHOICE

## 2020-05-21 RX ORDER — CYANOCOBALAMIN 1000 UG/ML
1 INJECTION, SOLUTION INTRAMUSCULAR; SUBCUTANEOUS
Qty: 1 ML | Refills: 11 | Status: SHIPPED | OUTPATIENT
Start: 2020-05-21 | End: 2020-11-20

## 2020-05-21 ASSESSMENT — PAIN SCALES - GENERAL: PAINLEVEL: SEVERE PAIN (7)

## 2020-05-21 ASSESSMENT — MIFFLIN-ST. JEOR: SCORE: 1385.61

## 2020-05-21 NOTE — PROGRESS NOTES
"Family Medicine Telephone Visit Note           Telephone Visit Consent   Patient was verbally read the following and verbal consent was obtained.    \"Telephone visits are billed at different rates depending on your insurance coverage. During this emergency period, for some insurers they may be billed the same as an in-person visit.  Please reach out to your insurance provider with any questions.  If during the course of the call the physician/provider feels a telephone visit is not appropriate, you will not be charged for this service.\"    Name person giving consent:  Patient   Date verbal consent given:  5/21/2020  Time verbal consent given:  11:10 AM      Chief Complaint   Patient presents with     Pain     f/u cpm and numbness on left side of hip and leg, refill b12              HPI   Patients name: Ruthy  Appointment start time:  12:03 PM    Wt down to 194  Diabetes is 'fine'.  Only one high reading.  Sugars are low 100s.  Pain control is the same.  Hips and arm pain persist.  Pain shooting down leg came on about the same time as hip pain. Pain is all.    Pain meds help. Can manage constipation.  occas urge incont sx.      Would like std testing.  No sx but doesn't trust new partner.  Denies discharge and pelvic pain.    Due for AIC and BMP, too.    Denies etoh or drug use.  Happy w/ pain control  Database rev'd.        Current Outpatient Medications   Medication Sig Dispense Refill     albuterol (2.5 MG/3ML) 0.083% nebulizer solution Take 1 vial (2.5 mg) by nebulization every 6 hours as needed for shortness of breath / dyspnea 1 Box prn     albuterol (VENTOLIN HFA) 108 (90 Base) MCG/ACT inhaler Inhale 2 puffs into the lungs 4 times daily Inhale 2 puffs every 4-6 hours as needed 3 Inhaler 3     Alcohol Swabs (B-D SINGLE USE SWABS REGULAR) PADS Apply 1 pad topically 3 times daily Use as directed 100 each 12     amitriptyline (ELAVIL) 10 MG tablet TAKE 1 TABLET(10 MG) BY MOUTH AT BEDTIME 30 tablet 0     " aspirin (ASA) 325 MG EC tablet Take 1 tablet (325 mg) by mouth daily 90 tablet 3     atorvastatin (LIPITOR) 10 MG tablet 1 po qday 90 tablet 3     blood glucose (NO BRAND SPECIFIED) lancets standard Check fasting glucose 4 times daily.  For Accu check monitor 360 each 11     cyanocobalamin (CYANOCOBALAMIN) 1000 MCG/ML injection Inject 1 mL (1,000 mcg) into the muscle every 30 days 10 mL 3     diclofenac (VOLTAREN) 1 % topical gel        diltiazem ER (DILT-XR) 240 MG 24 hr ER beaded capsule Take 1 capsule (240 mg) by mouth daily 30 capsule 11     dulaglutide (TRULICITY) 1.5 MG/0.5ML pen Inject 1.5 mg Subcutaneous every 7 days 2 mL 11     DULoxetine (CYMBALTA) 60 MG capsule Take 1 capsule (60 mg) by mouth daily 30 capsule 11     DULoxetine (CYMBALTA) 60 MG capsule Take 1 capsule (60 mg) by mouth daily 30 capsule 11     famotidine (PEPCID) 20 MG tablet Take 1 tablet (20 mg) by mouth 2 times daily 60 tablet 11     ferrous sulfate (IRON) 325 (65 FE) MG tablet Take 1 tablet (325 mg) by mouth daily (with breakfast) 30 tablet 2     gabapentin (NEURONTIN) 250 MG/5ML solution Take 15 mLs (750 mg) by mouth 3 times daily 1350 mL 2     insulin glargine (BASAGLAR KWIKPEN) 100 UNIT/ML pen Inject 25 Units Subcutaneous daily 6 mL 11     insulin pen needle (BD ULTRA-FINE) 29G X 12.7MM miscellaneous Use 4 times daily 100 each prn     Lancets (ONETOUCH DELICA PLUS [id number]) MISC CHECK FASTING GLUCOSE THREE TIMES DAILY 100 each 11     lidocaine (LMX4) 4 % external cream Apply topically once as needed for mild pain (on upper back) 45 g 2     magnesium oxide (MAG-OX) 400 (241.3 MG) MG tablet Take 1 tablet (400 mg) by mouth daily 30 tablet 11     naloxone (NARCAN) 4 MG/0.1ML nasal spray Spray 1 spray (4 mg) into one nostril alternating nostrils once as needed for opioid reversal Every 2-3 minutes until patient responsive or EMS arrives 0.2 mL 0     order for DME Equipment being ordered: wheeled walker with seat 1 Device 0      "oxyCODONE-acetaminophen (PERCOCET)  MG per tablet Take 1 tablet by mouth every 6 hours as needed for severe pain (new dose) 120 tablet 0     phentermine (ADIPEX-P) 37.5 MG tablet Take 0.5-1 tablets by mouth every morning       risperiDONE (RISPERDAL) 0.25 MG tablet Take 1 tablet (0.25 mg) by mouth At Bedtime       senna-docusate (SENOKOT-S/PERICOLACE) 8.6-50 MG tablet Take 1 tablet by mouth 2 times daily 60 tablet 11     Simethicone 180 MG CAPS Use 1 softgel as needed for gas 60 capsule 11     SUMAtriptan (IMITREX) 25 MG tablet One tab at onset of HA.  May repeat dose in 2 hours.  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       04/20/2020  2   04/20/2020  Oxycodone-Acetaminophen   120.00 30 [address] Fal  [id number]  [facility] ([id number])  0/0 60.00 [place]  [place]   03/19/2020  2   03/19/2020  Oxycodone-Acetaminophen   120.00 30 [address] Fal  [id number]  [facility] ([id number])  0/[place] 60.00 [place]   03/17/2020  1   02/24/2020  Phentermine 37.5 Mg Tablet  90.00 90 [address] Henri  [id number]  [facility] ([id number])  0/0  Private Pay  [place]   02/27/2020  2   02/27/2020  Oxycodone-Acetaminophen   90.00 22 [address] Fal  [id number]  [facility] ([id number])  0/0 61.36 [place]              Review of Systems:     See hpi         Physical Exam:     Ht 1.499 m (4' 11\")   Wt 88 kg (194 lb)   BMI 39.18 kg/m    Estimated body mass index is 39.18 kg/m  as calculated from the following:    Height as of this encounter: 1.499 m (4' 11\").    Weight as of this encounter: 88 kg (194 lb).    Exam:  Constitutional: healthy, alert and no " distress  Psychiatric: mentation appears normal and affect normal/brigh        Assessment and Plan   1. S/P bariatric surgery  Refilled B12.    - cyanocobalamin (CYANOCOBALAMIN) 1000 MCG/ML injection; Inject 1 mL (1,000 mcg) into the muscle every 30 days  Dispense: 1 mL; Refill: 11    2. Vitamin B12 deficiency (non anemic)  See above  - cyanocobalamin (CYANOCOBALAMIN) 1000 MCG/ML injection; Inject 1 mL (1,000 mcg) into the muscle every 30 days  Dispense: 1 mL; Refill: 11    3. Moderate persistent asthma without complication  Refilled albuterol   - albuterol (VENTOLIN HFA) 108 (90 Base) MCG/ACT inhaler; Inhale 2 puffs into the lungs 4 times daily Inhale 2 puffs every 4-6 hours as needed  Dispense: 2 Inhaler; Refill: 1    4. Chronic pain syndrome  Stable dosing and database.  Future lab for uds  - oxyCODONE-acetaminophen (PERCOCET)  MG per tablet; Take 1 tablet by mouth every 6 hours as needed for severe pain (new dose)  Dispense: 120 tablet; Refill: 0    5. Type 2 diabetes mellitus with diabetic polyneuropathy, with long-term current use of insulin (H)  AIC soon  6.  Lab visit ordered for GC/Chlam/HIV/wet prep/syph      Refilled medications that would be required in the next 3 months.     After Visit Information:  Patient declined AVS     No follow-ups on file.    Appointment end time: 1221  This is a telephone visit that took 18 minutes.      Clinician location:  [place]    Jose Angel Meyer MD  I precepted today with héctor.

## 2020-05-26 ENCOUNTER — TELEPHONE (OUTPATIENT)
Dept: FAMILY MEDICINE | Facility: CLINIC | Age: 55
End: 2020-05-26

## 2020-05-26 NOTE — TELEPHONE ENCOUNTER
[facility] [place] [facility] phone call message- general phone call:    Reason for call: Pt states that she is to come into the clinic to have a physical next month. As far as I know, we are not doing physicals until at least July. I was wondering if Dr Meyer did want this patient to come in next month and what did he want her to come in for?  I looked at the note on 05/21 but did not see anything that would give me an answer.    Return call needed: Yes    OK to leave a message on voice mail? Yes    Primary language: English      needed? No    Call taken on May 26, 2020 at 9:19 AM by Aristides Bueno

## 2020-06-02 ENCOUNTER — TRANSFERRED RECORDS (OUTPATIENT)
Dept: HEALTH INFORMATION MANAGEMENT | Facility: CLINIC | Age: 55
End: 2020-06-02

## 2020-06-02 DIAGNOSIS — Z79.4 TYPE 2 DIABETES MELLITUS WITH DIABETIC POLYNEUROPATHY, WITH LONG-TERM CURRENT USE OF INSULIN (H): ICD-10-CM

## 2020-06-02 DIAGNOSIS — E11.42 TYPE 2 DIABETES MELLITUS WITH DIABETIC POLYNEUROPATHY, WITH LONG-TERM CURRENT USE OF INSULIN (H): ICD-10-CM

## 2020-06-02 DIAGNOSIS — Z11.3 SCREEN FOR STD (SEXUALLY TRANSMITTED DISEASE): ICD-10-CM

## 2020-06-02 DIAGNOSIS — G89.4 CHRONIC PAIN SYNDROME: ICD-10-CM

## 2020-06-02 LAB
AMPHETAMINES QUAL: POSITIVE
BACTERIA: NORMAL
BARBITURATES QUAL URINE: NEGATIVE
BENZODIAZEPINE QUAL URINE: NEGATIVE
BUN SERPL-MCNC: 25.1 MG/DL (ref 7–19)
BUPRENORPHINE QUAL URINE: NEGATIVE
CALCIUM SERPL-MCNC: 9.2 MG/DL (ref 8.5–10.1)
CANNABINOIDS UR QL SCN: NEGATIVE
CHLORIDE SERPLBLD-SCNC: 93 MMOL/L (ref 98–110)
CLUE CELLS: NORMAL
CO2 SERPL-SCNC: 24.4 MMOL/L (ref 20–32)
COCAINE QUAL URINE: NEGATIVE
CREAT SERPL-MCNC: 1.1 MG/DL (ref 0.5–1)
GFR SERPL CREATININE-BSD FRML MDRD: 54.4 ML/MIN/1.7 M2
GLUCOSE SERPL-MCNC: >900 MG'DL (ref 70–99)
HBA1C MFR BLD: 14.5 % (ref 4.1–5.7)
HIV 1+2 AB+HIV1 P24 AG SERPL QL IA: NEGATIVE
METHAMPHETAMINE: NEGATIVE
METHODONE QUAL: NEGATIVE
MORPHINE QUAL: NEGATIVE
MOTILE TRICHOMONAS: NEGATIVE
ODOR: NORMAL
OXYCODONE QUAL: POSITIVE
PH WET PREP: NORMAL (ref 3.8–4.5)
PHENCYCLIDINE: NEGATIVE
POTASSIUM SERPL-SCNC: 4.2 MMOL/L (ref 3.2–4.6)
PROPOXYPHENE: NEGATIVE
SODIUM SERPL-SCNC: 126.9 MMOL/L (ref 132–142)
TEMPERATURE OF URINE WAS BETWEEN 90-100 DEGREES F: YES
TRICYCLIC ANTIDEPRESSANTS: NEGATIVE
WBC WET PREP: NORMAL (ref 2–5)
YEAST: NORMAL

## 2020-06-02 NOTE — LETTER
June 5, 2020      Coramejia Álvarez  [address]  [place] [address]        Dear ,    All of your other tests for infections were normal, thankfully.   I hope your diabetes control is improving.   JIGNESH Meyer         Resulted Orders   Syphilis Screen Boyd (RPR/VDRL) ([facility])   Result Value Ref Range    Treponema Antibody (Syphilis) Negative Negative    Narrative    Test performed by:  [facility] [address] [place] [address]   Basic Metabolic Panel ([place])   Result Value Ref Range    Urea Nitrogen 25.1 (H) 7.0 - 19.0 mg/dL    Calcium 9.2 8.5 - 10.1 mg/dL    Chloride 93.0 (L) 98.0 - 110.0 mmol/L    Carbon Dioxide 24.4 20.0 - 32.0 mmol/L    Creatinine 1.1 (H) 0.5 - 1.0 mg/dL    Glucose >900 (HH) 70.0 - 99.0 mg'dL    Potassium 4.2 3.2 - 4.6 mmol/L    Sodium 126.9 (L) 132.0 - 142.0 mmol/L    GFR Estimate 54.4 (L) >60.0 mL/min/1.7 m2    GFR Estimate If Black 65.8 >60.0 mL/min/1.7 m2    Narrative    Critical Value was reported to and read back by  Dr. Meyer   at 6/2/2020   11:18 AM (DL)   Hemoglobin A1c ([facility])   Result Value Ref Range    Hemoglobin A1C 14.5 (H) 4.1 - 5.7 %   Wet Prep ([facility])   Result Value Ref Range    Yeast Wet Prep None none    Motile Trichomonas Wet Prep Negative Negative    Clue Cells Wet Prep None NONE    WBC WET PREP None 2 - 5    Bacteria Wet Prep None None    pH Wet Prep Not performed 3.8 - 4.5    Odor Wet Prep None NONE   Chlamydia/Gono Amplified ([facility])   Result Value Ref Range    Chlamydia trac,Amplified Prb Negative Negative    N gonorrhoeae,Amplified Prb Negative Negative    Narrative    Test performed by:  [facility] [address] [place] [address]   HIV Ag/Ab Screen Boyd ([facility])   Result Value Ref Range    HIV Antigen/Antibody Negative Negative    Narrative    Test performed by:  [facility] [address] [place] [address]  Method is Abbott HIV Ag/Ab for the detection of HIV p24 antigen, HIV-1    antibodies and HIV-2 antibodies.   Rapid Urine Drug Screen ([facility])   Result Value Ref Range    Cannabinoids Qual Urine Negative NEGATIVE    Phencyclidine Negative NEGATIVE    Cocaine Qual Urine Negative NEGATIVE    Methamphetamine Qual Negative NEGATIVE    Morphine Qual Negative NEGATIVE    Amphetamines Qual Positive (A) NEGATIVE    Benzodiazepine Qual Urine Negative NEGATIVE    Tricyclic Antidepressants Negative NEGATIVE    Methadone Qual Negative NEGATIVE    Barbiturates Qual Urine Negative NEGATIVE    Oxycodone Qual Positive (A) NEGATIVE    Propoxyphene Negative NEGATIVE    Buprenorphine Qual Urine Negative NEGATIVE    Temperature of Urine was Between  Degrees F YES YES      Comment:      This is a preliminary screening test that detects drugs-of-abuse in urine at   specified detection levels.  To confirm preliminary results, a more specific   method such as Gas Chromatography/Mass Spectrometry (GC/MS) must be used.            If you have any questions or concerns, please call the clinic at the number listed above.       Sincerely,        [facility]

## 2020-06-02 NOTE — LETTER
June 5, 2020      Ruthy Álvarez  [address]  [place] [address]        Dear Ruthy,    All of your other tests for infections were normal, thankfully.   I hope your diabetes control is improving.   JIGNESH Meyer     Please see below for your test results.    Resulted Orders   Syphilis Screen [place] (RPR/VDRL) ([facility])   Result Value Ref Range    Treponema Antibody (Syphilis) Negative Negative    Narrative    Test performed by:  [facility] [address] [place] [address]   Basic Metabolic Panel ([place])   Result Value Ref Range    Urea Nitrogen 25.1 (H) 7.0 - 19.0 mg/dL    Calcium 9.2 8.5 - 10.1 mg/dL    Chloride 93.0 (L) 98.0 - 110.0 mmol/L    Carbon Dioxide 24.4 20.0 - 32.0 mmol/L    Creatinine 1.1 (H) 0.5 - 1.0 mg/dL    Glucose >900 (HH) 70.0 - 99.0 mg'dL    Potassium 4.2 3.2 - 4.6 mmol/L    Sodium 126.9 (L) 132.0 - 142.0 mmol/L    GFR Estimate 54.4 (L) >60.0 mL/min/1.7 m2    GFR Estimate If Black 65.8 >60.0 mL/min/1.7 m2    Narrative    Critical Value was reported to and read back by  Dr. Meyer   at 6/2/2020   11:18 AM (DL)   Hemoglobin A1c ([facility])   Result Value Ref Range    Hemoglobin A1C 14.5 (H) 4.1 - 5.7 %   Wet Prep ([facility])   Result Value Ref Range    Yeast Wet Prep None none    Motile Trichomonas Wet Prep Negative Negative    Clue Cells Wet Prep None NONE    WBC WET PREP None 2 - 5    Bacteria Wet Prep None None    pH Wet Prep Not performed 3.8 - 4.5    Odor Wet Prep None NONE   Chlamydia/Gono Amplified ([facility])   Result Value Ref Range    Chlamydia trac,Amplified Prb Negative Negative    N gonorrhoeae,Amplified Prb Negative Negative    Narrative    Test performed by:  [facility] [address] [place] [address]   HIV Ag/Ab Screen [place] ([facility])   Result Value Ref Range    HIV Antigen/Antibody Negative Negative    Narrative    Test performed by:  [facility] [address] [place] [address]  Method is Abbott HIV Ag/Ab for the  detection of HIV p24 antigen, HIV-1   antibodies and HIV-2 antibodies.   Rapid Urine Drug Screen ([facility])   Result Value Ref Range    Cannabinoids Qual Urine Negative NEGATIVE    Phencyclidine Negative NEGATIVE    Cocaine Qual Urine Negative NEGATIVE    Methamphetamine Qual Negative NEGATIVE    Morphine Qual Negative NEGATIVE    Amphetamines Qual Positive (A) NEGATIVE    Benzodiazepine Qual Urine Negative NEGATIVE    Tricyclic Antidepressants Negative NEGATIVE    Methadone Qual Negative NEGATIVE    Barbiturates Qual Urine Negative NEGATIVE    Oxycodone Qual Positive (A) NEGATIVE    Propoxyphene Negative NEGATIVE    Buprenorphine Qual Urine Negative NEGATIVE    Temperature of Urine was Between  Degrees F YES YES      Comment:      This is a preliminary screening test that detects drugs-of-abuse in urine at   specified detection levels.  To confirm preliminary results, a more specific   method such as Gas Chromatography/Mass Spectrometry (GC/MS) must be used.            If you have any questions, please call the clinic to make an appointment.    Sincerely,    [facility]

## 2020-06-03 LAB — TREPONEMA ANTIBODY (SYPHILIS): NEGATIVE

## 2020-06-04 ENCOUNTER — TELEPHONE (OUTPATIENT)
Dept: FAMILY MEDICINE | Facility: CLINIC | Age: 55
End: 2020-06-04

## 2020-06-04 NOTE — TELEPHONE ENCOUNTER
This SW was going to reach out to patient to schedule a lab f/u. This was already completed. No f/u needed at this time.     LORENA Bennett

## 2020-06-04 NOTE — TELEPHONE ENCOUNTER
----- Message from Lizzette Trotter MD sent at 6/3/2020  1:43 PM CDT -----  Regarding: Discharged from hospital--needs close follow up  Hello,    Our patient, Ms. Álvarez, was discharged from the hospital today after being admitted for hyperglycemia. She was not in DKA or HHS, just high glucose levels. We believe that this occurred due to missing several doses of insulin over the last several days. Her A1C has also doubled in the last several months, which is concerning for longer term issues with using her insulin.     Her sugars were quite high, even upon discharge. She will need to be closely monitored. I recommend that she meet with pharmacy for additional diabetes education.     Pam, can you assist with care coordination and follow up with the patient to see if she is scheduled for an IN PERSON, LAB visit today or tomorrow?    Thanks, Sandy Trotter

## 2020-06-05 LAB
C TRACH RRNA SPEC QL NAA+PROBE: NEGATIVE
N GONORRHOEA RRNA SPEC QL NAA+PROBE: NEGATIVE

## 2020-06-05 NOTE — RESULT ENCOUNTER NOTE
Please mail labs to patient with this message.  Ruthy  All of your other tests for infections were normal, thankfully.  I hope your diabetes control is improving.  JIGNESH Meyer

## 2020-06-09 ENCOUNTER — TELEPHONE (OUTPATIENT)
Dept: FAMILY MEDICINE | Facility: CLINIC | Age: 55
End: 2020-06-09

## 2020-06-09 NOTE — TELEPHONE ENCOUNTER
[facility] phone call message- general phone call:    Reason for call: Pt would like a letter for the county stating she needs a special diet    Action desired: letter     Return call needed: Yes    OK to leave a message on voice mail? Yes    Advised patient to response may take up to 2 business days: No    Primary language: English      needed? No    Call taken on June 9, 2020 at 3:01 PM by Livia Westbrook

## 2020-06-09 NOTE — TELEPHONE ENCOUNTER
Patient requesting letter stating she is on a special diet due to having bariatric surgery    btrn    Letter written and mailed

## 2020-06-17 DIAGNOSIS — E11.42 DIABETIC POLYNEUROPATHY ASSOCIATED WITH TYPE 2 DIABETES MELLITUS (H): ICD-10-CM

## 2020-06-17 RX ORDER — AMITRIPTYLINE HYDROCHLORIDE 10 MG/1
TABLET ORAL
Qty: 30 TABLET | Refills: 0 | Status: SHIPPED | OUTPATIENT
Start: 2020-06-17 | End: 2020-07-20

## 2020-06-22 ENCOUNTER — VIRTUAL VISIT (OUTPATIENT)
Dept: FAMILY MEDICINE | Facility: CLINIC | Age: 55
End: 2020-06-22
Payer: MEDICARE

## 2020-06-22 DIAGNOSIS — G89.4 CHRONIC PAIN SYNDROME: ICD-10-CM

## 2020-06-22 DIAGNOSIS — E11.42 TYPE 2 DIABETES MELLITUS WITH DIABETIC POLYNEUROPATHY, WITH LONG-TERM CURRENT USE OF INSULIN (H): ICD-10-CM

## 2020-06-22 DIAGNOSIS — Z79.4 TYPE 2 DIABETES MELLITUS WITH DIABETIC POLYNEUROPATHY, WITH LONG-TERM CURRENT USE OF INSULIN (H): ICD-10-CM

## 2020-06-22 RX ORDER — INSULIN GLARGINE 100 [IU]/ML
32 INJECTION, SOLUTION SUBCUTANEOUS DAILY
Qty: 0.1 ML | Refills: 0 | COMMUNITY
Start: 2020-06-22 | End: 2021-02-10

## 2020-06-22 RX ORDER — OXYCODONE AND ACETAMINOPHEN 10; 325 MG/1; MG/1
1 TABLET ORAL 3 TIMES DAILY
Qty: 90 TABLET | Refills: 0 | Status: SHIPPED | OUTPATIENT
Start: 2020-06-22 | End: 2020-07-21

## 2020-06-22 NOTE — PROGRESS NOTES
[birth year]19 1955   Patient Assessment/Suction   Respiratory Effort Unlabored   All Lung Fields Breath Sounds wheezes, expiratory   Rhythm/Pattern, Respiratory tachypneic   PRE-TX-O2   O2 Device (Oxygen Therapy) room air   SpO2 (!) 93 %   Pulse Oximetry Type Continuous   $ Pulse Oximetry - Multiple Charge Pulse Oximetry - Multiple   Pulse 100   Resp (!) 22   Aerosol Therapy   $ Aerosol Therapy Charges Aerosol Treatment   Respiratory Treatment Status (SVN) given   Treatment Route (SVN) mask   Patient Position (SVN) semi-Clark's   Post Treatment Assessment (SVN) breath sounds unchanged   Signs of Intolerance (SVN) none   Breath Sounds Post-Respiratory Treatment   Throughout All Fields Post-Treatment All Fields   Throughout All Fields Post-Treatment no change   Post-treatment Heart Rate (beats/min) 100   Post-treatment Resp Rate (breaths/min) 22      "Family Medicine Telephone Visit Note           Telephone Visit Consent   Patient was verbally read the following and verbal consent was obtained.    \"Telephone visits are billed at different rates depending on your insurance coverage. During this emergency period, for some insurers they may be billed the same as an in-person visit.  Please reach out to your insurance provider with any questions.  If during the course of the call the physician/provider feels a telephone visit is not appropriate, you will not be charged for this service.\"    Name person giving consent:  Patient   Date verbal consent given:  6/22/2020  Time verbal consent given:  11:43 AM           Chief Complaint   Patient presents with     Pain Management     CPM/ B-12 shot           Due to patient being non-English speaking/uses sign language, an  was used for this visit. Only for face-to-face interpretation by an external agency, date and length of interpretation can be found on the scanned worksheet.           HPI   Patients name: Ruthy  Appointment start time:  12:21 PM  SUBJECTIVE:  Ruthy Álvarez has a phone visit for followup of her chronic pain syndrome.  We did followup of her diabetes and orders for her B12 shot.  She has B12 deficiency as a result of her gastric bypass.  She has been on self-administered B12.  She feels that she needs refills for that medication.  I reviewed her chart and she does in fact have B12 ordered for her at the pharmacy on 05/21.  I called the pharmacy and left a message authorizing for the syringe and needles that she needs to do the self-injections.      She has chronic back and knee pain.  She in the past was on oxycodone 10 mg up to 4 times a day.  This was increased temporarily as a result of a dental procedure, but now she is due to go back to her three times a day dosing.  She feels like her back pain is worse in the hip.  It is nonradiating, left more than right.  No bowel or bladder " Modifed Barium Swallow Study  Speech Start Time: 0925  Speech Stop Time: 1010  Speech Total (min): 45 min    SLP Treatment Date: 03/14/17    Reason for Referral  Patient was referred for a Modified Barium Swallow Study to assess the efficiency of his/her swallow function, rule out aspiration and make recommendations regarding safe dietary consistencies, effective compensatory strategies, and safe eating environment.     Diagnosis   Darius aPiz Jr. is a 86 y.o.  male who  has a past medical history of Anemia of chronic disease; BPH (benign prostatic hyperplasia); Diabetes mellitus type 2 in nonobese; Dysphagia as late effect of cerebrovascular disease; Elevated PSA; History of CVA (cerebrovascular accident); History of subdural hematoma (post traumatic); Liver disease; Seizures; Stroke; and Urinary tract infection.. The patient presented to [facility] on 3/11/2017 with a primary complaint of Hand Injury (Spouse accidentally scratched left hand with fingernail on Wednesday creating a skin tear to top of hand. Now swollen with hematoma. )     Mr. Paiz is an 87 y/o M with a PMH significant for DM II, CVA w/ dysphagia, prior subdural hematoma, BPH, Macrocytic Anemia 2/2 to B12 Deficiency, Seizure Disorder, and HTN who presents to the [facility] ED with a chief complaint of L hand injury. Patient was a poor historian and history was obtained from friend, Bryanna Lukasz, at bedside. Patient was in his USOH until the morning of 3/08/2017 when he was being helped up from his bed and his friend accidentally scratched the dorsal surface of his L hand with her finger nail. Shortly after, Ms. Lal noticed L hand swelling in Mr. Valentine. She states at that time she applied hydrogen peroxide and alcohol to the wound however Mr. Valentine's L hand continued to swell over the next few days. On Friday, Ms. Lal states she began to notice skin color changes in Mr. Valentine's L hand and wrist. Upon  examination, Mr. Valentine denies numbness or tingling, fevers or chills, nausea, vomiting, chest pain, or SOB.      Of note, patient was recently discharged in 01/2017 from [facility] after experiencing a fall. He successfully completed rehab at [facility] and recently returned home. At baseline, he requires assistance with all his ADL's and walks with a walker w/ assistance.     Traumatic hematoma of hand with infection    Past Medical History:   Diagnosis Date    Anemia of chronic disease     BPH (benign prostatic hyperplasia)     Diabetes mellitus type 2 in nonobese     Dysphagia as late effect of cerebrovascular disease     Elevated PSA     History of CVA (cerebrovascular accident)     History of subdural hematoma (post traumatic)     Liver disease     Seizures     Stroke     Urinary tract infection      Past Surgical History:   Procedure Laterality Date    FABIENNE HOLE FOR SUBDURAL HEMATOMA      CATARACT EXTRACTION      GASTROSTOMY TUBE PLACEMENT          General Precautions: fall, honey thick, diabetic  Current Respiratory Status:  (room air)    Recommendations  - Puree Diet w/ Honey Thickened Liquids  - TSP SIPS ONLY -- NO STRAWS  - Chin tuck w/ liquids  - small bites/sips  - slow feeding rate  - pt must be fully alert and sitting upright at 90 degree angle for all meals  - pills crushed in pudding/applesauce    Oral Peripheral Examination  Oral Musculature Evaluation  Oral Musculature: general weakness (left facial drool noted)  Dentition: upper and lower dentures  Secretion Management: problems swallowing secretions  Mucosal Quality: dry, sticky  Mandibular Strength and Mobility: impaired  Oral Labial Strength and Mobility:  (decreased strength)  Lingual Strength and Mobility: impaired strength, impaired left lateral movement  Velar Elevation: WFL  Buccal Strength and Mobility: decreased tone  Volitional Cough:  (elicited)  Volitional Swallow:  (untimely upon palpitation, pt demonstrated  incontinence.  It seems to be worse in the past 2 weeks.  No weakness in her legs.  She had an MRI in March that showed some degenerative disk disease but no severe spinal stenosis.      Regarding her diabetes, she was hospitalized for hypoglycemia.  She is taking basal insulin at a dose of 32 units at bedtime and Trulicity 1.5 mg every 7 days.  In the past, she was on Basaglar 25 units at bedtime, so this dose change is an increase.  Her blood sugars are in the upper 100s to lower 200s at this time.      OBJECTIVE:   GENERAL:  Patient is alert and pleasant over the telephone in no acute distress.  She sounds at her baseline and is mentally clear.      ASSESSMENT:   1.  Type 2 diabetes mellitus, uncertain control.  She had an A1c of 14 when she was hospitalized, reflecting a longer period of poor glycemic control.  We will continue her current doses, have her monitor more frequently and review them on Friday when she returns in the clinic.  We will get an A1C and BMP at that time.   2.  Regarding her back and hip pain, we refilled her oxycodone but decreased her dose from four 10 mg tablets to three 10 mg tablets daily.  We will reassess her pain when she returns on Friday.  The etiology is not clear.  Perhaps she would benefit from an injection or additional physical therapy.  Reevaluate on Friday.   3.  Regarding her B12, I reassured her that we have given adequate supplies to her pharmacy.  She can contact them directly for this.       Having hip pain for the past 2 weeks.  No radiation.  Not     Taking 32 at bedtime        Current Outpatient Medications   Medication Sig Dispense Refill     albuterol (2.5 MG/3ML) 0.083% nebulizer solution Take 1 vial (2.5 mg) by nebulization every 6 hours as needed for shortness of breath / dyspnea 1 Box prn     albuterol (VENTOLIN HFA) 108 (90 Base) MCG/ACT inhaler Inhale 2 puffs into the lungs 4 times daily Inhale 2 puffs every 4-6 hours as needed 2 Inhaler 1     Alcohol Swabs  (B-D SINGLE USE SWABS REGULAR) PADS Apply 1 pad topically 3 times daily Use as directed 100 each 12     amitriptyline (ELAVIL) 10 MG tablet TAKE 1 TABLET(10 MG) BY MOUTH AT BEDTIME 30 tablet 0     aspirin (ASA) 325 MG EC tablet Take 1 tablet (325 mg) by mouth daily 90 tablet 3     atorvastatin (LIPITOR) 10 MG tablet 1 po qday 90 tablet 3     blood glucose (NO BRAND SPECIFIED) lancets standard Check fasting glucose 4 times daily.  For Accu check monitor 360 each 11     cyanocobalamin (CYANOCOBALAMIN) 1000 MCG/ML injection Inject 1 mL (1,000 mcg) into the muscle every 30 days 1 mL 11     diclofenac (VOLTAREN) 1 % topical gel        diltiazem ER (DILT-XR) 240 MG 24 hr ER beaded capsule Take 1 capsule (240 mg) by mouth daily 30 capsule 11     dulaglutide (TRULICITY) 1.5 MG/0.5ML pen Inject 1.5 mg Subcutaneous every 7 days 2 mL 11     DULoxetine (CYMBALTA) 60 MG capsule Take 1 capsule (60 mg) by mouth daily 30 capsule 11     DULoxetine (CYMBALTA) 60 MG capsule Take 1 capsule (60 mg) by mouth daily 30 capsule 11     famotidine (PEPCID) 20 MG tablet Take 1 tablet (20 mg) by mouth 2 times daily 60 tablet 11     ferrous sulfate (IRON) 325 (65 FE) MG tablet Take 1 tablet (325 mg) by mouth daily (with breakfast) 30 tablet 2     gabapentin (NEURONTIN) 250 MG/5ML solution Take 15 mLs (750 mg) by mouth 3 times daily 1350 mL 2     insulin glargine (BASAGLAR KWIKPEN) 100 UNIT/ML pen Inject 25 Units Subcutaneous daily 6 mL 11     insulin pen needle (BD ULTRA-FINE) 29G X 12.7MM miscellaneous Use 4 times daily 100 each prn     Lancets (ONETOUCH DELICA PLUS [id number]) MISC CHECK FASTING GLUCOSE THREE TIMES DAILY 100 each 11     lidocaine (LMX4) 4 % external cream Apply topically once as needed for mild pain (on upper back) 45 g 2     magnesium oxide (MAG-OX) 400 (241.3 MG) MG tablet Take 1 tablet (400 mg) by mouth daily 30 tablet 11     naloxone (NARCAN) 4 MG/0.1ML nasal spray Spray 1 spray (4 mg) into one nostril alternating nostrils  uncoordination of laryngeal movements)  Voice Prior to PO Intake:  (garbled, slow speech noted )    Consistencies Assessed  Thin liquids (5 and 10cc cup sips), Nectar thick liquids (5 and 10 cc cup sips x2), Honey thick liquids (tsp sips x3), Puree (2 tsp bites of pudding) and Soft solids (x2 trials of soft/diced peach)    Oral Preparation / Oral Phase  Severe oral dysphagia noted w/ all liquid consistencies and with dental soft consistencies. Pt exhibited severe deficits in bolus control with liquid consistencies. Difficulty noted with bolus propulsion of dental soft. Despite max prompts to initiate swallow, pt was unable to trigger pharyngeal swallow. Dental soft consistencies spit into cut. Pt was able to swallow second PO trial attempt of dental soft consistency.    Pharyngeal Phase  Delayed trigger of pharyngeal swallow noted w/ all liquid consistencies (thin, nectar thickened, and honey thickened liquids). Pt initiated swallow at pyriform sinus level w/ all liquids. Aspiration noted w/ 5 and 10 ccs of thin and nectar thickened liquids. Throat clearing noted after swallow. ST prompted pt to cough, however, pt was unable to produce productive cough and was unable to clear airway of the thin liquid or nectar thickened liquids. ST prompted pt to initiate chin tuck and effortful swallow w/ PO trials of thin and nectar thickened liquids which was observed to be unsuccessful. Trace aspiration noted after PO trials of thin liquid and nectar thickened liquids when chin tuck utilized. Pt continued to initiate throat clearing and was unable to produce productive cough despite max cues. Moderate vallecular residue noted after all PO trials of thin, nectar thickened, and honey thickened liquids. Pt able to clear vallecular residue when prompted to swallow multiple times after initial swallow. No s/s of aspiration noted w/ honey thickened liquids via tsp sips when utilizing chin tuck, effortful swallow, and double swallows  once as needed for opioid reversal Every 2-3 minutes until patient responsive or EMS arrives 0.2 mL 0     order for DME Equipment being ordered: wheeled walker with seat 1 Device 0     oxyCODONE-acetaminophen (PERCOCET)  MG per tablet Take 1 tablet by mouth every 6 hours as needed for severe pain (new dose) 120 tablet 0     phentermine (ADIPEX-P) 37.5 MG tablet Take 0.5-1 tablets by mouth every morning       risperiDONE (RISPERDAL) 0.25 MG tablet Take 1 tablet (0.25 mg) by mouth At Bedtime       senna-docusate (SENOKOT-S/PERICOLACE) 8.6-50 MG tablet Take 1 tablet by mouth 2 times daily 60 tablet 11     Simethicone 180 MG CAPS Use 1 softgel as needed for gas 60 capsule 11     SUMAtriptan (IMITREX) 25 MG tablet One tab at onset of HA.  May repeat dose in 2 hours.  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       Clinician location:  Ritika Meyer MD     strategies. No s/s of aspiration noted w/ pureed consistencies or with dental soft consistency once pt was able to initiate swallow.    Cervical Esophageal Phase  WFL    Impressions  Pt presents with severe oropharyngeal dysphagia characterized by deficits in bolus control, difficulties with bolus propulsion w/ dental soft consistencies, delayed swallow reflex at pyriform level, moderate vallecular residue noted after swallowing liquids, and aspiration w/ thin and nectar thickened liquids. Pt unable to produce productive cough adequate for clearing airway of aspirated liquids. Pt would benefit from puree diet w/ honey thickened liquids administer via tsp while utilizing chin tuck and multiple swallows. Pt requires 1:1 assistance/supervision w/ meals while utilizing following swallow precautions: assistance w/ thickening liquids, small sips/bites, head of bed at 90 degree angle for all PO intake, pt fully alert for all PO intake, no straws, honey thickened liquids via TSP ONLY, utilize chin tuck and multiple swallows w/ all tsp sips of liquids.    Prognosis/Plan/Education  Pt remains at high risk for aspiration. ST will f/u to continue family/caregiver education. ST spoke w/ MD via telephone re: MBS results, diet recommendations, swallow precautions, and pt's high risk of aspiration status. Swallow precautions posted in pt's room.    Care Plan   SLP Goals        Problem: SLP Goal    Goal Priority Disciplines Outcome   SLP Goal     SLP Ongoing (interventions implemented as appropriate)   Description:  Short Term Goals:  1. Pt will successfully participate in Modified Barium Swallow study to radiographically assess swallow function, rule out aspiration and determine safest/least restrictive diet. --MET 3/14  2. Patient will tolerate pureed consistency po diet and honey thickened liquids via tsp w/ chin tuck with no overt s/s of aspiration.                G-Codes  Functional Assessment Tool Used: NOMS  Score: 2  Swallow  Current Status (): CI  Swallow Goal Status (): CJ  Swallow Discharge Status (): ANNMARIE Whitney. CF-SLP  Speech-Language Pathologist

## 2020-06-26 ENCOUNTER — OFFICE VISIT (OUTPATIENT)
Dept: FAMILY MEDICINE | Facility: CLINIC | Age: 55
End: 2020-06-26
Payer: MEDICARE

## 2020-06-26 VITALS
TEMPERATURE: 97.9 F | BODY MASS INDEX: 39.68 KG/M2 | OXYGEN SATURATION: 96 % | DIASTOLIC BLOOD PRESSURE: 63 MMHG | SYSTOLIC BLOOD PRESSURE: 103 MMHG | HEART RATE: 117 BPM | HEIGHT: 59 IN | WEIGHT: 196.8 LBS | RESPIRATION RATE: 16 BRPM

## 2020-06-26 DIAGNOSIS — E11.42 TYPE 2 DIABETES MELLITUS WITH DIABETIC POLYNEUROPATHY, WITH LONG-TERM CURRENT USE OF INSULIN (H): Primary | ICD-10-CM

## 2020-06-26 DIAGNOSIS — L98.7 EXCESSIVE AND REDUNDANT SKIN AND SUBCUTANEOUS TISSUE: ICD-10-CM

## 2020-06-26 DIAGNOSIS — R10.2 PELVIC PAIN IN FEMALE: ICD-10-CM

## 2020-06-26 DIAGNOSIS — Z79.4 TYPE 2 DIABETES MELLITUS WITH DIABETIC POLYNEUROPATHY, WITH LONG-TERM CURRENT USE OF INSULIN (H): Primary | ICD-10-CM

## 2020-06-26 LAB
ANION GAP SERPL CALCULATED.3IONS-SCNC: 13 MMOL/L (ref 5–18)
BUN SERPL-MCNC: 24 MG/DL (ref 8–22)
CALCIUM SERPL-MCNC: 8.7 MG/DL (ref 8.5–10.5)
CHLORIDE SERPL-SCNC: 107 MMOL/L (ref 98–107)
CO2 SERPL-SCNC: 22 MMOL/L (ref 22–31)
CREAT SERPL-MCNC: 1.2 MG/DL (ref 0.6–1.1)
GLUCOSE SERPL-MCNC: 223 MG/DL (ref 70–125)
HBA1C MFR BLD: 8.8 % (ref 4.1–5.7)
POTASSIUM SERPL-SCNC: 4.3 MMOL/L (ref 3.5–5)
SODIUM SERPL-SCNC: 142 MMOL/L (ref 136–145)

## 2020-06-26 ASSESSMENT — MIFFLIN-ST. JEOR: SCORE: 1398.56

## 2020-06-26 NOTE — LETTER
July 21, 2020      Ruthy Álvarez  [address]  [place] [address]        Dear ,    We are writing to inform you of your test results.          No results found from the In Basket message.    If you have any questions or concerns, please call the clinic at the number listed above.       Sincerely,        Jose Angel Meyer MD

## 2020-06-26 NOTE — PATIENT INSTRUCTIONS
Cont trulicity  Increase lantus from 32 to 36 units daily.      06/30/20   PLASTIC SURGERY REFERRAL  [facility] [address]  [place] [address]  Appointments: [phone]  Fax: [phone]    Demographics, referral and office note faxed to [phone]. They will contact patient to schedule.     Anaid Jerome

## 2020-06-26 NOTE — PROGRESS NOTES
32 night  trulicity q week    Checking daily when remember.    Lately upper 100s-200s    Stopped drinking pop.  Little juice.  Walk for exercise.

## 2020-06-30 ENCOUNTER — TRANSCRIBE ORDERS (OUTPATIENT)
Dept: OTHER | Age: 55
End: 2020-06-30

## 2020-06-30 ENCOUNTER — RECORDS - HEALTHEAST (OUTPATIENT)
Dept: ADMINISTRATIVE | Facility: OTHER | Age: 55
End: 2020-06-30

## 2020-06-30 DIAGNOSIS — L98.7 EXCESSIVE AND REDUNDANT SKIN AND SUBCUTANEOUS TISSUE: Primary | ICD-10-CM

## 2020-07-01 ENCOUNTER — DOCUMENTATION ONLY (OUTPATIENT)
Dept: FAMILY MEDICINE | Facility: CLINIC | Age: 55
End: 2020-07-01

## 2020-07-01 NOTE — PROGRESS NOTES
Please call:   Her diabetes test is much improved--her AIC came down from 14.4 to 8.8 and should be better next time we check.   Her kidney function is stable, but not normal due to diabetic kidney damage.   I want to be clear if she has tolerated lisinopril in the past.  It's not on her list now and should help protect her kidneys going forward, if she can tolerate it.     I'd prefer that her next visit be in person, so that we can recheck a urine test and check her BP carefully.     Thanks       Patient informed of above and voices understanding.  Patient states she is taking lisinopril now and tolerating well.pt unable to give the dose,she was not home to look at the bottle. Patient agreeable with next appointment being in person and she will call with lisinopril dose    Note routed to Dr.Fallert Marie RN

## 2020-07-13 ENCOUNTER — COMMUNICATION - HEALTHEAST (OUTPATIENT)
Dept: TELEHEALTH | Facility: CLINIC | Age: 55
End: 2020-07-13

## 2020-07-13 ENCOUNTER — HOSPITAL ENCOUNTER (OUTPATIENT)
Dept: ULTRASOUND IMAGING | Facility: HOSPITAL | Age: 55
Discharge: HOME | End: 2020-07-13
Attending: FAMILY MEDICINE

## 2020-07-13 DIAGNOSIS — R10.2 PELVIC PAIN IN FEMALE: ICD-10-CM

## 2020-07-14 NOTE — RESULT ENCOUNTER NOTE
Please mail labs to patient with this message.;    Anthonyauraissa  Your ultrasound showed no problems with your ovaries, thankfully, or any other abnormalities in the pelvis.  Andrey

## 2020-07-17 ENCOUNTER — TELEPHONE (OUTPATIENT)
Dept: FAMILY MEDICINE | Facility: CLINIC | Age: 55
End: 2020-07-17

## 2020-07-17 DIAGNOSIS — G89.4 CHRONIC PAIN SYNDROME: ICD-10-CM

## 2020-07-17 NOTE — TELEPHONE ENCOUNTER
[facility] phone call message- patient requesting a refill:    Full Medication Name: oxyCODONE    Dose:  MG     Pharmacy confirmed as   [id number] [address] AT [place] [address] [address] [address]  Phone: [phone] Fax: [phone]  : Yes    Additional Comments: the pt is out an needs enough medication to las till her appointment the Pharmacy told her to call us       OK to leave a message on voice mail? Yes    Primary language: English      needed? No    Call taken on July 17, 2020 at 12:37 PM by Earl Murphy

## 2020-07-19 DIAGNOSIS — E11.42 DIABETIC POLYNEUROPATHY ASSOCIATED WITH TYPE 2 DIABETES MELLITUS (H): ICD-10-CM

## 2020-07-20 RX ORDER — AMITRIPTYLINE HYDROCHLORIDE 10 MG/1
TABLET ORAL
Qty: 30 TABLET | Refills: 0 | Status: SHIPPED | OUTPATIENT
Start: 2020-07-20 | End: 2020-08-18

## 2020-07-20 NOTE — TELEPHONE ENCOUNTER
Pt is calling to check on status of refill and oxycodone.    Pt's states her appt is coming up and she does not have any to last her until then. Needing a call back

## 2020-07-21 RX ORDER — OXYCODONE AND ACETAMINOPHEN 10; 325 MG/1; MG/1
1 TABLET ORAL 3 TIMES DAILY
Qty: 21 TABLET | Refills: 0 | Status: SHIPPED | OUTPATIENT
Start: 2020-07-21 | End: 2020-07-27

## 2020-07-23 ENCOUNTER — COMMUNICATION - HEALTHEAST (OUTPATIENT)
Dept: SURGERY | Facility: CLINIC | Age: 55
End: 2020-07-23

## 2020-07-23 DIAGNOSIS — E66.01 MORBID OBESITY WITH BMI OF 45.0-49.9, ADULT (H): ICD-10-CM

## 2020-07-23 DIAGNOSIS — K90.9 INTESTINAL MALABSORPTION, UNSPECIFIED TYPE: ICD-10-CM

## 2020-07-23 DIAGNOSIS — K91.2 POSTOPERATIVE MALABSORPTION: ICD-10-CM

## 2020-07-23 NOTE — PROGRESS NOTES
Patient Active Problem List    Diagnosis Date Noted     Chronic pain syndrome 10/07/2016     Priority: High     Chronic Pain Diagnosis:  neuropathy  DIRE Total Score(s):14     ORT: 5    4 - 7 =  Moderate Risk   of future problems with Opioids  FAQ5: 45/100 on 9/9/16, 40/100 on 11/15/16  Behavioral Health Consultation: 5/12/17 with Dr. Daisy Quezada  Personal Care Plan for Chronic Pain: 5/12/17  Opioid medication:oxycodone   Dose:10mg  Number of pills per month:60  Patient is being prescribed 10mg of oxycodone IR (Percocet) per day this is 15 mg Morphine Equivalents  Benzodiazepines Prescribed? No  Naloxone prescribed? No       Clinic visit frequency required: Q 1 month Next visit due:   Controlled Substance/Opioid Treatment agreement on file (dated <12 months ago)?:    Date(s): pending  Last [place] verification: last visit  Items in red to be updated at each visit  Patient is followed by [facility] Unavailable for ongoing prescription of pain medication.  All refills should be approved by this provider, or covering partner.Chronic Pain          Type 2 diabetes mellitus with diabetic polyneuropathy (H) 10/12/2015     Priority: High     Metabolic acidosis 07/26/2019     Priority: Medium     reolved w/ discontinuation of ASA       Copper deficiency 04/05/2019     Priority: Medium     Identified in Aug, 2018--see discharge summary from [facility] in [place].       Generalized abdominal pain 01/03/2019     Priority: Medium     Gastroenteritis 01/03/2019     Priority: Medium     Neuropathy 01/03/2019     Priority: Medium     Obesity 01/03/2019     Priority: Medium     Other acute pulmonary embolism without acute cor pulmonale (H) 12/31/2018     Priority: Medium     Hernia of anterior abdominal wall 01/28/2017     Priority: Medium     Chronic pain 01/27/2017     Priority: Medium     Diarrhea 01/27/2017     Priority: Medium     Hernia 01/27/2017     Priority: Medium     Osteoarthritis of both knees 10/31/2016     Priority: Medium      Polyneuropathy due to secondary diabetes mellitus (H) 10/31/2016     Priority: Medium     Compression neuropathy of left lower extremity 02/19/2016     Priority: Medium     Bilateral ankle joint pain 01/15/2016     Priority: Medium     Microalbuminuria 10/12/2015     Priority: Medium     Esophageal reflux 04/07/2015     Priority: Medium     Urinary incontinence 02/19/2015     Priority: Medium     Insomnia 02/19/2015     Priority: Medium     Essential hypertension 08/26/2013     Priority: Medium     Asthma 08/04/2013     Priority: Medium     History of injury 08/04/2013     Priority: Medium     Hyperlipidemia LDL goal <100 06/21/2013     Priority: Medium     S/P bariatric surgery 06/21/2013     Priority: Medium     Kim-en-Y in 2006       Neck pain 05/16/2013     Priority: Medium     Moderate major depression (H) 01/24/2013     Priority: Medium     Moderate persistent asthma without complication 11/15/2012     Priority: Medium     Pulmonary embolism and infarction (H) 11/15/2012     Priority: Medium     12/24/2018  Unprovoked.  + fam hx of DVT.  Treated w/ DOAC 12/2018through 9/2019       S/P hysterectomy 06/21/2013     Priority: Low     2008, For fibroids.  Cervix removed, but still has ovaries.       Carpal tunnel syndrome 11/15/2012     Priority: Low     Necrotizing fasciitis (H) 11/15/2012     Priority: Low     Health Care Home 11/15/2012     Priority: Low     Tier 3    Status: Accept  Care Coordination Start Date: 02/27/12  State Tier Level: Level 3  Language/Barrier to Learning: No  Significant Mental Health Issues: Yes  Date Care Coordination Discontinued: 05/28/15    DX V65.8 REPLACED WITH [id number] CARE HOME (04/08/2013)         Abnormal Pap smear of cervix      Priority: Low     3-12-15: Pap/HPV neg.  Plan: Given hx (mildly abn pap, hyster), recheck in 5 yrs.  10-11-11: Pap/HPV neg.  Plan: Given ASUS pap in 2010, recommend repeating pap in 1 year.  8/3/2010 ASCUS Pap with +BV- pt treated with  "Metrogel.  Pt needs repeat Pap in 1yr.  8/4/2008 Pt had total hyst for menorrhagia, pelvic pain, scar tissue, and fibroids.       There are no exam notes on file for this visit.  Chief Complaint   Patient presents with     Physical     physical     Blood pressure 103/63, pulse 117, temperature 97.9  F (36.6  C), temperature source Oral, resp. rate 16, height 1.499 m (4' 11.02\"), weight 89.3 kg (196 lb 12.8 oz), SpO2 96 %, not currently breastfeeding.   SUBJECTIVE:  Ruthy Álvarez is here for a med check, followup of diabetes and evaluation of some discomfort in her arms.  Her blood sugars have improved.  She is taking insulin 32 units at night and Trulicity q. week.  Her blood sugars are in the upper 100s-200s.  She does not have polydipsia or polyuria.  She stopped drinking pop, is only drinking a little bit of juice and is walking a little bit for exercise.      She has some new discomfort in her arms.  It is in the excessive skin underneath her triceps area.  She has lost a considerable amount of weight in the past year or so and would like to consider surgery to relieve his discomfort.      A review of her most recent ultrasound of her pelvis was done because of discomfort that showed a hysterectomy and no ovarian pathology.  Her pelvic pain is on longer a discomfort.      OBJECTIVE:   GENERAL:  Patient is alert, pleasant, and in no acute distress.   VITAL SIGNS:  Blood pressure is excellent.  Her pulse has improved from 117 to around 104 by the time she moved from the chair to the exam table.  She is pleasant and in no acute distress.   CHEST:  Clear.   HEART:  Tachycardic, normal rhythm.   ABDOMEN:  Soft, nontender.   EXTREMITIES:  Examination of her arm does reveal excessive redundant skin in the triceps area which is as a result of her weight loss.      ASSESSMENT AND PLAN:   1.  Type 2 diabetes mellitus, improved control.  When she was hospitalized, her A1C was in the teens, now it is down to 8.8.  It " has been a relatively short period of time.  I am encouraged she is going in the right direction.  We will increase her insulin from 32 to 36 units daily and continue Trulicity.  Her BMP is acceptable.  We will repeat an A1C in 3 months.  I encouraged healthy lifestyle changes, which she is pursuing.   2.  Regarding the redundant skin from her weight loss, I did refer her to Plastic Surgery at the .  Please see comments in her after-visit summary.  For her pelvic pain, the ultrasound was reviewed.           Results for orders placed or performed in visit on 06/26/20   Hemoglobin A1c ([facility])     Status: Abnormal   Result Value Ref Range    Hemoglobin A1C 8.8 (H) 4.1 - 5.7 %   Basic Metabolic Profile ([facility])     Status: Abnormal   Result Value Ref Range    Sodium 142 136 - 145 mmol/L    Potassium 4.3 3.5 - 5.0 mmol/L    Chloride 107 98 - 107 mmol/L    CO2, Total 22 22 - 31 mmol/L    Anion Gap 13 5 - 18 mmol/L    Glucose 223 (H) 70 - 125 mg/dL    Calcium 8.7 8.5 - 10.5 mg/dL    Urea Nitrogen 24 (H) 8 - 22 mg/dL    Creatinine 1.20 (H) 0.60 - 1.10 mg/dL    GFR Estimate If Black 57 (L) >60 mL/min/1.73m2    GFR Estimate 47 (L) >60 mL/min/1.73m2    Narrative    Test performed by:  [facility]  [address] [place] [address]  Fasting Glucose reference range is 70-99 mg/dL per  American Diabetes Association (ADA) guidelines.

## 2020-07-27 ENCOUNTER — OFFICE VISIT (OUTPATIENT)
Dept: FAMILY MEDICINE | Facility: CLINIC | Age: 55
End: 2020-07-27
Payer: MEDICARE

## 2020-07-27 VITALS
HEIGHT: 59 IN | HEART RATE: 112 BPM | OXYGEN SATURATION: 95 % | BODY MASS INDEX: 40.52 KG/M2 | TEMPERATURE: 98.1 F | RESPIRATION RATE: 20 BRPM | SYSTOLIC BLOOD PRESSURE: 133 MMHG | DIASTOLIC BLOOD PRESSURE: 75 MMHG | WEIGHT: 201 LBS

## 2020-07-27 DIAGNOSIS — G89.4 CHRONIC PAIN SYNDROME: Primary | ICD-10-CM

## 2020-07-27 DIAGNOSIS — R60.9 EDEMA, UNSPECIFIED TYPE: ICD-10-CM

## 2020-07-27 DIAGNOSIS — G89.4 CHRONIC PAIN SYNDROME: ICD-10-CM

## 2020-07-27 DIAGNOSIS — I10 ESSENTIAL HYPERTENSION: ICD-10-CM

## 2020-07-27 DIAGNOSIS — E11.42 TYPE 2 DIABETES MELLITUS WITH DIABETIC POLYNEUROPATHY, WITH LONG-TERM CURRENT USE OF INSULIN (H): Primary | ICD-10-CM

## 2020-07-27 DIAGNOSIS — Z79.4 TYPE 2 DIABETES MELLITUS WITH DIABETIC POLYNEUROPATHY, WITH LONG-TERM CURRENT USE OF INSULIN (H): Primary | ICD-10-CM

## 2020-07-27 LAB
AMPHETAMINES QUAL: POSITIVE
BARBITURATES QUAL URINE: NEGATIVE
BENZODIAZEPINE QUAL URINE: NEGATIVE
BILIRUBIN UR: NEGATIVE MG/DL
BLOOD UR: NEGATIVE MG/DL
BUPRENORPHINE QUAL URINE: NEGATIVE
CANNABINOIDS UR QL SCN: NEGATIVE
COCAINE QUAL URINE: NEGATIVE
CREAT UR-MCNC: 60.9 MG/DL
GLUCOSE URINE: NEGATIVE
KETONES UR QL: NEGATIVE MG/DL
LEUKOCYTE ESTERASE UR: NEGATIVE
METHAMPHETAMINE: NEGATIVE
METHODONE QUAL: NEGATIVE
MICROALBUMIN UR-MCNC: 36.36 MG/DL (ref 0–1.99)
MICROALBUMIN/CREAT UR: 597 MG/G
MORPHINE QUAL: NEGATIVE
NITRITE UR QL STRIP: NEGATIVE MG/DL
OXYCODONE QUAL: POSITIVE
PH UR STRIP: 5 [PH] (ref 4.5–8)
PHENCYCLIDINE: NEGATIVE
PROPOXYPHENE: NEGATIVE
PROTEIN UR: ABNORMAL MG/DL
SP GR UR STRIP: 1.02 (ref 1–1.03)
TEMPERATURE OF URINE WAS BETWEEN 90-100 DEGREES F: YES
TRICYCLIC ANTIDEPRESSANTS: POSITIVE
UROBILINOGEN UR STRIP-ACNC: ABNORMAL E.U./DL

## 2020-07-27 RX ORDER — OXYCODONE AND ACETAMINOPHEN 10; 325 MG/1; MG/1
1 TABLET ORAL 3 TIMES DAILY
Qty: 90 TABLET | Refills: 0 | Status: SHIPPED | OUTPATIENT
Start: 2020-07-27 | End: 2020-08-21

## 2020-07-27 ASSESSMENT — MIFFLIN-ST. JEOR: SCORE: 1417.61

## 2020-07-27 NOTE — PATIENT INSTRUCTIONS
06/30/20   PLASTIC SURGERY REFERRAL  [facility]  [address] [place] [address], [address]  [place] [address]  Appointments: [phone]  Fax: [phone]     Demographics, referral and office note faxed to [phone]. They will contact patient to schedule.       1.  Try taking amitryptiline 10 mg at bedtime to help with sleep and nerve pain    2.  I may change medications for BP based on your tests.    Goal protect your kidneys and decrease swelling.

## 2020-07-27 NOTE — PROGRESS NOTES
Patient Active Problem List    Diagnosis Date Noted     Chronic pain syndrome 10/07/2016     Priority: High     Chronic Pain Diagnosis:  neuropathy  DIRE Total Score(s):14     ORT: 5    4 - 7 =  Moderate Risk   of future problems with Opioids  FAQ5: 45/100 on 9/9/16, 40/100 on 11/15/16  Behavioral Health Consultation: 5/12/17 with Dr. Daisy Quezada  Personal Care Plan for Chronic Pain: 5/12/17  Opioid medication:oxycodone   Dose:10mg  Number of pills per month:60  Patient is being prescribed 10mg of oxycodone IR (Percocet) per day this is 15 mg Morphine Equivalents  Benzodiazepines Prescribed? No  Naloxone prescribed? No       Clinic visit frequency required: Q 1 month Next visit due:   Controlled Substance/Opioid Treatment agreement on file (dated <12 months ago)?:    Date(s): pending  Last [place]NPMP verification: last visit  Items in red to be updated at each visit  Patient is followed by [facility] Un[facility] for ongoing prescription of pain medication.  All refills should be approved by this provider, or covering partner.Chronic Pain          Type 2 diabetes mellitus with diabetic polyneuropathy (H) 10/12/2015     Priority: High     Metabolic acidosis 07/26/2019     Priority: Medium     reolved w/ discontinuation of ASA       Copper deficiency 04/05/2019     Priority: Medium     Identified in Aug, 2018--see discharge summary from [facility] in [place].       Generalized abdominal pain 01/03/2019     Priority: Medium     Gastroenteritis 01/03/2019     Priority: Medium     Neuropathy 01/03/2019     Priority: Medium     Obesity 01/03/2019     Priority: Medium     Other acute pulmonary embolism without acute cor pulmonale (H) 12/31/2018     Priority: Medium     Hernia of anterior abdominal wall 01/28/2017     Priority: Medium     Chronic pain 01/27/2017     Priority: Medium     Diarrhea 01/27/2017     Priority: Medium     Hernia 01/27/2017     Priority: Medium     Osteoarthritis of both knees 10/31/2016     Priority: Medium      Polyneuropathy due to secondary diabetes mellitus (H) 10/31/2016     Priority: Medium     Compression neuropathy of left lower extremity 02/19/2016     Priority: Medium     Bilateral ankle joint pain 01/15/2016     Priority: Medium     Microalbuminuria 10/12/2015     Priority: Medium     Esophageal reflux 04/07/2015     Priority: Medium     Urinary incontinence 02/19/2015     Priority: Medium     Insomnia 02/19/2015     Priority: Medium     Essential hypertension 08/26/2013     Priority: Medium     Asthma 08/04/2013     Priority: Medium     History of injury 08/04/2013     Priority: Medium     Hyperlipidemia LDL goal <100 06/21/2013     Priority: Medium     S/P bariatric surgery 06/21/2013     Priority: Medium     Kim-en-Y in 2006       Neck pain 05/16/2013     Priority: Medium     Moderate major depression (H) 01/24/2013     Priority: Medium     Moderate persistent asthma without complication 11/15/2012     Priority: Medium     Pulmonary embolism and infarction (H) 11/15/2012     Priority: Medium     12/24/2018  Unprovoked.  + fam hx of DVT.  Treated w/ DOAC 12/2018through 9/2019       S/P hysterectomy 06/21/2013     Priority: Low     2008, For fibroids.  Cervix removed, but still has ovaries.       Carpal tunnel syndrome 11/15/2012     Priority: Low     Necrotizing fasciitis (H) 11/15/2012     Priority: Low     Health Care Home 11/15/2012     Priority: Low     Tier 3    Status: Accept  Care Coordination Start Date: 02/27/12  State Tier Level: Level 3  Language/Barrier to Learning: No  Significant Mental Health Issues: Yes  Date Care Coordination Discontinued: 05/28/15    DX V65.8 REPLACED WITH [id number] (04/08/2013)         Abnormal Pap smear of cervix      Priority: Low     3-12-15: Pap/HPV neg.  Plan: Given hx (mildly abn pap, hyster), recheck in 5 yrs.  10-11-11: Pap/HPV neg.  Plan: Given ASUS pap in 2010, recommend repeating pap in 1 year.  8/3/2010 ASCUS Pap with +BV- pt treated with  "Metrogel.  Pt needs repeat Pap in 1yr.  8/4/2008 Pt had total hyst for menorrhagia, pelvic pain, scar tissue, and fibroids.       There are no exam notes on file for this visit.  Chief Complaint   Patient presents with     Pain Management     CPM/ vitamin b-12     Blood pressure 133/75, pulse 112, temperature 98.1  F (36.7  C), temperature source Oral, resp. rate 20, height 1.499 m (4' 11.02\"), weight 91.2 kg (201 lb), SpO2 95 %, not currently breastfeeding.  No results found for any visits on 07/27/20.  On good days low 100s--has them 3-4/7  Bad days 200-300s--has sx.    No pop.  occas arvind sugar free tea.    someV*  Unsure why  36 units daily truliity daily.  Taking dilt daily.    Calves will cramp up a lot--off and on.  Painful.  Stretching helps.    ROS:  Not dizzy or LH.    More HA than usual  Feeling more bloated and 'getting bigger'  Has A BM 2-3 times/day.    Urination is normal.  Sleep is not good--no more than 1-2 hours.      Lives alone. Easily irritated.  Not sleeping well.  Neuropathy     SUBJECTIVE:  Ruthy Álvarez is here for several issues.  She needs followup of her diabetes, hypertension, evaluation of edema and followup of her chronic pain.  Her main concern is that she has had edema in both of her legs for the past several weeks from the foot and ankles, not associated with new shortness of breath, orthopnea or PND.  She does not have a history of congestive heart failure.  She is not complaining of worsening shortness of breath or pleuritic chest pain.  She is not complaining of dizziness or lightheadedness.  She has had a few more headaches than usual and feels a bit more bloated in her lower abdomen.  She is having a bowel movement 2-3 times per day, and her urination feels normal.      For her diabetes, her blood sugars vary.  On good days, she is in the low 100s.  On bad days, she can be in the 200s-300s.  She seems a bit vague about her diet.  She is not drinking any pop.  She is " having some tea and some fruit juices periodically.  She is taking 36 units of basal insulin daily and Trulicity daily.  She is also taking phentermine to help curb her appetite.      Her legs will cramp up off and on.  They become painful, but stretching will help.      Regarding her chronic pain, she has burning pain in her feet and osteoarthritis pain.  She is on opiates.  She does not want to increase the dose, but at the same time feels her burning pain is more uncomfortable.  She is unsure about whether she is taking amitriptyline, but her urinalysis would suggest that she is and that she is positive for tricyclics.      OBJECTIVE:   GENERAL:  The patient is alert, pleasant and in no acute distress.   CHEST:  Clear.   HEART:  Tachycardic at 108.  No murmurs.   ABDOMEN:  Nontender.   EXTREMITIES:  She has puffy edema on the dorsum of her feet and in her lower ankles, which is new for her.        Reviewing her chart, she is on diltiazem, which could perhaps cause edema, although it is not extremely common.  I do not see any other clearcut causes for edema amongst her med list.      ASSESSMENT:   1.  Chronic pain syndrome.  Database reviewed.  Urine drug screen is appropriate today.  I refilled her oxycodone.  She is already on gabapentin and amitriptyline for her neuropathic pain.  We could consider trying increasing amitriptyline in the future, but I did not pursue that today.   2.  Regarding the edema, it is possible it could be med related.  It is bilateral, so I do not think a recurrent pulmonary embolism is likely.  She does not have any other objective signs of congestive heart failure.  She does have chronic kidney disease, and this could be worsening possibly, although it not clear why that would be as she is not taking any NSAIDs, and her creatinine has been stable between 1-1.5 for quite some time.  We will get a BNP and BMP and contact her with these results.  She is due for a urine microalbumin test  anyway, so we will order that and a plain urinalysis as well to look for various stages of proteinuria, and I will call her with those results.  We are going to want to see her BMP before switching her from diltiazem to an ACE or an ARB.  Ideally, either would be better in light of her chronic kidney disease.   3.  Regarding her leg cramps, no intervention made at this time other than to encourage her to drink more fluids, specifically water.  We will call her with the results of her labs when they are back.  Follow up certainly on a monthly basis.

## 2020-08-05 NOTE — RESULT ENCOUNTER NOTE
Please call:    Her urine drug test came back as expected.  The urine test for protein was definitely abnormal, though actually better than 3-5 years ago.  I definitely want to restart a medication called lisinopril, which can protect your kidneys over the long term.  However, we need the results of your blood tests before we can start it.  Please come in again soon so that we can try to redraw the kidney tests.  Fasting is not necessary, so please try to come in well-hydrated!  Thanks  JIGNESH Meyer

## 2020-08-05 NOTE — TELEPHONE ENCOUNTER
FUTURE VISIT INFORMATION      FUTURE VISIT INFORMATION:    Date: 9/16/20    Time: 9:00am    [place] [facility] [place]ERRAL INFORMATION:    Referring provider:  Jose Angel Meyer MD     Referring providers clinic:  [facility]    Reason for visit/diagnosis  excess skin removal consult    RECORDS REQUESTED FROM:       Clinic name Comments Records Status Imaging Status   [facility] OV/referral  6/26/20 EPIC

## 2020-08-07 DIAGNOSIS — I10 ESSENTIAL HYPERTENSION: ICD-10-CM

## 2020-08-07 DIAGNOSIS — Z79.4 TYPE 2 DIABETES MELLITUS WITH DIABETIC POLYNEUROPATHY, WITH LONG-TERM CURRENT USE OF INSULIN (H): ICD-10-CM

## 2020-08-07 DIAGNOSIS — E11.42 TYPE 2 DIABETES MELLITUS WITH DIABETIC POLYNEUROPATHY, WITH LONG-TERM CURRENT USE OF INSULIN (H): ICD-10-CM

## 2020-08-07 DIAGNOSIS — R80.9 MICROALBUMINURIA: Primary | ICD-10-CM

## 2020-08-07 DIAGNOSIS — R60.9 EDEMA, UNSPECIFIED TYPE: ICD-10-CM

## 2020-08-07 LAB
BNP SERPL-MCNC: 12 PG/ML (ref 0–80)
BUN SERPL-MCNC: 21.7 MG/DL (ref 7–19)
CALCIUM SERPL-MCNC: 8.7 MG/DL (ref 8.5–10.1)
CHLORIDE SERPLBLD-SCNC: 109.9 MMOL/L (ref 98–110)
CO2 SERPL-SCNC: 23.6 MMOL/L (ref 20–32)
CREAT SERPL-MCNC: 0.9 MG/DL (ref 0.5–1)
GFR SERPL CREATININE-BSD FRML MDRD: 67.7 ML/MIN/1.7 M2
GLUCOSE SERPL-MCNC: 89.7 MG'DL (ref 70–99)
POTASSIUM SERPL-SCNC: 4.4 MMOL/L (ref 3.2–4.6)
SODIUM SERPL-SCNC: 142.7 MMOL/L (ref 132–142)

## 2020-08-10 DIAGNOSIS — I10 ESSENTIAL HYPERTENSION: ICD-10-CM

## 2020-08-10 DIAGNOSIS — R80.9 MICROALBUMINURIA: ICD-10-CM

## 2020-08-10 RX ORDER — DILTIAZEM HYDROCHLORIDE 120 MG/1
120 CAPSULE, EXTENDED RELEASE ORAL AT BEDTIME
Qty: 30 CAPSULE | Refills: 11 | Status: SHIPPED | OUTPATIENT
Start: 2020-08-10 | End: 2020-08-10

## 2020-08-10 RX ORDER — DILTIAZEM HYDROCHLORIDE 120 MG/1
CAPSULE, EXTENDED RELEASE ORAL
Qty: 90 CAPSULE | Refills: 3 | Status: SHIPPED | OUTPATIENT
Start: 2020-08-10 | End: 2020-09-21 | Stop reason: ALTCHOICE

## 2020-08-10 RX ORDER — LISINOPRIL 10 MG/1
TABLET ORAL
Qty: 90 TABLET | Refills: 3 | Status: SHIPPED | OUTPATIENT
Start: 2020-08-10 | End: 2021-03-11

## 2020-08-10 RX ORDER — LISINOPRIL 10 MG/1
10 TABLET ORAL AT BEDTIME
Qty: 30 TABLET | Refills: 11 | Status: SHIPPED | OUTPATIENT
Start: 2020-08-10 | End: 2020-08-10

## 2020-08-10 NOTE — RESULT ENCOUNTER NOTE
I called pt:  Will decrease dilt from 240 to 120 mg daily and add lisin 10 mg daily.  Recheck BMP at next f/up 8/21.

## 2020-08-18 DIAGNOSIS — E11.42 DIABETIC POLYNEUROPATHY ASSOCIATED WITH TYPE 2 DIABETES MELLITUS (H): ICD-10-CM

## 2020-08-18 RX ORDER — AMITRIPTYLINE HYDROCHLORIDE 10 MG/1
TABLET ORAL
Qty: 30 TABLET | Refills: 0 | Status: SHIPPED | OUTPATIENT
Start: 2020-08-18 | End: 2020-09-23

## 2020-08-21 DIAGNOSIS — G89.4 CHRONIC PAIN SYNDROME: ICD-10-CM

## 2020-08-21 DIAGNOSIS — G89.4 CHRONIC PAIN SYNDROME: Primary | ICD-10-CM

## 2020-08-21 RX ORDER — OXYCODONE AND ACETAMINOPHEN 10; 325 MG/1; MG/1
1 TABLET ORAL 3 TIMES DAILY
Qty: 90 TABLET | Refills: 0 | Status: SHIPPED | OUTPATIENT
Start: 2020-08-21 | End: 2020-09-21

## 2020-08-24 ENCOUNTER — TRANSFERRED RECORDS (OUTPATIENT)
Dept: HEALTH INFORMATION MANAGEMENT | Facility: CLINIC | Age: 55
End: 2020-08-24

## 2020-08-28 ENCOUNTER — COMMUNICATION - HEALTHEAST (OUTPATIENT)
Dept: CARDIOLOGY | Facility: CLINIC | Age: 55
End: 2020-08-28

## 2020-09-09 PROBLEM — R22.1 NECK MASS: Status: ACTIVE | Noted: 2019-10-11

## 2020-09-09 PROBLEM — R87.619 ABNORMAL PAP SMEAR OF CERVIX: Status: ACTIVE | Noted: 2019-04-22

## 2020-09-09 PROBLEM — R73.9 HYPERGLYCEMIA: Status: ACTIVE | Noted: 2019-09-08

## 2020-09-09 PROBLEM — R07.9 CHEST PAIN: Status: ACTIVE | Noted: 2019-01-03

## 2020-09-09 PROBLEM — Z98.84 HX OF GASTRIC BYPASS: Status: ACTIVE | Noted: 2019-04-22

## 2020-09-09 PROBLEM — E11.65 HYPERGLYCEMIA DUE TO TYPE 2 DIABETES MELLITUS (H): Status: ACTIVE | Noted: 2020-06-02

## 2020-09-09 PROBLEM — R55 SYNCOPE, NEAR: Status: ACTIVE | Noted: 2019-01-04

## 2020-09-16 ENCOUNTER — PRE VISIT (OUTPATIENT)
Dept: SURGERY | Facility: CLINIC | Age: 55
End: 2020-09-16

## 2020-09-19 DIAGNOSIS — E11.42 DIABETIC POLYNEUROPATHY ASSOCIATED WITH TYPE 2 DIABETES MELLITUS (H): ICD-10-CM

## 2020-09-21 ENCOUNTER — OFFICE VISIT (OUTPATIENT)
Dept: FAMILY MEDICINE | Facility: CLINIC | Age: 55
End: 2020-09-21
Payer: MEDICARE

## 2020-09-21 VITALS
HEIGHT: 59 IN | WEIGHT: 205.2 LBS | TEMPERATURE: 97.6 F | SYSTOLIC BLOOD PRESSURE: 92 MMHG | OXYGEN SATURATION: 100 % | DIASTOLIC BLOOD PRESSURE: 50 MMHG | HEART RATE: 98 BPM | BODY MASS INDEX: 41.37 KG/M2 | RESPIRATION RATE: 16 BRPM

## 2020-09-21 DIAGNOSIS — Z23 NEED FOR PROPHYLACTIC VACCINATION AND INOCULATION AGAINST INFLUENZA: Primary | ICD-10-CM

## 2020-09-21 DIAGNOSIS — I10 ESSENTIAL HYPERTENSION: ICD-10-CM

## 2020-09-21 DIAGNOSIS — G89.4 CHRONIC PAIN SYNDROME: ICD-10-CM

## 2020-09-21 LAB
AMPHETAMINES QUAL: POSITIVE
BARBITURATES QUAL URINE: NEGATIVE
BENZODIAZEPINE QUAL URINE: NEGATIVE
BUPRENORPHINE QUAL URINE: NEGATIVE
CANNABINOIDS UR QL SCN: NEGATIVE
COCAINE QUAL URINE: NEGATIVE
METHAMPHETAMINE: NEGATIVE
METHODONE QUAL: NEGATIVE
MORPHINE QUAL: NEGATIVE
OXYCODONE QUAL: POSITIVE
PHENCYCLIDINE: NEGATIVE
PROPOXYPHENE: NEGATIVE
TEMPERATURE OF URINE WAS BETWEEN 90-100 DEGREES F: YES
TRICYCLIC ANTIDEPRESSANTS: POSITIVE

## 2020-09-21 RX ORDER — OXYCODONE AND ACETAMINOPHEN 10; 325 MG/1; MG/1
1 TABLET ORAL 3 TIMES DAILY
Qty: 90 TABLET | Refills: 0 | Status: SHIPPED | OUTPATIENT
Start: 2020-09-21 | End: 2020-10-21

## 2020-09-21 RX ADMIN — CYANOCOBALAMIN 1000 MCG: 1000 INJECTION, SOLUTION INTRAMUSCULAR; SUBCUTANEOUS at 17:18

## 2020-09-21 ASSESSMENT — MIFFLIN-ST. JEOR: SCORE: 1431.66

## 2020-09-21 NOTE — NURSING NOTE
Clinic Administered Medication Documentation      Injectable Medication Documentation    Patient was given Cyanocobalamin (B-12). Prior to medication administration, verified patients identity using patient s name and date of birth. Please see MAR and medication order for additional information. Patient instructed to remain in clinic for 15 minutes.      Was entire vial of medication used? Yes  Vial/Syringe: Single dose vial  Expiration Date:  08/2021  Was this medication supplied by the patient? No    Name of provider who requested the medication administration: Dr. Sanchez  Name of provider on site (faculty or community preceptor) at the time of performing the medication administration: Dr. Meyer    Date of next administration: every month  Date of next office visit with provider to renew medication plan (must be seen annually): N/A

## 2020-09-21 NOTE — PROGRESS NOTES
Patient Active Problem List    Diagnosis Date Noted     Chronic pain disorder 10/07/2016     Priority: High     Chronic Pain Diagnosis:  neuropathy  DIRE Total Score(s):14     ORT: 5    4 - 7 =  Moderate Risk   of future problems with Opioids  FAQ5: 45/100 on 9/9/16, 40/100 on 11/15/16  Behavioral Health Consultation: 5/12/17 with Dr. Daisy Quezada  Personal Care Plan for Chronic Pain: 5/12/17  Opioid medication:oxycodone   Dose:10mg  Number of pills per month:60  Patient is being prescribed 10mg of oxycodone IR (Percocet) per day this is 15 mg Morphine Equivalents  Benzodiazepines Prescribed? No  Naloxone prescribed? No       Clinic visit frequency required: Q 1 month Next visit due:   Controlled Substance/Opioid Treatment agreement on file (dated <12 months ago)?:    Date(s): pending  Last MNPMP verification: last visit  Items in red to be updated at each visit  Patient is followed by Data Unavailable for ongoing prescription of pain medication.  All refills should be approved by this provider, or covering partner.Chronic Pain     Chronic Pain Diagnosis:  neuropathy  DIRE Total Score(s):14     ORT: 5    4 - 7 =  Moderate Risk   of future problems with Opioids  FAQ5: 45/100 on 9/9/16, 40/100 on 11/15/16  Behavioral Health Consultation: 5/12/17 with Dr. Daisy Quezada  Personal Care Plan for Chronic Pain: 5/12/17  Opioid medication:oxycodone   Dose:10mg  Number of pills per month:60  Patient is being prescribed 10mg of oxycodone IR (Percocet) per day this is 15 mg Morphine Equivalents  Benzodiazepines Prescribed? No  Naloxone prescribed? No       Clinic visit frequency required: Q 1 month Next visit due:   Controlled Substance/Opioid Treatment agreement on file (dated <12 months ago)?:    Date(s): pending  Last MNPMP verification: last visit  Items in red to be updated at each visit  Patient is followed by Data Unavailable for ongoing prescription of pain medication.  All refills should be approved by this  provider, or covering partner.Chronic Pain       Type 2 diabetes mellitus with diabetic polyneuropathy (H) 10/12/2015     Priority: High     Hyperglycemia due to type 2 diabetes mellitus (H) 06/02/2020     Priority: Medium     Neck mass 10/11/2019     Priority: Medium     Added automatically from request for surgery [id number]       Hyperglycemia 09/08/2019     Priority: Medium     Metabolic acidosis 07/26/2019     Priority: Medium     reolved w/ discontinuation of ASA       Hx of gastric bypass 04/22/2019     Priority: Medium     Copper deficiency 04/05/2019     Priority: Medium     Identified in Aug, 2018--see discharge summary from [facility] in [place].       Syncope, near 01/04/2019     Priority: Medium     Chest pain 01/03/2019     Priority: Medium     Generalized abdominal pain 01/03/2019     Priority: Medium     Gastroenteritis 01/03/2019     Priority: Medium     Neuropathy 01/03/2019     Priority: Medium     Obesity 01/03/2019     Priority: Medium     Other acute pulmonary embolism without acute cor pulmonale (H) 12/31/2018     Priority: Medium     Hernia of anterior abdominal wall 01/28/2017     Priority: Medium     Chronic pain 01/27/2017     Priority: Medium     Diarrhea 01/27/2017     Priority: Medium     Hernia 01/27/2017     Priority: Medium     Osteoarthritis of both knees 10/31/2016     Priority: Medium     Polyneuropathy due to secondary diabetes mellitus (H) 10/31/2016     Priority: Medium     Compression neuropathy of left lower extremity 02/19/2016     Priority: Medium     Interdigital neuroma of left foot 02/19/2016     Priority: Medium     Bilateral ankle joint pain 01/15/2016     Priority: Medium     Microalbuminuria 10/12/2015     Priority: Medium     Esophageal reflux 04/07/2015     Priority: Medium     Urinary incontinence 02/19/2015     Priority: Medium     Insomnia 02/19/2015     Priority: Medium     Essential hypertension 08/26/2013     Priority: Medium     Asthma 08/04/2013     Priority:  Medium     History of injury 08/04/2013     Priority: Medium     History of cholecystectomy 08/04/2013     Priority: Medium     Hyperlipidemia 06/21/2013     Priority: Medium     Status post bariatric surgery 06/21/2013     Priority: Medium     Kim-en-Y in 2006       History of hysterectomy for benign disease 06/21/2013     Priority: Medium     2008, For fibroids.  Cervix removed, but still has ovaries.       Neck pain 05/16/2013     Priority: Medium     Moderate major depression (H) 01/24/2013     Priority: Medium     Moderate persistent asthma without complication 11/15/2012     Priority: Medium     Pulmonary embolism with infarction (H) 11/15/2012     Priority: Medium     12/24/2018  Unprovoked.  + fam hx of DVT.  Treated w/ DOAC 12/2018through 9/2019 12/24/2018  Unprovoked.  + fam hx of DVT.  Plan warfarin for 6 months.       Persons encountering health services in other specified circumstances 11/15/2012     Priority: Medium     Tier 3    Status: Accept  Care Coordination Start Date: 02/27/12  State Tier Level: Level 3  Language/Barrier to Learning: No  Significant Mental Health Issues: Yes  Date Care Coordination Discontinued: 05/28/15    DX V65.8 REPLACED WITH [id number] [facility] (04/08/2013)       Open wound of groin with complication 05/12/2011     Priority: Medium     Chronic pain of both shoulders 12/23/2005     Priority: Medium     Abnormal Pap smear of cervix 04/22/2019     Priority: Low     3-12-15: Pap/HPV neg.  Plan: Given hx (mildly abn pap, hyster), recheck in 5 yrs.  10-11-11: Pap/HPV neg.  Plan: Given ASUS pap in 2010, recommend repeating pap in 1 year.  8/3/2010 ASCUS Pap with +BV- pt treated with Metrogel.  Pt needs repeat Pap in 1yr.  8/4/2008 Pt had total hyst for menorrhagia, pelvic pain, scar tissue, and fibroids.    3-12-15: Pap/HPV neg.  Plan: Given hx (mildly abn pap, hyster), recheck in 5 yrs.  10-11-11: Pap/HPV neg.  Plan: Given ASUS pap in 2010, recommend repeating pap in 1  "year.  8/3/2010 ASCUS Pap with +BV- pt treated with Metrogel.  Pt needs repeat Pap in 1yr.  8/4/2008 Pt had total hyst for menorrhagia, pelvic pain, scar tissue, and fibroids.       S/P hysterectomy 06/21/2013     Priority: Low     2008, For fibroids.  Cervix removed, but still has ovaries.       Carpal tunnel syndrome 11/15/2012     Priority: Low     Necrotizing fasciitis (H) 11/15/2012     Priority: Low     Health Care Home 11/15/2012     Priority: Low     Tier 3    Status: Accept  Care Coordination Start Date: 02/27/12  State Tier Level: Level 3  Language/Barrier to Learning: No  Significant Mental Health Issues: Yes  Date Care Coordination Discontinued: 05/28/15    DX V65.8 REPLACED WITH [id number] [facility] (04/08/2013)         Nursing Notes:   Viridiana Loaiza CMA  9/21/2020  5:18 PM  Signed  Clinic Administered Medication Documentation      Injectable Medication Documentation    Patient was given Cyanocobalamin (B-12). Prior to medication administration, verified patients identity using patient s name and date of birth. Please see MAR and medication order for additional information. Patient instructed to remain in clinic for 15 minutes.      Was entire vial of medication used? Yes  Vial/Syringe: Single dose vial  Expiration Date:  08/2021  Was this medication supplied by the patient? No    Name of provider who requested the medication administration: Dr. Sanchez  Name of provider on site (faculty or community preceptor) at the time of performing the medication administration: Dr. Meyer    Date of next administration: every month  Date of next office visit with provider to renew medication plan (must be seen annually): N/A        Chief Complaint   Patient presents with     Pain Management     CPM     Imm/Inj     Flu Shot     Blood pressure 92/50, pulse 98, temperature 97.6  F (36.4  C), temperature source Oral, resp. rate 16, height 1.499 m (4' 11.02\"), weight 93.1 kg (205 lb 3.2 oz), SpO2 100 %, not " currently breastfeeding.  Results for orders placed or performed in visit on 09/21/20   Rapid Urine Drug Screen (P FM)     Status: Abnormal   Result Value Ref Range    Cannabinoids Qual Urine NEGATIVE NEGATIVE    Phencyclidine NEGATIVE NEGATIVE    Cocaine Qual Urine NEGATIVE NEGATIVE    Methamphetamine Qual NEGATIVE NEGATIVE    Morphine Qual NEGATIVE NEGATIVE    Amphetamines Qual POSITIVE (A) NEGATIVE    Benzodiazepine Qual Urine NEGATIVE NEGATIVE    Tricyclic Antidepressants POSITIVE (A) NEGATIVE    Methadone Qual NEGATIVE NEGATIVE    Barbiturates Qual Urine NEGATIVE NEGATIVE    Oxycodone Qual POSITIVE (A) NEGATIVE    Propoxyphene NEGATIVE NEGATIVE    Buprenorphine Qual Urine NEGATIVE NEGATIVE    Temperature of Urine was Between  Degrees F YES YES   SUBJECTIVE:  Ruthy Álvarez is here for followup of her chronic pain syndrome.  Overall, she feels stable and is not requesting any changes to her medications.  She is able to go about her ADLs and do some walking as a result of her list of pain medications.  Her dose has been stable.  She does not have constipation, somnolence or unusual fatigue.  She is working with getting an appointment at [facility] for decreasing the amount of tissue in her triceps area to help alleviate some of the discomfort and for cosmetic reasons.  She denies any alcohol use or any aberrant behavior.  Her urine drug screen is appropriate today.  Database was reviewed and is appropriate as well.      She would like a flu shot.      She needs a B12 vaccine.      OBJECTIVE:   GENERAL:  Patient is alert, pleasant, and nontoxic.   VITAL SIGNS:  Blood pressure repeated and pulse noted.   CHEST:  Clear.   HEART:  Regular rate and rhythm without murmur.     AFFECT:  She is a little bit down.  Her eye contact is fair.     Exam is otherwise not done.      ASSESSMENT:   1.  Chronic pain syndrome, stable.  Refilled her pain medications for 1 month.  Her next appointment will not be until about  5 weeks, so we will need to bridge her for about 10 days starting a month from now.  I will send a note to myself to do that.   2.  Hypertension, currently overtreated.  We will stop diltiazem, in light of her chronic kidney disease, and will continue her lisinopril.  Reevaluate next time.   3.  B12 deficiency.  Her monthly dose of B12 1000 mcg given.  Flu shot given.         Bedside opioid-overdose calculator (RIOSORD)      In the past six months, has your patient had an outpatient,          Points for  inpatient or ED visit for any of the following:           'yes' answer    Substance use disorder (addiction) of any kind,     25   including alcohol and cannabis?  Bipolar disorder or schizophrenia?       10  Stroke or other cerebrovascular disease?          9  Signifi cant chronic kidney disease?         8  Heart failure?           7  Nonmalignant pancreatic disease?         7  Chronic pulmonary disease?          5  Chronic headache?           5  Does your patient take:  Fentanyl (transdermal or transmucosal)?      13  Morphine?          11  Methadone?          10  Hydromorphone?           7  Extended-release or long-acting (ER/LA) opioid?          5  Benzodiazepine?           9  Antidepressant?           8   >100 mg MME/day?                      7  TOTAL   0    Average probability of overdose or serious opioid-induced respiratory depression in the next six months   Points    Risk   0-4     2%   5-7     5%   8-9     7%   10-17     15%   18-25     30%   26-41     55%   42     83%

## 2020-09-23 RX ORDER — AMITRIPTYLINE HYDROCHLORIDE 10 MG/1
TABLET ORAL
Qty: 30 TABLET | Refills: 0 | Status: SHIPPED | OUTPATIENT
Start: 2020-09-23 | End: 2020-11-02

## 2020-09-24 RX ORDER — ALPRAZOLAM 0.5 MG/1
0.5 TABLET, EXTENDED RELEASE ORAL EVERY MORNING
Qty: 0.1 TABLET | Refills: 0 | Status: SHIPPED | OUTPATIENT
Start: 2020-09-24 | End: 2020-09-24

## 2020-09-30 DIAGNOSIS — Z79.4 TYPE 2 DIABETES MELLITUS WITH DIABETIC POLYNEUROPATHY, WITH LONG-TERM CURRENT USE OF INSULIN (H): ICD-10-CM

## 2020-09-30 DIAGNOSIS — E11.42 TYPE 2 DIABETES MELLITUS WITH DIABETIC POLYNEUROPATHY, WITH LONG-TERM CURRENT USE OF INSULIN (H): ICD-10-CM

## 2020-09-30 RX ORDER — DULAGLUTIDE 1.5 MG/.5ML
1.5 INJECTION, SOLUTION SUBCUTANEOUS
Qty: 2 ML | Refills: 11 | Status: SHIPPED | OUTPATIENT
Start: 2020-09-30 | End: 2021-09-09

## 2020-10-15 ENCOUNTER — TELEPHONE (OUTPATIENT)
Dept: PLASTIC SURGERY | Facility: CLINIC | Age: 55
End: 2020-10-15

## 2020-10-15 NOTE — TELEPHONE ENCOUNTER
Attempt to call patient to offer sooner appointment with Paulette Smiley in [place]. Number invalid.    Kirti Cardona LPN

## 2020-10-21 ENCOUNTER — TELEPHONE (OUTPATIENT)
Dept: FAMILY MEDICINE | Facility: CLINIC | Age: 55
End: 2020-10-21

## 2020-10-21 DIAGNOSIS — G89.4 CHRONIC PAIN SYNDROME: ICD-10-CM

## 2020-10-21 RX ORDER — OXYCODONE AND ACETAMINOPHEN 10; 325 MG/1; MG/1
1 TABLET ORAL 3 TIMES DAILY
Qty: 30 TABLET | Refills: 0 | Status: SHIPPED | OUTPATIENT
Start: 2020-10-21 | End: 2020-10-29

## 2020-10-21 NOTE — TELEPHONE ENCOUNTER
[facility] phone call message- general phone call:    Reason for call: Pt is calling to speak with Viridiana regarding her oxyCODONE-acetaminophen (PERCOCET)  MG per tablet, pt is stating she should have had some more available to last her til her next appointment. If can give pt a call back.     Return call needed: Yes    OK to leave a message on voice mail? Yes    Primary language: English      needed? No    Call taken on October 21, 2020 at 1:29 PM by Grisel Flores-Cardona

## 2020-10-29 ENCOUNTER — OFFICE VISIT (OUTPATIENT)
Dept: FAMILY MEDICINE | Facility: CLINIC | Age: 55
End: 2020-10-29
Payer: MEDICARE

## 2020-10-29 VITALS
BODY MASS INDEX: 39.68 KG/M2 | HEART RATE: 100 BPM | RESPIRATION RATE: 16 BRPM | OXYGEN SATURATION: 97 % | DIASTOLIC BLOOD PRESSURE: 65 MMHG | WEIGHT: 196.8 LBS | SYSTOLIC BLOOD PRESSURE: 132 MMHG | HEIGHT: 59 IN | TEMPERATURE: 98.3 F

## 2020-10-29 DIAGNOSIS — G89.4 CHRONIC PAIN SYNDROME: ICD-10-CM

## 2020-10-29 DIAGNOSIS — G89.29 CHRONIC PAIN OF BOTH SHOULDERS: Primary | ICD-10-CM

## 2020-10-29 DIAGNOSIS — M25.512 CHRONIC PAIN OF BOTH SHOULDERS: Primary | ICD-10-CM

## 2020-10-29 DIAGNOSIS — M25.511 CHRONIC PAIN OF BOTH SHOULDERS: Primary | ICD-10-CM

## 2020-10-29 DIAGNOSIS — I10 ESSENTIAL HYPERTENSION: Primary | ICD-10-CM

## 2020-10-29 DIAGNOSIS — G89.4 CHRONIC PAIN DISORDER: ICD-10-CM

## 2020-10-29 LAB
AMPHETAMINES QUAL: POSITIVE
BARBITURATES QUAL URINE: NEGATIVE
BENZODIAZEPINE QUAL URINE: NEGATIVE
BUPRENORPHINE QUAL URINE: NEGATIVE
CANNABINOIDS UR QL SCN: NEGATIVE
COCAINE QUAL URINE: NEGATIVE
METHAMPHETAMINE: NEGATIVE
METHODONE QUAL: NEGATIVE
MORPHINE QUAL: NEGATIVE
OXYCODONE QUAL: NEGATIVE
PHENCYCLIDINE: NEGATIVE
PROPOXYPHENE: NEGATIVE
TEMPERATURE OF URINE WAS BETWEEN 90-100 DEGREES F: YES
TRICYCLIC ANTIDEPRESSANTS: NEGATIVE

## 2020-10-29 PROCEDURE — 99214 OFFICE O/P EST MOD 30 MIN: CPT | Mod: 25 | Performed by: FAMILY MEDICINE

## 2020-10-29 PROCEDURE — 96372 THER/PROPH/DIAG INJ SC/IM: CPT | Performed by: FAMILY MEDICINE

## 2020-10-29 PROCEDURE — 80306 DRUG TEST PRSMV INSTRMNT: CPT | Performed by: FAMILY MEDICINE

## 2020-10-29 RX ORDER — OXYCODONE AND ACETAMINOPHEN 10; 325 MG/1; MG/1
1 TABLET ORAL 3 TIMES DAILY
Qty: 30 TABLET | Refills: 0 | Status: SHIPPED | OUTPATIENT
Start: 2020-10-29 | End: 2020-10-30

## 2020-10-29 RX ADMIN — CYANOCOBALAMIN 1000 MCG: 1000 INJECTION, SOLUTION INTRAMUSCULAR; SUBCUTANEOUS at 11:26

## 2020-10-29 ASSESSMENT — MIFFLIN-ST. JEOR: SCORE: 1393.56

## 2020-10-29 NOTE — NURSING NOTE
Clinic Administered Medication Documentation      Injectable Medication Documentation    Patient was given Cyanocobalamin (B-12). Prior to medication administration, verified patients identity using patient s name and date of birth. Please see MAR and medication order for additional information. Patient instructed to remain in clinic for 15 minutes.      Was entire vial of medication used? Yes  Vial/Syringe: Single dose vial  Expiration Date:  08212021  Was this medication supplied by the patient? No    Name of provider who requested the medication administration: Dr. Meyer  Name of provider on site (faculty or community preceptor) at the time of performing the medication administration: Dr. Meyer    Date of next administration: One time   Date of next office visit with provider to renew medication plan (must be seen annually): N.A

## 2020-10-29 NOTE — PROGRESS NOTES
Patient Active Problem List    Diagnosis Date Noted     Chronic pain disorder 10/07/2016     Priority: High     Chronic Pain Diagnosis:  neuropathy  DIRE Total Score(s):14     ORT: 5    4 - 7 =  Moderate Risk   of future problems with Opioids  FAQ5: 45/100 on 9/9/16, 40/100 on 11/15/16  Behavioral Health Consultation: 5/12/17 with Dr. Daisy Quezada  Personal Care Plan for Chronic Pain: 5/12/17  Opioid medication:oxycodone   Dose:10mg  Number of pills per month:60  Patient is being prescribed 10mg of oxycodone IR (Percocet) per day this is 15 mg Morphine Equivalents  Benzodiazepines Prescribed? No  Naloxone prescribed? No       Clinic visit frequency required: Q 1 month Next visit due:   Controlled Substance/Opioid Treatment agreement on file (dated <12 months ago)?:    Date(s): pending  Last MNPMP verification: last visit  Items in red to be updated at each visit  Patient is followed by Data Unavailable for ongoing prescription of pain medication.  All refills should be approved by this provider, or covering partner.Chronic Pain     Chronic Pain Diagnosis:  neuropathy  DIRE Total Score(s):14     ORT: 5    4 - 7 =  Moderate Risk   of future problems with Opioids  FAQ5: 45/100 on 9/9/16, 40/100 on 11/15/16  Behavioral Health Consultation: 5/12/17 with Dr. Daisy Quezada  Personal Care Plan for Chronic Pain: 5/12/17  Opioid medication:oxycodone   Dose:10mg  Number of pills per month:60  Patient is being prescribed 10mg of oxycodone IR (Percocet) per day this is 15 mg Morphine Equivalents  Benzodiazepines Prescribed? No  Naloxone prescribed? No       Clinic visit frequency required: Q 1 month Next visit due:   Controlled Substance/Opioid Treatment agreement on file (dated <12 months ago)?:    Date(s): pending  Last MNPMP verification: last visit  Items in red to be updated at each visit  Patient is followed by Data Unavailable for ongoing prescription of pain medication.  All refills should be approved by this  provider, or covering partner.Chronic Pain       Type 2 diabetes mellitus with diabetic polyneuropathy (H) 10/12/2015     Priority: High     Hyperglycemia due to type 2 diabetes mellitus (H) 06/02/2020     Priority: Medium     Neck mass 10/11/2019     Priority: Medium     Added automatically from request for surgery [id number]       Hyperglycemia 09/08/2019     Priority: Medium     Metabolic acidosis 07/26/2019     Priority: Medium     reolved w/ discontinuation of ASA       Hx of gastric bypass 04/22/2019     Priority: Medium     Copper deficiency 04/05/2019     Priority: Medium     Identified in Aug, 2018--see discharge summary from [facility] in [place].       Syncope, near 01/04/2019     Priority: Medium     Chest pain 01/03/2019     Priority: Medium     Generalized abdominal pain 01/03/2019     Priority: Medium     Gastroenteritis 01/03/2019     Priority: Medium     Neuropathy 01/03/2019     Priority: Medium     Obesity 01/03/2019     Priority: Medium     Other acute pulmonary embolism without acute cor pulmonale (H) 12/31/2018     Priority: Medium     Hernia of anterior abdominal wall 01/28/2017     Priority: Medium     Chronic pain 01/27/2017     Priority: Medium     Diarrhea 01/27/2017     Priority: Medium     Hernia 01/27/2017     Priority: Medium     Osteoarthritis of both knees 10/31/2016     Priority: Medium     Polyneuropathy due to secondary diabetes mellitus (H) 10/31/2016     Priority: Medium     Compression neuropathy of left lower extremity 02/19/2016     Priority: Medium     Interdigital neuroma of left foot 02/19/2016     Priority: Medium     Bilateral ankle joint pain 01/15/2016     Priority: Medium     Microalbuminuria 10/12/2015     Priority: Medium     Esophageal reflux 04/07/2015     Priority: Medium     Urinary incontinence 02/19/2015     Priority: Medium     Insomnia 02/19/2015     Priority: Medium     Essential hypertension 08/26/2013     Priority: Medium     Asthma 08/04/2013     Priority:  Medium     History of injury 08/04/2013     Priority: Medium     History of cholecystectomy 08/04/2013     Priority: Medium     Hyperlipidemia 06/21/2013     Priority: Medium     Status post bariatric surgery 06/21/2013     Priority: Medium     Kim-en-Y in 2006       History of hysterectomy for benign disease 06/21/2013     Priority: Medium     2008, For fibroids.  Cervix removed, but still has ovaries.       Neck pain 05/16/2013     Priority: Medium     Moderate major depression (H) 01/24/2013     Priority: Medium     Moderate persistent asthma without complication 11/15/2012     Priority: Medium     Pulmonary embolism with infarction (H) 11/15/2012     Priority: Medium     12/24/2018  Unprovoked.  + fam hx of DVT.  Treated w/ DOAC 12/2018through 9/2019 12/24/2018  Unprovoked.  + fam hx of DVT.  Plan warfarin for 6 months.       Persons encountering health services in other specified circumstances 11/15/2012     Priority: Medium     Tier 3    Status: Accept  Care Coordination Start Date: 02/27/12  State Tier Level: Level 3  Language/Barrier to Learning: No  Significant Mental Health Issues: Yes  Date Care Coordination Discontinued: 05/28/15    DX V65.8 REPLACED WITH [id number] [facility] (04/08/2013)       Open wound of groin with complication 05/12/2011     Priority: Medium     Chronic pain of both shoulders 12/23/2005     Priority: Medium     Abnormal Pap smear of cervix 04/22/2019     Priority: Low     3-12-15: Pap/HPV neg.  Plan: Given hx (mildly abn pap, hyster), recheck in 5 yrs.  10-11-11: Pap/HPV neg.  Plan: Given ASUS pap in 2010, recommend repeating pap in 1 year.  8/3/2010 ASCUS Pap with +BV- pt treated with Metrogel.  Pt needs repeat Pap in 1yr.  8/4/2008 Pt had total hyst for menorrhagia, pelvic pain, scar tissue, and fibroids.    3-12-15: Pap/HPV neg.  Plan: Given hx (mildly abn pap, hyster), recheck in 5 yrs.  10-11-11: Pap/HPV neg.  Plan: Given ASUS pap in 2010, recommend repeating pap in 1  "year.  8/3/2010 ASCUS Pap with +BV- pt treated with Metrogel.  Pt needs repeat Pap in 1yr.  8/4/2008 Pt had total hyst for menorrhagia, pelvic pain, scar tissue, and fibroids.       S/P hysterectomy 06/21/2013     Priority: Low     2008, For fibroids.  Cervix removed, but still has ovaries.       Carpal tunnel syndrome 11/15/2012     Priority: Low     Necrotizing fasciitis (H) 11/15/2012     Priority: Low     Health Care Home 11/15/2012     Priority: Low     Tier 3    Status: Accept  Care Coordination Start Date: 02/27/12  State Tier Level: Level 3  Language/Barrier to Learning: No  Significant Mental Health Issues: Yes  Date Care Coordination Discontinued: 05/28/15    DX V65.8 REPLACED WITH [id number] [facility] (04/08/2013)         There are no exam notes on file for this visit.  Chief Complaint   Patient presents with     other     CPM     10/21/2020  2   10/21/2020  10/21/2020  Oxycodone-Acetaminophen   30.00 [address]  [id number]  [facility] ([id number])  0/0 45.00 [place]   09/21/2020  2   09/21/2020 09/21/2020  Oxycodone-Acetaminophen   90.00 30 [address]  [id number]  [facility] ([id number])  0[place]   08/23/2020  2   08/21/2020 08/23/2020  Oxycodone-Acetaminophen   90.00 30 [address]  [id number]  [facility] ([id number])  0/0 45.00 MME Comm Ins         Blood pressure 132/65, pulse 118, temperature 98.3  F (36.8  C), temperature source Oral, resp. rate 16, height 1.499 m (4' 11.02\"), weight 89.3 kg (196 lb 12.8 oz), SpO2 97 %, not currently breastfeeding.  No results found for any visits on 10/29/20.     Physical Functional Ability Questionnaire:    Instructions: Select the number (1-4) in each of the groups that best summarizes ability. Add the numbers and multiply by 5 for a total score out of 100.         Self-care ability assessment: 3 assistance w/ bathing  1. Require total care: for bathing, toilet, dressing, moving, and eating  2. Require frequent assistance  3. Require occasional assistance  4. Independent " with self-care      Family and social ability assessment: 3  1. Unable to perform any: chores, hobbies, driving, sex, and social activities  2. Able to perform some  3. Able to perform many  4. Able to perform all      Movement ability assessment: 2  1. Able to get up and walk with assistance, unable to climb stairs  2. Able to get up and walk independently, able to climb one flight of stairs  3. Able to walk short distances and climb more than one flight of stairs  4. Able to walk long distances and climb stairs without difficulty      Lifting Ability Assessment: 1  1. Able to lift up to 10 lbs. occasionally  2. Able to lift up to 20 lbs. occasionally  3. Able to lift up to 50 lbs. occasionally  4. Able to lift over 50 lbs. occasionally      Work Ability Assessment: 2  1. Unable to do any work  2. Able to work part-time and with physical limitations  3. Able to work part-time or with physical limitations  4. Able to perform normal work    Physical Functional Ability (FAQ5) Score: 55/100    See additional note for details

## 2020-10-30 ENCOUNTER — TELEPHONE (OUTPATIENT)
Dept: FAMILY MEDICINE | Facility: CLINIC | Age: 55
End: 2020-10-30

## 2020-10-30 DIAGNOSIS — G89.4 CHRONIC PAIN SYNDROME: ICD-10-CM

## 2020-10-30 RX ORDER — OXYCODONE AND ACETAMINOPHEN 10; 325 MG/1; MG/1
1 TABLET ORAL 3 TIMES DAILY
Qty: 90 TABLET | Refills: 0 | Status: SHIPPED | OUTPATIENT
Start: 2020-10-30 | End: 2020-11-20

## 2020-10-30 RX ORDER — OXYCODONE AND ACETAMINOPHEN 10; 325 MG/1; MG/1
1 TABLET ORAL 3 TIMES DAILY
Qty: 90 TABLET | Refills: 0 | Status: SHIPPED | OUTPATIENT
Start: 2020-10-29 | End: 2020-10-30

## 2020-10-30 NOTE — TELEPHONE ENCOUNTER
[facility] phone call message- medication clarification/question:    Full Medication Name:     oxyCODONE-acetaminophen (PERCOCET)  MG per tablet  Take 1 tablet by mouth 3 times daily - Oral      Question:     Pt is needing a call back. She's needing more than 30 tablets. She states she usually gets about 90      Pharmacy confirmed as [id number] [address] AT [facility]: Yes    OK to leave a message on voice mail? Yes    Primary language: English      needed? No    Call taken on October 30, 2020 at 1:03 PM by Megan Quezada CMA

## 2020-11-02 DIAGNOSIS — E11.42 DIABETIC POLYNEUROPATHY ASSOCIATED WITH TYPE 2 DIABETES MELLITUS (H): ICD-10-CM

## 2020-11-02 RX ORDER — AMITRIPTYLINE HYDROCHLORIDE 10 MG/1
TABLET ORAL
Qty: 30 TABLET | Refills: 11 | Status: SHIPPED | OUTPATIENT
Start: 2020-11-02 | End: 2023-01-16

## 2020-11-18 NOTE — PROGRESS NOTES
Service Date: 10/29/2020      HISTORY OF PRESENT ILLNESS:  Ruthy Álvarez is here for followup of  hypotension.  Last time we stopped her diltiazem because she was developing symptoms of hypotension.  Her blood pressure is better and her symptoms have resolved.  She also needs followup of her chronic pain syndrome.  The database was reviewed and was appropriate. now.  She is reasonably well-controlled on oxycodone 10 mg t.i.d.  Her FAQ5 is 55.  She is going to be seeing a plastic surgeon in mid-November to consider plastic surgery on her triceps, which is a source of pain for her.  She has underlying osteoarthritis of her knees.  She has had no aberrant behaviors.  She does not use alcohol or drugs.  She is trying to walk a bit more.      Regarding her diabetes, she does not complain of polydipsia or polyuria.  She is on basal insulin 36 units daily and Trulicity.  Her last blood sugar was in the 100s, but she is not checking frequently.       PHYSICAL EXAMINATION:   GENERAL:  The patient is alert, pleasant, and in no acute distress.   VITAL SIGNS:  Her blood pressure is in the 130s/70s.   LUNGS:  Chest clear.   CARDIOVASCULAR:  Regular rate and rhythm without murmur.   PSYCH:  Affect is appropriate.  Eye contact is normal.      ASSESSMENT/PLAN:   1.  Chronic pain syndrome, stable.  FAQ5 is stable.  Urine drug screen is appropriate.  Database was reviewed.  Prescription refilled.  Follow up in approximately 1 month.   2.  Type 2 diabetes mellitus.  Check A1c and BMP next visit.  She is a tough draw, so  I encouraged her to drink fluids before coming in next time.   3.  Hypertension, well-controlled on lisinopril 10 mg daily alone.  No changes were made.         LUCAS SIMMONS MD             D: 2020   T: 2020   MT: al      Name:     RUTHY ÁLVAREZ   MRN:      [id number]        Account:      [id number]   :      1965           Service Date: 10/29/2020      Document: [id number]

## 2020-11-20 ENCOUNTER — OFFICE VISIT (OUTPATIENT)
Dept: FAMILY MEDICINE | Facility: CLINIC | Age: 55
End: 2020-11-20
Payer: MEDICARE

## 2020-11-20 VITALS
HEART RATE: 117 BPM | TEMPERATURE: 98 F | SYSTOLIC BLOOD PRESSURE: 110 MMHG | RESPIRATION RATE: 16 BRPM | DIASTOLIC BLOOD PRESSURE: 71 MMHG | OXYGEN SATURATION: 97 %

## 2020-11-20 DIAGNOSIS — G89.4 CHRONIC PAIN SYNDROME: Primary | ICD-10-CM

## 2020-11-20 DIAGNOSIS — G89.4 CHRONIC PAIN DISORDER: ICD-10-CM

## 2020-11-20 DIAGNOSIS — E11.42 TYPE 2 DIABETES MELLITUS WITH DIABETIC POLYNEUROPATHY, WITH LONG-TERM CURRENT USE OF INSULIN (H): ICD-10-CM

## 2020-11-20 DIAGNOSIS — Z98.84 S/P BARIATRIC SURGERY: ICD-10-CM

## 2020-11-20 DIAGNOSIS — E53.8 VITAMIN B12 DEFICIENCY (NON ANEMIC): ICD-10-CM

## 2020-11-20 DIAGNOSIS — R07.9 CHEST PAIN, UNSPECIFIED TYPE: ICD-10-CM

## 2020-11-20 DIAGNOSIS — Z79.4 TYPE 2 DIABETES MELLITUS WITH DIABETIC POLYNEUROPATHY, WITH LONG-TERM CURRENT USE OF INSULIN (H): ICD-10-CM

## 2020-11-20 LAB
AMPHETAMINES QUAL: POSITIVE
BARBITURATES QUAL URINE: NEGATIVE
BENZODIAZEPINE QUAL URINE: NEGATIVE
BUN SERPL-MCNC: 22.6 MG/DL (ref 7–19)
BUPRENORPHINE QUAL URINE: NEGATIVE
CALCIUM SERPL-MCNC: 8.5 MG/DL (ref 8.5–10.1)
CANNABINOIDS UR QL SCN: NEGATIVE
CHLORIDE SERPLBLD-SCNC: 105.9 MMOL/L (ref 98–110)
CO2 SERPL-SCNC: 21.1 MMOL/L (ref 20–32)
COCAINE QUAL URINE: NEGATIVE
CREAT SERPL-MCNC: 1.1 MG/DL (ref 0.5–1)
GFR SERPL CREATININE-BSD FRML MDRD: 55.5 ML/MIN/1.7 M2
GLUCOSE SERPL-MCNC: 248.8 MG'DL (ref 70–99)
HBA1C MFR BLD: 6.9 % (ref 4.1–5.7)
METHAMPHETAMINE: NEGATIVE
METHODONE QUAL: NEGATIVE
MORPHINE QUAL: POSITIVE
OXYCODONE QUAL: POSITIVE
PHENCYCLIDINE: NEGATIVE
POTASSIUM SERPL-SCNC: 3.9 MMOL/L (ref 3.2–4.6)
PROPOXYPHENE: NEGATIVE
SODIUM SERPL-SCNC: 136.7 MMOL/L (ref 132–142)
TEMPERATURE OF URINE WAS BETWEEN 90-100 DEGREES F: YES
TRICYCLIC ANTIDEPRESSANTS: POSITIVE

## 2020-11-20 PROCEDURE — 93000 ELECTROCARDIOGRAM COMPLETE: CPT | Performed by: FAMILY MEDICINE

## 2020-11-20 PROCEDURE — 80306 DRUG TEST PRSMV INSTRMNT: CPT | Performed by: FAMILY MEDICINE

## 2020-11-20 PROCEDURE — 83036 HEMOGLOBIN GLYCOSYLATED A1C: CPT | Performed by: FAMILY MEDICINE

## 2020-11-20 PROCEDURE — 99214 OFFICE O/P EST MOD 30 MIN: CPT | Performed by: FAMILY MEDICINE

## 2020-11-20 PROCEDURE — 80048 BASIC METABOLIC PNL TOTAL CA: CPT | Performed by: FAMILY MEDICINE

## 2020-11-20 PROCEDURE — 36415 COLL VENOUS BLD VENIPUNCTURE: CPT | Performed by: FAMILY MEDICINE

## 2020-11-20 RX ORDER — CYANOCOBALAMIN 1000 UG/ML
1 INJECTION, SOLUTION INTRAMUSCULAR; SUBCUTANEOUS
Qty: 1 ML | Refills: 11 | Status: SHIPPED | OUTPATIENT
Start: 2020-11-20 | End: 2023-12-01 | Stop reason: DRUGHIGH

## 2020-11-20 RX ORDER — OXYCODONE AND ACETAMINOPHEN 10; 325 MG/1; MG/1
1 TABLET ORAL 3 TIMES DAILY
Qty: 90 TABLET | Refills: 0 | Status: SHIPPED | OUTPATIENT
Start: 2020-11-20 | End: 2020-12-17

## 2020-11-20 NOTE — PROGRESS NOTES
Patient Active Problem List    Diagnosis Date Noted     Chronic pain disorder 10/07/2016     Priority: High     Chronic Pain Diagnosis:  neuropathy  DIRE Total Score(s):14     ORT: 5    4 - 7 =  Moderate Risk   of future problems with Opioids  FAQ5: 45/100 on 9/9/16, 40/100 on 11/15/16  Behavioral Health Consultation: 5/12/17 with Dr. Daisy Quezada  Personal Care Plan for Chronic Pain: 5/12/17  Opioid medication:oxycodone   Dose:10mg  Number of pills per month:60  Patient is being prescribed 10mg of oxycodone IR (Percocet) per day this is 15 mg Morphine Equivalents  Benzodiazepines Prescribed? No  Naloxone prescribed? No       Clinic visit frequency required: Q 1 month Next visit due:   Controlled Substance/Opioid Treatment agreement on file (dated <12 months ago)?:    Date(s): pending  Last [place] verification: last visit  Items in red to be updated at each visit  Patient is followed by Data Unavailable for ongoing prescription of pain medication.  All refills should be approved by this provider, or covering partner.Chronic Pain     Chronic Pain Diagnosis:  neuropathy  DIRE Total Score(s):14     ORT: 5    4 - 7 =  Moderate Risk   of future problems with Opioids  FAQ5: 45/100 on 9/9/16, 40/100 on 11/15/16  55 October 29, 2020    Behavioral Health Consultation: 5/12/17 with Dr. Daisy Quezada  Personal Care Plan for Chronic Pain: 5/12/17  Opioid medication:oxycodone   Dose:10mg  Number of pills per month:90  Patient is being prescribed 45 MME  Benzodiazepines Prescribed? No  Naloxone prescribed? No       Clinic visit frequency required: Q 1 month Next visit due:   Controlled Substance/Opioid Treatment agreement on file (dated <12 months ago)?:    Date(s): pending  Last [place] verification: last visit  Items in red to be updated at each visit  Patient is followed by Data Unavailable for ongoing prescription of pain medication.  All refills should be approved by this provider, or covering partner.Chronic Pain        Type 2 diabetes mellitus with diabetic polyneuropathy (H) 10/12/2015     Priority: High     Hyperglycemia due to type 2 diabetes mellitus (H) 06/02/2020     Priority: Medium     Neck mass 10/11/2019     Priority: Medium     Added automatically from request for surgery [id number]       Hyperglycemia 09/08/2019     Priority: Medium     Metabolic acidosis 07/26/2019     Priority: Medium     reolved w/ discontinuation of ASA       Hx of gastric bypass 04/22/2019     Priority: Medium     Copper deficiency 04/05/2019     Priority: Medium     Identified in Aug, 2018--see discharge summary from [facility] in [place].       Syncope, near 01/04/2019     Priority: Medium     Chest pain 01/03/2019     Priority: Medium     Generalized abdominal pain 01/03/2019     Priority: Medium     Gastroenteritis 01/03/2019     Priority: Medium     Neuropathy 01/03/2019     Priority: Medium     Obesity 01/03/2019     Priority: Medium     Other acute pulmonary embolism without acute cor pulmonale (H) 12/31/2018     Priority: Medium     Hernia of anterior abdominal wall 01/28/2017     Priority: Medium     Chronic pain 01/27/2017     Priority: Medium     Diarrhea 01/27/2017     Priority: Medium     Hernia 01/27/2017     Priority: Medium     Osteoarthritis of both knees 10/31/2016     Priority: Medium     Polyneuropathy due to secondary diabetes mellitus (H) 10/31/2016     Priority: Medium     Compression neuropathy of left lower extremity 02/19/2016     Priority: Medium     Interdigital neuroma of left foot 02/19/2016     Priority: Medium     Bilateral ankle joint pain 01/15/2016     Priority: Medium     Microalbuminuria 10/12/2015     Priority: Medium     Esophageal reflux 04/07/2015     Priority: Medium     Urinary incontinence 02/19/2015     Priority: Medium     Insomnia 02/19/2015     Priority: Medium     Essential hypertension 08/26/2013     Priority: Medium     Asthma 08/04/2013     Priority: Medium     History of injury 08/04/2013     Priority:  Medium     History of cholecystectomy 08/04/2013     Priority: Medium     Hyperlipidemia 06/21/2013     Priority: Medium     Status post bariatric surgery 06/21/2013     Priority: Medium     Kim-en-Y in 2006       History of hysterectomy for benign disease 06/21/2013     Priority: Medium     2008, For fibroids.  Cervix removed, but still has ovaries.       Neck pain 05/16/2013     Priority: Medium     Moderate major depression (H) 01/24/2013     Priority: Medium     Moderate persistent asthma without complication 11/15/2012     Priority: Medium     Pulmonary embolism with infarction (H) 11/15/2012     Priority: Medium     12/24/2018  Unprovoked.  + fam hx of DVT.  Treated w/ DOAC 12/2018through 9/2019 12/24/2018  Unprovoked.  + fam hx of DVT.  Plan warfarin for 6 months.       Persons encountering health services in other specified circumstances 11/15/2012     Priority: Medium     Tier 3    Status: Accept  Care Coordination Start Date: 02/27/12  State Tier Level: Level 3  Language/Barrier to Learning: No  Significant Mental Health Issues: Yes  Date Care Coordination Discontinued: 05/28/15    DX V65.8 REPLACED WITH [id number] (04/08/2013)       Open wound of groin with complication 05/12/2011     Priority: Medium     Chronic pain of both shoulders 12/23/2005     Priority: Medium     Abnormal Pap smear of cervix 04/22/2019     Priority: Low     3-12-15: Pap/HPV neg.  Plan: Given hx (mildly abn pap, hyster), recheck in 5 yrs.  10-11-11: Pap/HPV neg.  Plan: Given ASUS pap in 2010, recommend repeating pap in 1 year.  8/3/2010 ASCUS Pap with +BV- pt treated with Metrogel.  Pt needs repeat Pap in 1yr.  8/4/2008 Pt had total hyst for menorrhagia, pelvic pain, scar tissue, and fibroids.    3-12-15: Pap/HPV neg.  Plan: Given hx (mildly abn pap, hyster), recheck in 5 yrs.  10-11-11: Pap/HPV neg.  Plan: Given ASUS pap in 2010, recommend repeating pap in 1 year.  8/3/2010 ASCUS Pap with +BV- pt treated with  Metrogel.  Pt needs repeat Pap in 1yr.  8/4/2008 Pt had total hyst for menorrhagia, pelvic pain, scar tissue, and fibroids.       S/P hysterectomy 06/21/2013     Priority: Low     2008, For fibroids.  Cervix removed, but still has ovaries.       Carpal tunnel syndrome 11/15/2012     Priority: Low     Necrotizing fasciitis (H) 11/15/2012     Priority: Low     Health Care Home 11/15/2012     Priority: Low     Tier 3    Status: Accept  Care Coordination Start Date: 02/27/12  State Tier Level: Level 3  Language/Barrier to Learning: No  Significant Mental Health Issues: Yes  Date Care Coordination Discontinued: 05/28/15    DX V65.8 REPLACED WITH [id number] (04/08/2013)         There are no exam notes on file for this visit.  Chief Complaint   Patient presents with     Pain Management     Pt is here for pain management today.     Medication Reconciliation     Complete.    SUBJECTIVE:  re Ruthy Álvarez.  Please see comments above.      Regarding her chronic pain, it is stable and well managed with her current regimen.  She has had no other drug use.  She denies sharing medication.  Her FAQ-5 is stable and updated in the problem list today.  She denies any problems with constipation.  Her current dose of oxycodone is under 50 MME.  Database was reviewed, and there are no red flags.      OBJECTIVE:   GENERAL:  The patient is alert, pleasant and in no acute distress.  She is appropriately groomed.   VITAL SIGNS:  Her blood pressure is excellent.  Her heart rate initially was tachycardic, but improved during her visit.  It was in the upper 90s at the end of our visit.   CHEST:  Clear.   HEART:  Regular rate and rhythm.  Normal S1 and S2.  No murmurs.  No chest wall tenderness.   ABDOMEN:  Soft and nontender.   EXTREMITIES:  No edema.      LABORATORY:  Labs are now back.  Her A1c is excellent.  Her BMP is normal.  Urine drug screen surprisingly showed morphine, and a confirmatory test was sent.      ASSESSMENT  AND PLAN:   1. Chronic pain syndrome.  Symptomatically stable.  Database is appropriate.  She has had no aberrant behavior.  I suspect that the morphine in her urine today is a false positive.  We will send it off for a confirmation test, and I refilled her oxycodone.   2. Regarding her chest pain, I obtained an EKG and reviewed it.  It was entirely normal, but she is asymptomatic at the time of the visit.  I think it is likely due to the psychosocial stresses she is under given its atypical nature, but given that she has multiple cardiac risk factors, we will refer her for an adenosine Cardiolite study.  She had one that was normal at more than 1 year ago, so I think repeating it now is reasonable.   3. Regarding her diabetes, her glycemic control is excellent.  Her A1c is now under 7, and she has no hypoglycemia.  Continue current therapy, which is a combination between Trulicity and Basaglar.   4. Regarding her hypertension, well controlled.  No changes made.         Blood pressure 110/71, pulse 117, temperature 98  F (36.7  C), temperature source Oral, resp. rate 16, SpO2 97 %, not currently breastfeeding.   Here for refills  Gets some tightening in the chest.    Can be at rest.  Hurts in shoulders, too, and goes to back  Will last about 5 times and then goes away.  Happens 3-4 times/day.  Present for 2 weeks.  Nothing like it before.  Definitely under more stress--social conflicts with family.    No cough, sob.  Not assoc w/ SOB.  'pulling and tightening.'    CRF's htn/DM/Obeisty  Tolerates lisinopril dizzy, lightheaded, or coughing.        Results for orders placed or performed in visit on 11/20/20   Hemoglobin A1c ([facility])     Status: Abnormal   Result Value Ref Range    Hemoglobin A1C 6.9 (H) 4.1 - 5.7 %   Basic Metabolic Panel ([place])     Status: Abnormal   Result Value Ref Range    Urea Nitrogen 22.6 (H) 7.0 - 19.0 mg/dL    Calcium 8.5 8.5 - 10.1 mg/dL    Chloride 105.9 98.0 - 110.0 mmol/L    Carbon  Dioxide 21.1 20.0 - 32.0 mmol/L    Creatinine 1.1 (H) 0.5 - 1.0 mg/dL    Glucose 248.8 (H) 70.0 - 99.0 mg'dL    Potassium 3.9 3.2 - 4.6 mmol/L    Sodium 136.7 132.0 - 142.0 mmol/L    GFR Estimate 55.5 (L) >60.0 mL/min/1.7 m2    GFR Estimate If Black 67.2 >60.0 mL/min/1.7 m2   Rapid Urine Drug Screen ([facility])     Status: Abnormal   Result Value Ref Range    Cannabinoids Qual Urine NEGATIVE NEGATIVE    Phencyclidine NEGATIVE NEGATIVE    Cocaine Qual Urine NEGATIVE NEGATIVE    Methamphetamine Qual NEGATIVE NEGATIVE    Morphine Qual POSITIVE (A) NEGATIVE    Amphetamines Qual POSITIVE (A) NEGATIVE    Benzodiazepine Qual Urine NEGATIVE NEGATIVE    Tricyclic Antidepressants POSITIVE (A) NEGATIVE    Methadone Qual NEGATIVE NEGATIVE    Barbiturates Qual Urine NEGATIVE NEGATIVE    Oxycodone Qual POSITIVE (A) NEGATIVE    Propoxyphene NEGATIVE NEGATIVE    Buprenorphine Qual Urine NEGATIVE NEGATIVE    Temperature of Urine was Between  Degrees F YES YES

## 2020-11-23 NOTE — RESULT ENCOUNTER NOTE
Labs were available at the time of the encounter and results were discussed in the office.  Morphine confirmation test sent.  Patient declined use of anything not prescribed.  CF

## 2020-11-24 NOTE — PROGRESS NOTES
PLASTIC SURGERY HISTORY AND PHYSICAL    Chief Complaint: pannus, excess skin- consult for body contouring  Referring Provider: Jose Angel Meyer      HPI:Humberto is a 55 year old female PMH HTN, DM, depression, OA of knees, asthma, chronic pain, MO s/p gastric bypass with significant weight loss, presents to discuss body contouring.     Gastric bypass in 2010 at [facility]. Highest weight 340lb, lowest weight was 137lb but gained weight back to 260lb. Now losing again with assistance of medical weight management. Still losing weight, goal weight 137lb.    Most concerned about excess skin to arms, legs and stomach. Complains of pain to arms and legs from the loose skin. Feels that weight of arms adds to shoulder pain and has trouble lifting arms.    No issues with rashes in the past but has had boils. History of necrotizing fasciitis to abdomen/R groin in 2010 after I &D of boil. States that she required multiple surgeries and healed a large area by secondary intention.     Pt has chronic pain to left leg, takes oxycodone daily 10mg TID, gabapentin and topical antiinflammatories.     History of unprovoked DVT/PE 2 years ago, took eliquis for a course of 6 months, stopped 10/2019. Now takes ASA daily.     PMH:   Past Medical History:   Diagnosis Date     Arthritis      Depressive disorder      Diabetes (H)      Gastroenteritis      History of blood transfusion      Hypertension      Necrotizing fasciitis (H) 11/15/2012     Osteoarthritis of both knees 10/31/2016     Uncomplicated asthma      PSH:   Past Surgical History:   Procedure Laterality Date     GASTRIC BYPASS  2006     HYSTERECTOMY     debridement for necrotizing fascitis   Left foot surgery- [facility]    FH:   Family History   Problem Relation Age of Onset     Diabetes Mother      Diabetes Brother      Diabetes Sister      No Known Problems Father      No Known Problems Maternal Grandmother      No Known Problems Maternal Grandfather      No Known  Problems Paternal Grandmother      No Known Problems Paternal Grandfather      No Known Problems Son      No Known Problems Daughter      No Known Problems Maternal Half-Brother      No Known Problems Maternal Half-Sister      No Known Problems Paternal Half-Brother      No Known Problems Paternal Half-Sister      No Known Problems Niece      No Known Problems Nephew      No Known Problems Cousin      No Known Problems Other      Coronary Artery Disease No family hx of      Cancer No family hx of      Heart Disease No family hx of      Hypertension No family hx of      Hyperlipidemia No family hx of      Kidney Disease No family hx of      Cerebrovascular Disease No family hx of      Obesity No family hx of      Thrombosis No family hx of      Asthma No family hx of      Arthritis No family hx of      Thyroid Disease No family hx of      Depression No family hx of      Mental Illness No family hx of      Substance Abuse No family hx of      Cystic Fibrosis No family hx of      Early Death No family hx of      Coronary Artery Disease Early Onset No family hx of      Heart Failure No family hx of      Bleeding Diathesis No family hx of      Dementia No family hx of      Breast Cancer No family hx of      Ovarian Cancer No family hx of      Uterine Cancer No family hx of      Prostate Cancer No family hx of      Colorectal Cancer No family hx of      Pancreatic Cancer No family hx of      Lung Cancer No family hx of      Melanoma No family hx of      Autoimmune Disease No family hx of      Unknown/Adopted No family hx of      Genetic Disorder No family hx of         SH:   Social History     Tobacco Use     Smoking status: Never Smoker     Smokeless tobacco: Never Used   Substance Use Topics     Alcohol use: No     Drug use: No      Work: not working.    MEDS:     Current Outpatient Medications:      albuterol (2.5 MG/3ML) 0.083% nebulizer solution, Take 1 vial (2.5 mg) by nebulization every 6 hours as needed for  shortness of breath / dyspnea, Disp: 1 Box, Rfl: prn     albuterol (VENTOLIN HFA) 108 (90 Base) MCG/ACT inhaler, Inhale 2 puffs into the lungs 4 times daily Inhale 2 puffs every 4-6 hours as needed, Disp: 2 Inhaler, Rfl: 1     Alcohol Swabs (B-D SINGLE USE SWABS REGULAR) PADS, Apply 1 pad topically 3 times daily Use as directed, Disp: 100 each, Rfl: 12     amitriptyline (ELAVIL) 10 MG tablet, TAKE 1 TABLET(10 MG) BY MOUTH AT BEDTIME, Disp: 30 tablet, Rfl: 11     aspirin (ASA) 325 MG EC tablet, Take 1 tablet (325 mg) by mouth daily, Disp: 90 tablet, Rfl: 3     atorvastatin (LIPITOR) 10 MG tablet, 1 po qday, Disp: 90 tablet, Rfl: 3     blood glucose (NO BRAND SPECIFIED) lancets standard, Check fasting glucose 4 times daily.  For Accu check monitor, Disp: 360 each, Rfl: 11     cyanocobalamin (CYANOCOBALAMIN) 1000 MCG/ML injection, Inject 1 mL (1,000 mcg) into the muscle every 30 days, Disp: 1 mL, Rfl: 11     diclofenac (VOLTAREN) 1 % topical gel, , Disp: , Rfl:      dulaglutide (TRULICITY) 1.5 MG/0.5ML pen, Inject 1.5 mg Subcutaneous every 7 days, Disp: 2 mL, Rfl: 11     DULoxetine (CYMBALTA) 60 MG capsule, Take 1 capsule (60 mg) by mouth daily, Disp: 30 capsule, Rfl: 11     DULoxetine (CYMBALTA) 60 MG capsule, Take 1 capsule (60 mg) by mouth daily, Disp: 30 capsule, Rfl: 11     famotidine (PEPCID) 20 MG tablet, Take 1 tablet (20 mg) by mouth 2 times daily, Disp: 60 tablet, Rfl: 11     ferrous sulfate (IRON) 325 (65 FE) MG tablet, Take 1 tablet (325 mg) by mouth daily (with breakfast), Disp: 30 tablet, Rfl: 2     gabapentin (NEURONTIN) 250 MG/5ML solution, Take 15 mLs (750 mg) by mouth 3 times daily, Disp: 1350 mL, Rfl: 2     insulin glargine (BASAGLAR KWIKPEN) 100 UNIT/ML pen, Inject 32 Units Subcutaneous daily, Disp: 0.1 mL, Rfl: 0     insulin pen needle (BD ULTRA-FINE) 29G X 12.7MM miscellaneous, Use 4 times daily, Disp: 100 each, Rfl: prn     Lancets (ONETOUCH DELICA PLUS [id number]) MISC, CHECK FASTING GLUCOSE  THREE TIMES DAILY, Disp: 100 each, Rfl: 11     lidocaine (LMX4) 4 % external cream, Apply topically once as needed for mild pain (on upper back), Disp: 45 g, Rfl: 2     lisinopril (ZESTRIL) 10 MG tablet, TAKE 1 TABLET(10 MG) BY MOUTH AT BEDTIME, Disp: 90 tablet, Rfl: 3     magnesium oxide (MAG-OX) 400 (241.3 MG) MG tablet, Take 1 tablet (400 mg) by mouth daily, Disp: 30 tablet, Rfl: 11     naloxone (NARCAN) 4 MG/0.1ML nasal spray, Spray 1 spray (4 mg) into one nostril alternating nostrils once as needed for opioid reversal Every 2-3 minutes until patient responsive or EMS arrives, Disp: 0.2 mL, Rfl: 0     order for DME, Equipment being ordered: wheeled walker with seat, Disp: 1 Device, Rfl: 0     oxyCODONE-acetaminophen (PERCOCET)  MG per tablet, Take 1 tablet by mouth 3 times daily, Disp: 90 tablet, Rfl: 0     phentermine (ADIPEX-P) 37.5 MG tablet, Take 0.5-1 tablets by mouth every morning, Disp: , Rfl:      risperiDONE (RISPERDAL) 0.25 MG tablet, Take 1 tablet (0.25 mg) by mouth At Bedtime, Disp: , Rfl:      senna-docusate (SENOKOT-S/PERICOLACE) 8.6-50 MG tablet, Take 1 tablet by mouth 2 times daily, Disp: 60 tablet, Rfl: 11     Simethicone 180 MG CAPS, Use 1 softgel as needed for gas, Disp: 60 capsule, Rfl: 11     SUMAtriptan (IMITREX) 25 MG tablet, One tab at onset of HA.  May repeat dose in 2 hours.  Do not exceed 200 mg in 24 hours, Disp: 24 tablet, Rfl: 1     tolterodine ER (DETROL LA) 2 MG 24 hr capsule, Take 1 capsule (2 mg) by mouth daily, Disp: 90 capsule, Rfl: 3     vitamin D2 (ERGOCALCIFEROL) 09572 units (1250 mcg) capsule, Take 1 capsule by mouth three times a week, Disp: , Rfl: 0    Current Facility-Administered Medications:      cyanocobalamin injection 1,000 mcg, 1,000 mcg, Intramuscular, Q30 Days, Jose Angel Meyer MD, 1,000 mcg at 10/29/20 1126     cyanocobalamin injection 1,000 mcg, 1,000 mcg, Intramuscular, Q30 Days, Jose Angel Meyer MD, 1,000 mcg at 07/08/19 1207      "cyanocobalamin injection 1,000 mcg, 1,000 mcg, Intramuscular, Q30 Days, Jose Angel Meyer MD     vitamin B-12 (CYANOCOBALAMIN) injection 1,000 mcg, 1,000 mcg, Intramuscular, Q30 Days, Jose Angel Meyer MD, 1,000 mcg at 05/02/19 1434       ALLERGIES:     Allergies   Allergen Reactions     Sulfamethoxazole-Trimethoprim Other (See Comments) and Nephrotoxicity     Other reaction(s): Renal Failure  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 Denies chest pain, shortness of breath, MI, CVA, PE, and bleeding disorders.     PHYSICAL EXAMINATION:   /80 (BP Location: Left arm, Patient Position: Sitting, Cuff Size: Adult Large)   Pulse 108   Ht 1.499 m (4' 11\")   Wt 90.6 kg (199 lb 12.8 oz)   SpO2 98%   BMI 40.35 kg/m     BMI: Body mass index is 40.35 kg/m .  General: No acute distress.   Abdomen: rotund upper abdomen with midline scar. Lower abdomen with hanging pannus, grade II. There is vertical scar to midline pannus and R groin/flank from previous surgery/infection.  Extremities: excess skin and tissue to upper arms bilaterally. Excess skin and tissue to medial thighs.      ASSESSMENT: weight loss with excess skin, requesting body contouring.      PLAN:  Plan to get to goal weight and continue to document any rash issues with pannus for 6+ months.    Discussed that brachioplasty and thighplasty are considered cosmetic and unfortunately will not be covered by insurance. Can provide quote for procedure if she desires. With that in mind, I discussed with her that removal of skin of thighs and arms may help with some of her discomfort but by no means could it be " guaranteed that it will resolve pain she is having, especially considering the chronic pain in her legs from prior surgery.     Prior to any surgery would need medical clearance due to DVT/PE history and must maintain good HbA1c. Per chart review after patient left, she was noted to have prior DVT/PE in 2012 as well 12/2018.     I also offered to patient to submit to insurance now, although I think she has the best chance of covered with more documentation and will have best outcome if she gets closer to goal weight. If she does want to submit to insurance I will request medical clearance prior to getting PA with a periop anticoagulation plan.     We had a thorough discussion about the procedure today. I explained what a panniculectomy consists of and what it does not. I explained the difference between a panniculectomy and an abdominoplasty. I explained the risks to include bleeding, infection, injury to surrounding structures, fluid collection, wound healing difficulties, contour deformity, dog ears, asymmetry, loss of umbilicus, inability to remove all excess tissue, and DVT/PE.      Total time spent with patient was 35 min of which greater than 50% was in counseling.    JUAN C PhamC  Plastic and Reconstructive Surgery

## 2020-11-25 ENCOUNTER — TELEPHONE (OUTPATIENT)
Dept: FAMILY MEDICINE | Facility: CLINIC | Age: 55
End: 2020-11-25

## 2020-11-25 ENCOUNTER — PRE VISIT (OUTPATIENT)
Dept: SURGERY | Facility: CLINIC | Age: 55
End: 2020-11-25

## 2020-11-25 ENCOUNTER — RECORDS - HEALTHEAST (OUTPATIENT)
Dept: ADMINISTRATIVE | Facility: OTHER | Age: 55
End: 2020-11-25

## 2020-11-25 NOTE — PATIENT INSTRUCTIONS
11/25/20   [facility]  [address] [place] [address]  Phone:[phone]  Fax:[phone]    Referral, demographics, office note and med list faxed to [phone]. They will contact patient to schedule.    Anaid Jerome

## 2020-11-26 NOTE — TELEPHONE ENCOUNTER
Answering Service Documentation     Patient's pharmacy is calling about her refill of percocet, she receives 90 pills per month. She was recently seen for a CPM by Dr. Meyer on 11/25/20, and her percocet was refilled. She is due for a refill on the 30th. She presented to the pharmacy today reporting that she lost the rest of her prescription and needs more now. I reported that I cannot make another prescription for any narcotics over the phone.     Israel Rowell MD PGY3  [facility]

## 2020-11-27 ENCOUNTER — OFFICE VISIT (OUTPATIENT)
Dept: PLASTIC SURGERY | Facility: CLINIC | Age: 55
End: 2020-11-27
Payer: MEDICARE

## 2020-11-27 VITALS
BODY MASS INDEX: 40.28 KG/M2 | WEIGHT: 199.8 LBS | HEIGHT: 59 IN | OXYGEN SATURATION: 98 % | SYSTOLIC BLOOD PRESSURE: 130 MMHG | DIASTOLIC BLOOD PRESSURE: 80 MMHG | HEART RATE: 108 BPM

## 2020-11-27 DIAGNOSIS — E66.01 MORBID OBESITY (H): ICD-10-CM

## 2020-11-27 DIAGNOSIS — L98.7 EXCESS SKIN OF ABDOMEN: Primary | ICD-10-CM

## 2020-11-27 PROCEDURE — 99203 OFFICE O/P NEW LOW 30 MIN: CPT | Performed by: PHYSICIAN ASSISTANT

## 2020-11-27 ASSESSMENT — MIFFLIN-ST. JEOR: SCORE: 1406.92

## 2020-11-27 ASSESSMENT — PAIN SCALES - GENERAL: PAINLEVEL: SEVERE PAIN (7)

## 2020-11-27 NOTE — LETTER
11/27/2020       RE: Ruthy Álvarez  [address]  [place] [address]     Dear Colleague,    Thank you for referring your patient, Ruthy Álvarez, to the [facility]. Please see a copy of my visit note below.    PLASTIC SURGERY HISTORY AND PHYSICAL    Chief Complaint: pannus, excess skin- consult for body contouring  Referring Provider: Jose Angel Meyer      HPI:Humberto is a 55 year old female PMH HTN, DM, depression, OA of knees, asthma, chronic pain, MO s/p gastric bypass with significant weight loss, presents to discuss body contouring.     Gastric bypass in 2010 at [facility]. Highest weight 340lb, lowest weight was 137lb but gained weight back to 260lb. Now losing again with assistance of medical weight management. Still losing weight, goal weight 137lb.    Most concerned about excess skin to arms, legs and stomach. Complains of pain to arms and legs from the loose skin. Feels that weight of arms adds to shoulder pain and has trouble lifting arms.    No issues with rashes in the past but has had boils. History of necrotizing fasciitis to abdomen/R groin in 2010 after I &D of boil. States that she required multiple surgeries and healed a large area by secondary intention.     Pt has chronic pain to left leg, takes oxycodone daily 10mg TID, gabapentin and topical antiinflammatories.     History of unprovoked DVT/PE 2 years ago, took eliquis for a course of 6 months, stopped 10/2019. Now takes ASA daily.     PMH:   Past Medical History:   Diagnosis Date     Arthritis      Depressive disorder      Diabetes (H)      Gastroenteritis      History of blood transfusion      Hypertension      Necrotizing fasciitis (H) 11/15/2012     Osteoarthritis of both knees 10/31/2016     Uncomplicated asthma      PSH:   Past Surgical History:   Procedure Laterality Date     GASTRIC BYPASS  2006      HYSTERECTOMY     debridement for necrotizing fascitis   Left foot surgery- [facility]    FH:   Family History   Problem Relation Age of Onset     Diabetes Mother      Diabetes Brother      Diabetes Sister      No Known Problems Father      No Known Problems Maternal Grandmother      No Known Problems Maternal Grandfather      No Known Problems Paternal Grandmother      No Known Problems Paternal Grandfather      No Known Problems Son      No Known Problems Daughter      No Known Problems Maternal Half-Brother      No Known Problems Maternal Half-Sister      No Known Problems Paternal Half-Brother      No Known Problems Paternal Half-Sister      No Known Problems Niece      No Known Problems Nephew      No Known Problems Cousin      No Known Problems Other      Coronary Artery Disease No family hx of      Cancer No family hx of      Heart Disease No family hx of      Hypertension No family hx of      Hyperlipidemia No family hx of      Kidney Disease No family hx of      Cerebrovascular Disease No family hx of      Obesity No family hx of      Thrombosis No family hx of      Asthma No family hx of      Arthritis No family hx of      Thyroid Disease No family hx of      Depression No family hx of      Mental Illness No family hx of      Substance Abuse No family hx of      Cystic Fibrosis No family hx of      Early Death No family hx of      Coronary Artery Disease Early Onset No family hx of      Heart Failure No family hx of      Bleeding Diathesis No family hx of      Dementia No family hx of      Breast Cancer No family hx of      Ovarian Cancer No family hx of      Uterine Cancer No family hx of      Prostate Cancer No family hx of      Colorectal Cancer No family hx of      Pancreatic Cancer No family hx of      Lung Cancer No family hx of      Melanoma No family hx of      Autoimmune Disease No family hx of      Unknown/Adopted No family hx of      Genetic Disorder No family hx of         SH:   Social History      Tobacco Use     Smoking status: Never Smoker     Smokeless tobacco: Never Used   Substance Use Topics     Alcohol use: No     Drug use: No      Work: not working.    MEDS:     Current Outpatient Medications:      albuterol (2.5 MG/3ML) 0.083% nebulizer solution, Take 1 vial (2.5 mg) by nebulization every 6 hours as needed for shortness of breath / dyspnea, Disp: 1 Box, Rfl: prn     albuterol (VENTOLIN HFA) 108 (90 Base) MCG/ACT inhaler, Inhale 2 puffs into the lungs 4 times daily Inhale 2 puffs every 4-6 hours as needed, Disp: 2 Inhaler, Rfl: 1     Alcohol Swabs (B-D SINGLE USE SWABS REGULAR) PADS, Apply 1 pad topically 3 times daily Use as directed, Disp: 100 each, Rfl: 12     amitriptyline (ELAVIL) 10 MG tablet, TAKE 1 TABLET(10 MG) BY MOUTH AT BEDTIME, Disp: 30 tablet, Rfl: 11     aspirin (ASA) 325 MG EC tablet, Take 1 tablet (325 mg) by mouth daily, Disp: 90 tablet, Rfl: 3     atorvastatin (LIPITOR) 10 MG tablet, 1 po qday, Disp: 90 tablet, Rfl: 3     blood glucose (NO BRAND SPECIFIED) lancets standard, Check fasting glucose 4 times daily.  For Accu check monitor, Disp: 360 each, Rfl: 11     cyanocobalamin (CYANOCOBALAMIN) 1000 MCG/ML injection, Inject 1 mL (1,000 mcg) into the muscle every 30 days, Disp: 1 mL, Rfl: 11     diclofenac (VOLTAREN) 1 % topical gel, , Disp: , Rfl:      dulaglutide (TRULICITY) 1.5 MG/0.5ML pen, Inject 1.5 mg Subcutaneous every 7 days, Disp: 2 mL, Rfl: 11     DULoxetine (CYMBALTA) 60 MG capsule, Take 1 capsule (60 mg) by mouth daily, Disp: 30 capsule, Rfl: 11     DULoxetine (CYMBALTA) 60 MG capsule, Take 1 capsule (60 mg) by mouth daily, Disp: 30 capsule, Rfl: 11     famotidine (PEPCID) 20 MG tablet, Take 1 tablet (20 mg) by mouth 2 times daily, Disp: 60 tablet, Rfl: 11     ferrous sulfate (IRON) 325 (65 FE) MG tablet, Take 1 tablet (325 mg) by mouth daily (with breakfast), Disp: 30 tablet, Rfl: 2     gabapentin (NEURONTIN) 250 MG/5ML solution, Take 15 mLs (750 mg) by mouth 3 times  daily, Disp: 1350 mL, Rfl: 2     insulin glargine (BASAGLAR KWIKPEN) 100 UNIT/ML pen, Inject 32 Units Subcutaneous daily, Disp: 0.1 mL, Rfl: 0     insulin pen needle (BD ULTRA-FINE) 29G X 12.7MM miscellaneous, Use 4 times daily, Disp: 100 each, Rfl: prn     Lancets (ONETOUCH DELICA PLUS [id number]) MISC, CHECK FASTING GLUCOSE THREE TIMES DAILY, Disp: 100 each, Rfl: 11     lidocaine (LMX4) 4 % external cream, Apply topically once as needed for mild pain (on upper back), Disp: 45 g, Rfl: 2     lisinopril (ZESTRIL) 10 MG tablet, TAKE 1 TABLET(10 MG) BY MOUTH AT BEDTIME, Disp: 90 tablet, Rfl: 3     magnesium oxide (MAG-OX) 400 (241.3 MG) MG tablet, Take 1 tablet (400 mg) by mouth daily, Disp: 30 tablet, Rfl: 11     naloxone (NARCAN) 4 MG/0.1ML nasal spray, Spray 1 spray (4 mg) into one nostril alternating nostrils once as needed for opioid reversal Every 2-3 minutes until patient responsive or EMS arrives, Disp: 0.2 mL, Rfl: 0     order for DME, Equipment being ordered: wheeled walker with seat, Disp: 1 Device, Rfl: 0     oxyCODONE-acetaminophen (PERCOCET)  MG per tablet, Take 1 tablet by mouth 3 times daily, Disp: 90 tablet, Rfl: 0     phentermine (ADIPEX-P) 37.5 MG tablet, Take 0.5-1 tablets by mouth every morning, Disp: , Rfl:      risperiDONE (RISPERDAL) 0.25 MG tablet, Take 1 tablet (0.25 mg) by mouth At Bedtime, Disp: , Rfl:      senna-docusate (SENOKOT-S/PERICOLACE) 8.6-50 MG tablet, Take 1 tablet by mouth 2 times daily, Disp: 60 tablet, Rfl: 11     Simethicone 180 MG CAPS, Use 1 softgel as needed for gas, Disp: 60 capsule, Rfl: 11     SUMAtriptan (IMITREX) 25 MG tablet, One tab at onset of HA.  May repeat dose in 2 hours.  Do not exceed 200 mg in 24 hours, Disp: 24 tablet, Rfl: 1     tolterodine ER (DETROL LA) 2 MG 24 hr capsule, Take 1 capsule (2 mg) by mouth daily, Disp: 90 capsule, Rfl: 3     vitamin D2 (ERGOCALCIFEROL) 09830 units (1250 mcg) capsule, Take 1 capsule by mouth three times a week, Disp: ,  "Rfl: 0    Current Facility-Administered Medications:      cyanocobalamin injection 1,000 mcg, 1,000 mcg, Intramuscular, Q30 Days, Jose Angel Meyer MD, 1,000 mcg at 10/29/20 1126     cyanocobalamin injection 1,000 mcg, 1,000 mcg, Intramuscular, Q30 Days, Jose Angel Meyer MD, 1,000 mcg at 07/08/19 1207     cyanocobalamin injection 1,000 mcg, 1,000 mcg, Intramuscular, Q30 Days, Jose Angel Meyer MD     vitamin B-12 (CYANOCOBALAMIN) injection 1,000 mcg, 1,000 mcg, Intramuscular, Q30 Days, Jose Angel Meyer MD, 1,000 mcg at 05/02/19 1434       ALLERGIES:     Allergies   Allergen Reactions     Sulfamethoxazole-Trimethoprim Other (See Comments) and Nephrotoxicity     Other reaction(s): Renal Failure  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 Denies chest pain, shortness of breath, MI, CVA, PE, and bleeding disorders.     PHYSICAL EXAMINATION:   /80 (BP Location: Left arm, Patient Position: Sitting, Cuff Size: Adult Large)   Pulse 108   Ht 1.499 m (4' 11\")   Wt 90.6 kg (199 lb 12.8 oz)   SpO2 98%   BMI 40.35 kg/m     BMI: Body mass index is 40.35 kg/m .  General: No acute distress.   Abdomen: rotund upper abdomen with midline scar. Lower abdomen with hanging pannus, grade II. There is vertical scar to midline pannus and R groin/flank from previous surgery/infection.  Extremities: excess skin and tissue to upper arms bilaterally. Excess skin and tissue to medial thighs.      ASSESSMENT: weight loss with excess skin, requesting body contouring.      PLAN:  Plan to get to goal weight and continue to document any rash issues with pannus for 6+ " months.    Discussed that brachioplasty and thighplasty are considered cosmetic and unfortunately will not be covered by insurance. Can provide quote for procedure if she desires. With that in mind, I discussed with her that removal of skin of thighs and arms may help with some of her discomfort but by no means could it be guaranteed that it will resolve pain she is having, especially considering the chronic pain in her legs from prior surgery.     Prior to any surgery would need medical clearance due to DVT/PE history and must maintain good HbA1c. Per chart review after patient left, she was noted to have prior DVT/PE in 2012 as well 12/2018.     I also offered to patient to submit to insurance now, although I think she has the best chance of covered with more documentation and will have best outcome if she gets closer to goal weight. If she does want to submit to insurance I will request medical clearance prior to getting PA with a periop anticoagulation plan.     We had a thorough discussion about the procedure today. I explained what a panniculectomy consists of and what it does not. I explained the difference between a panniculectomy and an abdominoplasty. I explained the risks to include bleeding, infection, injury to surrounding structures, fluid collection, wound healing difficulties, contour deformity, dog ears, asymmetry, loss of umbilicus, inability to remove all excess tissue, and DVT/PE.      Total time spent with patient was 35 min of which greater than 50% was in counseling.    Paulette Smiley PA-C  Plastic and Reconstructive Surgery

## 2020-12-03 ENCOUNTER — TRANSFERRED RECORDS (OUTPATIENT)
Dept: HEALTH INFORMATION MANAGEMENT | Facility: CLINIC | Age: 55
End: 2020-12-03

## 2020-12-04 ENCOUNTER — TRANSFERRED RECORDS (OUTPATIENT)
Dept: HEALTH INFORMATION MANAGEMENT | Facility: CLINIC | Age: 55
End: 2020-12-04

## 2020-12-17 ENCOUNTER — OFFICE VISIT (OUTPATIENT)
Dept: FAMILY MEDICINE | Facility: CLINIC | Age: 55
End: 2020-12-17
Payer: MEDICARE

## 2020-12-17 VITALS
DIASTOLIC BLOOD PRESSURE: 68 MMHG | OXYGEN SATURATION: 99 % | SYSTOLIC BLOOD PRESSURE: 116 MMHG | RESPIRATION RATE: 16 BRPM | HEART RATE: 123 BPM | WEIGHT: 201.2 LBS | BODY MASS INDEX: 40.64 KG/M2 | TEMPERATURE: 97.4 F

## 2020-12-17 DIAGNOSIS — L98.7 EXCESSIVE AND REDUNDANT SKIN AND SUBCUTANEOUS TISSUE: ICD-10-CM

## 2020-12-17 DIAGNOSIS — E11.42 DIABETIC POLYNEUROPATHY ASSOCIATED WITH TYPE 2 DIABETES MELLITUS (H): ICD-10-CM

## 2020-12-17 DIAGNOSIS — G89.4 CHRONIC PAIN SYNDROME: ICD-10-CM

## 2020-12-17 DIAGNOSIS — G89.4 CHRONIC PAIN SYNDROME: Primary | ICD-10-CM

## 2020-12-17 DIAGNOSIS — R82.6 ABNORMAL URINE LEVELS OF SUBSTANCES CHIEFLY NONMEDICINAL AS TO SOURCE: Primary | ICD-10-CM

## 2020-12-17 DIAGNOSIS — R07.9 CHEST PAIN, UNSPECIFIED TYPE: ICD-10-CM

## 2020-12-17 LAB
AMPHETAMINES QUAL: POSITIVE
BARBITURATES QUAL URINE: NEGATIVE
BENZODIAZEPINE QUAL URINE: POSITIVE
BUPRENORPHINE QUAL URINE: NEGATIVE
CANNABINOIDS UR QL SCN: NEGATIVE
COCAINE QUAL URINE: NEGATIVE
METHAMPHETAMINE: POSITIVE
METHODONE QUAL: NEGATIVE
MORPHINE QUAL: NEGATIVE
OXYCODONE QUAL: POSITIVE
PHENCYCLIDINE: NEGATIVE
PROPOXYPHENE: NEGATIVE
TEMPERATURE OF URINE WAS BETWEEN 90-100 DEGREES F: YES
TRICYCLIC ANTIDEPRESSANTS: POSITIVE

## 2020-12-17 PROCEDURE — 96372 THER/PROPH/DIAG INJ SC/IM: CPT | Performed by: FAMILY MEDICINE

## 2020-12-17 PROCEDURE — 80306 DRUG TEST PRSMV INSTRMNT: CPT | Performed by: FAMILY MEDICINE

## 2020-12-17 PROCEDURE — 99214 OFFICE O/P EST MOD 30 MIN: CPT | Mod: 25 | Performed by: FAMILY MEDICINE

## 2020-12-17 RX ORDER — GABAPENTIN 250 MG/5ML
750 SOLUTION ORAL 3 TIMES DAILY
Qty: 1350 ML | Refills: 2 | Status: SHIPPED | OUTPATIENT
Start: 2020-12-17 | End: 2021-04-01

## 2020-12-17 RX ORDER — OXYCODONE AND ACETAMINOPHEN 10; 325 MG/1; MG/1
1 TABLET ORAL 3 TIMES DAILY
Qty: 90 TABLET | Refills: 0 | Status: SHIPPED | OUTPATIENT
Start: 2020-12-17 | End: 2021-01-18

## 2020-12-17 RX ADMIN — CYANOCOBALAMIN 1000 MCG: 1000 INJECTION, SOLUTION INTRAMUSCULAR; SUBCUTANEOUS at 12:41

## 2020-12-17 NOTE — PROGRESS NOTES
Patient Active Problem List    Diagnosis Date Noted     Chronic pain disorder 10/07/2016     Priority: High     Chronic Pain Diagnosis:  neuropathy  DIRE Total Score(s):14     ORT: 5    4 - 7 =  Moderate Risk   of future problems with Opioids  FAQ5: 50 November 20, 2020  Behavioral Health Consultation: 5/12/17 with Dr. Daisy Quezada  Personal Care Plan for Chronic Pain: 5/12/17  Opioid medication:oxycodone   Dose:10mg  Number of pills per month:60  Patient is being prescribed 10mg of oxycodone IR (Percocet) per day this is 15 mg Morphine Equivalents  Benzodiazepines Prescribed? No  Naloxone prescribed? No       Clinic visit frequency required: Q 1 month Next visit due:   Controlled Substance/Opioid Treatment agreement on file (dated <12 months ago)?:    Date(s): pending  Last [place] verification: last visit  Items in red to be updated at each visit  Patient is followed by Data Unavailable for ongoing prescription of pain medication.  All refills should be approved by this provider, or covering partner.Chronic Pain     Chronic Pain Diagnosis:  neuropathy  DIRE Total Score(s):14     ORT: 5    4 - 7 =  Moderate Risk   of future problems with Opioids  FAQ5: 45/100 on 9/9/16, 40/100 on 11/15/16  55 October 29, 2020    Behavioral Health Consultation: 5/12/17 with Dr. Daisy Quezada  Personal Care Plan for Chronic Pain: 5/12/17  Opioid medication:oxycodone   Dose:10mg  Number of pills per month:90  Patient is being prescribed 45 MME  Benzodiazepines Prescribed? No  Naloxone prescribed? No       Clinic visit frequency required: Q 1 month Next visit due:   Controlled Substance/Opioid Treatment agreement on file (dated <12 months ago)?:    Date(s): pending  Last [place] verification: last visit  Items in red to be updated at each visit  Patient is followed by [facility] Unavailable for ongoing prescription of pain medication.  All refills should be approved by this provider, or covering partner.Chronic Pain       Type 2 diabetes  mellitus with diabetic polyneuropathy (H) 10/12/2015     Priority: High     Morbid obesity (H) 11/27/2020     Priority: Medium     Hyperglycemia due to type 2 diabetes mellitus (H) 06/02/2020     Priority: Medium     Neck mass 10/11/2019     Priority: Medium     Added automatically from request for surgery [id number]       Hyperglycemia 09/08/2019     Priority: Medium     Metabolic acidosis 07/26/2019     Priority: Medium     reolved w/ discontinuation of ASA       Hx of gastric bypass 04/22/2019     Priority: Medium     Copper deficiency 04/05/2019     Priority: Medium     Identified in Aug, 2018--see discharge summary from [facility] in [place].       Syncope, near 01/04/2019     Priority: Medium     Chest pain 01/03/2019     Priority: Medium     Generalized abdominal pain 01/03/2019     Priority: Medium     Gastroenteritis 01/03/2019     Priority: Medium     Neuropathy 01/03/2019     Priority: Medium     Obesity 01/03/2019     Priority: Medium     Other acute pulmonary embolism without acute cor pulmonale (H) 12/31/2018     Priority: Medium     Hernia of anterior abdominal wall 01/28/2017     Priority: Medium     Diarrhea 01/27/2017     Priority: Medium     Hernia 01/27/2017     Priority: Medium     Osteoarthritis of both knees 10/31/2016     Priority: Medium     Polyneuropathy due to secondary diabetes mellitus (H) 10/31/2016     Priority: Medium     Compression neuropathy of left lower extremity 02/19/2016     Priority: Medium     Interdigital neuroma of left foot 02/19/2016     Priority: Medium     Bilateral ankle joint pain 01/15/2016     Priority: Medium     Microalbuminuria 10/12/2015     Priority: Medium     Esophageal reflux 04/07/2015     Priority: Medium     Urinary incontinence 02/19/2015     Priority: Medium     Insomnia 02/19/2015     Priority: Medium     Essential hypertension 08/26/2013     Priority: Medium     Asthma 08/04/2013     Priority: Medium     History of injury 08/04/2013     Priority: Medium      History of cholecystectomy 08/04/2013     Priority: Medium     Hyperlipidemia 06/21/2013     Priority: Medium     Status post bariatric surgery 06/21/2013     Priority: Medium     Kim-en-Y in 2006       History of hysterectomy for benign disease 06/21/2013     Priority: Medium     2008, For fibroids.  Cervix removed, but still has ovaries.       Neck pain 05/16/2013     Priority: Medium     Moderate major depression (H) 01/24/2013     Priority: Medium     Moderate persistent asthma without complication 11/15/2012     Priority: Medium     Pulmonary embolism with infarction (H) 11/15/2012     Priority: Medium     12/24/2018  Unprovoked.  + fam hx of DVT.  Treated w/ DOAC 12/2018through 9/2019 12/24/2018  Unprovoked.  + fam hx of DVT.  Plan warfarin for 6 months.       Persons encountering health services in other specified circumstances 11/15/2012     Priority: Medium     Tier 3    Status: Accept  Care Coordination Start Date: 02/27/12  State Tier Level: Level 3  Language/Barrier to Learning: No  Significant Mental Health Issues: Yes  Date Care Coordination Discontinued: 05/28/15    DX V65.8 REPLACED WITH [id number] (04/08/2013)       Open wound of groin with complication 05/12/2011     Priority: Medium     Chronic pain of both shoulders 12/23/2005     Priority: Medium     Abnormal Pap smear of cervix 04/22/2019     Priority: Low     3-12-15: Pap/HPV neg.  Plan: Given hx (mildly abn pap, hyster), recheck in 5 yrs.  10-11-11: Pap/HPV neg.  Plan: Given ASUS pap in 2010, recommend repeating pap in 1 year.  8/3/2010 ASCUS Pap with +BV- pt treated with Metrogel.  Pt needs repeat Pap in 1yr.  8/4/2008 Pt had total hyst for menorrhagia, pelvic pain, scar tissue, and fibroids.    3-12-15: Pap/HPV neg.  Plan: Given hx (mildly abn pap, hyster), recheck in 5 yrs.  10-11-11: Pap/HPV neg.  Plan: Given ASUS pap in 2010, recommend repeating pap in 1 year.  8/3/2010 ASCUS Pap with +BV- pt treated with Metrogel.   Pt needs repeat Pap in 1yr.  8/4/2008 Pt had total hyst for menorrhagia, pelvic pain, scar tissue, and fibroids.       S/P hysterectomy 06/21/2013     Priority: Low     2008, For fibroids.  Cervix removed, but still has ovaries.       Carpal tunnel syndrome 11/15/2012     Priority: Low     Necrotizing fasciitis (H) 11/15/2012     Priority: Low     Health Care Home 11/15/2012     Priority: Low     Tier 3    Status: Accept  Care Coordination Start Date: 02/27/12  State Tier Level: Level 3  Language/Barrier to Learning: No  Significant Mental Health Issues: Yes  Date Care Coordination Discontinued: 05/28/15    DX V65.8 REPLACED WITH [id number] (04/08/2013)         Nursing Notes:   Viridiana Loaiza CMA  12/17/2020 12:42 PM  Signed  Clinic Administered Medication Documentation      Injectable Medication Documentation    Patient was given Cyanocobalamin (B-12). Prior to medication administration, verified patients identity using patient s name and date of birth. Please see MAR and medication order for additional information. Patient instructed to remain in clinic for 15 minutes.      Was entire vial of medication used? Yes  Vial/Syringe: Single dose vial  Expiration Date:  08/2022  Was this medication supplied by the patient? No    Name of provider who requested the medication administration: Dr. Meyer  Name of provider on site (faculty or community preceptor) at the time of performing the medication administration: Dr. Meyer    Date of next administration: N/A  Date of next office visit with provider to renew medication plan (must be seen annually): N/A          Chief Complaint   Patient presents with     Pain Management     CPM / b12 shot     Pain     pain in arms and legs     Blood pressure 116/68, pulse 123, temperature 97.4  F (36.3  C), temperature source Oral, resp. rate 16, weight 91.3 kg (201 lb 3.2 oz), SpO2 99 %, not currently breastfeeding.   Ruthy Álvarez is here for chronic pain syndrome  primarily.  The location of her pain is primarily neuropathic pain, but in addition, over the past several years as a result of her weight loss, she has had pain from redundant tissue in her abdomen and more so in her triceps areas and in her thighs.  I had most recently referred her to a plastic surgeon at the  who is potentially interested in proceeding.  She has an achy discomfort over the triceps that makes it hard for her to lift her arms.  It goes from the triceps area to her elbow.  She also has pain in her legs that is worse with standing.  It is over the medial and posterior thighs.  It is also worse if her lower legs are actually in a dependent position because of the excess skin folds on her legs.  She would like to have surgery for relief of this discomfort if at all possible.  Her pain is polyfactorial from both refractory neuropathy and now from this.  She has been adherent to her medications and does not take any outside medications.  She has lost nearly 200 pounds from her very maximum weight, which is approximately 400.  Most recently, her weight loss has been primarily through pharmacotherapy.  She has gone from approximately 237 one year ago to her current weight, which is about 200.      She was recently hospitalized in the ER at [facility] for anterior chest pain.  It was sharp and pleuritic.  She ruled out for myocardial infarction.  Ruled out for pulmonary embolism.  She had an MRI of her neck that shows some cervical radiculopathy.  Her neck pain is no longer present.  Her chest pain is resolved.  She does not have chest pain with physical activity but has chest pain with emotional stress.  She notes that the pain she had occurred right after a strong argument with her son.  She realizes that her heart races but understands that this is an adverse effect of her phentermine, which she is being prescribed for her weight loss.  She denies any benzodiazepine us, which showed up on her rapid  urine drug screen today.      OBJECTIVE:   GENERAL:  Patient is alert, pleasant, and in no acute distress.   CHEST:  Clear.   HEART:  Regular rate and rhythm.  Pulse has slowed down to 108 when I saw her.   EXTREMITIES:  Upper extremities, she has excessive redundant soft tissue skin that is dependent when she abducts her shoulder.  It is tender to palpation in extension of triceps to her axilla consistent with redundant fat in the area.  The volume of fat is almost equal to the volume of the biceps above it.  She has more ill-defined but definitely mildly tender soft tissue over the medial and posterior thigh as well.      ASSESSMENT:   1.  Chronic pain syndrome, stable.  Refilled her oxycodone.  The results of her urine drug screen are interesting.  Everything is explained there except for the benzodiazepine, which she denies.  Will send it off for confirmation.  I did refill her oxycodone today based on review of her history and the database, which was unremarkable.     2.  Regarding her redundant skin, I do think she is a candidate for surgery and would benefit from surgical removal of the soft tissue of her arms.  I think she is clearly more tender in her arms but has pain both in her arms and her legs that could potentially be relieved by surgery and this would help unburden the need for long-term opiates.  However, it may not eliminate that need given the need for that is polyfactorial.  We will send a copy of this note to her bariatric surgeon.       Results for orders placed or performed in visit on 12/17/20   Rapid Urine Drug Screen ([facility])     Status: Abnormal   Result Value Ref Range    Cannabinoids Qual Urine NEGATIVE NEGATIVE    Phencyclidine NEGATIVE NEGATIVE    Cocaine Qual Urine NEGATIVE NEGATIVE    Methamphetamine Qual POSITIVE (A) NEGATIVE    Morphine Qual NEGATIVE NEGATIVE    Amphetamines Qual POSITIVE (A) NEGATIVE    Benzodiazepine Qual Urine POSITIVE (A) NEGATIVE    Tricyclic Antidepressants  POSITIVE (A) NEGATIVE    Methadone Qual NEGATIVE NEGATIVE    Barbiturates Qual Urine NEGATIVE NEGATIVE    Oxycodone Qual POSITIVE (A) NEGATIVE    Propoxyphene NEGATIVE NEGATIVE    Buprenorphine Qual Urine NEGATIVE NEGATIVE    Temperature of Urine was Between  Degrees F YES YES

## 2020-12-17 NOTE — NURSING NOTE
Clinic Administered Medication Documentation      Injectable Medication Documentation    Patient was given Cyanocobalamin (B-12). Prior to medication administration, verified patients identity using patient s name and date of birth. Please see MAR and medication order for additional information. Patient instructed to remain in clinic for 15 minutes.      Was entire vial of medication used? Yes  Vial/Syringe: Single dose vial  Expiration Date:  08/2022  Was this medication supplied by the patient? No    Name of provider who requested the medication administration: Dr. Meyer  Name of provider on site (faculty or community preceptor) at the time of performing the medication administration: Dr. Meyer    Date of next administration: N/A  Date of next office visit with provider to renew medication plan (must be seen annually): N/A

## 2020-12-17 NOTE — LETTER
December 21, 2020      Ruthy TJ Álvarez  [address]  [place] [address]        Dear ,    We are writing to inform you of your test results.    Your confirmatory test came back showing a breakdown product of a medication called diazepam, which you were given in at [facility] recently, probably before your MRI test.   So, I don't view it as a problem.   See you in January.   C Fallert     Resulted Orders   Rapid Urine Drug Screen ([facility])   Result Value Ref Range    Cannabinoids Qual Urine NEGATIVE NEGATIVE    Phencyclidine NEGATIVE NEGATIVE    Cocaine Qual Urine NEGATIVE NEGATIVE    Methamphetamine Qual POSITIVE (A) NEGATIVE    Morphine Qual NEGATIVE NEGATIVE    Amphetamines Qual POSITIVE (A) NEGATIVE    Benzodiazepine Qual Urine POSITIVE (A) NEGATIVE    Tricyclic Antidepressants POSITIVE (A) NEGATIVE    Methadone Qual NEGATIVE NEGATIVE    Barbiturates Qual Urine NEGATIVE NEGATIVE    Oxycodone Qual POSITIVE (A) NEGATIVE    Propoxyphene NEGATIVE NEGATIVE    Buprenorphine Qual Urine NEGATIVE NEGATIVE    Temperature of Urine was Between  Degrees F YES YES      Comment:      This is a preliminary screening test that detects drugs-of-abuse in urine at   specified detection levels.  To confirm preliminary results, a more specific   method such as Gas Chromatography/Mass Spectrometry (GC/MS) must be used.        Benzodiazepines, Urine, Quantitative ([facility])   Result Value Ref Range    7-Aminoclonazepam, Urn, Quant <5 ng/mL      Comment:      Cutoff: 5 ng/mL    Alpha-Hydrozyalprazolam, Urn, Quant <5 ng/mL      Comment:      Cutoff: 5 ng/mL    Alpha-Hydroxymidazolam, Urn, Quant <20 ng/mL      Comment:      Cutoff: 20 ng/mL  INTERPRETIVE INFORMATION: Benzodiazepines, Urine,                             Quantitative  Methodology: Quantitative Liquid Chromatography-Tandem Mass   Spectrometry  For medical purposes only; not valid for forensic use.  Identification of specific drug(s) taken by  specimen donor is   problematic due to common metabolites, some of which are   prescription drugs themselves. The absence of expected drug(s)   and/or drug metabolite(s) may indicate non-compliance,   inappropriate timing of specimen collection relative to drug   administration, poor drug absorption, diluted/adulterated urine,   or limitations of testing. The concentration value must be greater   than or equal to the cutoff to be reported as positive.   Interpretive questions should be directed to the laboratory.  Test developed and characteristics determined by [facility]. See Compliance Statement B: [facility]Light[facility]/CS  Performed By: [facility]  [address] [place] [id number]  [facility] Director: Brandie Robbins MD      Alprazolam, Urn, Quant <5 ng/mL      Comment:      Cutoff: 5 ng/mL    Chlordiazepoxide, Urn, Quant <20 ng/mL      Comment:      Cutoff: 20 ng/mL    Clonazepam, Urn, Quant <5 ng/mL      Comment:      Cutoff: 5 ng/mL    Diazepam, Urn, Quant <20 ng/mL      Comment:      Cutoff: 20 ng/mL    Lorazepam, Urn, Quant <20 ng/mL      Comment:      Cutoff: 20 ng/mL    Midazolam, Urn, Quantitatvie <20 ng/mL      Comment:      Cutoff: 20 ng/mL    Nordiazepam, Urn, Quant <20 ng/mL      Comment:      Cutoff: 20 ng/mL    Oxazepam, Urn, Quant 41 ng/mL      Comment:      Consistent with use of a drug containing oxazepam, such as Serax,   or use of a drug that is metabolized to oxazepam.  Cutoff: 20 ng/mL      Temazepam, Urn, Quant <20 ng/mL      Comment:      Cutoff: 20 ng/mL    Narrative    Test performed by:  [facility]  [address] [place] [id number]       If you have any questions or concerns, please call the clinic at the number listed above.       Sincerely,      Jose Angel Meyer MD

## 2020-12-20 LAB
1OH-MIDAZOLAM UR CFM-MCNC: <20 NG/ML
7AMINOCLONAZEPAM UR CFM-MCNC: <5 NG/ML
A-OH ALPRAZ UR CFM-MCNC: <5 NG/ML
ALPRAZ UR CFM-MCNC: <5 NG/ML
CHLORDIAZEP UR CFM-MCNC: <20 NG/ML
CLONAZEPAM UR CFM-MCNC: <5 NG/ML
DIAZEPAM UR CFM-MCNC: <20 NG/ML
LORAZEPAM UR CFM-MCNC: <20 NG/ML
MIDAZOLAM UR CFM-MCNC: <20 NG/ML
NORDIAZEPAM UR CFM-MCNC: <20 NG/ML
OXAZEPAM UR CFM-MCNC: 41 NG/ML
TEMAZEPAM UR CFM-MCNC: <20 NG/ML

## 2020-12-21 NOTE — RESULT ENCOUNTER NOTE
Please mail labs to patient with this message.    Ms Henri  Your confirmatory test came back showing a breakdown product of a medication called diazepam, which you were given in at [facility] recently, probably before your MRI test.  So, I don't view it as a problem.  See you in January.  JIGNESH Meyer

## 2020-12-23 DIAGNOSIS — G89.4 CHRONIC PAIN SYNDROME: ICD-10-CM

## 2020-12-23 RX ORDER — OXYCODONE AND ACETAMINOPHEN 10; 325 MG/1; MG/1
1 TABLET ORAL 3 TIMES DAILY
Qty: 90 TABLET | Refills: 0 | OUTPATIENT
Start: 2020-12-23

## 2020-12-23 NOTE — TELEPHONE ENCOUNTER
Please call pharmacy:  I already sent a Rx on 12/17.  I did not change the dose at that visit and will not ok them filling it earlier than pharmacy policy allows.  Last filled on 11/27/2020

## 2020-12-23 NOTE — TELEPHONE ENCOUNTER
[facility] phone call message- patient requesting a refill:    Full Medication Name: oxyCODONE-    Dose:  MG     Pharmacy confirmed as   [id number] [address] AT [place] [address] [address] [place] [address]  Phone: [phone] Fax: [phone]  : Yes    Additional Comments: the pt called to ask the Dr to ok the refill for today instead of tomorrow she is out         OK to leave a message on voice mail? Yes    Primary language: English      needed? No    Call taken on December 23, 2020 at 9:34 AM by Earl Murphy

## 2020-12-23 NOTE — TELEPHONE ENCOUNTER
Per pharmacy: Pt wants to fill her medication today 4 days early based on previous fill (which -2days early). She mentioned something about taking extra for some reason? Please call or fax back to approve or deny. Thanks

## 2020-12-24 ENCOUNTER — COMMUNICATION - HEALTHEAST (OUTPATIENT)
Dept: SURGERY | Facility: CLINIC | Age: 55
End: 2020-12-24

## 2020-12-24 DIAGNOSIS — E66.01 MORBID OBESITY WITH BMI OF 45.0-49.9, ADULT (H): ICD-10-CM

## 2020-12-24 DIAGNOSIS — K91.2 POSTOPERATIVE MALABSORPTION: ICD-10-CM

## 2020-12-24 DIAGNOSIS — K90.9 INTESTINAL MALABSORPTION, UNSPECIFIED TYPE: ICD-10-CM

## 2020-12-30 ENCOUNTER — COMMUNICATION - HEALTHEAST (OUTPATIENT)
Dept: SURGERY | Facility: CLINIC | Age: 55
End: 2020-12-30

## 2020-12-31 ENCOUNTER — TRANSFERRED RECORDS (OUTPATIENT)
Dept: HEALTH INFORMATION MANAGEMENT | Facility: CLINIC | Age: 55
End: 2020-12-31

## 2020-12-31 ENCOUNTER — OFFICE VISIT - HEALTHEAST (OUTPATIENT)
Dept: SURGERY | Facility: CLINIC | Age: 55
End: 2020-12-31

## 2020-12-31 DIAGNOSIS — N18.31 STAGE 3A CHRONIC KIDNEY DISEASE (H): ICD-10-CM

## 2020-12-31 DIAGNOSIS — E11.649 TYPE 2 DIABETES MELLITUS WITH HYPOGLYCEMIA WITHOUT COMA, WITH LONG-TERM CURRENT USE OF INSULIN (H): ICD-10-CM

## 2020-12-31 DIAGNOSIS — Z79.4 TYPE 2 DIABETES MELLITUS WITH HYPOGLYCEMIA WITHOUT COMA, WITH LONG-TERM CURRENT USE OF INSULIN (H): ICD-10-CM

## 2020-12-31 DIAGNOSIS — K91.2 POSTOPERATIVE MALABSORPTION: ICD-10-CM

## 2020-12-31 ASSESSMENT — MIFFLIN-ST. JEOR: SCORE: 1367

## 2021-01-04 ENCOUNTER — AMBULATORY - HEALTHEAST (OUTPATIENT)
Dept: LAB | Facility: CLINIC | Age: 56
End: 2021-01-04

## 2021-01-04 DIAGNOSIS — E11.649 TYPE 2 DIABETES MELLITUS WITH HYPOGLYCEMIA WITHOUT COMA, WITH LONG-TERM CURRENT USE OF INSULIN (H): ICD-10-CM

## 2021-01-04 DIAGNOSIS — Z79.4 TYPE 2 DIABETES MELLITUS WITH HYPOGLYCEMIA WITHOUT COMA, WITH LONG-TERM CURRENT USE OF INSULIN (H): ICD-10-CM

## 2021-01-04 DIAGNOSIS — K91.2 POSTOPERATIVE MALABSORPTION: ICD-10-CM

## 2021-01-04 LAB
ALBUMIN SERPL-MCNC: 3.3 G/DL (ref 3.5–5)
ALP SERPL-CCNC: 192 U/L (ref 45–120)
ALT SERPL W P-5'-P-CCNC: 21 U/L (ref 0–45)
ANION GAP SERPL CALCULATED.3IONS-SCNC: 13 MMOL/L (ref 5–18)
AST SERPL W P-5'-P-CCNC: 24 U/L (ref 0–40)
BILIRUB SERPL-MCNC: 0.3 MG/DL (ref 0–1)
BUN SERPL-MCNC: 24 MG/DL (ref 8–22)
CALCIUM SERPL-MCNC: 8.4 MG/DL (ref 8.5–10.5)
CHLORIDE BLD-SCNC: 110 MMOL/L (ref 98–107)
CO2 SERPL-SCNC: 17 MMOL/L (ref 22–31)
CREAT SERPL-MCNC: 1.24 MG/DL (ref 0.6–1.1)
ERYTHROCYTE [DISTWIDTH] IN BLOOD BY AUTOMATED COUNT: 10.2 % (ref 11–14.5)
FERRITIN SERPL-MCNC: 90 NG/ML (ref 10–130)
FOLATE SERPL-MCNC: 7 NG/ML
GFR SERPL CREATININE-BSD FRML MDRD: 45 ML/MIN/1.73M2
GLUCOSE BLD-MCNC: 243 MG/DL (ref 70–125)
HCT VFR BLD AUTO: 35 % (ref 35–47)
HGB BLD-MCNC: 11.6 G/DL (ref 12–16)
MCH RBC QN AUTO: 33 PG (ref 27–34)
MCHC RBC AUTO-ENTMCNC: 33.2 G/DL (ref 32–36)
MCV RBC AUTO: 99 FL (ref 80–100)
PLATELET # BLD AUTO: 266 THOU/UL (ref 140–440)
PMV BLD AUTO: 10 FL (ref 7–10)
POTASSIUM BLD-SCNC: 4.5 MMOL/L (ref 3.5–5)
PROT SERPL-MCNC: 6.6 G/DL (ref 6–8)
PTH-INTACT SERPL-MCNC: 379 PG/ML (ref 10–86)
RBC # BLD AUTO: 3.52 MILL/UL (ref 3.8–5.4)
SODIUM SERPL-SCNC: 140 MMOL/L (ref 136–145)
VIT B12 SERPL-MCNC: 1198 PG/ML (ref 213–816)
WBC: 11.3 THOU/UL (ref 4–11)

## 2021-01-05 ENCOUNTER — OFFICE VISIT - HEALTHEAST (OUTPATIENT)
Dept: SURGERY | Facility: CLINIC | Age: 56
End: 2021-01-05

## 2021-01-05 ENCOUNTER — TRANSFERRED RECORDS (OUTPATIENT)
Dept: HEALTH INFORMATION MANAGEMENT | Facility: CLINIC | Age: 56
End: 2021-01-05

## 2021-01-05 DIAGNOSIS — E66.812 OBESITY, CLASS II, BMI 35-39.9, ISOLATED (SEE ACTUAL BMI): ICD-10-CM

## 2021-01-05 DIAGNOSIS — E11.649 TYPE 2 DIABETES MELLITUS WITH HYPOGLYCEMIA WITHOUT COMA, WITH LONG-TERM CURRENT USE OF INSULIN (H): ICD-10-CM

## 2021-01-05 DIAGNOSIS — Z79.4 TYPE 2 DIABETES MELLITUS WITH HYPOGLYCEMIA WITHOUT COMA, WITH LONG-TERM CURRENT USE OF INSULIN (H): ICD-10-CM

## 2021-01-05 DIAGNOSIS — Z71.3 NUTRITIONAL COUNSELING: ICD-10-CM

## 2021-01-05 DIAGNOSIS — N18.31 STAGE 3A CHRONIC KIDNEY DISEASE (H): ICD-10-CM

## 2021-01-05 DIAGNOSIS — Z98.84 BARIATRIC SURGERY STATUS: ICD-10-CM

## 2021-01-05 LAB — 25(OH)D3 SERPL-MCNC: 11.5 NG/ML (ref 30–80)

## 2021-01-05 ASSESSMENT — MIFFLIN-ST. JEOR: SCORE: 1367

## 2021-01-06 LAB — ZINC SERPL-MCNC: 59.8 UG/DL (ref 60–120)

## 2021-01-08 LAB
ANNOTATION COMMENT IMP: NORMAL
VIT A SERPL-MCNC: 0.63 MG/L (ref 0.3–1.2)
VIT B1 PYROPHOSHATE BLD-SCNC: 99 NMOL/L (ref 70–180)
VITAMIN A (RETINYL PALMITATE): 0.02 MG/L (ref 0–0.1)

## 2021-01-13 ENCOUNTER — COMMUNICATION - HEALTHEAST (OUTPATIENT)
Dept: SURGERY | Facility: CLINIC | Age: 56
End: 2021-01-13

## 2021-01-13 ENCOUNTER — AMBULATORY - HEALTHEAST (OUTPATIENT)
Dept: SURGERY | Facility: CLINIC | Age: 56
End: 2021-01-13

## 2021-01-13 DIAGNOSIS — K91.2 POSTOPERATIVE MALABSORPTION: ICD-10-CM

## 2021-01-13 DIAGNOSIS — E66.01 MORBID OBESITY WITH BMI OF 45.0-49.9, ADULT (H): ICD-10-CM

## 2021-01-13 DIAGNOSIS — Z98.84 BARIATRIC SURGERY STATUS: ICD-10-CM

## 2021-01-13 DIAGNOSIS — K90.9 INTESTINAL MALABSORPTION, UNSPECIFIED TYPE: ICD-10-CM

## 2021-01-13 DIAGNOSIS — E21.3 HYPERPARATHYROIDISM (H): ICD-10-CM

## 2021-01-13 DIAGNOSIS — E55.9 VITAMIN D DEFICIENCY: ICD-10-CM

## 2021-01-16 DIAGNOSIS — I10 BENIGN ESSENTIAL HYPERTENSION: ICD-10-CM

## 2021-01-18 ENCOUNTER — OFFICE VISIT (OUTPATIENT)
Dept: FAMILY MEDICINE | Facility: CLINIC | Age: 56
End: 2021-01-18
Payer: MEDICARE

## 2021-01-18 VITALS
BODY MASS INDEX: 40.64 KG/M2 | HEART RATE: 112 BPM | RESPIRATION RATE: 16 BRPM | OXYGEN SATURATION: 96 % | TEMPERATURE: 98.2 F | SYSTOLIC BLOOD PRESSURE: 112 MMHG | WEIGHT: 201.2 LBS | DIASTOLIC BLOOD PRESSURE: 73 MMHG

## 2021-01-18 DIAGNOSIS — M79.603 UPPER EXTREMITY PAIN, DIFFUSE, UNSPECIFIED LATERALITY: ICD-10-CM

## 2021-01-18 DIAGNOSIS — G89.4 CHRONIC PAIN DISORDER: Primary | ICD-10-CM

## 2021-01-18 DIAGNOSIS — G89.4 CHRONIC PAIN SYNDROME: ICD-10-CM

## 2021-01-18 PROCEDURE — 80306 DRUG TEST PRSMV INSTRMNT: CPT | Performed by: FAMILY MEDICINE

## 2021-01-18 PROCEDURE — 99214 OFFICE O/P EST MOD 30 MIN: CPT | Mod: 25 | Performed by: FAMILY MEDICINE

## 2021-01-18 PROCEDURE — 96372 THER/PROPH/DIAG INJ SC/IM: CPT | Performed by: FAMILY MEDICINE

## 2021-01-18 RX ORDER — ATORVASTATIN CALCIUM 10 MG/1
TABLET, FILM COATED ORAL
Qty: 90 TABLET | Refills: 3 | Status: SHIPPED | OUTPATIENT
Start: 2021-01-18 | End: 2022-02-14

## 2021-01-18 RX ORDER — OXYCODONE AND ACETAMINOPHEN 10; 325 MG/1; MG/1
TABLET ORAL
Qty: 100 TABLET | Refills: 0 | Status: SHIPPED | OUTPATIENT
Start: 2021-01-18 | End: 2021-02-11

## 2021-01-18 RX ADMIN — CYANOCOBALAMIN 1000 MCG: 1000 INJECTION, SOLUTION INTRAMUSCULAR; SUBCUTANEOUS at 14:48

## 2021-01-18 NOTE — NURSING NOTE
Clinic Administered Medication Documentation      Injectable Medication Documentation    Patient was given Cyanocobalamin (B-12). Prior to medication administration, verified patients identity using patient s name and date of birth. Please see MAR and medication order for additional information. Patient instructed to remain in clinic for 15 minutes.      Was entire vial of medication used? Yes  Vial/Syringe: Single dose vial  Expiration Date:  08/30/2022   Was this medication supplied by the patient? No    Name of provider who requested the medication administration: Dr. Meyer  Name of provider on site (faculty or community preceptor) at the time of performing the medication administration: Dr. Meyer    Date of next administration: n/a  Date of next office visit with provider to renew medication plan (must be seen annually): n/a

## 2021-01-18 NOTE — PROGRESS NOTES
Patient Active Problem List    Diagnosis Date Noted     Chronic pain disorder 10/07/2016     Priority: High     Chronic Pain Diagnosis:  neuropathy  DIRE Total Score(s):14     ORT: 5    4 - 7 =  Moderate Risk   of future problems with Opioids  FAQ5: 50 November 20, 2020  Behavioral Health Consultation: 5/12/17 with Dr. Daisy Quezada  Personal Care Plan for Chronic Pain: 5/12/17  Opioid medication:oxycodone   Dose:10mg  Number of pills per month:60  Patient is being prescribed 10mg of oxycodone IR (Percocet) per day this is 15 mg Morphine Equivalents  Benzodiazepines Prescribed? No  Naloxone prescribed? No       Clinic visit frequency required: Q 1 month Next visit due:   Controlled Substance/Opioid Treatment agreement on file (dated <12 months ago)?:    Date(s): pending  Last [place] verification: last visit  Items in red to be updated at each visit  Patient is followed by Data Unavailable for ongoing prescription of pain medication.  All refills should be approved by this provider, or covering partner.Chronic Pain     Chronic Pain Diagnosis:  neuropathy  DIRE Total Score(s):14     ORT: 5    4 - 7 =  Moderate Risk   of future problems with Opioids  FAQ5: 45/100 on 9/9/16, 40/100 on 11/15/16  55 October 29, 2020    Behavioral Health Consultation: 5/12/17 with Dr. Daisy Quezada  Personal Care Plan for Chronic Pain: 5/12/17  Opioid medication:oxycodone   Dose:10mg  Number of pills per month:90  Patient is being prescribed 45 MME  Benzodiazepines Prescribed? No  Naloxone prescribed? No       Clinic visit frequency required: Q 1 month Next visit due:   Controlled Substance/Opioid Treatment agreement on file (dated <12 months ago)?:    Date(s): pending  Last [place] verification: last visit  Items in red to be updated at each visit  Patient is followed by Data Unavailable for ongoing prescription of pain medication.  All refills should be approved by this provider, or covering partner.Chronic Pain       Type 2 diabetes  mellitus with diabetic polyneuropathy (H) 10/12/2015     Priority: High     Morbid obesity (H) 11/27/2020     Priority: Medium     Hyperglycemia due to type 2 diabetes mellitus (H) 06/02/2020     Priority: Medium     Neck mass 10/11/2019     Priority: Medium     Added automatically from request for surgery [id number]       Hyperglycemia 09/08/2019     Priority: Medium     Metabolic acidosis 07/26/2019     Priority: Medium     reolved w/ discontinuation of ASA       Hx of gastric bypass 04/22/2019     Priority: Medium     Copper deficiency 04/05/2019     Priority: Medium     Identified in Aug, 2018--see discharge summary from [facility] in [place].       Syncope, near 01/04/2019     Priority: Medium     Chest pain 01/03/2019     Priority: Medium     Generalized abdominal pain 01/03/2019     Priority: Medium     Gastroenteritis 01/03/2019     Priority: Medium     Neuropathy 01/03/2019     Priority: Medium     Obesity 01/03/2019     Priority: Medium     Other acute pulmonary embolism without acute cor pulmonale (H) 12/31/2018     Priority: Medium     Hernia of anterior abdominal wall 01/28/2017     Priority: Medium     Diarrhea 01/27/2017     Priority: Medium     Hernia 01/27/2017     Priority: Medium     Osteoarthritis of both knees 10/31/2016     Priority: Medium     Polyneuropathy due to secondary diabetes mellitus (H) 10/31/2016     Priority: Medium     Compression neuropathy of left lower extremity 02/19/2016     Priority: Medium     Interdigital neuroma of left foot 02/19/2016     Priority: Medium     Bilateral ankle joint pain 01/15/2016     Priority: Medium     Microalbuminuria 10/12/2015     Priority: Medium     Esophageal reflux 04/07/2015     Priority: Medium     Urinary incontinence 02/19/2015     Priority: Medium     Insomnia 02/19/2015     Priority: Medium     Essential hypertension 08/26/2013     Priority: Medium     Asthma 08/04/2013     Priority: Medium     History of injury 08/04/2013     Priority: Medium      History of cholecystectomy 08/04/2013     Priority: Medium     Hyperlipidemia 06/21/2013     Priority: Medium     Status post bariatric surgery 06/21/2013     Priority: Medium     Kim-en-Y in 2006       History of hysterectomy for benign disease 06/21/2013     Priority: Medium     2008, For fibroids.  Cervix removed, but still has ovaries.       Neck pain 05/16/2013     Priority: Medium     Moderate major depression (H) 01/24/2013     Priority: Medium     Moderate persistent asthma without complication 11/15/2012     Priority: Medium     Pulmonary embolism with infarction (H) 11/15/2012     Priority: Medium     12/24/2018  Unprovoked.  + fam hx of DVT.  Treated w/ DOAC 12/2018through 9/2019 12/24/2018  Unprovoked.  + fam hx of DVT.  Plan warfarin for 6 months.       Persons encountering health services in other specified circumstances 11/15/2012     Priority: Medium     Tier 3    Status: Accept  Care Coordination Start Date: 02/27/12  State Tier Level: Level 3  Language/Barrier to Learning: No  Significant Mental Health Issues: Yes  Date Care Coordination Discontinued: 05/28/15    DX V65.8 REPLACED WITH [id number] [facility] C[facility] (04/08/2013)       Open wound of groin with complication 05/12/2011     Priority: Medium     Chronic pain of both shoulders 12/23/2005     Priority: Medium     Abnormal Pap smear of cervix 04/22/2019     Priority: Low     3-12-15: Pap/HPV neg.  Plan: Given hx (mildly abn pap, hyster), recheck in 5 yrs.  10-11-11: Pap/HPV neg.  Plan: Given ASUS pap in 2010, recommend repeating pap in 1 year.  8/3/2010 ASCUS Pap with +BV- pt treated with Metrogel.  Pt needs repeat Pap in 1yr.  8/4/2008 Pt had total hyst for menorrhagia, pelvic pain, scar tissue, and fibroids.    3-12-15: Pap/HPV neg.  Plan: Given hx (mildly abn pap, hyster), recheck in 5 yrs.  10-11-11: Pap/HPV neg.  Plan: Given ASUS pap in 2010, recommend repeating pap in 1 year.  8/3/2010 ASCUS Pap with +BV- pt treated with Metrogel.   Pt needs repeat Pap in 1yr.  8/4/2008 Pt had total hyst for menorrhagia, pelvic pain, scar tissue, and fibroids.       S/P hysterectomy 06/21/2013     Priority: Low     2008, For fibroids.  Cervix removed, but still has ovaries.       Carpal tunnel syndrome 11/15/2012     Priority: Low     Necrotizing fasciitis (H) 11/15/2012     Priority: Low     Health Care Home 11/15/2012     Priority: Low     Tier 3    Status: Accept  Care Coordination Start Date: 02/27/12  State Tier Level: Level 3  Language/Barrier to Learning: No  Significant Mental Health Issues: Yes  Date Care Coordination Discontinued: 05/28/15    DX V65.8 REPLACED WITH [id number] (04/08/2013)         There are no exam notes on file for this visit.  Chief Complaint   Patient presents with     Pain Management     CPM     other     legs feel numb and hard to sit up and feels heavy / burning on arms     Blood pressure 112/73, pulse 112, temperature 98.2  F (36.8  C), temperature source Oral, resp. rate 16, weight 91.3 kg (201 lb 3.2 oz), SpO2 96 %, not currently breastfeeding.  Results for orders placed or performed in visit on 01/18/21   Rapid Urine Drug Screen ([facility])     Status: Abnormal   Result Value Ref Range    Cannabinoids Qual Urine Negative NEGATIVE    Phencyclidine Negative NEGATIVE    Cocaine Qual Urine Negative NEGATIVE    Methamphetamine Qual Negative NEGATIVE    Morphine Qual Negative NEGATIVE    Amphetamines Qual Positive (A) NEGATIVE    Benzodiazepine Qual Urine Negative NEGATIVE    Tricyclic Antidepressants Positive (A) NEGATIVE    Methadone Qual Negative NEGATIVE    Barbiturates Qual Urine Negative NEGATIVE    Oxycodone Qual Positive (A) NEGATIVE    Propoxyphene Negative NEGATIVE    Buprenorphine Qual Urine Negative NEGATIVE    Temperature of Urine was Between  Degrees F YES YES   needs refills of meds  Feels they are working.  Sometimes takes a little more.    Legs feel heavier--harder to get up.  Feels like hands give  out.  Pain and numbness in both arms.  Whole hand is involved.  Hands are numb and tingling, too.  Having some neck pain, but not as much as in the past, before lipoma removed.    SUBJECTIVE:  Ruthy Álvarez is here for her chronic pain syndrome first and secondly, she has upper extremity bilateral numbness and weakness.      For her chronic pain, she feels like her symptoms are relatively stable, although her legs feel a little bit heavier and she feels a bit more tired.  She would like to get back into physical therapy if at all possible.  Regarding her on her arms, she has bilateral hand weakness and discomfort.  She is dropping things more often.  It involves her whole hand, does not seem to radiate from her neck.  She feels like her hands give out.  Please see history as noted above.  She has a history of right carpal tunnel syndrome.  She is not currently using any braces.  The duration of her symptoms has been 1 month.  She would like to get into physical therapy to help her generalized strength.      OBJECTIVE:   VITAL SIGNS:  Blood pressure and pulse as listed.  Patient appears a bit more down today, but in no acute distress.   MUSCULOSKELETAL:  Neck range of motion is full and negative Spurling test.  Strength in the upper extremity is normal.  Sensation is normal.  Ulnar nerve, median nerve tests are normal as well.  She has a scar over the right wrist.  She can pinch a piece of paper and hold onto it tightly.      LABS:  Database was reviewed and is appropriate.  Urine drug screen is appropriate as well.      ASSESSMENT/PLAN:   1.  Chronic pain syndrome, stable.  She would like to engage in PT, which is completely appropriate and I support that.  I referred her to pool therapy in [place].   2.  Regarding her hand numbness, her symptoms have been going on for about a month.  It suggests carpal tunnel, but her exam does not support this as Phalen's and Tinel's are negative.  I will refer her to  Neurology for consultation and possible EMG.  We will have her try some wrist braces in the interim and see if that works.  Follow up in 1 month.

## 2021-01-19 ENCOUNTER — TELEPHONE (OUTPATIENT)
Dept: FAMILY MEDICINE | Facility: CLINIC | Age: 56
End: 2021-01-19

## 2021-01-19 NOTE — TELEPHONE ENCOUNTER
Prior Authorization Retail Medication Request    Medication/Dose: dulaglutide (TRULICITY) 1.5 MG/0.5ML pen  ICD code (if different than what is on RX):  Type 2 diabetes mellitus with diabetic polyneuropathy, with long-term current use of insulin (H) [E11.42, Z79.4]   Previously Tried and Failed:    Rationale:      Insurance Name:  [employer] [facility] [*  Insurance ID:  [id number]       Pharmacy Information (if different than what is on RX)  Name:  [facility] #[id number] [place], [place] - [address] AT [facility]  Phone:  [phone]

## 2021-01-19 NOTE — PATIENT INSTRUCTIONS
01/19/21   PT/OT REFERRAL    [facility] and [address], [address]  [place] [address]  Phone: [phone]  Fax: [phone]    Referral and demographics faxed to [phone] who will contact patient to schedule.       NEUROLOGY ADULT REFERRAL   [facility] [place]  Phone: [phone]  Fax: [phone]    Referral, demographics, office note and medication list faxed to [phone].     Anaid Jerome

## 2021-01-21 ENCOUNTER — TELEPHONE (OUTPATIENT)
Dept: FAMILY MEDICINE | Facility: CLINIC | Age: 56
End: 2021-01-21

## 2021-01-21 NOTE — TELEPHONE ENCOUNTER
Patient had an outstanding balance that would need to be paid in full before they can schedule.     Will route back to provider and look in to new options for this patient.     Anaid Jerome

## 2021-01-21 NOTE — TELEPHONE ENCOUNTER
Prior Authorization Not Needed per Insurance    Medication: dulaglutide (TRULICITY) 1.5 MG/0.5ML pen  Insurance Company: [facility] ([facility] - Phone [phone] Fax [phone]  Expected CoPay:      Pharmacy Filling the Rx: [facility] #[id number] [facility] [address] AT [facility] OF [facility]  Pharmacy Notified: Yes  Patient Notified: Yes    PA not needed through [employer]. Pharmacy received paid claim.

## 2021-01-21 NOTE — TELEPHONE ENCOUNTER
PA Initiation    Medication: dulaglutide (TRULICITY) 1.5 MG/0.5ML pen  Insurance Company: [employer] - Phone [phone] Fax [phone]  Pharmacy Filling the Rx: [facility] #[id number] [facility] [address] AT [facility] OF [facility]  Filling Pharmacy Phone: [phone]  Filling Pharmacy Fax: [phone]  Start Date: 1/21/2021    Ruthy Álvarez ([place]: [id number])

## 2021-02-03 ENCOUNTER — VIRTUAL VISIT (OUTPATIENT)
Dept: NEUROLOGY | Facility: CLINIC | Age: 56
End: 2021-02-03
Attending: FAMILY MEDICINE
Payer: MEDICARE

## 2021-02-03 VITALS — BODY MASS INDEX: 38.51 KG/M2 | HEIGHT: 59 IN | WEIGHT: 191 LBS

## 2021-02-03 DIAGNOSIS — G89.4 CHRONIC PAIN DISORDER: ICD-10-CM

## 2021-02-03 DIAGNOSIS — M62.81 MUSCLE WEAKNESS (GENERALIZED): ICD-10-CM

## 2021-02-03 DIAGNOSIS — G62.9 NEUROPATHY: Primary | ICD-10-CM

## 2021-02-03 PROBLEM — N18.30 CHRONIC KIDNEY DISEASE, STAGE 3 (H): Status: ACTIVE | Noted: 2020-12-31

## 2021-02-03 PROCEDURE — 99204 OFFICE O/P NEW MOD 45 MIN: CPT | Mod: GT | Performed by: PSYCHIATRY & NEUROLOGY

## 2021-02-03 ASSESSMENT — MIFFLIN-ST. JEOR: SCORE: 1367

## 2021-02-03 NOTE — LETTER
2/3/2021         RE: Ruthy Álvarez  [address]  [place] [address]        Dear Colleague,    Thank you for referring your patient, Ruthy Álvarez, to the [facility] [place]. Please see a copy of my visit note below.    [facility]  [place]    Ruthy Álvarez MRN# [id number]   Age: 55 year old YOB: 1965               Assessment and Plan:   Assessment:   Diffuse pain -- her pain certainly does not sound neuropathic by history.  With her significant weight loss I do worry about nutritional deficiencies causing neuropathy.          Plan:   Orders Placed This Encounter   Procedures     Vitamin B6 ([facility])     Vitamin E     CK total     C-Reactive Protein  Quant ([facility])     ESR: Erythrocyte sedimentation rate     EMG     She has had some recent lab work done already.  I ordered further labs looking for nutritional issues or inflammation.  We will have her come back for EMG testing, and hopefully we will have the lab results at that time as well.             Chief Complaint/HPI:     I saw this patient for neurologic evaluation today.  This is a 55-year-old woman with complaints of pain in all 4 extremities.  In 2016 she had a gastric bypass procedure done.  She did lose weight, but unfortunately gained it all back.  This past year she started taking diet pills and has lost 60 pounds in the last 7 to 8 months.  With that she is noticing a lot of loose skin.  She says that it is hard to walk because it is so painful.  It hurts with every step.  She has had this aching burning pain all over for about 5 months now.  It is worse when she is at rest and tries to get back up.  The pain will go down the arms and into the back.  She has a hard time lifting things, heavy things might slip out of her hand.  I asked about numbness, she mentioned tingling symptoms in the elbows and legs and feet.  Regarding balance, she says that she has  always had a balance problem, she is not sure that that has gotten worse in these recent months.  It is harder for her to go up stairs, her legs will feel like they might give out.  Going down stairs is easier.            Past Medical History:    has a past medical history of Arthritis, Depressive disorder, Diabetes (H), Gastroenteritis, History of blood transfusion, Hypertension, Necrotizing fasciitis (H) (11/15/2012), Osteoarthritis of both knees (10/31/2016), and Uncomplicated asthma.          Past Surgical History:    has a past surgical history that includes gastric bypass (2006) and Hysterectomy.          Social History:     Social History     Tobacco Use     Smoking status: Never Smoker     Smokeless tobacco: Never Used   Substance Use Topics     Alcohol use: No             Family History:     Family History   Problem Relation Age of Onset     Diabetes Mother      Diabetes Brother      Diabetes Sister      No Known Problems Father      No Known Problems Maternal Grandmother      No Known Problems Maternal Grandfather      No Known Problems Paternal Grandmother      No Known Problems Paternal Grandfather      No Known Problems Son      No Known Problems Daughter      No Known Problems Maternal Half-Brother      No Known Problems Maternal Half-Sister      No Known Problems Paternal Half-Brother      No Known Problems Paternal Half-Sister      No Known Problems Niece      No Known Problems Nephew      No Known Problems Cousin      No Known Problems Other      Coronary Artery Disease No family hx of      Cancer No family hx of      Heart Disease No family hx of      Hypertension No family hx of      Hyperlipidemia No family hx of      Kidney Disease No family hx of      Cerebrovascular Disease No family hx of      Obesity No family hx of      Thrombosis No family hx of      Asthma No family hx of      Arthritis No family hx of      Thyroid Disease No family hx of      Depression No family hx of      Mental  Illness No family hx of      Substance Abuse No family hx of      Cystic Fibrosis No family hx of      Early Death No family hx of      Coronary Artery Disease Early Onset No family hx of      Heart Failure No family hx of      Bleeding Diathesis No family hx of      Dementia No family hx of      Breast Cancer No family hx of      Ovarian Cancer No family hx of      Uterine Cancer No family hx of      Prostate Cancer No family hx of      Colorectal Cancer No family hx of      Pancreatic Cancer No family hx of      Lung Cancer No family hx of      Melanoma No family hx of      Autoimmune Disease No family hx of      Unknown/Adopted No family hx of      Genetic Disorder No family hx of                 Allergies:     Allergies   Allergen Reactions     Sulfamethoxazole-Trimethoprim Other (See Comments) and Nephrotoxicity     Other reaction(s): Renal Failure  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, 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, 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, 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, 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  Other reaction(s): Renal Failure  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, 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, 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             Medications:     Current Outpatient Medications:      albuterol (2.5 MG/3ML) 0.083% nebulizer solution, Take 1 vial (2.5 mg) by nebulization every 6 hours as needed for shortness of breath / dyspnea, Disp: 1 Box, Rfl: prn     albuterol (VENTOLIN HFA) 108 (90 Base) MCG/ACT inhaler, Inhale 2 puffs into the lungs 4 times daily Inhale 2 puffs every 4-6 hours as needed, Disp: 2 Inhaler, Rfl: 1     Alcohol Swabs (B-D SINGLE USE SWABS REGULAR) PADS, Apply 1 pad topically 3 times daily Use as directed, Disp: 100 each, Rfl: 12     amitriptyline (ELAVIL) 10 MG tablet, TAKE 1 TABLET(10 MG) BY MOUTH AT BEDTIME, Disp: 30 tablet, Rfl: 11     aspirin (ASA) 325 MG EC tablet, Take 1 tablet (325 mg) by mouth  daily, Disp: 90 tablet, Rfl: 3     atorvastatin (LIPITOR) 10 MG tablet, TAKE 1 TABLET BY MOUTH EVERY DAY, Disp: 90 tablet, Rfl: 3     blood glucose (NO BRAND SPECIFIED) lancets standard, Check fasting glucose 4 times daily.  For Accu check monitor, Disp: 360 each, Rfl: 11     cyanocobalamin (CYANOCOBALAMIN) 1000 MCG/ML injection, Inject 1 mL (1,000 mcg) into the muscle every 30 days, Disp: 1 mL, Rfl: 11     diclofenac (VOLTAREN) 1 % topical gel, , Disp: , Rfl:      dulaglutide (TRULICITY) 1.5 MG/0.5ML pen, Inject 1.5 mg Subcutaneous every 7 days, Disp: 2 mL, Rfl: 11     DULoxetine (CYMBALTA) 60 MG capsule, Take 1 capsule (60 mg) by mouth daily, Disp: 30 capsule, Rfl: 11     famotidine (PEPCID) 20 MG tablet, Take 1 tablet (20 mg) by mouth 2 times daily, Disp: 60 tablet, Rfl: 11     ferrous sulfate (IRON) 325 (65 FE) MG tablet, Take 1 tablet (325 mg) by mouth daily (with breakfast), Disp: 30 tablet, Rfl: 2     gabapentin (NEURONTIN) 250 MG/5ML solution, Take 15 mLs (750 mg) by mouth 3 times daily, Disp: 1350 mL, Rfl: 2     insulin glargine (BASAGLAR KWIKPEN) 100 UNIT/ML pen, Inject 32 Units Subcutaneous daily, Disp: 0.1 mL, Rfl: 0     insulin pen needle (BD ULTRA-FINE) 29G X 12.7MM miscellaneous, Use 4 times daily, Disp: 100 each, Rfl: prn     Lancets (ONETOUCH DELICA PLUS [id number]) MISC, CHECK FASTING GLUCOSE THREE TIMES DAILY, Disp: 100 each, Rfl: 11     lidocaine (LMX4) 4 % external cream, Apply topically once as needed for mild pain (on upper back), Disp: 45 g, Rfl: 2     lisinopril (ZESTRIL) 10 MG tablet, TAKE 1 TABLET(10 MG) BY MOUTH AT BEDTIME, Disp: 90 tablet, Rfl: 3     magnesium oxide (MAG-OX) 400 (241.3 MG) MG tablet, Take 1 tablet (400 mg) by mouth daily, Disp: 30 tablet, Rfl: 11     naloxone (NARCAN) 4 MG/0.1ML nasal spray, Spray 1 spray (4 mg) into one nostril alternating nostrils once as needed for opioid reversal Every 2-3 minutes until patient responsive or EMS arrives, Disp: 0.2 mL, Rfl: 0     order  for DME, Equipment being ordered: wheeled walker with seat, Disp: 1 Device, Rfl: 0     oxyCODONE-acetaminophen (PERCOCET)  MG per tablet, 1 po TID, and may take 1/2 to 1 tab q6 hours prn breakthrough pain., Disp: 100 tablet, Rfl: 0     phentermine (ADIPEX-P) 37.5 MG tablet, Take 0.5-1 tablets by mouth every morning, Disp: , Rfl:      risperiDONE (RISPERDAL) 0.25 MG tablet, Take 1 tablet (0.25 mg) by mouth At Bedtime, Disp: , Rfl:      senna-docusate (SENOKOT-S/PERICOLACE) 8.6-50 MG tablet, Take 1 tablet by mouth 2 times daily, Disp: 60 tablet, Rfl: 11     Simethicone 180 MG CAPS, Use 1 softgel as needed for gas, Disp: 60 capsule, Rfl: 11     SUMAtriptan (IMITREX) 25 MG tablet, One tab at onset of HA.  May repeat dose in 2 hours.  Do not exceed 200 mg in 24 hours, Disp: 24 tablet, Rfl: 1     tolterodine ER (DETROL LA) 2 MG 24 hr capsule, Take 1 capsule (2 mg) by mouth daily, Disp: 90 capsule, Rfl: 3     vitamin D2 (ERGOCALCIFEROL) 71480 units (1250 mcg) capsule, Take 1 capsule by mouth three times a week, Disp: , Rfl: 0    Current Facility-Administered Medications:      cyanocobalamin injection 1,000 mcg, 1,000 mcg, Intramuscular, Q30 Days, Jose Angel Meyer MD, 1,000 mcg at 01/18/21 1448     cyanocobalamin injection 1,000 mcg, 1,000 mcg, Intramuscular, Q30 Days, Jose Angel Meyer MD, 1,000 mcg at 07/08/19 1207     cyanocobalamin injection 1,000 mcg, 1,000 mcg, Intramuscular, Q30 Days, Jose Angel Meyer MD     vitamin B-12 (CYANOCOBALAMIN) injection 1,000 mcg, 1,000 mcg, Intramuscular, Q30 Days, Jose Angel Meyer MD, 1,000 mcg at 05/02/19 1434              Physical Exam:   Awake and alert with no aphasia no dysarthria  Speech is clear and coherent  Cranial nerves are fine  I do not see any focal or lateralized weakness or coordination difficulties in the arms         Video-Visit Details    Type of service:  Video Visit    Video Start Time: 10:11 AM  Video End Time: 10:23 AM    Originating  Location (pt. Location): Home  Distant Location (provider location):  [facility] NEUROLOGY [place]   Platform used for Video Visit: Owen Roland MD            Again, thank you for allowing me to participate in the care of your patient.        Sincerely,        Nabil Roland MD

## 2021-02-04 ENCOUNTER — AMBULATORY - HEALTHEAST (OUTPATIENT)
Dept: LAB | Facility: HOSPITAL | Age: 56
End: 2021-02-04

## 2021-02-04 DIAGNOSIS — M62.81 MUSCLE WEAKNESS (GENERALIZED): ICD-10-CM

## 2021-02-04 DIAGNOSIS — G62.9 PERIPHERAL NERVE DISORDER: ICD-10-CM

## 2021-02-04 DIAGNOSIS — G89.4 CHRONIC PAIN SYNDROME: ICD-10-CM

## 2021-02-04 LAB
C REACTIVE PROTEIN LHE: 0.3 MG/DL (ref 0–0.8)
CK SERPL-CCNC: 54 U/L (ref 30–190)
ERYTHROCYTE [SEDIMENTATION RATE] IN BLOOD BY WESTERGREN METHOD: 48 MM/HR (ref 0–20)

## 2021-02-07 LAB
A-TOCOPHEROL VIT E SERPL-MCNC: 6.8 MG/L (ref 5.5–18)
BETA+GAMMA TOCOPHEROL SERPL-MCNC: 1.3 MG/L (ref 0–6)

## 2021-02-08 LAB — PYRIDOXAL PHOS SERPL-SCNC: 74.1 NMOL/L (ref 20–125)

## 2021-02-08 NOTE — PROGRESS NOTES
[facility]  [place]    Ruthy Álvarez MRN# [id number]   Age: 55 year old YOB: 1965               Assessment and Plan:   Assessment:   Diffuse pain -- her pain does sound neuropathic by history.  With her significant weight loss I do worry about nutritional deficiencies causing neuropathy.          Plan:   Orders Placed This Encounter   Procedures     Vitamin B6 ([facility])     Vitamin E     CK total     C-Reactive Protein  Quant ([facility])     ESR: Erythrocyte sedimentation rate     EMG     She has had some recent lab work done already.  I ordered further labs looking for nutritional issues or inflammation.  We will have her come back for EMG testing, and hopefully we will have the lab results at that time as well.             Chief Complaint/HPI:     I saw this patient for neurologic evaluation today.  This is a 55-year-old woman with complaints of pain in all 4 extremities.  In 2016 she had a gastric bypass procedure done.  She did lose weight, but unfortunately gained it all back.  This past year she started taking diet pills and has lost 60 pounds in the last 7 to 8 months.  With that she is noticing a lot of loose skin.  She says that it is hard to walk because it is so painful.  It hurts with every step.  She has had this aching burning pain all over for about 5 months now.  It is worse when she is at rest and tries to get back up.  The pain will go down the arms and into the back.  She has a hard time lifting things, heavy things might slip out of her hand.  I asked about numbness, she mentioned tingling symptoms in the elbows and legs and feet.  Regarding balance, she says that she has always had a balance problem, she is not sure that that has gotten worse in these recent months.  It is harder for her to go up stairs, her legs will feel like they might give out.  Going down stairs is easier.            Past Medical History:    has a past medical history of Arthritis,  Depressive disorder, Diabetes (H), Gastroenteritis, History of blood transfusion, Hypertension, Necrotizing fasciitis (H) (11/15/2012), Osteoarthritis of both knees (10/31/2016), and Uncomplicated asthma.          Past Surgical History:    has a past surgical history that includes gastric bypass (2006) and Hysterectomy.          Social History:     Social History     Tobacco Use     Smoking status: Never Smoker     Smokeless tobacco: Never Used   Substance Use Topics     Alcohol use: No             Family History:     Family History   Problem Relation Age of Onset     Diabetes Mother      Diabetes Brother      Diabetes Sister      No Known Problems Father      No Known Problems Maternal Grandmother      No Known Problems Maternal Grandfather      No Known Problems Paternal Grandmother      No Known Problems Paternal Grandfather      No Known Problems Son      No Known Problems Daughter      No Known Problems Maternal Half-Brother      No Known Problems Maternal Half-Sister      No Known Problems Paternal Half-Brother      No Known Problems Paternal Half-Sister      No Known Problems Niece      No Known Problems Nephew      No Known Problems Cousin      No Known Problems Other      Coronary Artery Disease No family hx of      Cancer No family hx of      Heart Disease No family hx of      Hypertension No family hx of      Hyperlipidemia No family hx of      Kidney Disease No family hx of      Cerebrovascular Disease No family hx of      Obesity No family hx of      Thrombosis No family hx of      Asthma No family hx of      Arthritis No family hx of      Thyroid Disease No family hx of      Depression No family hx of      Mental Illness No family hx of      Substance Abuse No family hx of      Cystic Fibrosis No family hx of      Early Death No family hx of      Coronary Artery Disease Early Onset No family hx of      Heart Failure No family hx of      Bleeding Diathesis No family hx of      Dementia No family hx of       Breast Cancer No family hx of      Ovarian Cancer No family hx of      Uterine Cancer No family hx of      Prostate Cancer No family hx of      Colorectal Cancer No family hx of      Pancreatic Cancer No family hx of      Lung Cancer No family hx of      Melanoma No family hx of      Autoimmune Disease No family hx of      Unknown/Adopted No family hx of      Genetic Disorder No family hx of                 Allergies:     Allergies   Allergen Reactions     Sulfamethoxazole-Trimethoprim Other (See Comments) and Nephrotoxicity     Other reaction(s): Renal Failure  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, 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, 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, 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, 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  Other reaction(s): Renal Failure  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, 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, 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             Medications:     Current Outpatient Medications:      albuterol (2.5 MG/3ML) 0.083% nebulizer solution, Take 1 vial (2.5 mg) by nebulization every 6 hours as needed for shortness of breath / dyspnea, Disp: 1 Box, Rfl: prn     albuterol (VENTOLIN HFA) 108 (90 Base) MCG/ACT inhaler, Inhale 2 puffs into the lungs 4 times daily Inhale 2 puffs every 4-6 hours as needed, Disp: 2 Inhaler, Rfl: 1     Alcohol Swabs (B-D SINGLE USE SWABS REGULAR) PADS, Apply 1 pad topically 3 times daily Use as directed, Disp: 100 each, Rfl: 12     amitriptyline (ELAVIL) 10 MG tablet, TAKE 1 TABLET(10 MG) BY MOUTH AT BEDTIME, Disp: 30 tablet, Rfl: 11     aspirin (ASA) 325 MG EC tablet, Take 1 tablet (325 mg) by mouth daily, Disp: 90 tablet, Rfl: 3     atorvastatin (LIPITOR) 10 MG tablet, TAKE 1 TABLET BY MOUTH EVERY DAY, Disp: 90 tablet, Rfl: 3     blood glucose (NO BRAND SPECIFIED) lancets standard, Check fasting glucose 4 times daily.  For Accu check monitor, Disp: 360 each, Rfl: 11     cyanocobalamin  (CYANOCOBALAMIN) 1000 MCG/ML injection, Inject 1 mL (1,000 mcg) into the muscle every 30 days, Disp: 1 mL, Rfl: 11     diclofenac (VOLTAREN) 1 % topical gel, , Disp: , Rfl:      dulaglutide (TRULICITY) 1.5 MG/0.5ML pen, Inject 1.5 mg Subcutaneous every 7 days, Disp: 2 mL, Rfl: 11     DULoxetine (CYMBALTA) 60 MG capsule, Take 1 capsule (60 mg) by mouth daily, Disp: 30 capsule, Rfl: 11     famotidine (PEPCID) 20 MG tablet, Take 1 tablet (20 mg) by mouth 2 times daily, Disp: 60 tablet, Rfl: 11     ferrous sulfate (IRON) 325 (65 FE) MG tablet, Take 1 tablet (325 mg) by mouth daily (with breakfast), Disp: 30 tablet, Rfl: 2     gabapentin (NEURONTIN) 250 MG/5ML solution, Take 15 mLs (750 mg) by mouth 3 times daily, Disp: 1350 mL, Rfl: 2     insulin glargine (BASAGLAR KWIKPEN) 100 UNIT/ML pen, Inject 32 Units Subcutaneous daily, Disp: 0.1 mL, Rfl: 0     insulin pen needle (BD ULTRA-FINE) 29G X 12.7MM miscellaneous, Use 4 times daily, Disp: 100 each, Rfl: prn     Lancets (ONETOUCH DELICA PLUS [id number]) MISC, CHECK FASTING GLUCOSE THREE TIMES DAILY, Disp: 100 each, Rfl: 11     lidocaine (LMX4) 4 % external cream, Apply topically once as needed for mild pain (on upper back), Disp: 45 g, Rfl: 2     lisinopril (ZESTRIL) 10 MG tablet, TAKE 1 TABLET(10 MG) BY MOUTH AT BEDTIME, Disp: 90 tablet, Rfl: 3     magnesium oxide (MAG-OX) 400 (241.3 MG) MG tablet, Take 1 tablet (400 mg) by mouth daily, Disp: 30 tablet, Rfl: 11     naloxone (NARCAN) 4 MG/0.1ML nasal spray, Spray 1 spray (4 mg) into one nostril alternating nostrils once as needed for opioid reversal Every 2-3 minutes until patient responsive or EMS arrives, Disp: 0.2 mL, Rfl: 0     order for DME, Equipment being ordered: wheeled walker with seat, Disp: 1 Device, Rfl: 0     oxyCODONE-acetaminophen (PERCOCET)  MG per tablet, 1 po TID, and may take 1/2 to 1 tab q6 hours prn breakthrough pain., Disp: 100 tablet, Rfl: 0     phentermine (ADIPEX-P) 37.5 MG tablet, Take 0.5-1  tablets by mouth every morning, Disp: , Rfl:      risperiDONE (RISPERDAL) 0.25 MG tablet, Take 1 tablet (0.25 mg) by mouth At Bedtime, Disp: , Rfl:      senna-docusate (SENOKOT-S/PERICOLACE) 8.6-50 MG tablet, Take 1 tablet by mouth 2 times daily, Disp: 60 tablet, Rfl: 11     Simethicone 180 MG CAPS, Use 1 softgel as needed for gas, Disp: 60 capsule, Rfl: 11     SUMAtriptan (IMITREX) 25 MG tablet, One tab at onset of HA.  May repeat dose in 2 hours.  Do not exceed 200 mg in 24 hours, Disp: 24 tablet, Rfl: 1     tolterodine ER (DETROL LA) 2 MG 24 hr capsule, Take 1 capsule (2 mg) by mouth daily, Disp: 90 capsule, Rfl: 3     vitamin D2 (ERGOCALCIFEROL) 64635 units (1250 mcg) capsule, Take 1 capsule by mouth three times a week, Disp: , Rfl: 0    Current Facility-Administered Medications:      cyanocobalamin injection 1,000 mcg, 1,000 mcg, Intramuscular, Q30 Days, Jose Angel Meyer MD, 1,000 mcg at 01/18/21 1448     cyanocobalamin injection 1,000 mcg, 1,000 mcg, Intramuscular, Q30 Days, Jose Angel Meyer MD, 1,000 mcg at 07/08/19 1207     cyanocobalamin injection 1,000 mcg, 1,000 mcg, Intramuscular, Q30 Days, Jose Angel Meyer MD     vitamin B-12 (CYANOCOBALAMIN) injection 1,000 mcg, 1,000 mcg, Intramuscular, Q30 Days, Jose Angel Meyer MD, 1,000 mcg at 05/02/19 1434              Physical Exam:   Awake and alert with no aphasia no dysarthria  Speech is clear and coherent  Cranial nerves are fine  I do not see any focal or lateralized weakness or coordination difficulties in the arms         Video-Visit Details    Type of service:  Video Visit    Video Start Time: 10:11 AM  Video End Time: 10:23 AM    Originating Location (pt. Location): Home  Distant Location (provider location):  [facility]   Platform used for Video Visit: Brayan Roland MD

## 2021-02-09 DIAGNOSIS — E11.42 TYPE 2 DIABETES MELLITUS WITH DIABETIC POLYNEUROPATHY, WITH LONG-TERM CURRENT USE OF INSULIN (H): ICD-10-CM

## 2021-02-09 DIAGNOSIS — Z79.4 TYPE 2 DIABETES MELLITUS WITH DIABETIC POLYNEUROPATHY, WITH LONG-TERM CURRENT USE OF INSULIN (H): ICD-10-CM

## 2021-02-10 RX ORDER — INSULIN GLARGINE 100 [IU]/ML
INJECTION, SOLUTION SUBCUTANEOUS
Qty: 6 ML | Refills: 11 | Status: SHIPPED | OUTPATIENT
Start: 2021-02-10 | End: 2021-02-11

## 2021-02-11 ENCOUNTER — OFFICE VISIT (OUTPATIENT)
Dept: FAMILY MEDICINE | Facility: CLINIC | Age: 56
End: 2021-02-11
Payer: MEDICARE

## 2021-02-11 VITALS
TEMPERATURE: 98.2 F | RESPIRATION RATE: 16 BRPM | HEART RATE: 109 BPM | DIASTOLIC BLOOD PRESSURE: 81 MMHG | SYSTOLIC BLOOD PRESSURE: 146 MMHG | OXYGEN SATURATION: 98 %

## 2021-02-11 DIAGNOSIS — E11.42 TYPE 2 DIABETES MELLITUS WITH DIABETIC POLYNEUROPATHY, WITH LONG-TERM CURRENT USE OF INSULIN (H): ICD-10-CM

## 2021-02-11 DIAGNOSIS — R30.0 DYSURIA: Primary | ICD-10-CM

## 2021-02-11 DIAGNOSIS — G89.4 CHRONIC PAIN SYNDROME: ICD-10-CM

## 2021-02-11 DIAGNOSIS — S76.319A HAMSTRING MUSCLE STRAIN, UNSPECIFIED LATERALITY, INITIAL ENCOUNTER: ICD-10-CM

## 2021-02-11 DIAGNOSIS — G89.4 CHRONIC PAIN SYNDROME: Primary | ICD-10-CM

## 2021-02-11 DIAGNOSIS — Z79.4 TYPE 2 DIABETES MELLITUS WITH DIABETIC POLYNEUROPATHY, WITH LONG-TERM CURRENT USE OF INSULIN (H): ICD-10-CM

## 2021-02-11 LAB
AMPHETAMINES QUAL: POSITIVE
BARBITURATES QUAL URINE: NEGATIVE
BENZODIAZEPINE QUAL URINE: NEGATIVE
BILIRUBIN UR: NEGATIVE MG/DL
BLOOD UR: ABNORMAL MG/DL
BUPRENORPHINE QUAL URINE: NEGATIVE
CANNABINOIDS UR QL SCN: NEGATIVE
COCAINE QUAL URINE: NEGATIVE
GLUCOSE URINE: ABNORMAL
KETONES UR QL: NEGATIVE MG/DL
LEUKOCYTE ESTERASE UR: NEGATIVE
METHAMPHETAMINE: NEGATIVE
METHODONE QUAL: NEGATIVE
MORPHINE QUAL: NEGATIVE
NITRITE UR QL STRIP: NEGATIVE MG/DL
OXYCODONE QUAL: POSITIVE
PH UR STRIP: 5 [PH] (ref 4.5–8)
PHENCYCLIDINE: NEGATIVE
PROPOXYPHENE: NEGATIVE
PROTEIN UR: ABNORMAL MG/DL
SP GR UR STRIP: >=1.03 (ref 1–1.03)
TEMPERATURE OF URINE WAS BETWEEN 90-100 DEGREES F: YES
TRICYCLIC ANTIDEPRESSANTS: NEGATIVE
UROBILINOGEN UR STRIP-ACNC: ABNORMAL E.U./DL

## 2021-02-11 PROCEDURE — 80306 DRUG TEST PRSMV INSTRMNT: CPT | Performed by: FAMILY MEDICINE

## 2021-02-11 PROCEDURE — 81003 URINALYSIS AUTO W/O SCOPE: CPT | Performed by: FAMILY MEDICINE

## 2021-02-11 PROCEDURE — 99214 OFFICE O/P EST MOD 30 MIN: CPT | Mod: 25 | Performed by: FAMILY MEDICINE

## 2021-02-11 PROCEDURE — 96372 THER/PROPH/DIAG INJ SC/IM: CPT | Performed by: FAMILY MEDICINE

## 2021-02-11 RX ORDER — INSULIN GLARGINE 100 [IU]/ML
35 INJECTION, SOLUTION SUBCUTANEOUS AT BEDTIME
Qty: 6 ML | Refills: 11 | Status: SHIPPED | OUTPATIENT
Start: 2021-02-11 | End: 2021-10-21

## 2021-02-11 RX ORDER — AMOXICILLIN 250 MG
1 CAPSULE ORAL 2 TIMES DAILY
Qty: 60 TABLET | Refills: 11 | Status: SHIPPED | OUTPATIENT
Start: 2021-02-11 | End: 2021-03-11

## 2021-02-11 RX ORDER — OXYCODONE AND ACETAMINOPHEN 10; 325 MG/1; MG/1
TABLET ORAL
Qty: 100 TABLET | Refills: 0 | Status: SHIPPED | OUTPATIENT
Start: 2021-02-11 | End: 2021-03-11

## 2021-02-11 RX ADMIN — CYANOCOBALAMIN 1000 MCG: 1000 INJECTION, SOLUTION INTRAMUSCULAR; SUBCUTANEOUS at 15:20

## 2021-02-11 NOTE — PROGRESS NOTES
Patient Active Problem List    Diagnosis Date Noted     Chronic pain disorder 10/07/2016     Priority: High     Chronic Pain Diagnosis:  neuropathy  DIRE Total Score(s):14     ORT: 5    4 - 7 =  Moderate Risk   of future problems with Opioids  FAQ5: 50 November 20, 2020  Behavioral Health Consultation: 5/12/17 with Dr. Daisy Quezada  Personal Care Plan for Chronic Pain: 5/12/17  Opioid medication:oxycodone   Dose:10mg  Number of pills per month:60  Patient is being prescribed 10mg of oxycodone IR (Percocet) per day this is 15 mg Morphine Equivalents  Benzodiazepines Prescribed? No  Naloxone prescribed? No       Clinic visit frequency required: Q 1 month Next visit due:   Controlled Substance/Opioid Treatment agreement on file (dated <12 months ago)?:    Date(s): pending  Last [place] verification: last visit  Items in red to be updated at each visit  Patient is followed by Data Unavailable for ongoing prescription of pain medication.  All refills should be approved by this provider, or covering partner.Chronic Pain     Chronic Pain Diagnosis:  neuropathy  DIRE Total Score(s):14     ORT: 5    4 - 7 =  Moderate Risk   of future problems with Opioids  FAQ5: 45/100 on 9/9/16, 40/100 on 11/15/16  55 October 29, 2020    Behavioral Health Consultation: 5/12/17 with Dr. Daisy Quezada  Personal Care Plan for Chronic Pain: 5/12/17  Opioid medication:oxycodone   Dose:10mg  Number of pills per month:90  Patient is being prescribed 45 MME  Benzodiazepines Prescribed? No  Naloxone prescribed? No       Clinic visit frequency required: Q 1 month Next visit due:   Controlled Substance/Opioid Treatment agreement on file (dated <12 months ago)?:    Date(s): pending  Last [place] verification: last visit  Items in red to be updated at each visit  Patient is followed by Data Unavailable for ongoing prescription of pain medication.  All refills should be approved by this provider, or covering partner.Chronic Pain       Type 2 diabetes  mellitus with diabetic polyneuropathy (H) 10/12/2015     Priority: High     Chronic kidney disease, stage 3 12/31/2020     Priority: Medium     Morbid obesity (H) 11/27/2020     Priority: Medium     Hyperglycemia due to type 2 diabetes mellitus (H) 06/02/2020     Priority: Medium     Neck mass 10/11/2019     Priority: Medium     Added automatically from request for surgery [id number]       Hyperglycemia 09/08/2019     Priority: Medium     Metabolic acidosis 07/26/2019     Priority: Medium     reolved w/ discontinuation of ASA       Hx of gastric bypass 04/22/2019     Priority: Medium     Copper deficiency 04/05/2019     Priority: Medium     Identified in Aug, 2018--see discharge summary from [facility] in [place].       Syncope, near 01/04/2019     Priority: Medium     Chest pain 01/03/2019     Priority: Medium     Generalized abdominal pain 01/03/2019     Priority: Medium     Gastroenteritis 01/03/2019     Priority: Medium     Neuropathy 01/03/2019     Priority: Medium     Obesity 01/03/2019     Priority: Medium     Other acute pulmonary embolism without acute cor pulmonale (H) 12/31/2018     Priority: Medium     Hernia of anterior abdominal wall 01/28/2017     Priority: Medium     Diarrhea 01/27/2017     Priority: Medium     Hernia 01/27/2017     Priority: Medium     Osteoarthritis of both knees 10/31/2016     Priority: Medium     Polyneuropathy due to secondary diabetes mellitus (H) 10/31/2016     Priority: Medium     Compression neuropathy of left lower extremity 02/19/2016     Priority: Medium     Interdigital neuroma of left foot 02/19/2016     Priority: Medium     Bilateral ankle joint pain 01/15/2016     Priority: Medium     Microalbuminuria 10/12/2015     Priority: Medium     Esophageal reflux 04/07/2015     Priority: Medium     Urinary incontinence 02/19/2015     Priority: Medium     Insomnia 02/19/2015     Priority: Medium     Essential hypertension 08/26/2013     Priority: Medium     Asthma 08/04/2013      Priority: Medium     History of injury 08/04/2013     Priority: Medium     History of cholecystectomy 08/04/2013     Priority: Medium     Hyperlipidemia 06/21/2013     Priority: Medium     Status post bariatric surgery 06/21/2013     Priority: Medium     Kim-en-Y in 2006       History of hysterectomy for benign disease 06/21/2013     Priority: Medium     2008, For fibroids.  Cervix removed, but still has ovaries.       Neck pain 05/16/2013     Priority: Medium     Moderate major depression (H) 01/24/2013     Priority: Medium     Moderate persistent asthma without complication 11/15/2012     Priority: Medium     Pulmonary embolism with infarction (H) 11/15/2012     Priority: Medium     12/24/2018  Unprovoked.  + fam hx of DVT.  Treated w/ DOAC 12/2018through 9/2019 12/24/2018  Unprovoked.  + fam hx of DVT.  Plan warfarin for 6 months.       Persons encountering health services in other specified circumstances 11/15/2012     Priority: Medium     Tier 3    Status: Accept  Care Coordination Start Date: 02/27/12  State Tier Level: Level 3  Language/Barrier to Learning: No  Significant Mental Health Issues: Yes  Date Care Coordination Discontinued: 05/28/15    DX V65.8 REPLACED WITH [id number] (04/08/2013)       Open wound of groin with complication 05/12/2011     Priority: Medium     Chronic pain of both shoulders 12/23/2005     Priority: Medium     Abnormal Pap smear of cervix 04/22/2019     Priority: Low     3-12-15: Pap/HPV neg.  Plan: Given hx (mildly abn pap, hyster), recheck in 5 yrs.  10-11-11: Pap/HPV neg.  Plan: Given ASUS pap in 2010, recommend repeating pap in 1 year.  8/3/2010 ASCUS Pap with +BV- pt treated with Metrogel.  Pt needs repeat Pap in 1yr.  8/4/2008 Pt had total hyst for menorrhagia, pelvic pain, scar tissue, and fibroids.    3-12-15: Pap/HPV neg.  Plan: Given hx (mildly abn pap, hyster), recheck in 5 yrs.  10-11-11: Pap/HPV neg.  Plan: Given ASUS pap in 2010, recommend repeating  pap in 1 year.  8/3/2010 ASCUS Pap with +BV- pt treated with Metrogel.  Pt needs repeat Pap in 1yr.  8/4/2008 Pt had total hyst for menorrhagia, pelvic pain, scar tissue, and fibroids.       S/P hysterectomy 06/21/2013     Priority: Low     2008, For fibroids.  Cervix removed, but still has ovaries.       Carpal tunnel syndrome 11/15/2012     Priority: Low     Necrotizing fasciitis (H) 11/15/2012     Priority: Low     Health Care Home 11/15/2012     Priority: Low     Tier 3    Status: Accept  Care Coordination Start Date: 02/27/12  State Tier Level: Level 3  Language/Barrier to Learning: No  Significant Mental Health Issues: Yes  Date Care Coordination Discontinued: 05/28/15    DX V65.8 REPLACED WITH [id number] (04/08/2013)         Nursing Notes:   Viridiana Loaiza CMA  2/11/2021  3:25 PM  Signed  Clinic Administered Medication Documentation      Injectable Medication Documentation    Patient was given Cyanocobalamin (B-12). Prior to medication administration, verified patients identity using patient s name and date of birth. Please see MAR and medication order for additional information. Patient instructed to remain in clinic for 15 minutes.      Was entire vial of medication used? Yes  Vial/Syringe: Single dose vial  Expiration Date:  08/30/2022  Was this medication supplied by the patient? No    Name of provider who requested the medication administration: Fallert  Name of provider on site (faculty or community preceptor) at the time of performing the medication administration: Fallert    Date of next administration: N/A  Date of next office visit with provider to renew medication plan (must be seen annually): N/A        Chief Complaint   Patient presents with     Pain Management     CPM     Blood pressure (!) 146/81, pulse 109, temperature 98.2  F (36.8  C), temperature source Oral, resp. rate 16, SpO2 98 %, not currently breastfeeding. SUBJECTIVE:  Ruthy Álvarez is here for followup of her chronic  pain syndrome.  She also has some dark urine.  She is due for refills of her basal insulin and has some new pain in both of her hips.  The pain in her hips is actually more in her hamstrings.  It is worse when they are stretched, worse with trying to stand up and then with lying down.  She is not currently using any treatment other than the chronic pain medications that she is taking.        Her chronic pain is generally quite stable.  She simply wants refills of her current medications.      Her arms continue to bother her.  She has redundant fatty tissue in the dependent portions of her triceps that has been not resolved with some compression.  She has been seeking to get surgery from a plastic surgeon to get some relief from this discomfort, which seems entirely reasonable to me and she would like documentation of that.      She has had some discomfort underneath the skin folds in her abdomen.  It feels uncomfortable, but there has been no rash.  She is using some powder and a towel to help control the moisture in that area.  She has a sense of itching in her vagina and thinks that she may have a yeast infection.  She also has some dark-colored urine that is malodorous at times.      OBJECTIVE:   VITAL SIGNS:  The patient's blood pressure and pulse are as noted.   CHEST:  Clear.   HEART:  Regular rate and rhythm without murmurs.   MUSCULOSKELETAL:  She has tenderness over both hamstrings but more tender on the left than on the right.  It is worsened with flexion of her hip and then extension of her knee, which is stretching the hamstring.    SKIN:  Exam of the lower abdomen reveals there are no signs of tinea or Candida intertrigo.  There is just some mildly macerated tissue in between the skin folds but no overt rash.  She does have redundant tissue in both of her triceps that is uncomfortable for her but not red or inflamed.      ASSESSMENT:   1.  Chronic pain syndrome.  It is certainly polyfactorial.  With  extensive weight loss over the years, she does have redundant soft tissue in her triceps and would benefit from plastic surgery to remove this redundant tissue from a comfort and chronic pain management standpoint.   2.  Type 2 diabetes.  I simply refilled her basal insulin.  She is taking 35 units of Basaglar daily.   3.  Regarding her hamstring strain, will try some diclofenac gel topically to see if that is helpful.   4.  For her complaints of dark urine, a urinalysis was obtained today.  We will contact her with results.  Database review today is unremarkable.          Results for orders placed or performed in visit on 02/11/21   Rapid Urine Drug Screen ([facility])     Status: Abnormal   Result Value Ref Range    Cannabinoids Qual Urine NEGATIVE NEGATIVE    Phencyclidine NEGATIVE NEGATIVE    Cocaine Qual Urine NEGATIVE NEGATIVE    Methamphetamine Qual NEGATIVE NEGATIVE    Morphine Qual NEGATIVE NEGATIVE    Amphetamines Qual POSITIVE (A) NEGATIVE    Benzodiazepine Qual Urine NEGATIVE NEGATIVE    Tricyclic Antidepressants NEGATIVE NEGATIVE    Methadone Qual NEGATIVE NEGATIVE    Barbiturates Qual Urine NEGATIVE NEGATIVE    Oxycodone Qual POSITIVE (A) NEGATIVE    Propoxyphene NEGATIVE NEGATIVE    Buprenorphine Qual Urine NEGATIVE NEGATIVE    Temperature of Urine was Between  Degrees F YES YES   Urinalysis, Micro If ([facility])     Status: Abnormal   Result Value Ref Range    Specific Gravity Urine >=1.030 1.005 - 1.030    pH Urine 5.0 4.5 - 8.0    Leukocyte Esterase UR Negative NEGATIVE    Nitrite Urine Negative NEGATIVE mg/dL    Protein UR 3+ (A) NEGATIVE mg/dL    Glucose Urine Trace (A) NEGATIVE    Ketones Urine Negative NEGATIVE mg/dL    Urobilinogen mg/dL 0.2 E.U./dL 0.2 E.U./dL E.U./dL    Bilirubin UR Negative NEGATIVE mg/dL    Blood UR Trace-intact (A) NEGATIVE mg/dL

## 2021-02-11 NOTE — NURSING NOTE
Clinic Administered Medication Documentation      Injectable Medication Documentation    Patient was given Cyanocobalamin (B-12). Prior to medication administration, verified patients identity using patient s name and date of birth. Please see MAR and medication order for additional information. Patient instructed to remain in clinic for 15 minutes.      Was entire vial of medication used? Yes  Vial/Syringe: Single dose vial  Expiration Date:  08/30/2022  Was this medication supplied by the patient? No    Name of provider who requested the medication administration: Betsy  Name of provider on site (faculty or community preceptor) at the time of performing the medication administration: Fallert    Date of next administration: N/A  Date of next office visit with provider to renew medication plan (must be seen annually): N/A

## 2021-03-03 PROBLEM — E61.0 COPPER DEFICIENCY: Status: ACTIVE | Noted: 2019-04-05

## 2021-03-03 PROBLEM — E87.20 METABOLIC ACIDOSIS: Status: ACTIVE | Noted: 2019-07-26

## 2021-03-03 PROBLEM — N18.31 STAGE 3A CHRONIC KIDNEY DISEASE (H): Status: ACTIVE | Noted: 2020-12-31

## 2021-03-11 ENCOUNTER — OFFICE VISIT (OUTPATIENT)
Dept: FAMILY MEDICINE | Facility: CLINIC | Age: 56
End: 2021-03-11
Payer: MEDICARE

## 2021-03-11 VITALS
WEIGHT: 200 LBS | RESPIRATION RATE: 16 BRPM | DIASTOLIC BLOOD PRESSURE: 96 MMHG | HEART RATE: 109 BPM | BODY MASS INDEX: 40.4 KG/M2 | SYSTOLIC BLOOD PRESSURE: 150 MMHG | OXYGEN SATURATION: 98 % | TEMPERATURE: 98 F

## 2021-03-11 DIAGNOSIS — R80.9 MICROALBUMINURIA: ICD-10-CM

## 2021-03-11 DIAGNOSIS — I10 ESSENTIAL HYPERTENSION: ICD-10-CM

## 2021-03-11 DIAGNOSIS — G89.4 CHRONIC PAIN DISORDER: ICD-10-CM

## 2021-03-11 DIAGNOSIS — G89.4 CHRONIC PAIN SYNDROME: Primary | ICD-10-CM

## 2021-03-11 DIAGNOSIS — M17.0 PRIMARY OSTEOARTHRITIS OF BOTH KNEES: ICD-10-CM

## 2021-03-11 PROCEDURE — 99214 OFFICE O/P EST MOD 30 MIN: CPT | Mod: 25 | Performed by: FAMILY MEDICINE

## 2021-03-11 PROCEDURE — 96372 THER/PROPH/DIAG INJ SC/IM: CPT | Performed by: FAMILY MEDICINE

## 2021-03-11 PROCEDURE — 80306 DRUG TEST PRSMV INSTRMNT: CPT | Performed by: FAMILY MEDICINE

## 2021-03-11 RX ORDER — LISINOPRIL 20 MG/1
20 TABLET ORAL DAILY
Qty: 90 TABLET | Refills: 3 | Status: SHIPPED | OUTPATIENT
Start: 2021-03-11 | End: 2021-07-01

## 2021-03-11 RX ORDER — OXYCODONE AND ACETAMINOPHEN 10; 325 MG/1; MG/1
TABLET ORAL
Qty: 100 TABLET | Refills: 0 | Status: SHIPPED | OUTPATIENT
Start: 2021-03-11 | End: 2021-04-08

## 2021-03-11 RX ORDER — AMOXICILLIN 250 MG
1 CAPSULE ORAL 2 TIMES DAILY
Qty: 60 TABLET | Refills: 11 | Status: SHIPPED | OUTPATIENT
Start: 2021-03-11 | End: 2022-03-25

## 2021-03-11 RX ADMIN — CYANOCOBALAMIN 1000 MCG: 1000 INJECTION, SOLUTION INTRAMUSCULAR; SUBCUTANEOUS at 13:27

## 2021-03-11 NOTE — PROGRESS NOTES
Patient Active Problem List    Diagnosis Date Noted     Stage 3a chronic kidney disease 12/31/2020     Priority: High     Persistent protienuria       Copper deficiency 04/05/2019     Priority: High     Identified in Aug, 2018--see discharge summary from [facility] [place].       Chronic pain disorder 10/07/2016     Priority: High     Chronic Pain Diagnosis:  neuropathy  DIRE Total Score(s):14     ORT: 5    4 - 7 =  Moderate Risk   of future problems with Opioids  FAQ5: 50 November 20, 2020  Behavioral Health Consultation: 5/12/17 with Dr. Daisy Quezada  Personal Care Plan for Chronic Pain: 5/12/17  Opioid medication:oxycodone   Dose:10mg  Number of pills per month:60  Patient is being prescribed 10mg of oxycodone IR (Percocet) per day this is 15 mg Morphine Equivalents  Benzodiazepines Prescribed? No  Naloxone prescribed? No       Clinic visit frequency required: Q 1 month Next visit due:   Controlled Substance/Opioid Treatment agreement on file (dated <12 months ago)?:    Date(s): pending  Last [place] verification: last visit  Items in red to be updated at each visit  Patient is followed by Data Unavailable for ongoing prescription of pain medication.  All refills should be approved by this provider, or covering partner.Chronic Pain     Chronic Pain Diagnosis:  neuropathy  DIRE Total Score(s):14     ORT: 5    4 - 7 =  Moderate Risk   of future problems with Opioids  FAQ5: 45/100 on 9/9/16, 40/100 on 11/15/16  55 October 29, 2020    Behavioral Health Consultation: 5/12/17 with Dr. Daisy Quezada  Personal Care Plan for Chronic Pain: 5/12/17  Opioid medication:oxycodone   Dose:10mg  Number of pills per month:90  Patient is being prescribed 45 MME  Benzodiazepines Prescribed? No  Naloxone prescribed? No       Clinic visit frequency required: Q 1 month Next visit due:   Controlled Substance/Opioid Treatment agreement on file (dated <12 months ago)?:    Date(s): pending  Last [place] verification: last visit  Items  in red to be updated at each visit  Patient is followed by [facility] Un[facility] for ongoing prescription of pain medication.  All refills should be approved by this provider, or covering partner.Chronic Pain       Persistent proteinuria 10/12/2015     Priority: High     Type 2 diabetes mellitus with diabetic polyneuropathy (H) 10/12/2015     Priority: High     Essential hypertension 08/26/2013     Priority: High     Morbid obesity (H) 11/27/2020     Priority: Medium     Hyperglycemia due to type 2 diabetes mellitus (H) 06/02/2020     Priority: Medium     Neck mass 10/11/2019     Priority: Medium     Added automatically from request for surgery [id number]       Hyperglycemia 09/08/2019     Priority: Medium     Hx of gastric bypass 04/22/2019     Priority: Medium     Syncope, near 01/04/2019     Priority: Medium     Chest pain 01/03/2019     Priority: Medium     Generalized abdominal pain 01/03/2019     Priority: Medium     Gastroenteritis 01/03/2019     Priority: Medium     Neuropathy 01/03/2019     Priority: Medium     Obesity 01/03/2019     Priority: Medium     Other acute pulmonary embolism without acute cor pulmonale (H) 12/31/2018     Priority: Medium     Hernia of anterior abdominal wall 01/28/2017     Priority: Medium     Diarrhea 01/27/2017     Priority: Medium     Hernia 01/27/2017     Priority: Medium     Osteoarthritis of both knees 10/31/2016     Priority: Medium     Polyneuropathy due to secondary diabetes mellitus (H) 10/31/2016     Priority: Medium     Compression neuropathy of left lower extremity 02/19/2016     Priority: Medium     Interdigital neuroma of left foot 02/19/2016     Priority: Medium     Bilateral ankle joint pain 01/15/2016     Priority: Medium     Esophageal reflux 04/07/2015     Priority: Medium     Urinary incontinence 02/19/2015     Priority: Medium     Insomnia 02/19/2015     Priority: Medium     Asthma 08/04/2013     Priority: Medium     History of injury 08/04/2013     Priority: Medium      History of cholecystectomy 08/04/2013     Priority: Medium     Hyperlipidemia 06/21/2013     Priority: Medium     Status post bariatric surgery 06/21/2013     Priority: Medium     Kim-en-Y in 2006       History of hysterectomy for benign disease 06/21/2013     Priority: Medium     2008, For fibroids.  Cervix removed, but still has ovaries.       Moderate major depression (H) 01/24/2013     Priority: Medium     Moderate persistent asthma without complication 11/15/2012     Priority: Medium     Pulmonary embolism with infarction (H) 11/15/2012     Priority: Medium     12/24/2018  Unprovoked.  + fam hx of DVT.  Treated w/ DOAC 12/2018through 9/2019 12/24/2018  Unprovoked.  + fam hx of DVT.  Plan warfarin for 6 months.       Persons encountering health services in other specified circumstances 11/15/2012     Priority: Medium     Tier 3    Status: Accept  Care Coordination Start Date: 02/27/12  State Tier Level: Level 3  Language/Barrier to Learning: No  Significant Mental Health Issues: Yes  Date Care Coordination Discontinued: 05/28/15    DX V65.8 REPLACED WITH [id number] (04/08/2013)       Open wound of groin with complication 05/12/2011     Priority: Medium     Metabolic acidosis 07/26/2019     Priority: Low     reolved w/ discontinuation of ASA       Abnormal Pap smear of cervix 04/22/2019     Priority: Low     3-12-15: Pap/HPV neg.  Plan: Given hx (mildly abn pap, hyster), recheck in 5 yrs.  10-11-11: Pap/HPV neg.  Plan: Given ASUS pap in 2010, recommend repeating pap in 1 year.  8/3/2010 ASCUS Pap with +BV- pt treated with Metrogel.  Pt needs repeat Pap in 1yr.  8/4/2008 Pt had total hyst for menorrhagia, pelvic pain, scar tissue, and fibroids.    3-12-15: Pap/HPV neg.  Plan: Given hx (mildly abn pap, hyster), recheck in 5 yrs.  10-11-11: Pap/HPV neg.  Plan: Given ASUS pap in 2010, recommend repeating pap in 1 year.  8/3/2010 ASCUS Pap with +BV- pt treated with Metrogel.  Pt needs repeat Pap in  1yr.  8/4/2008 Pt had total hyst for menorrhagia, pelvic pain, scar tissue, and fibroids.       S/P hysterectomy 06/21/2013     Priority: Low     2008, For fibroids.  Cervix removed, but still has ovaries.       Carpal tunnel syndrome 11/15/2012     Priority: Low     Necrotizing fasciitis (H) 11/15/2012     Priority: Low     Health Care Home 11/15/2012     Priority: Low     Tier 3    Status: Accept  Care Coordination Start Date: 02/27/12  State Tier Level: Level 3  Language/Barrier to Learning: No  Significant Mental Health Issues: Yes  Date Care Coordination Discontinued: 05/28/15    DX V65.8 REPLACED WITH [id number] (04/08/2013)         Chronic pain of both shoulders 12/23/2005     Priority: Low     There are no exam notes on file for this visit.  Chief Complaint   Patient presents with     Pain Management     CPM / b-12 shot     Blood pressure (!) 150/96, pulse 109, temperature 98  F (36.7  C), temperature source Oral, resp. rate 16, weight 90.7 kg (200 lb), SpO2 98 %, not currently breastfeeding.  No results found for any visits on 03/11/21.   SUBJECTIVE:  Ruthy Álvarez is here for followup of her chronic pain syndrome.  She also needs reevaluation of her hypertension, proteinuria and she would like a referral for PT.      Regarding her chronic pain, she feels like her symptoms are well controlled on her current dose of oxycodone.  She has been on stable doses with no aberrant behavior and normal urine drug tests for more than 1 year.  She is at moderate risk for aberrant behavior due to her ORT, which is why we see her every month.  She has had functional improvement but would like additional gains by seeing physical therapy.  I reviewed her chart.  She does have osteoarthritis in her knees that is x-ray-proven.  She has tried pool therapy in the past but cannot go there currently and would like something closer to her home, perhaps at [facility].      Regarding her hypertension, she is  taking lisinopril 10 mg daily and tolerates this well.  Recently a urinalysis showed 3+ proteinuria, and she had a creatinine in November which was 1.1.  She had, interestingly, a mild metabolic acidosis at that time as well, which is a bit surprising.  She has no difficulty with urination other than noticing that her urine is a little bit dark.  She has had longstanding obesity, hypertension and type 2 diabetes.  Her blood pressure is a bit higher today.      OBJECTIVE:   Vitals reviewed.  Affect is appropriate.  Eye contact is normal.  She is soft spoken but linear in her thinking, awake and alert.      Urine drug screen is appropriate today.  Continue the stimulant that she is taking for weight loss and the opiates she has been prescribed.      ASSESSMENT:   1.  Chronic pain syndrome.  She was given a refill of her oxycodone.  The dose is Percocet brand, 1 p.o. t.i.d. with a half q.6 hours p.r.n. breakthrough pain, #100 per month.  She will continue to follow up monthly, and I referred her to PT, which she is very interested in.   2.  Regarding her knee osteoarthritis, PT recommended strongly encouraged to avoid NSAIDs.   3.  Regarding her hypertension, we will increase her lisinopril from 10 mg to 20 mg daily and eventually target a dose of 40 mg daily.  I referred her to Nephrology given her substantial proteinuria.  She agrees to go at this time.   4.  History of B12 deficiency.  B12 given monthly here again today.

## 2021-03-11 NOTE — NURSING NOTE
Clinic Administered Medication Documentation      Injectable Medication Documentation    Patient was given Cyanocobalamin (B-12). Prior to medication administration, verified patients identity using patient s name and date of birth. Please see MAR and medication order for additional information. Patient instructed to remain in clinic for 15 minutes.      Was entire vial of medication used? Yes  Vial/Syringe: Single dose vial  Expiration Date:  08/30/2022  Was this medication supplied by the patient? No    Name of provider who requested the medication administration: Dr. barron  Name of provider on site (faculty or community preceptor) at the time of performing the medication administration: Dr. Barron    Date of next administration: N/A  Date of next office visit with provider to renew medication plan (must be seen annually): N/A

## 2021-03-11 NOTE — PATIENT INSTRUCTIONS
Increase lisinopril from 10 to 20 mg daily    Drink lots of water--will draw blood next visit.    03/12/21   PHYSICAL THERAPY REFERRAL  [facility]  Phone: [phone]  Fax: [phone]     Referral faxed to [facility] at [phone] who will contact patient to schedule.     Anaid Jerome

## 2021-03-13 NOTE — RESULT ENCOUNTER NOTE
Labs were available at the time of the encounter and results were discussed in the office on 3/11.  Increased ace inhibitor and referred to nephrology.    Will attempt to maximize lisinopril 40 mg daily.

## 2021-03-25 NOTE — TELEPHONE ENCOUNTER
Per pharmacy: insulin glargine (BASAGLAR KWIKPEN) 100 UNIT/ML pen is not covered by medication   Alternatives: Lantus

## 2021-03-30 ENCOUNTER — OFFICE VISIT - HEALTHEAST (OUTPATIENT)
Dept: SURGERY | Facility: CLINIC | Age: 56
End: 2021-03-30

## 2021-04-01 DIAGNOSIS — E11.42 DIABETIC POLYNEUROPATHY ASSOCIATED WITH TYPE 2 DIABETES MELLITUS (H): ICD-10-CM

## 2021-04-01 RX ORDER — GABAPENTIN 250 MG/5ML
750 SOLUTION ORAL 3 TIMES DAILY
Qty: 1350 ML | Refills: 2 | Status: SHIPPED | OUTPATIENT
Start: 2021-04-01 | End: 2021-11-18

## 2021-04-02 ENCOUNTER — TELEPHONE (OUTPATIENT)
Dept: FAMILY MEDICINE | Facility: CLINIC | Age: 56
End: 2021-04-02

## 2021-04-03 ENCOUNTER — IMMUNIZATION (OUTPATIENT)
Dept: FAMILY MEDICINE | Facility: CLINIC | Age: 56
End: 2021-04-03
Payer: MEDICARE

## 2021-04-03 PROCEDURE — 0031A PR COVID VAC JANSSEN AD26 0.5ML: CPT

## 2021-04-03 PROCEDURE — 91303 PR COVID VAC JANSSEN AD26 0.5ML: CPT

## 2021-04-08 ENCOUNTER — ANCILLARY PROCEDURE (OUTPATIENT)
Dept: GENERAL RADIOLOGY | Facility: CLINIC | Age: 56
End: 2021-04-08
Attending: FAMILY MEDICINE
Payer: MEDICARE

## 2021-04-08 ENCOUNTER — OFFICE VISIT (OUTPATIENT)
Dept: FAMILY MEDICINE | Facility: CLINIC | Age: 56
End: 2021-04-08
Payer: MEDICARE

## 2021-04-08 VITALS
WEIGHT: 200.2 LBS | HEART RATE: 108 BPM | SYSTOLIC BLOOD PRESSURE: 159 MMHG | TEMPERATURE: 97.4 F | RESPIRATION RATE: 16 BRPM | BODY MASS INDEX: 40.44 KG/M2 | OXYGEN SATURATION: 100 % | DIASTOLIC BLOOD PRESSURE: 80 MMHG

## 2021-04-08 DIAGNOSIS — G89.29 CHRONIC PAIN OF BOTH SHOULDERS: ICD-10-CM

## 2021-04-08 DIAGNOSIS — M25.511 CHRONIC PAIN OF BOTH SHOULDERS: ICD-10-CM

## 2021-04-08 DIAGNOSIS — M25.512 CHRONIC PAIN OF BOTH SHOULDERS: ICD-10-CM

## 2021-04-08 DIAGNOSIS — G89.29 CHRONIC PAIN OF BOTH SHOULDERS: Primary | ICD-10-CM

## 2021-04-08 DIAGNOSIS — M19.011 ARTHRITIS OF BOTH ACROMIOCLAVICULAR JOINTS: ICD-10-CM

## 2021-04-08 DIAGNOSIS — G89.4 CHRONIC PAIN SYNDROME: ICD-10-CM

## 2021-04-08 DIAGNOSIS — M25.511 CHRONIC PAIN OF BOTH SHOULDERS: Primary | ICD-10-CM

## 2021-04-08 DIAGNOSIS — G89.4 CHRONIC PAIN SYNDROME: Primary | ICD-10-CM

## 2021-04-08 DIAGNOSIS — M25.512 CHRONIC PAIN OF BOTH SHOULDERS: Primary | ICD-10-CM

## 2021-04-08 DIAGNOSIS — M19.012 ARTHRITIS OF BOTH ACROMIOCLAVICULAR JOINTS: ICD-10-CM

## 2021-04-08 PROCEDURE — 73030 X-RAY EXAM OF SHOULDER: CPT | Mod: LT | Performed by: RADIOLOGY

## 2021-04-08 PROCEDURE — 73030 X-RAY EXAM OF SHOULDER: CPT | Mod: RT | Performed by: RADIOLOGY

## 2021-04-08 PROCEDURE — 80306 DRUG TEST PRSMV INSTRMNT: CPT | Performed by: FAMILY MEDICINE

## 2021-04-08 PROCEDURE — 99214 OFFICE O/P EST MOD 30 MIN: CPT | Mod: 25 | Performed by: FAMILY MEDICINE

## 2021-04-08 PROCEDURE — 96372 THER/PROPH/DIAG INJ SC/IM: CPT | Performed by: FAMILY MEDICINE

## 2021-04-08 RX ORDER — OXYCODONE AND ACETAMINOPHEN 10; 325 MG/1; MG/1
TABLET ORAL
Qty: 100 TABLET | Refills: 0 | Status: SHIPPED | OUTPATIENT
Start: 2021-04-08 | End: 2021-05-06

## 2021-04-08 RX ADMIN — CYANOCOBALAMIN 1000 MCG: 1000 INJECTION, SOLUTION INTRAMUSCULAR; SUBCUTANEOUS at 12:18

## 2021-04-08 NOTE — LETTER
Opioid / Opioid Plus Controlled Substance Agreement    This is an agreement between you and your provider about the safe and appropriate use of controlled substance/opioids prescribed by your care team. Controlled substances are medicines that can cause physical and mental dependence (abuse).    There are strict laws about having and using these medicines. We here at [facility] are committing to working with you in your efforts to get better. To support you in this work, we ll help you schedule regular office appointments for medicine refills. If we must cancel or change your appointment for any reason, we ll make sure you have enough medicine to last until your next appointment.     As a Provider, I will:    Listen carefully to your concerns and treat you with respect.     Recommend a treatment plan that I believe is in your best interest. This plan may involve therapies other than opioid pain medication.     Talk with you often about the possible benefits, and the risk of harm of any medicine that we prescribe for you.     Provide a plan on how to taper (discontinue or go off) using this medicine if the decision is made to stop its use.    As a Patient, I understand that opioid(s):     Are a controlled substance prescribed by my care team to help me function or work and manage my condition(s).     Are strong medicines and can cause serious side effects such as:    Drowsiness, which can seriously affect my driving ability    A lower breathing rate, enough to cause death    Harm to my thinking ability     Depression     Abuse of and addiction to this medicine    Need to be taken exactly as prescribed. Combining opioids with certain medicines or chemicals (such as illegal drugs, sedatives, sleeping pills, and benzodiazepines) can be dangerous or even fatal. If I stop opioids suddenly, I may have severe withdrawal symptoms.    Do not work for all types of pain nor for all patients. If they re not helpful, I may  be asked to stop them.        The risks, benefits and side effects of these medicine(s) were explained to me. I agree that:  1. I will take part in other treatments as advised by my care team. This may be psychiatry or counseling, physical therapy, behavioral therapy, group treatment or a referral to a specialist.     2. I will keep all my appointments. I understand that this is part of the monitoring of opioids. My care team may require an office visit for EVERY opioid/controlled substance refill. If I miss appointments or don t follow instructions, my care team may stop my medicine.    3. I will take my medicines as prescribed. I will not change the dose or schedule unless my care team tells me to. There will be no refills if I run out early.     4. I may be asked to come to the clinic and complete a urine drug test or complete a pill count at any time. If I don t give a urine sample or participate in a pill count, the care team may stop my medicine.    5. I will only receive prescriptions from this clinic for chronic pain. If I am treated by another provider for acute pain issues, I will tell them that I am taking opioid pain medication for chronic pain and that I have a treatment agreement with this provider. I will inform my [facility] care team within one business day if I am given a prescription for any pain medication by another healthcare provider. My [facility] care team can contact other providers and pharmacists about my use of any medicines.    6. It is up to me to make sure that I don t run out of my medicines on weekends or holidays. If my care team is willing to refill my opioid prescription without a visit, I must request refills only during office hours. Refills may take up to 3 business days to process. I will use one pharmacy to fill all my opioid and other controlled substance prescriptions. I will notify the clinic about any changes to my insurance or medication  availability.    7. I am responsible for my prescriptions. If the medicine/prescription is lost, stolen or destroyed, it will not be replaced. I also agree not to share controlled substance medicines with anyone.    8. I am aware I should not use any illegal or recreational drugs. I agree not to drink alcohol unless my care team says I can.       9. If I enroll in the [place] Medical Cannabis program, I will tell my care team prior to my next refill.     10. I will tell my care team right away if I become pregnant, have a new medical problem treated outside of my regular clinic, or have a change in my medications.    11. I understand that this medicine can affect my thinking, judgment and reaction time. Alcohol and drugs affect the brain and body, which can affect the safety of my driving. Being under the influence of alcohol or drugs can affect my decision-making, behaviors, personal safety, and the safety of others. Driving while impaired (DWI) can occur if a person is driving, operating, or in physical control of a car, motorcycle, boat, snowmobile, ATV, motorbike, off-road vehicle, or any other motor vehicle ([place] Statute 169A.20). I understand the risk if I choose to drive or operate any vehicle or machinery.    I understand that if I do not follow any of the conditions above, my prescriptions or treatment may be stopped or changed.          Opioids  What You Need to Know    What are opioids?   Opioids are pain medicines that must be prescribed by a doctor. They are also known as narcotics.     Examples are:   1. morphine (MS Contin, Lucy)  2. oxycodone (Oxycontin)  3. oxycodone and acetaminophen (Percocet)  4. hydrocodone and acetaminophen (Vicodin, Norco)   5. fentanyl patch (Duragesic)   6. hydromorphone (Dilaudid)   7. methadone  8. codeine (Tylenol #3)     What do opioids do well?   Opioids are best for severe short-term pain such as after a surgery or injury. They may work well for cancer pain. They may  help some people with long-lasting (chronic) pain.     What do opioids NOT do well?   Opioids never get rid of pain entirely, and they don t work well for most patients with chronic pain. Opioids don t reduce swelling, one of the causes of pain.                                    Other ways to manage chronic pain and improve function include:       Treat the health problem that may be causing pain    Anti-inflammation medicines, which reduce swelling and tenderness, such as ibuprofen (Advil, Motrin) or naproxen (Aleve)    Acetaminophen (Tylenol)    Antidepressants and anti-seizure medicines, especially for nerve pain    Topical treatments such as patches or creams    Injections or nerve blocks    Chiropractic or osteopathic treatment    Acupuncture, massage, deep breathing, meditation, visual imagery, aromatherapy    Use heat or ice at the pain site    Physical therapy     Exercise    Stop smoking    Take part in therapy       Risks and side effects     Talk to your doctor before you start or decide to keep taking opioids. Possible side effects include:      Lowering your breathing rate enough to cause death    Overdose, including death, especially if taking higher than prescribed doses    Worse depression symptoms; less pleasure in things you usually enjoy    Feeling tired or sluggish    Slower thoughts or cloudy thinking    Being more sensitive to pain over time; pain is harder to control    Trouble sleeping or restless sleep    Changes in hormone levels (for example, less testosterone)    Changes in sex drive or ability to have sex    Constipation    Unsafe driving    Itching and sweating    Dizziness    Nausea, throwing up and dry mouth    What else should I know about opioids?    Opioids may lead to dependence, tolerance, or addiction.      Dependence means that if you stop or reduce the medicine too quickly, you will have withdrawal symptoms. These include loose poop (diarrhea), jitters, flu-like symptoms,  nervousness and tremors. Dependence is not the same as addiction.                       Tolerance means needing higher doses over time to get the same effect. This may increase the chance of serious side effects.      Addiction is when people improperly use a substance that harms their body, their mind or their relations with others. Use of opiates can cause a relapse of addiction if you have a history of drug or alcohol abuse.      People who have used opioids for a long time may have a lower quality of life, worse depression, higher levels of pain and more visits to doctors.    You can overdose on opioids. Take these steps to lower your risk of overdose:    1. Recognize the signs:  Signs of overdose include decrease or loss of consciousness (blackout), slowed breathing, trouble waking up and blue lips. If someone is worried about overdose, they should call 911.    2. Talk to your doctor about Narcan (naloxone).   If you are at risk for overdose, you may be given a prescription for Narcan. This medicine very quickly reverses the effects of opioids.   If you overdose, a friend or family member can give you Narcan while waiting for the ambulance. They need to know the signs of overdose and how to give Narcan.     3. Don't use alcohol or street drugs.   Taking them with opioids can cause death.    4. Do not take any of these medicines unless your doctor says it s OK. Taking these with opioids can cause death:    Benzodiazepines, such as lorazepam (Ativan), alprazolam (Xanax) or diazepam (Valium)    Muscle relaxers, such as cyclobenzaprine (Flexeril)    Sleeping pills like zolpidem (Ambien)     Other opioids      How to keep you and other people safe while taking opioids:    1. Never share your opioids with others.  Opioid medicines are regulated by the Drug Enforcement Agency ([employer]). Selling or sharing medications is a criminal act.    2. Be sure to store opioids in a secure place, locked up if possible. Young children  can easily swallow them and overdose.    3. When you are traveling with your medicines, keep them in the original bottles. If you use a pill box, be sure you also carry a copy of your medicine list from your clinic or pharmacy.    4. Safe disposal of opioids    Most pharmacies have places to get rid of medicine, called disposal kiosks. Medicine disposal options are also available in every [facility]. Search your county and  medication disposal  to find more options. You can find more details at:  [email or web address]     I agree that my provider, clinic care team, and pharmacy may work with any city, state or federal law enforcement agency that investigates the misuse, sale, or other diversion of my controlled medicine. I will allow my provider to discuss my care with, or share a copy of, this agreement with any other treating provider, pharmacy or emergency room where I receive care.    I have read this agreement and have asked questions about anything I did not understand.    _______________________________________________________  Patient Signature - Ruthy Álvarez _____________________                   Date     _______________________________________________________  Provider Signature - Jose Angel Meyer MD   _____________________                   Date     _______________________________________________________  Witness Signature (required if provider not present while patient signing)   _____________________                   Date

## 2021-04-08 NOTE — NURSING NOTE
Clinic Administered Medication Documentation      Injectable Medication Documentation    Patient was given Cyanocobalamin (B-12). Prior to medication administration, verified patients identity using patient s name and date of birth. Please see MAR and medication order for additional information. Patient instructed to remain in clinic for 15 minutes.      Was entire vial of medication used? Yes  Vial/Syringe: Single dose vial  Expiration Date:  10/22  Was this medication supplied by the patient? No    Name of provider who requested the medication administration: Dr. Meyer  Name of provider on site (faculty or community preceptor) at the time of performing the medication administration: Dr. Meyer    Date of next administration: N/A  Date of next office visit with provider to renew medication plan (must be seen annually): N/A

## 2021-04-08 NOTE — PROGRESS NOTES
Patient Active Problem List    Diagnosis Date Noted     Stage 3a chronic kidney disease 12/31/2020     Priority: High     Persistent protienuria       Copper deficiency 04/05/2019     Priority: High     Identified in Aug, 2018--see discharge summary from [facility] in [place].       Chronic pain disorder 10/07/2016     Priority: High     Chronic Pain Diagnosis:  neuropathy  DIRE Total Score(s):14     ORT: 5    4 - 7 =  Moderate Risk   of future problems with Opioids  FAQ5: 50 November 20, 2020  Behavioral Health Consultation: 5/12/17 with Dr. Daisy Quezada  Personal Care Plan for Chronic Pain: 5/12/17  Opioid medication:oxycodone   Dose:10mg  Number of pills per month:60  Patient is being prescribed 10mg of oxycodone IR (Percocet) per day this is 15 mg Morphine Equivalents  Benzodiazepines Prescribed? No  Naloxone prescribed? No       Clinic visit frequency required: Q 1 month Next visit due:   Controlled Substance/Opioid Treatment agreement on file (dated <12 months ago)?:    Date(s): pending  Last [place] verification: last visit  Items in red to be updated at each visit  Patient is followed by Data Unavailable for ongoing prescription of pain medication.  All refills should be approved by this provider, or covering partner.Chronic Pain     Chronic Pain Diagnosis:  neuropathy  DIRE Total Score(s):14     ORT: 5    4 - 7 =  Moderate Risk   of future problems with Opioids  FAQ5: 45/100 on 9/9/16, 40/100 on 11/15/16  55 October 29, 2020    Behavioral Health Consultation: 5/12/17 with Dr. Daisy Quezada  Personal Care Plan for Chronic Pain: 5/12/17  Opioid medication:oxycodone   Dose:10mg  Number of pills per month:90  Patient is being prescribed 45 MME  Benzodiazepines Prescribed? No  Naloxone prescribed? No       Clinic visit frequency required: Q 1 month Next visit due:   Controlled Substance/Opioid Treatment agreement on file (dated <12 months ago)?:    Date(s): pending  Last [place] verification: last visit  Items  in red to be updated at each visit  Patient is followed by [facility] Un[facility] for ongoing prescription of pain medication.  All refills should be approved by this provider, or covering partner.Chronic Pain       Persistent proteinuria 10/12/2015     Priority: High     Type 2 diabetes mellitus with diabetic polyneuropathy (H) 10/12/2015     Priority: High     Essential hypertension 08/26/2013     Priority: High     Morbid obesity (H) 11/27/2020     Priority: Medium     Hyperglycemia due to type 2 diabetes mellitus (H) 06/02/2020     Priority: Medium     Neck mass 10/11/2019     Priority: Medium     Added automatically from request for surgery [id number]       Hyperglycemia 09/08/2019     Priority: Medium     Hx of gastric bypass 04/22/2019     Priority: Medium     Syncope, near 01/04/2019     Priority: Medium     Chest pain 01/03/2019     Priority: Medium     Generalized abdominal pain 01/03/2019     Priority: Medium     Gastroenteritis 01/03/2019     Priority: Medium     Neuropathy 01/03/2019     Priority: Medium     Obesity 01/03/2019     Priority: Medium     Other acute pulmonary embolism without acute cor pulmonale (H) 12/31/2018     Priority: Medium     Hernia of anterior abdominal wall 01/28/2017     Priority: Medium     Diarrhea 01/27/2017     Priority: Medium     Hernia 01/27/2017     Priority: Medium     Osteoarthritis of both knees 10/31/2016     Priority: Medium     Polyneuropathy due to secondary diabetes mellitus (H) 10/31/2016     Priority: Medium     Compression neuropathy of left lower extremity 02/19/2016     Priority: Medium     Interdigital neuroma of left foot 02/19/2016     Priority: Medium     Bilateral ankle joint pain 01/15/2016     Priority: Medium     Esophageal reflux 04/07/2015     Priority: Medium     Urinary incontinence 02/19/2015     Priority: Medium     Insomnia 02/19/2015     Priority: Medium     Asthma 08/04/2013     Priority: Medium     History of injury 08/04/2013     Priority: Medium      History of cholecystectomy 08/04/2013     Priority: Medium     Hyperlipidemia 06/21/2013     Priority: Medium     Status post bariatric surgery 06/21/2013     Priority: Medium     Kim-en-Y in 2006       History of hysterectomy for benign disease 06/21/2013     Priority: Medium     2008, For fibroids.  Cervix removed, but still has ovaries.       Moderate major depression (H) 01/24/2013     Priority: Medium     Moderate persistent asthma without complication 11/15/2012     Priority: Medium     Pulmonary embolism with infarction (H) 11/15/2012     Priority: Medium     12/24/2018  Unprovoked.  + fam hx of DVT.  Treated w/ DOAC 12/2018through 9/2019 12/24/2018  Unprovoked.  + fam hx of DVT.  Plan warfarin for 6 months.       Persons encountering health services in other specified circumstances 11/15/2012     Priority: Medium     Tier 3    Status: Accept  Care Coordination Start Date: 02/27/12  State Tier Level: Level 3  Language/Barrier to Learning: No  Significant Mental Health Issues: Yes  Date Care Coordination Discontinued: 05/28/15    DX V65.8 REPLACED WITH [id number] (04/08/2013)       Open wound of groin with complication 05/12/2011     Priority: Medium     Metabolic acidosis 07/26/2019     Priority: Low     reolved w/ discontinuation of ASA       Abnormal Pap smear of cervix 04/22/2019     Priority: Low     3-12-15: Pap/HPV neg.  Plan: Given hx (mildly abn pap, hyster), recheck in 5 yrs.  10-11-11: Pap/HPV neg.  Plan: Given ASUS pap in 2010, recommend repeating pap in 1 year.  8/3/2010 ASCUS Pap with +BV- pt treated with Metrogel.  Pt needs repeat Pap in 1yr.  8/4/2008 Pt had total hyst for menorrhagia, pelvic pain, scar tissue, and fibroids.    3-12-15: Pap/HPV neg.  Plan: Given hx (mildly abn pap, hyster), recheck in 5 yrs.  10-11-11: Pap/HPV neg.  Plan: Given ASUS pap in 2010, recommend repeating pap in 1 year.  8/3/2010 ASCUS Pap with +BV- pt treated with Metrogel.  Pt needs repeat Pap in  1yr.  8/4/2008 Pt had total hyst for menorrhagia, pelvic pain, scar tissue, and fibroids.       S/P hysterectomy 06/21/2013     Priority: Low     2008, For fibroids.  Cervix removed, but still has ovaries.       Carpal tunnel syndrome 11/15/2012     Priority: Low     Necrotizing fasciitis (H) 11/15/2012     Priority: Low     Health Care Home 11/15/2012     Priority: Low     Tier 3    Status: Accept  Care Coordination Start Date: 02/27/12  State Tier Level: Level 3  Language/Barrier to Learning: No  Significant Mental Health Issues: Yes  Date Care Coordination Discontinued: 05/28/15    DX V65.8 REPLACED WITH [id number] (04/08/2013)         Chronic pain of both shoulders 12/23/2005     Priority: Low     There are no exam notes on file for this visit.  Chief Complaint   Patient presents with     Pain Management     CPM     Blood pressure (!) 159/80, pulse 108, temperature 97.4  F (36.3  C), temperature source Oral, resp. rate 16, weight 90.8 kg (200 lb 3.2 oz), SpO2 100 %, not currently breastfeeding.  No results found for any visits on 04/08/21.   Bilateral arm pain  Not doing PT  Wants to get arm surgery, too.  SUBJECTIVE:  Ruthy Álvarez is here for followup of her chronic pain syndrome.  Her chronic pain source is OA of multiple locations.  She has contraindications to NSAIDs in that she has had a Kim-en-Y gastric bypass.  She has been on oxycodone 3-4 tablets daily for several years.  She has had no aberrant behavior.  She tolerates the medication well and would like a refill.  Her current dose is 1 p.o. t.i.d. and an extra half p.r.n.  She gets 100 per month.  I reviewed her database and there are no red flags.  She is due to sign an opioid agreement today.      On top of that, she has relatively new bilateral arm pain.  It is an achy discomfort on top of her shoulders.  It hurts to move her arms around.  It radiates into her upper arms.  Interestingly, she is trying to pursue surgery to remove  excess fat in her triceps because it seems to be causing pain as well, but this pain seems to be more proximal to that, up in the shoulders.  It hurts to reach above her head.  It hurts to sleep at night.  She has not tried any specific therapy other than continuing her current pain medications.  She does not have any trauma.      OBJECTIVE:   VITAL SIGNS:  As listed.   GENERAL:  Patient is alert, pleasant, and in no acute distress.   EXTREMITIES:  Her arms have no obvious deformity.  She can flex to about 80 degrees and internally rotate to about 12 on the right and about L2 on the left.  She has tenderness over both AC joints, interestingly, and has pain with cross arm adduction test bilaterally.  She has an equivocal Dee maneuver.  Pain with empty can maneuver.  Normal strength in her biceps, internal and external rotators of her shoulder.  Positive pain with cross arm adduction test.      IMAGING:  X-rays of her shoulders were reviewed personally, and she shows osteoarthritis at her AC joints.      ASSESSMENT/PLAN:   1.  Chronic pain syndrome.  This is stable.  I refilled her oxycodone.  She will follow up monthly.  Pain agreement updated today.   2.  Regarding her osteoarthritis of her shoulders, I called her back after I saw the x-ray report.  We will try some Diclofenac p.r.n. for now and reevaluate in a month.  Could consider Orthopedic referral for an injection.

## 2021-04-09 NOTE — RESULT ENCOUNTER NOTE
Labs were available at the time of the encounter and results were discussed in the office.    I called w/ xray results.

## 2021-04-16 ENCOUNTER — COMMUNICATION - HEALTHEAST (OUTPATIENT)
Dept: SCHEDULING | Facility: CLINIC | Age: 56
End: 2021-04-16

## 2021-04-16 ENCOUNTER — TRANSFERRED RECORDS (OUTPATIENT)
Dept: HEALTH INFORMATION MANAGEMENT | Facility: CLINIC | Age: 56
End: 2021-04-16

## 2021-04-27 ENCOUNTER — TELEPHONE (OUTPATIENT)
Dept: FAMILY MEDICINE | Facility: CLINIC | Age: 56
End: 2021-04-27

## 2021-04-27 NOTE — TELEPHONE ENCOUNTER
Dr. Meyer-  Patient was referred to [facility] services for PT. We are unable to get a hold of patient for the scheduled SOC this past weekend. We are going to try and see patient tomorrow 4/28 for SOC. Can we get verbal orders for okay for PT SOC for 4/28?  Thank you!  Julia Garza

## 2021-04-28 ENCOUNTER — COMMUNICATION - HEALTHEAST (OUTPATIENT)
Dept: FAMILY MEDICINE | Facility: CLINIC | Age: 56
End: 2021-04-28

## 2021-04-30 ENCOUNTER — COMMUNICATION - HEALTHEAST (OUTPATIENT)
Dept: SCHEDULING | Facility: CLINIC | Age: 56
End: 2021-04-30

## 2021-05-05 DIAGNOSIS — G89.4 CHRONIC PAIN DISORDER: ICD-10-CM

## 2021-05-05 DIAGNOSIS — G62.9 NEUROPATHY: ICD-10-CM

## 2021-05-05 DIAGNOSIS — M62.81 MUSCLE WEAKNESS (GENERALIZED): ICD-10-CM

## 2021-05-05 LAB
CK SERPL-CCNC: 88 U/L (ref 30–225)
CRP SERPL-MCNC: <2.9 MG/L (ref 0–8)
ERYTHROCYTE [SEDIMENTATION RATE] IN BLOOD BY WESTERGREN METHOD: 79 MM/H (ref 0–30)

## 2021-05-05 PROCEDURE — 86140 C-REACTIVE PROTEIN: CPT | Performed by: PATHOLOGY

## 2021-05-05 PROCEDURE — 82550 ASSAY OF CK (CPK): CPT | Performed by: PATHOLOGY

## 2021-05-05 PROCEDURE — 85652 RBC SED RATE AUTOMATED: CPT | Performed by: PATHOLOGY

## 2021-05-05 PROCEDURE — 36415 COLL VENOUS BLD VENIPUNCTURE: CPT | Performed by: PATHOLOGY

## 2021-05-06 ENCOUNTER — OFFICE VISIT (OUTPATIENT)
Dept: FAMILY MEDICINE | Facility: CLINIC | Age: 56
End: 2021-05-06
Payer: MEDICARE

## 2021-05-06 VITALS
OXYGEN SATURATION: 97 % | DIASTOLIC BLOOD PRESSURE: 62 MMHG | TEMPERATURE: 96.9 F | RESPIRATION RATE: 20 BRPM | SYSTOLIC BLOOD PRESSURE: 102 MMHG | HEART RATE: 115 BPM

## 2021-05-06 DIAGNOSIS — M19.011 ARTHRITIS OF BOTH ACROMIOCLAVICULAR JOINTS: ICD-10-CM

## 2021-05-06 DIAGNOSIS — E11.42 DIABETIC POLYNEUROPATHY ASSOCIATED WITH TYPE 2 DIABETES MELLITUS (H): ICD-10-CM

## 2021-05-06 DIAGNOSIS — M25.512 CHRONIC PAIN OF BOTH SHOULDERS: ICD-10-CM

## 2021-05-06 DIAGNOSIS — M25.511 CHRONIC PAIN OF BOTH SHOULDERS: ICD-10-CM

## 2021-05-06 DIAGNOSIS — M19.012 ARTHRITIS OF BOTH ACROMIOCLAVICULAR JOINTS: ICD-10-CM

## 2021-05-06 DIAGNOSIS — I10 ESSENTIAL HYPERTENSION: ICD-10-CM

## 2021-05-06 DIAGNOSIS — G89.4 CHRONIC PAIN SYNDROME: Primary | ICD-10-CM

## 2021-05-06 DIAGNOSIS — G89.29 CHRONIC PAIN OF BOTH SHOULDERS: ICD-10-CM

## 2021-05-06 PROCEDURE — 99214 OFFICE O/P EST MOD 30 MIN: CPT | Mod: 25 | Performed by: FAMILY MEDICINE

## 2021-05-06 PROCEDURE — 96372 THER/PROPH/DIAG INJ SC/IM: CPT | Performed by: FAMILY MEDICINE

## 2021-05-06 PROCEDURE — 80306 DRUG TEST PRSMV INSTRMNT: CPT | Performed by: FAMILY MEDICINE

## 2021-05-06 RX ORDER — OXYCODONE AND ACETAMINOPHEN 10; 325 MG/1; MG/1
TABLET ORAL
Qty: 105 TABLET | Refills: 0 | Status: SHIPPED | OUTPATIENT
Start: 2021-05-06 | End: 2021-06-03

## 2021-05-06 RX ADMIN — CYANOCOBALAMIN 1000 MCG: 1000 INJECTION, SOLUTION INTRAMUSCULAR; SUBCUTANEOUS at 12:31

## 2021-05-06 ASSESSMENT — ANXIETY QUESTIONNAIRES
IF YOU CHECKED OFF ANY PROBLEMS ON THIS QUESTIONNAIRE, HOW DIFFICULT HAVE THESE PROBLEMS MADE IT FOR YOU TO DO YOUR WORK, TAKE CARE OF THINGS AT HOME, OR GET ALONG WITH OTHER PEOPLE: SOMEWHAT DIFFICULT
4. TROUBLE RELAXING: NEARLY EVERY DAY
7. FEELING AFRAID AS IF SOMETHING AWFUL MIGHT HAPPEN: SEVERAL DAYS
GAD7 TOTAL SCORE: 16
2. NOT BEING ABLE TO STOP OR CONTROL WORRYING: NEARLY EVERY DAY
7. FEELING AFRAID AS IF SOMETHING AWFUL MIGHT HAPPEN: SEVERAL DAYS
5. BEING SO RESTLESS THAT IT IS HARD TO SIT STILL: MORE THAN HALF THE DAYS
2. NOT BEING ABLE TO STOP OR CONTROL WORRYING: NEARLY EVERY DAY
6. BECOMING EASILY ANNOYED OR IRRITABLE: MORE THAN HALF THE DAYS
6. BECOMING EASILY ANNOYED OR IRRITABLE: MORE THAN HALF THE DAYS
1. FEELING NERVOUS, ANXIOUS, OR ON EDGE: MORE THAN HALF THE DAYS
5. BEING SO RESTLESS THAT IT IS HARD TO SIT STILL: MORE THAN HALF THE DAYS
1. FEELING NERVOUS, ANXIOUS, OR ON EDGE: MORE THAN HALF THE DAYS
3. WORRYING TOO MUCH ABOUT DIFFERENT THINGS: NEARLY EVERY DAY
IF YOU CHECKED OFF ANY PROBLEMS ON THIS QUESTIONNAIRE, HOW DIFFICULT HAVE THESE PROBLEMS MADE IT FOR YOU TO DO YOUR WORK, TAKE CARE OF THINGS AT HOME, OR GET ALONG WITH OTHER PEOPLE: SOMEWHAT DIFFICULT
GAD7 TOTAL SCORE: 16
3. WORRYING TOO MUCH ABOUT DIFFERENT THINGS: NEARLY EVERY DAY

## 2021-05-06 ASSESSMENT — PATIENT HEALTH QUESTIONNAIRE - PHQ9
5. POOR APPETITE OR OVEREATING: NEARLY EVERY DAY
SUM OF ALL RESPONSES TO PHQ QUESTIONS 1-9: 18

## 2021-05-06 NOTE — PROGRESS NOTES
"  Assessment & Plan     Chronic pain syndrome  Stable, but feels sleep is still problematic.  Will increase quantity from 100 to 105 to enable consistent 1.5 tab at bedtime dosing.  pdmp neg  - Rapid Urine Drug Screen (Labdaq)  - oxyCODONE-acetaminophen (PERCOCET)  MG per tablet; 1 po QAM , 1 po afternoon, and 1.5 QHS.    Chronic pain of both shoulders  See below    Arthritis of both acromioclavicular joints  Likely substantial source of shoulder pain.  Will refer to ortho for injection    Essential hypertension  Controlled.  Cr normal!    Diabetic polyneuropathy associated with type 2 diabetes mellitus (H)  Discussed changing from gabapentin to pregabalin--defer for now               BMI:   Estimated body mass index is 40.44 kg/m  as calculated from the following:    Height as of 2/3/21: 1.499 m (4' 11\").    Weight as of 4/8/21: 90.8 kg (200 lb 3.2 oz).     See Patient Instructions    No follow-ups on file.    Jose Angel Meyer MD  [facility]    Subjective     HPI     Pain History:  When did you first notice your pain? - More than 6 weeks   Have you seen this provider for your pain in the past?   Yes   Where in your body do you have pain? Right groin.    Very sensitive along right groin, hurts where she had an incision years ago.  Hurts to lift leg.  Throbbing/stabbing/pressure. A little better when walking and standing.  Worse when laying down, but doesn't hurt to lay down.  Initiatl source of pain is the neuropathy in feet.  Ankles and toes are worse, particularly at night.  Not sleeping well due to the pain.  Oxycodone helps sometimes, but not consistently.  Takes oxy TID.    Gabapentin doesn't seem to help fully.  Saw Dr Roland, and has f/up tomorrow.  Has in hospital for chest pain.  Got J&J shot and got CP after that.  Didn't find a source of CP.  Chest pain is better.  Planning homecare, but hasn't been arranged yet.    Shoulders are sore.    Wants to remove skin in " arms--that  Are you seeing anyone else for your pain? No          PHQ-9 SCORE 10/7/2016 5/12/2017 5/6/2021   PHQ-9 Total Score - - -   PHQ-9 Total Score 18 19 18     JUAN-7 SCORE 8/1/2014 5/12/2017 5/6/2021   Total Score 15 - -   Total Score - 13 16     PEG Score 5/6/2021   PEG Total Score 7.33       Chronic Pain Follow Up:    Location of pain: neuropathy, right groin, arms and shoulders.  Analgesia/pain control:    - Recent changes:  none    - Overall control: Tolerable with discomfort    - Current treatments: oxycodone tid and gapapentin   Adherence:     - Do you ever take more pain medicine than prescribed? No    - When did you take your last dose of pain medicine?  8 am today   Adverse effects: No   PDMP Review     None        Last CSA Agreement:   Patient-Level CSA:    There are no patient-level csa.       Last UDS: 4/8/2021   Results for orders placed or performed in visit on 05/06/21   Rapid Urine Drug Screen (Labdaq)     Status: Abnormal   Result Value Ref Range    Cannabinoids Qual Urine NEGATIVE NEGATIVE    Phencyclidine NEGATIVE NEGATIVE    Cocaine Qual Urine NEGATIVE NEGATIVE    Methamphetamine Qual NEGATIVE NEGATIVE    Morphine Qual NEGATIVE NEGATIVE    Amphetamines Qual POSITIVE (A) NEGATIVE    Benzodiazepine Qual Urine NEGATIVE NEGATIVE    Tricyclic Antidepressants POSITIVE (A) NEGATIVE    Methadone Qual NEGATIVE NEGATIVE    Barbiturates Qual Urine NEGATIVE NEGATIVE    Oxycodone Qual POSITIVE (A) NEGATIVE    Propoxyphene NEGATIVE NEGATIVE    Buprenorphine Qual Urine NEGATIVE NEGATIVE    Temperature of Urine was Between  Degrees F YES YES             PEG Score 5/6/2021   PEG Total Score 7.33         Review of Systems   Constitutional, HEENT, cardiovascular, pulmonary, gi and gu systems are negative, except as otherwise noted.  Chest pain has resolved.  Had non diagnositic eval @ [facility].        Objective    LMP  (LMP Unknown)   There is no height or weight on file to calculate BMI.  Physical Exam    GENERAL: healthy, alert and no distress  RESP: lungs clear to auscultation - no rales, rhonchi or wheezes  CV: regular rate and rhythm, normal S1 S2, no S3 or S4, no murmur, click or rub, no peripheral edema and peripheral pulses strong  ABDOMEN: soft, nontender, no hepatosplenomegaly, no masses and bowel sounds normal  MS: SLR negative.  Hip pain with IR.  Flexion normal.  No edema  Shoulders tender over both AC joints.    rev'd shoulder xrays from last visit:  AC joint OA noted.      Orders Only on 05/05/2021   Component Date Value Ref Range Status     CK Total 05/05/2021 88  30 - 225 U/L Final     CRP Inflammation 05/05/2021 <2.9  0.0 - 8.0 mg/L Final     Sed Rate 05/05/2021 79* 0 - 30 mm/h Final     Results for orders placed or performed in visit on 05/06/21 (from the past 24 hour(s))   Rapid Urine Drug Screen (Labdaq)   Result Value Ref Range    Cannabinoids Qual Urine NEGATIVE NEGATIVE    Phencyclidine NEGATIVE NEGATIVE    Cocaine Qual Urine NEGATIVE NEGATIVE    Methamphetamine Qual NEGATIVE NEGATIVE    Morphine Qual NEGATIVE NEGATIVE    Amphetamines Qual POSITIVE (A) NEGATIVE    Benzodiazepine Qual Urine NEGATIVE NEGATIVE    Tricyclic Antidepressants POSITIVE (A) NEGATIVE    Methadone Qual NEGATIVE NEGATIVE    Barbiturates Qual Urine NEGATIVE NEGATIVE    Oxycodone Qual POSITIVE (A) NEGATIVE    Propoxyphene NEGATIVE NEGATIVE    Buprenorphine Qual Urine NEGATIVE NEGATIVE    Temperature of Urine was Between  Degrees F YES YES     Sodium 136 - 145 mmol/L 140    Potassium 3.5 - 5.1 mmol/L 3.9    Chloride 98 - 109 mmol/L 109    CO2 20 - 29 mmol/L 21    Anion Gap 7 - 16 mmol/L 10    Calcium 8.4 - 10.4 mg/dL 8.1Low     Comment: Low serum albumin may artificially lower total calcium, without impacting ionized calcium concentrations. If patient has or is at risk for hypoalbuminemia, consider ionized serum calcium to more accurately assess calcium status.   BUN 7 - 26 mg/dL 20    Creatinine 0.55 - 1.02 mg/dL  0.84    GFR, Estimated >60 mL/min/1.73m2 >60    Glucose 70 - 100 mg/dL 86

## 2021-05-06 NOTE — NURSING NOTE
Clinic Administered Medication Documentation      Injectable Medication Documentation    Patient was given Cyanocobalamin (B-12). Prior to medication administration, verified patients identity using patient s name and date of birth. Please see MAR and medication order for additional information. Patient instructed to stay in clinic after the injection but patient declined.      Was entire vial of medication used? Yes  Vial/Syringe: Single dose vial  Expiration Date:  01/01/2023  Was this medication supplied by the patient? No    Name of provider who requested the medication administration: Dr. Meyer  Name of provider on site (faculty or community preceptor) at the time of performing the medication administration: Dr. Meyer    Date of next administration: 06/06/21  Date of next office visit with provider to renew medication plan (must be seen annually): 11/20/21    Okay to give per Dr. Meyer.    BARBARA Dial

## 2021-05-07 ASSESSMENT — ASTHMA QUESTIONNAIRES: ACT_TOTALSCORE: 10

## 2021-05-07 ASSESSMENT — ANXIETY QUESTIONNAIRES: GAD7 TOTAL SCORE: 16

## 2021-05-08 LAB
A-TOCOPHEROL VIT E SERPL-MCNC: 7.4 MG/L (ref 5.5–18)
BETA+GAMMA TOCOPHEROL SERPL-MCNC: 1.2 MG/L (ref 0–6)
VIT B6 SERPL-MCNC: 34.8 NMOL/L (ref 20–125)

## 2021-05-19 DIAGNOSIS — Z53.9 DIAGNOSIS NOT YET DEFINED: Primary | ICD-10-CM

## 2021-05-19 PROCEDURE — G0180 MD CERTIFICATION HHA PATIENT: HCPCS | Performed by: FAMILY MEDICINE

## 2021-05-26 ASSESSMENT — PATIENT HEALTH QUESTIONNAIRE - PHQ9: SUM OF ALL RESPONSES TO PHQ QUESTIONS 1-9: 19

## 2021-05-27 ENCOUNTER — RECORDS - HEALTHEAST (OUTPATIENT)
Dept: ADMINISTRATIVE | Facility: CLINIC | Age: 56
End: 2021-05-27

## 2021-05-28 ENCOUNTER — RECORDS - HEALTHEAST (OUTPATIENT)
Dept: ADMINISTRATIVE | Facility: CLINIC | Age: 56
End: 2021-05-28

## 2021-05-28 ASSESSMENT — ANXIETY QUESTIONNAIRES: GAD7 TOTAL SCORE: 12

## 2021-05-28 NOTE — PATIENT INSTRUCTIONS - HE
"Plan:  1. Welcome back, given many of your fatigue/pain issues and weight gain from your lowest weight (but stable since we last saw you in 2016), we'll be rechecking your vitamin labs and since you've missed your B12 shot this month, we gave you 1000mcg today.  2. To optimize your vitamin levels, take a \"complete multivitamin with iron TWICE daily\" (no gummies ever), B12 should either be a monthly shot or using sublingual B12 at 1000mcg daily or at least every other day to maintain good levels. Vitamin D should increase to 5000IUs daily and calcium citrate should occur at least once daily if not twice (depending on labs/vitmain D level).  I'll contact you when all your labs are back and we'll correct any deficiencies. Given your recent iron tests, your anemia seems more likely to have a contributing from low B12 due to the high MCV level or possible a bit low in folate periodically.     3. Given depression referral to psychology to help with stress management/family.    4. Given high sleepiness score with snoring history and smaller airway, referral for sleep study ordered. They'll call you to set up study.    5. Given severe reflux requiring both PPI (omeprazole) and H2 blocker (Zantac), upper GI ordered to evaluate further.  Xray will contact you to arrange and I'll update you with results.    6. Continue your PT weekly.     7. Follow up with dietician to improve protein intake, 3 meals daily and remember to separate food from beverages by 20-30 minutes to avoid vomiting/dumping or early return of hunger and resulting grazing. Read through your folder on optimal bariatric method and nutrition.    8. Given some of your symptoms and the fat accumulation on the back of your neck, \"buffalo hump\", we'll check a morning cortisol level after a bedtime dose of 1mg of dexamethasone. You can arrange in your clinic lab or outpatient lab and get it drawn by 8am the next day for an accurate result.    9. Recheck with me in " about 10 weeks to see how we're progressing and to recheck any deficiencies found.     10. Optimizing protein intake at 3 meals daily should lower your insulin requirements or at least bring your blood sugars closer to the ideal range. Check regularly to avoid lows and adjust insulin if needed or contact Dr. Meyer or Myself if uncertain about reducing dose. Often times a 10-30% reduction is possible if good method is practiced and the grazing/snacking improves as a result.      [facility]  Nutritional Guidelines  Gastric Bypass 18 Months Post Op and Beyond    General Guidelines and Helpful Hints:    Eat 3 meals per day + protein supplement(s). No snacks between meals.  o Do not skip meals.  This can cause overeating at the next meal and will prevent adequate protein and nutritional intake.    Aim for 60-80 grams of protein per day.  o Always eat your protein first. This assists with optimal nutrition and helps you stay full longer.  o Depending on your portion size, you may need to drink approved protein supplement between meals to achieve protein goals. Follow recommendations of your Dietitian.     Eat your protein first, and then follow with fiber.   o It is not necessary to count your fiber, but 15-20 grams per day is recommended.    o Add fiber by including fruits, vegetables, whole grains, and beans.     Portions should remain about 1 cup per meal. Use measuring cups to be accurate.    Continue to use saucer/salad plates, infant/toddler silverware to keep portion sizes small and take small bites.    Eat [id number] to make each meal last 20-30 minutes. Always stop eating when satisfied.    Continue to use caution with foods containing skins, peels or membranes. Chew well!    Aim for 64 oz. of calorie-free fluids daily.  o Continue to avoid caffeine and carbonation. If you choose to drink alcohol, do so in moderation.   o Remember to avoid drinking during meals, 15-30 minutes before and 30  minutes after.    Exercise is wu for continued weight loss and weight maintenance. Aim for 30-60 minutes of physical activity most days of the week. Include cardiovascular and strength training.    If having trouble tolerating meat, try using a crock-pot, tinfoil tent, steamer or other moist cooking method to create tender meats. Add broth or low-fat gravy to help meat stay moist.     Avoid high sugar and high fat foods to prevent dumping syndrome.  o Check nutrition labels for less than 10 grams of sugar and less than 10 grams of fat per serving.    Continue Taking Vitamins/Minerals:  o 1000- mcg of Sublingual B-12 daily  o 1 Multivitamin with Iron twice daily (chewable or swallow tabs)  o 500-600 mg Calcium Citrate twice daily (chewable or swallow tabs)  o 5000 IU Vitamin D3 daily    Sample Grocery List    Protein:    Fat free Greek or light yogurt (less than 10 grams sugar)    Fat free or low-fat cottage cheese    String cheese or reduced fat cheese slices    Tuna, salmon, crab, egg, or chicken salad made with light or fat free mayonnaise    Egg or Egg Substitute    Lean/extra lean turkey, beef, bison, venison (ground, sirloin, round, flank)    Pork loin or tenderloin (grilled, baked, broiled)    Fish such as salmon, tuna, trout, tilapia, etc. (grilled, baked, broiled)    Tender cuts of lean (skinless) turkey or chicken    Lean deli meats: turkey, lean ham, chicken, lean roast beef    Beans such as kidney, garbanzo, black, hunter, or low-fat/fat free refried beans    Peanut butter (natural preferred). Limit to 1 Tbsp. per day.    Low-fat meatloaf (made with lean ground beef or turkey)    Sloppy Joes made with low-sugar ketchup and lean ground beef or turkey    Soy or vegetable protein (i.e. vegan crumbles, soy/veggie burger, tofu)    Hummus    Vegetables:    Fresh: cooked or raw (as tolerated)    Frozen vegetables    Canned vegetables (low sodium or no salt added, rinse before cooking/eating)    (Ok to have  skins/peels/membranes/seeds - just chew well)    Fruits:    Fresh fruit    Frozen fruit (no sugar added)    Canned fruit (packed in its own juice, NOT syrup)    (Ok to have skins/peels/membranes/seeds - just chew well)    Starch:    Unsweetened whole-grain hot cereal (or high fiber cold cereal, dry)    Toasted whole wheat bread or [place] Thins    Whole grain crackers    Baked /boiled/mashed potato/sweet potato    Cooked whole grain pasta, brown rice, or other cooked whole grains    Starchy vegetables: corn, peas, winter squash    Protein Supplement:     Ready to drink protein shake with:  o 15-30 grams protein per serving  o Less than 10 grams total carbohydrate per serving     Protein powder mixed with:  o  Skim or 1% milk  o Low fat or fat free Lactaid milk, plain or no sugar added soymilk  o Water     Fats: (use in moderation)    1 teaspoon of soft tub margarine    1 teaspoon olive oil, canola oil, or peanut oil    1 tablespoon of low-fat miller or salad dressing     Sample Menu for 18+ months after Gastric Bypass    You do NOT need to eat/drink the full portion sizes listed below  Always stop when you are satisfied    Breakfast   cup 1% cottage cheese     cup mixed berries   Lunch 2 oz lean roast beef on   [place] Thin with 1 tsp. light miller    small tomato, chopped, mixed with 1 tsp. light vinaigrette dressing   Supplement Approved protein supplement (if needed between meals)   Dinner 2 oz grilled salmon    cup salad greens with 1 tsp. light salad dressing and 1 tsp. ground flax seed    cup quinoa or brown rice     Breakfast   cup egg substitute with   cup sautéed chopped vegetables  2 light [place] Krisp crackers   Lunch Tuna Melt:   cup tuna mixed with 1 tsp. light miller over   [place] Thin. Top with 2-3 slices cucumber and 1 oz slice of low fat cheese   Supplement 1 cup skim milk (if needed between meals)   Dinner 3 oz  grilled, broiled, or baked seasoned skinless chicken breast    cup asparagus     Breakfast    cup plain oatmeal made with skim or 1% milk with 1 Tbsp. flavored/unflavored protein powder added  1 mozzarella string cheese   Lunch 2 oz deli turkey breast  1/3 cup salad with 1 tsp. light salad dressing, 1/8 of a whole avocado and 1 Tbsp. sunflower seeds   Dinner 3 oz. pork loin made in a crock pot, seasoned with a spice rub    cup cooked carrots   Supplement Approved protein supplement (if needed between meals)     Breakfast 1 cup breakfast casserole made with egg substitute, turkey sausage,  and steamed, chopped bell peppers   Supplement  1 cup light Greek yogurt (if needed between meals)   Lunch 2 oz. teriyaki turkey    cup mashed sweet potato with 1-2 spritzes of spray butter (like [place])    cup fresh pineapple   Dinner 3 oz low fat meatloaf    cup roasted garlic zucchini     Breakfast   cup leftover breakfast casserole    cup no sugar added applesauce with 1 Tbsp. unflavored protein powder and a sprinkle of cinnamon    Lunch 3 oz shrimp with 1-2 Tbsp. low-sugar cocktail sauce for dipping    c. whole wheat pasta drizzled with   tsp. olive oil   Supplement 1 cup skim/1% milk with scoop of protein powder (if needed between meals)   Dinner Grilled, seasoned kebob with 2 oz lean beef and   cup vegetables     Breakfast Breakfast pizza:   [place] Thin spread with 1 Tbsp. low sugar spaghetti sauce,   cup shredded low fat cheese, melted and 1 slice of [place] mar     cup fresh fruit mixed with chopped almonds   Lunch   cup black bean soup  4-5 whole grain crackers   Dinner 3 oz  tilapia with lemon pepper seasoning    cup stewed tomatoes   Supplement 1 string cheese (if needed between meals)     Breakfast 2 hard boiled eggs (discard 1 egg yolk)    whole wheat English Muffin with 1 tsp. low sugar jelly   Lunch   cup leftover black bean soup topped with 1-2 Tbsp. low fat cheese  2-3 light [place] Krisp crackers   Supplement Approved protein supplement (if needed between meals)   Dinner 3 oz sirloin steak    cup steamed  broccoli

## 2021-05-28 NOTE — PROGRESS NOTES
"[facility] Follow Up Visit for Previous Bariatric Surgery   Date of visit: 4/22/2019  Physician: Henri Snow MD  Primary Care is Jose Angel Meyer MD.  Ruthy Álvarez   53 y.o.  female    Date of Surgery: 2006  Initial Weight: max was 360 lbs  Initial BMI: na  Today's Weight:   Wt Readings from Last 1 Encounters:   04/22/19 (!) 230 lb (104.3 kg)     Body mass index is 45.68 kg/m .  Weight: (!) 230 lb (104.3 kg)       Assessment and Plan   Assessment: Ruthy is a 53 y.o. year old female who is 13 years s/p  Kim en Y Gastric Bypass with Dr. Harper.  She has had a durable weight loss of 130 lbs since surgery. Lowest weight down to 136 lbs. Overall compliance with the Health system Bariatric Surgery Program has been lost to follow up the last 3 years.  She's struggled w/ pain/neuropathy and overall blunted/tired and depressed affect some of which may be attributable to her under supplementation use the last few years but also in part due to family dynamic stress, chronic pain and medications related and mood disorder that I think would be helped with psychology therapy.        Today, there are numerous issues in play and most relate to poor bariatric method which I reviewed for her today from a diet/supplementation and mindful eating standpoint (handouts/folders provided).    .  Ruthy Álvarez feels that she has not achieved her   preoperative goals for bariatric surgery.    Plan:  1. Welcome back, given many of your fatigue/pain issues and weight gain from your lowest weight (but stable since we last saw you in 2016), we'll be rechecking your vitamin labs and since you've missed your B12 shot this month, we gave you 1000mcg today.  2. To optimize your vitamin levels, take a \"complete multivitamin with iron TWICE daily\" (no gummies ever), B12 should either be a monthly shot or using sublingual B12 at 1000mcg daily or at least every other day to maintain good levels. Booster shot given " "today given missed dose earlier this month.  Vitamin D should increase to 5000IUs daily and calcium citrate should occur at least once daily if not twice (depending on labs/vitmain D level).  I'll contact you when all your labs are back and we'll correct any deficiencies. Given your recent iron tests, your anemia seems more likely to have a contributing from low B12 due to the high MCV level or possible a bit low in folate periodically.     3. Given depression referral to psychology to help with stress management/family.    4. Given high sleepiness score with snoring history and smaller airway, referral for sleep study ordered. They'll call you to set up study.    5. Given severe reflux requiring both PPI (omeprazole) and H2 blocker (Zantac), upper GI ordered to evaluate further.  Xray will contact you to arrange and I'll update you with results.    6. Continue your PT weekly.     7. Follow up with dietician to improve protein intake, 3 meals daily and remember to separate food from beverages by 20-30 minutes to avoid vomiting/dumping or early return of hunger and resulting grazing. Read through your folder on optimal bariatric method and nutrition.    8. Given some of your symptoms and the fat accumulation on the back of your neck, \"buffalo hump\", we'll check a morning cortisol level after a bedtime dose of 1mg of dexamethasone. You can arrange in your clinic lab or outpatient lab and get it drawn by 8am the next day for an accurate result.    9. Recheck with me in about 10 weeks to see how we're progressing and to recheck any deficiencies found.     10. Optimizing protein intake at 3 meals daily should lower your insulin requirements or at least bring your blood sugars closer to the ideal range. Check regularly to avoid lows and adjust insulin if needed or contact Dr. Meyer or Myself if uncertain about reducing dose. Often times a 10-30% reduction is possible if good method is practiced and the grazing/snacking " improves as a result.      1. Postoperative malabsorption  Comprehensive Metabolic Panel    Vitamin D, Total (25-Hydroxy)    Parathyroid Hormone Intact    Vitamin B12    Vitamin A (Retinol), Serum or Plasma    Thyroid Stimulating Hormone (TSH)    Vitamin B1 (Thiamine), Whole Blood (VIT B1 WB)    Copper, Serum or Plasma    Zinc, Serum or Plasma    Magnesium    Folate, Serum    Glycosylated Hemoglobin A1c    Ambulatory referral to Psychology    Amb Referral to Bariatric Dietician    XR Upper GI W Digital Recording    DISCONTINUED: cyanocobalamin injection 1,000 mcg   2. Neuropathy (H)  DISCONTINUED: cyanocobalamin injection 1,000 mcg   3. Depression, unspecified depression type  Thyroid Stimulating Hormone (TSH)    Ambulatory referral to Psychology   4. Hx of gastric bypass  Split Night Sleep Study   5. Weight gain following gastric bypass surgery  Thyroid Stimulating Hormone (TSH)    dexamethasone (DECADRON) 1 MG tablet   6. Excessive daytime sleepiness  Split Night Sleep Study   7. Obesity, Class III, BMI 40-49.9 (morbid obesity) (H)  Split Night Sleep Study    dexamethasone (DECADRON) 1 MG tablet   8. [place] hump  dexamethasone (DECADRON) 1 MG tablet       Return in about 2 months (around 7/1/2019).     Bariatric Surgery Review   Interim History/LifeChanges: having more daytime fatigue, increased nerve pain issues and using a walker or crutches as result. Weight is stable over that time period apart from recent 9 lb reduction. Has had some sporadic vitamin use, missed this month's B12 shot at her clinic so will give her one today.  Continuing on insulin products and has required some opiate pain medication regularly along with gabapentin which can increase appetite/grazing tendencies.  Has been off vitamins for 3 months (multivitamin once a day).     Had period of living in [place] in 2017 for a year and then moved back in November 2018.    Patient Concerns: weight gain from her lowest in the first year post op  "when she was \"walking everywhere\". Generalized pain, fatigue/depression. Has noticed \"hump\" on lower c-spine. Painless.  Medication changes: see list, sporadic vitamin use. Off lasix recently. On Eliquis now and off ASA.    Appetite (1-10): breakfast, sometimes skipped (3 times weekly estimated average).     GERD sx at all? On PPI and H2 blocker.    Vitamin Intake:   Multivitamin   sporadic use   Vitamin D  2000 IUS (under the 5000IUs recommended).   Calcium  no   Vit. B-12    missed April 1st dose, usually gets injections at her PCP clinic     Habits:            Alcohol Intake  no   NSAID Use  asa in the past.    Caffeine Use  no   Exercise  gets PT weekly for her legs. Water exercise.   CPAP Use:  no, but stop bang of 5 and ESS of 21. May be multifactorial given lack of vitamins and sedating pain medications  But mallampati IV airway so will refer for PSG.   Birth Control  na             MBSAQIP  Surgeon: (Dr. Harper)    Symptoms  Hair Loss: Yes  Reactive Hypoglycemia: Yes  Abdominal Pain: Yes  Nausea: Yes  Heartburn: Yes  Constipation: Yes  Diarrhea: Yes  Trouble Breathing or Chest Pain: Yes  Leg Swelling: Yes  Skin rashes under folds: No                         LABS: ordered      LABS:  Lab Results   Component Value Date    WBC 11.7 (H) 04/20/2019    HGB 9.6 (L) 04/20/2019    HCT 30.9 (L) 04/20/2019     (H) 04/20/2019     04/20/2019      Lab Results   Component Value Date    JWYOIMPG39OH 17.2 (L) 04/07/2015    Lab Results   Component Value Date    HGBA1C 9.0 (H) 12/11/2018      Lab Results   Component Value Date    CHOL 191 10/22/2015    Lab Results   Component Value Date     (H) 02/14/2013         Lab Results   Component Value Date    FERRITIN 83 03/18/2019      Lab Results   Component Value Date    HDL 44 (L) 10/22/2015      Lab Results   Component Value Date    EANTWSOH89 1,731 (H) 02/19/2015    No results found for: 44784   Lab Results   Component Value Date    LDLCALC 124 10/22/2015    " Lab Results   Component Value Date    TSH 2.65 02/22/2016    Lab Results   Component Value Date    FOLATE 7.6 03/18/2019      Lab Results   Component Value Date    TRIG 113 10/22/2015    Lab Results   Component Value Date    ALT 54 (H) 01/04/2019    AST 28 01/04/2019    ALKPHOS 184 (H) 01/04/2019    BILITOT 0.3 01/04/2019    No results found for: TESTOSTERONE     No components found for: CHOLHDL No results found for: 7597   @[facility](vitamin a: 1)@          Patient Profile   Social History     Social History Narrative     Not on file        Past Medical History   Past Medical History:   Diagnosis Date     Abnormal cervical Papanicolaou smear with positive human papillomavirus deoxyribonucleic acid test      Ankle joint pain     bilateral     Asthma      Carpal tunnel syndrome     bilateral     Compression neuropathy      Depression      Diabetes (H)     type II     Diabetic nephropathy (H)      DVT of lower extremity, bilateral (H) 2011    right     Esophageal reflux      Hyperlipidemia      Hypertension      Infectious fasciitis 2011    abdominal wall      Insomnia      Neck pain      Necrotizing fasciitis (H)      Neuropathy (H)      Obesity      Pulmonary embolism and infarction (H)      SOB (shortness of breath)     with exertion     Tarsal tunnel syndrome      Urinary incontinence      Patient Active Problem List   Diagnosis     Carpal tunnel syndrome of right wrist     Asthma     Depression     DVT of lower extremity, bilateral (H)     Diabetes (H)     Hypertension     Diabetic nephropathy (H)     Neuropathy (H)     Neck pain     Obesity     Compression neuropathy of left lower extremity     Mononeuritis lower limb, left     Tarsal tunnel syndrome of left side     Neuroma digital nerve, left     Nausea & vomiting     Gastroenteritis     Generalized abdominal pain     Chest wall pain     Morbid obesity with BMI of 45.0-49.9, adult (H)     Influenza A     Hernia     GERD (gastroesophageal reflux disease)      Chronic pain     Diarrhea     Hernia of anterior abdominal wall     Pulmonary embolism, bilateral (H)     Syncope, near     Abnormal Pap smear of cervix     Bilateral ankle joint pain     Chronic pain disorder     Copper deficiency     Chronic pain of both shoulders     History of hysterectomy for benign disease     Bariatric surgery status     History of cholecystectomy     History of injury     History of pulmonary embolism     Insomnia     Hyperlipidemia     Microalbuminuria     Moderate major depression (H)     Moderate persistent asthma without complication     Necrotizing fasciitis (H)     Open wound of groin with complication     Osteoarthritis of both knees     Pain in left lower leg     Persons encountering health services in other specified circumstances     Urinary incontinence     Pulmonary embolism with infarction (H)     Polyneuropathy due to secondary diabetes mellitus (H)     Hx of gastric bypass     Current Outpatient Medications   Medication Sig Note     apixaban (ELIQUIS) 5 mg Tab tablet Take 5 mg by mouth 2 (two) times a day.      atorvastatin (LIPITOR) 10 MG tablet Take 10 mg by mouth at bedtime.             benazepril (LOTENSIN) 40 MG tablet Take 40 mg by mouth daily.      capsaicin (ZOSTRIX) 0.033 % Crea Apply 1 g topically 3 (three) times a day. Patient applies to legs and feet            chlorthalidone (HYGROTEN) 25 MG tablet Take 25 mg by mouth daily.      cholecalciferol, vitamin D3, 1,000 unit tablet Take 2,000 Units by mouth daily.      cyanocobalamin 1,000 mcg/mL injection Inject 1,000 mcg into the shoulder, thigh, or buttocks every 28 days. 4/22/2019: Last shot in March 2019     DULoxetine (CYMBALTA) 60 MG capsule Take 60 mg by mouth daily. 1/4/2019: Patient claims this was stopped however from the 12/11 office visit, this was refilled     famotidine (PEPCID) 20 MG tablet Take 20 mg by mouth 2 (two) times a day.      ferrous sulfate 325 (65 FE) MG EC tablet Take 325 mg by mouth daily.        fluticasone-salmeterol (ADVAIR HFA) 230-21 mcg/actuation inhaler Inhale 2 puffs daily.        1/4/2019: Patient only takes daily instead of two times a day     gabapentin (NEURONTIN) 250 mg/5 mL solution Take 500 mg by mouth 3 (three) times a day. Take 2 teaspoons by mouth three times daily      insulin aspart U-100 (NOVOLOG FLEXPEN U-100 INSULIN) 100 unit/mL injection pen Inject 12 Units under the skin 3 (three) times a day with meals.             insulin glargine (BASAGLAR KWIKPEN U-100 INSULIN) 100 unit/mL (3 mL) pen Inject 44 Units under the skin at bedtime.             magnesium oxide (MAG-OX) 400 mg (241.3 mg magnesium) tablet Take 400 mg by mouth daily.      omeprazole (PRILOSEC) 20 MG capsule Take 20 mg by mouth daily before breakfast.      oxyCODONE-acetaminophen (PERCOCET/ENDOCET) 5-325 mg per tablet 2 (two) times a day as needed for pain. 1 tab every morning and 2 tabs at bedtime prn       4/22/2019: May have prn dose during the day and 2 at night.     risperiDONE (RISPERDAL) 0.25 MG tablet Take 0.25 mg by mouth bedtime. 1/4/2019: Patient claims she is still taking this however she has not filled it since 4/30/18 x 180 tabs - the rx at that time was 2 tabs at bedtime. Per 12/11 note this was not filled by her PCP     senna-docusate (SENNOSIDES-DOCUSATE SODIUM) 8.6-50 mg tablet Take 1 tablet by mouth 2 (two) times a day.      simethicone (MYLICON) 80 MG chewable tablet Chew 1 tablet (80 mg total) every 6 (six) hours as needed for flatulence.      SUMAtriptan (IMITREX) 25 MG tablet Take 25 mg by mouth every 2 (two) hours as needed for migraine (Maximum 200 mg per 24 hours).       tolterodine (DETROL LA) 2 MG ER capsule Take 2 mg by mouth daily.      aspirin 81 MG EC tablet Take 81 mg by mouth daily.      copper gluconate 2 mg cap Take 1 capsule by mouth daily.      dexamethasone (DECADRON) 1 MG tablet Take 1 mg by mouth at bedtime.      furosemide (LASIX) 20 MG tablet Take 20 mg by mouth daily as  "needed for other. swelling        Past Surgical History  She has a past surgical history that includes Gastric bypass ();  section; Uterine fibroid surgery (); Abdominal wall surgery; pr revise median n/carpal tunnel surg (Right, 10/24/2014); Hysterectomy; Uterine fibroid surgery; Knee surgery (Right); Colonoscopy (N/A, 3/15/2017); and Colonoscopy (N/A, 3/16/2017).     Examination   /63   Pulse 82   Resp 18   Ht 4' 11.5\" (1.511 m)   Wt (!) 230 lb (104.3 kg)   LMP 2005   SpO2 99%   BMI 45.68 kg/m    Height: 4' 11.5\" (1.511 m) (2019 12:23 PM)  Weight: (!) 230 lb (104.3 kg) (2019 12:23 PM)  BMI (Calculated): 45.7 (2019 12:23 PM)  SpO2: 99 % (2019 12:23 PM)    General:  Alert and ambulatory,   HEENT:  No conjunctival pallor, moist mucous Membranes, neck is thin. C7 fat pad prominence c/w buffalo hump.  Pulmonary:  Normal respiratory effort, no cough, no audible wheezes/crackles.  CV:  Regular rate and Rhythm, no murmurs, pulses 2 plus  Abdominal: morbid obesity. Nontender. Few silvery striae.   Extremities: no pitting edema. Dry skin.  Skin:  No obvious pallor or jaundice.  Pscyh/Mood: blunted, fatigued and depressed affect. Admits some family issues/seclusion, loss of energy and ongoing pain. Was open to psychology referral and we discussed assessing her labs to see if any additional reasons for her depression (thyroid/sporadic B12 use, etc). Is on chronic pain medications and has poor sleep \"waking nearly every hour\" for one reason or another.          Counseling:   We reviewed the important post op bariatric recommendations:  -eating 3 meals daily  -eating protein first, getting >60gm protein daily  -eating slowly, chewing food well  -avoiding/limiting calorie containing beverages  -drinking water 15-30 minutes before or after meals  -limiting restaurant or cafeteria eating to twice a week or less    We discussed the importance of restorative sleep and stress " management in maintaining a healthy weight.  We discussed the National Weight Control Registry healthy weight maintenance strategies and ways to optimize metabolism.  We discussed the importance of physical activity including cardiovascular and strength training in maintaining a healthier weight.  We discussed the importance of life-long vitamin supplementation and life-long  follow-up.    Ruthy was reminded that, to avoid marginal ulcers she should avoid tobacco at all, alcohol in excess, caffeine in excess, and NSAIDS (unless indicated for cardioprotection or othewise and opposed by a PPI).    At least 40 minutes was spent in direct consultation and over 50% of the time devoted to counseling regarding maximizing the benefits of her previous bariatric surgery while minimizing risks of nutritional or structural complications.    Henri Snow MD  [facility].  4/22/2019  1:20 PM

## 2021-05-28 NOTE — PROGRESS NOTES
Order for sleep study received, reviewed, and approved.   Reassess Obstructive Sleep Apnea after 130 lbs sustained weight loss  Comments for study:   none  Zeyad Pink MD  2:06 PM, 4/24/2019
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
## 2021-05-29 ENCOUNTER — RECORDS - HEALTHEAST (OUTPATIENT)
Dept: ADMINISTRATIVE | Facility: CLINIC | Age: 56
End: 2021-05-29

## 2021-05-29 NOTE — TELEPHONE ENCOUNTER
Overnight polysomnography was reviewed.   The patient had snoring but does not have obstructive sleep apnea. Please call the patient to offer cancellation or an earlier follow up visit. May double book except for my 1/2 days.Thank you.

## 2021-05-29 NOTE — PROGRESS NOTES
Patient called at home due to no show for psychotherapy.  Her phone is apparently disconnected, not able to leave message.  DALE SalehSW

## 2021-05-29 NOTE — PROGRESS NOTES
"Mental Health Visit Note    6/4/2019    Start time: 4:20    Stop Time: 5:10   Session # 2    Session Type: Patient is presenting for an Individual session.   Persons present: patient and therapist.    Ruthy Álvarez is a 53 y.o. female is being seen today for   Chief Complaint   Patient presents with     MH Follow Up     Depression     low mood in the context of interpersonal family stress     Anxiety     anxiety in the context of interpersonal family stress   .     New symptoms or complaints: None    Functional Impairment:   Personal: 4  Family: 3  Work: 4  Social: 4    Clinical assessment of mental status: [Same MS as 5/8/2019] Patient's grooming is Well groomed, attire is Appropriate, and age appears Appears Stated. Behavior towards examiner is Cooperative, motor activity is Within normal, and eye contact is Appropriate.  Patient's mood is Depressed, and affect is Congruent w/content of speech, Tearful, Anxious and Depressed.  Speech/language is Within normal and Dramatic, attention is Within normal, and concentration is Brief. Thought process is Within normal, thought content is Within noraml and  Within normal.  Patient's orientation is X 3, memory is  Impairment, Recent, Remote and Mild, judgement is No Evidence of Impairment, and estimated intelligence is Average. Demonstrated insight is Adequate while fund of knowledge is adequate.    Suicidal/Homicidal Ideation present: None Reported This Session    Patient's impression of their current status:  \"My family keeps on taking out there is stress on me.\"  Patient reports mood as depressed, anxious, stressed. Says she hears voices, \"and I sees them out of the corner of my eyes\".    Says that praying helps \"to drive them away\".  Complains that Zyprexa makes her sleepy but that it doesn't help her sleep through the night because of the pain.   Processes family stress, oldest daughter keeping her grandchildren from her, youngest child with issues she worries " "about.    Patient reports follow up on therapy homework: spoke with the [facility] about CADI services, she needs a note from her doctor to get those services.  She did get 6 hours PCA/week.  She continues attendance at pain clinic. Says PT was cut off because of insurance issues. Sad because she felt that it had started to help \"where everyday didn't hurt as much\".      Therapist impression of patients current state: Acute symptoms of depression and anxiety appear triggered by interpersonal family stress, probably worsened from history of trauma and associated emotional dysregulation.  Unknown if visual and auditory stimuli are probably worsened as well by stress. She remains overwhelmed by psychosocial stressors, chronic pain, poor support system.  Reports she takes her medications as prescribed.    Processed negative cognitions, reinforced strengths, validated patient efforts and feelings.  Discussed again possible resources for support including [facility] case management, CADI, [facility] crisis team, arms services.  Patient still declines, stating she wants to stay with her new PCA because she does not want people in her home.  Wants to focus on medical appointments.    Attempted to get ANA for past  providers.  She does not remember names or clinics but saw psychiatry provider in [id number], also 2015.   She last saw a psychotherapist in 2017, does not remember that provider name or agency. She has seen about 8 therapists in her life.     Type of psychotherapeutic technique provided: Client centered, Solution-focused and CBT    Progress toward short term goals:Progress as expected,   spoke with the [facility] about CADI services, she needs a note from her doctor to get those services.  She did get 6 hours PCA/week.  She continues attendance at pain clinic. Says PT was cut off because of insurance issues. Sad because she felt that it had started to help \"where everyday didn't hurt as much\".      Review of long term " goals: Not done at today's visit    Diagnosis:   Schizoaffective Disorder  PTSD, preliminary    Plan and Follow up: Patient to return in two weeks for follow up.  She will practice 1 self care/self soothing activity daily as discussed.  She will continue physical therapy and attendance at her pain clinic.    Discharge Criteria/Planning: Client has chronic symptoms and ongoing therapy for maintenance stability recommended.    Carmen Tello MSW, LICSW  6/4/2019

## 2021-05-29 NOTE — TELEPHONE ENCOUNTER
Attempted to contact Ruthy today 6/11/2019 @ [phone]. Line was busy 2 times. Unable to leave message.

## 2021-05-29 NOTE — TELEPHONE ENCOUNTER
Called the patient at [phone], no answer, line was busy. Unable to leave a message at this time.    Matilde Gomez [facility] 6/12/2019 8:39 AM

## 2021-05-29 NOTE — TELEPHONE ENCOUNTER
Called the patient today 6/13/2019  at [phone], no answer, line was busy. Unable to leave a message at this time.    At this point I will mail a letter

## 2021-05-30 VITALS — WEIGHT: 241 LBS | HEIGHT: 59 IN | BODY MASS INDEX: 48.59 KG/M2

## 2021-05-30 NOTE — PROGRESS NOTES
Patient late canceled today, second late cancel/no show.  Mailed letter outlining clinic expectations and that she will be discharged if she has another late cancel/no show before 7/10/2020.  Carmen Tello, DALESW

## 2021-05-30 NOTE — PROGRESS NOTES
"Mental Health Visit Note    6/26/2019    Start time: 11:01    Stop Time: 11:47   Session # 3    Session Type: Patient is presenting for an Individual session.   Persons present: patient and therapist.    Ruthy Álvarez is a 53 y.o. female is being seen today for   Chief Complaint   Patient presents with     MH Follow Up     Depression     Low mood in the context of little to do and low capacity.     Anxiety     Anxiety about grandsons and their schooling   .     New symptoms or complaints: Low mood in the context of little to do and little physical capacity, worries about her grandsons and her schooling    Functional Impairment:   Personal: 4  Family: 3  Work: 4  Social: 4    Clinical assessment of mental status: [Same MS as 6/4/2019] Patient's grooming is Well groomed, attire is Appropriate, and age appears Appears Stated. Behavior towards examiner is Cooperative, motor activity is Within normal, and eye contact is Appropriate.  Patient's mood is Depressed, and affect is Congruent w/content of speech, Tearful, Anxious and Depressed.  Speech/language is Within normal and Dramatic, attention is Within normal, and concentration is Brief. Thought process is Within normal, thought content is Within noraml and  Within normal.  Patient's orientation is X 3, memory is  Impairment, Recent, Remote and Mild, judgement is No Evidence of Impairment, and estimated intelligence is Average. Demonstrated insight is Adequate while fund of knowledge is adequate.    Suicidal/Homicidal Ideation present: None Reported This Session    Patient's impression of their current status:  \"I can't get to [employer] because I don't have a ride.\"  Patient reports mood is low and sometimes anxious.  Patient complains of being alone with little to do. \"My son tells me I have to pay him gas money every time I get I the car.\" Unable to get to [employer] because of transportation issues.     Processes concerns about her 11 and 15 year old grandsons, " "feeling that they are not being sent to school. Says she doesn't want to report it because \"I don't want the kids to end up in foster care\".  She has no contact with the daughter or the grandchldren but runs into them at the store. \"Whenever I see her, the kids are with her instead of at school\".  \"That's the way she tortures me.\"     Patient reports mixed follow-up with therapy homework: She tried engaging in self-care/self soothing activity a couple of times.  She continued physical therapy but then it was cut off for some reason, continues attendance at her pain clinic.  cause she felt that it had started to help \"where everyday didn't hurt as much\".      Therapist impression of patients current state: Acute symptoms of depression in the context of  psychosocial stressors and familial interpersonal distress.  Limited capacity and insight.  Does not complain of visual or auditory hallucinations today.  She remains overwhelmed by psychosocial stressors, chronic pain, and poor support system.  Suspect symptoms are worsened by history of trauma and associated emotional dysregulation.    Processed negative cognitions, reinforced strengths, validated patient efforts and feelings. Brainstormed sources of emotional support for patient.  She identified her oldest son and his four grandchildren as supports for her, able to talk with them about 1x/week as they are in [place].  Brainstormed ways to get to [employer].  Discussed [employer], patient states \"they're terrible\".  Patient identifies that she has a couple friends at [employer] who she can ask for rides.     Type of psychotherapeutic technique provided: Client centered, Solution-focused and CBT    Progress toward short term goals:Mixed follow-up in progress: She tried engaging in self-care/self soothing activity a couple of times.  She continued physical therapy but then it was cut off for some reason, continues attendance at her pain clinic.  cause she felt that it had " "started to help \"where everyday didn't hurt as much\".      Review of long term goals: Not done at today's visit    Diagnosis:   1. Schizoaffective disorder, chronic condition (H)     2. Major depression, recurrent, chronic (H)         Plan and Follow up: Patient to return in two weeks for follow up. She will reach out to her son in [place] so that she can be with family over the holiday.  She will reach out to [employer] members for rides to [employer].  She will explore [employer] as alternative way to get to [employer]. She will continue to attend pain clinic, and will discuss with physical therapy that she wants with her physician.  She will practice 1 self-care/self soothing activity daily as discussed.    Discharge Criteria/Planning: Client has chronic symptoms and ongoing therapy for maintenance stability recommended.    Carmen JIMENEZ, LICSW  6/26/2019  "

## 2021-05-31 ENCOUNTER — RECORDS - HEALTHEAST (OUTPATIENT)
Dept: ADMINISTRATIVE | Facility: CLINIC | Age: 56
End: 2021-05-31
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
## 2021-05-31 NOTE — PROGRESS NOTES
HPI: This patient is a 52yo F who presents for evaluation of the throat at the request of Dr. Meyer. There has been a globus/phlegm sensation for months, accompanied with occasional hoarseness and dry mouth. Denies fevers, otalgia, weight loss, odynophagia, dysphagia, hemoptysis, and shortness of breath. Does complain of sour taste and heartburn despite being on daily reflux medication. She has had a Kim-en-Y bypass in 2006.    Past medical history, surgical history, social history, family history, medications, and allergies have been reviewed with the patient and are documented above.    Review of Systems: a 10-system review was performed. Pertinent positives are noted in the HPI and on a separate scanned document in the chart.    PHYSICAL EXAMINATION:  GEN: no acute distress, normocephalic. Morbid obesity.  EYES: extraocular movements are intact, pupils are equal and round. Sclera clear.   EARS: auricles are normally formed. The external auditory canals are clear with minimal to no cerumen. Tympanic membranes are intact bilaterally with no signs of infection, effusion, retractions, or perforations.  NOSE: anterior nares are patent. There are no masses or lesions. The septum is non-obstructing.  OC/OP: clear, dentition is in good repair. The tongue and palate are fully mobile and symmetric. No masses or lesions. Cobblestoning of the posterior pharyngeal wall.  HP/L (scope): nasopharynx, base of tongue, vallecula, epiglottis, and pyriform sinuses are clear. The bilateral vocal folds are mobile and without lesion. There is interarytenoid edema and erythema.  NECK: soft and supple. No lymphadenopathy or masses. Airway is midline.  NEURO: CN VII and XII symmetric. alert and oriented. No spontaneous nystagmus. Walks with a walker.  PULM: breathing comfortably on room air, normal chest expansion with respiration  CARDS: no cyanosis or clubbing, normal carotid pulses    MEDICAL DECISION-MAKING: This patient is a 52yo F  with multiple chronic medical issues and symptoms of chronic laryngitis/globus sensation from acid reflux. She will be referred back to bariatrics for further management.

## 2021-05-31 NOTE — PROGRESS NOTES
Outpatient Mental Health Treatment Plan    Name:  Ruthy Álvarez  :  1965  MRN:  [id number]    Treatment Plan:  Initial Treatment Plan 2019  Intake/initial treatment plan date:  2019  Benefit and risks and alternatives have been discussed: Yes  Is this treatment appropriate with minimal intrusion/restrictions: Yes  Estimated duration of treatment:  Approximately 10+ sessions.  Anticipated frequency of services:  Every 2 weeks  Necessity for frequency: This frequency is needed to establish therapeutic goals and for continuity of care in order to monitor progress.  Necessity for treatment: To address cognitive, behavioral, and/or emotional barriers in order to work toward goals and to improve quality of life.    Session Type: Patient is presenting for an Individual session.   Persons present include patient and therapist.    Plan:      ? Depression    Goal:  Decrease average depression level from 8 to 3.   Strategies:    ?[x] Decrease social isolation     [x] Increase involvement in meaningful activities     ?[x] Discuss sleep hygiene     ?[x] Explore thoughts and expectations about self and others     ?[x] Process grief (loss of significant person, independence, role,        etc.)     ?[] Assess for suicide risk     ?[x] Implement physical activity routine (with physician approval)     [x] Consider introduction of bibliotherapy and/or videos     [x] Continue compliance with medical treatment plan (or explore         barriers)     ?  ?Degree to which this is a problem: 4  Degree to which goal is met: 0  Date of Review: 2019          ?   ? Anxiety    Goal:  Decrease average anxiety level from 7 to 3.   Strategies: ? [x]Learn and practice relaxation techniques and other coping        strategies (e.g., thought stopping, reframing, meditation)     ? [x] Increase involvement in meaningful activities     ? [x] Discuss sleep hygiene     ? [x] Explore thoughts and expectations about self and  others     ? [x] Identify and monitor triggers for panic/anxiety symptoms     ? [x] Implement physical activity routine (with physician approval)     ? [x] Consider introduction of bibliotherapy and/or videos     ? [] Continue compliance with medical treatment plan (or explore          barriers)                                       []     Degree to which this is a problem: 3  Degree to which goal is met: 0  Date of Review: 8/29/2019         Interpersonal stress    Goal:  Increase % satisfaction with relationships from 20%  to 70%.  Strategies: ?[x] Learn assertive communication skills through role-play and             other techniques     ?[x]  Explore thoughts and expectations about self and others      ?[x]  Learn and practice relaxation techniques and other coping         strategies     [x] Consider inviting others to attend conjoint sessions, and/or          consider referral for couples  or family therapy     ?[x] Consider introduction of bibliotherapy and/or videos     ?   Degree to which this is a problem: 4  Degree to which goal is met: 0  Date of Review: 8/29/2019      Adjustment to disability / changes    Goal:  Increase % acceptance from 30%  to 70%.   Strategies: ?[x]  Explore thoughts and expectations about self and others     [x] Explore emotional reactions to illness/injury     ?[x] Learn and practice relaxation techniques and other coping        strategies     [x] Implement physical activity routine (with physician approval)                [x] Increase involvement in meaningful activities                [x] Engage in values clarification and goal-setting     [x] Consider introduction of bibliotherapy and/or videos                [x] Explore barriers to cooperation with rehabilitation progra?m  Degree to which this is a problem: 3  Degree to which goal is met: 1  Date of Review: 8/29/2019         Functional Impairment:  1=Not at all/Rarely  2=Some days  3=Most Days  4=Every Day    Personal :  "4  Family : 4  Social : 3   Work/school : 4    Diagnosis:  Schizoaffective, versus Major Depression, recurrent, severe with history of psychosis  Adjustment disorder with anxiety and depression    WHODAS 2.0 12-item version: self administered: 79%     Scores presented in qualifiers to represent level of disability.  SEVERE Problem (high, extreme, ...) 50-95%     H1= 30  H2= 10  H3= 20    Scores presented in qualifiers to represent level of disability.  SEVERE Problem (high, extreme, ...) 50-95%     H1= 30  H2= 10  H3= 20        Clinical assessments and measures completed: PHQ-9 and JUAN-7 completed: JUAN 7: Total Score: 19   and PHQ9 :  PHQ-9 Total Score: 21     Strengths:  Survivor, care about the way she looks.  Limitations:  \"Vulnerable to hurt caused by children\", depression, medical problems  Cultural Considerations: Grew up in  family headed by strong single mother where [employer], hard work, school were all important.    Persons responsible for this plan: Patient and Provider            Psychotherapist Signature           Patient Signature:              Guardian Signature             Provider: Performed and documented by JAYME Saleh   Date:  8/29/2019      "
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
## 2021-06-01 ENCOUNTER — RECORDS - HEALTHEAST (OUTPATIENT)
Dept: ADMINISTRATIVE | Facility: CLINIC | Age: 56
End: 2021-06-01

## 2021-06-01 NOTE — PROGRESS NOTES
[facility] Discharge Summary    Patient Name: Ruthy Álvarez  Date: 2/19/2020  Referring provider: Santos He/Yoly Garza  Visit Diagnosis:     ICD-10-CM    1. Chronic neck pain M54.2     G89.29    2. Decreased range of motion of neck R29.898    3. Generalized muscle weakness M62.81        Goal Status:  See status in note below.    Patient was seen for 1 visit and was recommended to return to medical provider for assessment of mass on cervical spine.  Pt was to return to PT after medical intervention if deemed appropriate. Pt did not return.    Therapy will be discontinued at this time.  The patient will need a new referral to resume.    Thank you for your referral.  Rosalinda Bazan PT, DPT, OCS, CLT  2/19/2020  8:07 AM    September 19, 2019      Patient: Ruthy Álvarez   MR Number: [id number]   YOB: 1965   Date of Visit: 9/18/2019       Dear Dr. Santos He/Yoly Garza:    Thank you for this referral.   We are seeing Ruthy Álvarez for Physical Therapy of neck pain with headaches.    Medicare and/or Medicaid requires physician review and approval of the treatment plan. Please review the plan of care and verify that you agree with the therapy plan of care by co-signing this note.      Recommend pt get consult for large, soft mass on L side C6-T2. Recommend return to PT once mass has been assessed by medical providers.    Plan once pt is cleared to return back to PT would be.  Plan of Care: 9/18/19-12/17/19   Communication with: Referral Source  Patient Related Instruction: Nature of Condition;Treatment plan and rationale;Self Care instruction;Basis of treatment;Body mechanics;Posture;Precautions;Next steps;Expected outcome  Times per Week: 1  Number of Weeks: 12  Number of Visits: 12  Discharge Planning: when indicated  Precautions / Restrictions : none  Therapeutic Exercise: ROM;Stretching;Strengthening  Neuromuscular Reeducation:  posture;TNE;core;other  Neuromuscular Re-education: neurodynamics  Manual Therapy: soft tissue mobilization;joint mobilization;muscle energy  Functional Training (ADL's): self care;ADL's      Goals  Pt to be able to move head all directions for ADLs with minimal pain and difficulty in 12 weeks.    Pt to be I with HEP for neck and upper back flexibility and strengthening for improved QOL in 12 weeks.      If you have any questions or concerns, please don't hesitate to call.    Sincerely,    Rosalinda Bazan, PT, DPT, OCS, CLT      Physician recommendation:     ___ Follow therapist's recommendation        ___ Modify therapy      I certify the need for these services furnished within this plan and while under my care.    Referring Provider's  Printed Name:   _____________________________________________    Referring Provider's signature:  __________________________________________________  Date/time:    ________________      After signing this form, please return to the fax number in the header.  Thank you.    [facility]   Initial Evaluation    Patient Name: Ruthy Álvarez  Date of evaluation: 9/19/2019  Visit number: 1/12  Referring Provider: Santos He MD  Referring Diagnosis: Chronic neck pain with headache  Visit Diagnosis:     ICD-10-CM    1. Chronic neck pain M54.2     G89.29    2. Decreased range of motion of neck R29.898    3. Generalized muscle weakness M62.81        Assessment:        Ruthy Álvarez is a 53 y.o. female who presents to therapy today with chief complaints of neck pain and headaches with growing lump on back of neck. Onset date of sx was about 2 years ago when pt started having headaches, then noticed the lump on the back of her neck, and headache plus neck pain has been increasing ever since as well as size of lump.  Pt reported significant PMHx including DM II with neuropathy, lumbar spine pain, rheumatoid arthritis and h/o L LE nerve procedure - all of which cause pt  to use 4WW for pain with walking.  Pain symptoms are moderate to intense.  Functional impairments include difficulty and pain with head movement with ADLs.     Pt demo's signs and sx not consistent with only musculoskeletal dysfunction. Pt has limited neck ROM due to pain but not specific to pain changing only with movement of body. Soft, mildly tender, large mass on L C6-T2 area could be contributing to pt's neck pain and headache from just sheer size of mass. Recommend pt have this further evaluated prior to continuing with PT for ROM and weakness deficits.    Pt. is appropriate for skilled PT intervention as outlined in the Plan of Care (POC).  Pt. is a good candidate for skilled PT services to improve pain levels and function.    Goals  Pt to be able to move head all directions for ADLs with minimal pain and difficulty in 12 weeks.    Pt to be I with HEP for neck and upper back flexibility and strengthening for improved QOL in 12 weeks.    Patient's expectations/goals are realistic.    Barriers to Learning or Achieving Goals:  Co-morbidities or other medical factors.  Neuropathy, DM II, rheumatoid arthritis       Plan / Patient Instructions:      Plan for next visit: Hold chart until pt has mass assessed. Message sent to primary MD.    Recommend pt get consult for large, soft mass on L side C6-T2. Recommend return to PT once mass has been assessed by medical providers.    Treatment techniques, plan of care, and goals were discussed with the patient.  The patient agrees to the plan as outlined.  The plan of care is dynamic and will be modified on an ongoing basis.       Subjective:       Social information:   Occupation:unknown occupation   Work Status:NA    History of Present Illness:  Pt reports recent hospitalization 2 weeks ago with blood sugar issues.    Pt reports she started having headaches maybe for a couple of years. Pt could feel something moving around in her neck that seemed to cause her  headaches.    Pt then reports she noticed a lump shortly after the headaches started. Since then headaches, neck pain and size of lump have increased.    Yoly Garza/Santos He MD wrote order for chronic neck pain with intermittent headaches.    Pt states Dr. Meyer - her primary MD - is concerned the area might be possibly a tumor    Pt reports h/o L leg pain and she had a nerve procedure performed that did not help the leg pain and pt has had to use 4WW for about the past 3 years due to pain, weakness and neuropathy.    Pt also reports diabetic neuropathy and rheumatoid arthritis that has worsened over the past year.    Pain Ratin  Pain rating at best: 8  Pain rating at worst: 8  Pain description: throbbing pain, numbness more R hand/wrist than L    Patient reports benefit from:  tipping head to the right - very short term mild pain relief but then can hurt the rigth side       Objective:      Patient Outcome Measures :    Neck Disability Score in %: 46     Scores range from 0-100%, where a score of 0% represents minimal pain and maximal function. The minmal clinically important difference is a score reduction of 10%.    Precautions/Restrictions: DM, RA  Involved side: Bilateral  Posture Observation:      General sitting posture is  Poor with forward head, scapular protraction with rounded shoulders, thoracic kyphosis.   Pt has large, soft, mildly tender mass on L side of spine around C6-T2 - unknown etiology per pt's report.    Gait: Amb with slow gait speed with 4 WW and B trendelenberg and mild forward trunk lean.    Palpation: Mild tenderness with palpation of large mass around left side of C6-T2    ROM: Neck ROM mod limited all directions - pt able to perform and get a little bit of relief with small ROM but bigger ROM and holding out of neutral can increase pain    Strength: B UE grossly 4/4 with increased pain in neck and shoulders with resistanct    Sensation: Pt reports decreased sensation  intermittently R > L hand.    Treatment Today      TREATMENT MINUTES COMMENTS   Evaluation 35 Neck/Head   Self-care/ Home management     Manual therapy     Neuromuscular Re-education     Therapeutic Activity     Therapeutic Exercises 10 Discussed eval findings and POC. HEP instruction per Patient Instructions with written handout given. Pt advised to return to MD for cervical mass assessment.   Gait training     Modality__________________                Total 45    Blank areas are intentional and mean the treatment did not include these items.        PT Evaluation Code: (Please list factors)  Patient History/Comorbidities: multi - see subjective  Examination: neck/head  Clinical Presentation: evolving  Clinical Decision Making: low    Patient History/  Comorbidities Examination  (body structures and functions, activity limitations, and/or participation restrictions) Clinical Presentation Clinical Decision Making (Complexity)   No documented Comorbidities or personal factors 1-2 Elements Stable and/or uncomplicated Low   1-2 documented comorbidities or personal factor 3 Elements Evolving clinical presentation with changing characteristics Moderate   3-4 documented comorbidities or personal factors 4 or more Unstable and unpredictable High       Rosalinda Bazan PT, DPT, OCS, CLT  9/19/2019  8:49 AM

## 2021-06-01 NOTE — PROGRESS NOTES
"Mental Health Visit Note    9/11/2019    Start time: 10:07am    Stop Time: 10:58am  Session # 6    Session Type: Patient is presenting for an Individual session.   Persons present: patient and therapist.    Ruthy Álvarez is a 53 y.o. female is being seen today for   Chief Complaint   Patient presents with     MH Follow Up     Depression     low mood with medical and family issues     Anxiety     anxiety in the context of medical and family issues   .     New symptoms or complaints: None    Functional Impairment:   Personal: 4  Family: 3  Work: 4  Social: 4    Clinical assessment of mental status: [Same MS as 8/29/2019] Patient's grooming is Well groomed, attire is Appropriate, and age appears Appears Stated. Behavior towards examiner is Cooperative, motor activity is Within normal, and eye contact is Appropriate.  Patient's mood is Depressed, and affect is Congruent w/content of speech, Tearful, Anxious and Depressed.  Speech/language is Within normal and Dramatic, attention is Within normal, and concentration is Brief. Thought process is Within normal, thought content is Within noraml and  Within normal.  Patient's orientation is X 3, memory is  Impairment, Recent, Remote and Mild, judgement is No Evidence of Impairment, and estimated intelligence is Average. Demonstrated insight is Adequate while fund of knowledge is adequate.    Suicidal/Homicidal Ideation present: None Reported This Session    Patient's impression of their current status:  \"I'm so tired.\"  \"You know how you have this ball in your stomach.\"   States mood is depressed, energy is very poor.   Processes stressful convo she had with her adult daughter who she's been estranged from for four months.   Processes worry about grandson who is being home-schooled but patient feels isn't learning what he needs to.     Processes worry about her own health: She was hospitalized again, this time for high blood sugar, chest pain, headache. \"I feel like I " "was released too early but I guess they know best.\"  She was referred to bariatric clinic, \"but I keep going back and the doctor doesn't do anything.\"  She understands that she should be doing classes \"but they don't refer me.\"  Her PCP has referred her to a different bariatric clinic, and she will start PT that day as well.   \"My pain is not as bad in the pool, but they won't let me back in the pool until the bill for $467 is paid.\"    Patient nodding off in session, appears sedated/fatigued.     Patient reports mixed follow-through with therapy homework: She practiced self soothing activities of about 2 times per week.  States she did not her [employer] for spiritual and social support, says she did not feel well enough to go.  She has not yet explored [employer] as an alternative way to get to [employer].  She does continue to follow recommendations her pain clinic and discussed desired physical therapy.    Therapist impression of patients current state: Acute depressive and anxiety symptoms continue.  Patient's motivation and follow-through remain poor, partly in context with acute depression.  Patient nodding off in session, appears sedated or fatigued.  She is reasonably open and cooperative in session.    Processed negative cognitions, reinforced strengths, validated patient efforts and feelings. Brainstormed self-soothers.  Discussed mindfulness notion of \"accepting what is\".  Discussed motivation and ways to identify activities that are actually self-motivated because of something that she wants.    Type of psychotherapeutic technique provided: Client centered, Solution-focused and CBT    Progress toward short term goals:Mixed follow-up in progress: he practiced self soothing activities of about 2 times per week.  States she did not her [employer] for spiritual and social support, says she did not feel well enough to go.  She has not yet explored [employer] as an alternative way to get to [employer].  She does " continue to follow recommendations her pain clinic and discussed desired physical therapy.    Review of long term goals: Not done at today's visit and Date of last review 8/29/2019    Diagnosis:   1. Major depression, recurrent, chronic (H)     2. Schizoaffective disorder, chronic condition (H)         Plan and Follow up: Patient to return in two weeks for follow up.  She will practice 1 self-care/self soothing activity daily as discussed.  She will attend her [employer] for spiritual and social support.  She will explore [employer] as alternative way to get to [employer].  She will continue recommendations of her pain clinic, and discuss desired physical therapy.  Plan to assess trauma symptoms to the PCL 5 when patient is in less distress.    Discharge Criteria/Planning: Client has chronic symptoms and ongoing therapy for maintenance stability recommended.    Carmen Tello MSW, LICSW  9/11/2019

## 2021-06-02 ENCOUNTER — RECORDS - HEALTHEAST (OUTPATIENT)
Dept: ADMINISTRATIVE | Facility: CLINIC | Age: 56
End: 2021-06-02

## 2021-06-02 VITALS — BODY MASS INDEX: 45.16 KG/M2 | HEIGHT: 60 IN | WEIGHT: 230 LBS

## 2021-06-02 NOTE — TELEPHONE ENCOUNTER
Pt came in today for a wound check. Her medication was sent to the wrong pharmacy. Updated pharmacy and will re-send to Dr. Martinez to sign.

## 2021-06-02 NOTE — TELEPHONE ENCOUNTER
Pt calling to report that her pain medication is not helping with her pain. Pt has a large excision of mass yesterday. Discussed icing and changing medication to something stronger. She has a history of gastric bypass and cannot take NSAIDS.    Discussed with Dr. Martinez who will change her medication.     Also offered pt to come into clinic today for a dressing change since she states she has no one at home to help her.

## 2021-06-02 NOTE — PROGRESS NOTES
"Mental Health Visit Note    10/3/2019    Start time: 9:10am    Stop Time: 9:44am  Session # 7    Session Type: Patient is presenting for an Individual session.   Persons present: patient and therapist.    Ruthy Álvarez is a 54 y.o. female is being seen today for   Chief Complaint   Patient presents with     MH Follow Up     Depression     some improvement, mild low mood with family issues     Anxiety     mild anxiety with family issues   .     New symptoms or complaints: None    Functional Impairment:   Personal: 4  Family: 3  Work: 4  Social: 4    Clinical assessment of mental status: Patient's grooming is Well groomed, attire is Appropriate, and age appears Appears Stated. Behavior towards examiner is Cooperative, motor activity is Within normal, and eye contact is Appropriate.  Patient's mood is Depressed, and affect is Congruent w/content of speech, Irritable and Depressed.  Speech/language is Within normal and Dramatic, attention is Within normal, and concentration is Brief. Thought process is Within normal, thought content is Within noraml and  Within normal.  Patient's orientation is X 3, memory is  Impairment, Recent, Remote and Mild, judgement is No Evidence of Impairment, and estimated intelligence is Average. Demonstrated insight is Adequate while fund of knowledge is adequate.    Suicidal/Homicidal Ideation present: None Reported This Session    Patient's impression of their current status:  Patient reports mood is somewhat less depressed but still suffering depression in the context of family issues.  Processes her difficulty with her youngest adult son.   Able to identify positive that her sisters and adult son in [place] both took her out on her birthday.    Patient reports follow-up with therapy homework: Utilized \"let it go\" mindfulness technique to deal with interpersonal conflict with adult children. Watched the [employer] channel, [employer] for self-soothing. Utilized [employer] to attend her " "[employer] and attended a concert \"it was real nice\". Attending pain clinic appointments. \"They won't let me do PT until the bill is paid.\"  Attended her [employer] for spiritual and social support.    Therapist impression of patients current state: Patient continues with depression and anxiety symptoms but made some progress in participating in planning some positive activities and increased her activity level overall.  Reasonably open and cooperative in session and showing some better therapeutic connection.    Processed negative cognitions, reinforced strengths, validated patient efforts and feelings.  Discussed usefulness of acceptance and the inability to change others.  Patient identifies \"I just got to pray and let it go.\"    Type of psychotherapeutic technique provided: Client centered, Solution-focused and CBT    Progress toward short term goals:Progress as expected,    Utilized \"let it go\" mindfulness technique to deal with interpersonal conflict with adult children. Watched the [employer] channel, [employer] for self-soothing. Utilized [employer] to attend her [employer] and attended a concert \"it was real nice\". Attending pain clinic appointments. \"They won't let me do PT until the bill is paid.\"  Attended her [employer] for spiritual and social support.    Review of long term goals: Not done at today's visit and Date of last review 8/29/2019    Diagnosis:   1. Major depression, recurrent, chronic (H)     2. Schizoaffective disorder, chronic condition (H)         Plan and Follow up: Patient to return in two weeks for follow up.  She will continue to practice 1 self-care/self soothing activity daily as discussed.  She will attend her [employer] for spiritual and social support.  She will continue to utilize [employer] as alternative transportation.  She will continue recommendations of her pain clinic and discuss desired physical therapy as able.  Plan to assess trauma symptoms with the PCL 5.    Discharge " Criteria/Planning: Client has chronic symptoms and ongoing therapy for maintenance stability recommended.    Carmen Tello MSW, LICSW  10/3/2019

## 2021-06-02 NOTE — PROGRESS NOTES
Pt comes into clinic today for a final wound check. Her QASIM drain was removed on 10/25/19.     Her incision looks great. It is healing well with no signs of infection. Pt has no complaints.     Removed steri strips and cleaned area with adhesive remover. Informed her that she can leave incision as it is and no longer needs to cover it.     She will call us if she has any future questions or concerns.

## 2021-06-02 NOTE — PROGRESS NOTES
Excision education & surgery packet provided to pt.    Yudy Burnham CMA ([facility])

## 2021-06-02 NOTE — PROGRESS NOTES
Pt came into clinic today for QASIM drain removal. Today she had less than 10 cc's of serosanguinous fluid. Her incision is healing well. I replaced the steri strips and covered with gauze per her request.    QASIM drain was removed without difficulty. Applied Bacitracin to site and covered with a band aid.     Pt would like to come back in clinic for one week for a final wound check.

## 2021-06-02 NOTE — ANESTHESIA POSTPROCEDURE EVALUATION
Patient: Ruthy Álvarez  EXCISION, MASS, TORSO  Anesthesia type: general    Patient location: PACU  Last vitals:   Vitals Value Taken Time   /62 10/17/2019  3:00 PM   Temp 36.7  C (98  F) 10/17/2019  1:26 PM   Pulse 94 10/17/2019  3:03 PM   Resp 16 10/17/2019  2:30 PM   SpO2 92 % 10/17/2019  3:03 PM   Vitals shown include unvalidated device data.  Post vital signs: stable  Level of consciousness: awake and responds to simple questions  Post-anesthesia pain: pain controlled  Post-anesthesia nausea and vomiting: no  Pulmonary: unassisted, return to baseline  Cardiovascular: stable and blood pressure at baseline  Hydration: adequate  Anesthetic events: no    QCDR Measures:  ASA# 11 - Shonna-op Cardiac Arrest: ASA11B - Patient did NOT experience unanticipated cardiac arrest  ASA# 12 - Shonna-op Mortality Rate: ASA12B - Patient did NOT die  ASA# 13 - PACU Re-Intubation Rate: ASA13B - Patient did NOT require a new airway mgmt  ASA# 10 - Composite Anes Safety: ASA10A - No serious adverse event    Additional Notes:

## 2021-06-02 NOTE — PATIENT INSTRUCTIONS - HE
[facility] Bariatric Care  Nutritional Guidelines  Gastric Bypass 18 Months Post Op and Beyond    General Guidelines and Helpful Hints:    Eat 3 meals per day + protein supplement(s). No snacks between meals.  o Do not skip meals.  This can cause overeating at the next meal and will prevent adequate protein and nutritional intake.    Aim for 60-80 grams of protein per day.  o Always eat your protein first. This assists with optimal nutrition and helps you stay full longer.  o Depending on your portion size, you may need to drink approved protein supplement between meals to achieve protein goals. Follow recommendations of your Dietitian.     Eat your protein first, and then follow with fiber.   o It is not necessary to count your fiber, but 15-20 grams per day is recommended.    o Add fiber by including fruits, vegetables, whole grains, and beans.     Portions should remain about 1 cup per meal. Use measuring cups to be accurate.    Continue to use saucer/salad plates, infant/toddler silverware to keep portion sizes small and take small bites.    Eat S-L-O-W-L-Y to make each meal last 20-30 minutes. Always stop eating when satisfied.    Continue to use caution with foods containing skins, peels or membranes. Chew well!    Aim for 64 oz. of calorie-free fluids daily.  o Continue to avoid caffeine and carbonation. If you choose to drink alcohol, do so in moderation.   o Remember to avoid drinking during meals, 15-30 minutes before and 30 minutes after.    Exercise is wu for continued weight loss and weight maintenance. Aim for 30-60 minutes of physical activity most days of the week. Include cardiovascular and strength training.    If having trouble tolerating meat, try using a crock-pot, tinfoil tent, steamer or other moist cooking method to create tender meats. Add broth or low-fat gravy to help meat stay moist.     Avoid high sugar and high fat foods to prevent dumping syndrome.  o Check nutrition labels for less  than 10 grams of sugar and less than 10 grams of fat per serving.    Continue Taking Vitamins/Minerals:  o 2482-2881 mcg of Sublingual B-12 daily  o 1 Multivitamin with Iron twice daily (chewable or swallow tabs)  o 500-600 mg Calcium Citrate twice daily (chewable or swallow tabs)  o 5000 IU Vitamin D3 daily    Sample Grocery List    Protein:    Fat free Greek or light yogurt (less than 10 grams sugar)    Fat free or low-fat cottage cheese    String cheese or reduced fat cheese slices    Tuna, salmon, crab, egg, or chicken salad made with light or fat free mayonnaise    Egg or Egg Substitute    Lean/extra lean turkey, beef, bison, venison (ground, sirloin, round, flank)    Pork loin or tenderloin (grilled, baked, broiled)    Fish such as salmon, tuna, trout, tilapia, etc. (grilled, baked, broiled)    Tender cuts of lean (skinless) turkey or chicken    Lean deli meats: turkey, lean ham, chicken, lean roast beef    Beans such as kidney, garbanzo, black, hunter, or low-fat/fat free refried beans    Peanut butter (natural preferred). Limit to 1 Tbsp. per day.    Low-fat meatloaf (made with lean ground beef or turkey)    Sloppy Joes made with low-sugar ketchup and lean ground beef or turkey    Soy or vegetable protein (i.e. vegan crumbles, soy/veggie burger, tofu)    Hummus    Vegetables:    Fresh: cooked or raw (as tolerated)    Frozen vegetables    Canned vegetables (low sodium or no salt added, rinse before cooking/eating)    (Ok to have skins/peels/membranes/seeds - just chew well)    Fruits:    Fresh fruit    Frozen fruit (no sugar added)    Canned fruit (packed in its own juice, NOT syrup)    (Ok to have skins/peels/membranes/seeds - just chew well)    Starch:    Unsweetened whole-grain hot cereal (or high fiber cold cereal, dry)    Toasted whole wheat bread or [place] Thins    Whole grain crackers    Baked /boiled/mashed potato/sweet potato    Cooked whole grain pasta, brown rice, or other cooked whole  grains    Starchy vegetables: corn, peas, winter squash    Protein Supplement:     Ready to drink protein shake with:  o 15-30 grams protein per serving  o Less than 10 grams total carbohydrate per serving     Protein powder mixed with:  o  Skim or 1% milk  o Low fat or fat free Lactaid milk, plain or no sugar added soymilk  o Water     Fats: (use in moderation)    1 teaspoon of soft tub margarine    1 teaspoon olive oil, canola oil, or peanut oil    1 tablespoon of low-fat miller or salad dressing     Sample Menu for 18+ months after Gastric Bypass    You do NOT need to eat/drink the full portion sizes listed below  Always stop when you are satisfied    Breakfast   cup 1% cottage cheese     cup mixed berries   Lunch 2 oz lean roast beef on   [place] Thin with 1 tsp. light miller    small tomato, chopped, mixed with 1 tsp. light vinaigrette dressing   Supplement Approved protein supplement (if needed between meals)   Dinner 2 oz grilled salmon    cup salad greens with 1 tsp. light salad dressing and 1 tsp. ground flax seed    cup quinoa or brown rice     Breakfast   cup egg substitute with   cup sautéed chopped vegetables  2 light [place] Krisp crackers   Lunch Tuna Melt:   cup tuna mixed with 1 tsp. light miller over   [place] Thin. Top with 2-3 slices cucumber and 1 oz slice of low fat cheese   Supplement 1 cup skim milk (if needed between meals)   Dinner 3 oz  grilled, broiled, or baked seasoned skinless chicken breast    cup asparagus     Breakfast   cup plain oatmeal made with skim or 1% milk with 1 Tbsp. flavored/unflavored protein powder added  1 mozzarella string cheese   Lunch 2 oz deli turkey breast  1/3 cup salad with 1 tsp. light salad dressing, 1/8 of a whole avocado and 1 Tbsp. sunflower seeds   Dinner 3 oz. pork loin made in a crock pot, seasoned with a spice rub    cup cooked carrots   Supplement Approved protein supplement (if needed between meals)     Breakfast 1 cup breakfast casserole made with egg  substitute, turkey sausage,  and steamed, chopped bell peppers   Supplement  1 cup light Greek yogurt (if needed between meals)   Lunch 2 oz. teriyaki turkey    cup mashed sweet potato with 1-2 spritzes of spray butter (like [place])    cup fresh pineapple   Dinner 3 oz low fat meatloaf    cup roasted garlic zucchini     Breakfast   cup leftover breakfast casserole    cup no sugar added applesauce with 1 Tbsp. unflavored protein powder and a sprinkle of cinnamon    Lunch 3 oz shrimp with 1-2 Tbsp. low-sugar cocktail sauce for dipping    c. whole wheat pasta drizzled with   tsp. olive oil   Supplement 1 cup skim/1% milk with scoop of protein powder (if needed between meals)   Dinner Grilled, seasoned kebob with 2 oz lean beef and   cup vegetables     Breakfast Breakfast pizza:   [place] Thin spread with 1 Tbsp. low sugar spaghetti sauce,   cup shredded low fat cheese, melted and 1 slice of [place] mar     cup fresh fruit mixed with chopped almonds   Lunch   cup black bean soup  4-5 whole grain crackers   Dinner 3 oz  tilapia with lemon pepper seasoning    cup stewed tomatoes   Supplement 1 string cheese (if needed between meals)     Breakfast 2 hard boiled eggs (discard 1 egg yolk)    whole wheat English Muffin with 1 tsp. low sugar jelly   Lunch   cup leftover black bean soup topped with 1-2 Tbsp. low fat cheese  2-3 light [place] Krisp crackers   Supplement Approved protein supplement (if needed between meals)   Dinner 3 oz sirloin steak    cup steamed broccoli

## 2021-06-02 NOTE — PROGRESS NOTES
[facility] Consult    HPI:    54 y.o. year old female who I have been consulted by Jose Angel Meyer MD for evaluation of Mass (Consult for mass on neck, mass is soft. )  Here for consult the midline mass in her back.  Causes symptoms and issues on a regular basis she had gastric bypass with Dr. edgar in the past had recurrence of all of her weight over time.  She now has difficulties with walking ambulation and currently is being seen in the bariatric clinic for medical treatment of her weight issues.    Allergies:Sulfamethoxazole-trimethoprim    Past Medical History:   Diagnosis Date     Abnormal cervical Papanicolaou smear with positive human papillomavirus deoxyribonucleic acid test      Ankle joint pain     bilateral     Asthma      Carpal tunnel syndrome     bilateral     Compression neuropathy      Depression      Diabetes (H)     type II     Diabetic nephropathy (H)      DVT of lower extremity, bilateral (H) 2011    right     Esophageal reflux      Hyperlipidemia      Hypertension      Infectious fasciitis 2011    abdominal wall      Insomnia      Neck pain      Necrotizing fasciitis (H)      Neuropathy      Obesity      Pulmonary embolism and infarction (H)      SOB (shortness of breath)     with exertion     Tarsal tunnel syndrome      Urinary incontinence        Past Surgical History:   Procedure Laterality Date     ABDOMINAL WALL SURGERY      for necrotizing fasciitis      SECTION       COLONOSCOPY N/A 3/15/2017    Procedure: INCOMPLETE COLONOSCOPY;  Surgeon: Topher Street MD;  [place] [facility] [place]:      COLONOSCOPY N/A 3/16/2017    Procedure: COLONOSCOPY;  Surgeon: Topher Street MD;  [place] [facility] [place]  Service:      GASTRIC BYPASS  2006    Kim-en-Y     HYSTERECTOMY       KNEE SURGERY Right      MS REVISE MEDIAN N/CARPAL TUNNEL SURG Right 10/24/2014    Procedure: RIGHT REVISION CARPAL TUNNEL RELEASE WITH HYPOTHENAR FAT PAD;  Surgeon: Aaron Oreilly MD;   Location: [facility];  Service: Orthopedics     UTERINE FIBROID SURGERY  2008    hysterectomy     UTERINE FIBROID SURGERY         CURRENT MEDS:  Current Outpatient Medications on File Prior to Visit   Medication Sig Dispense Refill     apixaban (ELIQUIS) 5 mg Tab tablet Take 5 mg by mouth 2 (two) times a day.       aspirin 325 MG EC tablet Take 325 mg by mouth.       atorvastatin (LIPITOR) 10 MG tablet Take 10 mg by mouth daily.              capsaicin (ZOSTRIX) 0.025 % cream Apply 1 application topically 3 (three) times a day as needed.              chlorthalidone (HYGROTEN) 25 MG tablet Take 25 mg by mouth daily.       cholecalciferol, vitamin D3, 1,000 unit tablet Take 2,000 Units by mouth daily.       copper gluconate 2 mg cap Take 1 capsule by mouth daily.       cyanocobalamin 1,000 mcg/mL injection Inject 1,000 mcg into the shoulder, thigh, or buttocks every 28 days.       diclofenac sodium (VOLTAREN) 1 % Gel Apply topically.       diltiazem (DILACOR XR) 240 MG 24 hr capsule Take 240 mg by mouth daily.       dulaglutide (TRULICITY) 0.75 mg/0.5 mL PnIj Inject 0.75 mg under the skin every 7 days. Mondays       DULoxetine (CYMBALTA) 60 MG capsule Take 60 mg by mouth daily.       famotidine (PEPCID) 20 MG tablet Take 20 mg by mouth 2 (two) times a day.       ferrous sulfate 325 (65 FE) MG EC tablet Take 325 mg by mouth daily.        fluticasone-salmeterol (ADVAIR HFA) 230-21 mcg/actuation inhaler Inhale 2 puffs every evening.              furosemide (LASIX) 20 MG tablet Take 20 mg by mouth daily as needed for other. swelling  2     gabapentin (NEURONTIN) 250 mg/5 mL solution Take 500 mg by mouth 3 (three) times a day. Take 2 teaspoons by mouth three times daily       insulin glargine (BASAGLAR KWIKPEN) 100 unit/mL (3 mL) pen Inject 25 Units under the skin every evening. 5 pen 0     naloxone (NARCAN) 4 mg/actuation nasal spray 1 spray by Intranasal route as needed for opioid reversal. 1 spray (4 mg dose) into  "one nostril for opioid reversal. Call 911. May repeat if no response in 3 minutes.       oxyCODONE-acetaminophen (PERCOCET/ENDOCET)  mg per tablet Take 1 tablet by mouth every 6 (six) hours as needed for pain.              pen needle, diabetic (BD ULTRA-FINE ORIG PEN NEEDLE) 29 gauge x 1/2\" Ndle Use 4 times daily       senna-docusate (SENNOSIDES-DOCUSATE SODIUM) 8.6-50 mg tablet Take 1 tablet by mouth 2 (two) times a day.       simethicone (MYLICON) 80 MG chewable tablet Chew 1 tablet (80 mg total) every 6 (six) hours as needed for flatulence. 30 tablet 0     SUMAtriptan (IMITREX) 25 MG tablet Take 25 mg by mouth every 2 (two) hours as needed for migraine (Maximum 200 mg per 24 hours).        tolterodine (DETROL LA) 2 MG ER capsule Take 2 mg by mouth daily.       ACCU-CHEK FASTCLIX LANCET DRUM USE TO TEST BLOOD SUGARS QID  0     ACCU-CHEK GUIDE GLUCOSE METER Misc U UTD  0     ACCU-CHEK GUIDE strips CHECK GLUCOSE TID  6     No current facility-administered medications on file prior to visit.        Family History   Problem Relation Age of Onset     Cancer Brother      Coronary artery disease Mother          of MI at age of 51 years        reports that she has never smoked. She has never used smokeless tobacco. She reports that she does not drink alcohol or use drugs.    Review of Systems:  Negative except increasing weight walking and ambulation issues and this mass on her back.Otherwise twelve system of review is negative.      OBJECTIVE:  Vitals:    10/11/19 1302   BP: 104/64   Patient Site: Right Arm   Patient Position: Sitting   Cuff Size: Adult Regular   Pulse: 98   SpO2: 95%   Weight: (!) 229 lb (103.9 kg)   Height: 4' 11\" (1.499 m)     Body mass index is 46.25 kg/m .    EXAM:  GENERAL: This is a well-developed 54 y.o. female who appears her stated age  HEAD: normocephalic  HEENT: Pupils equal and reactive bilaterally  MOUTH: mucus membranes intact  CARDIAC: RRR without murmur  CHEST/LUNG:  Clear to " "auscultation  ABDOMEN: Soft, nontender, nondistended, no masses  BACK: mass 8x8 cm midline fatty \"buffalo hump\"  EXTREMITIES: Grossly normal, warm,   NEUROLOGIC: Focally intact, nonfocal  INTEGUMENT: No open lesions or ulcers  VASCULAR: Pulses intact, symmetrical upper and lower extremities.        LABS:  Lab Results   Component Value Date    WBC 11.5 (H) 09/08/2019    WBC 12.7 (H) 09/19/2014    HGB 9.8 (L) 09/08/2019    HCT 30.6 (L) 09/08/2019    MCV 99 09/08/2019     09/08/2019     INR/Prothrombin Time      Lab Results   Component Value Date    HGBA1C 7.8 (H) 08/22/2019     Lab Results   Component Value Date    ALT 23 09/08/2019    AST 23 09/08/2019    ALKPHOS 250 (H) 09/08/2019    BILITOT 0.3 09/08/2019        Assessment/Plan:   1. Neck mass  We discussed excision of this.  I think infarction this is probably more related to her weight then other things.  Her BMI is 46.25 today.  She is status post bypass by Dr. hernández in the past and now she is being followed in the bariatric clinic.  We discussed that this can be kind of a issue and we can try to excise this but this may well return.  Is more fatty area, over the superior midline spine.  Having this discussion and the high risks of recurrence and answered all questions she still would like to proceed to try to talk her Out of it.  I think that weight loss is probably a better option for her but she wants this removed secondary to discomfort and symptoms and causing problems with her clothing    - Verify informed consent for procedure; Standing  - Verify informed consent for Anesthesia; Standing  - Skin prep - Clip hair as needed; Standing  - Apply 2% Chlorhexidine Gluconate cloth (Abilio Cloth) to surgical area.; Standing  - Insert and maintain IV; Standing  - sodium chloride bacteriostatic 0.9 % injection 0.1-0.3 mL  - sodium chloride flush 3 mL (NS)  - NaCl 0.9% IR 0.9 % irrigation solution 1,000 mL (NS)  - Case Request: EXCISION, MASS, TORSO; Standing  - " Place sequential compression device; Standing  - ceFAZolin 2 g in dextrose 5% 100 mL (ANCEF)  - Case Request: EXCISION, MASS, TORSO      No follow-ups on file.     Rhys Martinez MD  [facility] Department of Surgery

## 2021-06-02 NOTE — ANESTHESIA CARE TRANSFER NOTE
Last vitals:   Vitals:    10/17/19 1326   BP: 160/80   Pulse: 94   Resp: 16   Temp: 36.7  C (98  F)   SpO2: 98%     Patient's level of consciousness is drowsy but awake. Denies pain. Denies nausea. VSS.   Spontaneous respirations: yes  Maintains airway independently: yes  Dentition unchanged: yes  Oropharynx: oropharynx clear of all foreign objects    QCDR Measures:  ASA# 20 - Surgical Safety Checklist: WHO surgical safety checklist completed prior to induction    PQRS# 430 - Adult PONV Prevention: 4558F - Pt received => 2 anti-emetic agents (different classes) preop & intraop  ASA# 8 - Peds PONV Prevention: NA - Not pediatric patient, not GA or 2 or more risk factors NOT present  PQRS# 424 - Shonna-op Temp Management: 4559F - At least one body temp DOCUMENTED => 35.5C or 95.9F within required timeframe  PQRS# 426 - PACU Transfer Protocol: - Transfer of care checklist used  ASA# 14 - Acute Post-op Pain: ASA14B - Patient did NOT experience pain >= 7 out of 10

## 2021-06-02 NOTE — PROGRESS NOTES
"Post-op Surgical Weight Loss Diet Evaluation     Assessment:  Pt presents for 13 years post-op RD visit, s/p RYGB. Today we reviewed current eating habits and level of physical activity, and instructed on the changes that are required for successful bariatric outcomes.    Patient Progress: patient is going to take phentermine per Dr. Carson visit    Pt's Initial Weight: 229 lbs  Weight: (!) 229 lb (103.9 kg)  Weight loss from initial: 0  % Weight loss: 0 %      Body mass index is 46.25 kg/m .     Concerns: tired all the time, patient was falling asleep during our visit, not very alert    Diabetes  Testing Blood Sugars: yes  If so, how often? 4 times per day-   DM Meds currently taking: GLP-1, insulin    Vitamins   Multi Vit with Iron: yes  Calcium Citrate: no  B12: yes  D3: yes    Do you experience hunger? no  Do you have \"dumping\" syndrome?none   Do you experience any reflux or discomfort with eating? Reflux  -Are you still taking omeprazole? no  Nausea: no  Vomiting: sometimes- last time was a week and a half ago  Diarrhea: no  Constipation: no  Hair loss:yes    Diet Recall/Time:   Breakfast: yogurt and 2 boiled eggs (30g)  Am Snack: none  Lunch: lunch meat sandwich- turkey and ham (21g)  Pm snack: none  Dinner: skip- happens frequently  HS Snack: none  Wake up in the night to eat: yes- fruit     Proteins/Veg/Fruits/CHO (NOT well tolerated): none    Estimated protein intake: 51 grams    Estimated portion size per meals:1-2 cup/meal    Incorporation of vegetables, fruits, carbohydrates into diet/meals using   Bariatric \"Plate Method\"   The patient and I discussed the importance of including lean/low fat protein at each meal, including a vegetable/fruit, and limiting carbohydrate intake to less than 25% of plate volume. Always keeping within approved perimeters of post op meal portion sizes according to post op guidelines.      Meals per week away from home: rare  Recommended limiting eating out to no more than " "2x/week.    Meal Duration:20 minutes  Encouraged slowing meal times down, 20-30 minutes, chewing to applesauce consistency.     Fluid-meal separation:  Fluids are sometimes  30min before and 30 minutes after meals.  The patient and I reviewed the anatomy of the bypass and why  fluids from a meal is so important.    Fluid Intake  Water: a gallon  Caffeine: only when at an appointment- trying to stay awake   Alcohol: none  Carbonation: none  Juice: orange juice occasionally     Discussed the importance of adequate hydration after surgery and the goal of 64+ oz of fluid daily.   The patient understands the importance of avoiding all carbonated, caffeinated, and sweetened drinks; and instead choosing 64oz plain water.    Exercise  Type: none- taking pain medicine for legs- walking around when going to appointments    Pt's understands that 30-60 minutes of daily activity is an important part of bariatric surgery success.   Encouraged pt to incorporate strength training exercise along with cardiovascular exercise as well, most days of the week.      PES statement:    (NI-5.7.1) Inadequate protein intake related to Gastric bypass causing increased nutrient needs due to malabsorption/ Decreased ability to consume sufficient protein as evidenced by Edema, poor musculature, dull skin, thin and fragile hair; and Estimated intake of protein insufficient to meet requirements    Intervention    Discussion  1. Discussed 18 months and beyond  Post-Op Nutritional Guidelines for RYGB  2. Recommended to consume 15-20gm protein at 3 meals daily, along with protein supplement/\"planned protein containing snack\" of 15-30gm protein, to reach goal of 60-80 gm protein daily.  3. Educated on post-op vitamin regimen: Multi Vit + iron 2x/day, calcium citrate 400-600 mg 2x/day, 0822-1414 mcg of Sublingual B-12 daily, and 5000 IU Vitamin D3 daily (MVI and calcium can be taken at the same time BID)  4. Reviewed lean protein " "sources  5. Bariatric Plate Method-  including lean/low fat protein at each meal, including a vegetable/fruit, and limiting carbohydrate intake to less than 25% of plate volume. Approved perimeters of post op meal portion sizes according to post op guidelines.  Instructions  1. Include 15-20gm protein at each meal, along with protein supplement/\"planned protein containing snack\" of 15-30gm protein, to reach goal of 60-80 gm protein daily.  2. Increase fluid intake to 64oz daily: choose plain or calorie/carbonation-free beverages.  3. Incorporate daily structured activity, 30-60 minutes most days of the week  4. Recommended pt to start taking: Multi Vit + iron 2x/day, calcium citrate 400-600 mg 2x/day, 4363-2681 mcg of Sublingual B-12 daily, and 5000 IU Vitamin D3 daily. (MVI and calcium can be taken at the same time)  5. Read food labels more consistently: keeping total fat grams <10, total sugar grams <10, fiber >3gm per serving.  6. Increase vegetable/fruit intake, by having a vegetable or fruit with each meal daily.  7. Practice plate method: 1/2 plate lean/low fat protein source, vegetable/fruit, <25% of plate complex carbohydrates.  8. Separate fluids 30 minutes before/after meal times.  9. Practice eating off of smaller plates/bowls, chewing to applesauce consistency, taking 20-30 minutes to eat in a calm/relaxed environment without distractions of tv/email/cell phone.    Handouts provided:  18 Months and Beyond  Post-Op Nutritional Guidelines for RYGB  Protein Supplement handout    Monitor/Evaluation    Pt to follow up in one month with KAROLINE.      Time In: 9:50a  Time Out: 10:20a      ABN signed: Yes    "

## 2021-06-02 NOTE — TELEPHONE ENCOUNTER
RECEIVED WAIVER OF LIABILITY FORM FOR SIGNATURE REGARDING APPEAL OF DENIAL OF SERVICE - PLACED IN PROVIDER'S CLINIC MAILBOX

## 2021-06-02 NOTE — PROGRESS NOTES
"HPI: Pt is here for follow up lipoma removal with Dr. Martinez on 10/17/2019.   she is doing well.  Pain is well controlled with her pain  Medicine but she reports considerable pain with drain manipulation. The drain has been putting up to 30-40 ml/day of thin red fluid.  No difficulties with the surgical wound/wounds.  she is eating well and denies fever and chills.         /80   Pulse (!) 118   Temp 99.1  F (37.3  C) (Tympanic)   Resp 16   Ht 4' 11\" (1.499 m)   Wt (!) 229 lb (103.9 kg)   LMP 04/16/2005   SpO2 95%   BMI 46.25 kg/m      EXAM:  GENERAL:Appears well  SURGICAL WOUNDS:  Incisions healing well, no enduration or drainage. QASIM drain with SS fluid and approximately 25 mL in bulb      Assessment/Plan: Doing well after surgery. The drain should stay in until the end of the week and can be pulled by the clinic RN if the output is less than 20 mL/day    Grecia Argueta , CNP  [facility] Surgery       "

## 2021-06-02 NOTE — PROGRESS NOTES
Bariatric Follow Up Visit with a History of Previous Bariatric Surgery     Date of visit: 10/24/2019  Physician: Vy Carson MD  Primary Care Provider:  Jose Angel Meyer MD Twaundalyn E Beckom   54 y.o.  female    Date of Surgery: 2006 RYGB with Dr. Harper  Initial Weight: 360# lost to 137#   Initial BMI:   Today's Weight:   Wt Readings from Last 1 Encounters:   10/24/19 (!) 229 lb (103.9 kg)     Body mass index is 46.25 kg/m .      Assessment and Plan     Assessment: Ruthy is a 54 y.o. year old female who is 13 yrs s/p  Kim en Y Gastric Bypass with Dr. Meredith Álvarez feels as if she had achieved the goals she hoped to accomplish through bariatric surgery and weight loss.   She has had significant weight regain after about 5 years and would like a revision. She saw Dr. Snow in April of this year, was set up with an UGI series and sleep study which both looked good.  I don't see that she did her annual bariatric labs. In 2013 her PTH was very high and D level was 7.    Encounter Diagnoses   Name Primary?     Postoperative malabsorption Yes     Morbid obesity with BMI of 45.0-49.9, adult (H)      Intestinal malabsorption, unspecified type           Current Outpatient Medications:      ACCU-CHEK FASTCLIX LANCET DRUM, USE TO TEST BLOOD SUGARS QID, Disp: , Rfl: 0     ACCU-CHEK GUIDE GLUCOSE METER Misc, U UTD, Disp: , Rfl: 0     ACCU-CHEK GUIDE strips, CHECK GLUCOSE TID, Disp: , Rfl: 6     apixaban (ELIQUIS) 5 mg Tab tablet, Take 5 mg by mouth 2 (two) times a day., Disp: , Rfl:      aspirin 325 MG EC tablet, Take 325 mg by mouth., Disp: , Rfl:      atorvastatin (LIPITOR) 10 MG tablet, Take 10 mg by mouth daily.    , Disp: , Rfl:      capsaicin (ZOSTRIX) 0.025 % cream, Apply 1 application topically 3 (three) times a day as needed.    , Disp: , Rfl:      chlorthalidone (HYGROTEN) 25 MG tablet, Take 25 mg by mouth daily., Disp: , Rfl:      cholecalciferol, vitamin D3, 1,000 unit tablet,  Take 2,000 Units by mouth daily., Disp: , Rfl:      copper gluconate 2 mg cap, Take 1 capsule by mouth daily., Disp: , Rfl:      cyanocobalamin 1,000 mcg/mL injection, Inject 1,000 mcg into the shoulder, thigh, or buttocks every 28 days., Disp: , Rfl:      diclofenac sodium (VOLTAREN) 1 % Gel, Apply topically., Disp: , Rfl:      diltiazem (DILACOR XR) 240 MG 24 hr capsule, Take 240 mg by mouth daily., Disp: , Rfl:      dulaglutide (TRULICITY) 0.75 mg/0.5 mL PnIj, Inject 0.75 mg under the skin every 7 days. Mondays, Disp: , Rfl:      DULoxetine (CYMBALTA) 60 MG capsule, Take 60 mg by mouth daily., Disp: , Rfl:      famotidine (PEPCID) 20 MG tablet, Take 20 mg by mouth 2 (two) times a day., Disp: , Rfl:      ferrous sulfate 325 (65 FE) MG EC tablet, Take 325 mg by mouth daily. , Disp: , Rfl:      fluticasone-salmeterol (ADVAIR HFA) 230-21 mcg/actuation inhaler, Inhale 2 puffs every evening.    , Disp: , Rfl:      furosemide (LASIX) 20 MG tablet, Take 20 mg by mouth daily as needed for other. swelling, Disp: , Rfl: 2     gabapentin (NEURONTIN) 250 mg/5 mL solution, Take 500 mg by mouth 3 (three) times a day. Take 2 teaspoons by mouth three times daily, Disp: , Rfl:      HYDROcodone-acetaminophen 5-325 mg per tablet, Take 2 tablets by mouth every 4 (four) hours as needed., Disp: 20 tablet, Rfl: 0     HYDROmorphone (DILAUDID) 2 MG tablet, Take 1-2 tablets (2-4 mg total) by mouth every 6 (six) hours as needed for pain., Disp: 20 tablet, Rfl: 0     insulin glargine (BASAGLAR KWIKPEN) 100 unit/mL (3 mL) pen, Inject 25 Units under the skin every evening., Disp: 5 pen, Rfl: 0     multivit-min-iron-folic-vit K1 (CENTRUM CHEWABLES) 8 mg-400 mcg- 10 mcg Chew, Chew 1 tablet daily., Disp: , Rfl:      naloxone (NARCAN) 4 mg/actuation nasal spray, 1 spray by Intranasal route as needed for opioid reversal. 1 spray (4 mg dose) into one nostril for opioid reversal. Call 911. May repeat if no response in 3 minutes., Disp: , Rfl:       "oxyCODONE-acetaminophen (PERCOCET/ENDOCET)  mg per tablet, Take 1 tablet by mouth every 6 (six) hours as needed for pain.    , Disp: , Rfl:      pen needle, diabetic (BD ULTRA-FINE ORIG PEN NEEDLE) 29 gauge x 1/2\" Ndle, Use 4 times daily, Disp: , Rfl:      senna-docusate (SENNOSIDES-DOCUSATE SODIUM) 8.6-50 mg tablet, Take 1 tablet by mouth 2 (two) times a day., Disp: , Rfl:      simethicone (MYLICON) 80 MG chewable tablet, Chew 1 tablet (80 mg total) every 6 (six) hours as needed for flatulence., Disp: 30 tablet, Rfl: 0     SUMAtriptan (IMITREX) 25 MG tablet, Take 25 mg by mouth every 2 (two) hours as needed for migraine (Maximum 200 mg per 24 hours). , Disp: , Rfl:      tolterodine (DETROL LA) 2 MG ER capsule, Take 2 mg by mouth daily., Disp: , Rfl:      phentermine (ADIPEX-P) 37.5 mg tablet, Take 1/2 to 1 tablet in the morning., Disp: 90 tablet, Rfl: 0    Plan: 13 yrs s/p RYGB intact. Maintaining a 141# weight loss. Would like a revision but staple line intact, surgery is not indicated.  Dietitian visit will be helpful. Labs to assess nutritional status. She has started a GLP-1 RA. Will try phentermine.    Return in about 1 month (around 11/24/2019), or dietitian.    Bariatric Surgery Review     Interim History/LifeChanges: She would like a revision. She does not remember the UGI series she had in May of this year which showed an intact RYGB.   She gets her B-12 injections at her clinic monthly. She takes 1,000U vitamin D.     Patient Concerns: weight regain  Appetite (1-10): 5  GERD: takes PPI    Medication changes: no    Vitamin Intake:   B-12   injections at the clinic   MVI  one centrum kids chewables   Vitamin D  1,000U   Calcium   dietary     Other  no              LABS: ordered    Nausea occ  Vomiting rare  Constipation no  Diarrhea no  Rashes no  Hair Loss yes  Calf tenderness no  Breathing difficulty sometimes  Reactive Hypoglycemia doesn't push it  Light Headedness yes   Moods ok    12 point ROS as " above and otherwise negative      Habits:  Alcohol: none  Tobacco: none  Caffeine sometimes  NSAIDS daily  Exercise Routine: walks as able. Falls. Her legs won't let her.  3 meals/day no  Protein first no  ?grams/day  Water Separate from meals sometimes I do, sometimes I don't  Calorie Containing Beverages OJ, grape  Restaurant eating/wk 2/mo  Sleeping pain wakes her up, hard to sleep 3-4 hours/night  Stress moderate  CPAP: not deeded  Contraception: PM, Hyst    Social History     Social History     Socioeconomic History     Marital status: Single     Spouse name: Not on file     Number of children: Not on file     Years of education: Not on file     Highest education level: Not on file   Occupational History     Not on file   Social Needs     Financial resource strain: Not on file     Food insecurity:     Worry: Not on file     Inability: Not on file     Transportation needs:     Medical: Not on file     Non-medical: Not on file   Tobacco Use     Smoking status: Never Smoker     Smokeless tobacco: Never Used   Substance and Sexual Activity     Alcohol use: No     Drug use: No     Sexual activity: Not Currently   Lifestyle     Physical activity:     Days per week: Not on file     Minutes per session: Not on file     Stress: Not on file   Relationships     Social connections:     Talks on phone: Not on file     Gets together: Not on file     Attends [employer] service: Not on file     Active member of club or organization: Not on file     Attends meetings of clubs or organizations: Not on file     Relationship status: Not on file     Intimate partner violence:     Fear of current or ex partner: Not on file     Emotionally abused: Not on file     Physically abused: Not on file     Forced sexual activity: Not on file   Other Topics Concern     Not on file   Social History Narrative     Not on file       Past Medical History     Past Medical History:   Diagnosis Date     Abnormal cervical Papanicolaou smear with positive  human papillomavirus deoxyribonucleic acid test      Ankle joint pain     bilateral     Anxiety      Arthritis     rheumatoid arthritis     Asthma      Carpal tunnel syndrome     bilateral     Compression neuropathy      Depression      Diabetes (H)     type II     Diabetic nephropathy (H)      DVT of lower extremity, bilateral (H) 2011    right     Esophageal reflux      History of blood clots      Hyperlipidemia      Hypertension      Infectious fasciitis 2011    abdominal wall      Insomnia      Neck pain      Necrotizing fasciitis (H)      Neuropathy      Obesity      Pulmonary embolism and infarction (H)      SOB (shortness of breath)     with exertion     Tarsal tunnel syndrome      Urinary incontinence      Problem List     Patient Active Problem List   Diagnosis     Carpal tunnel syndrome of right wrist     Asthma     Depression     DVT of lower extremity, bilateral (H)     Diabetes (H)     Hypertension     Diabetic nephropathy (H)     Neuropathy     Neck pain     Obesity     Compression neuropathy of left lower extremity     Mononeuritis lower limb, left     Tarsal tunnel syndrome of left side     Interdigital neuroma of left foot     Nausea & vomiting     Gastroenteritis     Generalized abdominal pain     Chest wall pain     Morbid obesity with BMI of 45.0-49.9, adult (H)     Influenza A     Hernia     GERD (gastroesophageal reflux disease)     Chronic pain     Diarrhea     Hernia of anterior abdominal wall     Pulmonary embolism, bilateral (H)     Syncope, near     Abnormal Pap smear of cervix     Bilateral ankle joint pain     Chronic pain disorder     Copper deficiency     Chronic pain of both shoulders     History of hysterectomy for benign disease     Bariatric surgery status     History of cholecystectomy     History of injury     History of pulmonary embolism     Insomnia     Hyperlipidemia     Microalbuminuria     Moderate major depression (H)     Moderate persistent asthma without complication      Necrotizing fasciitis (H)     Open wound of groin with complication     Osteoarthritis of both knees     Pain in left lower leg     Persons encountering health services in other specified circumstances     Urinary incontinence     Pulmonary embolism with infarction (H)     Polyneuropathy due to secondary diabetes mellitus (H)     Hx of gastric bypass     Chest pain     Chest pain, unspecified     Hyperglycemia     Neck mass     Medications     Current Outpatient Medications   Medication Sig Note     ACCU-CHEK FASTCLIX LANCET DRUM USE TO TEST BLOOD SUGARS QID      ACCU-CHEK GUIDE GLUCOSE METER Misc U UTD      ACCU-CHEK GUIDE strips CHECK GLUCOSE TID      apixaban (ELIQUIS) 5 mg Tab tablet Take 5 mg by mouth 2 (two) times a day.      aspirin 325 MG EC tablet Take 325 mg by mouth.      atorvastatin (LIPITOR) 10 MG tablet Take 10 mg by mouth daily.             capsaicin (ZOSTRIX) 0.025 % cream Apply 1 application topically 3 (three) times a day as needed.             chlorthalidone (HYGROTEN) 25 MG tablet Take 25 mg by mouth daily.      cholecalciferol, vitamin D3, 1,000 unit tablet Take 2,000 Units by mouth daily.      copper gluconate 2 mg cap Take 1 capsule by mouth daily.      cyanocobalamin 1,000 mcg/mL injection Inject 1,000 mcg into the shoulder, thigh, or buttocks every 28 days.      diclofenac sodium (VOLTAREN) 1 % Gel Apply topically.      diltiazem (DILACOR XR) 240 MG 24 hr capsule Take 240 mg by mouth daily.      dulaglutide (TRULICITY) 0.75 mg/0.5 mL PnIj Inject 0.75 mg under the skin every 7 days. Mondays      DULoxetine (CYMBALTA) 60 MG capsule Take 60 mg by mouth daily. 8/21/2019: Patient states she stopped taking, but also said this 1/4/19 at [facility], and patient keeps getting refilled.     famotidine (PEPCID) 20 MG tablet Take 20 mg by mouth 2 (two) times a day.      ferrous sulfate 325 (65 FE) MG EC tablet Take 325 mg by mouth daily.       fluticasone-salmeterol (ADVAIR HFA) 230-21 mcg/actuation  "inhaler Inhale 2 puffs every evening.        2019: Prescribed to be taken twice daily.      furosemide (LASIX) 20 MG tablet Take 20 mg by mouth daily as needed for other. swelling      gabapentin (NEURONTIN) 250 mg/5 mL solution Take 500 mg by mouth 3 (three) times a day. Take 2 teaspoons by mouth three times daily      HYDROcodone-acetaminophen 5-325 mg per tablet Take 2 tablets by mouth every 4 (four) hours as needed.      HYDROmorphone (DILAUDID) 2 MG tablet Take 1-2 tablets (2-4 mg total) by mouth every 6 (six) hours as needed for pain.      insulin glargine (BASAGLAR KWIKPEN) 100 unit/mL (3 mL) pen Inject 25 Units under the skin every evening.      multivit-min-iron-folic-vit K1 (CENTRUM CHEWABLES) 8 mg-400 mcg- 10 mcg Chew Chew 1 tablet daily.      naloxone (NARCAN) 4 mg/actuation nasal spray 1 spray by Intranasal route as needed for opioid reversal. 1 spray (4 mg dose) into one nostril for opioid reversal. Call 911. May repeat if no response in 3 minutes.      oxyCODONE-acetaminophen (PERCOCET/ENDOCET)  mg per tablet Take 1 tablet by mouth every 6 (six) hours as needed for pain.        2019: Usually takes 3x per day     pen needle, diabetic (BD ULTRA-FINE ORIG PEN NEEDLE) 29 gauge x 1/2\" Ndle Use 4 times daily      senna-docusate (SENNOSIDES-DOCUSATE SODIUM) 8.6-50 mg tablet Take 1 tablet by mouth 2 (two) times a day.      simethicone (MYLICON) 80 MG chewable tablet Chew 1 tablet (80 mg total) every 6 (six) hours as needed for flatulence.      SUMAtriptan (IMITREX) 25 MG tablet Take 25 mg by mouth every 2 (two) hours as needed for migraine (Maximum 200 mg per 24 hours).       tolterodine (DETROL LA) 2 MG ER capsule Take 2 mg by mouth daily.      phentermine (ADIPEX-P) 37.5 mg tablet Take 1/2 to 1 tablet in the morning.      Surgical History     Past Surgical History  She has a past surgical history that includes Gastric bypass ();  section; Uterine fibroid surgery (); Abdominal " "wall surgery; pr revise median n/carpal tunnel surg (Right, 10/24/2014); Hysterectomy; Uterine fibroid surgery; Knee surgery (Right); Colonoscopy (N/A, 3/15/2017); and Colonoscopy (N/A, 3/16/2017).    Objective-Exam     Constitutional:  /64   Pulse (!) 110   Resp 14   Ht 4' 11\" (1.499 m)   Wt (!) 229 lb (103.9 kg)   LMP 04/16/2005   SpO2 95%   BMI 46.25 kg/m    Height: 4' 11\" (1.499 m) (10/24/2019  8:22 AM)  Weight: (!) 229 lb (103.9 kg) (10/24/2019  8:22 AM)  BMI (Calculated): 46.2 (10/24/2019  8:22 AM)  SpO2: 95 % (10/24/2019  8:22 AM)    General:  Pleasant and in no acute distress   Eyes:  EOMI  ENT:  Airway 3+  Moist mucous membranes  Neck:  Supple, No LAD, No thyromegaly, No carotid bruits appreciated  Respiratory: Normal respiratory effort, no cough, wheezes or crackles  CV:  Regular rate and Rhythm,no murmurs, pulses 2+, no calf tenderness, no LE edema  Gastrointestinal: Abdomen NT/ND, BS+  Musculoskeletal: muscle mass WNL  Skin: color baseline hair wig, incisions nicely healed  Neurological: No tremor, normal gait  Psychiatric: alert and oriented X3, mood and affect normal    Counseling     We reviewed the important post op bariatric recommendations:  -eating 3 meals daily  -eating protein first, getting >60gm protein daily  -eating slowly, chewing food well  -avoiding/limiting calorie containing beverages  -drinking water 15-30 minutes before or after meals  -choosing wheat, not white with breads, crackers, pastas, aleisha, bagels, tortillas, rice  -limiting restaurant or cafeteria eating to twice a week or less    We discussed the importance of restorative sleep and stress management in maintaining a healthy weight.  We discussed the National Weight Control Registry healthy weight maintenance strategies and ways to optimize metabolism.  We discussed the importance of physical activity including cardiovascular and strength training in maintaining a healthier weight.    We discussed the importance " of life-long vitamin supplementation and life-long  follow-up.    Ruthy was reminded that, to avoid marginal ulcers she should avoid tobacco at all, alcohol in excess, caffeine in excess, and NSAIDS (unless indicated for cardioprotection or othewise and opposed by a PPI).    Vy Carson MD, FAAFP  [facility].  10/24/2019  9:18 AM      No images are attached to the encounter.   30 minutes spent with patient. >50% in counseling and coordination of care.

## 2021-06-02 NOTE — TELEPHONE ENCOUNTER
Called patient with critical labs with glucose>400.  Ruthy states she took her basalglar 25U last evening and takes her trulicity Monday mornings. She states that her sugars were OK yesterday and she hasn't checked today. I asked her to check her glucose while I am on the phone. Her level was 422. She denies feeling ill in any way.  She had pineapple juice this morning. She has short acting insulin at home and I asked her to give her usual 7U and recheck her sugars to bring them down to 200. She was pleasant and cooperative. I encouraged her to call Dr. Meyer's office regarding her sugars and a possible change in her insulin dose. I asked her to not have any more pineapple juice as it makes her sugars go very high. Discussed the labs that have resulted so far. Will call in RX for D2 50,000U. She states she is taking one MVI with iron, will increase that to 2 daily for hgb 11.3.

## 2021-06-03 ENCOUNTER — OFFICE VISIT (OUTPATIENT)
Dept: FAMILY MEDICINE | Facility: CLINIC | Age: 56
End: 2021-06-03
Payer: MEDICARE

## 2021-06-03 VITALS
WEIGHT: 229 LBS | DIASTOLIC BLOOD PRESSURE: 64 MMHG | BODY MASS INDEX: 46.16 KG/M2 | HEIGHT: 59 IN | HEART RATE: 98 BPM | SYSTOLIC BLOOD PRESSURE: 104 MMHG | OXYGEN SATURATION: 95 %

## 2021-06-03 VITALS
WEIGHT: 229 LBS | HEART RATE: 118 BPM | HEIGHT: 59 IN | TEMPERATURE: 99.1 F | RESPIRATION RATE: 16 BRPM | SYSTOLIC BLOOD PRESSURE: 126 MMHG | OXYGEN SATURATION: 95 % | DIASTOLIC BLOOD PRESSURE: 80 MMHG | BODY MASS INDEX: 46.16 KG/M2

## 2021-06-03 VITALS
DIASTOLIC BLOOD PRESSURE: 64 MMHG | RESPIRATION RATE: 14 BRPM | OXYGEN SATURATION: 95 % | BODY MASS INDEX: 46.16 KG/M2 | WEIGHT: 229 LBS | HEART RATE: 110 BPM | SYSTOLIC BLOOD PRESSURE: 126 MMHG | HEIGHT: 59 IN

## 2021-06-03 VITALS
OXYGEN SATURATION: 95 % | RESPIRATION RATE: 16 BRPM | SYSTOLIC BLOOD PRESSURE: 146 MMHG | BODY MASS INDEX: 40.31 KG/M2 | TEMPERATURE: 98.5 F | HEART RATE: 107 BPM | WEIGHT: 199.6 LBS | DIASTOLIC BLOOD PRESSURE: 81 MMHG

## 2021-06-03 VITALS — HEIGHT: 59 IN | WEIGHT: 229 LBS | BODY MASS INDEX: 46.16 KG/M2

## 2021-06-03 VITALS — BODY MASS INDEX: 46.16 KG/M2 | HEIGHT: 59 IN | WEIGHT: 229 LBS

## 2021-06-03 DIAGNOSIS — G89.4 CHRONIC PAIN SYNDROME: ICD-10-CM

## 2021-06-03 DIAGNOSIS — G89.4 CHRONIC PAIN SYNDROME: Primary | ICD-10-CM

## 2021-06-03 PROCEDURE — 99214 OFFICE O/P EST MOD 30 MIN: CPT | Mod: 25 | Performed by: FAMILY MEDICINE

## 2021-06-03 PROCEDURE — 80306 DRUG TEST PRSMV INSTRMNT: CPT | Performed by: FAMILY MEDICINE

## 2021-06-03 PROCEDURE — 96372 THER/PROPH/DIAG INJ SC/IM: CPT | Performed by: FAMILY MEDICINE

## 2021-06-03 RX ORDER — OXYCODONE AND ACETAMINOPHEN 10; 325 MG/1; MG/1
TABLET ORAL
Qty: 120 TABLET | Refills: 0 | Status: SHIPPED | OUTPATIENT
Start: 2021-06-03 | End: 2021-07-01

## 2021-06-03 RX ADMIN — CYANOCOBALAMIN 1000 MCG: 1000 INJECTION, SOLUTION INTRAMUSCULAR; SUBCUTANEOUS at 12:36

## 2021-06-03 NOTE — PROGRESS NOTES
Patient Active Problem List    Diagnosis Date Noted     Stage 3a chronic kidney disease 12/31/2020     Priority: High     Persistent protienuria       Copper deficiency 04/05/2019     Priority: High     Identified in Aug, 2018--see discharge summary from [facility] in [place].       Chronic pain disorder 10/07/2016     Priority: High     Chronic Pain Diagnosis:  neuropathy  DIRE Total Score(s):14     ORT: 5    4 - 7 =  Moderate Risk   of future problems with Opioids  FAQ5: 50 November 20, 2020  Behavioral Health Consultation: 5/12/17 with Dr. Daisy Quezada  Personal Care Plan for Chronic Pain: 5/12/17  Opioid medication:oxycodone   Dose:10mg  Number of pills per month:60  Patient is being prescribed 10mg of oxycodone IR (Percocet) per day this is 15 mg Morphine Equivalents  Benzodiazepines Prescribed? No  Naloxone prescribed? No       Clinic visit frequency required: Q 1 month Next visit due:   Controlled Substance/Opioid Treatment agreement on file (dated <12 months ago)?:    Date(s): pending  Last [place] verification: last visit  Items in red to be updated at each visit  Patient is followed by Data Unavailable for ongoing prescription of pain medication.  All refills should be approved by this provider, or covering partner.Chronic Pain     Chronic Pain Diagnosis:  neuropathy  DIRE Total Score(s):14     ORT: 5    4 - 7 =  Moderate Risk   of future problems with Opioids  FAQ5: 45/100 on 9/9/16, 40/100 on 11/15/16  55 October 29, 2020    Behavioral Health Consultation: 5/12/17 with Dr. Daisy Quezada  Personal Care Plan for Chronic Pain: 5/12/17  Opioid medication:oxycodone   Dose:10mg  Number of pills per month:90  Patient is being prescribed 45 MME  Benzodiazepines Prescribed? No  Naloxone prescribed? No       Clinic visit frequency required: Q 1 month Next visit due:   Controlled Substance/Opioid Treatment agreement on file (dated <12 months ago)?:    Date(s): pending  Last [place] verification: last visit  Items  in red to be updated at each visit  Patient is followed by [facility] Unavailable for ongoing prescription of pain medication.  All refills should be approved by this provider, or covering partner.Chronic Pain       Persistent proteinuria 10/12/2015     Priority: High     Type 2 diabetes mellitus with diabetic polyneuropathy (H) 10/12/2015     Priority: High     Essential hypertension 08/26/2013     Priority: High     Arthritis of both acromioclavicular joints 04/08/2021     Priority: Medium     xray proven        Morbid obesity (H) 11/27/2020     Priority: Medium     Hyperglycemia due to type 2 diabetes mellitus (H) 06/02/2020     Priority: Medium     Neck mass 10/11/2019     Priority: Medium     Added automatically from request for surgery [id number]       Hyperglycemia 09/08/2019     Priority: Medium     Hx of gastric bypass 04/22/2019     Priority: Medium     Syncope, near 01/04/2019     Priority: Medium     Chest pain 01/03/2019     Priority: Medium     Generalized abdominal pain 01/03/2019     Priority: Medium     Gastroenteritis 01/03/2019     Priority: Medium     Neuropathy 01/03/2019     Priority: Medium     Obesity 01/03/2019     Priority: Medium     Other acute pulmonary embolism without acute cor pulmonale (H) 12/31/2018     Priority: Medium     Hernia of anterior abdominal wall 01/28/2017     Priority: Medium     Diarrhea 01/27/2017     Priority: Medium     Hernia 01/27/2017     Priority: Medium     Osteoarthritis of both knees 10/31/2016     Priority: Medium     Polyneuropathy due to secondary diabetes mellitus (H) 10/31/2016     Priority: Medium     Compression neuropathy of left lower extremity 02/19/2016     Priority: Medium     Interdigital neuroma of left foot 02/19/2016     Priority: Medium     Bilateral ankle joint pain 01/15/2016     Priority: Medium     Esophageal reflux 04/07/2015     Priority: Medium     Urinary incontinence 02/19/2015     Priority: Medium     Insomnia 02/19/2015     Priority: Medium      Asthma 08/04/2013     Priority: Medium     History of injury 08/04/2013     Priority: Medium     History of cholecystectomy 08/04/2013     Priority: Medium     Hyperlipidemia 06/21/2013     Priority: Medium     Status post bariatric surgery 06/21/2013     Priority: Medium     Kim-en-Y in 2006       History of hysterectomy for benign disease 06/21/2013     Priority: Medium     2008, For fibroids.  Cervix removed, but still has ovaries.       Moderate major depression (H) 01/24/2013     Priority: Medium     Moderate persistent asthma without complication 11/15/2012     Priority: Medium     Pulmonary embolism with infarction (H) 11/15/2012     Priority: Medium     12/24/2018  Unprovoked.  + fam hx of DVT.  Treated w/ DOAC 12/2018through 9/2019 12/24/2018  Unprovoked.  + fam hx of DVT.  Plan warfarin for 6 months.       Persons encountering health services in other specified circumstances 11/15/2012     Priority: Medium     Tier 3    Status: Accept  Care Coordination Start Date: 02/27/12  State Tier Level: Level 3  Language/Barrier to Learning: No  Significant Mental Health Issues: Yes  Date Care Coordination Discontinued: 05/28/15    DX V65.8 REPLACED WITH [id number] (04/08/2013)       Open wound of groin with complication 05/12/2011     Priority: Medium     Metabolic acidosis 07/26/2019     Priority: Low     reolved w/ discontinuation of ASA       Abnormal Pap smear of cervix 04/22/2019     Priority: Low     3-12-15: Pap/HPV neg.  Plan: Given hx (mildly abn pap, hyster), recheck in 5 yrs.  10-11-11: Pap/HPV neg.  Plan: Given ASUS pap in 2010, recommend repeating pap in 1 year.  8/3/2010 ASCUS Pap with +BV- pt treated with Metrogel.  Pt needs repeat Pap in 1yr.  8/4/2008 Pt had total hyst for menorrhagia, pelvic pain, scar tissue, and fibroids.    3-12-15: Pap/HPV neg.  Plan: Given hx (mildly abn pap, hyster), recheck in 5 yrs.  10-11-11: Pap/HPV neg.  Plan: Given ASUS pap in 2010, recommend  repeating pap in 1 year.  8/3/2010 ASCUS Pap with +BV- pt treated with Metrogel.  Pt needs repeat Pap in 1yr.  8/4/2008 Pt had total hyst for menorrhagia, pelvic pain, scar tissue, and fibroids.       S/P hysterectomy 06/21/2013     Priority: Low     2008, For fibroids.  Cervix removed, but still has ovaries.       Carpal tunnel syndrome 11/15/2012     Priority: Low     Necrotizing fasciitis (H) 11/15/2012     Priority: Low     Health Care Home 11/15/2012     Priority: Low     Tier 3    Status: Accept  Care Coordination Start Date: 02/27/12  State Tier Level: Level 3  Language/Barrier to Learning: No  Significant Mental Health Issues: Yes  Date Care Coordination Discontinued: 05/28/15    DX V65.8 REPLACED WITH [id number] (04/08/2013)         Chronic pain of both shoulders 12/23/2005     Priority: Low     There are no exam notes on file for this visit.  Chief Complaint   Patient presents with     Pain Management     CPM     Blood pressure (!) 146/81, pulse 107, temperature 98.5  F (36.9  C), temperature source Oral, resp. rate 16, weight 90.5 kg (199 lb 9.6 oz), SpO2 95 %, not currently breastfeeding.  Results for orders placed or performed in visit on 06/03/21   Rapid Urine Drug Screen ([facility])     Status: Abnormal   Result Value Ref Range    Cannabinoids Qual Urine NEGATIVE NEGATIVE    Phencyclidine NEGATIVE NEGATIVE    Cocaine Qual Urine NEGATIVE NEGATIVE    Methamphetamine Qual NEGATIVE NEGATIVE    Morphine Qual NEGATIVE NEGATIVE    Amphetamines Qual POSITIVE (A) NEGATIVE    Benzodiazepine Qual Urine NEGATIVE NEGATIVE    Tricyclic Antidepressants NEGATIVE NEGATIVE    Methadone Qual NEGATIVE NEGATIVE    Barbiturates Qual Urine NEGATIVE NEGATIVE    Oxycodone Qual POSITIVE (A) NEGATIVE    Propoxyphene NEGATIVE NEGATIVE    Buprenorphine Qual Urine NEGATIVE NEGATIVE    Temperature of Urine was Between  Degrees F YES YES   SUBJECTIVE:  Twaundalyn is here primarily for chronic pain.  The etiology of  her pain is osteoarthritis, diabetic neuropathy.  She has osteoarthritis of both AC joints.  Most recently, her diabetic neuropathy has been most bothersome.  She describes a burning, tingling and numbness in her entire leg, starting with her feet where it is most severe.  It is bilateral. She has B12 deficiency and has diabetes, and she has had diabetes since the mid 90s.    Incidentally, I have referred her to Nephrology for her nephropathy and Ortho for her shoulder OA to consider more aggressive treatment of her shoulder AC joint OA with an injection, and she has yet to get in.    She would like to try increasing her oxycodone from 1-1/2 to 2 tablets at night because when she has tried that on her own in the past, it has helped her sleep a little bit better, which is her priority.  She is already on gabapentin for diabetic neuropathy and takes a liquid form of that, which does help.  She has never tried pregabalin or Tegretol, to my knowledge.  She has a prescription for Narcan at home.  She denies any other drugs of misuse.   The database was reviewed today and is appropriate.    OBJECTIVE:  Blood pressure is mildly elevated today.  Chest clear.  Her affect appropriate.  Eye contact normal.  She is appropriately groomed.    ASSESSMENT:  Chronic pain syndrome, which the underlying etiology is osteoarthritis of multiple locations and diabetic neuropathy.  She has chronic kidney disease, which complicates matters to some degree.  Her glycemic control has been quite good of late.  She has an appointment with Dr. Roland in the near future regarding the diabetic neuropathy.  In the meantime, we will try increasing her oxycodone from 3-1/2 to a total of 4 tablets daily.  She will take 1 in the morning, 1 in the afternoon and two at night.  Hopefully, this will help with sleep.  We will reevaluate in 1 month when she returns.  She sees me on a monthly basis.  Could consider switching from gabapentin to pregabalin.   Could also consider adding Tegretol.  I would probably try to switch the pregabalin first and then would try adding a Tegretol if that does not help.  She gets monthly B12 shots, and that was given today.  Database reviewed.  I discussed the importance of having Narcan on board, and she agrees to that.  If her current pain medications are working, we will have her sign a new pain agreement in the near future.

## 2021-06-03 NOTE — NURSING NOTE
Clinic Administered Medication Documentation      Injectable Medication Documentation    Patient was given Cyanocobalamin (B-12). Prior to medication administration, verified patients identity using patient s name and date of birth. Please see MAR and medication order for additional information. Patient instructed to remain in clinic for 15 minutes.      Was entire vial of medication used? Yes  Vial/Syringe: Single dose vial  Expiration Date:  01/2023  Was this medication supplied by the patient? No    Name of provider who requested the medication administration: Dr. Meyer  Name of provider on site (faculty or community preceptor) at the time of performing the medication administration: Dr. Meyer    Date of next administration: n/a  Date of next office visit with provider to renew medication plan (must be seen annually): n/a

## 2021-06-03 NOTE — PROGRESS NOTES
"Mental Health Visit Note    11/14/2019    Start time: 9:08am    Stop Time: 9:58am  Session # 8    Session Type: Patient is presenting for an Individual session.   Persons present: patient and therapist.    Ruthy Álvarez is a 54 y.o. female is being seen today for   Chief Complaint   Patient presents with      Follow Up     Depression     low mood with medical, family issues     Anxiety     worries about grandson   .     New symptoms or complaints: None    Functional Impairment:   Personal: 3  Family: 3  Work: 4  Social: 3    Clinical assessment of mental status: Patient's grooming is Well groomed, attire is Appropriate, and age appears Appears Stated. Behavior towards examiner is Cooperative, motor activity is Within normal, and eye contact is Appropriate.  Patient's mood is Depressed, and affect is Congruent w/content of speech and Depressed.  Speech/language is Within normal and Soft, attention is Within normal, and concentration is Brief. Thought process is Within normal, thought content is Within noraml and  Within normal.  Patient's orientation is X 3, memory is  Impairment, Recent, Remote and Mild, judgement is No Evidence of Impairment, and estimated intelligence is Average. Demonstrated insight is Adequate while fund of knowledge is adequate.    Suicidal/Homicidal Ideation present: None Reported This Session    Patient's impression of their current status:  Patient reports depression is \"a little better\", while anxiety is still high in the context of a number of psychosocial stressors.  Processes worries about her 10 year old grandson.  \"I just can't stand that she (daughter) isn't doing right by him.  I didn't raise him that way.\"  Processed stressors of multiple expectations from family members that she help them with $.    Patient reports follow up with therapy homework: practiced 1 self-care/self-soothing activity about 3-4x/week.  She attended her [employer]ism for spiritual and social support x1.  " Utilized [employer] for alternative transportation. Attending meetings with pain clinic, says she still can't access physical therapy due to some kind of insurance problem.     Therapist impression of patients current state: Patient showing some improvement with depressive sx, crying less.  She has anxiety but normalized in the context of multiple concerns.  Planning some positive activities, a little more active.  Open and cooperative in session, making use of session to problem-solve stressors.    Processed negative cognitions, reinforced strengths, validated patient efforts and feelings. Brainstormed options around her grandson's situation, discussed community agencies that might intervene.  Advised patient that writer is mandated .    Discussed caring for herself while putting limits on family's expectations.  Discussed use of mindfulness methods for stress relief, patient identifies mindful prayer that she feels could be helpful to her.     Type of psychotherapeutic technique provided: Client centered, Solution-focused and CBT    Progress toward short term goals:Progress as expected,    practiced 1 self-care/self-soothing activity about 3-4x/week.  She attended her [employer] for spiritual and social support x1.  Utilized [employer] for alternative transportation. Attending meetings with pain clinic, says she still can't access physical therapy due to some kind of insurance problem.     Review of long term goals: Not done at today's visit and Date of last review 8/29/2019    Diagnosis:   1. Depression, major, recurrent, moderate (H)         Plan and Follow up: Patient to return in two weeks for follow up.  She will continue to practice one self-care activity daily.  Will utilize self-soothing strategies when stressed by circumstance or interpersonal concerns. She will attend her [employer] for spiritual and social support, using [employer] for alternate transportation as needed.  She will continue  recommendations of her pain clinic and explore possible PT options. Plan to assess trauma sx with PCL-5.    Discharge Criteria/Planning: Client has chronic symptoms and ongoing therapy for maintenance stability recommended.    Carmen Tello MSW, LICSW  11/14/2019

## 2021-06-04 NOTE — PROGRESS NOTES
"Mental Health Visit Note    12/5/2019    Start time: 9:13am    Stop Time: 9:59am  Session # 9    Session Type: Patient is presenting for an Individual session.   Persons present: patient and therapist.    Ruthy Álvarez is a 54 y.o. female is being seen today for   Chief Complaint   Patient presents with     MH Follow Up     Depression     low mood with family issues around the holidays     Anxiety     somewhat improved, worries about grandchildren   .     New symptoms or complaints: None    Functional Impairment:   Personal: 3  Family: 3  Work: 4  Social: 3    Clinical assessment of mental status: [Same MS as 11/14/2019]  Patient's grooming is Well groomed, attire is Appropriate, and age appears Appears Stated. Behavior towards examiner is Cooperative, motor activity is Within normal, and eye contact is Appropriate.  Patient's mood is Depressed, and affect is Congruent w/content of speech and Depressed.  Speech/language is Within normal and Soft, attention is Within normal, and concentration is Brief. Thought process is Within normal, thought content is Within noraml and  Within normal.  Patient's orientation is X 3, memory is  Impairment, Recent, Remote and Mild, judgement is No Evidence of Impairment, and estimated intelligence is Average. Demonstrated insight is Adequate while fund of knowledge is adequate.    Suicidal/Homicidal Ideation present: None Reported This Session    Patient's impression of their current status:   \"I'm just so disappointed.\"  Patient reports mood as mild to moderately depressed.  Processes difficult interpersonal issues with adult children over the holiday.     Patient reports follow up with therapy homework: Identified baking for her family as a self soothing activity.  She recruited her daughter to help her with physical parts of the process.  She attended her [employer] for spiritual and social support and used [employer] for alternate transportation.  Continues attendance at " "pain clinic.    Therapist impression of patients current state: Patient still showing some improvement with depressive symptoms with less tearfulness and despair.  Some chronic low mood and anxiety, somewhat normalized in the context of multiple concerns.  Planning some positive activities and somewhat more active.  Cognitive and physical abilities are limited but patient remains open and cooperative in session.    Processed negative cognitions, reinforced strengths, validated patient efforts and feelings.  Discussed mindfulness concept of \"accepting what is\".  Discussed unlikely ability to change others personalities.  Discussed supporting family members and caring about her grandchildren while maintaining self-care and limit setting.  Reminded patient of her identified goal to utilize mindfulness prayer.    Type of psychotherapeutic technique provided: Client centered, Solution-focused and CBT    Progress toward short term goals:Progress as expected,     Identified baking for her family as a self soothing activity.  She recruited her daughter to help her with physical parts of the process.  She attended her [employer]ism for spiritual and social support and used [employer] for alternate transportation.  Continues attendance at pain clinic.    Review of long term goals: Not done at today's visit and Date of last review 8/29/2019    Diagnosis:   1. Depression, major, recurrent, moderate (H)     2. Adjustment disorder with mixed anxiety and depressed mood         Plan and Follow up: Patient to return in two weeks for follow up.  Homework continued from last session: She will continue to practice one self-care activity daily.  Will utilize self-soothing strategies when stressed by circumstance or interpersonal concerns. She will attend her [employer]ism for spiritual and social support, using [employer] for alternate transportation as needed.  She will continue recommendations of her pain clinic and explore possible PT " options. Plan to assess trauma sx with PCL-5.    Discharge Criteria/Planning: Client has chronic symptoms and ongoing therapy for maintenance stability recommended.    Carmen Tello MSW, LICSW  12/5/2019

## 2021-06-04 NOTE — PROGRESS NOTES
Outpatient Mental Health Treatment Plan    Name:  Ruthy Álvarez  :  1965  MRN:  [id number]    Treatment Plan:  Updated Treatment Plan 2019  Intake/initial treatment plan date:  2019  Benefit and risks and alternatives have been discussed: Yes  Is this treatment appropriate with minimal intrusion/restrictions: Yes  Estimated duration of treatment:  Approximately 10+ sessions.  Anticipated frequency of services:  Every 2 weeks  Necessity for frequency: This frequency is needed to establish therapeutic goals and for continuity of care in order to monitor progress.  Necessity for treatment: To address cognitive, behavioral, and/or emotional barriers in order to work toward goals and to improve quality of life.    Session Type: Patient is presenting for an Individual session.   Persons present include patient and therapist.    Plan:      ? Depression  [Patient has been able to utilize coping skills discussed in session to better cope with mood issues in the context of multiple family and medical issues.  Some chronic depression continues.]  Goal:  Decrease average depression level from 7 to 3.   Strategies:    ?[x] Decrease social isolation     [x] Increase involvement in meaningful activities     ?[x] Discuss sleep hygiene     ?[x] Explore thoughts and expectations about self and others     ?[x] Process grief (loss of significant person, independence, role,        etc.)     ?[] Assess for suicide risk     ?[x] Implement physical activity routine (with physician approval)     [x] Consider introduction of bibliotherapy and/or videos     [x] Continue compliance with medical treatment plan (or explore         barriers)     ?  ?Degree to which this is a problem: 3  Degree to which goal is met: 1  Date of Review: 2019          ?   ? Anxiety    Goal:  Decrease average anxiety level from 5 to 3. [Patient has been able to utilize coping skills discussed in session to better cope with stress and  anxiety of multiple family and medical issues. Chronic situational anxiety continues. ]   Strategies: ? [x]Learn and practice relaxation techniques and other coping        strategies (e.g., thought stopping, reframing, meditation)     ? [x] Increase involvement in meaningful activities     ? [x] Discuss sleep hygiene     ? [x] Explore thoughts and expectations about self and others     ? [x] Identify and monitor triggers for panic/anxiety symptoms     ? [x] Implement physical activity routine (with physician approval)     ? [x] Consider introduction of bibliotherapy and/or videos     ? [] Continue compliance with medical treatment plan (or explore          barriers)                                       []     Degree to which this is a problem: 2  Degree to which goal is met: 2  Date of Review: 12/5/2019         Interpersonal stress    Goal:  Increase % satisfaction with relationships from 45%  to 70%. [Patient making use of assertive communication and limit setting discussed in session to reduce interpersonal stress with family members and former PCA.  She was invited to bring adult children into session but states they will not come.]  Strategies: ?[x] Learn assertive communication skills through role-play and             other techniques     ?[x]  Explore thoughts and expectations about self and others      ?[x]  Learn and practice relaxation techniques and other coping         strategies     [] Consider inviting others to attend conjoint sessions, and/or          consider referral for couples  or family therapy     ?[x] Consider introduction of bibliotherapy and/or videos     ?   Degree to which this is a problem: 3  Degree to which goal is met: 1  Date of Review: 12/5/2019      Adjustment to disability / changes [Multiple medical issues continue to contribute to anxiety and depression.]   Goal:  Increase % acceptance from 40%  to 70%.   Strategies: ?[x]  Explore thoughts and expectations about self and  "others     [x] Explore emotional reactions to illness/injury     ?[x] Learn and practice relaxation techniques and other coping        strategies     [x] Implement physical activity routine (with physician approval)                [x] Increase involvement in meaningful activities                [x] Engage in values clarification and goal-setting     [x] Consider introduction of bibliotherapy and/or videos                [x] Explore barriers to cooperation with rehabilitation progra?m  Degree to which this is a problem: 3  Degree to which goal is met: 1  Date of Review: 12/5/2019         Functional Impairment:  1=Not at all/Rarely  2=Some days  3=Most Days  4=Every Day    Personal : 4  Family : 4  Social : 3   Work/school : 4    Diagnosis:  Schizoaffective, versus Major Depression, recurrent, severe with history of psychosis  Adjustment disorder with anxiety and depression    WHODAS 2.0 12-item version self administered: 71%    Scores presented in qualifiers to represent level of disability.  SEVERE Problem (high, extreme, ...) 50-95%    H1= 30  H2= 2  H3= 9      Clinical assessments and measures completed: Patient completed MH assessments including: JUAN 7: Total Score: 12  and PHQ9 :  PHQ-9 Total Score: 19  , [place] Suicide Rating Scale     Strengths:  Survivor, care about the way she looks.  Limitations:  \"Vulnerable to hurt caused by children\", depression, medical problems  Cultural Considerations: Grew up in  family headed by strong single mother where Judaism, hard work, school were all important.    Persons responsible for this plan: Patient and Provider            Psychotherapist Signature           Patient Signature:              Guardian Signature             Provider: Performed and documented by Carmen Tello [facility]   Date:  12/5/2019    "

## 2021-06-05 VITALS — BODY MASS INDEX: 38.51 KG/M2 | WEIGHT: 191 LBS | HEIGHT: 59 IN

## 2021-06-05 VITALS — WEIGHT: 191 LBS | BODY MASS INDEX: 38.51 KG/M2 | HEIGHT: 59 IN

## 2021-06-08 ENCOUNTER — MEDICAL CORRESPONDENCE (OUTPATIENT)
Dept: HEALTH INFORMATION MANAGEMENT | Facility: CLINIC | Age: 56
End: 2021-06-08

## 2021-06-08 ENCOUNTER — TELEPHONE (OUTPATIENT)
Dept: FAMILY MEDICINE | Facility: CLINIC | Age: 56
End: 2021-06-08

## 2021-06-08 NOTE — PROGRESS NOTES
[facility] Certification Request    January 17, 2017      Patient: Ruthy Álvarez  MR Number: [id number]  YOB: 1965  Date of Visit: 1/17/2017      Dear Dr. Meyer,     Thank you for this referral.   We are seeing Ruthy Álvarez for Physical Therapy.    Medicare and/or Medicaid requires physician review and approval of the treatment plan. Please review the plan of care and verify that you agree with the therapy plan of care by co-signing this note.      Plan of Care  Authorization / Certification Start Date: 01/17/17  Authorization / Certification End Date: 04/17/17  Authorization / Certification Number of Visits: 8-10 visits  Communication with: Referral Source  Patient Related Instruction: Nature of Condition;Treatment plan and rationale;Basis of treatment;Next steps;Expected outcome  Times per Week: 1-2x/week for 60'  Number of Weeks: 8-10 visits  Number of Visits: 8-10 visits  Therapeutic Exercise: ROM;Stretching;Strengthening  Neuromuscular Reeducation: kinesio tape;posture;balance/proprioception;TNE;core  Manual Therapy: soft tissue mobilization;myofascial release;joint mobilization;muscle energy  Modalities: hot pack;cold pack  Functional Training (ADL's): ADL's;instructions in transfers;self care    Goals:  No Data Recorded  Pt will: increase comfortable gait speed to >0.6 m/s with 4WW to decrease falls risk in 8 visits  Pt will: perform > 6 reps on 30 second sit<> stand with UE support to improve LE strength in 8 visits  Pt will: decrease TUG score to <20 seconds with 4WW to decrease falls risk in 8 visits  Pt will: report greater ease and less pain in L LE with transfers in 8 visits      If you have any questions or concerns, please don't hesitate to call.    Sincerely,      Kathy Bethea, PT        Physician recommendation:     ___ Follow therapist's recommendation        ___ Modify therapy      *Physician co-signature indicates they certify the need for these  "services furnished within this plan and while under their care.      [facility]   [facility]-Pel[facility] Initial Evaluation    Patient Name: Ruthy Álvarez  Date of evaluation: 1/17/2017  Referral Diagnosis: Chronic pain  Referring provider: Jose Angel Meyer,*  Visit Diagnosis:     ICD-10-CM    1. Decreased functional mobility and endurance Z74.09    2. Lower extremity pain, left M79.605    3. Poor balance R26.89    4. Muscular deconditioning R29.898        Assessment:   Ruthy Álvarez is a 51 y.o. female who presents to therapy today with chief complaints of left lower extremity pain and overall deconditioning. Onset date of sx was 3/2016. In March 2016, patient had a left peroneal nerve decompression as well as a left tarsal tunnel release with complications following. She feels her foot pain has worsened since then. She describes her L LE symptoms as numbness/tingling and pain starting in L gluteal region and radiating to her left foot. She also c/o frequent falls and poor balance- estimates she has fallen 6x in the last year. Patient lives alone and has a PCA who assists her with ADL's. Evaluation reveals: significantly limited lumbar AROM- unable to fully assess lower back due to pain levels. She scored well below age gender normative values for gait speed, 30\" sit<> stand, and TUG, and is at a high falls risk based on these measures. Patient will benefit from 1:1 skilled physical therapy services to address the above limitations.       Goals:  No Data Recorded  Pt will: increase comfortable gait speed to >0.6 m/s with 4WW to decrease falls risk in 8 visits  Pt will: perform > 6 reps on 30 second sit<> stand with UE support to improve LE strength in 8 visits  Pt will: decrease TUG score to <20 seconds with 4WW to decrease falls risk in 8 visits  Pt will: report greater ease and less pain in L LE with transfers in 8 visits    Patient's expectations/goals are realistic. Prognosis is fair due to " "chronicity of condition and motivation.    Barriers to Learning or Achieving Goals:  Chronicity of the problem.       Plan / Patient Instructions:        Plan of Care:   Authorization / Certification Start Date: 01/17/17  Authorization / Certification End Date: 04/17/17  Authorization / Certification Number of Visits: 8-10 visits  Communication with: Referral Source  Patient Related Instruction: Nature of Condition;Treatment plan and rationale;Basis of treatment;Next steps;Expected outcome  Times per Week: 1-2x/week for 60'  Number of Weeks: 8-10 visits  Number of Visits: 8-10 visits  Therapeutic Exercise: ROM;Stretching;Strengthening  Neuromuscular Reeducation: kinesio tape;posture;balance/proprioception;TNE;core  Manual Therapy: soft tissue mobilization;myofascial release;joint mobilization;muscle energy  Modalities: hot pack;cold pack  Functional Training (ADL's): ADL's;instructions in transfers;self care    Plan for next visit: LE strengthening, aerobic exercise, HEP     Subjective:        Ruthy Álvarez is a 50 y/o female who presents today with c/o B LE, foot, B shoulder, lower back, and B UE pain. After discussion about current pain, patient feels her greatest concern is her left foot/LE. She describes numbness from posterior L hip to posterior heel and plantar surface of the foot. She describes \"tingling\" from the dorsum of her foot to medial lower LE.   Patient reports her pain has significantly worsened since surgery on 3/8/16 (patient had a left peroneal nerve decompression as well as a left tarsal tunnel release). Patient had complications following this surgery- see chart for details regarding infection. She also c/o chronic lower back pain with radiation to L hip.   Patient has had 6 falls in the last year, most recent fall occurred yesterday in her home. Patient uses 4WW at all times and has used >2 years.   She has had 2 falls in her kitchen when cooking and one fall while walking on a sidewalk. " "\"My legs just give out\". Estimates she can walk for 5 minutes before she becomes short of breath.  Patient was recently in the ED with s/s dizziness and was diagnosed with pneumonia. She was not hospitalized and is no longer taking antibiotics. She experiences dizziness daily, described as \"light headed\" typically occurring when she is walking in her home.     PMH: arthritis, HTN, DM, neuropathy, Hx of DVT    Social information:   Living Situation:apartment and one bed room apartment with elevator. She has a son who lives nearby, but does not have strong family support. Patient has 2 sons and 2 daughters. She has a PCA who works with her for 6-7 hours/day. Her PCA helps her with bathing, grooming, cooking and driving.    Occupation:unemployed. Patient used to work for a [employer] and [employer].    Pain Ratin  Pain rating at best: 7  Pain rating at worst: 10  Pain description: aching, burning, sharp, tingling and weakness    Patient reports benefit from:  placing wrap on her ankle.          Objective:      Note: Items left blank indicates the item was not performed or not indicated at the time of the evaluation.    Patient Outcome Measures :  Pt did not complete   Examination  1. Decreased functional mobility and endurance     2. Lower extremity pain, left     3. Poor balance     4. Muscular deconditioning         Precautions/Restrictions: see PMH in subjective  Involved side: Bilateral    Posture Observation:   Standing: stands with B UE support on 4WW, increased Lx lordosis with hinge at L3/4    Slow, antalgic transition from sit>supine, min A from therapist to lift L LE       Lumbar ROM:    Date:      *Indicate scale AROM AROM AROM   Lumbar Flexion 50% loss, increased L posterior thigh pain, no increased LBP     Lumbar Extension 40% loss, no increased pain       Right Left Right Left Right Left   Lumbar Sidebending 30% loss no increased pain 30% loss no increased pain        Lumbar Rotation 60% loss with " increased B shoulder pain and L posterior hip pain 40% loss with minimal increased L lateral hip pain        Thoracic Flexion      Thoracic Extension      Thoracic Sidebending         Thoracic Rotation           Lower Extremity Strength:     Date:      LE strength/5 Right Left Right Left Right Left   Hip Flexion (L1-3) 4- 4-       Hip Extension (L5-S1)         Hip Abduction (L4-5)         Hip Adduction (L2-3)         Hip External Rotation         Hip Internal Rotation         Knee Extension (L3-4) 4- 4-       Knee Flexion         Ankle Dorsiflexion (L4-5) 4- 4-       Great Toe Extension (L5)         Ankle Plantar flexion (S1)         Abdominals          30 second sit<>stand: 3 reps with UE support  Age gender norm: 15 reps without UE support     Gait speed (10 meter walk test):  Comfortable gait speed: 21.87 seconds, 0.27 m/s  Age gender norm: 1.4 m/s  AD used? Yes, 4WW    Fast gait speed: 15.6 seconds, 0.38 m/s  Age gender norm: 1.46 m/s  AD used? Yes, 4WW    Gait observation: Ambulates slowly with 4WW, widened CARLYLE and B hip ER and decreased step length B. Pt able to clear floor with B LE although not fully.     TU.73 seconds with 4WW             Reflex Testing:  Patellar (L3-4): NT  Achilles (S1-2): NT    Hip PROM with overpressure:   Unable to assess R hip PROM due to mm guarding and pain response   Unable to fully assess L hip PROM due to mm guarding and pain. Passive hip flexion in shortened range causes increased L gluteal and LE pain   L SLR: to approx 40 degrees, stopped due to c/o bilateral lateral hip and anterior hip pain. -ve increased pain with adduction.   R SLR: to approx 55 degrees, increased posterior LE pain, -ve increased pain with adduction          Lumbar Special Tests:    Lumbar Special Tests Right Left SI Tests Right  Left   Quadrant test   SI Compression     Straight leg raise   SI Distraction     Crossover response   Thigh Thrust     Slump   Sacral Thrust       REJI     Trunk extensor  endurance test  Resisted Abduction     Prone instability test  Other:         Palpation:   + hypertonicity and pain of L gluteal mm, piriformis, IT band, increased pain at greater trochanter > lateral/superior hip     Repeated Motion Testing: NT      Passive Mobility - Joint Integrity:  PA's: unable to assess    Treatment Today     TREATMENT MINUTES COMMENTS   Evaluation 55 Lumbar/LE evaluation.   Discussed findings of evaluation and patient's current symptoms.    Self-care/ Home management     Manual therapy     Neuromuscular Re-education     Therapeutic Activity     Therapeutic Exercises     Gait training     Modality__________________                Total 55    Blank areas are intentional and mean the treatment did not include these items.       PT Evaluation Code: (Please list factors)  Patient History/Comorbidities: arthritis, HTN, DM, neuropathy, Hx of DVT  Examination: balance, strength, ROM, see assessment   Clinical Presentation: stable  Clinical Decision Making: low    Patient History/  Comorbidities Examination  (body structures and functions, activity limitations, and/or participation restrictions) Clinical Presentation Clinical Decision Making (Complexity)   No documented Comorbidities or personal factors 1-2 Elements Stable and/or uncomplicated Low   1-2 documented comorbidities or personal factor 3 Elements Evolving clinical presentation with changing characteristics Moderate   3-4 documented comorbidities or personal factors 4 or more Unstable and unpredictable High              Kathy Bethea  1/17/2017  2:37 PM

## 2021-06-08 NOTE — TELEPHONE ENCOUNTER
Verbal Orders for PT 1 x this week to complete discharge from home services given to MARCELINO    btrn

## 2021-06-08 NOTE — TELEPHONE ENCOUNTER
[facility] phone call message- general phone call:    Reason for call: Orders for PT 1 x this week to complete discharge from home services.     Return call needed: Yes    OK to leave a message on voice mail? Yes    Primary language: English      needed? No    Call taken on June 8, 2021 at 9:49 AM by Grisel Flores-Cardona

## 2021-06-08 NOTE — PROGRESS NOTES
"[facility] Daily Progress     Patient Name: Ruthy Álvarez  Date: 2017  Visit #: -10 visits - Medicare  PTA visit #:    Referral Diagnosis: Chronic Pain   Referring provider: Jerri Patel MD Dr. Christopher Fallert  Visit Diagnosis:     ICD-10-CM    1. Decreased functional mobility and endurance Z74.09    2. Lower extremity pain, left M79.605    3. Poor balance R26.89    4. Muscular deconditioning R29.898          Assessment:   Patient was unable to tolerate full treatment session time today due to chest pain, frequent coughing and remaining pneumonia symptoms. She did appear motivated and willing to try exercises however. Initiated HEP and gave handouts for general strengthening and core stabilization- will check compliance with HEP at next session.     Goal Status:  No Data Recorded  Pt will: increase comfortable gait speed to >0.6 m/s with 4WW to decrease falls risk in 8 visits  Pt will: perform > 6 reps on 30 second sit<> stand with UE support to improve LE strength in 8 visits  Pt will: decrease TUG score to <20 seconds with 4WW to decrease falls risk in 8 visits  Pt will: report greater ease and less pain in L LE with transfers in 8 visits    Plan / Patient Education:     Continue with initial plan of care.  Progress with home program as tolerated.    Subjective:   Patient returns to PT today with c/o coughing and chest pain. \"It's so dry I can't stop coughing\". She feels her pneumonia has worsened and is going to see her PCP after today's visit.     Pain Ratin in L lower LE.       Objective:   Pt ambulates slowly with 4WW, widened CARLYLE and decreased step length overall lobby<>gym.     BP at rest: 125/86, HR: 92    Therapeutic exercise performed:  NuStep WL 2, 7:00 total. Average SPM: 40. Rate of dyspnea: 6/10    - SKC x 30 seconds x 2 reps B, therapist assist. Pt unable to perform due to c/o pain in B knees.  - Lumbar rotation with arm abduction x 30 seconds x 2 reps B. " "VC's for deep breathing.   - Posterior pelvic tilt x 5 seconds x 15 reps. Pt unable to tolerate >15 reps due to c/o chest/throat pain.  - Standing hip abduction with B UE support on railing x 15 reps B. VC's for upright posture   - Standing marches x 15 reps alternating, VC's for \"driving knee\" up.B UE support on railing.   - Heel/toe raises discontinued due to pain.   - Sit<>stand with UE support x 10 reps, VC's for abdominal engagement upon standing.    Frequent rest breaks taken today due to chest pain and coughing although she requested to continue session. Suggested discontinue session early, patient was in agreement.     Treatment Today     TREATMENT MINUTES COMMENTS   Evaluation     Self-care/ Home management     Manual therapy     Neuromuscular Re-education     Therapeutic Activity     Therapeutic Exercises 45 See above.   Gait training     Modality__________________                Total 45 Therapist and patient terminated session early.    Blank areas are intentional and mean the treatment did not include these items.       Kathy Bethea  1/24/2017    "

## 2021-06-09 NOTE — ANESTHESIA CARE TRANSFER NOTE
Last vitals:   Vitals:    03/15/17 1039   BP: 105/66   Pulse: 86   Resp: 12   Temp: 37.4  C (99.4  F)   SpO2: 98%     Patient's level of consciousness is awake  Spontaneous respirations: yes  Maintains airway independently: yes  Dentition unchanged: yes  Oropharynx: oropharynx clear of all foreign objects    QCDR Measures:  ASA# 20 - Surgical Safety Checklist: ASA20A - Safety Checks Done  PQRS# 430 - Adult PONV Prevention: NA - Not adult patient, not GA or 3 or more risk factors NOT present  ASA# 8 - Peds PONV Prevention: NA - Not pediatric patient, not GA or 2 or more risk factors NOT present  PQRS# 424 - Shonna-op Temp Management: 4559F - At least one body temp DOCUMENTED => 35.5C or 95.9F within required timeframe  PQRS# 426 - PACU Transfer Protocol: - Transfer of care checklist used  ASA# 14 - Acute Post-op Pain: ASA14B - Patient did NOT experience pain >= 7 out of 10    I completed my SBAR handoff to the receiving nurse per policy and procedure.

## 2021-06-09 NOTE — ANESTHESIA POSTPROCEDURE EVALUATION
Patient: Ruthy Álvarez  INCOMPLETE COLONOSCOPY  Anesthesia type: MAC    Patient location: Phase II Recovery  Last vitals:   Vitals:    03/15/17 1039   BP: 105/66   Pulse: 86   Resp: 12   Temp: 37.4  C (99.4  F)   SpO2: 98%     Post vital signs: stable  Level of consciousness: awake and responds to simple questions  Post-anesthesia pain: pain controlled  Post-anesthesia nausea and vomiting: no  Pulmonary: unassisted, return to baseline  Cardiovascular: stable and blood pressure at baseline  Hydration: adequate  Anesthetic events: no    QCDR Measures:  ASA# 11 - Shonna-op Cardiac Arrest: ASA11B - Patient did NOT experience unanticipated cardiac arrest  ASA# 12 - Shonna-op Mortality Rate: ASA12B - Patient did NOT die  ASA# 13 - PACU Re-Intubation Rate: NA - No ETT / LMA used for case  ASA# 10 - Composite Anes Safety: ASA10A - No serious adverse event  ASA# 38 - New Corneal Injury: ASA38A - No new exposure keratitis or corneal abrasion in PACU    Additional Notes:

## 2021-06-09 NOTE — PROGRESS NOTES
[facility] Discharge Summary  Patient Name: Ruthy Álvarez  Date: 4/10/2017  Referral Diagnosis: Chronic pain   Referring provider: Jose Angel Meyer,*  Visit Diagnosis:   1. Decreased functional mobility and endurance     2. Lower extremity pain, left     3. Poor balance     4. Muscular deconditioning         Goals:  Goals not re-assessed. Pt did not return to PT.  Pt will: increase comfortable gait speed to >0.6 m/s with 4WW to decrease falls risk in 8 visits  Pt will: perform > 6 reps on 30 second sit<> stand with UE support to improve LE strength in 8 visits  Pt will: decrease TUG score to <20 seconds with 4WW to decrease falls risk in 8 visits  Pt will: report greater ease and less pain in L LE with transfers in 8 visits    Patient was seen for 3 visits from initial eval to 2/21/17 with 2 missed appointments.  The patient discontinued therapy, did not return.  Patient did not return due to other health issues.     Therapy will be discontinued at this time.  The patient will need a new referral to resume.    Thank you for your referral.  Kathy Neema  4/10/2017  9:40 AM  [facility] Daily Progress     Patient Name: Ruthy Álvarez  Date: 2/21/2017  Visit #: 3/8-10 visits - Medicare  PTA visit #:    Referral Diagnosis: Chronic Pain   Referring provider: Jose Angel Meyer,* Dr. Jose Angel Meyer  Visit Diagnosis:     ICD-10-CM    1. Decreased functional mobility and endurance Z74.09    2. Lower extremity pain, left M79.605    3. Poor balance R26.89    4. Muscular deconditioning R29.898          Assessment:   Patient returns to PT today with reports of improved lower back, ankle, and hip pain. She demonstrates motivation during appt today and was willing to participate in all strengthening exercise. Pt ambulates with noticeably improved gait speed and step length throughout session. She is progressing towards goals and is appropriate for additional PT services.     Goal  Status:  No Data Recorded  Pt will: increase comfortable gait speed to >0.6 m/s with 4WW to decrease falls risk in 8 visits  Pt will: perform > 6 reps on 30 second sit<> stand with UE support to improve LE strength in 8 visits  Pt will: decrease TUG score to <20 seconds with 4WW to decrease falls risk in 8 visits  Pt will: report greater ease and less pain in L LE with transfers in 8 visits    Plan / Patient Education:     Continue with initial plan of care.  Progress with home program as tolerated.    Subjective:   Patient reports feeling overall better today- bilateral hip pain has improved.   She experienced intense lower LE numbness over the weekend which has improved.     Pain Ratin in L lower LE.       Objective:   Pt ambulates with increased gait speed with improved step length and clearance B.     Therapeutic exercise performed:  NuStep WL 3, 9:00 total. Average SPM: 40. Rate of dyspnea: 0/10    - Ab sets x 10 reps with cues for breathing  - Ab sets with slow, unilateral march x 10 reps. VC's for cont abdominal activation.   - Bridges no hold x 10 reps, VC's for abd and gluteal activation.   - Supine SLR nerve slider- pt has difficulty maintaining hip flexion independently so modified to seated slump slider x 10 reps B  - Standing hip abduction with B UE support x 10 reps B. VC's to keep upright posture.   - Standing DL squats x 10 reps with B UE support  - Heel/toe raises x 10 rep with B UE support     Pt requested one seated rest break at the conclusion of session. HEP handouts given with verbal and visual explanation.     Treatment Today     TREATMENT MINUTES COMMENTS   Evaluation     Self-care/ Home management     Manual therapy     Neuromuscular Re-education     Therapeutic Activity     Therapeutic Exercises 54 See above.   Gait training     Modality__________________                Total 54    Blank areas are intentional and mean the treatment did not include these items.       Kathy  Aakre  2/21/2017

## 2021-06-09 NOTE — ANESTHESIA POSTPROCEDURE EVALUATION
Patient: Ruthy Álvarez  COLONOSCOPY  Anesthesia type: MAC    Patient location: Phase II Recovery  Last vitals:   Vitals:    03/16/17 1413   BP: 124/59   Pulse: 81   Resp: 16   Temp: 37.2  C (99  F)   SpO2: 100%     Post vital signs: stable  Level of consciousness: awake and responds to simple questions  Post-anesthesia pain: pain controlled  Post-anesthesia nausea and vomiting: no  Pulmonary: unassisted, return to baseline  Cardiovascular: stable and blood pressure at baseline  Hydration: adequate  Anesthetic events: no    QCDR Measures:  ASA# 11 - Shonna-op Cardiac Arrest: ASA11B - Patient did NOT experience unanticipated cardiac arrest  ASA# 12 - Shonna-op Mortality Rate: ASA12B - Patient did NOT die  ASA# 13 - PACU Re-Intubation Rate: NA - No ETT / LMA used for case  ASA# 10 - Composite Anes Safety: ASA10A - No serious adverse event  ASA# 38 - New Corneal Injury: ASA38A - No new exposure keratitis or corneal abrasion in PACU    Additional Notes:

## 2021-06-09 NOTE — ANESTHESIA PREPROCEDURE EVALUATION
Anesthesia Evaluation      Patient summary reviewed   No history of anesthetic complications     Airway   Mallampati: II  Neck ROM: full   Pulmonary - negative ROS and normal exam   (+) asthma  moderate,  well controlled, shortness of breath,                          Cardiovascular - negative ROS and normal exam  (+) hypertension well controlled, , hypercholesterolemia,      Neuro/Psych - negative ROS     Endo/Other    (+) diabetes mellitus well controlled using insulin, obesity,      GI/Hepatic/Renal - negative ROS   (+) GERD well controlled,   chronic renal disease CRI,           Dental - normal exam                        Anesthesia Plan  Planned anesthetic: MAC    ASA 3   Induction: intravenous   Anesthetic plan and risks discussed with: patient    Post-op plan: routine recovery

## 2021-06-09 NOTE — ANESTHESIA CARE TRANSFER NOTE
Last vitals:   Vitals:    03/16/17 1413   BP: 124/59   Pulse: 81   Resp: 16   Temp: 37.2  C (99  F)   SpO2: 100%     Patient's level of consciousness is drowsy  Spontaneous respirations: yes  Maintains airway independently: yes  Dentition unchanged: yes  Oropharynx: oropharynx clear of all foreign objects    QCDR Measures:  ASA# 20 - Surgical Safety Checklist: ASA20A - Safety Checks Done  PQRS# 430 - Adult PONV Prevention: 4558F - Pt received => 2 anti-emetic agents (different classes) preop & intraop  ASA# 8 - Peds PONV Prevention: NA - Not pediatric patient, not GA or 2 or more risk factors NOT present  PQRS# 424 - Shonna-op Temp Management: 4559F - At least one body temp DOCUMENTED => 35.5C or 95.9F within required timeframe  PQRS# 426 - PACU Transfer Protocol: - Transfer of care checklist used  ASA# 14 - Acute Post-op Pain: NA - Patient under age 10y or did not go to PACU    I completed my SBAR handoff to the receiving nurse per policy and procedure.

## 2021-06-09 NOTE — ANESTHESIA PREPROCEDURE EVALUATION
Anesthesia Evaluation      Patient summary reviewed   No history of anesthetic complications     Airway   Mallampati: II  Neck ROM: full   Pulmonary - normal exam    breath sounds clear to auscultation  (+) asthma   well controlled, shortness of breath,   (-) sleep apnea, not a smoker    ROS comment: H/O PE                         Cardiovascular   Exercise tolerance: < 4 METS (Walks with walker)  (+) hypertension, , hypercholesterolemia,     (-) angina, murmur  ECG reviewed  Rhythm: regular  Rate: normal,    no murmur   ROS comment: The Lexiscan stress and rest images demonstrate normal left ventricular  size and tracer uptake pattern. The gated images reveal normal resting regional  wall motion and global systolic performance. The measured resting ejection fraction  is greater than 70%.      CONCLUSION: Lexiscan stress nuclear study is negative for inducible myocardial  ischemia or infarction.     Neuro/Psych    (+) neuromuscular disease,  depression,     Endo/Other    (+) diabetes mellitus, obesity,   (-) not pregnant     GI/Hepatic/Renal    (+) GERD well controlled,   chronic renal disease,           Dental - normal exam                        Anesthesia Plan  Planned anesthetic: MAC    ASA 3   Induction: intravenous   Anesthetic plan and risks discussed with: patient    Post-op plan: routine recovery

## 2021-06-10 NOTE — TELEPHONE ENCOUNTER
Wellness Screening Tool  Symptom Screening:  Do you have one of the following NEW symptoms:    Fever (subjective or >100.0)?  No    A new cough?  No    Shortness of breath?  No     Chills? No     New loss of taste or smell? No     Generalized body aches? No     New persistent headache? No     New sore throat? No     Nausea, vomiting, or diarrhea?  No    Within the past 2 weeks, have you been exposed to someone with a known positive illness below:    COVID-19 (known or suspected)?  No    Chicken pox?  No    Mealses?  No    Pertussis?  No    Patient notified of visitor policy- They may have one person accompany them to their appointment, but they will need to wear a mask and will be screened upon arrival for symptoms: Yes  Pt informed to wear a mask: Yes  Pt notified if they develop any symptoms listed above, prior to their appointment, they are to call the clinic directly at [phone] for further instructions.  Yes  Patient's appointment status: Patient will be seen in clinic as scheduled on Mon 8-31-20 @ 1350 in [facility] with Dr. Killian.  mg

## 2021-06-14 NOTE — PROGRESS NOTES
"Ruthy Álvarez is a 55 y.o. female who is being evaluated via a billable telephone visit.      The patient has been notified of following:     \"This telephone visit will be conducted via a call between you and your physician/provider. We have found that certain health care needs can be provided without the need for a physical exam.  This service lets us provide the care you need with a short phone conversation.  If a prescription is necessary we can send it directly to your pharmacy.  If lab work is needed we can place an order for that and you can then stop by our lab to have the test done at a later time.    Telephone visits are billed at different rates depending on your insurance coverage. During this emergency period, for some insurers they may be billed the same as an in-person visit.  Please reach out to your insurance provider with any questions.    If during the course of the call the physician/provider feels a telephone visit is not appropriate, you will not be charged for this service.\"    Patient has given verbal consent to a Telephone visit? Yes    What phone number would you like to be contacted at? [phone]    Patient would like to receive their AVS by AVS Preference: E-Mail (Inform patient AVS not encrypted with this option).    Phone call duration: 25 minutes    Katy Decker RD        Post-op Surgical Weight Loss Diet Evaluation     Assessment:  Pt presents for 14 years post-op RD visit, s/p RYGB in 2006. Today we reviewed current eating habits and level of physical activity, and instructed on the changes that are required for successful bariatric outcomes.    Patient Progress: would like to lose about 50lb more  Has been taking her phentermine , cleaning around the house  States she has a lot of loose skin that is really heavy and hurts her arms and legs and reports a lot of sore    Pt's Initial Weight: 360 lbs  Weight: 191 lb (86.6 kg)  Weight loss from initial: 169  % Weight loss: 46.94 " %      Body mass index is 38.58 kg/m .    Patient Active Problem List   Diagnosis     Carpal tunnel syndrome of right wrist     Asthma     Depression     DVT of lower extremity, bilateral (H)     Diabetes (H)     Hypertension     Diabetic nephropathy (H)     Neuropathy     Neck pain     Obesity     Compression neuropathy of left lower extremity     Mononeuritis lower limb, left     Tarsal tunnel syndrome of left side     Interdigital neuroma of left foot     Nausea & vomiting     Gastroenteritis     Generalized abdominal pain     Chest wall pain     Morbid obesity with BMI of 45.0-49.9, adult (H)     Influenza A     Hernia     GERD (gastroesophageal reflux disease)     Chronic pain     Diarrhea     Hernia of anterior abdominal wall     Pulmonary embolism, bilateral (H)     Syncope, near     Abnormal Pap smear of cervix     Bilateral ankle joint pain     Chronic pain disorder     Copper deficiency     Chronic pain of both shoulders     History of hysterectomy for benign disease     Bariatric surgery status     History of cholecystectomy     History of injury     History of pulmonary embolism     Insomnia     Hyperlipidemia     Microalbuminuria     Moderate major depression (H)     Moderate persistent asthma without complication     Necrotizing fasciitis (H)     Open wound of groin with complication     Osteoarthritis of both knees     Pain in left lower leg     Persons encountering health services in other specified circumstances     Urinary incontinence     Pulmonary embolism with infarction (H)     Polyneuropathy due to secondary diabetes mellitus (H)     Hx of gastric bypass     Chest pain     Chest pain, unspecified     Hyperglycemia     Neck mass     Hyperglycemia due to type 2 diabetes mellitus (H)     Type 2 diabetes mellitus with diabetic polyneuropathy (H)     Chronic kidney disease, stage 3       Diabetes: yes- taking trulicity, states blood glucose is looking pretty good    Vitamins   Multi Vit with Iron:  "yes  Calcium Citrate: yes  B12: yes  D3: yes    Do you have \"dumping\" syndrome? Sometimes- vegetables, pizza (no more than a slice or 2)   Do you experience any reflux or discomfort with eating? no  Nausea: no  Vomiting: no  Diarrhea: no  Constipation: no  Hair loss:no    Diet Recall/Time:   Breakfast: 2 pieces of mar and a piece of toast  Lunch: none  Dinner: ham sandwich  +protein shake in morning    Typical Snacks: none    Estimated protein intake: 50-60 grams    Meal Duration:1 hour- takes a couple bites then will walk away and come back to it     Fluid-meal separation:  Fluids are  30min before and 30 minutes after meals.    Fluid Intake  Water: states she is drinking a lot of water and gets full really fast from this    Exercise: housework      PES statement:      (NC-1.4) Altered GI Function related to Alteration in gastrointestinal tract structure and/or function/ Decreased functional length of the GI tract as evidenced by Weight loss of 46.94% initial body weight; Gastric bypass surgery  (NI-5.7.1) Inadequate protein intake related to Gastric bypass causing increased nutrient needs due to malabsorption/ Decreased ability to consume sufficient protein as evidenced by Edema, poor musculature, dull skin, thin and fragile hair; and Estimated intake of protein insufficient to meet requirements    Intervention    Discussion    1. Recommended to consume 15-20gm protein at 3 meals daily, along with protein supplement/\"planned protein containing snack\" of 15-30gm protein, to reach goal of 60-80 gm protein daily.  2. Reviewed lean protein sources  3. Bariatric Plate Method-  including lean/low fat protein at each meal, including a vegetable/fruit, and limiting carbohydrate intake to less than 25% of plate volume.   Instructions  1. Include 15-20gm protein at each meal, along with protein supplement/\"planned protein containing snack\" of 15-30gm protein, to reach goal of 60-80 gm protein daily.  2. Increase " fluid intake to 64oz daily: choose plain or calorie/carbonation-free beverages.  3. Incorporate daily structured activity, 30-60 minutes most days of the week  4. Increase vegetable/fruit intake, by having a vegetable or fruit with each meal daily.  5. Practice plate method: 1/2 plate lean/low fat protein source, vegetable/fruit, <25% of plate complex carbohydrates.  6. Separate fluids 30 minutes before/after meal times.  7. Practice eating off of smaller plates/bowls, chewing to applesauce consistency, taking 20-30 minutes to eat in a calm/relaxed environment without distractions of tv/email/cell phone.    Assessment/Plan:    Pt to follow up for 3 months  post-op visit with bariatrician to check on phentermine     Katy Decker RD

## 2021-06-14 NOTE — PATIENT INSTRUCTIONS - HE
[facility]ariatric [facility]  Nutritional Guidelines  Gastric Bypass 18 Months Post Op and Beyond    General Guidelines and Helpful Hints:    Eat 3 meals per day + protein supplement(s). No snacks between meals.  o Do not skip meals.  This can cause overeating at the next meal and will prevent adequate protein and nutritional intake.    Aim for 60-80 grams of protein per day.  o Always eat your protein first. This assists with optimal nutrition and helps you stay full longer.  o Depending on your portion size, you may need to drink approved protein supplement between meals to achieve protein goals. Follow recommendations of your Dietitian.     Eat your protein first, and then follow with fiber.   o It is not necessary to count your fiber, but 15-20 grams per day is recommended.    o Add fiber by including fruits, vegetables, whole grains, and beans.     Portions should remain about 1 cup per meal. Use measuring cups to be accurate.    Continue to use saucer/salad plates, infant/toddler silverware to keep portion sizes small and take small bites.    Eat S-L-O-W-L-Y to make each meal last 20-30 minutes. Always stop eating when satisfied.    Continue to use caution with foods containing skins, peels or membranes. Chew well!    Aim for 64 oz. of calorie-free fluids daily.  o Continue to avoid caffeine and carbonation. If you choose to drink alcohol, do so in moderation.   o Remember to avoid drinking during meals, 15-30 minutes before and 30 minutes after.    Exercise is wu for continued weight loss and weight maintenance. Aim for 30-60 minutes of physical activity most days of the week. Include cardiovascular and strength training.    If having trouble tolerating meat, try using a crock-pot, tinfoil tent, steamer or other moist cooking method to create tender meats. Add broth or low-fat gravy to help meat stay moist.     Avoid high sugar and high fat foods to prevent dumping syndrome.  o Check nutrition labels for less  than 10 grams of sugar and less than 10 grams of fat per serving.    Continue Taking Vitamins/Minerals:  o 2452-2049 mcg of Sublingual B-12 daily  o 1 Multivitamin with Iron twice daily (chewable or swallow tabs)  o 500-600 mg Calcium Citrate twice daily (chewable or swallow tabs)  o 5000 IU Vitamin D3 daily    Sample Grocery List    Protein:    Fat free Greek or light yogurt (less than 10 grams sugar)    Fat free or low-fat cottage cheese    String cheese or reduced fat cheese slices    Tuna, salmon, crab, egg, or chicken salad made with light or fat free mayonnaise    Egg or Egg Substitute    Lean/extra lean turkey, beef, bison, venison (ground, sirloin, round, flank)    Pork loin or tenderloin (grilled, baked, broiled)    Fish such as salmon, tuna, trout, tilapia, etc. (grilled, baked, broiled)    Tender cuts of lean (skinless) turkey or chicken    Lean deli meats: turkey, lean ham, chicken, lean roast beef    Beans such as kidney, garbanzo, black, hunter, or low-fat/fat free refried beans    Peanut butter (natural preferred). Limit to 1 Tbsp. per day.    Low-fat meatloaf (made with lean ground beef or turkey)    Sloppy Joes made with low-sugar ketchup and lean ground beef or turkey    Soy or vegetable protein (i.e. vegan crumbles, soy/veggie burger, tofu)    Hummus    Vegetables:    Fresh: cooked or raw (as tolerated)    Frozen vegetables    Canned vegetables (low sodium or no salt added, rinse before cooking/eating)    (Ok to have skins/peels/membranes/seeds - just chew well)    Fruits:    Fresh fruit    Frozen fruit (no sugar added)    Canned fruit (packed in its own juice, NOT syrup)    (Ok to have skins/peels/membranes/seeds - just chew well)    Starch:    Unsweetened whole-grain hot cereal (or high fiber cold cereal, dry)    Toasted whole wheat bread or [place] Thins    Whole grain crackers    Baked /boiled/mashed potato/sweet potato    Cooked whole grain pasta, brown rice, or other cooked whole  grains    Starchy vegetables: corn, peas, winter squash    Protein Supplement:     Ready to drink protein shake with:  o 15-30 grams protein per serving  o Less than 10 grams total carbohydrate per serving     Protein powder mixed with:  o  Skim or 1% milk  o Low fat or fat free Lactaid milk, plain or no sugar added soymilk  o Water     Fats: (use in moderation)    1 teaspoon of soft tub margarine    1 teaspoon olive oil, canola oil, or peanut oil    1 tablespoon of low-fat miller or salad dressing     Sample Menu for 18+ months after Gastric Bypass    You do NOT need to eat/drink the full portion sizes listed below  Always stop when you are satisfied    Breakfast   cup 1% cottage cheese     cup mixed berries   Lunch 2 oz lean roast beef on   [place] Thin with 1 tsp. light miller    small tomato, chopped, mixed with 1 tsp. light vinaigrette dressing   Supplement Approved protein supplement (if needed between meals)   Dinner 2 oz grilled salmon    cup salad greens with 1 tsp. light salad dressing and 1 tsp. ground flax seed    cup quinoa or brown rice     Breakfast   cup egg substitute with   cup sautéed chopped vegetables  2 light [place] Krisp crackers   Lunch Tuna Melt:   cup tuna mixed with 1 tsp. light miller over   [place] Thin. Top with 2-3 slices cucumber and 1 oz slice of low fat cheese   Supplement 1 cup skim milk (if needed between meals)   Dinner 3 oz  grilled, broiled, or baked seasoned skinless chicken breast    cup asparagus     Breakfast   cup plain oatmeal made with skim or 1% milk with 1 Tbsp. flavored/unflavored protein powder added  1 mozzarella string cheese   Lunch 2 oz deli turkey breast  1/3 cup salad with 1 tsp. light salad dressing, 1/8 of a whole avocado and 1 Tbsp. sunflower seeds   Dinner 3 oz. pork loin made in a crock pot, seasoned with a spice rub    cup cooked carrots   Supplement Approved protein supplement (if needed between meals)     Breakfast 1 cup breakfast casserole made with egg  substitute, turkey sausage,  and steamed, chopped bell peppers   Supplement  1 cup light Greek yogurt (if needed between meals)   Lunch 2 oz. teriyaki turkey    cup mashed sweet potato with 1-2 spritzes of spray butter (like [place])    cup fresh pineapple   Dinner 3 oz low fat meatloaf    cup roasted garlic zucchini     Breakfast   cup leftover breakfast casserole    cup no sugar added applesauce with 1 Tbsp. unflavored protein powder and a sprinkle of cinnamon    Lunch 3 oz shrimp with 1-2 Tbsp. low-sugar cocktail sauce for dipping    c. whole wheat pasta drizzled with   tsp. olive oil   Supplement 1 cup skim/1% milk with scoop of protein powder (if needed between meals)   Dinner Grilled, seasoned kebob with 2 oz lean beef and   cup vegetables     Breakfast Breakfast pizza:   [place] Thin spread with 1 Tbsp. low sugar spaghetti sauce,   cup shredded low fat cheese, melted and 1 slice of [place] mar     cup fresh fruit mixed with chopped almonds   Lunch   cup black bean soup  4-5 whole grain crackers   Dinner 3 oz  tilapia with lemon pepper seasoning    cup stewed tomatoes   Supplement 1 string cheese (if needed between meals)     Breakfast 2 hard boiled eggs (discard 1 egg yolk)    whole wheat English Muffin with 1 tsp. low sugar jelly   Lunch   cup leftover black bean soup topped with 1-2 Tbsp. low fat cheese  2-3 light [place] Krisp crackers   Supplement Approved protein supplement (if needed between meals)   Dinner 3 oz sirloin steak    cup steamed broccoli

## 2021-06-14 NOTE — TELEPHONE ENCOUNTER
Patient needs to have an appointment prior to refill on this medication since it has been over 6 months.  Patient was called and a message left.  Sandi Coleman RN

## 2021-06-16 ENCOUNTER — TELEPHONE (OUTPATIENT)
Dept: FAMILY MEDICINE | Facility: CLINIC | Age: 56
End: 2021-06-16

## 2021-06-17 ENCOUNTER — MEDICAL CORRESPONDENCE (OUTPATIENT)
Dept: HEALTH INFORMATION MANAGEMENT | Facility: CLINIC | Age: 56
End: 2021-06-17

## 2021-06-17 NOTE — TELEPHONE ENCOUNTER
[facility] called again. Wanted to know if requested orders were approved. Approved orders per [facility] standing orders.

## 2021-06-17 NOTE — PATIENT INSTRUCTIONS - HE
Patient Instructions by Rosalinda Bazan PT at 9/18/2019  8:30 AM     Author: Rosalinda Bazan PT Service: -- Author Type: Physical Therapist    Filed: 9/18/2019  9:24 AM Encounter Date: 9/18/2019 Status: Addendum    : Rosalinda Bazan PT (Physical Therapist)    Related Notes: Original Note by Rosalinda Bazan PT (Physical Therapist) filed at 9/18/2019  9:23 AM       Rosalinda Bazan's number [phone]  Call me by next week if you haven't heard from anyone from Dr. Meyer's clinic or another clinic trying to schedule a consult for you     CERVICAL FLEXION    Tilt your head downwards, then return back to looking straight ahead.  NICE AND GENTLE x10 reps   3-4x/day    CERVICAL SIDE BEND    Tilt your head towards the side, then return back to looking straight ahead.  (Be sure to keep you eyes and nose pointed straight ahead the entire time)  NICE AND GENTLE x10 reps   3-4x/day    CERVICAL ROTATION    Turn your head towards the side, then return back to looking straight ahead.  NICE AND GENTLE x10 reps   3-4x/day

## 2021-06-17 NOTE — TELEPHONE ENCOUNTER
Reason for Call:  Home Health Care    Bon with [facility] called regarding (reason for call): Orders    Orders are needed for this patient. PT and medication interaction    PT: 2x a week for 3 weeks for strengthening, balance and gate training    Medication Interaction: amitriptyline 10mg and phentermine 37.5mg- Needs the Ok for these. Please call Bon back from [facility] with the verbal orders and the ok for those medications.     Pt Provider: Dr. Meyer    Phone Number Homecare Nurse can be reached at: [phone]    Can we leave a detailed message on this number? Yes, Bon requested a detailed VM with the OK if you get voicemail.    Phone number patient can be reached at:   Cell number on file:    Telephone Information:   Mobile [phone]       Best Time: anytime    Call taken on 4/28/2021 at 4:55 PM by Darby Muller

## 2021-06-19 NOTE — LETTER
"Letter by Carmen Tello LICSW at      Author: Carmen Tello LICSW Service: -- Author Type: --    Filed:  Encounter Date: 7/18/2019 Status: (Other)         July 18, 2019        Ruthy Álvarez  [address] [address]         Dear Ruthy,       We missed you on 7/18/2019.   Your file indicates this was your second \"No Show/Late Cancel\" appointment.  Per our policy and per the patient consent for service agreement reviewed and signed at your first visit, I will be required to discharge you if you have another late cancel or no show before 7/10/2010.    I understand life at times will get in the way of keeping appointments however it is important to call this clinic to cancel if you find you are not able to come in.  Cancelling 24 hours prior lets me know your situation and may allow me to schedule another patient who also needs my care.      Please know I take your care very seriously.  It is important to me that you reach your therapy goals. Unfortunately, this is made more difficult if your do not come to see me as agreed on.      If you don't already have an appointment scheduled and would like to return, please call this clinic to set up a new time.  You can reach the clinic at [phone].        Thank you,      Sincerely,          JAYME Saleh  [facility]  [address] [place]/[address]  [place], [place]  [id number] [phone]         "

## 2021-06-19 NOTE — LETTER
Letter by Brendon Kumari DO at      Author: Brendon Kumari DO Service: -- Author Type: --    Filed:  Encounter Date: 6/13/2019 Status: (Other)       Ruthy Álvarez  [address] [address]       June 13, 2019       Dear Ms. Álvarez,      Enclosed is the report from your recent sleep study.     Please call with questions or contact us using [facility].    Your follow up visit is scheduled 6/21/2019, at 11:20am.     If you would like to review results sooner, please call [phone] to schedule a telephone call appointment with .     Sincerely,      Emily LAIRD [facility]  Sleep Medicine  6/13/2019 10:01 AM    C/O      Brendon Kumari DO

## 2021-06-20 NOTE — LETTER
Letter by Katy Decker RD at      Author: Katy Decker RD Service: -- Author Type: --    Filed:  Encounter Date: 12/11/2019 Status: Signed         Ruthy Álvarez  [address] [address]               December 11, 2019      Dear Ruthy:    We are sorry that you missed your appointment with Katy Decker on 12/10/2019. Your health and follow-up medical care are important to us. Please call our office as soon as possible so that we may reschedule your appointment. If you have already rescheduled your appointment, please disregard this letter.    Sincerely,      MERLE Kendrick

## 2021-07-01 ENCOUNTER — OFFICE VISIT (OUTPATIENT)
Dept: FAMILY MEDICINE | Facility: CLINIC | Age: 56
End: 2021-07-01
Payer: MEDICARE

## 2021-07-01 VITALS
SYSTOLIC BLOOD PRESSURE: 147 MMHG | TEMPERATURE: 97.7 F | RESPIRATION RATE: 16 BRPM | OXYGEN SATURATION: 99 % | HEART RATE: 106 BPM | DIASTOLIC BLOOD PRESSURE: 77 MMHG

## 2021-07-01 DIAGNOSIS — G89.4 CHRONIC PAIN SYNDROME: Primary | ICD-10-CM

## 2021-07-01 DIAGNOSIS — I10 ESSENTIAL HYPERTENSION: ICD-10-CM

## 2021-07-01 DIAGNOSIS — R80.9 MICROALBUMINURIA: ICD-10-CM

## 2021-07-01 DIAGNOSIS — M19.011 ARTHRITIS OF BOTH ACROMIOCLAVICULAR JOINTS: ICD-10-CM

## 2021-07-01 DIAGNOSIS — M19.012 ARTHRITIS OF BOTH ACROMIOCLAVICULAR JOINTS: ICD-10-CM

## 2021-07-01 PROCEDURE — 80306 DRUG TEST PRSMV INSTRMNT: CPT | Performed by: FAMILY MEDICINE

## 2021-07-01 PROCEDURE — 99214 OFFICE O/P EST MOD 30 MIN: CPT | Mod: 25 | Performed by: FAMILY MEDICINE

## 2021-07-01 PROCEDURE — 96372 THER/PROPH/DIAG INJ SC/IM: CPT | Performed by: FAMILY MEDICINE

## 2021-07-01 RX ORDER — LISINOPRIL 30 MG/1
30 TABLET ORAL AT BEDTIME
Qty: 90 TABLET | Refills: 3 | Status: SHIPPED | OUTPATIENT
Start: 2021-07-01 | End: 2021-10-21

## 2021-07-01 RX ORDER — OXYCODONE AND ACETAMINOPHEN 10; 325 MG/1; MG/1
TABLET ORAL
Qty: 120 TABLET | Refills: 0 | Status: SHIPPED | OUTPATIENT
Start: 2021-07-01 | End: 2021-07-29

## 2021-07-01 RX ADMIN — CYANOCOBALAMIN 1000 MCG: 1000 INJECTION, SOLUTION INTRAMUSCULAR; SUBCUTANEOUS at 11:58

## 2021-07-01 NOTE — PATIENT INSTRUCTIONS
Keep going with the same pain pills    Refills of stool softener should be at pharmacy    Increase lisinopril to 30 mg , but take at bedtime.    See you a month:  Will do bloodwork then.      07/06/21   ORTHOPEDICS ADULT REFERRAL  [facility]  Phone: [phone]  Fax: [phone]    Demographics, referral, office note(s) and radiology reports faxed to [phone].

## 2021-07-01 NOTE — PROGRESS NOTES
Patient Active Problem List    Diagnosis Date Noted     Stage 3a chronic kidney disease 12/31/2020     Priority: High     Persistent protienuria       Copper deficiency 04/05/2019     Priority: High     Identified in Aug, 2018--see discharge summary from [facility] in [place].       Chronic pain disorder 10/07/2016     Priority: High     Chronic Pain Diagnosis:  neuropathy  DIRE Total Score(s):14     ORT: 5    4 - 7 =  Moderate Risk   of future problems with Opioids  FAQ5: 50 November 20, 2020  Behavioral Health Consultation: 5/12/17 with Dr. Daisy Quezada  Personal Care Plan for Chronic Pain: 5/12/17  Opioid medication:oxycodone   Dose:10mg  Number of pills per month:60  Patient is being prescribed 10mg of oxycodone IR (Percocet) per day this is 15 mg Morphine Equivalents  Benzodiazepines Prescribed? No  Naloxone prescribed? No       Clinic visit frequency required: Q 1 month Next visit due:   Controlled Substance/Opioid Treatment agreement on file (dated <12 months ago)?:    Date(s): pending  Last [place] verification: last visit  Items in red to be updated at each visit  Patient is followed by Data Unavailable for ongoing prescription of pain medication.  All refills should be approved by this provider, or covering partner.Chronic Pain     Chronic Pain Diagnosis:  neuropathy  DIRE Total Score(s):14     ORT: 5    4 - 7 =  Moderate Risk   of future problems with Opioids  FAQ5: 45/100 on 9/9/16, 40/100 on 11/15/16  55 October 29, 2020    Behavioral Health Consultation: 5/12/17 with Dr. Daisy Quezada  Personal Care Plan for Chronic Pain: 5/12/17  Opioid medication:oxycodone   Dose:10mg  Number of pills per month:90  Patient is being prescribed 45 MME  Benzodiazepines Prescribed? No  Naloxone prescribed? No       Clinic visit frequency required: Q 1 month Next visit due:   Controlled Substance/Opioid Treatment agreement on file (dated <12 months ago)?:    Date(s): pending  Last [place] verification: last visit  Items  in red to be updated at each visit  Patient is followed by Data Unavailable for ongoing prescription of pain medication.  All refills should be approved by this provider, or covering partner.Chronic Pain       Persistent proteinuria 10/12/2015     Priority: High     Type 2 diabetes mellitus with diabetic polyneuropathy (H) 10/12/2015     Priority: High     Essential hypertension 08/26/2013     Priority: High     Arthritis of both acromioclavicular joints 04/08/2021     Priority: Medium     xray proven        Morbid obesity (H) 11/27/2020     Priority: Medium     Hyperglycemia due to type 2 diabetes mellitus (H) 06/02/2020     Priority: Medium     Neck mass 10/11/2019     Priority: Medium     Added automatically from request for surgery [id number]       Hyperglycemia 09/08/2019     Priority: Medium     Hx of gastric bypass 04/22/2019     Priority: Medium     Syncope, near 01/04/2019     Priority: Medium     Chest pain 01/03/2019     Priority: Medium     Generalized abdominal pain 01/03/2019     Priority: Medium     Gastroenteritis 01/03/2019     Priority: Medium     Neuropathy 01/03/2019     Priority: Medium     Obesity 01/03/2019     Priority: Medium     Other acute pulmonary embolism without acute cor pulmonale (H) 12/31/2018     Priority: Medium     Hernia of anterior abdominal wall 01/28/2017     Priority: Medium     Diarrhea 01/27/2017     Priority: Medium     Hernia 01/27/2017     Priority: Medium     Osteoarthritis of both knees 10/31/2016     Priority: Medium     Polyneuropathy due to secondary diabetes mellitus (H) 10/31/2016     Priority: Medium     Compression neuropathy of left lower extremity 02/19/2016     Priority: Medium     Interdigital neuroma of left foot 02/19/2016     Priority: Medium     Bilateral ankle joint pain 01/15/2016     Priority: Medium     Esophageal reflux 04/07/2015     Priority: Medium     Urinary incontinence 02/19/2015     Priority: Medium     Insomnia 02/19/2015     Priority: Medium      Asthma 08/04/2013     Priority: Medium     History of injury 08/04/2013     Priority: Medium     History of cholecystectomy 08/04/2013     Priority: Medium     Hyperlipidemia 06/21/2013     Priority: Medium     Status post bariatric surgery 06/21/2013     Priority: Medium     Kim-en-Y in 2006       History of hysterectomy for benign disease 06/21/2013     Priority: Medium     2008, For fibroids.  Cervix removed, but still has ovaries.       Moderate major depression (H) 01/24/2013     Priority: Medium     Moderate persistent asthma without complication 11/15/2012     Priority: Medium     Pulmonary embolism with infarction (H) 11/15/2012     Priority: Medium     12/24/2018  Unprovoked.  + fam hx of DVT.  Treated w/ DOAC 12/2018through 9/2019 12/24/2018  Unprovoked.  + fam hx of DVT.  Plan warfarin for 6 months.       Persons encountering health services in other specified circumstances 11/15/2012     Priority: Medium     Tier 3    Status: Accept  Care Coordination Start Date: 02/27/12  State Tier Level: Level 3  Language/Barrier to Learning: No  Significant Mental Health Issues: Yes  Date Care Coordination Discontinued: 05/28/15    DX V65.8 REPLACED WITH [id number] (04/08/2013)       Open wound of groin with complication 05/12/2011     Priority: Medium     Metabolic acidosis 07/26/2019     Priority: Low     reolved w/ discontinuation of ASA       Abnormal Pap smear of cervix 04/22/2019     Priority: Low     3-12-15: Pap/HPV neg.  Plan: Given hx (mildly abn pap, hyster), recheck in 5 yrs.  10-11-11: Pap/HPV neg.  Plan: Given ASUS pap in 2010, recommend repeating pap in 1 year.  8/3/2010 ASCUS Pap with +BV- pt treated with Metrogel.  Pt needs repeat Pap in 1yr.  8/4/2008 Pt had total hyst for menorrhagia, pelvic pain, scar tissue, and fibroids.    3-12-15: Pap/HPV neg.  Plan: Given hx (mildly abn pap, hyster), recheck in 5 yrs.  10-11-11: Pap/HPV neg.  Plan: Given ASUS pap in 2010, recommend  repeating pap in 1 year.  8/3/2010 ASCUS Pap with +BV- pt treated with Metrogel.  Pt needs repeat Pap in 1yr.  8/4/2008 Pt had total hyst for menorrhagia, pelvic pain, scar tissue, and fibroids.       S/P hysterectomy 06/21/2013     Priority: Low     2008, For fibroids.  Cervix removed, but still has ovaries.       Carpal tunnel syndrome 11/15/2012     Priority: Low     Necrotizing fasciitis (H) 11/15/2012     Priority: Low     Health Care Home 11/15/2012     Priority: Low     Tier 3    Status: Accept  Care Coordination Start Date: 02/27/12  State Tier Level: Level 3  Language/Barrier to Learning: No  Significant Mental Health Issues: Yes  Date Care Coordination Discontinued: 05/28/15    DX V65.8 REPLACED WITH [id number] (04/08/2013)         Chronic pain of both shoulders 12/23/2005     Priority: Low     Nursing Notes:   Sonia Shane CMA  7/1/2021 12:00 PM  Signed  Clinic Administered Medication Documentation    Administrations This Visit     cyanocobalamin injection 1,000 mcg     Admin Date  07/01/2021 Action  Given Dose  1,000 mcg Route  Intramuscular Site  Left Deltoid Administered By  Sonia Shane CMA    Ordering Provider: Jose Angel Meyer MD    Patient Supplied?: No                  Injectable Medication Documentation    Patient was given Cyanocobalamin (B-12). Prior to medication administration, verified patients identity using patient s name and date of birth. Please see MAR and medication order for additional information. Patient instructed to remain in clinic for 15 minutes.      Was entire vial of medication used? Yes  Vial/Syringe: Single dose vial  Expiration Date:  1/2023  Was this medication supplied by the patient? No    Name of provider who requested the medication administration: Dr. Meyer  Name of provider on site (faculty or community preceptor) at the time of performing the medication administration: Dr. Jane    Date of next administration:  Every 30 Days  Date of next  office visit with provider to renew medication plan (must be seen annually):       SUBJECTIVE:  Ruthy Álvarez is here for followup of her chronic pain syndrome, assessment of her hypertension.  Her chronic pain is polyfactorial.  She has osteoarthritis in multiple locations.  In the past, she had considered getting a steroid injection in the AC joint of her left shoulder.  We have not been able to arrange that.  She agrees to another referral today.  She has been attempting to also get into Nephrology to help with her microalbuminuria.  She takes lisinopril 20 mg daily, tolerates this well.  Her last creatinine was mildly abnormal between 1 and 1.3.  She has not had problems with hyperkalemia and/or cough.    Regarding her chronic pain, it is stable on her current dose of oxycodone.  The increased dose at night given last time has helped substantially with her sleep, which was a main priority.  Her constipation is well controlled with Senokot.  She denies any aberrant behavior.  She gets B12 and injections on a monthly basis due to her Kim-en-Y type gastric bypass and would like that again today.  Her weight loss has been intentional but stable.    OBJECTIVE:  The patient is alert, pleasant.  Her personality is somewhat reserved, but she is in no acute distress.  Her blood pressure and pulse were as listed.  Urine drug screen is appropriate.  She is on a stimulant, causing the positive amphetamines test, and she takes oxycodone for her chronic pain.  I reviewed the prescription database, and there are no red flags.    ASSESSMENT AND PLAN:  1.  Chronic pain syndrome, stable.  I refilled oxycodone, maintaining her current doses.  She has Narcan available.  She has had no aberrant behavior.  2.  Regarding her hypertension, in light of her mildly elevated blood pressure today and at her last reading, and in light of her prior proteinuria, we increased her ACE inhibitor, lisinopril, from 20 to 30 mg daily.  When she  returns next time, she will be due for a BMP and hemoglobin A1c.   3.  I did refer her to Ortho for a steroid injection into her AC joint.  4.  We will try to help encourage her to get into Nephrology for her proteinuria.        Chief Complaint   Patient presents with     Pain Management     CPM     Blood pressure (!) 147/77, pulse 106, temperature 97.7  F (36.5  C), temperature source Oral, resp. rate 16, SpO2 99 %, not currently breastfeeding.     Results for orders placed or performed in visit on 07/01/21   Rapid Urine Drug Screen ([facility])     Status: Abnormal   Result Value Ref Range    Cannabinoids Qual Urine NEGATIVE NEGATIVE    Phencyclidine NEGATIVE NEGATIVE    Cocaine Qual Urine NEGATIVE NEGATIVE    Methamphetamine Qual NEGATIVE NEGATIVE    Morphine Qual NEGATIVE NEGATIVE    Amphetamines Qual POSITIVE (A) NEGATIVE    Benzodiazepine Qual Urine NEGATIVE NEGATIVE    Tricyclic Antidepressants NEGATIVE NEGATIVE    Methadone Qual NEGATIVE NEGATIVE    Barbiturates Qual Urine NEGATIVE NEGATIVE    Oxycodone Qual POSITIVE (A) NEGATIVE    Propoxyphene NEGATIVE NEGATIVE    Buprenorphine Qual Urine NEGATIVE NEGATIVE    Temperature of Urine was Between  Degrees F YES YES

## 2021-07-01 NOTE — NURSING NOTE
Clinic Administered Medication Documentation    Administrations This Visit     cyanocobalamin injection 1,000 mcg     Admin Date  07/01/2021 Action  Given Dose  1,000 mcg Route  Intramuscular Site  Left Deltoid Administered By  Sonia Shane CMA    Ordering Provider: Jose Angel Meyer MD    Patient Supplied?: No                  Injectable Medication Documentation    Patient was given Cyanocobalamin (B-12). Prior to medication administration, verified patients identity using patient s name and date of birth. Please see MAR and medication order for additional information. Patient instructed to remain in clinic for 15 minutes.      Was entire vial of medication used? Yes  Vial/Syringe: Single dose vial  Expiration Date:  1/2023  Was this medication supplied by the patient? No    Name of provider who requested the medication administration: Dr. Meyer  Name of provider on site (faculty or community preceptor) at the time of performing the medication administration: Dr. Jane    Date of next administration:  Every 30 Days  Date of next office visit with provider to renew medication plan (must be seen annually):

## 2021-07-01 NOTE — LETTER
July 2, 2021      Ruthy TJ Henri  [address]  [place] [address]        Dear ,    We are writing to inform you of your test results.    Ms Álvarez   Your urine test results is as expected.   C Fallert     Resulted Orders   Rapid Urine Drug Screen ([facility])   Result Value Ref Range    Cannabinoids Qual Urine NEGATIVE NEGATIVE    Phencyclidine NEGATIVE NEGATIVE    Cocaine Qual Urine NEGATIVE NEGATIVE    Methamphetamine Qual NEGATIVE NEGATIVE    Morphine Qual NEGATIVE NEGATIVE    Amphetamines Qual POSITIVE (A) NEGATIVE    Benzodiazepine Qual Urine NEGATIVE NEGATIVE    Tricyclic Antidepressants NEGATIVE NEGATIVE    Methadone Qual NEGATIVE NEGATIVE    Barbiturates Qual Urine NEGATIVE NEGATIVE    Oxycodone Qual POSITIVE (A) NEGATIVE    Propoxyphene NEGATIVE NEGATIVE    Buprenorphine Qual Urine NEGATIVE NEGATIVE    Temperature of Urine was Between  Degrees F YES YES      Comment:      This is a preliminary screening test that detects drugs-of-abuse in urine at   specified detection levels.  To confirm preliminary results, a more specific   method such as Gas Chromatography/Mass Spectrometry (GC/MS) must be used.            If you have any questions or concerns, please call the clinic at the number listed above.       Sincerely,      Jose Angel Meyer MD

## 2021-07-02 NOTE — RESULT ENCOUNTER NOTE
Please mail labs to patient with this message.    Ms Álvarez  Your urine test results is as expected.  JIGNESH Meyer

## 2021-07-03 NOTE — ANESTHESIA PREPROCEDURE EVALUATION
Anesthesia Preprocedure Evaluation by Jh Lundberg MD at 10/17/2019 10:47 AM     Author: Jh Lundberg MD Service: -- Author Type: Physician    Filed: 10/17/2019 11:13 AM Date of Service: 10/17/2019 10:47 AM Status: Addendum    : Jh Lundberg MD (Physician)    Related Notes: Original Note by Jh Lundberg MD (Physician) filed at 10/17/2019 10:53 AM       Anesthesia Evaluation      Patient summary reviewed   No history of anesthetic complications     Airway   Mallampati: III  Neck ROM: limited  Comment: Thick neck, limited mouth opening    Pulmonary - normal exam    breath sounds clear to auscultation  (+) asthma  moderate,  well controlled,   (-) not a smoker    ROS comment: Hx of pE                         Cardiovascular   Exercise tolerance: < 4 METS  (+) hypertension, , hypercholesterolemia,     ECG reviewed (9/7/19: sinus tachy, 104 bpm)  Rhythm: regular  Rate: normal,         Neuro/Psych    (+) neuromuscular disease (neuropathy),  depression, anxiety/panic attacks,     Endo/Other    (+) diabetes mellitus type 2 well controlled using insulin, arthritis (RA), obesity (BMI 46.23),      GI/Hepatic/Renal    (+) GERD,   chronic renal disease (CKD stage 3) CRI,      Other findings: Labs 10/15/19:  Hgb 11.5, Plt 279  Na 139, K 5.2, BUN 27, Cr 1.48      Dental - normal exam   (+) implants                           Anesthesia Plan  Planned anesthetic: general endotracheal  GETA.  High risk for PONV and plan for scopolamine patch, dexamethasone (4 mg), zofran and low-dose propofol gtt (25-50 mcg/kg/min).  NO ketorolac 2/2 hx of gastric bypass and CKD.  ASA 3   Induction: intravenous   Anesthetic plan and risks discussed with: patient  Anesthesia plan special considerations: antiemetics,   Post-op plan: routine recovery

## 2021-07-13 ENCOUNTER — TRANSFERRED RECORDS (OUTPATIENT)
Dept: HEALTH INFORMATION MANAGEMENT | Facility: CLINIC | Age: 56
End: 2021-07-13

## 2021-07-14 ENCOUNTER — TELEPHONE (OUTPATIENT)
Dept: SURGERY | Facility: CLINIC | Age: 56
End: 2021-07-14

## 2021-07-14 NOTE — TELEPHONE ENCOUNTER
Patient calling re: Medication refill-      phentermine (ADIPEX-P) 37.5 MG tablet   10/24/2019  --   Sig - Route: Take 0.5-1 tablets by mouth every morning - Oral     States she's at the pharmacy.   [facility]   [address] [place] [address]  [phone]  Patient is waiting there.    Shamika Read  [facility]  General Surgery  P: [phone]  F: [phone]

## 2021-07-14 NOTE — TELEPHONE ENCOUNTER
Left pt a message letting her know that in order to get a refill of her phentermine, she will need to have an appointment with . I left her the number to schedule and asked her to call the nurse line if she has any questions.    Julia Alvarez RN, CBN  [facility]  P [phone]  F [phone]

## 2021-07-21 ENCOUNTER — RECORDS - HEALTHEAST (OUTPATIENT)
Dept: ADMINISTRATIVE | Facility: CLINIC | Age: 56
End: 2021-07-21

## 2021-07-27 PROBLEM — R11.2 NAUSEA & VOMITING: Status: ACTIVE | Noted: 2017-01-25

## 2021-07-28 ENCOUNTER — TELEPHONE (OUTPATIENT)
Dept: SURGERY | Facility: CLINIC | Age: 56
End: 2021-07-28

## 2021-07-28 DIAGNOSIS — E66.09 OBESITY DUE TO EXCESS CALORIES WITH SERIOUS COMORBIDITY, UNSPECIFIED CLASSIFICATION: Primary | ICD-10-CM

## 2021-07-28 RX ORDER — PHENTERMINE HYDROCHLORIDE 37.5 MG/1
.5-1 TABLET ORAL EVERY MORNING
Qty: 90 TABLET | Refills: 0 | Status: SHIPPED | OUTPATIENT
Start: 2021-07-28 | End: 2021-09-09

## 2021-07-28 NOTE — TELEPHONE ENCOUNTER
Pt out of phentermine(1 week w/out); missed picking up the 7/14 refill by one day ([facility] policy)  Has a 8/24 appt with Dr Carson (earliest avail with all 3 docs).    [phone]  **OK to leave detailed VM   Bi-Rhombic Flap Text: The defect edges were debeveled with a #15 scalpel blade. Given the location of the defect and the proximity to free margins a bi-rhombic flap was deemed most appropriate. Using a sterile surgical marker, an appropriate rhombic flap was drawn incorporating the defect. The area thus outlined was incised deep to adipose tissue with a #15 scalpel blade. The skin margins were undermined to an appropriate distance in all directions utilizing iris scissors. Lazy S Intermediate Repair Preamble Text (Leave Blank If You Do Not Want): Undermining was performed with blunt dissection. Cartilage Graft Text: The defect edges were debeveled with a #15 scalpel blade. Given the location of the defect, shape of the defect, the fact the defect involved a full thickness cartilage defect a cartilage graft was deemed most appropriate. An appropriate donor site was identified, cleansed, and anesthetized. The cartilage graft was then harvested and transferred to the recipient site, oriented appropriately and then sutured into place. The secondary defect was then repaired using a primary closure. O-L Flap Text: The defect edges were debeveled with a #15 scalpel blade. Given the location of the defect, shape of the defect and the proximity to free margins an O-L flap was deemed most appropriate. Using a sterile surgical marker, an appropriate advancement flap was drawn incorporating the defect and placing the expected incisions within the relaxed skin tension lines where possible. The area thus outlined was incised deep to adipose tissue with a #15 scalpel blade. The skin margins were undermined to an appropriate distance in all directions utilizing iris scissors. Complex Repair And Tissue Cultured Epidermal Autograft Text: The defect edges were debeveled with a #15 scalpel blade. The primary defect was closed partially with a complex linear closure. Given the location of the defect, shape of the defect and the proximity to free margins an tissue cultured epidermal autograft was deemed most appropriate to repair the remaining defect. The graft was trimmed to fit the size of the remaining defect. The graft was then placed in the primary defect, oriented appropriately, and sutured into place. Lab Facility: [id number] O-Z Plasty Text: The defect edges were debeveled with a #15 scalpel blade. Given the location of the defect, shape of the defect and the proximity to free margins an O-Z plasty (double transposition flap) was deemed most appropriate. Using a sterile surgical marker, the appropriate transposition flaps were drawn incorporating the defect and placing the expected incisions within the relaxed skin tension lines where possible. The area thus outlined was incised deep to adipose tissue with a #15 scalpel blade. The skin margins were undermined to an appropriate distance in all directions utilizing iris scissors. Hemostasis was achieved with electrocautery. The flaps were then transposed into place, one clockwise and the other counterclockwise, and anchored with interrupted buried subcutaneous sutures. Deep Sutures: 4-0 Polysorb Body Location Override (Optional - Billing Will Still Be Based On Selected Body Map Location If Applicable): right Proximal Forearm Show Asc Variables: Yes Wound Care: Bacitracin Split-Thickness Skin Graft Text: The defect edges were debeveled with a #15 scalpel blade. Given the location of the defect, shape of the defect and the proximity to free margins a split thickness skin graft was deemed most appropriate. Using a sterile surgical marker, the primary defect shape was transferred to the donor site. The split thickness graft was then harvested. The skin graft was then placed in the primary defect and oriented appropriately. Complex Repair And Dermal Autograft Text: The defect edges were debeveled with a #15 scalpel blade. The primary defect was closed partially with a complex linear closure. Given the location of the defect, shape of the defect and the proximity to free margins an dermal autograft was deemed most appropriate to repair the remaining defect. The graft was trimmed to fit the size of the remaining defect. The graft was then placed in the primary defect, oriented appropriately, and sutured into place. Render Path Notes In Note?: no Rhomboid Transposition Flap Text: The defect edges were debeveled with a #15 scalpel blade. Given the location of the defect and the proximity to free margins a rhomboid transposition flap was deemed most appropriate. Using a sterile surgical marker, an appropriate rhomboid flap was drawn incorporating the defect. The area thus outlined was incised deep to adipose tissue with a #15 scalpel blade. The skin margins were undermined to an appropriate distance in all directions utilizing iris scissors. Suture Removal: 14 days Hemostasis: Electrocautery O-T Plasty Text: The defect edges were debeveled with a #15 scalpel blade. Given the location of the defect, shape of the defect and the proximity to free margins an O-T plasty was deemed most appropriate. Using a sterile surgical marker, an appropriate O-T plasty was drawn incorporating the defect and placing the expected incisions within the relaxed skin tension lines where possible. The area thus outlined was incised deep to adipose tissue with a #15 scalpel blade. The skin margins were undermined to an appropriate distance in all directions utilizing iris scissors. O-T Advancement Flap Text: The defect edges were debeveled with a #15 scalpel blade. Given the location of the defect, shape of the defect and the proximity to free margins an O-T advancement flap was deemed most appropriate. Using a sterile surgical marker, an appropriate advancement flap was drawn incorporating the defect and placing the expected incisions within the relaxed skin tension lines where possible. The area thus outlined was incised deep to adipose tissue with a #15 scalpel blade. The skin margins were undermined to an appropriate distance in all directions utilizing iris scissors. Anesthesia Volume In Cc: 6 Graft Donor Site Bandage (Optional-Leave Blank If You Don't Want In Note): A pressure bandage were applied to the donor site. Rhombic Flap Text: The defect edges were debeveled with a #15 scalpel blade. Given the location of the defect and the proximity to free margins a rhombic flap was deemed most appropriate. Using a sterile surgical marker, an appropriate rhombic flap was drawn incorporating the defect. The area thus outlined was incised deep to adipose tissue with a #15 scalpel blade. The skin margins were undermined to an appropriate distance in all directions utilizing iris scissors. Complex Repair And Epidermal Autograft Text: The defect edges were debeveled with a #15 scalpel blade. The primary defect was closed partially with a complex linear closure. Given the location of the defect, shape of the defect and the proximity to free margins an epidermal autograft was deemed most appropriate to repair the remaining defect. The graft was trimmed to fit the size of the remaining defect. The graft was then placed in the primary defect, oriented appropriately, and sutured into place. Complex Repair And O-T Advancement Flap Text: The defect edges were debeveled with a #15 scalpel blade. The primary defect was closed partially with a complex linear closure. Given the location of the remaining defect, shape of the defect and the proximity to free margins an O-T advancement flap was deemed most appropriate for complete closure of the defect. Using a sterile surgical marker, an appropriate advancement flap was drawn incorporating the defect and placing the expected incisions within the relaxed skin tension lines where possible. The area thus outlined was incised deep to adipose tissue with a #15 scalpel blade. The skin margins were undermined to an appropriate distance in all directions utilizing iris scissors. Keystone Flap Text: The defect edges were debeveled with a #15 scalpel blade. Given the location of the defect, shape of the defect a keystone flap was deemed most appropriate. Using a sterile surgical marker, an appropriate keystone flap was drawn incorporating the defect, outlining the appropriate donor tissue and placing the expected incisions within the relaxed skin tension lines where possible. The area thus outlined was incised deep to adipose tissue with a #15 scalpel blade. The skin margins were undermined to an appropriate distance in all directions around the primary defect and laterally outward around the flap utilizing iris scissors. Surgeon Performing The Repair (Optional): Danielle Bond PA-C Ftsg Text: The defect edges were debeveled with a #15 scalpel blade. Given the location of the defect, shape of the defect and the proximity to free margins a full thickness skin graft was deemed most appropriate. Using a sterile surgical marker, the primary defect shape was transferred to the donor site. The area thus outlined was incised deep to adipose tissue with a #15 scalpel blade. The harvested graft was then trimmed of adipose tissue until only dermis and epidermis was left. The skin margins of the secondary defect were undermined to an appropriate distance in all directions utilizing iris scissors. The secondary defect was closed with interrupted buried subcutaneous sutures. The skin edges were then re-apposed with running  sutures. The skin graft was then placed in the primary defect and oriented appropriately. A-T Advancement Flap Text: The defect edges were debeveled with a #15 scalpel blade. Given the location of the defect, shape of the defect and the proximity to free margins an A-T advancement flap was deemed most appropriate. Using a sterile surgical marker, an appropriate advancement flap was drawn incorporating the defect and placing the expected incisions within the relaxed skin tension lines where possible. The area thus outlined was incised deep to adipose tissue with a #15 scalpel blade. The skin margins were undermined to an appropriate distance in all directions utilizing iris scissors. Dressing: pressure dressing Path Notes (To The Dermatopathologist): Size:  1.2cm x 1.2cm Nodular BCC check margins Melolabial Transposition Flap Text: The defect edges were debeveled with a #15 scalpel blade. Given the location of the defect and the proximity to free margins a melolabial flap was deemed most appropriate. Using a sterile surgical marker, an appropriate melolabial transposition flap was drawn incorporating the defect. The area thus outlined was incised deep to adipose tissue with a #15 scalpel blade. The skin margins were undermined to an appropriate distance in all directions utilizing iris scissors. Curvilinear Excision Additional Text (Leave Blank If You Do Not Want): The margin was drawn around the clinically apparent lesion. A curvilinear shape was then drawn on the skin incorporating the lesion and margins. Incisions were then made along these lines to the appropriate tissue plane and the lesion was extirpated. Lip Wedge Excision Repair Text: Given the location of the defect and the proximity to free margins a full thickness wedge repair was deemed most appropriate. Using a sterile surgical marker, the appropriate repair was drawn incorporating the defect and placing the expected incisions perpendicular to the vermilion border. The vermilion border was also meticulously outlined to ensure appropriate reapproximation during the repair. The area thus outlined was incised through and through with a #15 scalpel blade. The muscularis and dermis were reaproximated with deep sutures following hemostasis. Care was taken to realign the vermilion border before proceeding with the superficial closure. Once the vermilion was realigned the superfical and mucosal closure was finished. Excision Depth: adipose tissue Crescentic Advancement Flap Text: The defect edges were debeveled with a #15 scalpel blade. Given the location of the defect and the proximity to free margins a crescentic advancement flap was deemed most appropriate. Using a sterile surgical marker, the appropriate advancement flap was drawn incorporating the defect and placing the expected incisions within the relaxed skin tension lines where possible. The area thus outlined was incised deep to adipose tissue with a #15 scalpel blade. The skin margins were undermined to an appropriate distance in all directions utilizing iris scissors. Complex Repair And A-T Advancement Flap Text: The defect edges were debeveled with a #15 scalpel blade. The primary defect was closed partially with a complex linear closure. Given the location of the remaining defect, shape of the defect and the proximity to free margins an A-T advancement flap was deemed most appropriate for complete closure of the defect. Using a sterile surgical marker, an appropriate advancement flap was drawn incorporating the defect and placing the expected incisions within the relaxed skin tension lines where possible. The area thus outlined was incised deep to adipose tissue with a #15 scalpel blade. The skin margins were undermined to an appropriate distance in all directions utilizing iris scissors. Estimated Blood Loss (Cc): minimal Complex Repair And Split-Thickness Skin Graft Text: The defect edges were debeveled with a #15 scalpel blade. The primary defect was closed partially with a complex linear closure. Given the location of the defect, shape of the defect and the proximity to free margins a split thickness skin graft was deemed most appropriate to repair the remaining defect. The graft was trimmed to fit the size of the remaining defect. The graft was then placed in the primary defect, oriented appropriately, and sutured into place. Island Pedicle Flap-Requiring Vessel Identification Text: The defect edges were debeveled with a #15 scalpel blade. Given the location of the defect, shape of the defect and the proximity to free margins an island pedicle advancement flap was deemed most appropriate. Using a sterile surgical marker, an appropriate advancement flap was drawn, based on the axial vessel mentioned above, incorporating the defect, outlining the appropriate donor tissue and placing the expected incisions within the relaxed skin tension lines where possible. The area thus outlined was incised deep to adipose tissue with a #15 scalpel blade. The skin margins were undermined to an appropriate distance in all directions around the primary defect and laterally outward around the island pedicle utilizing iris scissors. There was minimal undermining beneath the pedicle flap. Fusiform Excision Additional Text (Leave Blank If You Do Not Want): The margin was drawn around the clinically apparent lesion. A fusiform shape was then drawn on the skin incorporating the lesion and margins. Incisions were then made along these lines to the appropriate tissue plane and the lesion was extirpated. Repair Type: Complex Complex Repair And Modified Advancement Flap Text: The defect edges were debeveled with a #15 scalpel blade. The primary defect was closed partially with a complex linear closure. Given the location of the remaining defect, shape of the defect and the proximity to free margins a modified advancement flap was deemed most appropriate for complete closure of the defect. Using a sterile surgical marker, an appropriate advancement flap was drawn incorporating the defect and placing the expected incisions within the relaxed skin tension lines where possible. The area thus outlined was incised deep to adipose tissue with a #15 scalpel blade. The skin margins were undermined to an appropriate distance in all directions utilizing iris scissors. Paramedian Forehead Flap Text: A decision was made to reconstruct the defect utilizing an interpolation axial flap and a staged reconstruction. A telfa template was made of the defect. This telfa template was then used to outline the paramedian forehead pedicle flap. The donor area for the pedicle flap was then injected with anesthesia. The flap was excised through the skin and subcutaneous tissue down to the layer of the underlying musculature. The pedicle flap was carefully excised within this deep plane to maintain its blood supply. The edges of the donor site were undermined. The donor site was closed in a primary fashion. The pedicle was then rotated into position and sutured. Once the tube was sutured into place, adequate blood supply was confirmed with blanching and refill. The pedicle was then wrapped with xeroform gauze and dressed appropriately with a telfa and gauze bandage to ensure continued blood supply and protect the attached pedicle. Complex Repair And Ftsg Text: The defect edges were debeveled with a #15 scalpel blade. The primary defect was closed partially with a complex linear closure. Given the location of the defect, shape of the defect and the proximity to free margins a full thickness skin graft was deemed most appropriate to repair the remaining defect. The graft was trimmed to fit the size of the remaining defect. The graft was then placed in the primary defect, oriented appropriately, and sutured into place. Muscle Hinge Flap Text: The defect edges were debeveled with a #15 scalpel blade. Given the size, depth and location of the defect and the proximity to free margins a muscle hinge flap was deemed most appropriate. Using a sterile surgical marker, an appropriate hinge flap was drawn incorporating the defect. The area thus outlined was incised with a #15 scalpel blade. The skin margins were undermined to an appropriate distance in all directions utilizing iris scissors. Double Island Pedicle Flap Text: The defect edges were debeveled with a #15 scalpel blade. Given the location of the defect, shape of the defect and the proximity to free margins a double island pedicle advancement flap was deemed most appropriate. Using a sterile surgical marker, an appropriate advancement flap was drawn incorporating the defect, outlining the appropriate donor tissue and placing the expected incisions within the relaxed skin tension lines where possible. The area thus outlined was incised deep to adipose tissue with a #15 scalpel blade. The skin margins were undermined to an appropriate distance in all directions around the primary defect and laterally outward around the island pedicle utilizing iris scissors. There was minimal undermining beneath the pedicle flap. Chonodrocutaneous Helical Advancement Flap Text: The defect edges were debeveled with a #15 scalpel blade. Given the location of the defect and the proximity to free margins a chondrocutaneous helical advancement flap was deemed most appropriate. Using a sterile surgical marker, the appropriate advancement flap was drawn incorporating the defect and placing the expected incisions within the relaxed skin tension lines where possible. The area thus outlined was incised deep to adipose tissue with a #15 scalpel blade. The skin margins were undermined to an appropriate distance in all directions utilizing iris scissors. Skin Substitute Units (Will Override Primary Defect Units If Greater Than 0): 0 Elliptical Excision Additional Text (Leave Blank If You Do Not Want): The margin was drawn around the clinically apparent lesion. An elliptical shape was then drawn on the skin incorporating the lesion and margins. Incisions were then made along these lines to the appropriate tissue plane and the lesion was extirpated. Transposition Flap Text: The defect edges were debeveled with a #15 scalpel blade. Given the location of the defect and the proximity to free margins a transposition flap was deemed most appropriate. Using a sterile surgical marker, an appropriate transposition flap was drawn incorporating the defect. The area thus outlined was incised deep to adipose tissue with a #15 scalpel blade. The skin margins were undermined to an appropriate distance in all directions utilizing iris scissors. Complex Repair And Double Advancement Flap Text: The defect edges were debeveled with a #15 scalpel blade. The primary defect was closed partially with a complex linear closure. Given the location of the remaining defect, shape of the defect and the proximity to free margins a double advancement flap was deemed most appropriate for complete closure of the defect. Using a sterile surgical marker, an appropriate advancement flap was drawn incorporating the defect and placing the expected incisions within the relaxed skin tension lines where possible. The area thus outlined was incised deep to adipose tissue with a #15 scalpel blade. The skin margins were undermined to an appropriate distance in all directions utilizing iris scissors. Scalpel Size: 15 blade Complex Repair And Dorsal Nasal Flap Text: The defect edges were debeveled with a #15 scalpel blade. The primary defect was closed partially with a complex linear closure. Given the location of the remaining defect, shape of the defect and the proximity to free margins a dorsal nasal flap was deemed most appropriate for complete closure of the defect. Using a sterile surgical marker, an appropriate flap was drawn incorporating the defect and placing the expected incisions within the relaxed skin tension lines where possible. The area thus outlined was incised deep to adipose tissue with a #15 scalpel blade. The skin margins were undermined to an appropriate distance in all directions utilizing iris scissors. Posterior Auricular Interpolation Flap Text: A decision was made to reconstruct the defect utilizing an interpolation axial flap and a staged reconstruction. A telfa template was made of the defect. This telfa template was then used to outline the posterior auricular interpolation flap. The donor area for the pedicle flap was then injected with anesthesia. The flap was excised through the skin and subcutaneous tissue down to the layer of the underlying musculature. The pedicle flap was carefully excised within this deep plane to maintain its blood supply. The edges of the donor site were undermined. The donor site was closed in a primary fashion. The pedicle was then rotated into position and sutured. Once the tube was sutured into place, adequate blood supply was confirmed with blanching and refill. The pedicle was then wrapped with xeroform gauze and dressed appropriately with a telfa and gauze bandage to ensure continued blood supply and protect the attached pedicle. Consent was obtained from the patient. The risks and benefits to therapy were discussed in detail. Specifically, the risks of infection, scarring, bleeding, prolonged wound healing, incomplete removal, allergy to anesthesia, nerve injury and recurrence were addressed. Prior to the procedure, the treatment site was clearly identified and confirmed by the patient. All components of Universal Protocol/PAUSE Rule completed. Burow's Advancement Flap Text: The defect edges were debeveled with a #15 scalpel blade. Given the location of the defect and the proximity to free margins a Burow's advancement flap was deemed most appropriate. Using a sterile surgical marker, the appropriate advancement flap was drawn incorporating the defect and placing the expected incisions within the relaxed skin tension lines where possible. The area thus outlined was incised deep to adipose tissue with a #15 scalpel blade. The skin margins were undermined to an appropriate distance in all directions utilizing iris scissors. Complex Repair And Z Plasty Text: The defect edges were debeveled with a #15 scalpel blade. The primary defect was closed partially with a complex linear closure. Given the location of the remaining defect, shape of the defect and the proximity to free margins a Z plasty was deemed most appropriate for complete closure of the defect. Using a sterile surgical marker, an appropriate advancement flap was drawn incorporating the defect and placing the expected incisions within the relaxed skin tension lines where possible. The area thus outlined was incised deep to adipose tissue with a #15 scalpel blade. The skin margins were undermined to an appropriate distance in all directions utilizing iris scissors. Alar Island Pedicle Flap Text: The defect edges were debeveled with a #15 scalpel blade. Given the location of the defect, shape of the defect and the proximity to the alar rim an island pedicle advancement flap was deemed most appropriate. Using a sterile surgical marker, an appropriate advancement flap was drawn incorporating the defect, outlining the appropriate donor tissue and placing the expected incisions within the nasal ala running parallel to the alar rim. The area thus outlined was incised with a #15 scalpel blade. The skin margins were undermined minimally to an appropriate distance in all directions around the primary defect and laterally outward around the island pedicle utilizing iris scissors. There was minimal undermining beneath the pedicle flap. Star Wedge Flap Text: The defect edges were debeveled with a #15 scalpel blade. Given the location of the defect, shape of the defect and the proximity to free margins a star wedge flap was deemed most appropriate. Using a sterile surgical marker, an appropriate rotation flap was drawn incorporating the defect and placing the expected incisions within the relaxed skin tension lines where possible. The area thus outlined was incised deep to adipose tissue with a #15 scalpel blade. The skin margins were undermined to an appropriate distance in all directions utilizing iris scissors. Saucerization Excision Additional Text (Leave Blank If You Do Not Want): The margin was drawn around the clinically apparent lesion. Incisions were then made along these lines, in a tangential fashion, to the appropriate tissue plane and the lesion was extirpated. Accession #: [id number] Mastoid Interpolation Flap Text: A decision was made to reconstruct the defect utilizing an interpolation axial flap and a staged reconstruction. A telfa template was made of the defect. This telfa template was then used to outline the mastoid interpolation flap. The donor area for the pedicle flap was then injected with anesthesia. The flap was excised through the skin and subcutaneous tissue down to the layer of the underlying musculature. The pedicle flap was carefully excised within this deep plane to maintain its blood supply. The edges of the donor site were undermined. The donor site was closed in a primary fashion. The pedicle was then rotated into position and sutured. Once the tube was sutured into place, adequate blood supply was confirmed with blanching and refill. The pedicle was then wrapped with xeroform gauze and dressed appropriately with a telfa and gauze bandage to ensure continued blood supply and protect the attached pedicle. Advancement Flap (Double) Text: The defect edges were debeveled with a #15 scalpel blade. Given the location of the defect and the proximity to free margins a double advancement flap was deemed most appropriate. Using a sterile surgical marker, the appropriate advancement flaps were drawn incorporating the defect and placing the expected incisions within the relaxed skin tension lines where possible. The area thus outlined was incised deep to adipose tissue with a #15 scalpel blade. The skin margins were undermined to an appropriate distance in all directions utilizing iris scissors. Complex Repair And Single Advancement Flap Text: The defect edges were debeveled with a #15 scalpel blade. The primary defect was closed partially with a complex linear closure. Given the location of the remaining defect, shape of the defect and the proximity to free margins a single advancement flap was deemed most appropriate for complete closure of the defect. Using a sterile surgical marker, an appropriate advancement flap was drawn incorporating the defect and placing the expected incisions within the relaxed skin tension lines where possible. The area thus outlined was incised deep to adipose tissue with a #15 scalpel blade. The skin margins were undermined to an appropriate distance in all directions utilizing iris scissors. Island Pedicle Flap With Canthal Suspension Text: The defect edges were debeveled with a #15 scalpel blade. Given the location of the defect, shape of the defect and the proximity to free margins an island pedicle advancement flap was deemed most appropriate. Using a sterile surgical marker, an appropriate advancement flap was drawn incorporating the defect, outlining the appropriate donor tissue and placing the expected incisions within the relaxed skin tension lines where possible. The area thus outlined was incised deep to adipose tissue with a #15 scalpel blade. The skin margins were undermined to an appropriate distance in all directions around the primary defect and laterally outward around the island pedicle utilizing iris scissors. There was minimal undermining beneath the pedicle flap. A suspension suture was placed in the canthal tendon to prevent tension and prevent ectropion. Detail Level: Detailed Slit Excision Additional Text (Leave Blank If You Do Not Want): A linear line was drawn on the skin overlying the lesion. An incision was made slowly until the lesion was visualized. Once visualized, the lesion was removed with blunt dissection. Partial Purse String (Simple) Text: Given the location of the defect and the characteristics of the surrounding skin a simple purse string closure was deemed most appropriate. Undermining was performed circumferentially around the surgical defect. A purse string suture was then placed and tightened. Wound tension of the circular defect prevented complete closure of the wound. Melolabial Interpolation Flap Text: A decision was made to reconstruct the defect utilizing an interpolation axial flap and a staged reconstruction. A telfa template was made of the defect. This telfa template was then used to outline the melolabial interpolation flap. The donor area for the pedicle flap was then injected with anesthesia. The flap was excised through the skin and subcutaneous tissue down to the layer of the underlying musculature. The pedicle flap was carefully excised within this deep plane to maintain its blood supply. The edges of the donor site were undermined. The donor site was closed in a primary fashion. The pedicle was then rotated into position and sutured. Once the tube was sutured into place, adequate blood supply was confirmed with blanching and refill. The pedicle was then wrapped with xeroform gauze and dressed appropriately with a telfa and gauze bandage to ensure continued blood supply and protect the attached pedicle. Complex Repair And W Plasty Text: The defect edges were debeveled with a #15 scalpel blade. The primary defect was closed partially with a complex linear closure. Given the location of the remaining defect, shape of the defect and the proximity to free margins a W plasty was deemed most appropriate for complete closure of the defect. Using a sterile surgical marker, an appropriate advancement flap was drawn incorporating the defect and placing the expected incisions within the relaxed skin tension lines where possible. The area thus outlined was incised deep to adipose tissue with a #15 scalpel blade. The skin margins were undermined to an appropriate distance in all directions utilizing iris scissors. Spiral Flap Text: The defect edges were debeveled with a #15 scalpel blade. Given the location of the defect, shape of the defect and the proximity to free margins a spiral flap was deemed most appropriate. Using a sterile surgical marker, an appropriate rotation flap was drawn incorporating the defect and placing the expected incisions within the relaxed skin tension lines where possible. The area thus outlined was incised deep to adipose tissue with a #15 scalpel blade. The skin margins were undermined to an appropriate distance in all directions utilizing iris scissors. Positioning (Leave Blank If You Do Not Want): The patient was placed in a comfortable position exposing the surgical site. Information: Selecting Yes will display possible errors in your note based on the variables you have selected. This validation is only offered as a suggestion for you. PLEASE NOTE THAT THE VALIDATION TEXT WILL BE REMOVED WHEN YOU FINALIZE YOUR NOTE. IF YOU WANT TO FAX A PRELIMINARY NOTE YOU WILL NEED TO TOGGLE THIS TO 'NO' IF YOU DO NOT WANT IT IN YOUR FAXED NOTE. Island Pedicle Flap Text: The defect edges were debeveled with a #15 scalpel blade. Given the location of the defect, shape of the defect and the proximity to free margins an island pedicle advancement flap was deemed most appropriate. Using a sterile surgical marker, an appropriate advancement flap was drawn incorporating the defect, outlining the appropriate donor tissue and placing the expected incisions within the relaxed skin tension lines where possible. The area thus outlined was incised deep to adipose tissue with a #15 scalpel blade. The skin margins were undermined to an appropriate distance in all directions around the primary defect and laterally outward around the island pedicle utilizing iris scissors. There was minimal undermining beneath the pedicle flap. Advancement Flap (Single) Text: The defect edges were debeveled with a #15 scalpel blade. Given the location of the defect and the proximity to free margins a single advancement flap was deemed most appropriate. Using a sterile surgical marker, an appropriate advancement flap was drawn incorporating the defect and placing the expected incisions within the relaxed skin tension lines where possible. The area thus outlined was incised deep to adipose tissue with a #15 scalpel blade. The skin margins were undermined to an appropriate distance in all directions utilizing iris scissors. Intermediate / Complex Repair - Final Wound Length In Cm: 4 Excisional Biopsy Additional Text (Leave Blank If You Do Not Want): The margin was drawn around the clinically apparent lesion. An elliptical shape was then drawn on the skin incorporating the lesion and margins.  Incisions were then made along these lines to the appropriate tissue plane and the lesion was extirpated. Rotation Flap Text: The defect edges were debeveled with a #15 scalpel blade. Given the location of the defect, shape of the defect and the proximity to free margins a rotation flap was deemed most appropriate. Using a sterile surgical marker, an appropriate rotation flap was drawn incorporating the defect and placing the expected incisions within the relaxed skin tension lines where possible. The area thus outlined was incised deep to adipose tissue with a #15 scalpel blade. The skin margins were undermined to an appropriate distance in all directions utilizing iris scissors. Partial Purse String (Intermediate) Text: Given the location of the defect and the characteristics of the surrounding skin an intermediate purse string closure was deemed most appropriate. Undermining was performed circumferentially around the surgical defect. A purse string suture was then placed and tightened. Wound tension of the circular defect prevented complete closure of the wound. Complex Repair And Double M Plasty Text: The defect edges were debeveled with a #15 scalpel blade. The primary defect was closed partially with a complex linear closure. Given the location of the remaining defect, shape of the defect and the proximity to free margins a double M plasty was deemed most appropriate for complete closure of the defect. Using a sterile surgical marker, an appropriate advancement flap was drawn incorporating the defect and placing the expected incisions within the relaxed skin tension lines where possible. The area thus outlined was incised deep to adipose tissue with a #15 scalpel blade. The skin margins were undermined to an appropriate distance in all directions utilizing iris scissors. Epidermal Sutures: 4-0 Nylon Interpolation Flap Text: A decision was made to reconstruct the defect utilizing an interpolation axial flap and a staged reconstruction. A telfa template was made of the defect. This telfa template was then used to outline the interpolation flap. The donor area for the pedicle flap was then injected with anesthesia. The flap was excised through the skin and subcutaneous tissue down to the layer of the underlying musculature. The interpolation flap was carefully excised within this deep plane to maintain its blood supply. The edges of the donor site were undermined. The donor site was closed in a primary fashion. The pedicle was then rotated into position and sutured. Once the tube was sutured into place, adequate blood supply was confirmed with blanching and refill. The pedicle was then wrapped with xeroform gauze and dressed appropriately with a telfa and gauze bandage to ensure continued blood supply and protect the attached pedicle. Post-Care Instructions: I reviewed with the patient in detail post-care instructions. Patient is not to engage in any heavy lifting, exercise, or swimming for the next 14 days. Should the patient develop any fevers, chills, bleeding, severe pain patient will contact the office immediately. Pre-Excision Curettage Text (Leave Blank If You Do Not Want): Prior to drawing the surgical margin the visible lesion was removed with electrodesiccation and curettage to clearly define the lesion size. Complex Repair And M Plasty Text: The defect edges were debeveled with a #15 scalpel blade. The primary defect was closed partially with a complex linear closure. Given the location of the remaining defect, shape of the defect and the proximity to free margins an M plasty was deemed most appropriate for complete closure of the defect. Using a sterile surgical marker, an appropriate advancement flap was drawn incorporating the defect and placing the expected incisions within the relaxed skin tension lines where possible. The area thus outlined was incised deep to adipose tissue with a #15 scalpel blade. The skin margins were undermined to an appropriate distance in all directions utilizing iris scissors. Dorsal Nasal Flap Text: The defect edges were debeveled with a #15 scalpel blade. Given the location of the defect and the proximity to free margins a dorsal nasal flap was deemed most appropriate. Using a sterile surgical marker, an appropriate dorsal nasal flap was drawn around the defect. The area thus outlined was incised deep to adipose tissue with a #15 scalpel blade. The skin margins were undermined to an appropriate distance in all directions utilizing iris scissors. Hatchet Flap Text: The defect edges were debeveled with a #15 scalpel blade. Given the location of the defect, shape of the defect and the proximity to free margins a hatchet flap was deemed most appropriate. Using a sterile surgical marker, an appropriate hatchet flap was drawn incorporating the defect and placing the expected incisions within the relaxed skin tension lines where possible. The area thus outlined was incised deep to adipose tissue with a #15 scalpel blade. The skin margins were undermined to an appropriate distance in all directions utilizing iris scissors. Perilesional Excision Additional Text (Leave Blank If You Do Not Want): The margin was drawn around the clinically apparent lesion. Incisions were then made along these lines to the appropriate tissue plane and the lesion was extirpated. Cheek-To-Nose Interpolation Flap Text: A decision was made to reconstruct the defect utilizing an interpolation axial flap and a staged reconstruction. A telfa template was made of the defect. This telfa template was then used to outline the Cheek-To-Nose Interpolation flap. The donor area for the pedicle flap was then injected with anesthesia. The flap was excised through the skin and subcutaneous tissue down to the layer of the underlying musculature. The interpolation flap was carefully excised within this deep plane to maintain its blood supply. The edges of the donor site were undermined. The donor site was closed in a primary fashion. The pedicle was then rotated into position and sutured. Once the tube was sutured into place, adequate blood supply was confirmed with blanching and refill. The pedicle was then wrapped with xeroform gauze and dressed appropriately with a telfa and gauze bandage to ensure continued blood supply and protect the attached pedicle. Purse String (Simple) Text: Given the location of the defect and the characteristics of the surrounding skin a purse string simple closure was deemed most appropriate. Undermining was performed circumferentially around the surgical defect. A purse string suture was then placed and tightened. Home Suture Removal Text: Patient was provided a home suture removal kit and will remove their sutures at home. If they have any questions or difficulties they will call the office. Complex Repair Preamble Text (Leave Blank If You Do Not Want): Extensive wide undermining was performed. Trilobed Flap Text: The defect edges were debeveled with a #15 scalpel blade. Given the location of the defect and the proximity to free margins a trilobed flap was deemed most appropriate. Using a sterile surgical marker, an appropriate trilobed flap drawn around the defect. The area thus outlined was incised deep to adipose tissue with a #15 scalpel blade. The skin margins were undermined to an appropriate distance in all directions utilizing iris scissors. Date Of Previous Biopsy (Optional): 8/15/19 Purse String (Intermediate) Text: Given the location of the defect and the characteristics of the surrounding skin a purse string intermediate closure was deemed most appropriate. Undermining was performed circumfirentially around the surgical defect. A purse string suture was then placed and tightened. Cheek Interpolation Flap Text: A decision was made to reconstruct the defect utilizing an interpolation axial flap and a staged reconstruction. A telfa template was made of the defect. This telfa template was then used to outline the Cheek Interpolation flap. The donor area for the pedicle flap was then injected with anesthesia. The flap was excised through the skin and subcutaneous tissue down to the layer of the underlying musculature. The interpolation flap was carefully excised within this deep plane to maintain its blood supply. The edges of the donor site were undermined. The donor site was closed in a primary fashion. The pedicle was then rotated into position and sutured. Once the tube was sutured into place, adequate blood supply was confirmed with blanching and refill. The pedicle was then wrapped with xeroform gauze and dressed appropriately with a telfa and gauze bandage to ensure continued blood supply and protect the attached pedicle. Complex Repair And V-Y Plasty Text: The defect edges were debeveled with a #15 scalpel blade. The primary defect was closed partially with a complex linear closure. Given the location of the remaining defect, shape of the defect and the proximity to free margins a V-Y plasty was deemed most appropriate for complete closure of the defect. Using a sterile surgical marker, an appropriate advancement flap was drawn incorporating the defect and placing the expected incisions within the relaxed skin tension lines where possible. The area thus outlined was incised deep to adipose tissue with a #15 scalpel blade. The skin margins were undermined to an appropriate distance in all directions utilizing iris scissors. Mucosal Advancement Flap Text: Given the location of the defect, shape of the defect and the proximity to free margins a mucosal advancement flap was deemed most appropriate. Incisions were made with a 15 blade scalpel in the appropriate fashion along the cutaneous vermilion border and the mucosal lip. The remaining actinically damaged mucosal tissue was excised. The mucosal advancement flap was then elevated to the gingival sulcus with care taken to preserve the neurovascular structures and advanced into the primary defect. Care was taken to ensure that precise realignment of the vermilion border was achieved. Epidermal Closure Graft Donor Site (Optional): simple interrupted Bilobed Transposition Flap Text: The defect edges were debeveled with a #15 scalpel blade. Given the location of the defect and the proximity to free margins a bilobed transposition flap was deemed most appropriate. Using a sterile surgical marker, an appropriate bilobe flap drawn around the defect. The area thus outlined was incised deep to adipose tissue with a #15 scalpel blade. The skin margins were undermined to an appropriate distance in all directions utilizing iris scissors. Repair Performed By Another Provider Text (Leave Blank If You Do Not Want): After the tissue was excised the defect was repaired by another provider. Xenograft Text: The defect edges were debeveled with a #15 scalpel blade. Given the location of the defect, shape of the defect and the proximity to free margins a xenograft was deemed most appropriate. The graft was then trimmed to fit the size of the defect. The graft was then placed in the primary defect and oriented appropriately. Modified Advancement Flap Text: The defect edges were debeveled with a #15 scalpel blade. Given the location of the defect, shape of the defect and the proximity to free margins a modified advancement flap was deemed most appropriate. Using a sterile surgical marker, an appropriate advancement flap was drawn incorporating the defect and placing the expected incisions within the relaxed skin tension lines where possible. The area thus outlined was incised deep to adipose tissue with a #15 scalpel blade. The skin margins were undermined to an appropriate distance in all directions utilizing iris scissors. Complex Repair And Transposition Flap Text: The defect edges were debeveled with a #15 scalpel blade. The primary defect was closed partially with a complex linear closure. Given the location of the remaining defect, shape of the defect and the proximity to free margins a transposition flap was deemed most appropriate for complete closure of the defect. Using a sterile surgical marker, an appropriate advancement flap was drawn incorporating the defect and placing the expected incisions within the relaxed skin tension lines where possible. The area thus outlined was incised deep to adipose tissue with a #15 scalpel blade. The skin margins were undermined to an appropriate distance in all directions utilizing iris scissors. X Size Of Lesion In Cm (Optional): 0.6 Bilobed Flap Text: The defect edges were debeveled with a #15 scalpel blade. Given the location of the defect and the proximity to free margins a bilobe flap was deemed most appropriate. Using a sterile surgical marker, an appropriate bilobe flap drawn around the defect. The area thus outlined was incised deep to adipose tissue with a #15 scalpel blade. The skin margins were undermined to an appropriate distance in all directions utilizing iris scissors. Z Plasty Text: The lesion was extirpated to the level of the fat with a #15 scalpel blade. Given the location of the defect, shape of the defect and the proximity to free margins a Z-plasty was deemed most appropriate for repair. Using a sterile surgical marker, the appropriate transposition arms of the Z-plasty were drawn incorporating the defect and placing the expected incisions within the relaxed skin tension lines where possible. The area thus outlined was incised deep to adipose tissue with a #15 scalpel blade. The skin margins were undermined to an appropriate distance in all directions utilizing iris scissors. The opposing transposition arms were then transposed into place in opposite direction and anchored with interrupted buried subcutaneous sutures. Anesthesia Type: 1% lidocaine with epinephrine Complex Repair And Rhombic Flap Text: The defect edges were debeveled with a #15 scalpel blade. The primary defect was closed partially with a complex linear closure. Given the location of the remaining defect, shape of the defect and the proximity to free margins a rhombic flap was deemed most appropriate for complete closure of the defect. Using a sterile surgical marker, an appropriate advancement flap was drawn incorporating the defect and placing the expected incisions within the relaxed skin tension lines where possible. The area thus outlined was incised deep to adipose tissue with a #15 scalpel blade. The skin margins were undermined to an appropriate distance in all directions utilizing iris scissors. No Repair - Repaired With Adjacent Surgical Defect Text (Leave Blank If You Do Not Want): After the excision the defect was repaired concurrently with another surgical defect which was in close approximation. W Plasty Text: The lesion was extirpated to the level of the fat with a #15 scalpel blade. Given the location of the defect, shape of the defect and the proximity to free margins a W-plasty was deemed most appropriate for repair. Using a sterile surgical marker, the appropriate transposition arms of the W-plasty were drawn incorporating the defect and placing the expected incisions within the relaxed skin tension lines where possible. The area thus outlined was incised deep to adipose tissue with a #15 scalpel blade. The skin margins were undermined to an appropriate distance in all directions utilizing iris scissors. The opposing transposition arms were then transposed into place in opposite direction and anchored with interrupted buried subcutaneous sutures. Tissue Cultured Epidermal Autograft Text: The defect edges were debeveled with a #15 scalpel blade. Given the location of the defect, shape of the defect and the proximity to free margins a tissue cultured epidermal autograft was deemed most appropriate. The graft was then trimmed to fit the size of the defect. The graft was then placed in the primary defect and oriented appropriately. Mercedes Flap Text: The defect edges were debeveled with a #15 scalpel blade. Given the location of the defect, shape of the defect and the proximity to free margins a Mercedes flap was deemed most appropriate. Using a sterile surgical marker, an appropriate advancement flap was drawn incorporating the defect and placing the expected incisions within the relaxed skin tension lines where possible. The area thus outlined was incised deep to adipose tissue with a #15 scalpel blade. The skin margins were undermined to an appropriate distance in all directions utilizing iris scissors. Size Of Margin In Cm: 0.3 Banner Transposition Flap Text: The defect edges were debeveled with a #15 scalpel blade. Given the location of the defect and the proximity to free margins a Banner transposition flap was deemed most appropriate. Using a sterile surgical marker, an appropriate flap drawn around the defect. The area thus outlined was incised deep to adipose tissue with a #15 scalpel blade. The skin margins were undermined to an appropriate distance in all directions utilizing iris scissors. Complex Repair And Rotation Flap Text: The defect edges were debeveled with a #15 scalpel blade. The primary defect was closed partially with a complex linear closure. Given the location of the remaining defect, shape of the defect and the proximity to free margins a rotation flap was deemed most appropriate for complete closure of the defect. Using a sterile surgical marker, an appropriate advancement flap was drawn incorporating the defect and placing the expected incisions within the relaxed skin tension lines where possible. The area thus outlined was incised deep to adipose tissue with a #15 scalpel blade. The skin margins were undermined to an appropriate distance in all directions utilizing iris scissors. Where Do You Want The Question To Include Opioid Counseling Located?: Case Summary Tab H Plasty Text: Given the location of the defect, shape of the defect and the proximity to free margins a H-plasty was deemed most appropriate for repair. Using a sterile surgical marker, the appropriate advancement arms of the H-plasty were drawn incorporating the defect and placing the expected incisions within the relaxed skin tension lines where possible. The area thus outlined was incised deep to adipose tissue with a #15 scalpel blade. The skin margins were undermined to an appropriate distance in all directions utilizing iris scissors. The opposing advancement arms were then advanced into place in opposite direction and anchored with interrupted buried subcutaneous sutures. Skin Substitute Text: The defect edges were debeveled with a #15 scalpel blade. Given the location of the defect, shape of the defect and the proximity to free margins a skin substitute graft was deemed most appropriate. The graft material was trimmed to fit the size of the defect. The graft was then placed in the primary defect and oriented appropriately. Advancement-Rotation Flap Text: The defect edges were debeveled with a #15 scalpel blade. Given the location of the defect, shape of the defect and the proximity to free margins an advancement-rotation flap was deemed most appropriate. Using a sterile surgical marker, an appropriate flap was drawn incorporating the defect and placing the expected incisions within the relaxed skin tension lines where possible. The area thus outlined was incised deep to adipose tissue with a #15 scalpel blade. The skin margins were undermined to an appropriate distance in all directions utilizing iris scissors. Ear Star Wedge Flap Text: The defect edges were debeveled with a #15 blade scalpel. Given the location of the defect and the proximity to free margins (helical rim) an ear star wedge flap was deemed most appropriate. Using a sterile surgical marker, the appropriate flap was drawn incorporating the defect and placing the expected incisions between the helical rim and antihelix where possible. The area thus outlined was incised through and through with a #15 scalpel blade. Epidermal Closure: cross stitch Dermal Autograft Text: The defect edges were debeveled with a #15 scalpel blade. Given the location of the defect, shape of the defect and the proximity to free margins a dermal autograft was deemed most appropriate. Using a sterile surgical marker, the primary defect shape was transferred to the donor site. The area thus outlined was incised deep to adipose tissue with a #15 scalpel blade. The harvested graft was then trimmed of adipose and epidermal tissue until only dermis was left. The skin graft was then placed in the primary defect and oriented appropriately. Billing Type: [employer] V-Y Flap Text: The defect edges were debeveled with a #15 scalpel blade. Given the location of the defect, shape of the defect and the proximity to free margins a V-Y flap was deemed most appropriate. Using a sterile surgical marker, an appropriate advancement flap was drawn incorporating the defect and placing the expected incisions within the relaxed skin tension lines where possible. The area thus outlined was incised deep to adipose tissue with a #15 scalpel blade. The skin margins were undermined to an appropriate distance in all directions utilizing iris scissors. Complex Repair And Melolabial Flap Text: The defect edges were debeveled with a #15 scalpel blade. The primary defect was closed partially with a complex linear closure. Given the location of the remaining defect, shape of the defect and the proximity to free margins a melolabial flap was deemed most appropriate for complete closure of the defect. Using a sterile surgical marker, an appropriate advancement flap was drawn incorporating the defect and placing the expected incisions within the relaxed skin tension lines where possible. The area thus outlined was incised deep to adipose tissue with a #15 scalpel blade. The skin margins were undermined to an appropriate distance in all directions utilizing iris scissors. V-Y Plasty Text: The defect edges were debeveled with a #15 scalpel blade. Given the location of the defect, shape of the defect and the proximity to free margins an V-Y advancement flap was deemed most appropriate. Using a sterile surgical marker, an appropriate advancement flap was drawn incorporating the defect and placing the expected incisions within the relaxed skin tension lines where possible. The area thus outlined was incised deep to adipose tissue with a #15 scalpel blade. The skin margins were undermined to an appropriate distance in all directions utilizing iris scissors. Anesthesia Volume In Cc: 3 Excision Method: Elliptical Epidermal Autograft Text: The defect edges were debeveled with a #15 scalpel blade. Given the location of the defect, shape of the defect and the proximity to free margins an epidermal autograft was deemed most appropriate. Using a sterile surgical marker, the primary defect shape was transferred to the donor site. The epidermal graft was then harvested. The skin graft was then placed in the primary defect and oriented appropriately. Complex Repair And Skin Substitute Graft Text: The defect edges were debeveled with a #15 scalpel blade. The primary defect was closed partially with a complex linear closure. Given the location of the remaining defect, shape of the defect and the proximity to free margins a skin substitute graft was deemed most appropriate to repair the remaining defect. The graft was trimmed to fit the size of the remaining defect. The graft was then placed in the primary defect, oriented appropriately, and sutured into place. Complex Repair And Bilobe Flap Text: The defect edges were debeveled with a #15 scalpel blade. The primary defect was closed partially with a complex linear closure. Given the location of the remaining defect, shape of the defect and the proximity to free margins a bilobe flap was deemed most appropriate for complete closure of the defect. Using a sterile surgical marker, an appropriate advancement flap was drawn incorporating the defect and placing the expected incisions within the relaxed skin tension lines where possible. The area thus outlined was incised deep to adipose tissue with a #15 scalpel blade. The skin margins were undermined to an appropriate distance in all directions utilizing iris scissors. Bilateral Helical Rim Advancement Flap Text: The defect edges were debeveled with a #15 blade scalpel. Given the location of the defect and the proximity to free margins (helical rim) a bilateral helical rim advancement flap was deemed most appropriate. Using a sterile surgical marker, the appropriate advancement flaps were drawn incorporating the defect and placing the expected incisions between the helical rim and antihelix where possible. The area thus outlined was incised through and through with a #15 scalpel blade. With a skin hook and iris scissors, the flaps were gently and sharply undermined and freed up. S Plasty Text: Given the location and shape of the defect, and the orientation of relaxed skin tension lines, an S-plasty was deemed most appropriate for repair. Using a sterile surgical marker, the appropriate outline of the S-plasty was drawn, incorporating the defect and placing the expected incisions within the relaxed skin tension lines where possible. The area thus outlined was incised deep to adipose tissue with a #15 scalpel blade. The skin margins were undermined to an appropriate distance in all directions utilizing iris scissors. The skin flaps were advanced over the defect. The opposing margins were then approximated with interrupted buried subcutaneous sutures. Double O-Z Flap Text: The defect edges were debeveled with a #15 scalpel blade. Given the location of the defect, shape of the defect and the proximity to free margins a Double O-Z flap was deemed most appropriate. Using a sterile surgical marker, an appropriate transposition flap was drawn incorporating the defect and placing the expected incisions within the relaxed skin tension lines where possible. The area thus outlined was incised deep to adipose tissue with a #15 scalpel blade. The skin margins were undermined to an appropriate distance in all directions utilizing iris scissors. Helical Rim Advancement Flap Text: The defect edges were debeveled with a #15 blade scalpel. Given the location of the defect and the proximity to free margins (helical rim) a double helical rim advancement flap was deemed most appropriate. Using a sterile surgical marker, the appropriate advancement flaps were drawn incorporating the defect and placing the expected incisions between the helical rim and antihelix where possible. The area thus outlined was incised through and through with a #15 scalpel blade. With a skin hook and iris scissors, the flaps were gently and sharply undermined and freed up. Complex Repair And O-L Flap Text: The defect edges were debeveled with a #15 scalpel blade. The primary defect was closed partially with a complex linear closure. Given the location of the remaining defect, shape of the defect and the proximity to free margins an O-L flap was deemed most appropriate for complete closure of the defect. Using a sterile surgical marker, an appropriate flap was drawn incorporating the defect and placing the expected incisions within the relaxed skin tension lines where possible. The area thus outlined was incised deep to adipose tissue with a #15 scalpel blade. The skin margins were undermined to an appropriate distance in all directions utilizing iris scissors. Double O-Z Plasty Text: The defect edges were debeveled with a #15 scalpel blade. Given the location of the defect, shape of the defect and the proximity to free margins a Double O-Z plasty (double transposition flap) was deemed most appropriate. Using a sterile surgical marker, the appropriate transposition flaps were drawn incorporating the defect and placing the expected incisions within the relaxed skin tension lines where possible. The area thus outlined was incised deep to adipose tissue with a #15 scalpel blade. The skin margins were undermined to an appropriate distance in all directions utilizing iris scissors. Hemostasis was achieved with electrocautery. The flaps were then transposed into place, one clockwise and the other counterclockwise, and anchored with interrupted buried subcutaneous sutures. Composite Graft Text: The defect edges were debeveled with a #15 scalpel blade. Given the location of the defect, shape of the defect, the proximity to free margins and the fact the defect was full thickness a composite graft was deemed most appropriate. The defect was outline and then transferred to the donor site. A full thickness graft was then excised from the donor site. The graft was then placed in the primary defect, oriented appropriately and then sutured into place. The secondary defect was then repaired using a primary closure. Complex Repair And Xenograft Text: The defect edges were debeveled with a #15 scalpel blade. The primary defect was closed partially with a complex linear closure. Given the location of the defect, shape of the defect and the proximity to free margins a xenograft was deemed most appropriate to repair the remaining defect. The graft was trimmed to fit the size of the remaining defect. The graft was then placed in the primary defect, oriented appropriately, and sutured into place. O-Z Flap Text: The defect edges were debeveled with a #15 scalpel blade. Given the location of the defect, shape of the defect and the proximity to free margins an O-Z flap was deemed most appropriate. Using a sterile surgical marker, an appropriate transposition flap was drawn incorporating the defect and placing the expected incisions within the relaxed skin tension lines where possible. The area thus outlined was incised deep to adipose tissue with a #15 scalpel blade. The skin margins were undermined to an appropriate distance in all directions utilizing iris scissors. Lab: [id number] [facility]

## 2021-07-29 ENCOUNTER — OFFICE VISIT (OUTPATIENT)
Dept: FAMILY MEDICINE | Facility: CLINIC | Age: 56
End: 2021-07-29
Payer: MEDICARE

## 2021-07-29 VITALS
WEIGHT: 201 LBS | BODY MASS INDEX: 40.6 KG/M2 | SYSTOLIC BLOOD PRESSURE: 134 MMHG | TEMPERATURE: 98 F | RESPIRATION RATE: 16 BRPM | HEART RATE: 105 BPM | OXYGEN SATURATION: 97 % | DIASTOLIC BLOOD PRESSURE: 91 MMHG

## 2021-07-29 DIAGNOSIS — N18.31 STAGE 3A CHRONIC KIDNEY DISEASE (H): ICD-10-CM

## 2021-07-29 DIAGNOSIS — E78.5 HYPERLIPIDEMIA, UNSPECIFIED HYPERLIPIDEMIA TYPE: ICD-10-CM

## 2021-07-29 DIAGNOSIS — Z79.4 TYPE 2 DIABETES MELLITUS WITH DIABETIC POLYNEUROPATHY, WITH LONG-TERM CURRENT USE OF INSULIN (H): Primary | ICD-10-CM

## 2021-07-29 DIAGNOSIS — R80.9 MICROALBUMINURIA: ICD-10-CM

## 2021-07-29 DIAGNOSIS — E11.42 TYPE 2 DIABETES MELLITUS WITH DIABETIC POLYNEUROPATHY, WITH LONG-TERM CURRENT USE OF INSULIN (H): Primary | ICD-10-CM

## 2021-07-29 DIAGNOSIS — I10 ESSENTIAL HYPERTENSION: ICD-10-CM

## 2021-07-29 DIAGNOSIS — G89.4 CHRONIC PAIN SYNDROME: ICD-10-CM

## 2021-07-29 DIAGNOSIS — N32.81 OVERACTIVE BLADDER: ICD-10-CM

## 2021-07-29 LAB — HBA1C MFR BLD: 7.4 % (ref 0–5.6)

## 2021-07-29 PROCEDURE — 82043 UR ALBUMIN QUANTITATIVE: CPT | Performed by: FAMILY MEDICINE

## 2021-07-29 PROCEDURE — 36415 COLL VENOUS BLD VENIPUNCTURE: CPT | Performed by: FAMILY MEDICINE

## 2021-07-29 PROCEDURE — 80061 LIPID PANEL: CPT | Performed by: FAMILY MEDICINE

## 2021-07-29 PROCEDURE — 83036 HEMOGLOBIN GLYCOSYLATED A1C: CPT | Performed by: FAMILY MEDICINE

## 2021-07-29 PROCEDURE — 80048 BASIC METABOLIC PNL TOTAL CA: CPT | Performed by: FAMILY MEDICINE

## 2021-07-29 PROCEDURE — 96372 THER/PROPH/DIAG INJ SC/IM: CPT | Mod: 59 | Performed by: FAMILY MEDICINE

## 2021-07-29 PROCEDURE — 99214 OFFICE O/P EST MOD 30 MIN: CPT | Mod: 25 | Performed by: FAMILY MEDICINE

## 2021-07-29 RX ORDER — TOLTERODINE 2 MG/1
2 CAPSULE, EXTENDED RELEASE ORAL DAILY
Qty: 90 CAPSULE | Refills: 3 | Status: SHIPPED | OUTPATIENT
Start: 2021-07-29 | End: 2021-07-29

## 2021-07-29 RX ORDER — OXYCODONE AND ACETAMINOPHEN 10; 325 MG/1; MG/1
TABLET ORAL
Qty: 120 TABLET | Refills: 0 | Status: SHIPPED | OUTPATIENT
Start: 2021-07-29 | End: 2021-08-26

## 2021-07-29 RX ORDER — TOLTERODINE 2 MG/1
2 CAPSULE, EXTENDED RELEASE ORAL DAILY
Qty: 90 CAPSULE | Refills: 3 | Status: SHIPPED | OUTPATIENT
Start: 2021-07-29 | End: 2023-01-16

## 2021-07-29 RX ORDER — OXYCODONE AND ACETAMINOPHEN 10; 325 MG/1; MG/1
TABLET ORAL
Qty: 120 TABLET | Refills: 0 | Status: SHIPPED | OUTPATIENT
Start: 2021-07-29 | End: 2021-07-29

## 2021-07-29 RX ADMIN — CYANOCOBALAMIN 1000 MCG: 1000 INJECTION, SOLUTION INTRAMUSCULAR; SUBCUTANEOUS at 13:32

## 2021-07-29 NOTE — NURSING NOTE
Clinic Administered Medication Documentation          Injectable Medication Documentation    Patient was given Cyanocobalamin (B-12). Prior to medication administration, verified patients identity using patient s name and date of birth. Please see MAR and medication order for additional information. Patient instructed to remain in clinic for 15 minutes.      Was entire vial of medication used? Yes  Vial/Syringe: Single dose vial  Expiration Date:  01/30/2023   Was this medication supplied by the patient? No    Name of provider who requested the medication administration: DR. Meyer  Name of provider on site (faculty or community preceptor) at the time of performing the medication administration: Dr. Meyer    Date of next administration: Monthly  Date of next office visit with provider to renew medication plan (must be seen annually): N/A

## 2021-07-29 NOTE — PROGRESS NOTES
Blood pressure (!) 134/91, pulse 105, temperature 98  F (36.7  C), temperature source Oral, resp. rate 16, weight 91.2 kg (201 lb), SpO2 97 %, not currently breastfeeding.    Part time  job.  Start.tomorrow.

## 2021-07-29 NOTE — PROGRESS NOTES
Patient Active Problem List    Diagnosis Date Noted     Stage 3a chronic kidney disease 12/31/2020     Priority: High     Persistent protienuria       Copper deficiency 04/05/2019     Priority: High     Identified in Aug, 2018--see discharge summary from [facility] in [place].       Chronic pain disorder 10/07/2016     Priority: High     Chronic Pain Diagnosis:  neuropathy  DIRE Total Score(s):14     ORT: 5    4 - 7 =  Moderate Risk   of future problems with Opioids  FAQ5: 50 November 20, 2020  Behavioral Health Consultation: 5/12/17 with Dr. Daisy Quezada  Personal Care Plan for Chronic Pain: 5/12/17  Opioid medication:oxycodone   Dose:10mg  Number of pills per month:60  Patient is being prescribed 10mg of oxycodone IR (Percocet) per day this is 15 mg Morphine Equivalents  Benzodiazepines Prescribed? No  Naloxone prescribed? No       Clinic visit frequency required: Q 1 month Next visit due:   Controlled Substance/Opioid Treatment agreement on file (dated <12 months ago)?:    Date(s): pending  Last [place] verification: last visit  Items in red to be updated at each visit  Patient is followed by Data Unavailable for ongoing prescription of pain medication.  All refills should be approved by this provider, or covering partner.Chronic Pain     Chronic Pain Diagnosis:  neuropathy  DIRE Total Score(s):14     ORT: 5    4 - 7 =  Moderate Risk   of future problems with Opioids  FAQ5: 45/100 on 9/9/16, 40/100 on 11/15/16  55 October 29, 2020    Behavioral Health Consultation: 5/12/17 with Dr. Daisy Quezada  Personal Care Plan for Chronic Pain: 5/12/17  Opioid medication:oxycodone   Dose:10mg  Number of pills per month:90  Patient is being prescribed 45 MME  Benzodiazepines Prescribed? No  Naloxone prescribed? No       Clinic visit frequency required: Q 1 month Next visit due:   Controlled Substance/Opioid Treatment agreement on file (dated <12 months ago)?:    Date(s): pending  Last [place]MP verification: last visit  Items  in red to be updated at each visit  Patient is followed by [facility] Unavailable for ongoing prescription of pain medication.  All refills should be approved by this provider, or covering partner.Chronic Pain       Persistent proteinuria 10/12/2015     Priority: High     Type 2 diabetes mellitus with diabetic polyneuropathy (H) 10/12/2015     Priority: High     Essential hypertension 08/26/2013     Priority: High     Arthritis of both acromioclavicular joints 04/08/2021     Priority: Medium     xray proven        Morbid obesity (H) 11/27/2020     Priority: Medium     Hyperglycemia due to type 2 diabetes mellitus (H) 06/02/2020     Priority: Medium     Neck mass 10/11/2019     Priority: Medium     Added automatically from request for surgery [id number]       Hyperglycemia 09/08/2019     Priority: Medium     Hx of gastric bypass 04/22/2019     Priority: Medium     Syncope, near 01/04/2019     Priority: Medium     Chest pain 01/03/2019     Priority: Medium     Generalized abdominal pain 01/03/2019     Priority: Medium     Gastroenteritis 01/03/2019     Priority: Medium     Neuropathy 01/03/2019     Priority: Medium     Obesity 01/03/2019     Priority: Medium     Other acute pulmonary embolism without acute cor pulmonale (H) 12/31/2018     Priority: Medium     Hernia of anterior abdominal wall 01/28/2017     Priority: Medium     Diarrhea 01/27/2017     Priority: Medium     Hernia 01/27/2017     Priority: Medium     Nausea & vomiting 01/25/2017     Priority: Medium     Osteoarthritis of both knees 10/31/2016     Priority: Medium     Polyneuropathy due to secondary diabetes mellitus (H) 10/31/2016     Priority: Medium     Compression neuropathy of left lower extremity 02/19/2016     Priority: Medium     Interdigital neuroma of left foot 02/19/2016     Priority: Medium     Bilateral ankle joint pain 01/15/2016     Priority: Medium     Esophageal reflux 04/07/2015     Priority: Medium     Urinary incontinence 02/19/2015      Priority: Medium     Insomnia 02/19/2015     Priority: Medium     Asthma 08/04/2013     Priority: Medium     History of injury 08/04/2013     Priority: Medium     History of cholecystectomy 08/04/2013     Priority: Medium     H/O necrotizing fasciitis 08/04/2013     Priority: Medium     Hyperlipidemia 06/21/2013     Priority: Medium     Status post bariatric surgery 06/21/2013     Priority: Medium     Kim-en-Y in 2006       History of hysterectomy for benign disease 06/21/2013     Priority: Medium     2008, For fibroids.  Cervix removed, but still has ovaries.       Moderate major depression (H) 01/24/2013     Priority: Medium     Moderate persistent asthma without complication 11/15/2012     Priority: Medium     Pulmonary embolism with infarction (H) 11/15/2012     Priority: Medium     12/24/2018  Unprovoked.  + fam hx of DVT.  Treated w/ DOAC 12/2018through 9/2019 12/24/2018  Unprovoked.  + fam hx of DVT.  Plan warfarin for 6 months.       Persons encountering health services in other specified circumstances 11/15/2012     Priority: Medium     Tier 3    Status: Accept  Care Coordination Start Date: 02/27/12  State Tier Level: Level 3  Language/Barrier to Learning: No  Significant Mental Health Issues: Yes  Date Care Coordination Discontinued: 05/28/15    DX V65.8 REPLACED WITH [id number] (04/08/2013)       Open wound of groin with complication 05/12/2011     Priority: Medium     Metabolic acidosis 07/26/2019     Priority: Low     reolved w/ discontinuation of ASA       Abnormal Pap smear of cervix 04/22/2019     Priority: Low     3-12-15: Pap/HPV neg.  Plan: Given hx (mildly abn pap, hyster), recheck in 5 yrs.  10-11-11: Pap/HPV neg.  Plan: Given ASUS pap in 2010, recommend repeating pap in 1 year.  8/3/2010 ASCUS Pap with +BV- pt treated with Metrogel.  Pt needs repeat Pap in 1yr.  8/4/2008 Pt had total hyst for menorrhagia, pelvic pain, scar tissue, and fibroids.    3-12-15: Pap/HPV neg.  Plan:  Given hx (mildly abn pap, hyster), recheck in 5 yrs.  10-11-11: Pap/HPV neg.  Plan: Given ASUS pap in 2010, recommend repeating pap in 1 year.  8/3/2010 ASCUS Pap with +BV- pt treated with Metrogel.  Pt needs repeat Pap in 1yr.  8/4/2008 Pt had total hyst for menorrhagia, pelvic pain, scar tissue, and fibroids.       S/P hysterectomy 06/21/2013     Priority: Low     2008, For fibroids.  Cervix removed, but still has ovaries.       Carpal tunnel syndrome 11/15/2012     Priority: Low     Necrotizing fasciitis (H) 11/15/2012     Priority: Low     Health Care Home 11/15/2012     Priority: Low     Tier 3    Status: Accept  Care Coordination Start Date: 02/27/12  State Tier Level: Level 3  Language/Barrier to Learning: No  Significant Mental Health Issues: Yes  Date Care Coordination Discontinued: 05/28/15    DX V65.8 REPLACED WITH [id number] (04/08/2013)         Chronic pain of both shoulders 12/23/2005     Priority: Low     Nursing Notes:   Viridiana Loaiza CMA  7/29/2021  1:33 PM  Signed  Clinic Administered Medication Documentation          Injectable Medication Documentation    Patient was given Cyanocobalamin (B-12). Prior to medication administration, verified patients identity using patient s name and date of birth. Please see MAR and medication order for additional information. Patient instructed to remain in clinic for 15 minutes.      Was entire vial of medication used? Yes  Vial/Syringe: Single dose vial  Expiration Date:  01/30/2023   Was this medication supplied by the patient? No    Name of provider who requested the medication administration: DR. Meyer  Name of provider on site (faculty or community preceptor) at the time of performing the medication administration: Dr. Meyer    Date of next administration: Monthly  Date of next office visit with provider to renew medication plan (must be seen annually): N/A        Chief Complaint   Patient presents with     Pain Management     CPM     Blood  pressure (!) 134/91, pulse 105, temperature 98  F (36.7  C), temperature source Oral, resp. rate 16, weight 91.2 kg (201 lb), SpO2 97 %, not currently breastfeeding.  No results found for any visits on 07/29/21.     SUBJECTIVE:  Ruthy Álvarez is here for a med check.  Her main reason for the visit is to get refills of her oxycodone.  She takes 10 mg in the morning, 10 mg in the afternoon and 20 mg at night. With it, she is able to walk and sleep effectively, which are our goals.  Her constipation is managed on current medications.  She denies any aberrant behavior.  She readily gives a urine drug screen today, and database shows no red flags.  She has Narcan at home, and she has educated her son on how to administer it.  She denies any substance use.    Regarding her diabetes, she is due for an A1c today.  She is on Lantus 35 units and Trulicity.  She has been seen by an eye doctor within the past few months and has glaucoma, but no retinopathy.  She has significant proteinuria.  She is due for a urine microalbumin test today.  I discussed with her the possibility of adding an SGLT2 inhibitor to her regimen pending today's results.    Regarding her hypertension, she did not take her lisinopril today, but tolerates the higher doses.  She is due for a basic metabolic profile.  She is on lisinopril 30 mg daily.     Regarding her hyperlipidemia, she states that she is adherent to atorvastatin and is due for a lipid profile.    She has followup with her bariatric surgeon in the near future and that she would like to have some redundant fat removed from her triceps and her abdomen.  Her weight loss has been maintained on phentermine and Trulicity.  She tolerates both of those well.    Her moods are improved.  She is very excited that she has a new job as a  that will begin tomorrow.    OBJECTIVE:  The patient is alert, pleasant and in no acute distress.  Repeat blood pressure is 134/91.  Affect is  appropriate.  Eye contact is normal.  Examination not done.    I reviewed her chart again, and she is due for a refill of tolterodine.  She states that sometimes she needs to double void, but overall when she is taking this medication, her urinary frequency is improved, and she does not have any side effects.    ASSESSMENT:    1.  Type 2 diabetes mellitus.  A1c is pending today.  She has diabetic nephropathy, and I will likely add an SGLT2 inhibitor to her regimen, but we will discuss management of her insulin if we make that change.  We will call her with those test results.  2.  Hypertension.  Home blood pressure cuff was given, and instructions on how to use it were given as well.  Check BMP on lisinopril 30 today and contact her with results.  I had referred her to Nephrology on several occasions because of the severe proteinuria, and we will re-refer her again.  3.  Regarding her chronic pain, oxycodone refilled.  4.  Regarding her overactive bladder, I refilled tolterodine.  5.  Regarding hyperlipidemia, lipid profile ordered today.  Target LDL under 70 if possible.

## 2021-07-29 NOTE — PATIENT INSTRUCTIONS
Instructions on Home BP monitoring :   ? Remain still:     Avoid smoking, caffeinated beverages, or exercise within 30 min before BP measurements.     Ensure ?5 min of quiet rest before BP measurements.   ? Sit correctly:     Sit with back straight and supported (on a straight-backed dining chair, for example, rather than a sofa).     Sit with feet flat on the floor and legs uncrossed.     Keep arm supported on a flat surface (such as a table), with the upper arm at heart level.   ? Bottom of the cuff should be placed directly above the bend of the elbow.   ? Take multiple readings:     Take at least 2 readings 1 min apart in morning before taking medications and in evening before supper. Optimally, measure and record BP daily. Ideally, obtain weekly BP readings beginning 2 weeks after a change in the treatment regimen and during the week before a clinic visit.   ? Record all readings accurately:     Monitors with built-in memory should be brought to all clinic appointments.     BP should be based on an average of readings on ?2 occasions for clinical decision making.        Choosing a home blood pressure monitor:  The American Heart Association recommends an automatic, cuff-style, bicep (upper-arm) monitor.  Wrist and finger monitors are not recommended because they yield less reliable readings.     Below is a list from Consumer Reports:  OMRON series 10 XG036E ($75)  [facility] Deluxe Automatic [id number] ($60)  OMRON evolve [id number] ($100)  [employer] [id number] ($70)  Relion  [id number] ($40)--good overall value.  Available at [employer].  [employer]ion  ($29).  Fewer features than , but a good value!    Make sure the cuff fits -- measure around your upper arm and choose a monitor that comes with the correct size cuff.

## 2021-07-30 PROBLEM — R19.7 DIARRHEA: Status: RESOLVED | Noted: 2017-01-27 | Resolved: 2021-07-30

## 2021-07-30 PROBLEM — R11.2 NAUSEA & VOMITING: Status: RESOLVED | Noted: 2017-01-25 | Resolved: 2021-07-30

## 2021-07-30 PROBLEM — R10.84 GENERALIZED ABDOMINAL PAIN: Status: RESOLVED | Noted: 2019-01-03 | Resolved: 2021-07-30

## 2021-07-30 PROBLEM — R07.9 CHEST PAIN: Status: RESOLVED | Noted: 2019-01-03 | Resolved: 2021-07-30

## 2021-07-30 PROBLEM — R55 SYNCOPE, NEAR: Status: RESOLVED | Noted: 2019-01-04 | Resolved: 2021-07-30

## 2021-07-30 PROBLEM — K52.9 GASTROENTERITIS: Status: RESOLVED | Noted: 2019-01-03 | Resolved: 2021-07-30

## 2021-07-30 LAB
ANION GAP SERPL CALCULATED.3IONS-SCNC: 11 MMOL/L (ref 5–18)
BUN SERPL-MCNC: 23 MG/DL (ref 8–22)
CALCIUM SERPL-MCNC: 8.9 MG/DL (ref 8.5–10.5)
CHLORIDE BLD-SCNC: 111 MMOL/L (ref 98–107)
CHOLEST SERPL-MCNC: 206 MG/DL
CO2 SERPL-SCNC: 16 MMOL/L (ref 22–31)
CREAT SERPL-MCNC: 1.06 MG/DL (ref 0.6–1.1)
CREAT UR-MCNC: 126 MG/DL
FASTING STATUS PATIENT QL REPORTED: ABNORMAL
GFR SERPL CREATININE-BSD FRML MDRD: 59 ML/MIN/1.73M2
GLUCOSE BLD-MCNC: 309 MG/DL (ref 70–125)
HDLC SERPL-MCNC: 55 MG/DL
LDLC SERPL CALC-MCNC: 104 MG/DL
MICROALBUMIN UR-MCNC: 265.37 MG/DL (ref 0–1.99)
MICROALBUMIN/CREAT UR: 2106.1 MG/G CR
POTASSIUM BLD-SCNC: 5.4 MMOL/L (ref 3.5–5)
SODIUM SERPL-SCNC: 138 MMOL/L (ref 136–145)
TRIGL SERPL-MCNC: 235 MG/DL

## 2021-07-30 RX ORDER — SODIUM BICARBONATE 650 MG/1
650 TABLET ORAL 3 TIMES DAILY
Qty: 90 TABLET | Refills: 3 | Status: SHIPPED | OUTPATIENT
Start: 2021-07-30 | End: 2021-10-21

## 2021-08-01 NOTE — RESULT ENCOUNTER NOTE
Please call Monday:  1.  Her urine test shows a lot of protein in the urine, and I have replaced the referral order to a kidney specialist.  2.  Her kidney function is stable, but her kidneys are having a hard time clearing acids from her blood.  It's now important for her to take some sodium bicarbonate three times daily to help neutralize this.  We will check it again at her next visit.  I have sent a Rx to her pharmacy for this.  3.  Her diabetes control is good, but I will talk to her next time about adding a medication called invokana, which has the benefit of protecting her kidneys while helping her blood sugar control.  I'd recommend follow-up in 2 weeks, instead of the usual 4 weeks, for that blood work and discussion.

## 2021-08-03 ENCOUNTER — TELEPHONE (OUTPATIENT)
Dept: FAMILY MEDICINE | Facility: CLINIC | Age: 56
End: 2021-08-03

## 2021-08-03 NOTE — TELEPHONE ENCOUNTER
Prior Authorization Retail Medication Request    Medication/Dose: tolterodine ER (DETROL LA) 2 MG 24 hr capsule  ICD code (if different than what is on RX):  Overactive bladder [N32.81]   Previously Tried and Failed:    Rationale:      Insurance Name:  [employer] [facility] [*  Insurance ID:  [id number]       Pharmacy Information (if different than what is on RX)  [facility]: [id number] [place], [place] - [address] AT [facility]   Phone:  [phone]

## 2021-08-04 NOTE — TELEPHONE ENCOUNTER
Central Prior Authorization Team   Phone: [phone]    PA Initiation    Medication: tolterodine ER (DETROL LA) 2 MG 24 hr capsule  Insurance Company: [employer] - Phone [phone] Fax [phone]  Pharmacy Filling the Rx: [id number] [place], [place] - [address] AT [facility]  Filling Pharmacy Phone: [phone]  Filling Pharmacy Fax:    Start Date: 8/4/2021

## 2021-08-05 NOTE — TELEPHONE ENCOUNTER
PRIOR AUTHORIZATION DENIED    Medication: tolterodine ER (DETROL LA) 2 MG 24 hr capsule    Denial Date: 8/5/2021    Denial Rational: Insurance requiring trial/failure or 2 covered drugs Myrbetriq and Solifenacin         Appeal Information: This medication was denied. If physician would like to appeal because patient has contraindication or allergy to covered medication please write letter of medical necessity and route back to PA team to initiate.  If no further action is needed please close encounter thank you.

## 2021-08-26 ENCOUNTER — OFFICE VISIT (OUTPATIENT)
Dept: FAMILY MEDICINE | Facility: CLINIC | Age: 56
End: 2021-08-26
Payer: MEDICARE

## 2021-08-26 VITALS
HEART RATE: 108 BPM | SYSTOLIC BLOOD PRESSURE: 136 MMHG | DIASTOLIC BLOOD PRESSURE: 78 MMHG | WEIGHT: 199.8 LBS | TEMPERATURE: 98.4 F | OXYGEN SATURATION: 95 % | BODY MASS INDEX: 40.35 KG/M2 | RESPIRATION RATE: 16 BRPM

## 2021-08-26 DIAGNOSIS — R25.2 MUSCLE CRAMPS: Primary | ICD-10-CM

## 2021-08-26 DIAGNOSIS — G89.4 CHRONIC PAIN SYNDROME: Primary | ICD-10-CM

## 2021-08-26 DIAGNOSIS — E87.20 METABOLIC ACIDOSIS: ICD-10-CM

## 2021-08-26 DIAGNOSIS — G89.4 CHRONIC PAIN SYNDROME: ICD-10-CM

## 2021-08-26 LAB
ALBUMIN SERPL-MCNC: 3.1 G/DL (ref 3.5–5)
AMPHETAMINES UR QL: DETECTED
ANION GAP SERPL CALCULATED.3IONS-SCNC: 12 MMOL/L (ref 5–18)
BARBITURATES UR QL SCN: NOT DETECTED
BENZODIAZ UR QL SCN: NOT DETECTED
BUN SERPL-MCNC: 22 MG/DL (ref 8–22)
BUPRENORPHINE UR QL: NOT DETECTED
CALCIUM SERPL-MCNC: 8.8 MG/DL (ref 8.5–10.5)
CANNABINOIDS UR QL: NOT DETECTED
CHLORIDE BLD-SCNC: 110 MMOL/L (ref 98–107)
CO2 SERPL-SCNC: 20 MMOL/L (ref 22–31)
COCAINE UR QL SCN: NOT DETECTED
CREAT SERPL-MCNC: 1.09 MG/DL (ref 0.6–1.1)
D-METHAMPHET UR QL: NOT DETECTED
GFR SERPL CREATININE-BSD FRML MDRD: 57 ML/MIN/1.73M2
GLUCOSE BLD-MCNC: 232 MG/DL (ref 70–125)
MAGNESIUM SERPL-MCNC: 1.5 MG/DL (ref 1.8–2.6)
METHADONE UR QL SCN: NOT DETECTED
OPIATES UR QL SCN: NOT DETECTED
OXYCODONE UR QL SCN: DETECTED
PCP UR QL SCN: NOT DETECTED
PHOSPHATE SERPL-MCNC: 3.8 MG/DL (ref 2.5–4.5)
POTASSIUM BLD-SCNC: 4.8 MMOL/L (ref 3.5–5)
PROPOXYPH UR QL: NOT DETECTED
SODIUM SERPL-SCNC: 142 MMOL/L (ref 136–145)
TRICYCLICS UR QL SCN: NOT DETECTED

## 2021-08-26 PROCEDURE — 83735 ASSAY OF MAGNESIUM: CPT | Performed by: FAMILY MEDICINE

## 2021-08-26 PROCEDURE — 96372 THER/PROPH/DIAG INJ SC/IM: CPT | Performed by: FAMILY MEDICINE

## 2021-08-26 PROCEDURE — 99214 OFFICE O/P EST MOD 30 MIN: CPT | Mod: 25 | Performed by: FAMILY MEDICINE

## 2021-08-26 PROCEDURE — 80069 RENAL FUNCTION PANEL: CPT | Performed by: FAMILY MEDICINE

## 2021-08-26 PROCEDURE — 80306 DRUG TEST PRSMV INSTRMNT: CPT | Performed by: FAMILY MEDICINE

## 2021-08-26 PROCEDURE — 36415 COLL VENOUS BLD VENIPUNCTURE: CPT | Performed by: FAMILY MEDICINE

## 2021-08-26 RX ORDER — OXYCODONE AND ACETAMINOPHEN 10; 325 MG/1; MG/1
TABLET ORAL
Qty: 120 TABLET | Refills: 0 | Status: SHIPPED | OUTPATIENT
Start: 2021-08-26 | End: 2021-09-20

## 2021-08-26 RX ADMIN — CYANOCOBALAMIN 1000 MCG: 1000 INJECTION, SOLUTION INTRAMUSCULAR; SUBCUTANEOUS at 15:56

## 2021-08-26 NOTE — NURSING NOTE
Clinic Administered Medication Documentation          Injectable Medication Documentation    Patient was given Cyanocobalamin (B-12). Prior to medication administration, verified patients identity using patient s name and date of birth. Please see MAR and medication order for additional information. Patient instructed to remain in clinic for 15 minutes.      Was entire vial of medication used? Yes  Vial/Syringe: Single dose vial  Expiration Date:  01/30/2023  Was this medication supplied by the patient? No    Name of provider who requested the medication administration: Dr. Meyer  Name of provider on site (faculty or community preceptor) at the time of performing the medication administration: Dr. Meyer    Date of next administration: N/a  Date of next office visit with provider to renew medication plan (must be seen annually): n/a

## 2021-08-26 NOTE — PROGRESS NOTES
Patient Active Problem List    Diagnosis Date Noted     Stage 3a chronic kidney disease 12/31/2020     Priority: High     Persistent protienuria       Copper deficiency 04/05/2019     Priority: High     Identified in Aug, 2018--see discharge summary from [facility] in [place].       Chronic pain disorder 10/07/2016     Priority: High     Chronic Pain Diagnosis:  neuropathy  DIRE Total Score(s):14     ORT: 5    4 - 7 =  Moderate Risk   of future problems with Opioids  FAQ5: 50 November 20, 2020  Behavioral Health Consultation: 5/12/17 with Dr. Daisy Quezada  Personal Care Plan for Chronic Pain: 5/12/17  Opioid medication:oxycodone   Dose:10mg  Number of pills per month:60  Patient is being prescribed 10mg of oxycodone IR (Percocet) per day this is 15 mg Morphine Equivalents  Benzodiazepines Prescribed? No  Naloxone prescribed? No       Clinic visit frequency required: Q 1 month Next visit due:   Controlled Substance/Opioid Treatment agreement on file (dated <12 months ago)?:    Date(s): pending  Last [place] verification: last visit  Items in red to be updated at each visit  Patient is followed by Data Unavailable for ongoing prescription of pain medication.  All refills should be approved by this provider, or covering partner.Chronic Pain     Chronic Pain Diagnosis:  neuropathy  DIRE Total Score(s):14     ORT: 5    4 - 7 =  Moderate Risk   of future problems with Opioids  FAQ5: 45/100 on 9/9/16, 40/100 on 11/15/16  55 October 29, 2020    Behavioral Health Consultation: 5/12/17 with Dr. Daisy Quezada  Personal Care Plan for Chronic Pain: 5/12/17  Opioid medication:oxycodone   Dose:10mg  Number of pills per month:90  Patient is being prescribed 45 MME  Benzodiazepines Prescribed? No  Naloxone prescribed? No       Clinic visit frequency required: Q 1 month Next visit due:   Controlled Substance/Opioid Treatment agreement on file (dated <12 months ago)?:    Date(s): pending  Last [place] verification: last visit  Items  in red to be updated at each visit  Patient is followed by Data Unavailable for ongoing prescription of pain medication.  All refills should be approved by this provider, or covering partner.Chronic Pain       Microalbuminuria 10/12/2015     Priority: High     Severely increased albuminuria       Type 2 diabetes mellitus with diabetic polyneuropathy (H) 10/12/2015     Priority: High     Essential hypertension 08/26/2013     Priority: High     Arthritis of both acromioclavicular joints 04/08/2021     Priority: Medium     xray proven        Morbid obesity (H) 11/27/2020     Priority: Medium     Hyperglycemia due to type 2 diabetes mellitus (H) 06/02/2020     Priority: Medium     Neck mass 10/11/2019     Priority: Medium     Added automatically from request for surgery [id number]       Metabolic acidosis 07/26/2019     Priority: Medium     reolved w/ discontinuation of ASA       Obesity 01/03/2019     Priority: Medium     Other acute pulmonary embolism without acute cor pulmonale (H) 12/31/2018     Priority: Medium     Hernia of anterior abdominal wall 01/28/2017     Priority: Medium     Osteoarthritis of both knees 10/31/2016     Priority: Medium     Polyneuropathy due to secondary diabetes mellitus (H) 10/31/2016     Priority: Medium     Compression neuropathy of left lower extremity 02/19/2016     Priority: Medium     Bilateral ankle joint pain 01/15/2016     Priority: Medium     Esophageal reflux 04/07/2015     Priority: Medium     Urinary incontinence 02/19/2015     Priority: Medium     Asthma 08/04/2013     Priority: Medium     History of cholecystectomy 08/04/2013     Priority: Medium     H/O necrotizing fasciitis 08/04/2013     Priority: Medium     Hyperlipidemia 06/21/2013     Priority: Medium     Status post bariatric surgery 06/21/2013     Priority: Medium     Kim-en-Y in 2006       Moderate major depression (H) 01/24/2013     Priority: Medium     Moderate persistent asthma without complication 11/15/2012      Priority: Medium     Persons encountering health services in other specified circumstances 11/15/2012     Priority: Medium     Tier 3    Status: Accept  Care Coordination Start Date: 02/27/12  State Tier Level: Level 3  Language/Barrier to Learning: No  Significant Mental Health Issues: Yes  Date Care Coordination Discontinued: 05/28/15    DX V65.8 REPLACED WITH [id number] (04/08/2013)       Abnormal Pap smear of cervix 04/22/2019     Priority: Low     3-12-15: Pap/HPV neg.  Plan: Given hx (mildly abn pap, hyster), recheck in 5 yrs.  10-11-11: Pap/HPV neg.  Plan: Given ASUS pap in 2010, recommend repeating pap in 1 year.  8/3/2010 ASCUS Pap with +BV- pt treated with Metrogel.  Pt needs repeat Pap in 1yr.  8/4/2008 Pt had total hyst for menorrhagia, pelvic pain, scar tissue, and fibroids.    3-12-15: Pap/HPV neg.  Plan: Given hx (mildly abn pap, hyster), recheck in 5 yrs.  10-11-11: Pap/HPV neg.  Plan: Given ASUS pap in 2010, recommend repeating pap in 1 year.  8/3/2010 ASCUS Pap with +BV- pt treated with Metrogel.  Pt needs repeat Pap in 1yr.  8/4/2008 Pt had total hyst for menorrhagia, pelvic pain, scar tissue, and fibroids.       Hernia 01/27/2017     Priority: Low     S/P hysterectomy 06/21/2013     Priority: Low     2008, For fibroids.  Cervix removed, but still has ovaries.       History of hysterectomy for benign disease 06/21/2013     Priority: Low     2008, For fibroids.  Cervix removed, but still has ovaries.       Carpal tunnel syndrome 11/15/2012     Priority: Low     Pulmonary embolism with infarction (H) 11/15/2012     Priority: Low     12/24/2018  Unprovoked.  + fam hx of DVT.  Treated w/ DOAC 12/2018through 9/2019 12/24/2018  Unprovoked.  + fam hx of DVT.  Plan warfarin for 6 months.       Health Care Home 11/15/2012     Priority: Low     Tier 3    Status: Accept  Care Coordination Start Date: 02/27/12  State Tier Level: Level 3  Language/Barrier to Learning: No  Significant Mental  Health Issues: Yes  Date Care Coordination Discontinued: 05/28/15    DX V65.8 REPLACED WITH [id number] (04/08/2013)         Chronic pain of both shoulders 12/23/2005     Priority: Low     Nursing Notes:   Loaiza ViridianaTANIA  8/26/2021  3:56 PM  Signed  Clinic Administered Medication Documentation          Injectable Medication Documentation    Patient was given Cyanocobalamin (B-12). Prior to medication administration, verified patients identity using patient s name and date of birth. Please see MAR and medication order for additional information. Patient instructed to remain in clinic for 15 minutes.      Was entire vial of medication used? Yes  Vial/Syringe: Single dose vial  Expiration Date:  01/30/2023  Was this medication supplied by the patient? No    Name of provider who requested the medication administration: Dr. Meyer  Name of provider on site (faculty or community preceptor) at the time of performing the medication administration: Dr. Meyer    Date of next administration: N/a  Date of next office visit with provider to renew medication plan (must be seen annually): n/a          Chief Complaint   Patient presents with     Pain Management     Blood pressure 136/78, pulse 108, temperature 98.4  F (36.9  C), temperature source Oral, resp. rate 16, weight 90.6 kg (199 lb 12.8 oz), SpO2 95 %, not currently breastfeeding.     SUBJECTIVE:  Ruthy is here for followup of her chronic pain syndrome, followup of her metabolic acidosis and a new problem with some muscle cramps in her legs.  The muscle cramps have been going on for a few weeks, occur at rest, cramps in both calves that are partially relieved with topical lidocaine and improved with stretching, they occur primarily when she is sitting.  She has had a history of hypomagnesemia but is not currently taking any magnesium supplements.    Regarding her metabolic acidosis, it was a bit surprising on her last BMP.  I initiated sodium  chloride.  It took her a week or two to pick it up, but she has been taking it consistently t.i.d., does not like the big pills but is able to tolerate them for now.  Has not had a chance to get in to a kidney specialist yet and has not heard from them.    Regarding her chronic pain, it is well controlled.  Her  is unremarkable.  Urine drug screen is appropriate.  She feels satisfied with her pain relief and denies any aberrant behavior.    OBJECTIVE:    VITAL SIGNS:  Blood pressure is ideal.  GENERAL:  The patient is comfortable.  EXTREMITIES:  Examination of her legs reveals a lipoma on the lateral calf.  She has full pulses.  No edema.  No tenderness.  Symmetric calf size.    ASSESSMENT:     1.  Chronic pain syndrome, stable.  Refilled oxycodone.  2.  Metabolic acidosis.  BMP pending.  3.  Leg cramps.  Check magnesium and replace if appropriate.  Otherwise, it is challenging to identify a clear-cut source in that sometimes leg cramps can just be from diabetes itself.  We will call her with test results.  I gave her the number to get in to our Nephrology group in [place].  She will call and make this appointment on her own.      Results for orders placed or performed in visit on 08/26/21   Urine Drugs of Abuse Screen Panel 13     Status: Abnormal   Result Value Ref Range    Cannabinoids (91-wrg-1-carboxy-9-THC) Not Detected Not Detected, Indeterminate    Phencyclidine Not Detected Not Detected, Indeterminate    Cocaine (Benzoylecgonine) Not Detected Not Detected, Indeterminate    Methamphetamine (d-Methamphetamine) Not Detected Not Detected, Indeterminate    Opiates (Morphine) Not Detected Not Detected, Indeterminate    Amphetamine (d-Amphetamine) Detected (A) Not Detected, Indeterminate    Benzodiazepines (Nordiazepam) Not Detected Not Detected, Indeterminate    Tricyclic Antidepressants (Desipramine) Not Detected Not Detected, Indeterminate    Methadone Not Detected Not Detected, Indeterminate     Barbiturates (Butalbital) Not Detected Not Detected, Indeterminate    Oxycodone Detected (A) Not Detected, Indeterminate    Propoxyphene (Norpropoxyphene) Not Detected Not Detected, Indeterminate    Buprenorphine Not Detected Not Detected, Indeterminate

## 2021-08-27 DIAGNOSIS — E83.42 HYPOMAGNESEMIA: Primary | ICD-10-CM

## 2021-09-08 ENCOUNTER — ALLIED HEALTH/NURSE VISIT (OUTPATIENT)
Dept: FAMILY MEDICINE | Facility: CLINIC | Age: 56
End: 2021-09-08
Payer: MEDICARE

## 2021-09-08 DIAGNOSIS — Z23 NEED FOR PROPHYLACTIC VACCINATION AND INOCULATION AGAINST INFLUENZA: Primary | ICD-10-CM

## 2021-09-08 PROCEDURE — G0008 ADMIN INFLUENZA VIRUS VAC: HCPCS

## 2021-09-08 PROCEDURE — 90686 IIV4 VACC NO PRSV 0.5 ML IM: CPT

## 2021-09-09 ENCOUNTER — OFFICE VISIT (OUTPATIENT)
Dept: SURGERY | Facility: CLINIC | Age: 56
End: 2021-09-09
Payer: MEDICARE

## 2021-09-09 VITALS
DIASTOLIC BLOOD PRESSURE: 70 MMHG | HEART RATE: 80 BPM | WEIGHT: 196.6 LBS | SYSTOLIC BLOOD PRESSURE: 125 MMHG | BODY MASS INDEX: 39.64 KG/M2 | HEIGHT: 59 IN | RESPIRATION RATE: 16 BRPM

## 2021-09-09 DIAGNOSIS — K91.2 POSTOPERATIVE MALABSORPTION: Primary | ICD-10-CM

## 2021-09-09 DIAGNOSIS — E66.09 OBESITY DUE TO EXCESS CALORIES WITH SERIOUS COMORBIDITY, UNSPECIFIED CLASSIFICATION: ICD-10-CM

## 2021-09-09 DIAGNOSIS — Z79.4 TYPE 2 DIABETES MELLITUS WITH DIABETIC POLYNEUROPATHY, WITH LONG-TERM CURRENT USE OF INSULIN (H): ICD-10-CM

## 2021-09-09 DIAGNOSIS — E11.42 TYPE 2 DIABETES MELLITUS WITH DIABETIC POLYNEUROPATHY, WITH LONG-TERM CURRENT USE OF INSULIN (H): ICD-10-CM

## 2021-09-09 PROCEDURE — 99214 OFFICE O/P EST MOD 30 MIN: CPT | Performed by: FAMILY MEDICINE

## 2021-09-09 RX ORDER — PHENTERMINE HYDROCHLORIDE 37.5 MG/1
.5-1 TABLET ORAL EVERY MORNING
Qty: 90 TABLET | Refills: 1 | Status: SHIPPED | OUTPATIENT
Start: 2021-09-09 | End: 2022-06-23

## 2021-09-09 RX ORDER — DILTIAZEM HYDROCHLORIDE 120 MG/1
CAPSULE, EXTENDED RELEASE ORAL
COMMUNITY
Start: 2021-07-27 | End: 2021-10-25

## 2021-09-09 RX ORDER — AMLODIPINE AND BENAZEPRIL HYDROCHLORIDE 10; 40 MG/1; MG/1
1 CAPSULE ORAL
COMMUNITY
End: 2021-10-21 | Stop reason: ALTCHOICE

## 2021-09-09 RX ORDER — DULAGLUTIDE 1.5 MG/.5ML
1.5 INJECTION, SOLUTION SUBCUTANEOUS
Qty: 2 ML | Refills: 11 | Status: SHIPPED | OUTPATIENT
Start: 2021-09-09 | End: 2022-05-24

## 2021-09-09 RX ORDER — LATANOPROST 50 UG/ML
SOLUTION/ DROPS OPHTHALMIC
COMMUNITY
Start: 2021-07-29 | End: 2024-06-28

## 2021-09-09 RX ORDER — FLUTICASONE PROPIONATE AND SALMETEROL XINAFOATE 230; 21 UG/1; UG/1
2 AEROSOL, METERED RESPIRATORY (INHALATION)
COMMUNITY
End: 2022-06-23 | Stop reason: ALTCHOICE

## 2021-09-09 ASSESSMENT — MIFFLIN-ST. JEOR: SCORE: 1392.4

## 2021-09-09 NOTE — PROGRESS NOTES
Bariatric Follow Up Visit with a History of Previous Bariatric Surgery     Date of visit: 9/9/2021  Physician: Vy Carson MD, MD  Primary Care Provider:  Jose Angel Meyer   55 year old  female    Date of Surgery: 2006  Initial Weight: 360#  Initial BMI:   Today's Weight:   Wt Readings from Last 1 Encounters:   09/09/21 89.2 kg (196 lb 9.6 oz)     Body mass index is 39.71 kg/m .      Assessment and Plan     Assessment: Ruthy is a 55 year old year old female who is 15 yrs s/p  Kim en Y Gastric Bypass with Dr. Meredith Álvarez feels as if she has not achieved the goals she hoped to accomplish through bariatric surgery and weight loss. She does not have the energy she needs. Neuropathy keeps her awake    Encounter Diagnoses   Name Primary?     Postoperative malabsorption Yes     Type 2 diabetes mellitus with diabetic polyneuropathy, with long-term current use of insulin (H)          Current Outpatient Medications:      albuterol (2.5 MG/3ML) 0.083% nebulizer solution, Take 1 vial (2.5 mg) by nebulization every 6 hours as needed for shortness of breath / dyspnea, Disp: 1 Box, Rfl: prn     albuterol (VENTOLIN HFA) 108 (90 Base) MCG/ACT inhaler, Inhale 2 puffs into the lungs 4 times daily Inhale 2 puffs every 4-6 hours as needed, Disp: 2 Inhaler, Rfl: 1     Alcohol Swabs (B-D SINGLE USE SWABS REGULAR) PADS, Apply 1 pad topically 3 times daily Use as directed, Disp: 100 each, Rfl: 12     amitriptyline (ELAVIL) 10 MG tablet, TAKE 1 TABLET(10 MG) BY MOUTH AT BEDTIME, Disp: 30 tablet, Rfl: 11     amLODIPine-benazepril (LOTREL) 10-40 MG capsule, Take 1 capsule by mouth, Disp: , Rfl:      atorvastatin (LIPITOR) 10 MG tablet, TAKE 1 TABLET BY MOUTH EVERY DAY, Disp: 90 tablet, Rfl: 3     blood glucose (NO BRAND SPECIFIED) lancets standard, Check fasting glucose 4 times daily.  For Accu check monitor, Disp: 360 each, Rfl: 11     cyanocobalamin (CYANOCOBALAMIN) 1000 MCG/ML  injection, Inject 1 mL (1,000 mcg) into the muscle every 30 days, Disp: 1 mL, Rfl: 11     diclofenac (VOLTAREN) 1 % topical gel, Apply 4 g topically 3 times daily To top of both shoulders in area of pain, Disp: 350 g, Rfl: 2     diltiazem ER (DILT-XR) 120 MG 24 hr capsule, , Disp: , Rfl:      dulaglutide (TRULICITY) 1.5 MG/0.5ML pen, Inject 1.5 mg Subcutaneous every 7 days, Disp: 2 mL, Rfl: 11     DULoxetine (CYMBALTA) 60 MG capsule, Take 1 capsule (60 mg) by mouth daily, Disp: 30 capsule, Rfl: 11     famotidine (PEPCID) 20 MG tablet, Take 1 tablet (20 mg) by mouth 2 times daily, Disp: 60 tablet, Rfl: 11     ferrous sulfate (IRON) 325 (65 FE) MG tablet, Take 1 tablet (325 mg) by mouth daily (with breakfast), Disp: 30 tablet, Rfl: 2     fluticasone-salmeterol (ADVAIR HFA) 230-21 MCG/ACT inhaler, Inhale 2 puffs into the lungs, Disp: , Rfl:      gabapentin (NEURONTIN) 250 MG/5ML solution, Take 15 mLs (750 mg) by mouth 3 times daily, Disp: 1350 mL, Rfl: 2     insulin glargine (BASAGLAR KWIKPEN) 100 UNIT/ML pen, Inject 35 Units Subcutaneous At Bedtime, Disp: 6 mL, Rfl: 11     insulin pen needle (BD ULTRA-FINE) 29G X 12.7MM miscellaneous, Use 4 times daily, Disp: 100 each, Rfl: prn     Lancets (ONETOUCH DELICA PLUS FXFNCT11X) MISC, CHECK FASTING GLUCOSE THREE TIMES DAILY, Disp: 100 each, Rfl: 11     latanoprost (XALATAN) 0.005 % ophthalmic solution, INSTILL 1 DROP IN BOTH EYES DAILY AT BEDTIME, Disp: , Rfl:      lidocaine (LMX4) 4 % external cream, Apply topically once as needed for mild pain (on upper back), Disp: 45 g, Rfl: 2     lisinopril (ZESTRIL) 30 MG tablet, Take 1 tablet (30 mg) by mouth At Bedtime, Disp: 90 tablet, Rfl: 3     magnesium oxide (MAG-OX) 400 (241.3 Mg) MG tablet, Take 1 tablet (400 mg) by mouth 2 times daily, Disp: 60 tablet, Rfl: 1     naloxone (NARCAN) 4 MG/0.1ML nasal spray, Spray 1 spray (4 mg) into one nostril alternating nostrils once as needed for opioid reversal Every 2-3 minutes until  "patient responsive or EMS arrives, Disp: 0.2 mL, Rfl: 0     order for DME, Equipment being ordered: wheeled walker with seat, Disp: 1 Device, Rfl: 0     oxyCODONE-acetaminophen (PERCOCET)  MG per tablet, 1 po QAM , 1 po afternoon, and 2 po QHS., Disp: 120 tablet, Rfl: 0     phentermine (ADIPEX-P) 37.5 MG tablet, Take 0.5-1 tablets (18.75-37.5 mg) by mouth every morning, Disp: 90 tablet, Rfl: 0     risperiDONE (RISPERDAL) 0.25 MG tablet, Take 1 tablet (0.25 mg) by mouth At Bedtime, Disp: , Rfl:      senna-docusate (SENOKOT-S/PERICOLACE) 8.6-50 MG tablet, Take 1 tablet by mouth 2 times daily, Disp: 60 tablet, Rfl: 11     Simethicone 180 MG CAPS, Use 1 softgel as needed for gas, Disp: 60 capsule, Rfl: 11     sodium bicarbonate 650 MG tablet, Take 1 tablet (650 mg) by mouth 3 times daily, Disp: 90 tablet, Rfl: 3     SUMAtriptan (IMITREX) 25 MG tablet, One tab at onset of HA.  May repeat dose in 2 hours.  Do not exceed 200 mg in 24 hours, Disp: 24 tablet, Rfl: 1     tolterodine ER (DETROL LA) 2 MG 24 hr capsule, Take 1 capsule (2 mg) by mouth daily, Disp: 90 capsule, Rfl: 3     vitamin D2 (ERGOCALCIFEROL) 56220 units (1250 mcg) capsule, Take 1 capsule by mouth three times a week, Disp: , Rfl: 0    Current Facility-Administered Medications:      cyanocobalamin injection 1,000 mcg, 1,000 mcg, Intramuscular, Q30 Days, Jose Angel Meyer MD    Plan: Continue Trulicity 1.5mg weekly. Start vitamin D 50,000Y weekly. RF phentermine. F/u dietitian.    Return in about 1 month (around 10/9/2021).    Bariatric Surgery Review     Interim History/LifeChanges: Trulicity on Monday 34U basalglar at night No lows. Ended a relationship lately. \"best thing ever. 3# down over past month. Sleep is interrupted d/t neuropathy.    Patient Concerns: fatigue, neuropathy, rashes under the arms  Appetite (1-10): low  GERD: on H2 blocker    Medication changes: no    Vitamin Intake:   B-12   shots   MVI  rare   Vitamin D  none   Calcium   " none     Other  no              LABS: ordered    Nausea no  Vomiting no  Constipation yes  Diarrhea yes  Rashes yes  Hair Loss no-has extensions  Calf tenderness no  Breathing difficulty no  Reactive Hypoglycemia yes  Light Headedness occ   Moods stable    12 point ROS as above and otherwise negative      Habits:  Alcohol: no  Tobacco: never  Caffeine   NSAIDS no  Exercise Routine: walks daily. Joined [employer]. Daily.   3 meals/day no  Protein first no  ?grams/day  Water Separate from meals yes  Calorie Containing Beverages no  Restaurant eating/wk rare  Sleeping poorly  Stress high  CPAP: no  Contraception: PM  DEXA:not indicated    Social History     Social History     Socioeconomic History     Marital status: Single     Spouse name: Not on file     Number of children: Not on file     Years of education: Not on file     Highest education level: Not on file   Occupational History     Not on file   Tobacco Use     Smoking status: Never Smoker     Smokeless tobacco: Never Used   Substance and Sexual Activity     Alcohol use: No     Drug use: No     Sexual activity: Never   Other Topics Concern     Parent/sibling w/ CABG, MI or angioplasty before 65F 55M? No   Social History Narrative    Lives alone. [place], other daughter lives here 2 sons live in the [facility]     Social Determinants of Health     Financial Resource Strain:      Difficulty of Paying Living Expenses:    Food Insecurity:      Worried About Running Out of Food in the Last Year:      Ran Out of Food in the Last Year:    Transportation Needs:      Lack of Transportation (Medical):      Lack of Transportation (Non-Medical):    Physical Activity:      Days of Exercise per Week:      Minutes of Exercise per Session:    Stress:      Feeling of Stress :    Social Connections:      Frequency of Communication with Friends and Family:      Frequency of Social Gatherings with Friends and Family:      Attends Worship Services:       Active Member of Clubs or Organizations:      Attends Club or Organization Meetings:      Marital Status:    Intimate Partner Violence:      Fear of Current or Ex-Partner:      Emotionally Abused:      Physically Abused:      Sexually Abused:        Past Medical History     Past Medical History:   Diagnosis Date     Abnormal cervical Papanicolaou smear with positive human papillomavirus deoxyribonucleic acid test      Ankle joint pain     bilateral     Anxiety      Arthritis      Arthritis     rheumatoid arthritis     Asthma      Carpal tunnel syndrome     bilateral     Compression neuropathy      Depression      Depressive disorder      Diabetes (H)      Diabetes (H)     type II     Diabetic nephropathy (H)      DVT of lower extremity, bilateral (H) 2011    right     Esophageal reflux      Gastroenteritis      History of blood clots      History of blood transfusion      Hyperlipidemia      Hypertension      Hypertension      Infectious fasciitis 2011    abdominal wall      Insomnia      Neck pain      Necrotizing fasciitis (H) 11/15/2012     Necrotizing fasciitis (H)      Neuropathy      Obesity      Osteoarthritis of both knees 10/31/2016     Pulmonary embolism and infarction (H)      SOB (shortness of breath)     with exertion     Tarsal tunnel syndrome      Uncomplicated asthma      Urinary incontinence      Problem List     Patient Active Problem List   Diagnosis     Chronic pain of both shoulders     Abnormal Pap smear of cervix     Moderate persistent asthma without complication     Carpal tunnel syndrome     Pulmonary embolism with infarction (H)     Health Care Home     Moderate major depression (H)     S/P hysterectomy     Hyperlipidemia     Status post bariatric surgery     Essential hypertension     Urinary incontinence     Esophageal reflux     Microalbuminuria     Type 2 diabetes mellitus with diabetic polyneuropathy (H)     Bilateral ankle joint pain     Chronic pain disorder     Other acute  "pulmonary embolism without acute cor pulmonale (H)     Asthma     Compression neuropathy of left lower extremity     Hernia     Hernia of anterior abdominal wall     Obesity     Osteoarthritis of both knees     Polyneuropathy due to secondary diabetes mellitus (H)     Copper deficiency     Metabolic acidosis     Neck mass     Persons encountering health services in other specified circumstances     Hyperglycemia due to type 2 diabetes mellitus (H)     History of cholecystectomy     History of hysterectomy for benign disease     Morbid obesity (H)     Stage 3a chronic kidney disease     Arthritis of both acromioclavicular joints     H/O necrotizing fasciitis     Medications     [unfilled]  Surgical History     Past Surgical History  She has a past surgical history that includes gastric bypass (); Hysterectomy; gastric bypass ();  Section; Uterine Fibroid Surgery (); Abdominal Wall Surgery; REVISE MEDIAN N/CARPAL TUNNEL SURG (Right, 10/24/2014); Hysterectomy; Uterine Fibroid Surgery; knee surgery (Right); Colonoscopy (N/A, 3/15/2017); Colonoscopy (N/A, 3/16/2017); and Midline Insertion - Double Lumen (2021).    Objective-Exam     Constitutional:  /70   Pulse 80   Resp 16   Ht 1.499 m (4' 11\")   Wt 89.2 kg (196 lb 9.6 oz)   LMP  (LMP Unknown)   BMI 39.71 kg/m    [unfilled]  General:  Pleasant and in no acute distress   Eyes:  EOMI  ENT:  Airway 2-3+  Moist mucous membranes  Neck:  Supple, No LAD, No thyromegaly, No carotid bruits appreciated  Respiratory: Normal respiratory effort, no cough, wheezes or crackles  CV:  Regular rate and Rhythm,no murmurs, pulses 2+ , no calf tenderness, no LE edema  Gastrointestinal: Abdomen NT/ND, BS+  Musculoskeletal: muscle mass WNL  Skin: color  hair braided extensions, incisions nicely healed  Neurological: No tremor, normal gait  Psychiatric: alert and oriented X3, mood and affect normal    Counseling     We reviewed the important post " op bariatric recommendations:  -eating 3 meals daily  -eating protein first, getting >60gm protein daily  -eating slowly, chewing food well  -avoiding/limiting calorie containing beverages  -drinking water 15-30 minutes before or after meals  -choosing wheat, not white with breads, crackers, pastas, aleisha, bagels, tortillas, rice  -limiting restaurant or cafeteria eating to twice a week or less    We discussed the importance of restorative sleep and stress management in maintaining a healthy weight.  We discussed the National Weight Control Registry healthy weight maintenance strategies and ways to optimize metabolism.  We discussed the importance of physical activity including cardiovascular and strength training in maintaining a healthier weight.    We discussed the importance of life-long vitamin supplementation and life-long  follow-up.    Ruthy was reminded that, to avoid marginal ulcers she should avoid tobacco at all, alcohol in excess, caffeine in excess, and NSAIDS (unless indicated for cardioprotection or othewise and opposed by a PPI).    Vy Carson MD, MD, [facility].  9/9/2021  3:29 PM      No images are attached to the encounter.  Total time spent on the date of this encounter doing: chart review, review of test results, patient visit, physical exam, education, counseling, developing plan of care, and documenting = 30   minutes.

## 2021-09-09 NOTE — PATIENT INSTRUCTIONS
[facility] Bariatric Basics    Remember to:    -Eat 3 meals a day (not 2, not 5) Chew your food well/SLOW down  -Eat your protein first  -Be a water drinker/Minize liquid calories (no regular pop, no juice) skim or 1% milk OK  -Sleep 7-8 hours each night. Address sleep if problematic  -Stress management is important. Address if problematic  -Move-8000 steps daily Muscle: maintain your muscle mass (strength training 2X/wk)  -Wheat, not white (bread, pasta, crackers, aleisha, bagels, tortillas, rice)  -Limit restaurant, cafeteria, take out, drive through to 2 times per week or less  -Minimize caffeine, alcohol, and night-time snacking  -Consider keeping a food diary (i.e. My Fitness Pal, Lose It, or other food tracker)  -Follow up with the dietitian      **Some lean proteins: chicken, turkey, tuna, salmon, crab, fish, shrimp, scallops, lobster, lean cuts of beef and pork, luncheon meats, veggie burgers, beans (black, lima, garbanzo, hunter, kidney, refried), chile, cottage cheese, string cheese, other cheese, eggs, tofu, peanut butter, nuts, vegan crumbles, greek yogurt

## 2021-09-09 NOTE — LETTER
9/9/2021         RE: Ruthy Álvarez  [address]  [place] [address]        Dear Colleague,    Thank you for referring your patient, Ruthy Álvarez, to the [facility] [place]. Please see a copy of my visit note below.    Bariatric Follow Up Visit with a History of Previous Bariatric Surgery     Date of visit: 9/9/2021  Physician: Vy Carson MD, MD  Primary Care Provider:  Jose Angel Meyer TJ Croftesvin   55 year old  female    Date of Surgery: 2006  Initial Weight: 360#  Initial BMI:   Today's Weight:   Wt Readings from Last 1 Encounters:   09/09/21 89.2 kg (196 lb 9.6 oz)     Body mass index is 39.71 kg/m .      Assessment and Plan     Assessment: Ruthy is a 55 year old year old female who is 15 yrs s/p  Kim en Y Gastric Bypass with Dr. Harper  Vadimndmercedesmejia TJ Álvarez feels as if she has not achieved the goals she hoped to accomplish through bariatric surgery and weight loss. She does not have the energy she needs. Neuropathy keeps her awake    Encounter Diagnoses   Name Primary?     Postoperative malabsorption Yes     Type 2 diabetes mellitus with diabetic polyneuropathy, with long-term current use of insulin (H)          Current Outpatient Medications:      albuterol (2.5 MG/3ML) 0.083% nebulizer solution, Take 1 vial (2.5 mg) by nebulization every 6 hours as needed for shortness of breath / dyspnea, Disp: 1 Box, Rfl: prn     albuterol (VENTOLIN HFA) 108 (90 Base) MCG/ACT inhaler, Inhale 2 puffs into the lungs 4 times daily Inhale 2 puffs every 4-6 hours as needed, Disp: 2 Inhaler, Rfl: 1     Alcohol Swabs (B-D SINGLE USE SWABS REGULAR) PADS, Apply 1 pad topically 3 times daily Use as directed, Disp: 100 each, Rfl: 12     amitriptyline (ELAVIL) 10 MG tablet, TAKE 1 TABLET(10 MG) BY MOUTH AT BEDTIME, Disp: 30 tablet, Rfl: 11     amLODIPine-benazepril (LOTREL) 10-40 MG capsule, Take 1 capsule by mouth, Disp: , Rfl:       atorvastatin (LIPITOR) 10 MG tablet, TAKE 1 TABLET BY MOUTH EVERY DAY, Disp: 90 tablet, Rfl: 3     blood glucose (NO BRAND SPECIFIED) lancets standard, Check fasting glucose 4 times daily.  For Accu check monitor, Disp: 360 each, Rfl: 11     cyanocobalamin (CYANOCOBALAMIN) 1000 MCG/ML injection, Inject 1 mL (1,000 mcg) into the muscle every 30 days, Disp: 1 mL, Rfl: 11     diclofenac (VOLTAREN) 1 % topical gel, Apply 4 g topically 3 times daily To top of both shoulders in area of pain, Disp: 350 g, Rfl: 2     diltiazem ER (DILT-XR) 120 MG 24 hr capsule, , Disp: , Rfl:      dulaglutide (TRULICITY) 1.5 MG/0.5ML pen, Inject 1.5 mg Subcutaneous every 7 days, Disp: 2 mL, Rfl: 11     DULoxetine (CYMBALTA) 60 MG capsule, Take 1 capsule (60 mg) by mouth daily, Disp: 30 capsule, Rfl: 11     famotidine (PEPCID) 20 MG tablet, Take 1 tablet (20 mg) by mouth 2 times daily, Disp: 60 tablet, Rfl: 11     ferrous sulfate (IRON) 325 (65 FE) MG tablet, Take 1 tablet (325 mg) by mouth daily (with breakfast), Disp: 30 tablet, Rfl: 2     fluticasone-salmeterol (ADVAIR HFA) 230-21 MCG/ACT inhaler, Inhale 2 puffs into the lungs, Disp: , Rfl:      gabapentin (NEURONTIN) 250 MG/5ML solution, Take 15 mLs (750 mg) by mouth 3 times daily, Disp: 1350 mL, Rfl: 2     insulin glargine (BASAGLAR KWIKPEN) 100 UNIT/ML pen, Inject 35 Units Subcutaneous At Bedtime, Disp: 6 mL, Rfl: 11     insulin pen needle (BD ULTRA-FINE) 29G X 12.7MM miscellaneous, Use 4 times daily, Disp: 100 each, Rfl: prn     Lancets (ONETOUCH DELICA PLUS [id number]) MISC, CHECK FASTING GLUCOSE THREE TIMES DAILY, Disp: 100 each, Rfl: 11     latanoprost (XALATAN) 0.005 % ophthalmic solution, INSTILL 1 DROP IN BOTH EYES DAILY AT BEDTIME, Disp: , Rfl:      lidocaine (LMX4) 4 % external cream, Apply topically once as needed for mild pain (on upper back), Disp: 45 g, Rfl: 2     lisinopril (ZESTRIL) 30 MG tablet, Take 1 tablet (30 mg) by mouth At Bedtime, Disp: 90 tablet, Rfl: 3      "magnesium oxide (MAG-OX) 400 (241.3 Mg) MG tablet, Take 1 tablet (400 mg) by mouth 2 times daily, Disp: 60 tablet, Rfl: 1     naloxone (NARCAN) 4 MG/0.1ML nasal spray, Spray 1 spray (4 mg) into one nostril alternating nostrils once as needed for opioid reversal Every 2-3 minutes until patient responsive or EMS arrives, Disp: 0.2 mL, Rfl: 0     order for DME, Equipment being ordered: wheeled walker with seat, Disp: 1 Device, Rfl: 0     oxyCODONE-acetaminophen (PERCOCET)  MG per tablet, 1 po QAM , 1 po afternoon, and 2 po QHS., Disp: 120 tablet, Rfl: 0     phentermine (ADIPEX-P) 37.5 MG tablet, Take 0.5-1 tablets (18.75-37.5 mg) by mouth every morning, Disp: 90 tablet, Rfl: 0     risperiDONE (RISPERDAL) 0.25 MG tablet, Take 1 tablet (0.25 mg) by mouth At Bedtime, Disp: , Rfl:      senna-docusate (SENOKOT-S/PERICOLACE) 8.6-50 MG tablet, Take 1 tablet by mouth 2 times daily, Disp: 60 tablet, Rfl: 11     Simethicone 180 MG CAPS, Use 1 softgel as needed for gas, Disp: 60 capsule, Rfl: 11     sodium bicarbonate 650 MG tablet, Take 1 tablet (650 mg) by mouth 3 times daily, Disp: 90 tablet, Rfl: 3     SUMAtriptan (IMITREX) 25 MG tablet, One tab at onset of HA.  May repeat dose in 2 hours.  Do not exceed 200 mg in 24 hours, Disp: 24 tablet, Rfl: 1     tolterodine ER (DETROL LA) 2 MG 24 hr capsule, Take 1 capsule (2 mg) by mouth daily, Disp: 90 capsule, Rfl: 3     vitamin D2 (ERGOCALCIFEROL) 73624 units (1250 mcg) capsule, Take 1 capsule by mouth three times a week, Disp: , Rfl: 0    Current Facility-Administered Medications:      cyanocobalamin injection 1,000 mcg, 1,000 mcg, Intramuscular, Q30 Days, Jose Angel Meyer MD    Plan: Continue Trulicity 1.5mg weekly. Start vitamin D 50,000Y weekly. RF phentermine. F/u dietitian.    Return in about 1 month (around 10/9/2021).    Bariatric Surgery Review     Interim History/LifeChanges: Trulicity on Monday 34U basalglar at night No lows. Ended a relationship lately. \"best " thing ever. 3# down over past month. Sleep is interrupted d/t neuropathy.    Patient Concerns: fatigue, neuropathy, rashes under the arms  Appetite (1-10): low  GERD: on H2 blocker    Medication changes: no    Vitamin Intake:   B-12   shots   MVI  rare   Vitamin D  none   Calcium   none     Other  no              LABS: ordered    Nausea no  Vomiting no  Constipation yes  Diarrhea yes  Rashes yes  Hair Loss no-has extensions  Calf tenderness no  Breathing difficulty no  Reactive Hypoglycemia yes  Light Headedness occ   Moods stable    12 point ROS as above and otherwise negative      Habits:  Alcohol: no  Tobacco: never  Caffeine   NSAIDS no  Exercise Routine: walks daily. Joined [employer] Daily.   3 meals/day no  Protein first no  ?grams/day  Water Separate from meals yes  Calorie Containing Beverages no  Restaurant eating/wk rare  Sleeping poorly  Stress high  CPAP: no  Contraception: PM  DEXA:not indicated    Social History     Social History     Socioeconomic History     Marital status: Single     Spouse name: Not on file     Number of children: Not on file     Years of education: Not on file     Highest education level: Not on file   Occupational History     Not on file   Tobacco Use     Smoking status: Never Smoker     Smokeless tobacco: Never Used   Substance and Sexual Activity     Alcohol use: No     Drug use: No     Sexual activity: Never   Other Topics Concern     Parent/sibling w/ CABG, MI or angioplasty before 65F 55M? No   Social History Narrative    Lives alone. [place], other daughter lives here 2 sons live in the [facility]     Social Determinants of Health     Financial Resource Strain:      Difficulty of Paying Living Expenses:    Food Insecurity:      Worried About Running Out of Food in the Last Year:      Ran Out of Food in the Last Year:    Transportation Needs:      Lack of Transportation (Medical):      Lack of Transportation (Non-Medical):    Physical Activity:       Days of Exercise per Week:      Minutes of Exercise per Session:    Stress:      Feeling of Stress :    Social Connections:      Frequency of Communication with Friends and Family:      Frequency of Social Gatherings with Friends and Family:      Attends [employer] Services:      Active Member of Clubs or Organizations:      Attends Club or Organization Meetings:      Marital Status:    Intimate Partner Violence:      Fear of Current or Ex-Partner:      Emotionally Abused:      Physically Abused:      Sexually Abused:        Past Medical History     Past Medical History:   Diagnosis Date     Abnormal cervical Papanicolaou smear with positive human papillomavirus deoxyribonucleic acid test      Ankle joint pain     bilateral     Anxiety      Arthritis      Arthritis     rheumatoid arthritis     Asthma      Carpal tunnel syndrome     bilateral     Compression neuropathy      Depression      Depressive disorder      Diabetes (H)      Diabetes (H)     type II     Diabetic nephropathy (H)      DVT of lower extremity, bilateral (H) 2011    right     Esophageal reflux      Gastroenteritis      History of blood clots      History of blood transfusion      Hyperlipidemia      Hypertension      Hypertension      Infectious fasciitis 2011    abdominal wall      Insomnia      Neck pain      Necrotizing fasciitis (H) 11/15/2012     Necrotizing fasciitis (H)      Neuropathy      Obesity      Osteoarthritis of both knees 10/31/2016     Pulmonary embolism and infarction (H)      SOB (shortness of breath)     with exertion     Tarsal tunnel syndrome      Uncomplicated asthma      Urinary incontinence      Problem List     Patient Active Problem List   Diagnosis     Chronic pain of both shoulders     Abnormal Pap smear of cervix     Moderate persistent asthma without complication     Carpal tunnel syndrome     Pulmonary embolism with infarction (H)     Health Care Home     Moderate major depression (H)     S/P hysterectomy      "Hyperlipidemia     Status post bariatric surgery     Essential hypertension     Urinary incontinence     Esophageal reflux     Microalbuminuria     Type 2 diabetes mellitus with diabetic polyneuropathy (H)     Bilateral ankle joint pain     Chronic pain disorder     Other acute pulmonary embolism without acute cor pulmonale (H)     Asthma     Compression neuropathy of left lower extremity     Hernia     Hernia of anterior abdominal wall     Obesity     Osteoarthritis of both knees     Polyneuropathy due to secondary diabetes mellitus (H)     Copper deficiency     Metabolic acidosis     Neck mass     Persons encountering health services in other specified circumstances     Hyperglycemia due to type 2 diabetes mellitus (H)     History of cholecystectomy     History of hysterectomy for benign disease     Morbid obesity (H)     Stage 3a chronic kidney disease     Arthritis of both acromioclavicular joints     H/O necrotizing fasciitis     Medications     [unfilled]  Surgical History     Past Surgical History  She has a past surgical history that includes gastric bypass (); Hysterectomy; gastric bypass ();  Section; Uterine Fibroid Surgery (); Abdominal Wall Surgery; REVISE MEDIAN N/CARPAL TUNNEL SURG (Right, 10/24/2014); Hysterectomy; Uterine Fibroid Surgery; knee surgery (Right); Colonoscopy (N/A, 3/15/2017); Colonoscopy (N/A, 3/16/2017); and Midline Insertion - Double Lumen (2021).    Objective-Exam     Constitutional:  /70   Pulse 80   Resp 16   Ht 1.499 m (4' 11\")   Wt 89.2 kg (196 lb 9.6 oz)   LMP  (LMP Unknown)   BMI 39.71 kg/m    [unfilled]  General:  Pleasant and in no acute distress   Eyes:  EOMI  ENT:  Airway 2-3+  Moist mucous membranes  Neck:  Supple, No LAD, No thyromegaly, No carotid bruits appreciated  Respiratory: Normal respiratory effort, no cough, wheezes or crackles  CV:  Regular rate and Rhythm,no murmurs, pulses 2+ , no calf tenderness, no LE " edema  Gastrointestinal: Abdomen NT/ND, BS+  Musculoskeletal: muscle mass WNL  Skin: color  hair braided extensions, incisions nicely healed  Neurological: No tremor, normal gait  Psychiatric: alert and oriented X3, mood and affect normal    Counseling     We reviewed the important post op bariatric recommendations:  -eating 3 meals daily  -eating protein first, getting >60gm protein daily  -eating slowly, chewing food well  -avoiding/limiting calorie containing beverages  -drinking water 15-30 minutes before or after meals  -choosing wheat, not white with breads, crackers, pastas, aleisha, bagels, tortillas, rice  -limiting restaurant or cafeteria eating to twice a week or less    We discussed the importance of restorative sleep and stress management in maintaining a healthy weight.  We discussed the National Weight Control Registry healthy weight maintenance strategies and ways to optimize metabolism.  We discussed the importance of physical activity including cardiovascular and strength training in maintaining a healthier weight.    We discussed the importance of life-long vitamin supplementation and life-long  follow-up.    Ruthy was reminded that, to avoid marginal ulcers she should avoid tobacco at all, alcohol in excess, caffeine in excess, and NSAIDS (unless indicated for cardioprotection or othewise and opposed by a PPI).    Vy Carson MD, MD, [facility].  9/9/2021  3:29 PM      No images are attached to the encounter.  Total time spent on the date of this encounter doing: chart review, review of test results, patient visit, physical exam, education, counseling, developing plan of care, and documenting = 30   minutes.      Again, thank you for allowing me to participate in the care of your patient.        Sincerely,        Vy Carson MD

## 2021-09-10 ENCOUNTER — VIRTUAL VISIT (OUTPATIENT)
Dept: SURGERY | Facility: CLINIC | Age: 56
End: 2021-09-10
Payer: MEDICARE

## 2021-09-10 DIAGNOSIS — Z79.4 TYPE 2 DIABETES MELLITUS WITH DIABETIC POLYNEUROPATHY, WITH LONG-TERM CURRENT USE OF INSULIN (H): Primary | ICD-10-CM

## 2021-09-10 DIAGNOSIS — K91.2 POSTOPERATIVE MALABSORPTION: ICD-10-CM

## 2021-09-10 DIAGNOSIS — N18.31 STAGE 3A CHRONIC KIDNEY DISEASE (H): ICD-10-CM

## 2021-09-10 DIAGNOSIS — Z98.84 BARIATRIC SURGERY STATUS: ICD-10-CM

## 2021-09-10 DIAGNOSIS — E66.09 OBESITY DUE TO EXCESS CALORIES WITH SERIOUS COMORBIDITY, UNSPECIFIED CLASSIFICATION: ICD-10-CM

## 2021-09-10 DIAGNOSIS — Z71.3 NUTRITIONAL COUNSELING: ICD-10-CM

## 2021-09-10 DIAGNOSIS — E11.42 TYPE 2 DIABETES MELLITUS WITH DIABETIC POLYNEUROPATHY, WITH LONG-TERM CURRENT USE OF INSULIN (H): Primary | ICD-10-CM

## 2021-09-10 PROCEDURE — 98968 PH1 ASSMT&MGMT NQHP 21-30: CPT | Performed by: DIETITIAN, REGISTERED

## 2021-09-10 NOTE — LETTER
9/10/2021         RE: Ruthy Álvarez  [address]  [place] [address]        Dear Colleague,    Thank you for referring your patient, Ruthy Álvarez, to the [facility] [place]. Please see a copy of my visit note below.    Ruthy Álvarez is a 55 year old who is being evaluated via a billable telephone visit.      What phone number would you like to be contacted at? [phone]  How would you like to obtain your AVS? Mail a copy        Post-op Surgical Weight Loss Diet Evaluation     Assessment:  Pt presents for 15 years post-op RD visit, s/p RNY GB with Dr. Harper. Today we reviewed current eating habits and level of physical activity, and instructed on the changes that are required for successful bariatric outcomes.  Patient is taking Phentermine currently.     Patient Progress: would like to lose some more weight    Highest Weight: 360 lbs   Current Weight: 196 lbs   Lowest Weight: 137 lbs    There is no height or weight on file to calculate BMI.    Patient Active Problem List   Diagnosis     Chronic pain of both shoulders     Abnormal Pap smear of cervix     Moderate persistent asthma without complication     Carpal tunnel syndrome     Pulmonary embolism with infarction (H)     Health Care Home     Moderate major depression (H)     S/P hysterectomy     Hyperlipidemia     Status post bariatric surgery     Essential hypertension     Urinary incontinence     Esophageal reflux     Microalbuminuria     Type 2 diabetes mellitus with diabetic polyneuropathy (H)     Bilateral ankle joint pain     Chronic pain disorder     Other acute pulmonary embolism without acute cor pulmonale (H)     Asthma     Compression neuropathy of left lower extremity     Hernia     Hernia of anterior abdominal wall     Obesity     Osteoarthritis of both knees     Polyneuropathy due to secondary diabetes mellitus (H)     Copper deficiency     Metabolic acidosis     Neck mass  "    Persons encountering health services in other specified circumstances     Hyperglycemia due to type 2 diabetes mellitus (H)     History of cholecystectomy     History of hysterectomy for benign disease     Morbid obesity (H)     Stage 3a chronic kidney disease     Arthritis of both acromioclavicular joints     H/O necrotizing fasciitis       Diabetes: Yes-Trulicity, Glargine    Vitamins   Multi Vit with Iron: yes  Calcium Citrate: yes  B12: yes  D3: yes    Diarrhea: yes-on occasion      Diet Recall/Time:   Breakfast: boiled eggs (1-2), grapefruit  Lunch: 1/2 sandwich (lunch meat) or piece of toast with butter or cream cheese   Dinner: boiled or baked chicken or steak (trying to stay away from fried foods), macaroni or spaghetti, brussel sprouts    Typical Snacks: grapes or strawberries or cherries    Proteins/Veg/Fruits/CHO (NOT well tolerated): fish, pizza,     Estimated protein intake: 50-60 grams    Meal Duration:20-30 minutes     Fluid-meal separation:  Fluids are  30min before and 30 minutes after meals.    Fluid Intake  Water: at least 64 oz/day     Exercise: walking, [employer] daily-usually cardio machines, strengthening machines (mainly legs) for at least a couple of hours       PES statement:      (NI-5.7.1) Inadequate protein intake related to Gastric bypass causing increased nutrient needs due to malabsorption/ Decreased ability to consume sufficient protein as evidenced by Estimated intake of protein insufficient to meet requirements    Intervention    Discussion  1. Recommended to consume 15-20gm protein at 3 meals daily, along with protein supplement/\"planned protein containing snack\" of 15-30gm protein, to reach goal of 60-80 gm protein daily.  2. Educated on post-op vitamin regimen: Multi Vit + iron 2x/day, calcium citrate 400-600 mg 2x/day, 6343-5690 mcg of Sublingual B-12 daily, and 5000 IU Vitamin D3 daily (MVI and calcium can be taken at the same time " "BID)  3. Reviewed lean protein sources  4. Bariatric Plate Method-  including lean/low fat protein at each meal, including a vegetable/fruit, and limiting carbohydrate intake to less than 25% of plate volume.     Instructions  1. Include 15-20gm protein at each meal, along with protein supplement/\"planned protein containing snack\" of 15-30gm protein, to reach goal of 60-80 gm protein daily.  2. Increase fluid intake to 64oz daily: choose plain or calorie/carbonation-free beverages.  3. Incorporate daily structured activity, 30-60 minutes most days of the week  4. Recommended pt to start taking: Multi Vit + iron 2x/day, calcium citrate 400-600 mg 2x/day, 5536-5458 mcg of Sublingual B-12 daily, and 5000 IU Vitamin D3 daily. (MVI and calcium can be taken at the same time)  5. Read food labels more consistently: keeping total fat grams <10, total sugar grams <10, fiber >3gm per serving.  6. Increase vegetable/fruit intake, by having a vegetable or fruit with each meal daily.  7. Practice plate method: 1/2 plate lean/low fat protein source, vegetable/fruit, <25% of plate complex carbohydrates.  8. Separate fluids 30 minutes before/after meal times.  9. Practice eating off of smaller plates/bowls, chewing to applesauce consistency, taking 20-30 minutes to eat in a calm/relaxed environment without distractions of tv/email/cell phone.    Handouts provided:  18 Months Post-Op Nutritional Guidelines for RNY GB  Lean Protein Sources     Assessment/Plan:    Pt to follow up for 16 years  post-op visit with bariatrician       Phone call duration: 22 minutes      Matilde Norman RD          Again, thank you for allowing me to participate in the care of your patient.        Sincerely,        Matilde Norman RD    "

## 2021-09-10 NOTE — PROGRESS NOTES
Ruthy Álvarez is a 55 year old who is being evaluated via a billable telephone visit.      What phone number would you like to be contacted at? [phone]  How would you like to obtain your AVS? Mail a copy        Post-op Surgical Weight Loss Diet Evaluation     Assessment:  Pt presents for 15 years post-op RD visit, s/p RNY GB with Dr. Harper. Today we reviewed current eating habits and level of physical activity, and instructed on the changes that are required for successful bariatric outcomes.  Patient is taking Phentermine currently.     Patient Progress: would like to lose some more weight    Highest Weight: 360 lbs   Current Weight: 196 lbs   Lowest Weight: 137 lbs    There is no height or weight on file to calculate BMI.    Patient Active Problem List   Diagnosis     Chronic pain of both shoulders     Abnormal Pap smear of cervix     Moderate persistent asthma without complication     Carpal tunnel syndrome     Pulmonary embolism with infarction (H)     Health Care Home     Moderate major depression (H)     S/P hysterectomy     Hyperlipidemia     Status post bariatric surgery     Essential hypertension     Urinary incontinence     Esophageal reflux     Microalbuminuria     Type 2 diabetes mellitus with diabetic polyneuropathy (H)     Bilateral ankle joint pain     Chronic pain disorder     Other acute pulmonary embolism without acute cor pulmonale (H)     Asthma     Compression neuropathy of left lower extremity     Hernia     Hernia of anterior abdominal wall     Obesity     Osteoarthritis of both knees     Polyneuropathy due to secondary diabetes mellitus (H)     Copper deficiency     Metabolic acidosis     Neck mass     Persons encountering health services in other specified circumstances     Hyperglycemia due to type 2 diabetes mellitus (H)     History of cholecystectomy     History of hysterectomy for benign disease     Morbid obesity (H)     Stage 3a chronic kidney disease     Arthritis of both  "acromioclavicular joints     H/O necrotizing fasciitis       Diabetes: Yes-Trulicity, Glargine    Vitamins   Multi Vit with Iron: yes  Calcium Citrate: yes  B12: yes  D3: yes    Diarrhea: yes-on occasion      Diet Recall/Time:   Breakfast: boiled eggs (1-2), grapefruit  Lunch: 1/2 sandwich (lunch meat) or piece of toast with butter or cream cheese   Dinner: boiled or baked chicken or steak (trying to stay away from fried foods), macaroni or spaghetti, brussel sprouts    Typical Snacks: grapes or strawberries or cherries    Proteins/Veg/Fruits/CHO (NOT well tolerated): fish, pizza,     Estimated protein intake: 50-60 grams    Meal Duration:20-30 minutes     Fluid-meal separation:  Fluids are  30min before and 30 minutes after meals.    Fluid Intake  Water: at least 64 oz/day     Exercise: walking, [employer] daily-usually cardio machines, strengthening machines (mainly legs) for at least a couple of hours       PES statement:      (NI-5.7.1) Inadequate protein intake related to Gastric bypass causing increased nutrient needs due to malabsorption/ Decreased ability to consume sufficient protein as evidenced by Estimated intake of protein insufficient to meet requirements    Intervention    Discussion  1. Recommended to consume 15-20gm protein at 3 meals daily, along with protein supplement/\"planned protein containing snack\" of 15-30gm protein, to reach goal of 60-80 gm protein daily.  2. Educated on post-op vitamin regimen: Multi Vit + iron 2x/day, calcium citrate 400-600 mg 2x/day, 5569-2792 mcg of Sublingual B-12 daily, and 5000 IU Vitamin D3 daily (MVI and calcium can be taken at the same time BID)  3. Reviewed lean protein sources  4. Bariatric Plate Method-  including lean/low fat protein at each meal, including a vegetable/fruit, and limiting carbohydrate intake to less than 25% of plate volume.     Instructions  1. Include 15-20gm protein at each meal, along with protein " "supplement/\"planned protein containing snack\" of 15-30gm protein, to reach goal of 60-80 gm protein daily.  2. Increase fluid intake to 64oz daily: choose plain or calorie/carbonation-free beverages.  3. Incorporate daily structured activity, 30-60 minutes most days of the week  4. Recommended pt to start taking: Multi Vit + iron 2x/day, calcium citrate 400-600 mg 2x/day, 7297-2852 mcg of Sublingual B-12 daily, and 5000 IU Vitamin D3 daily. (MVI and calcium can be taken at the same time)  5. Read food labels more consistently: keeping total fat grams <10, total sugar grams <10, fiber >3gm per serving.  6. Increase vegetable/fruit intake, by having a vegetable or fruit with each meal daily.  7. Practice plate method: 1/2 plate lean/low fat protein source, vegetable/fruit, <25% of plate complex carbohydrates.  8. Separate fluids 30 minutes before/after meal times.  9. Practice eating off of smaller plates/bowls, chewing to applesauce consistency, taking 20-30 minutes to eat in a calm/relaxed environment without distractions of tv/email/cell phone.    Handouts provided:  18 Months Post-Op Nutritional Guidelines for RNY GB  Lean Protein Sources     Assessment/Plan:    Pt to follow up for 16 years  post-op visit with bariatrician       Phone call duration: 22 minutes      Matilde Norman RD      "

## 2021-09-20 ENCOUNTER — OFFICE VISIT (OUTPATIENT)
Dept: FAMILY MEDICINE | Facility: CLINIC | Age: 56
End: 2021-09-20
Payer: MEDICARE

## 2021-09-20 VITALS
OXYGEN SATURATION: 96 % | HEART RATE: 107 BPM | BODY MASS INDEX: 39.91 KG/M2 | DIASTOLIC BLOOD PRESSURE: 83 MMHG | WEIGHT: 197.6 LBS | RESPIRATION RATE: 16 BRPM | SYSTOLIC BLOOD PRESSURE: 147 MMHG | TEMPERATURE: 98.7 F

## 2021-09-20 DIAGNOSIS — D63.8 ANEMIA IN OTHER CHRONIC DISEASES CLASSIFIED ELSEWHERE: ICD-10-CM

## 2021-09-20 DIAGNOSIS — G89.4 CHRONIC PAIN SYNDROME: ICD-10-CM

## 2021-09-20 DIAGNOSIS — G89.4 CHRONIC PAIN SYNDROME: Primary | ICD-10-CM

## 2021-09-20 DIAGNOSIS — N18.31 STAGE 3A CHRONIC KIDNEY DISEASE (H): Primary | ICD-10-CM

## 2021-09-20 LAB
AMPHETAMINES UR QL: DETECTED
BARBITURATES UR QL SCN: NOT DETECTED
BENZODIAZ UR QL SCN: NOT DETECTED
BUPRENORPHINE UR QL: NOT DETECTED
CANNABINOIDS UR QL: NOT DETECTED
COCAINE UR QL SCN: NOT DETECTED
D-METHAMPHET UR QL: NOT DETECTED
METHADONE UR QL SCN: NOT DETECTED
OPIATES UR QL SCN: NOT DETECTED
OXYCODONE UR QL SCN: DETECTED
PCP UR QL SCN: NOT DETECTED
PROPOXYPH UR QL: NOT DETECTED
TRICYCLICS UR QL SCN: NOT DETECTED

## 2021-09-20 PROCEDURE — 99214 OFFICE O/P EST MOD 30 MIN: CPT | Mod: 25 | Performed by: FAMILY MEDICINE

## 2021-09-20 PROCEDURE — 80306 DRUG TEST PRSMV INSTRMNT: CPT | Performed by: FAMILY MEDICINE

## 2021-09-20 PROCEDURE — 96372 THER/PROPH/DIAG INJ SC/IM: CPT | Performed by: FAMILY MEDICINE

## 2021-09-20 RX ORDER — OXYCODONE AND ACETAMINOPHEN 10; 325 MG/1; MG/1
TABLET ORAL
Qty: 120 TABLET | Refills: 0 | Status: SHIPPED | OUTPATIENT
Start: 2021-09-20 | End: 2021-10-21

## 2021-09-20 RX ADMIN — CYANOCOBALAMIN 1000 MCG: 1000 INJECTION, SOLUTION INTRAMUSCULAR; SUBCUTANEOUS at 17:07

## 2021-09-20 NOTE — NURSING NOTE
Clinic Administered Medication Documentation          Injectable Medication Documentation    Patient was given Cyanocobalamin (B-12). Prior to medication administration, verified patients identity using patient s name and date of birth. Please see MAR and medication order for additional information. Patient instructed to remain in clinic for 15 minutes.      Was entire vial of medication used? Yes  Vial/Syringe: Single dose vial  Expiration Date:  01/30/2023  Was this medication supplied by the patient? No    Name of provider who requested the medication administration: Dr. barron  Name of provider on site (faculty or community preceptor) at the time of performing the medication administration: Dr. Barron    Date of next administration: N/A  Date of next office visit with provider to renew medication plan (must be seen annually): N/A

## 2021-09-20 NOTE — PROGRESS NOTES
Patient Active Problem List    Diagnosis Date Noted     Stage 3a chronic kidney disease 12/31/2020     Priority: High     Persistent protienuria       Copper deficiency 04/05/2019     Priority: High     Identified in Aug, 2018--see discharge summary from [facility] in [place].       Chronic pain disorder 10/07/2016     Priority: High     Chronic Pain Diagnosis:  neuropathy  DIRE Total Score(s):14     ORT: 5    4 - 7 =  Moderate Risk   of future problems with Opioids  FAQ5: 50 November 20, 2020  Behavioral Health Consultation: 5/12/17 with Dr. Daisy Quezada  Personal Care Plan for Chronic Pain: 5/12/17  Opioid medication:oxycodone   Dose:10mg  Number of pills per month:60  Patient is being prescribed 10mg of oxycodone IR (Percocet) per day this is 15 mg Morphine Equivalents  Benzodiazepines Prescribed? No  Naloxone prescribed? No       Clinic visit frequency required: Q 1 month Next visit due:   Controlled Substance/Opioid Treatment agreement on file (dated <12 months ago)?:    Date(s): pending  Last [place] verification: last visit  Items in red to be updated at each visit  Patient is followed by Data Unavailable for ongoing prescription of pain medication.  All refills should be approved by this provider, or covering partner.Chronic Pain     Chronic Pain Diagnosis:  neuropathy  DIRE Total Score(s):14     ORT: 5    4 - 7 =  Moderate Risk   of future problems with Opioids  FAQ5: 45/100 on 9/9/16, 40/100 on 11/15/16  55 October 29, 2020    Behavioral Health Consultation: 5/12/17 with Dr. Daisy Quezada  Personal Care Plan for Chronic Pain: 5/12/17  Opioid medication:oxycodone   Dose:10mg  Number of pills per month:90  Patient is being prescribed 45 MME  Benzodiazepines Prescribed? No  Naloxone prescribed? No       Clinic visit frequency required: Q 1 month Next visit due:   Controlled Substance/Opioid Treatment agreement on file (dated <12 months ago)?:    Date(s): pending  Last [place] verification: last visit  Items  in red to be updated at each visit  Patient is followed by Data Unavailable for ongoing prescription of pain medication.  All refills should be approved by this provider, or covering partner.Chronic Pain       Microalbuminuria 10/12/2015     Priority: High     Severely increased albuminuria       Type 2 diabetes mellitus with diabetic polyneuropathy (H) 10/12/2015     Priority: High     Essential hypertension 08/26/2013     Priority: High     Arthritis of both acromioclavicular joints 04/08/2021     Priority: Medium     xray proven        Morbid obesity (H) 11/27/2020     Priority: Medium     Hyperglycemia due to type 2 diabetes mellitus (H) 06/02/2020     Priority: Medium     Neck mass 10/11/2019     Priority: Medium     Added automatically from request for surgery [id number]       Metabolic acidosis 07/26/2019     Priority: Medium     reolved w/ discontinuation of ASA       Obesity 01/03/2019     Priority: Medium     Other acute pulmonary embolism without acute cor pulmonale (H) 12/31/2018     Priority: Medium     Hernia of anterior abdominal wall 01/28/2017     Priority: Medium     Osteoarthritis of both knees 10/31/2016     Priority: Medium     Polyneuropathy due to secondary diabetes mellitus (H) 10/31/2016     Priority: Medium     Compression neuropathy of left lower extremity 02/19/2016     Priority: Medium     Bilateral ankle joint pain 01/15/2016     Priority: Medium     Esophageal reflux 04/07/2015     Priority: Medium     Urinary incontinence 02/19/2015     Priority: Medium     Asthma 08/04/2013     Priority: Medium     History of cholecystectomy 08/04/2013     Priority: Medium     H/O necrotizing fasciitis 08/04/2013     Priority: Medium     Hyperlipidemia 06/21/2013     Priority: Medium     Status post bariatric surgery 06/21/2013     Priority: Medium     Kim-en-Y in 2006       Moderate major depression (H) 01/24/2013     Priority: Medium     Moderate persistent asthma without complication 11/15/2012      Priority: Medium     Persons encountering health services in other specified circumstances 11/15/2012     Priority: Medium     Tier 3    Status: Accept  Care Coordination Start Date: 02/27/12  State Tier Level: Level 3  Language/Barrier to Learning: No  Significant Mental Health Issues: Yes  Date Care Coordination Discontinued: 05/28/15    DX V65.8 REPLACED WITH [id number] (04/08/2013)       Abnormal Pap smear of cervix 04/22/2019     Priority: Low     3-12-15: Pap/HPV neg.  Plan: Given hx (mildly abn pap, hyster), recheck in 5 yrs.  10-11-11: Pap/HPV neg.  Plan: Given ASUS pap in 2010, recommend repeating pap in 1 year.  8/3/2010 ASCUS Pap with +BV- pt treated with Metrogel.  Pt needs repeat Pap in 1yr.  8/4/2008 Pt had total hyst for menorrhagia, pelvic pain, scar tissue, and fibroids.    3-12-15: Pap/HPV neg.  Plan: Given hx (mildly abn pap, hyster), recheck in 5 yrs.  10-11-11: Pap/HPV neg.  Plan: Given ASUS pap in 2010, recommend repeating pap in 1 year.  8/3/2010 ASCUS Pap with +BV- pt treated with Metrogel.  Pt needs repeat Pap in 1yr.  8/4/2008 Pt had total hyst for menorrhagia, pelvic pain, scar tissue, and fibroids.       Hernia 01/27/2017     Priority: Low     S/P hysterectomy 06/21/2013     Priority: Low     2008, For fibroids.  Cervix removed, but still has ovaries.       History of hysterectomy for benign disease 06/21/2013     Priority: Low     2008, For fibroids.  Cervix removed, but still has ovaries.       Carpal tunnel syndrome 11/15/2012     Priority: Low     Pulmonary embolism with infarction (H) 11/15/2012     Priority: Low     12/24/2018  Unprovoked.  + fam hx of DVT.  Treated w/ DOAC 12/2018through 9/2019 12/24/2018  Unprovoked.  + fam hx of DVT.  Plan warfarin for 6 months.       Health Care Home 11/15/2012     Priority: Low     Tier 3    Status: Accept  Care Coordination Start Date: 02/27/12  State Tier Level: Level 3  Language/Barrier to Learning: No  Significant Mental  Health Issues: Yes  Date Care Coordination Discontinued: 05/28/15    DX V65.8 REPLACED WITH [id number] (04/08/2013)         Chronic pain of both shoulders 12/23/2005     Priority: Low   SUBJECTIVE:  Ruthy Álvarez is here for followup of her chronic pain syndrome.  The etiology of her pain is osteoarthritis of her knees because of her chronic kidney disease and the refractoriness of her pain to conservative measures including acetaminophen.  She has been taking oxycodone.  She tolerates this well and is able to function and get improvement in her ability to walk as a result of it.  She takes oxycodone 10 mg tablets, 1 in the morning, 1 in the afternoon and 2 at night.  Her sleep is controlled with this regimen.  She has had no aberrant behavior.  Her prescription database is reviewed.  Her last refill was 08/26/2021.  She has been on consistent doses for the past 5 months without any escalations.  She is excited about the possibility of having redundant skin excised through [facility].  She is awaiting insurance approval for this.  She is hoping she can get some improvement in her pain as a result of the surgery.  She has a followup with Nephrology in the near future.  She is adherent to the sodium bicarbonate that I prescribed last time and her CO2 went from 16 to 20.  She has followup with Nephrology this month.    OBJECTIVE:    GENERAL:  The patient is alert, pleasant, in no acute distress.  VITAL SIGNS:  Blood pressure is 147/83.  Pulse is mildly tachycardic.  Urine drug screen is remarkable for opiates and amphetamines.  The amphetamines are appropriate because she is taking phentermine.    ASSESSMENT:    1.  Chronic pain syndrome, stable on current meds, no changes made.  Refilled oxycodone a quantity of 120/10 mg tablets per month.  She will follow up on a monthly basis.  2.  Hypertension, suboptimal control today.  I encouraged her to continue with her current treatment, which involves max  dose amlodipine and benazepril.  I would like Nephrology's opinion about the addition of an SGLT2 inhibitor given her CKD.  We will defer that recommendation to them, but I would support it if they would recommend it.  Follow up with me on a monthly basis.      There are no exam notes on file for this visit.  Chief Complaint   Patient presents with     Pain Management     CPM       Blood pressure (!) 147/83, pulse 107, temperature 98.7  F (37.1  C), temperature source Oral, resp. rate 16, weight 89.6 kg (197 lb 9.6 oz), SpO2 96 %, not currently breastfeeding.    Hemoglobin A1C   Date Value Ref Range Status   07/29/2021 7.4 (H) 0.0 - 5.6 % Final     Comment:     Normal <5.7%   Prediabetes 5.7-6.4%    Diabetes 6.5% or higher     Note: Adopted from ADA consensus guidelines.   04/17/2021 6.1 (H) <=5.6 % Final     Comment:     Prediabetes:   HBA1c       5.7 to 6.4%        Diabetes:        HBA1c        >=6.5%   Patients with Hgb F >5%, total bilirubin >10.0 mg/dL, abnormal red cell turnover, severe renal or hepatic disease or malignancy should not have this A1C method used to diagnose or monitor diabetes.         11/20/2020 6.9 (H) 4.1 - 5.7 % Final   06/26/2020 8.8 (H) 4.1 - 5.7 % Final   06/02/2020 14.5 (H) 4.1 - 5.7 % Final     Results for orders placed or performed in visit on 09/20/21   Urine Drugs of Abuse Screen Panel 13     Status: Abnormal   Result Value Ref Range    Cannabinoids (89-icc-8-carboxy-9-THC) Not Detected Not Detected, Indeterminate    Phencyclidine Not Detected Not Detected, Indeterminate    Cocaine (Benzoylecgonine) Not Detected Not Detected, Indeterminate    Methamphetamine (d-Methamphetamine) Not Detected Not Detected, Indeterminate    Opiates (Morphine) Not Detected Not Detected, Indeterminate    Amphetamine (d-Amphetamine) Detected (A) Not Detected, Indeterminate    Benzodiazepines (Nordiazepam) Not Detected Not Detected, Indeterminate    Tricyclic Antidepressants (Desipramine) Not Detected Not  Detected, Indeterminate    Methadone Not Detected Not Detected, Indeterminate    Barbiturates (Butalbital) Not Detected Not Detected, Indeterminate    Oxycodone Detected (A) Not Detected, Indeterminate    Propoxyphene (Norpropoxyphene) Not Detected Not Detected, Indeterminate    Buprenorphine Not Detected Not Detected, Indeterminate

## 2021-09-21 NOTE — TELEPHONE ENCOUNTER
RECORDS RECEIVED FROM: Internal   DATE RECEIVED: 09.22.2021    NOTES STATUS DETAILS   OFFICE NOTE from referring provider Internal 07.29.2021 Jose Angel Meyer MD   OFFICE NOTE from other specialist  N/A    *Only VASCULITIS or LUPUS gather office notes for the following N/A    *PULMONARY   N/A    *ENT N/A    *DERMATOLOGY N/A    *RHEUMATOLOGY N/A    DISCHARGE SUMMARY from hospital N/A    DISCHARGE REPORT from the ER N/A    MEDICATION LIST Internal    IMAGING  (NEED IMAGES AND REPORTS)     KIDNEY CT SCAN N/A    KIDNEY ULTRASOUND N/A    MR ABDOMEN N/A    NUCLEAR MEDICINE RENAL N/A    LABS     CBC Internal 04.18.2021   CMP Internal 04.18.2021   BMP Internal 07.29.2021   UA Internal 02.11.2021   URINE PROTEIN Internal 02.11.2021   RENAL PANEL Internal 08.26.2021   BIOPSY     KIDNEY BIOPSY  N/A

## 2021-09-22 ENCOUNTER — PRE VISIT (OUTPATIENT)
Dept: NEPHROLOGY | Facility: CLINIC | Age: 56
End: 2021-09-22

## 2021-09-22 ENCOUNTER — LAB (OUTPATIENT)
Dept: LAB | Facility: CLINIC | Age: 56
End: 2021-09-22
Payer: MEDICARE

## 2021-09-22 ENCOUNTER — TELEPHONE (OUTPATIENT)
Dept: FAMILY MEDICINE | Facility: CLINIC | Age: 56
End: 2021-09-22

## 2021-09-22 ENCOUNTER — OFFICE VISIT (OUTPATIENT)
Dept: NEPHROLOGY | Facility: CLINIC | Age: 56
End: 2021-09-22
Attending: INTERNAL MEDICINE
Payer: MEDICARE

## 2021-09-22 VITALS
BODY MASS INDEX: 39.53 KG/M2 | WEIGHT: 195.7 LBS | OXYGEN SATURATION: 96 % | DIASTOLIC BLOOD PRESSURE: 75 MMHG | HEART RATE: 125 BPM | SYSTOLIC BLOOD PRESSURE: 119 MMHG

## 2021-09-22 DIAGNOSIS — Z11.59 ENCOUNTER FOR SCREENING FOR OTHER VIRAL DISEASES: ICD-10-CM

## 2021-09-22 DIAGNOSIS — N18.2 STAGE 2 CHRONIC KIDNEY DISEASE: ICD-10-CM

## 2021-09-22 DIAGNOSIS — N18.31 STAGE 3A CHRONIC KIDNEY DISEASE (H): ICD-10-CM

## 2021-09-22 DIAGNOSIS — R80.9 PROTEINURIA, UNSPECIFIED TYPE: ICD-10-CM

## 2021-09-22 DIAGNOSIS — D63.8 ANEMIA IN OTHER CHRONIC DISEASES CLASSIFIED ELSEWHERE: ICD-10-CM

## 2021-09-22 DIAGNOSIS — R80.9 MICROALBUMINURIA: ICD-10-CM

## 2021-09-22 DIAGNOSIS — I10 ESSENTIAL HYPERTENSION: ICD-10-CM

## 2021-09-22 DIAGNOSIS — N18.2 STAGE 2 CHRONIC KIDNEY DISEASE: Primary | ICD-10-CM

## 2021-09-22 LAB
ALBUMIN SERPL-MCNC: 3 G/DL (ref 3.4–5)
ALBUMIN UR-MCNC: >499 MG/DL
ANION GAP SERPL CALCULATED.3IONS-SCNC: 6 MMOL/L (ref 3–14)
APPEARANCE UR: ABNORMAL
BILIRUB UR QL STRIP: NEGATIVE
BUN SERPL-MCNC: 24 MG/DL (ref 7–30)
CALCIUM SERPL-MCNC: 8.5 MG/DL (ref 8.5–10.1)
CHLORIDE BLD-SCNC: 115 MMOL/L (ref 94–109)
CO2 SERPL-SCNC: 23 MMOL/L (ref 20–32)
COLOR UR AUTO: YELLOW
CREAT SERPL-MCNC: 0.92 MG/DL (ref 0.52–1.04)
CREAT UR-MCNC: 103 MG/DL
DEPRECATED CALCIDIOL+CALCIFEROL SERPL-MC: 15 UG/L (ref 20–75)
ERYTHROCYTE [DISTWIDTH] IN BLOOD BY AUTOMATED COUNT: 12.3 % (ref 10–15)
FERRITIN SERPL-MCNC: 66 NG/ML (ref 8–252)
GFR SERPL CREATININE-BSD FRML MDRD: 70 ML/MIN/1.73M2
GLUCOSE BLD-MCNC: 114 MG/DL (ref 70–99)
GLUCOSE UR STRIP-MCNC: NEGATIVE MG/DL
HCT VFR BLD AUTO: 34 % (ref 35–47)
HGB BLD-MCNC: 11 G/DL (ref 11.7–15.7)
HGB UR QL STRIP: NEGATIVE
IRON SATN MFR SERPL: 19 % (ref 15–46)
IRON SERPL-MCNC: 64 UG/DL (ref 35–180)
KETONES UR STRIP-MCNC: NEGATIVE MG/DL
LEUKOCYTE ESTERASE UR QL STRIP: NEGATIVE
MCH RBC QN AUTO: 33 PG (ref 26.5–33)
MCHC RBC AUTO-ENTMCNC: 32.4 G/DL (ref 31.5–36.5)
MCV RBC AUTO: 102 FL (ref 78–100)
MUCOUS THREADS #/AREA URNS LPF: PRESENT /LPF
NITRATE UR QL: NEGATIVE
PH UR STRIP: 5 [PH] (ref 5–7)
PHOSPHATE SERPL-MCNC: 4.1 MG/DL (ref 2.5–4.5)
PLATELET # BLD AUTO: 332 10E3/UL (ref 150–450)
POTASSIUM BLD-SCNC: 4.2 MMOL/L (ref 3.4–5.3)
PROT UR-MCNC: 2.49 G/L
PROT/CREAT 24H UR: 2.42 G/G CR (ref 0–0.2)
PTH-INTACT SERPL-MCNC: 377 PG/ML (ref 18–80)
RBC # BLD AUTO: 3.33 10E6/UL (ref 3.8–5.2)
RBC URINE: 2 /HPF
SODIUM SERPL-SCNC: 144 MMOL/L (ref 133–144)
SP GR UR STRIP: 1.02 (ref 1–1.03)
SQUAMOUS EPITHELIAL: 12 /HPF
TIBC SERPL-MCNC: 336 UG/DL (ref 240–430)
TOTAL PROTEIN SERUM FOR ELP: 6.3 G/DL (ref 6.8–8.8)
UROBILINOGEN UR STRIP-MCNC: NORMAL MG/DL
WBC # BLD AUTO: 12.2 10E3/UL (ref 4–11)
WBC URINE: 8 /HPF

## 2021-09-22 PROCEDURE — 84155 ASSAY OF PROTEIN SERUM: CPT | Performed by: INTERNAL MEDICINE

## 2021-09-22 PROCEDURE — 84165 PROTEIN E-PHORESIS SERUM: CPT | Performed by: PATHOLOGY

## 2021-09-22 PROCEDURE — G0463 HOSPITAL OUTPT CLINIC VISIT: HCPCS

## 2021-09-22 PROCEDURE — 82306 VITAMIN D 25 HYDROXY: CPT | Performed by: INTERNAL MEDICINE

## 2021-09-22 PROCEDURE — 86255 FLUORESCENT ANTIBODY SCREEN: CPT | Performed by: INTERNAL MEDICINE

## 2021-09-22 PROCEDURE — 85027 COMPLETE CBC AUTOMATED: CPT | Performed by: PATHOLOGY

## 2021-09-22 PROCEDURE — 86706 HEP B SURFACE ANTIBODY: CPT | Performed by: INTERNAL MEDICINE

## 2021-09-22 PROCEDURE — 99204 OFFICE O/P NEW MOD 45 MIN: CPT | Mod: GC | Performed by: INTERNAL MEDICINE

## 2021-09-22 PROCEDURE — 86334 IMMUNOFIX E-PHORESIS SERUM: CPT | Performed by: PATHOLOGY

## 2021-09-22 PROCEDURE — 81001 URINALYSIS AUTO W/SCOPE: CPT | Performed by: PATHOLOGY

## 2021-09-22 PROCEDURE — 83883 ASSAY NEPHELOMETRY NOT SPEC: CPT | Performed by: INTERNAL MEDICINE

## 2021-09-22 PROCEDURE — 36415 COLL VENOUS BLD VENIPUNCTURE: CPT | Performed by: PATHOLOGY

## 2021-09-22 PROCEDURE — 86803 HEPATITIS C AB TEST: CPT | Performed by: INTERNAL MEDICINE

## 2021-09-22 PROCEDURE — 84999 UNLISTED CHEMISTRY PROCEDURE: CPT | Performed by: INTERNAL MEDICINE

## 2021-09-22 PROCEDURE — 83550 IRON BINDING TEST: CPT | Performed by: PATHOLOGY

## 2021-09-22 PROCEDURE — 84156 ASSAY OF PROTEIN URINE: CPT | Performed by: PATHOLOGY

## 2021-09-22 PROCEDURE — 83970 ASSAY OF PARATHORMONE: CPT | Performed by: INTERNAL MEDICINE

## 2021-09-22 PROCEDURE — 87340 HEPATITIS B SURFACE AG IA: CPT | Performed by: INTERNAL MEDICINE

## 2021-09-22 PROCEDURE — 80069 RENAL FUNCTION PANEL: CPT | Performed by: PATHOLOGY

## 2021-09-22 PROCEDURE — 82728 ASSAY OF FERRITIN: CPT | Performed by: PATHOLOGY

## 2021-09-22 PROCEDURE — 83520 IMMUNOASSAY QUANT NOS NONAB: CPT | Performed by: INTERNAL MEDICINE

## 2021-09-22 PROCEDURE — 84165 PROTEIN E-PHORESIS SERUM: CPT | Mod: TC | Performed by: INTERNAL MEDICINE

## 2021-09-22 PROCEDURE — 86334 IMMUNOFIX E-PHORESIS SERUM: CPT | Mod: TC | Performed by: INTERNAL MEDICINE

## 2021-09-22 ASSESSMENT — PAIN SCALES - GENERAL: PAINLEVEL: NO PAIN (0)

## 2021-09-22 NOTE — LETTER
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 Suspect that her proteinuria is due to diabetic kidney disease, but since she does not have retinopathy we will obtain more serologic studies. Differential includes secondary FSGS, membranous nephropathy, MM/light chain disease. Discussed optimizing HTN and diabetes management. We would agree with starting an SGLT2 inhibitor. She is on lisinopril 30mg daily - would recommend increasing if BP elevated at next visit.  - Check Hep B, Hep C, anti-PLA2R, and SPEP, serum immunofixation, and free light chains  - Follow up at next available - likely 3-4 months    2. Hypertension:   BP at goal in clinic, but elevated at home. Currently on diltiazem 120mg daily, lisinopril 30mg daily. Though her BP is above goal at home, will hold off on adjusting medications today due to tachycardia and lower BP. Would recommend increasing lisinopril to 40mg daily if BP remains above goal of 130/80. Agree with adding SGLT2 inhibitor.    3. Type II DM  Most recent A1c 7.4%. She has peripheral neuropathy, but not retinopathy. Last eye exam 8/31/21.    4. Anemia in CKD  Hgb 11.0. Iron deficient. Has been on ferrous sulfate in the past. She also receives B12 injections monthly. I encouraged her to resume daily iron supplementation.    5. BMD-CKD  Ca 8.5, Phos 4.1, Vit D & PTH pending. PTH previously elevated at 379 1/4/2021. On Vit D supplementation.    6. Metabolic Acidosis  Serum bicarb has been persistently low since ~2017. She was started on sodium bicarb 650mg TID by PCP in July 2021.     7. Obesity s/p Kim-en-y gastric bypass (2006)  BMI 39.5. Working on ongoing weight loss    8. Chronic pain  On oxycodone-acetaminophen  "10-325mg, takes total of 4 tabs daily. Prescribed by PCP.    9. Hyperlipidemia  On atorvastatin 10mg daily. Managed by PCP.    10. Moderate persistent asthma  Uses albuterol PRN.    11. GERD  On famotidine 20mg daily.    12. Tachycardia, regular  Pt noted to be tachycardic to 125 on presentation. She feels well with no infectious symptoms. Her BP is lower than typical, but orthostatics were negative (Supine 117/74 ; Sitting 123/82 ; Stand 130/88 ). Encouraged her to monitor for infectious symptoms at home and to maintain good hydration.      Assessment and plan was discussed with patient and she voiced her understanding and agreement.    I discussed the patient's plan of care with Dr. Brunson.    Juliana Castellanos MD  Nephrology Fellow    Consult:  Ruthy Álvarez was seen in consultation at the request of Dr. Meyer for CKD management and proteinuria.    Reason for Visit:  Ruthy Álvarez is a 56 year old female with Type II DM, HTN, HLD, chronic pain (on opiods), morbid obesity, hx of daksha-en-y gastric bypass, moderate persistent asthma, who presents for CKD management.    HPI:  Ms. Álvarez feels well today. She has not seen a nephrologist in the clinic prior, but was seen by Nephrology during a prolonged admission in 2011 for necrotizing fasciitis. 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 She was referred today due to proteinuria. She has a hx of DM II for ~34 years with varying degrees of control. She reports her DM was initially better controlled after gastric bypass surgery in 2006, but then she gained the weight back and had more difficulty controlling her blood sugars. Her most recent Hgb A1c is 7.4% and was 6.1% prior to that. She also has longstanding history of HTN. BP is routinely in 140s-150s/70-80s at home, though is lower today. She frequently uses \"5 hour energy\" drinks to stay awake during the day. She has hx of chronic pain " and takes Norco daily. She follows with her PCP for chronic pain management. Today, she denies any fevers or chills. No SOB, no cough, no chest pain. No nausea/vomiting/diarrhea. She has been eating & drinking well and drinks plenty of water throughout the day. No rashes or skin changes.     Renal History:   Kidney Disease and Medical Hx:  h/o HTN: Yes  , Usual BP: Not checked, h/o DM: Yes , h/o Protein in Urine: Yes , h/o Blood in Urine: No, h/o Kidney Stones:No, h/o UTI  No and h/o Chronic NSAID Use: No    ROS:   A comprehensive review of systems was obtained and negative, except as noted in the HPI or PMH.    Active Medical Problems:  Patient Active Problem List   Diagnosis     Chronic pain of both shoulders     Abnormal Pap smear of cervix     Moderate persistent asthma without complication     Carpal tunnel syndrome     Pulmonary embolism with infarction (H)     Health Care Home     Moderate major depression (H)     S/P hysterectomy     Hyperlipidemia     Status post bariatric surgery     Essential hypertension     Urinary incontinence     Esophageal reflux     Microalbuminuria     Type 2 diabetes mellitus with diabetic polyneuropathy (H)     Bilateral ankle joint pain     Chronic pain disorder     Other acute pulmonary embolism without acute cor pulmonale (H)     Asthma     Compression neuropathy of left lower extremity     Hernia     Hernia of anterior abdominal wall     Obesity     Osteoarthritis of both knees     Polyneuropathy due to secondary diabetes mellitus (H)     Copper deficiency     Metabolic acidosis     Neck mass     Persons encountering health services in other specified circumstances     Hyperglycemia due to type 2 diabetes mellitus (H)     History of cholecystectomy     History of hysterectomy for benign disease     Morbid obesity (H)     Stage 3a chronic kidney disease     Arthritis of both acromioclavicular joints     H/O necrotizing fasciitis     PMH:   Medical record was reviewed and PMH  was discussed with patient and noted below.  Past Medical History:   Diagnosis Date     Abnormal cervical Papanicolaou smear with positive human papillomavirus deoxyribonucleic acid test      Ankle joint pain     bilateral     Anxiety      Arthritis      Arthritis     rheumatoid arthritis     Asthma      Carpal tunnel syndrome     bilateral     Compression neuropathy      Depression      Depressive disorder      Diabetes (H)      Diabetes (H)     type II     Diabetic nephropathy (H)      DVT of lower extremity, bilateral (H) 2011    right     Esophageal reflux      Gastroenteritis      History of blood clots      History of blood transfusion      Hyperlipidemia      Hypertension      Hypertension      Infectious fasciitis     abdominal wall      Insomnia      Neck pain      Necrotizing fasciitis (H) 11/15/2012     Necrotizing fasciitis (H)      Neuropathy      Obesity      Osteoarthritis of both knees 10/31/2016     Pulmonary embolism and infarction (H)      SOB (shortness of breath)     with exertion     Tarsal tunnel syndrome      Uncomplicated asthma      Urinary incontinence      PSH:   Past Surgical History:   Procedure Laterality Date     ABDOMINAL WALL SURGERY      for necrotizing fasciitis      SECTION       COLONOSCOPY N/A 3/15/2017    Procedure: INCOMPLETE COLONOSCOPY;  Surgeon: Topher Street MD;  Location: [facility];  Service:      COLONOSCOPY N/A 3/16/2017    Procedure: COLONOSCOPY;  Surgeon: Topher Street MD;  Location: [facility];  Service:      GASTRIC BYPASS  2006    Kim'n'Y.  wt then was in low 300lb range     GASTRIC BYPASS  2006    Kim-en-Y     HC REVISE MEDIAN N/CARPAL TUNNEL SURG Right 10/24/2014    Procedure: RIGHT REVISION CARPAL TUNNEL RELEASE WITH HYPOTHENAR FAT PAD;  Surgeon: Aaron Oreilly MD;  Location: [facility];  Service: Orthopedics     HYSTERECTOMY       HYSTERECTOMY       KNEE SURGERY Right      MIDLINE INSERTION - DOUBLE LUMEN  2021           UTERINE FIBROID SURGERY  2008    hysterectomy     UTERINE FIBROID SURGERY         Family Hx:   Family History   Problem Relation Age of Onset     Diabetes Mother      Coronary Artery Disease Mother          of MI at age of 51 years     Diabetes Brother      Diabetes Sister      No Known Problems Father      No Known Problems Maternal Grandmother      No Known Problems Maternal Grandfather      No Known Problems Paternal Grandmother      No Known Problems Paternal Grandfather      No Known Problems Son      No Known Problems Daughter      No Known Problems Maternal Half-Brother      No Known Problems Maternal Half-Sister      No Known Problems Paternal Half-Brother      No Known Problems Paternal Half-Sister      No Known Problems Niece      No Known Problems Nephew      No Known Problems Cousin      No Known Problems Other      Cancer Brother      Heart Disease No family hx of      Hypertension No family hx of      Hyperlipidemia No family hx of      Kidney Disease No family hx of      Cerebrovascular Disease No family hx of      Obesity No family hx of      Thrombosis No family hx of      Asthma No family hx of      Arthritis No family hx of      Thyroid Disease No family hx of      Depression No family hx of      Mental Illness No family hx of      Substance Abuse No family hx of      Cystic Fibrosis No family hx of      Early Death No family hx of      Coronary Artery Disease Early Onset No family hx of      Heart Failure No family hx of      Bleeding Diathesis No family hx of      Dementia No family hx of      Breast Cancer No family hx of      Ovarian Cancer No family hx of      Uterine Cancer No family hx of      Prostate Cancer No family hx of      Colorectal Cancer No family hx of      Pancreatic Cancer No family hx of      Lung Cancer No family hx of      Melanoma No family hx of      Autoimmune Disease No family hx of      Unknown/Adopted No family hx of      Genetic Disorder No family hx of       Personal Hx:   Social History     Tobacco Use     Smoking status: Never Smoker     Smokeless tobacco: Never Used   Substance Use Topics     Alcohol use: No       Allergies:  Allergies   Allergen Reactions     Sulfamethoxazole-Trimethoprim Other (See Comments) and Nephrotoxicity     Other reaction(s): Renal Failure  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, 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, 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, 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, 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  Other reaction(s): Renal Failure  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, 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, 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       Medications:  Current Outpatient Medications   Medication Sig     albuterol (2.5 MG/3ML) 0.083% nebulizer solution Take 1 vial (2.5 mg) by nebulization every 6 hours as needed for shortness of breath / dyspnea     albuterol (VENTOLIN HFA) 108 (90 Base) MCG/ACT inhaler Inhale 2 puffs into the lungs 4 times daily Inhale 2 puffs every 4-6 hours as needed     Alcohol Swabs (B-D SINGLE USE SWABS REGULAR) PADS Apply 1 pad topically 3 times daily Use as directed     amitriptyline (ELAVIL) 10 MG tablet TAKE 1 TABLET(10 MG) BY MOUTH AT BEDTIME     amLODIPine-benazepril (LOTREL) 10-40 MG capsule Take 1 capsule by mouth     atorvastatin (LIPITOR) 10 MG tablet TAKE 1 TABLET BY MOUTH EVERY DAY     blood glucose (NO BRAND SPECIFIED) lancets standard Check fasting glucose 4 times daily.  For Accu check monitor     cyanocobalamin (CYANOCOBALAMIN) 1000 MCG/ML injection Inject 1 mL (1,000 mcg) into the muscle every 30 days     diclofenac (VOLTAREN) 1 % topical gel Apply 4 g topically 3 times daily To top of both shoulders in area of pain     diltiazem ER (DILT-XR) 120 MG 24 hr capsule      dulaglutide (TRULICITY) 1.5 MG/0.5ML pen Inject 1.5 mg Subcutaneous every 7 days     DULoxetine (CYMBALTA) 60 MG capsule Take 1 capsule (60 mg) by mouth daily      famotidine (PEPCID) 20 MG tablet Take 1 tablet (20 mg) by mouth 2 times daily     ferrous sulfate (IRON) 325 (65 FE) MG tablet Take 1 tablet (325 mg) by mouth daily (with breakfast)     fluticasone-salmeterol (ADVAIR HFA) 230-21 MCG/ACT inhaler Inhale 2 puffs into the lungs     gabapentin (NEURONTIN) 250 MG/5ML solution Take 15 mLs (750 mg) by mouth 3 times daily     insulin glargine (BASAGLAR KWIKPEN) 100 UNIT/ML pen Inject 35 Units Subcutaneous At Bedtime     insulin pen needle (BD ULTRA-FINE) 29G X 12.7MM miscellaneous Use 4 times daily     Lancets (ONETOUCH DELICA PLUS [id number]) MISC CHECK FASTING GLUCOSE THREE TIMES DAILY     latanoprost (XALATAN) 0.005 % ophthalmic solution INSTILL 1 DROP IN BOTH EYES DAILY AT BEDTIME     lidocaine (LMX4) 4 % external cream Apply topically once as needed for mild pain (on upper back)     lisinopril (ZESTRIL) 30 MG tablet Take 1 tablet (30 mg) by mouth At Bedtime     magnesium oxide (MAG-OX) 400 (241.3 Mg) MG tablet Take 1 tablet (400 mg) by mouth 2 times daily     naloxone (NARCAN) 4 MG/0.1ML nasal spray Spray 1 spray (4 mg) into one nostril alternating nostrils once as needed for opioid reversal Every 2-3 minutes until patient responsive or EMS arrives     order for DME Equipment being ordered: wheeled walker with seat     oxyCODONE-acetaminophen (PERCOCET)  MG per tablet 1 po QAM , 1 po afternoon, and 2 po QHS.     phentermine (ADIPEX-P) 37.5 MG tablet Take 0.5-1 tablets (18.75-37.5 mg) by mouth every morning     risperiDONE (RISPERDAL) 0.25 MG tablet Take 1 tablet (0.25 mg) by mouth At Bedtime     senna-docusate (SENOKOT-S/PERICOLACE) 8.6-50 MG tablet Take 1 tablet by mouth 2 times daily     Simethicone 180 MG CAPS Use 1 softgel as needed for gas     sodium bicarbonate 650 MG tablet Take 1 tablet (650 mg) by mouth 3 times daily     SUMAtriptan (IMITREX) 25 MG tablet One tab at onset of HA.  May repeat dose in 2 hours.  Do not exceed 200 mg in 24 hours     tolterodine  ER (DETROL LA) 2 MG 24 hr capsule Take 1 capsule (2 mg) by mouth daily     vitamin D2 (ERGOCALCIFEROL) 12517 units (1250 mcg) capsule Take 1 capsule by mouth three times a week     vitamin D3 (CHOLECALCIFEROL) 1.25 MG (33855 UT) capsule Take 1 capsule (50,000 Units) by mouth three times a week     Current Facility-Administered Medications   Medication     cyanocobalamin injection 1,000 mcg      Vitals:  /75   Pulse (!) 125   Wt 88.8 kg (195 lb 11.2 oz)   LMP  (LMP Unknown)   SpO2 96%   BMI 39.53 kg/m      Exam:  GENERAL APPEARANCE: NAD but fatigued  RESP: clear bilaterally, no increased WOB  CV: tachycardic, regular  EDEMA: no LE edema bilaterally  ABDOMEN: soft, nondistended, nontender, bowel sounds normal  MS: extremities normal - no gross deformities noted, no evidence of inflammation in joints, no muscle tenderness  SKIN: no rash    LABS:   CMP  Recent Labs   Lab Test 09/22/21  1434 08/26/21  1607 07/29/21  1409 04/18/21  1216 04/18/21  0904 04/18/21  0544 04/17/21  0901 04/17/21  0635 04/16/21  1228 04/16/21  0257 01/04/21  1622 01/04/21  1622    142 138  --   --  141   < > 140  --  140   < > 140   POTASSIUM 4.2 4.8 5.4*  --   --  4.8   < > 5.1*  --  4.7   < > 4.5   CHLORIDE 115* 110* 111*  --   --  116*   < > 113*  --  110*   < > 110*   CO2 23 20* 16*  --   --  18*   < > 18*  --  21*   < > 17*   ANIONGAP 6 12 11  --   --  7   < > 9  --  9   < > 13   * 232* 309* 116   < > 75   < > 190*   < > 147*   < > 243*   BUN 24 22 23*  --   --  29*   < > 29*  --  21   < > 24*   CR 0.92 1.09 1.06  --   --  0.81   < > 1.24*  --  0.87   < > 1.24*   GFRESTIMATED 70 57* 59*  --   --  >60   < > 45*  --  >60   < > 45*   GFRESTBLACK  --   --   --   --   --  >60  --  54*  --  >60  --  54*   MIKAELA 8.5 8.8 8.9  --   --  7.9*   < > 8.3*  --  9.2   < > 8.4*    < > = values in this interval not displayed.     Recent Labs   Lab Test 04/16/21  1044 04/16/21  0257 01/04/21  1622 08/24/20  2232   BILITOTAL 0.6 0.4 0.3  0.2   ALKPHOS 208* 215* 192* 159*   ALT 23 22 21 18   AST 22 27 24 26     CBC  Recent Labs   Lab Test 09/22/21  1434 04/18/21  0544 04/17/21  0635 04/16/21  1044   HGB 11.0* 9.0* 10.5* 12.1   WBC 12.2* 9.3 9.3 7.9   RBC 3.33* 2.79* 3.36* 3.80   HCT 34.0* 30.0* 35.6 39.6   * 108* 106* 104*   MCH 33.0 32.3 31.3 31.8   MCHC 32.4 30.0* 29.5* 30.6*   RDW 12.3 12.3 12.5 12.1    242 284 311     URINE STUDIES  Recent Labs   Lab Test 09/22/21  1434 02/11/21  1449 07/27/20  1719 06/03/20  0419 09/07/19  2252 09/07/19  2252 08/21/19  2238 08/21/19  2238 03/18/19  0922 01/04/19  1540   COLOR Yellow  --   --  Yellow  --  Colorless  --  Yellow  --  Yellow   APPEARANCE Cloudy*  --   --  Clear  --  Clear  --  Clear  --  Clear   URINEGLC Negative Trace* Negative >1000 mg/dL*   < > >1000 mg/dL*   < > 100 mg/dL*   < > Negative   URINEBILI Negative  --   --  Negative  --  Negative  --  Negative  --  Negative   URINEKETONE Negative Negative Negative Negative   < > Negative   < > Negative   < > Negative   SG 1.019 >=1.030 1.020 1.018   < > 1.015   < > 1.013   < > 1.013   UBLD Negative  --   --  Negative  --  Negative  --  Negative  --  Trace*   URINEPH 5.0 5.0 5.0 5.0   < > 5.0   < > 5.0   < > 5.5   PROTEIN >499*  --   --  Negative  --  30 mg/dL*  --  30 mg/dL*  --  100 mg/dL*   UROBILINOGEN  --   --   --  <2.0 E.U./dL  --  <2.0 E.U./dL  --  <2.0 E.U./dL  --  <2.0 E.U./dL   NITRITE Negative Negative Negative Negative   < > Negative   < > Negative   < > Negative   LEUKEST Negative  --   --  Negative  --  Negative  --  Negative  --  Negative   RBCU 2  --   --  0-2  --  0-2  --  0-2  --  0-2   WBCU 8*  --   --  0-5  --  0-5  --  0-5  --  0-5    < > = values in this interval not displayed.     Recent Labs   Lab Test 09/22/21  1434   UTPG 2.42*     PTH  Recent Labs   Lab Test 01/04/21  1622 10/24/19  1031   PTHI 379* 319*     IRON STUDIES  Recent Labs   Lab Test 09/22/21  1434 01/04/21  1622 10/24/19  1031 03/18/19  1001   IRON  64  --   --  78     --   --   --    IRONSAT 19  --   --   --    LAKSHMI 66 90 127 83         Sharron Castellanos MD    Attestation signed by Maribell Brunson MD at 10/22/2021  5:55 PM:  Attestation:  Ruthy Álvarez has been evaluated and examined by me, Maribell Brunson MD.  Discussed with the fellow or resident and agree with the findings and plan in this note.  I have reviewed Medications, Vital Signs, and Labs as well as provider notes.    Date of Service (when I saw the patient): 9/22/2021    Maribell Brunson MD  [facility]  Department of Medicine  Division of Renal Disease and Hypertension  [employer]  daily [employer]      Maribell Brunson MD

## 2021-09-22 NOTE — PROGRESS NOTES
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 Suspect that her proteinuria is due to diabetic kidney disease, but since she does not have retinopathy we will obtain more serologic studies. Differential includes secondary FSGS, membranous nephropathy, MM/light chain disease. Discussed optimizing HTN and diabetes management. We would agree with starting an SGLT2 inhibitor. She is on lisinopril 30mg daily - would recommend increasing if BP elevated at next visit.  - Check Hep B, Hep C, anti-PLA2R, and SPEP, serum immunofixation, and free light chains  - Follow up at next available - likely 3-4 months    2. Hypertension:   BP at goal in clinic, but elevated at home. Currently on diltiazem 120mg daily, lisinopril 30mg daily. Though her BP is above goal at home, will hold off on adjusting medications today due to tachycardia and lower BP. Would recommend increasing lisinopril to 40mg daily if BP remains above goal of 130/80. Agree with adding SGLT2 inhibitor.    3. Type II DM  Most recent A1c 7.4%. She has peripheral neuropathy, but not retinopathy. Last eye exam 8/31/21.    4. Anemia in CKD  Hgb 11.0. Iron deficient. Has been on ferrous sulfate in the past. She also receives B12 injections monthly. I encouraged her to resume daily iron supplementation.    5. BMD-CKD  Ca 8.5, Phos 4.1, Vit D & PTH pending. PTH previously elevated at 379 1/4/2021. On Vit D supplementation.    6. Metabolic Acidosis  Serum bicarb has been persistently low since ~2017. She was started on sodium bicarb 650mg TID by PCP in July 2021.     7. Obesity s/p Kim-en-y gastric bypass (2006)  BMI 39.5. Working on ongoing weight loss    8. Chronic pain  On oxycodone-acetaminophen 10-325mg, takes total of 4 tabs daily. Prescribed by PCP.    9. Hyperlipidemia  On  "atorvastatin 10mg daily. Managed by PCP.    10. Moderate persistent asthma  Uses albuterol PRN.    11. GERD  On famotidine 20mg daily.    12. Tachycardia, regular  Pt noted to be tachycardic to 125 on presentation. She feels well with no infectious symptoms. Her BP is lower than typical, but orthostatics were negative (Supine 117/74 ; Sitting 123/82 ; Stand 130/88 ). Encouraged her to monitor for infectious symptoms at home and to maintain good hydration.      Assessment and plan was discussed with patient and she voiced her understanding and agreement.    I discussed the patient's plan of care with Dr. Brunson.    Juliana Castellanos MD  Nephrology Fellow    Consult:  Ruthy Álvarez was seen in consultation at the request of Dr. Meyer for CKD management and proteinuria.    Reason for Visit:  Ruthy Álvarez is a 56 year old female with Type II DM, HTN, HLD, chronic pain (on opiods), morbid obesity, hx of daksha-en-y gastric bypass, moderate persistent asthma, who presents for CKD management.    HPI:  Ms. Álvarez feels well today. She has not seen a nephrologist in the clinic prior, but was seen by Nephrology during a prolonged admission in 2011 for necrotizing fasciitis. 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 She was referred today due to proteinuria. She has a hx of DM II for ~34 years with varying degrees of control. She reports her DM was initially better controlled after gastric bypass surgery in 2006, but then she gained the weight back and had more difficulty controlling her blood sugars. Her most recent Hgb A1c is 7.4% and was 6.1% prior to that. She also has longstanding history of HTN. BP is routinely in 140s-150s/70-80s at home, though is lower today. She frequently uses \"5 hour energy\" drinks to stay awake during the day. She has hx of chronic pain and takes Norco daily. She follows with her PCP for chronic pain management. Today, " she denies any fevers or chills. No SOB, no cough, no chest pain. No nausea/vomiting/diarrhea. She has been eating & drinking well and drinks plenty of water throughout the day. No rashes or skin changes.     Renal History:   Kidney Disease and Medical Hx:  h/o HTN: Yes  , Usual BP: Not checked, h/o DM: Yes , h/o Protein in Urine: Yes , h/o Blood in Urine: No, h/o Kidney Stones:No, h/o UTI  No and h/o Chronic NSAID Use: No    ROS:   A comprehensive review of systems was obtained and negative, except as noted in the HPI or PMH.    Active Medical Problems:  Patient Active Problem List   Diagnosis     Chronic pain of both shoulders     Abnormal Pap smear of cervix     Moderate persistent asthma without complication     Carpal tunnel syndrome     Pulmonary embolism with infarction (H)     Health Care Home     Moderate major depression (H)     S/P hysterectomy     Hyperlipidemia     Status post bariatric surgery     Essential hypertension     Urinary incontinence     Esophageal reflux     Microalbuminuria     Type 2 diabetes mellitus with diabetic polyneuropathy (H)     Bilateral ankle joint pain     Chronic pain disorder     Other acute pulmonary embolism without acute cor pulmonale (H)     Asthma     Compression neuropathy of left lower extremity     Hernia     Hernia of anterior abdominal wall     Obesity     Osteoarthritis of both knees     Polyneuropathy due to secondary diabetes mellitus (H)     Copper deficiency     Metabolic acidosis     Neck mass     Persons encountering health services in other specified circumstances     Hyperglycemia due to type 2 diabetes mellitus (H)     History of cholecystectomy     History of hysterectomy for benign disease     Morbid obesity (H)     Stage 3a chronic kidney disease     Arthritis of both acromioclavicular joints     H/O necrotizing fasciitis     PMH:   Medical record was reviewed and PMH was discussed with patient and noted below.  Past Medical History:   Diagnosis Date      Abnormal cervical Papanicolaou smear with positive human papillomavirus deoxyribonucleic acid test      Ankle joint pain     bilateral     Anxiety      Arthritis      Arthritis     rheumatoid arthritis     Asthma      Carpal tunnel syndrome     bilateral     Compression neuropathy      Depression      Depressive disorder      Diabetes (H)      Diabetes (H)     type II     Diabetic nephropathy (H)      DVT of lower extremity, bilateral (H)     right     Esophageal reflux      Gastroenteritis      History of blood clots      History of blood transfusion      Hyperlipidemia      Hypertension      Hypertension      Infectious fasciitis     abdominal wall      Insomnia      Neck pain      Necrotizing fasciitis (H) 11/15/2012     Necrotizing fasciitis (H)      Neuropathy      Obesity      Osteoarthritis of both knees 10/31/2016     Pulmonary embolism and infarction (H)      SOB (shortness of breath)     with exertion     Tarsal tunnel syndrome      Uncomplicated asthma      Urinary incontinence      PSH:   Past Surgical History:   Procedure Laterality Date     ABDOMINAL WALL SURGERY      for necrotizing fasciitis      SECTION       COLONOSCOPY N/A 3/15/2017    Procedure: INCOMPLETE COLONOSCOPY;  Surgeon: Topher Street MD;  [place] [facility] [place]  Service:      COLONOSCOPY N/A 3/16/2017    Procedure: COLONOSCOPY;  Surgeon: Topher Street MD;  [place] [facility] [place]  Service:      GASTRIC BYPASS  2006    Kim'n'Y.  wt then was in low 300lb range     GASTRIC BYPASS  2006    Kim-en-Y     HC REVISE MEDIAN N/CARPAL TUNNEL SURG Right 10/24/2014    Procedure: RIGHT REVISION CARPAL TUNNEL RELEASE WITH HYPOTHENAR FAT PAD;  Surgeon: Aaron Oreilly MD;  Location: [facility];  Service: Orthopedics     HYSTERECTOMY       HYSTERECTOMY       KNEE SURGERY Right      MIDLINE INSERTION - DOUBLE LUMEN  2021          UTERINE FIBROID SURGERY  2008    hysterectomy     UTERINE FIBROID SURGERY          Family Hx:   Family History   Problem Relation Age of Onset     Diabetes Mother      Coronary Artery Disease Mother          of MI at age of 51 years     Diabetes Brother      Diabetes Sister      No Known Problems Father      No Known Problems Maternal Grandmother      No Known Problems Maternal Grandfather      No Known Problems Paternal Grandmother      No Known Problems Paternal Grandfather      No Known Problems Son      No Known Problems Daughter      No Known Problems Maternal Half-Brother      No Known Problems Maternal Half-Sister      No Known Problems Paternal Half-Brother      No Known Problems Paternal Half-Sister      No Known Problems Niece      No Known Problems Nephew      No Known Problems Cousin      No Known Problems Other      Cancer Brother      Heart Disease No family hx of      Hypertension No family hx of      Hyperlipidemia No family hx of      Kidney Disease No family hx of      Cerebrovascular Disease No family hx of      Obesity No family hx of      Thrombosis No family hx of      Asthma No family hx of      Arthritis No family hx of      Thyroid Disease No family hx of      Depression No family hx of      Mental Illness No family hx of      Substance Abuse No family hx of      Cystic Fibrosis No family hx of      Early Death No family hx of      Coronary Artery Disease Early Onset No family hx of      Heart Failure No family hx of      Bleeding Diathesis No family hx of      Dementia No family hx of      Breast Cancer No family hx of      Ovarian Cancer No family hx of      Uterine Cancer No family hx of      Prostate Cancer No family hx of      Colorectal Cancer No family hx of      Pancreatic Cancer No family hx of      Lung Cancer No family hx of      Melanoma No family hx of      Autoimmune Disease No family hx of      Unknown/Adopted No family hx of      Genetic Disorder No family hx of      Personal Hx:   Social History     Tobacco Use     Smoking status: Never Smoker      Smokeless tobacco: Never Used   Substance Use Topics     Alcohol use: No       Allergies:  Allergies   Allergen Reactions     Sulfamethoxazole-Trimethoprim Other (See Comments) and Nephrotoxicity     Other reaction(s): Renal Failure  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, 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, 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, 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, 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  Other reaction(s): Renal Failure  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, 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, 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       Medications:  Current Outpatient Medications   Medication Sig     albuterol (2.5 MG/3ML) 0.083% nebulizer solution Take 1 vial (2.5 mg) by nebulization every 6 hours as needed for shortness of breath / dyspnea     albuterol (VENTOLIN HFA) 108 (90 Base) MCG/ACT inhaler Inhale 2 puffs into the lungs 4 times daily Inhale 2 puffs every 4-6 hours as needed     Alcohol Swabs (B-D SINGLE USE SWABS REGULAR) PADS Apply 1 pad topically 3 times daily Use as directed     amitriptyline (ELAVIL) 10 MG tablet TAKE 1 TABLET(10 MG) BY MOUTH AT BEDTIME     amLODIPine-benazepril (LOTREL) 10-40 MG capsule Take 1 capsule by mouth     atorvastatin (LIPITOR) 10 MG tablet TAKE 1 TABLET BY MOUTH EVERY DAY     blood glucose (NO BRAND SPECIFIED) lancets standard Check fasting glucose 4 times daily.  For Accu check monitor     cyanocobalamin (CYANOCOBALAMIN) 1000 MCG/ML injection Inject 1 mL (1,000 mcg) into the muscle every 30 days     diclofenac (VOLTAREN) 1 % topical gel Apply 4 g topically 3 times daily To top of both shoulders in area of pain     diltiazem ER (DILT-XR) 120 MG 24 hr capsule      dulaglutide (TRULICITY) 1.5 MG/0.5ML pen Inject 1.5 mg Subcutaneous every 7 days     DULoxetine (CYMBALTA) 60 MG capsule Take 1 capsule (60 mg) by mouth daily     famotidine (PEPCID) 20 MG tablet Take 1 tablet (20 mg) by mouth 2 times daily      ferrous sulfate (IRON) 325 (65 FE) MG tablet Take 1 tablet (325 mg) by mouth daily (with breakfast)     fluticasone-salmeterol (ADVAIR HFA) 230-21 MCG/ACT inhaler Inhale 2 puffs into the lungs     gabapentin (NEURONTIN) 250 MG/5ML solution Take 15 mLs (750 mg) by mouth 3 times daily     insulin glargine (BASAGLAR KWIKPEN) 100 UNIT/ML pen Inject 35 Units Subcutaneous At Bedtime     insulin pen needle (BD ULTRA-FINE) 29G X 12.7MM miscellaneous Use 4 times daily     Lancets (ONETOUCH DELICA PLUS [id number]) MISC CHECK FASTING GLUCOSE THREE TIMES DAILY     latanoprost (XALATAN) 0.005 % ophthalmic solution INSTILL 1 DROP IN BOTH EYES DAILY AT BEDTIME     lidocaine (LMX4) 4 % external cream Apply topically once as needed for mild pain (on upper back)     lisinopril (ZESTRIL) 30 MG tablet Take 1 tablet (30 mg) by mouth At Bedtime     magnesium oxide (MAG-OX) 400 (241.3 Mg) MG tablet Take 1 tablet (400 mg) by mouth 2 times daily     naloxone (NARCAN) 4 MG/0.1ML nasal spray Spray 1 spray (4 mg) into one nostril alternating nostrils once as needed for opioid reversal Every 2-3 minutes until patient responsive or EMS arrives     order for DME Equipment being ordered: wheeled walker with seat     oxyCODONE-acetaminophen (PERCOCET)  MG per tablet 1 po QAM , 1 po afternoon, and 2 po QHS.     phentermine (ADIPEX-P) 37.5 MG tablet Take 0.5-1 tablets (18.75-37.5 mg) by mouth every morning     risperiDONE (RISPERDAL) 0.25 MG tablet Take 1 tablet (0.25 mg) by mouth At Bedtime     senna-docusate (SENOKOT-S/PERICOLACE) 8.6-50 MG tablet Take 1 tablet by mouth 2 times daily     Simethicone 180 MG CAPS Use 1 softgel as needed for gas     sodium bicarbonate 650 MG tablet Take 1 tablet (650 mg) by mouth 3 times daily     SUMAtriptan (IMITREX) 25 MG tablet One tab at onset of HA.  May repeat dose in 2 hours.  Do not exceed 200 mg in 24 hours     tolterodine ER (DETROL LA) 2 MG 24 hr capsule Take 1 capsule (2 mg) by mouth daily      vitamin D2 (ERGOCALCIFEROL) 77874 units (1250 mcg) capsule Take 1 capsule by mouth three times a week     vitamin D3 (CHOLECALCIFEROL) 1.25 MG (25218 UT) capsule Take 1 capsule (50,000 Units) by mouth three times a week     Current Facility-Administered Medications   Medication     cyanocobalamin injection 1,000 mcg      Vitals:  /75   Pulse (!) 125   Wt 88.8 kg (195 lb 11.2 oz)   LMP  (LMP Unknown)   SpO2 96%   BMI 39.53 kg/m      Exam:  GENERAL APPEARANCE: NAD but fatigued  RESP: clear bilaterally, no increased WOB  CV: tachycardic, regular  EDEMA: no LE edema bilaterally  ABDOMEN: soft, nondistended, nontender, bowel sounds normal  MS: extremities normal - no gross deformities noted, no evidence of inflammation in joints, no muscle tenderness  SKIN: no rash    LABS:   CMP  Recent Labs   Lab Test 09/22/21  1434 08/26/21  1607 07/29/21  1409 04/18/21  1216 04/18/21  0904 04/18/21  0544 04/17/21  0901 04/17/21  0635 04/16/21  1228 04/16/21  0257 01/04/21  1622 01/04/21  1622    142 138  --   --  141   < > 140  --  140   < > 140   POTASSIUM 4.2 4.8 5.4*  --   --  4.8   < > 5.1*  --  4.7   < > 4.5   CHLORIDE 115* 110* 111*  --   --  116*   < > 113*  --  110*   < > 110*   CO2 23 20* 16*  --   --  18*   < > 18*  --  21*   < > 17*   ANIONGAP 6 12 11  --   --  7   < > 9  --  9   < > 13   * 232* 309* 116   < > 75   < > 190*   < > 147*   < > 243*   BUN 24 22 23*  --   --  29*   < > 29*  --  21   < > 24*   CR 0.92 1.09 1.06  --   --  0.81   < > 1.24*  --  0.87   < > 1.24*   GFRESTIMATED 70 57* 59*  --   --  >60   < > 45*  --  >60   < > 45*   GFRESTBLACK  --   --   --   --   --  >60  --  54*  --  >60  --  54*   MIKAELA 8.5 8.8 8.9  --   --  7.9*   < > 8.3*  --  9.2   < > 8.4*    < > = values in this interval not displayed.     Recent Labs   Lab Test 04/16/21  1044 04/16/21  0257 01/04/21  1622 08/24/20  2232   BILITOTAL 0.6 0.4 0.3 0.2   ALKPHOS 208* 215* 192* 159*   ALT 23 22 21 18   AST 22 27 24 26      CBC  Recent Labs   Lab Test 09/22/21  1434 04/18/21  0544 04/17/21  0635 04/16/21  1044   HGB 11.0* 9.0* 10.5* 12.1   WBC 12.2* 9.3 9.3 7.9   RBC 3.33* 2.79* 3.36* 3.80   HCT 34.0* 30.0* 35.6 39.6   * 108* 106* 104*   MCH 33.0 32.3 31.3 31.8   MCHC 32.4 30.0* 29.5* 30.6*   RDW 12.3 12.3 12.5 12.1    242 284 311     URINE STUDIES  Recent Labs   Lab Test 09/22/21  1434 02/11/21  1449 07/27/20  1719 06/03/20  0419 09/07/19  2252 09/07/19  2252 08/21/19  2238 08/21/19  2238 03/18/19  0922 01/04/19  1540   COLOR Yellow  --   --  Yellow  --  Colorless  --  Yellow  --  Yellow   APPEARANCE Cloudy*  --   --  Clear  --  Clear  --  Clear  --  Clear   URINEGLC Negative Trace* Negative >1000 mg/dL*   < > >1000 mg/dL*   < > 100 mg/dL*   < > Negative   URINEBILI Negative  --   --  Negative  --  Negative  --  Negative  --  Negative   URINEKETONE Negative Negative Negative Negative   < > Negative   < > Negative   < > Negative   SG 1.019 >=1.030 1.020 1.018   < > 1.015   < > 1.013   < > 1.013   UBLD Negative  --   --  Negative  --  Negative  --  Negative  --  Trace*   URINEPH 5.0 5.0 5.0 5.0   < > 5.0   < > 5.0   < > 5.5   PROTEIN >499*  --   --  Negative  --  30 mg/dL*  --  30 mg/dL*  --  100 mg/dL*   UROBILINOGEN  --   --   --  <2.0 E.U./dL  --  <2.0 E.U./dL  --  <2.0 E.U./dL  --  <2.0 E.U./dL   NITRITE Negative Negative Negative Negative   < > Negative   < > Negative   < > Negative   LEUKEST Negative  --   --  Negative  --  Negative  --  Negative  --  Negative   RBCU 2  --   --  0-2  --  0-2  --  0-2  --  0-2   WBCU 8*  --   --  0-5  --  0-5  --  0-5  --  0-5    < > = values in this interval not displayed.     Recent Labs   Lab Test 09/22/21  1434   UTPG 2.42*     PTH  Recent Labs   Lab Test 01/04/21  1622 10/24/19  1031   PTHI 379* 319*     IRON STUDIES  Recent Labs   Lab Test 09/22/21  1434 01/04/21  1622 10/24/19  1031 03/18/19  1001   IRON 64  --   --  78     --   --   --    IRONSAT 19  --   --   --     LAKSHMI 66 90 127 83         Sharron Castellanos MD

## 2021-09-22 NOTE — NURSING NOTE
Chief Complaint   Patient presents with     New Patient     Essential Hypertension and CKD     Blood pressure 119/75, pulse (!) 125, weight 88.8 kg (195 lb 11.2 oz), SpO2 96 %, not currently breastfeeding.    Bisi Salazar, CMA

## 2021-09-22 NOTE — PATIENT INSTRUCTIONS
Continue checking your blood pressure at home    Make sure you stay well-hydrated    Restart an iron supplement daily    Follow up at next available - schedule before you go

## 2021-09-23 LAB
ALBUMIN SERPL ELPH-MCNC: 3.5 G/DL (ref 3.7–5.1)
ALPHA1 GLOB SERPL ELPH-MCNC: 0.3 G/DL (ref 0.2–0.4)
ALPHA2 GLOB SERPL ELPH-MCNC: 1 G/DL (ref 0.5–0.9)
B-GLOBULIN SERPL ELPH-MCNC: 0.8 G/DL (ref 0.6–1)
GAMMA GLOB SERPL ELPH-MCNC: 0.7 G/DL (ref 0.7–1.6)
HBV SURFACE AB SERPL IA-ACNC: 0.5 M[IU]/ML
HBV SURFACE AG SERPL QL IA: NONREACTIVE
HCV AB SERPL QL IA: NONREACTIVE
KAPPA LC FREE SER-MCNC: 3.13 MG/DL (ref 0.33–1.94)
KAPPA LC FREE/LAMBDA FREE SER NEPH: 1.42 {RATIO} (ref 0.26–1.65)
LAMBDA LC FREE SERPL-MCNC: 2.2 MG/DL (ref 0.57–2.63)
Lab: NORMAL
M PROTEIN SERPL ELPH-MCNC: 0 G/DL
PERFORMING LABORATORY: NORMAL
PROT PATTERN SERPL ELPH-IMP: ABNORMAL
PROT PATTERN SERPL IFE-IMP: NORMAL
SPECIMEN STATUS: NORMAL
TEST NAME: NORMAL

## 2021-09-27 LAB — MAYO MISC RESULT: NORMAL

## 2021-10-01 ENCOUNTER — TELEPHONE (OUTPATIENT)
Dept: NEPHROLOGY | Facility: CLINIC | Age: 56
End: 2021-10-01

## 2021-10-12 ENCOUNTER — MEDICAL CORRESPONDENCE (OUTPATIENT)
Dept: HEALTH INFORMATION MANAGEMENT | Facility: CLINIC | Age: 56
End: 2021-10-12

## 2021-10-21 ENCOUNTER — OFFICE VISIT (OUTPATIENT)
Dept: FAMILY MEDICINE | Facility: CLINIC | Age: 56
End: 2021-10-21
Payer: MEDICARE

## 2021-10-21 VITALS
HEART RATE: 108 BPM | TEMPERATURE: 98.6 F | BODY MASS INDEX: 40.64 KG/M2 | OXYGEN SATURATION: 98 % | SYSTOLIC BLOOD PRESSURE: 160 MMHG | RESPIRATION RATE: 16 BRPM | DIASTOLIC BLOOD PRESSURE: 100 MMHG | WEIGHT: 201.2 LBS

## 2021-10-21 DIAGNOSIS — G89.4 CHRONIC PAIN SYNDROME: Primary | ICD-10-CM

## 2021-10-21 DIAGNOSIS — R80.9 MICROALBUMINURIA: ICD-10-CM

## 2021-10-21 DIAGNOSIS — E11.42 TYPE 2 DIABETES MELLITUS WITH DIABETIC POLYNEUROPATHY, WITH LONG-TERM CURRENT USE OF INSULIN (H): ICD-10-CM

## 2021-10-21 DIAGNOSIS — N18.31 STAGE 3A CHRONIC KIDNEY DISEASE (H): ICD-10-CM

## 2021-10-21 DIAGNOSIS — Z79.4 TYPE 2 DIABETES MELLITUS WITH DIABETIC POLYNEUROPATHY, WITH LONG-TERM CURRENT USE OF INSULIN (H): ICD-10-CM

## 2021-10-21 DIAGNOSIS — Z79.4 TYPE 2 DIABETES MELLITUS WITH COMPLICATION, WITH LONG-TERM CURRENT USE OF INSULIN (H): ICD-10-CM

## 2021-10-21 DIAGNOSIS — I10 ESSENTIAL HYPERTENSION: ICD-10-CM

## 2021-10-21 DIAGNOSIS — E11.8 TYPE 2 DIABETES MELLITUS WITH COMPLICATION, WITH LONG-TERM CURRENT USE OF INSULIN (H): ICD-10-CM

## 2021-10-21 PROCEDURE — 80306 DRUG TEST PRSMV INSTRMNT: CPT | Performed by: FAMILY MEDICINE

## 2021-10-21 PROCEDURE — 96372 THER/PROPH/DIAG INJ SC/IM: CPT | Performed by: FAMILY MEDICINE

## 2021-10-21 PROCEDURE — 99214 OFFICE O/P EST MOD 30 MIN: CPT | Mod: 25 | Performed by: FAMILY MEDICINE

## 2021-10-21 RX ORDER — PEN NEEDLE, DIABETIC 29 G X1/2"
NEEDLE, DISPOSABLE MISCELLANEOUS
Qty: 90 EACH | Refills: 3 | Status: SHIPPED | OUTPATIENT
Start: 2021-10-21 | End: 2022-10-27

## 2021-10-21 RX ORDER — ISOPROPYL ALCOHOL 0.75 G/1
1 SWAB TOPICAL 3 TIMES DAILY
Qty: 100 EACH | Refills: 12 | Status: SHIPPED | OUTPATIENT
Start: 2021-10-21 | End: 2021-11-16

## 2021-10-21 RX ORDER — SODIUM BICARBONATE 650 MG/1
650 TABLET ORAL 3 TIMES DAILY
Qty: 90 TABLET | Refills: 11 | Status: SHIPPED | OUTPATIENT
Start: 2021-10-21 | End: 2022-03-25 | Stop reason: SINTOL

## 2021-10-21 RX ORDER — OXYCODONE AND ACETAMINOPHEN 10; 325 MG/1; MG/1
TABLET ORAL
Qty: 120 TABLET | Refills: 0 | Status: SHIPPED | OUTPATIENT
Start: 2021-10-21 | End: 2021-11-18

## 2021-10-21 RX ORDER — INSULIN GLARGINE 100 [IU]/ML
26 INJECTION, SOLUTION SUBCUTANEOUS AT BEDTIME
Qty: 6 ML | Refills: 11 | Status: SHIPPED | OUTPATIENT
Start: 2021-10-21 | End: 2022-01-04

## 2021-10-21 RX ORDER — LISINOPRIL 40 MG/1
40 TABLET ORAL AT BEDTIME
Qty: 90 TABLET | Refills: 3 | Status: SHIPPED | OUTPATIENT
Start: 2021-10-21 | End: 2024-03-21

## 2021-10-21 RX ADMIN — CYANOCOBALAMIN 1000 MCG: 1000 INJECTION, SOLUTION INTRAMUSCULAR; SUBCUTANEOUS at 15:44

## 2021-10-21 ASSESSMENT — PATIENT HEALTH QUESTIONNAIRE - PHQ9: SUM OF ALL RESPONSES TO PHQ QUESTIONS 1-9: 4

## 2021-10-21 NOTE — PROGRESS NOTES
Patient Active Problem List    Diagnosis Date Noted     Stage 3a chronic kidney disease (H) 12/31/2020     Priority: High     Persistent protienuria       Copper deficiency 04/05/2019     Priority: High     Identified in Aug, 2018--see discharge summary from [facility] in [place].       Chronic pain disorder 10/07/2016     Priority: High     Chronic Pain Diagnosis:  neuropathy  DIRE Total Score(s):14     ORT: 5    4 - 7 =  Moderate Risk   of future problems with Opioids  FAQ5: 50 November 20, 2020  Behavioral Health Consultation: 5/12/17 with Dr. Daisy Quezada  Personal Care Plan for Chronic Pain: 5/12/17  Opioid medication:oxycodone   Dose:10mg  Number of pills per month:60  Patient is being prescribed 10mg of oxycodone IR (Percocet) per day this is 15 mg Morphine Equivalents  Benzodiazepines Prescribed? No  Naloxone prescribed? No       Clinic visit frequency required: Q 1 month Next visit due:   Controlled Substance/Opioid Treatment agreement on file (dated <12 months ago)?:    Date(s): pending  Last [place] verification: last visit  Items in red to be updated at each visit  Patient is followed by Data Unavailable for ongoing prescription of pain medication.  All refills should be approved by this provider, or covering partner.Chronic Pain     Chronic Pain Diagnosis:  neuropathy  DIRE Total Score(s):14     ORT: 5    4 - 7 =  Moderate Risk   of future problems with Opioids  FAQ5: 45/100 on 9/9/16, 40/100 on 11/15/16  55 October 29, 2020    Behavioral Health Consultation: 5/12/17 with Dr. Daisy Quezada  Personal Care Plan for Chronic Pain: 5/12/17  Opioid medication:oxycodone   Dose:10mg  Number of pills per month:90  Patient is being prescribed 45 MME  Benzodiazepines Prescribed? No  Naloxone prescribed? No       Clinic visit frequency required: Q 1 month Next visit due:   Controlled Substance/Opioid Treatment agreement on file (dated <12 months ago)?:    Date(s): pending  Last [place] verification: last  visit  Items in red to be updated at each visit  Patient is followed by [facility] Unavailable for ongoing prescription of pain medication.  All refills should be approved by this provider, or covering partner.Chronic Pain       Microalbuminuria 10/12/2015     Priority: High     Severely increased albuminuria       Type 2 diabetes mellitus with diabetic polyneuropathy (H) 10/12/2015     Priority: High     Essential hypertension 08/26/2013     Priority: High     Arthritis of both acromioclavicular joints 04/08/2021     Priority: Medium     xray proven        Morbid obesity (H) 11/27/2020     Priority: Medium     Hyperglycemia due to type 2 diabetes mellitus (H) 06/02/2020     Priority: Medium     Neck mass 10/11/2019     Priority: Medium     Added automatically from request for surgery [id number]       Metabolic acidosis 07/26/2019     Priority: Medium     reolved w/ discontinuation of ASA       Obesity 01/03/2019     Priority: Medium     Other acute pulmonary embolism without acute cor pulmonale (H) 12/31/2018     Priority: Medium     Hernia of anterior abdominal wall 01/28/2017     Priority: Medium     Osteoarthritis of both knees 10/31/2016     Priority: Medium     Polyneuropathy due to secondary diabetes mellitus (H) 10/31/2016     Priority: Medium     Compression neuropathy of left lower extremity 02/19/2016     Priority: Medium     Bilateral ankle joint pain 01/15/2016     Priority: Medium     Esophageal reflux 04/07/2015     Priority: Medium     Urinary incontinence 02/19/2015     Priority: Medium     Asthma 08/04/2013     Priority: Medium     History of cholecystectomy 08/04/2013     Priority: Medium     H/O necrotizing fasciitis 08/04/2013     Priority: Medium     Hyperlipidemia 06/21/2013     Priority: Medium     Status post bariatric surgery 06/21/2013     Priority: Medium     Kim-en-Y in 2006       Moderate major depression (H) 01/24/2013     Priority: Medium     Moderate persistent asthma without complication  11/15/2012     Priority: Medium     Persons encountering health services in other specified circumstances 11/15/2012     Priority: Medium     Tier 3    Status: Accept  Care Coordination Start Date: 02/27/12  State Tier Level: Level 3  Language/Barrier to Learning: No  Significant Mental Health Issues: Yes  Date Care Coordination Discontinued: 05/28/15    DX V65.8 REPLACED WITH [id number] (04/08/2013)       Abnormal Pap smear of cervix 04/22/2019     Priority: Low     3-12-15: Pap/HPV neg.  Plan: Given hx (mildly abn pap, hyster), recheck in 5 yrs.  10-11-11: Pap/HPV neg.  Plan: Given ASUS pap in 2010, recommend repeating pap in 1 year.  8/3/2010 ASCUS Pap with +BV- pt treated with Metrogel.  Pt needs repeat Pap in 1yr.  8/4/2008 Pt had total hyst for menorrhagia, pelvic pain, scar tissue, and fibroids.    3-12-15: Pap/HPV neg.  Plan: Given hx (mildly abn pap, hyster), recheck in 5 yrs.  10-11-11: Pap/HPV neg.  Plan: Given ASUS pap in 2010, recommend repeating pap in 1 year.  8/3/2010 ASCUS Pap with +BV- pt treated with Metrogel.  Pt needs repeat Pap in 1yr.  8/4/2008 Pt had total hyst for menorrhagia, pelvic pain, scar tissue, and fibroids.       Hernia 01/27/2017     Priority: Low     S/P hysterectomy 06/21/2013     Priority: Low     2008, For fibroids.  Cervix removed, but still has ovaries.       History of hysterectomy for benign disease 06/21/2013     Priority: Low     2008, For fibroids.  Cervix removed, but still has ovaries.       Carpal tunnel syndrome 11/15/2012     Priority: Low     Pulmonary embolism with infarction (H) 11/15/2012     Priority: Low     12/24/2018  Unprovoked.  + fam hx of DVT.  Treated w/ DOAC 12/2018through 9/2019 12/24/2018  Unprovoked.  + fam hx of DVT.  Plan warfarin for 6 months.       Health Care Home 11/15/2012     Priority: Low     Tier 3    Status: Accept  Care Coordination Start Date: 02/27/12  State Tier Level: Level 3  Language/Barrier to Learning:  No  Significant Mental Health Issues: Yes  Date Care Coordination Discontinued: 05/28/15    DX V65.8 REPLACED WITH [id number] (04/08/2013)         Chronic pain of both shoulders 12/23/2005     Priority: Low     There are no exam notes on file for this visit.  Chief Complaint   Patient presents with     Pain Management     CPM       Blood pressure (!) 160/100, pulse 108, temperature 98.6  F (37  C), temperature source Oral, resp. rate 16, weight 91.3 kg (201 lb 3.2 oz), SpO2 98 %, not currently breastfeeding.    SUBJECTIVE:  Ruthy Álvarez is here for several issues.  First of all, we addressed grieving.  She lost her daughter, Elizabeth, to an apparent overdose a few weeks ago and needed to talk about that.  Elizabeth is survived by her 4 children, many of whom come to our clinic.  For a while, she was not taking her medications, but has now started taking them back again and is emotionally coping with all of these stressors as well she could expect.  She names her 4 grandchildren from Elizabeth, Venessa Álvarez, Maurice David, Saud Paige and Evenswyatt Liu.  There are ongoing custody battles with the younger two, Saud and Evens, that are impacting Ruthy and she talks about that at length today.  She feels she is coping as well as she can.    Recently, she saw Nephrology for a consult regarding her CKD.  They agreed with me about starting an SGLT2 medication and perhaps increasing her lisinopril.  The patient has not done these yet.  She is now adherent to her medications.  She needs a B12 shot today.  She needs refills of her oxycodone.  Her pain control seems adequate.  She denies any aberrant behavior or any other drugs of abuse.  Her database today was unremarkable.  Urine drug screen today is appropriate.  The amphetamines are due to the phentermine she takes from our bariatric specialist to help with her obesity.  This has been very successful.    OBJECTIVE:  Patient's blood pressure improved to  160/100 and her pulse was 108.  Her affect is somewhat flat.  She is mildly tearful at times.  Exam otherwise not done today.    LABORATORY DATA:  Reviewed most recent labs from nephrology.  Her sodium bicarbonate had improved to 23.  Iron studies were acceptable.  Urinalysis shows significant proteinuria.  Hemoglobin was 11.  .  Estimated GFR was 70 at the time, and her creatinine was 0.92.    ASSESSMENT:    1.  Grieving.  I strongly recommend that Ruthy touch base with her psychologist.  Even though she is coping well now, that may not persist and having someone to talk with should be helpful.  2.  Regarding her hypertension, continue diltiazem, increase lisinopril from 30 to 40 mg daily.  3.  For her diabetes, continue Trulicity 1.5 mg weekly, start empagliflozin 10 mg daily.  Cut back insulin glargine by about 20% from 35-26 units daily and follow up in 1 month.  4.  Regarding her metabolic acidosis, strongly recommend that she continue her bicarbonate t.i.d.  We will check a BMP and an A1c next month.  Her last A1c was 7.4.  Would consider titrating up to Jardiance 25 mg daily if tolerated next time.  5.  Regarding her B12 deficiency, it is due to her previous gastric bypass.  B12 injection given today.  Continue monthly readings.        No results found for any visits on 10/21/21.

## 2021-10-21 NOTE — PATIENT INSTRUCTIONS
Start jardiance 10 mg daily  Increase lisinopril from 30 to 40 mg daily  Decrease basaglar from 34 to 26 units daily.   Keep trulicity the same  Increase sodium bicarbonate to 3 times daily.

## 2021-10-21 NOTE — NURSING NOTE
Clinic Administered Medication Documentation    Administrations This Visit     cyanocobalamin injection 1,000 mcg     Admin Date  10/21/2021 Action  Given Dose  1,000 mcg Route  Intramuscular Site  Left Deltoid Administered By  Viridiana Loaiza CMA    Ordering Provider: Jose Angel Meyer MD    NDC: 72953-970-73    Lot#: [id number]    : [employer]    Patient Supplied?: No                  Injectable Medication Documentation    Patient was given Cyanocobalamin (B-12). Prior to medication administration, verified patients identity using patient s name and date of birth. Please see MAR and medication order for additional information. Patient instructed to remain in clinic for 15 minutes.      Was entire vial of medication used? Yes  Vial/Syringe: Single dose vial  Expiration Date:  01302023  Was this medication supplied by the patient? No    Name of provider who requested the medication administration: Dr. Meyer  Name of provider on site (faculty or community preceptor) at the time of performing the medication administration: Dr. Meyer    Date of next administration: N/A  Date of next office visit with provider to renew medication plan (must be seen annually): N/A

## 2021-10-24 DIAGNOSIS — I10 ESSENTIAL HYPERTENSION: Primary | ICD-10-CM

## 2021-10-25 RX ORDER — DILTIAZEM HYDROCHLORIDE 120 MG/1
CAPSULE, EXTENDED RELEASE ORAL
Qty: 90 CAPSULE | Refills: 4 | Status: SHIPPED | OUTPATIENT
Start: 2021-10-25 | End: 2022-11-28

## 2021-11-01 ENCOUNTER — TELEPHONE (OUTPATIENT)
Dept: FAMILY MEDICINE | Facility: CLINIC | Age: 56
End: 2021-11-01

## 2021-11-01 NOTE — CONFIDENTIAL NOTE
Discussed family in ITM today after family tragedy in which patient's daughter passed away. Care coordinating for patient's 4 grandchildren    SW and care coordinator Tejas reached out to patient regarding getting apt for 12 year old grandson to see Dr. Harp, child psychologist. Dr. Harp has openings on 11/12/21. No answer, left  on behalf of Dr. Myeer, requesting that she call clinic to schedule grandson.     Sandi Aldridge, FERMINSW

## 2021-11-01 NOTE — TELEPHONE ENCOUNTER
Discussed family in ITM today after family tragedy in which patient's daughter passed away. Care coordinating for patient's 4 grandchildren    SW and care coordinator Tejas reached out to patient regarding getting apt for 12 year old grandson to see Dr. Harp, child psychologist. Dr. Harp has openings on 11/12/21. No answer, left  on behalf of Dr. Meyer, requesting that she call clinic to schedule grandson.     Sandi Aldridge, FERMINSW

## 2021-11-04 NOTE — TELEPHONE ENCOUNTER
"11/4/21:   Called patient to follow up on care for grandchildren.    Discussed scheduling Indira, age 12, with Dr. Harp child psychologist. She was agreeable. Scheduled:     Friday 11/12/21 at 2:00pm  Dr. Aan Harp   Pediatric Psychology Consult   [facility], [address] [place] [address], [id number] [phone]    Informs that Indira is living with his dad and that she will call inform his dad of this apt, and he will bring him on 11/12.     Discussed Saud, age 15, and informed that Dr. Meyer had been unable to reach her at cell phone or other listed numbers. She informs that Saud is living with her father, who was recently released from custodial. Does not think she is actually living with him, but rather is staying with friends. Expresses worry about Saud as she is hanging out with a \"bad crowd\" and is rebellious. She will agree to things, but then not follow through. SW inquired about what kind of rebellious things she does, Ruthy informs that she smokes mariajuana. Is not aware of alcohol or other drugs, but would not be surprised.     Requested that we schedule Saud with Dr. Meyer and then we can inform her dad and he can bring her to appointment. Is not sure of her dad's current phone number, will look it up and then return call to this SW.     Sandi Aldridge, LGSW   "

## 2021-11-06 DIAGNOSIS — E11.42 TYPE 2 DIABETES MELLITUS WITH DIABETIC POLYNEUROPATHY, WITH LONG-TERM CURRENT USE OF INSULIN (H): ICD-10-CM

## 2021-11-06 DIAGNOSIS — Z79.4 TYPE 2 DIABETES MELLITUS WITH DIABETIC POLYNEUROPATHY, WITH LONG-TERM CURRENT USE OF INSULIN (H): ICD-10-CM

## 2021-11-12 ENCOUNTER — IMMUNIZATION (OUTPATIENT)
Dept: FAMILY MEDICINE | Facility: CLINIC | Age: 56
End: 2021-11-12
Payer: MEDICARE

## 2021-11-12 PROCEDURE — 91300 PR COVID VAC PFIZER DIL RECON 30 MCG/0.3 ML IM: CPT

## 2021-11-12 PROCEDURE — 0004A PR COVID VAC PFIZER DIL RECON 30 MCG/0.3 ML IM: CPT

## 2021-11-15 RX ORDER — EMPAGLIFLOZIN 10 MG/1
TABLET, FILM COATED ORAL
Qty: 30 TABLET | Refills: 1 | Status: SHIPPED | OUTPATIENT
Start: 2021-11-15 | End: 2023-01-16

## 2021-11-16 DIAGNOSIS — Z79.4 TYPE 2 DIABETES MELLITUS WITH DIABETIC POLYNEUROPATHY, WITH LONG-TERM CURRENT USE OF INSULIN (H): ICD-10-CM

## 2021-11-16 DIAGNOSIS — E11.8 TYPE 2 DIABETES MELLITUS WITH COMPLICATION, WITH LONG-TERM CURRENT USE OF INSULIN (H): ICD-10-CM

## 2021-11-16 DIAGNOSIS — Z79.4 TYPE 2 DIABETES MELLITUS WITH COMPLICATION, WITH LONG-TERM CURRENT USE OF INSULIN (H): ICD-10-CM

## 2021-11-16 DIAGNOSIS — E11.42 TYPE 2 DIABETES MELLITUS WITH DIABETIC POLYNEUROPATHY, WITH LONG-TERM CURRENT USE OF INSULIN (H): ICD-10-CM

## 2021-11-16 RX ORDER — PEN NEEDLE, DIABETIC 32GX 5/32"
NEEDLE, DISPOSABLE MISCELLANEOUS
Qty: 400 EACH | Refills: 11 | Status: SHIPPED | OUTPATIENT
Start: 2021-11-16 | End: 2022-09-09

## 2021-11-18 ENCOUNTER — OFFICE VISIT (OUTPATIENT)
Dept: FAMILY MEDICINE | Facility: CLINIC | Age: 56
End: 2021-11-18
Payer: MEDICARE

## 2021-11-18 VITALS
OXYGEN SATURATION: 96 % | WEIGHT: 201.2 LBS | HEART RATE: 111 BPM | TEMPERATURE: 97.9 F | DIASTOLIC BLOOD PRESSURE: 83 MMHG | SYSTOLIC BLOOD PRESSURE: 141 MMHG | RESPIRATION RATE: 16 BRPM | BODY MASS INDEX: 40.64 KG/M2

## 2021-11-18 DIAGNOSIS — E11.42 TYPE 2 DIABETES MELLITUS WITH DIABETIC POLYNEUROPATHY, WITH LONG-TERM CURRENT USE OF INSULIN (H): ICD-10-CM

## 2021-11-18 DIAGNOSIS — G89.4 CHRONIC PAIN SYNDROME: Primary | ICD-10-CM

## 2021-11-18 DIAGNOSIS — E66.09 OBESITY DUE TO EXCESS CALORIES WITH SERIOUS COMORBIDITY, UNSPECIFIED CLASSIFICATION: ICD-10-CM

## 2021-11-18 DIAGNOSIS — E87.20 METABOLIC ACIDOSIS: ICD-10-CM

## 2021-11-18 DIAGNOSIS — E11.42 DIABETIC POLYNEUROPATHY ASSOCIATED WITH TYPE 2 DIABETES MELLITUS (H): ICD-10-CM

## 2021-11-18 DIAGNOSIS — Z79.4 TYPE 2 DIABETES MELLITUS WITH DIABETIC POLYNEUROPATHY, WITH LONG-TERM CURRENT USE OF INSULIN (H): ICD-10-CM

## 2021-11-18 DIAGNOSIS — N18.31 STAGE 3A CHRONIC KIDNEY DISEASE (H): ICD-10-CM

## 2021-11-18 LAB
AMPHETAMINES UR QL: DETECTED
ANION GAP SERPL CALCULATED.3IONS-SCNC: 11 MMOL/L (ref 5–18)
BARBITURATES UR QL SCN: NOT DETECTED
BENZODIAZ UR QL SCN: NOT DETECTED
BUN SERPL-MCNC: 22 MG/DL (ref 8–22)
BUPRENORPHINE UR QL: NOT DETECTED
CALCIUM SERPL-MCNC: 8.6 MG/DL (ref 8.5–10.5)
CANNABINOIDS UR QL: NOT DETECTED
CHLORIDE BLD-SCNC: 113 MMOL/L (ref 98–107)
CO2 SERPL-SCNC: 20 MMOL/L (ref 22–31)
COCAINE UR QL SCN: NOT DETECTED
CREAT SERPL-MCNC: 0.91 MG/DL (ref 0.6–1.1)
D-METHAMPHET UR QL: NOT DETECTED
GFR SERPL CREATININE-BSD FRML MDRD: 71 ML/MIN/1.73M2
GLUCOSE BLD-MCNC: 102 MG/DL (ref 70–125)
HBA1C MFR BLD: 6.9 % (ref 0–5.6)
METHADONE UR QL SCN: NOT DETECTED
OPIATES UR QL SCN: NOT DETECTED
OXYCODONE UR QL SCN: DETECTED
PCP UR QL SCN: NOT DETECTED
POTASSIUM BLD-SCNC: 4.2 MMOL/L (ref 3.5–5)
PROPOXYPH UR QL: NOT DETECTED
SODIUM SERPL-SCNC: 144 MMOL/L (ref 136–145)
TRICYCLICS UR QL SCN: NOT DETECTED

## 2021-11-18 PROCEDURE — 99214 OFFICE O/P EST MOD 30 MIN: CPT | Mod: 25 | Performed by: FAMILY MEDICINE

## 2021-11-18 PROCEDURE — 83036 HEMOGLOBIN GLYCOSYLATED A1C: CPT | Performed by: FAMILY MEDICINE

## 2021-11-18 PROCEDURE — 96372 THER/PROPH/DIAG INJ SC/IM: CPT | Performed by: FAMILY MEDICINE

## 2021-11-18 PROCEDURE — 80306 DRUG TEST PRSMV INSTRMNT: CPT | Performed by: FAMILY MEDICINE

## 2021-11-18 PROCEDURE — 36415 COLL VENOUS BLD VENIPUNCTURE: CPT | Performed by: FAMILY MEDICINE

## 2021-11-18 PROCEDURE — 80048 BASIC METABOLIC PNL TOTAL CA: CPT | Performed by: FAMILY MEDICINE

## 2021-11-18 RX ORDER — OXYCODONE AND ACETAMINOPHEN 10; 325 MG/1; MG/1
TABLET ORAL
Qty: 120 TABLET | Refills: 0 | Status: SHIPPED | OUTPATIENT
Start: 2021-11-18 | End: 2021-12-09

## 2021-11-18 RX ORDER — FLASH GLUCOSE SENSOR
1 KIT MISCELLANEOUS
Qty: 2 EACH | Refills: 5 | Status: SHIPPED | OUTPATIENT
Start: 2021-11-18 | End: 2022-01-06

## 2021-11-18 RX ORDER — GABAPENTIN 250 MG/5ML
750 SOLUTION ORAL 3 TIMES DAILY
Qty: 1350 ML | Refills: 2 | Status: SHIPPED | OUTPATIENT
Start: 2021-11-18 | End: 2022-05-14

## 2021-11-18 RX ORDER — FLASH GLUCOSE SCANNING READER
1 EACH MISCELLANEOUS ONCE
Qty: 1 EACH | Refills: 0 | Status: SHIPPED | OUTPATIENT
Start: 2021-11-18 | End: 2021-11-18

## 2021-11-18 RX ADMIN — CYANOCOBALAMIN 1000 MCG: 1000 INJECTION, SOLUTION INTRAMUSCULAR; SUBCUTANEOUS at 11:59

## 2021-11-18 NOTE — LETTER
November 19, 2021      Ruthy TJ Henri  [address]  [place] [address]        Dear ,    We are writing to inform you of your test results.      Ruthy   Your blood sugar control is very good (AIC test is under 7, which is great!), and your kidney tests are stable.   Your CO2 test is low, so please make sure to take the sodium bicarbonate 3 times daily.     See you next month.   JIGNESH Mcgowan   Hemoglobin A1c   Result Value Ref Range    Hemoglobin A1C 6.9 (H) 0.0 - 5.6 %      Comment:      Normal <5.7%   Prediabetes 5.7-6.4%    Diabetes 6.5% or higher     Note: Adopted from ADA consensus guidelines.   Basic metabolic panel   Result Value Ref Range    Sodium 144 136 - 145 mmol/L    Potassium 4.2 3.5 - 5.0 mmol/L    Chloride 113 (H) 98 - 107 mmol/L    Carbon Dioxide (CO2) 20 (L) 22 - 31 mmol/L    Anion Gap 11 5 - 18 mmol/L    Urea Nitrogen 22 8 - 22 mg/dL    Creatinine 0.91 0.60 - 1.10 mg/dL    Calcium 8.6 8.5 - 10.5 mg/dL    Glucose 102 70 - 125 mg/dL    GFR Estimate 71 >60 mL/min/1.73m2      Comment:      As of July 11, 2021, eGFR is calculated by the CKD-EPI creatinine equation, without race adjustment. eGFR can be influenced by muscle mass, exercise, and diet. The reported eGFR is an estimation only and is only applicable if the renal function is stable.       If you have any questions or concerns, please call the clinic at the number listed above.       Sincerely,      Jose Angel Meyer MD

## 2021-11-18 NOTE — RESULT ENCOUNTER NOTE
Please mail labs to patient with this message.    Twaundalyn  Your blood sugar control is very good (AIC test is under 7, which is great!), and your kidney tests are stable.  Your CO2 test is low, so please make sure to take the sodium bicarbonate 3 times daily.    See you next month.  JIGNESH Meyer

## 2021-11-18 NOTE — PROGRESS NOTES
Patient Active Problem List    Diagnosis Date Noted     Stage 3a chronic kidney disease (H) 12/31/2020     Priority: High     Persistent protienuria       Copper deficiency 04/05/2019     Priority: High     Identified in Aug, 2018--see discharge summary from [facility] in [place].       Chronic pain disorder 10/07/2016     Priority: High     Chronic Pain Diagnosis:  neuropathy  DIRE Total Score(s):14     ORT: 5    4 - 7 =  Moderate Risk   of future problems with Opioids  FAQ5: 50 November 20, 2020  Behavioral Health Consultation: 5/12/17 with Dr. Daisy Quezada  Personal Care Plan for Chronic Pain: 5/12/17  Opioid medication:oxycodone   Dose:10mg  Number of pills per month:60  Patient is being prescribed 10mg of oxycodone IR (Percocet) per day this is 15 mg Morphine Equivalents  Benzodiazepines Prescribed? No  Naloxone prescribed? No       Clinic visit frequency required: Q 1 month Next visit due:   Controlled Substance/Opioid Treatment agreement on file (dated <12 months ago)?:    Date(s): pending  Last [place] verification: last visit  Items in red to be updated at each visit  Patient is followed by [facility] for ongoing prescription of pain medication.  All refills should be approved by this provider, or covering partner.Chronic Pain     Chronic Pain Diagnosis:  neuropathy  DIRE Total Score(s):14     ORT: 5    4 - 7 =  Moderate Risk   of future problems with Opioids  FAQ5: 45/100 on 9/9/16, 40/100 on 11/15/16  55 October 29, 2020    Behavioral Health Consultation: 5/12/17 with Dr. Daisy Quezada  Personal Care Plan for Chronic Pain: 5/12/17  Opioid medication:oxycodone   Dose:10mg  Number of pills per month:90  Patient is being prescribed 45 MME  Benzodiazepines Prescribed? No  Naloxone prescribed? No       Clinic visit frequency required: Q 1 month Next visit due:   Controlled Substance/Opioid Treatment agreement on file (dated <12 months ago)?:    Date(s): pending  Last [place] verification: last  visit  Items in red to be updated at each visit  Patient is followed by [facility] for ongoing prescription of pain medication.  All refills should be approved by this provider, or covering partner.Chronic Pain       Microalbuminuria 10/12/2015     Priority: High     Severely increased albuminuria       Type 2 diabetes mellitus with diabetic polyneuropathy (H) 10/12/2015     Priority: High     Essential hypertension 08/26/2013     Priority: High     Arthritis of both acromioclavicular joints 04/08/2021     Priority: Medium     xray proven        Morbid obesity (H) 11/27/2020     Priority: Medium     Hyperglycemia due to type 2 diabetes mellitus (H) 06/02/2020     Priority: Medium     Neck mass 10/11/2019     Priority: Medium     Added automatically from request for surgery [id number]       Metabolic acidosis 07/26/2019     Priority: Medium     reolved w/ discontinuation of ASA       Obesity 01/03/2019     Priority: Medium     Other acute pulmonary embolism without acute cor pulmonale (H) 12/31/2018     Priority: Medium     Hernia of anterior abdominal wall 01/28/2017     Priority: Medium     Osteoarthritis of both knees 10/31/2016     Priority: Medium     Polyneuropathy due to secondary diabetes mellitus (H) 10/31/2016     Priority: Medium     Compression neuropathy of left lower extremity 02/19/2016     Priority: Medium     Bilateral ankle joint pain 01/15/2016     Priority: Medium     Esophageal reflux 04/07/2015     Priority: Medium     Urinary incontinence 02/19/2015     Priority: Medium     Asthma 08/04/2013     Priority: Medium     History of cholecystectomy 08/04/2013     Priority: Medium     H/O necrotizing fasciitis 08/04/2013     Priority: Medium     Hyperlipidemia 06/21/2013     Priority: Medium     Status post bariatric surgery 06/21/2013     Priority: Medium     Kim-en-Y in 2006       Moderate major depression (H) 01/24/2013     Priority: Medium     Moderate persistent asthma without complication  11/15/2012     Priority: Medium     Persons encountering health services in other specified circumstances 11/15/2012     Priority: Medium     Tier 3    Status: Accept  Care Coordination Start Date: 02/27/12  State Tier Level: Level 3  Language/Barrier to Learning: No  Significant Mental Health Issues: Yes  Date Care Coordination Discontinued: 05/28/15    DX V65.8 REPLACED WITH [id number] [facility] (04/08/2013)       Abnormal Pap smear of cervix 04/22/2019     Priority: Low     3-12-15: Pap/HPV neg.  Plan: Given hx (mildly abn pap, hyster), recheck in 5 yrs.  10-11-11: Pap/HPV neg.  Plan: Given ASUS pap in 2010, recommend repeating pap in 1 year.  8/3/2010 ASCUS Pap with +BV- pt treated with Metrogel.  Pt needs repeat Pap in 1yr.  8/4/2008 Pt had total hyst for menorrhagia, pelvic pain, scar tissue, and fibroids.    3-12-15: Pap/HPV neg.  Plan: Given hx (mildly abn pap, hyster), recheck in 5 yrs.  10-11-11: Pap/HPV neg.  Plan: Given ASUS pap in 2010, recommend repeating pap in 1 year.  8/3/2010 ASCUS Pap with +BV- pt treated with Metrogel.  Pt needs repeat Pap in 1yr.  8/4/2008 Pt had total hyst for menorrhagia, pelvic pain, scar tissue, and fibroids.       Hernia 01/27/2017     Priority: Low     S/P hysterectomy 06/21/2013     Priority: Low     2008, For fibroids.  Cervix removed, but still has ovaries.       History of hysterectomy for benign disease 06/21/2013     Priority: Low     2008, For fibroids.  Cervix removed, but still has ovaries.       Carpal tunnel syndrome 11/15/2012     Priority: Low     Pulmonary embolism with infarction (H) 11/15/2012     Priority: Low     12/24/2018  Unprovoked.  + fam hx of DVT.  Treated w/ DOAC 12/2018through 9/2019 12/24/2018  Unprovoked.  + fam hx of DVT.  Plan warfarin for 6 months.       Health Care Home 11/15/2012     Priority: Low     Tier 3    Status: Accept  Care Coordination Start Date: 02/27/12  State Tier Level: Level 3  Language/Barrier to Learning:  No  Significant Mental Health Issues: Yes  Date Care Coordination Discontinued: 05/28/15    DX V65.8 REPLACED WITH [id number] [facility] (04/08/2013)         Chronic pain of both shoulders 12/23/2005     Priority: Low     Nursing Notes:   Viridiana Loaiza CMA  11/18/2021 12:02 PM  Signed  Clinic Administered Medication Documentation    Administrations This Visit     cyanocobalamin injection 1,000 mcg     Admin Date  11/18/2021 Action  Given Dose  1,000 mcg Route  Intramuscular Site  Left Deltoid Administered By  Viridiana Loaiza CMA    Ordering Provider: Jose Angel Meyer MD    NDC: 48611-205-68    Lot#: [id number]    : [facility]    Patient Supplied?: No                  Injectable Medication Documentation    Patient was given Cyanocobalamin (B-12). Prior to medication administration, verified patients identity using patient s name and date of birth. Please see MAR and medication order for additional information. Patient instructed to remain in clinic for 15 minutes.      Was entire vial of medication used? Yes  Vial/Syringe: Single dose vial  Expiration Date:  01302023  Was this medication supplied by the patient? No    Name of provider who requested the medication administration: Dr. Meyer  Name of provider on site (faculty or community preceptor) at the time of performing the medication administration: Dr. Meyer    Date of next administration: N/A  Date of next office visit with provider to renew medication plan (must be seen annually): N/A          Chief Complaint   Patient presents with     Pain Management     CPM     Blood pressure (!) 141/83, pulse 111, temperature 97.9  F (36.6  C), temperature source Oral, resp. rate 16, weight 91.3 kg (201 lb 3.2 oz), SpO2 96 %, not currently breastfeeding.   SUBJECTIVE:  Ruthy Álvarez is here for followup of her chronic pain syndrome, hypertension and type 2 diabetes mellitus.  Regarding her chronic pain, the etiology is osteoarthritis in her  knees and peripheral neuropathy.  She takes oxycodone 10 mg/325 tablets, a total of 4 daily, tolerates it well.  With it, she is able to sleep and function better during the day.  PDMP reviewed.  There are no red flags.  Urine drug screen or below is appropriate.    Regarding her diabetes, she is on basal insulin, Basaglar 26 units daily, Trulicity now 1.5 mg weekly and the recent addition of Jardiance 10 mg daily.      Regarding her blood sugar, she has not been checking them of late and wonders if she is eligible for a CGM.  Her sister has one and likes it a great deal.    Regarding her hypertension, she continues to be on diltiazem 120 mg at bedtime.  I have increased her lisinopril from 30 to 40 mg daily, and she tolerates this well.  She has metabolic acidosis that is currently treated with sodium bicarbonate and is due for a BMP today also.  She denies any chest pain or shortness of breath.  She denies any polydipsia, polyuria or hypoglycemia.  Her grieving, which was discussed at length last time, seems to be okay, but she did not elaborate beyond this today.  She needs a B12 shot, as she has B12 deficiency as a result of her prior gastric bypass surgery.    OBJECTIVE:    GENERAL:  The patient is alert, pleasant, in no acute distress.  VITAL SIGNS:  Her blood pressure is mildly elevated at 141/83 but definitely improved.  CHEST:  Clear.  HEART:  Regular rate and rhythm without murmur.  PSYCH:  Affect is appropriate.    ASSESSMENT:    1.  Chronic pain syndrome, stable.  Refilled oxycodone at her current doses.  She will follow up monthly.  2.  Regarding her diabetes, her A1c is very reasonable.  I will continue her current therapy.  If she continues to tolerate her Jardiance, would increase the dose from 10 to 25 mg daily next visit.  She has only been on it for 1 week.  BMP is pending today to evaluate her creatinine and bicarbonate.  Next time, if she remains well symptomatically, would simply increase her  Jardiance from 10 to 25 mg daily.  I did prescribe a CGM for her, and she will come back with a visit with our pharmacy team if she needs help with instructions.  3.  For hypertension, continue dilt 120 and lisinopril 40 mg for now.  4.  For her neuropathic pain, I also refilled her gabapentin.  She takes a liquid preparation due to intolerance.        Results for orders placed or performed in visit on 11/18/21   Hemoglobin A1c     Status: Abnormal   Result Value Ref Range    Hemoglobin A1C 6.9 (H) 0.0 - 5.6 %   Results for orders placed or performed in visit on 11/18/21   Urine Drugs of Abuse Screen Panel 13     Status: Abnormal   Result Value Ref Range    Cannabinoids (54-ape-1-carboxy-9-THC) Not Detected Not Detected, Indeterminate    Phencyclidine Not Detected Not Detected, Indeterminate    Cocaine (Benzoylecgonine) Not Detected Not Detected, Indeterminate    Methamphetamine (d-Methamphetamine) Not Detected Not Detected, Indeterminate    Opiates (Morphine) Not Detected Not Detected, Indeterminate    Amphetamine (d-Amphetamine) Detected (A) Not Detected, Indeterminate    Benzodiazepines (Nordiazepam) Not Detected Not Detected, Indeterminate    Tricyclic Antidepressants (Desipramine) Not Detected Not Detected, Indeterminate    Methadone Not Detected Not Detected, Indeterminate    Barbiturates (Butalbital) Not Detected Not Detected, Indeterminate    Oxycodone Detected (A) Not Detected, Indeterminate    Propoxyphene (Norpropoxyphene) Not Detected Not Detected, Indeterminate    Buprenorphine Not Detected Not Detected, Indeterminate

## 2021-11-18 NOTE — NURSING NOTE
Clinic Administered Medication Documentation    Administrations This Visit     cyanocobalamin injection 1,000 mcg     Admin Date  11/18/2021 Action  Given Dose  1,000 mcg Route  Intramuscular Site  Left Deltoid Administered By  Viridiana Loaiza CMA    Ordering Provider: Jose Angel Meyer MD    NDC: 51535-869-77    Lot#: [id number]    : [employer]    Patient Supplied?: No                  Injectable Medication Documentation    Patient was given Cyanocobalamin (B-12). Prior to medication administration, verified patients identity using patient s name and date of birth. Please see MAR and medication order for additional information. Patient instructed to remain in clinic for 15 minutes.      Was entire vial of medication used? Yes  Vial/Syringe: Single dose vial  Expiration Date:  01302023  Was this medication supplied by the patient? No    Name of provider who requested the medication administration: Dr. Meyer  Name of provider on site (faculty or community preceptor) at the time of performing the medication administration: Dr. Meyer    Date of next administration: N/A  Date of next office visit with provider to renew medication plan (must be seen annually): N/A

## 2021-12-09 ENCOUNTER — OFFICE VISIT (OUTPATIENT)
Dept: FAMILY MEDICINE | Facility: CLINIC | Age: 56
End: 2021-12-09
Payer: MEDICARE

## 2021-12-09 VITALS
TEMPERATURE: 98.2 F | HEART RATE: 107 BPM | OXYGEN SATURATION: 96 % | RESPIRATION RATE: 16 BRPM | SYSTOLIC BLOOD PRESSURE: 122 MMHG | DIASTOLIC BLOOD PRESSURE: 77 MMHG

## 2021-12-09 DIAGNOSIS — G89.4 CHRONIC PAIN SYNDROME: Primary | ICD-10-CM

## 2021-12-09 DIAGNOSIS — K62.5 RECTAL BLEEDING: ICD-10-CM

## 2021-12-09 PROCEDURE — 80306 DRUG TEST PRSMV INSTRMNT: CPT | Performed by: FAMILY MEDICINE

## 2021-12-09 PROCEDURE — 96372 THER/PROPH/DIAG INJ SC/IM: CPT | Performed by: FAMILY MEDICINE

## 2021-12-09 PROCEDURE — 99214 OFFICE O/P EST MOD 30 MIN: CPT | Mod: 25 | Performed by: FAMILY MEDICINE

## 2021-12-09 RX ORDER — OXYCODONE AND ACETAMINOPHEN 10; 325 MG/1; MG/1
TABLET ORAL
Qty: 120 TABLET | Refills: 0 | Status: SHIPPED | OUTPATIENT
Start: 2021-12-09 | End: 2022-01-06

## 2021-12-09 RX ADMIN — CYANOCOBALAMIN 1000 MCG: 1000 INJECTION, SOLUTION INTRAMUSCULAR; SUBCUTANEOUS at 14:56

## 2021-12-09 NOTE — LETTER
Opioid / Opioid Plus Controlled Substance Agreement    This is an agreement between you and your provider about the safe and appropriate use of controlled substance/opioids prescribed by your care team. Controlled substances are medicines that can cause physical and mental dependence (abuse).    There are strict laws about having and using these medicines. We here at [facility] are committing to working with you in your efforts to get better. To support you in this work, we ll help you schedule regular office appointments for medicine refills. If we must cancel or change your appointment for any reason, we ll make sure you have enough medicine to last until your next appointment.     As a Provider, I will:    Listen carefully to your concerns and treat you with respect.     Recommend a treatment plan that I believe is in your best interest. This plan may involve therapies other than opioid pain medication.     Talk with you often about the possible benefits, and the risk of harm of any medicine that we prescribe for you.     Provide a plan on how to taper (discontinue or go off) using this medicine if the decision is made to stop its use.    As a Patient, I understand that opioid(s):     Are a controlled substance prescribed by my care team to help me function or work and manage my condition(s).     Are strong medicines and can cause serious side effects such as:    Drowsiness, which can seriously affect my driving ability    A lower breathing rate, enough to cause death    Harm to my thinking ability     Depression     Abuse of and addiction to this medicine    Need to be taken exactly as prescribed. Combining opioids with certain medicines or chemicals (such as illegal drugs, sedatives, sleeping pills, and benzodiazepines) can be dangerous or even fatal. If I stop opioids suddenly, I may have severe withdrawal symptoms.    Do not work for all types of pain nor for all patients. If they re not helpful, I may  be asked to stop them.        The risks, benefits and side effects of these medicine(s) were explained to me. I agree that:  1. I will take part in other treatments as advised by my care team. This may be psychiatry or counseling, physical therapy, behavioral therapy, group treatment or a referral to a specialist.     2. I will keep all my appointments. I understand that this is part of the monitoring of opioids. My care team may require an office visit for EVERY opioid/controlled substance refill. If I miss appointments or don t follow instructions, my care team may stop my medicine.    3. I will take my medicines as prescribed. I will not change the dose or schedule unless my care team tells me to. There will be no refills if I run out early.     4. I may be asked to come to the clinic and complete a urine drug test or complete a pill count at any time. If I don t give a urine sample or participate in a pill count, the care team may stop my medicine.    5. I will only receive prescriptions from this clinic for chronic pain. If I am treated by another provider for acute pain issues, I will tell them that I am taking opioid pain medication for chronic pain and that I have a treatment agreement with this provider. I will inform my [facility] care team within one business day if I am given a prescription for any pain medication by another healthcare provider. My [facility] care team can contact other providers and pharmacists about my use of any medicines.    6. It is up to me to make sure that I don t run out of my medicines on weekends or holidays. If my care team is willing to refill my opioid prescription without a visit, I must request refills only during office hours. Refills may take up to 3 business days to process. I will use one pharmacy to fill all my opioid and other controlled substance prescriptions. I will notify the clinic about any changes to my insurance or medication  availability.    7. I am responsible for my prescriptions. If the medicine/prescription is lost, stolen or destroyed, it will not be replaced. I also agree not to share controlled substance medicines with anyone.    8. I am aware I should not use any illegal or recreational drugs. I agree not to drink alcohol unless my care team says I can.       9. If I enroll in the [place] [facility] Cannabis program, I will tell my care team prior to my next refill.     10. I will tell my care team right away if I become pregnant, have a new medical problem treated outside of my regular clinic, or have a change in my medications.    11. I understand that this medicine can affect my thinking, judgment and reaction time. Alcohol and drugs affect the brain and body, which can affect the safety of my driving. Being under the influence of alcohol or drugs can affect my decision-making, behaviors, personal safety, and the safety of others. Driving while impaired (DWI) can occur if a person is driving, operating, or in physical control of a car, motorcycle, boat, snowmobile, ATV, motorbike, off-road vehicle, or any other motor vehicle ([place] Statute 169A.20). I understand the risk if I choose to drive or operate any vehicle or machinery.    I understand that if I do not follow any of the conditions above, my prescriptions or treatment may be stopped or changed.          Opioids  What You Need to Know    What are opioids?   Opioids are pain medicines that must be prescribed by a doctor. They are also known as narcotics.     Examples are:   1. morphine (MS Contin, Lucy)  2. oxycodone (Oxycontin)  3. oxycodone and acetaminophen (Percocet)  4. hydrocodone and acetaminophen (Vicodin, Norco)   5. fentanyl patch (Duragesic)   6. hydromorphone (Dilaudid)   7. methadone  8. codeine (Tylenol #3)     What do opioids do well?   Opioids are best for severe short-term pain such as after a surgery or injury. They may work well for cancer pain. They may  help some people with long-lasting (chronic) pain.     What do opioids NOT do well?   Opioids never get rid of pain entirely, and they don t work well for most patients with chronic pain. Opioids don t reduce swelling, one of the causes of pain.                                    Other ways to manage chronic pain and improve function include:       Treat the health problem that may be causing pain    Anti-inflammation medicines, which reduce swelling and tenderness, such as ibuprofen (Advil, Motrin) or naproxen (Aleve)    Acetaminophen (Tylenol)    Antidepressants and anti-seizure medicines, especially for nerve pain    Topical treatments such as patches or creams    Injections or nerve blocks    Chiropractic or osteopathic treatment    Acupuncture, massage, deep breathing, meditation, visual imagery, aromatherapy    Use heat or ice at the pain site    Physical therapy     Exercise    Stop smoking    Take part in therapy       Risks and side effects     Talk to your doctor before you start or decide to keep taking opioids. Possible side effects include:      Lowering your breathing rate enough to cause death    Overdose, including death, especially if taking higher than prescribed doses    Worse depression symptoms; less pleasure in things you usually enjoy    Feeling tired or sluggish    Slower thoughts or cloudy thinking    Being more sensitive to pain over time; pain is harder to control    Trouble sleeping or restless sleep    Changes in hormone levels (for example, less testosterone)    Changes in sex drive or ability to have sex    Constipation    Unsafe driving    Itching and sweating    Dizziness    Nausea, throwing up and dry mouth    What else should I know about opioids?    Opioids may lead to dependence, tolerance, or addiction.      Dependence means that if you stop or reduce the medicine too quickly, you will have withdrawal symptoms. These include loose poop (diarrhea), jitters, flu-like symptoms,  nervousness and tremors. Dependence is not the same as addiction.                       Tolerance means needing higher doses over time to get the same effect. This may increase the chance of serious side effects.      Addiction is when people improperly use a substance that harms their body, their mind or their relations with others. Use of opiates can cause a relapse of addiction if you have a history of drug or alcohol abuse.      People who have used opioids for a long time may have a lower quality of life, worse depression, higher levels of pain and more visits to doctors.    You can overdose on opioids. Take these steps to lower your risk of overdose:    1. Recognize the signs:  Signs of overdose include decrease or loss of consciousness (blackout), slowed breathing, trouble waking up and blue lips. If someone is worried about overdose, they should call 911.    2. Talk to your doctor about Narcan (naloxone).   If you are at risk for overdose, you may be given a prescription for Narcan. This medicine very quickly reverses the effects of opioids.   If you overdose, a friend or family member can give you Narcan while waiting for the ambulance. They need to know the signs of overdose and how to give Narcan.     3. Don't use alcohol or street drugs.   Taking them with opioids can cause death.    4. Do not take any of these medicines unless your doctor says it s OK. Taking these with opioids can cause death:    Benzodiazepines, such as lorazepam (Ativan), alprazolam (Xanax) or diazepam (Valium)    Muscle relaxers, such as cyclobenzaprine (Flexeril)    Sleeping pills like zolpidem (Ambien)     Other opioids      How to keep you and other people safe while taking opioids:    1. Never share your opioids with others.  Opioid medicines are regulated by the Drug Enforcement Agency ([employer]). Selling or sharing medications is a criminal act.    2. Be sure to store opioids in a secure place, locked up if possible. Young children  can easily swallow them and overdose.    3. When you are traveling with your medicines, keep them in the original bottles. If you use a pill box, be sure you also carry a copy of your medicine list from your clinic or pharmacy.    4. Safe disposal of opioids    Most pharmacies have places to get rid of medicine, called disposal kiosks. Medicine disposal options are also available in every [facility]. Search your county and  medication disposal  to find more options. You can find more details at:  [email or web address]     I agree that my provider, clinic care team, and pharmacy may work with any city, state or federal law enforcement agency that investigates the misuse, sale, or other diversion of my controlled medicine. I will allow my provider to discuss my care with, or share a copy of, this agreement with any other treating provider, pharmacy or emergency room where I receive care.    I have read this agreement and have asked questions about anything I did not understand.    _______________________________________________________  Patient Signature - Ruthy Álvarez _____________________                   Date     _______________________________________________________  Provider Signature - Jose Angel Meyer MD   _____________________                   Date     _______________________________________________________  Witness Signature (required if provider not present while patient signing)   _____________________                   Date

## 2021-12-09 NOTE — PROGRESS NOTES
Subjective   Ruthy is a 56 year old who presents for the following health issues     HPI     Pain History:  When did you first notice your pain? - More than 6 weeks   Have you seen this provider for your pain in the past?   Yes   Where in your body do you have pain? Knees primarily  Are you seeing anyone else for your pain? No    PHQ-9 SCORE 12/5/2019 5/6/2021 10/21/2021   PHQ-9 Total Score - - -   PHQ-9 Total Score 19 18 4       JUAN-7 SCORE 12/5/2019 5/6/2021 5/6/2021   Total Score - - -   Total Score 12 16 16         Chronic Pain Follow Up:    Location of pain: legs:  Knees/hips/ankles  Analgesia/pain control:    - Recent changes:  no    - Overall control: Tolerable with discomfort    - Current treatments: oxycodone tid/qid.  Much worse at night   Adherence:     - Do you ever take more pain medicine than prescribed? Yes:  Has cramps in legs at night.     - When did you take your last dose of pain medicine?  today   Adverse effects: No   PDMP Review       Value Time User    State PDMP site checked  Yes 11/18/2021 11:11 AM Jose Angel Meyer MD        Last CSA Agreement:   CSA -- Patient Level:    CSA: None found at the patient level.       Last UDS: 11/18/2021            Review of Systems         Objective    /77 (BP Location: Left arm, Patient Position: Sitting, Cuff Size: Adult Regular)   Pulse 107   Temp 98.2  F (36.8  C) (Oral)   Resp 16   LMP  (LMP Unknown)   SpO2 96%   There is no height or weight on file to calculate BMI.  Physical Exam   GENERAL: healthy, alert and no distress  RESP: lungs clear to auscultation - no rales, rhonchi or wheezes  CV: regular rate and rhythm, normal S1 S2, no S3 or S4, no murmur, click or rub, no peripheral edema and peripheral pulses strong  MS: no gross musculoskeletal defects noted, no edema    POOR AUDIO    In addition to the above, Ruthy need a refill of her pain meds and for her B12 deficiency.  She gets subcutaneous injections on a  monthly basis due to her known deficiency, which is probably related to her gastric bypass.    She has had rectal bleeding when wiping over the past few weeks.  She has known hemorrhoids that were treated in the past.  She had a colonoscopy in 2017 that was unremarkable.  She would like intervention for that.  She has no other symptoms to suggest pneumonia.    She has undergone some psychological trauma in the past months.  Please see prior dictations.  She seems to be coping okay and does mention it today.    OBJECTIVE:    VITAL SIGNS:  Blood pressure is excellent.  She is mildly tachycardic, very pleasant.  CHEST:  Clear.  HEART:  Regular.  Please see exam as above.    ASSESSMENT:    1.  Chronic pain syndrome.  Data base reviewed.  No red flags noted.  Oxycodone refilled.  Urine drug screen is pending at the time of this dictation.  She has been on stable doses for years and has improved sleep with higher dose of oxycodone at night.  2.  Regarding her rectal bleeding, refer to Colorectal Surgery for treatment of presumed hemorrhoid.  3.  Regarding B12 deficiency, injection given today.  Follow up monthly.

## 2021-12-09 NOTE — NURSING NOTE
Clinic Administered Medication Documentation    Administrations This Visit     cyanocobalamin injection 1,000 mcg     Admin Date  12/09/2021 Action  Given Dose  1,000 mcg Route  Intramuscular Site  Left Deltoid Administered By  Viridiana Loaiza CMA    Ordering Provider: Jose Angel aBrron MD    NDC: 48345-610-17    Lot#: [id number]    : [place]    Patient Supplied?: No                  Injectable Medication Documentation    Patient was given Cyanocobalamin (B-12). Prior to medication administration, verified patients identity using patient s name and date of birth. Please see MAR and medication order for additional information. Patient instructed to remain in clinic for 15 minutes.      Was entire vial of medication used? Yes  Vial/Syringe: Single dose vial  Expiration Date:  12/30/2022  Was this medication supplied by the patient? No    Name of provider who requested the medication administration: Dr. Barron  Name of provider on site (faculty or community preceptor) at the time of performing the medication administration: Dr. barron    Date of next administration: Monthly  Date of next office visit with provider to renew medication plan (must be seen annually): N/A

## 2021-12-10 NOTE — PATIENT INSTRUCTIONS
12/10/21   [facility]    [address] [place] [address], [address]  [facility], [address]  Phone: [phone]  Fax: [phone]      Faxed referral, demographics office notes and labs. They will contact patient to schedule.     Megan Quezada

## 2022-01-04 ENCOUNTER — OFFICE VISIT (OUTPATIENT)
Dept: FAMILY MEDICINE | Facility: CLINIC | Age: 57
End: 2022-01-04
Payer: COMMERCIAL

## 2022-01-04 VITALS
RESPIRATION RATE: 16 BRPM | DIASTOLIC BLOOD PRESSURE: 82 MMHG | SYSTOLIC BLOOD PRESSURE: 149 MMHG | HEART RATE: 117 BPM | OXYGEN SATURATION: 96 % | TEMPERATURE: 98.7 F

## 2022-01-04 DIAGNOSIS — E11.42 TYPE 2 DIABETES MELLITUS WITH DIABETIC POLYNEUROPATHY, WITH LONG-TERM CURRENT USE OF INSULIN (H): ICD-10-CM

## 2022-01-04 DIAGNOSIS — Z20.822 EXPOSURE TO 2019 NOVEL CORONAVIRUS: Primary | ICD-10-CM

## 2022-01-04 DIAGNOSIS — M79.674 PAIN OF TOE OF RIGHT FOOT: ICD-10-CM

## 2022-01-04 DIAGNOSIS — Z79.4 TYPE 2 DIABETES MELLITUS WITH DIABETIC POLYNEUROPATHY, WITH LONG-TERM CURRENT USE OF INSULIN (H): ICD-10-CM

## 2022-01-04 DIAGNOSIS — M25.512 ACUTE PAIN OF LEFT SHOULDER: ICD-10-CM

## 2022-01-04 PROCEDURE — 99214 OFFICE O/P EST MOD 30 MIN: CPT | Mod: GC | Performed by: FAMILY MEDICINE

## 2022-01-04 PROCEDURE — U0005 INFEC AGEN DETEC AMPLI PROBE: HCPCS | Performed by: FAMILY MEDICINE

## 2022-01-04 RX ORDER — ACETAMINOPHEN 500 MG
500 TABLET ORAL 2 TIMES DAILY PRN
Qty: 30 TABLET | Refills: 0 | Status: SHIPPED | OUTPATIENT
Start: 2022-01-04 | End: 2024-06-28

## 2022-01-04 RX ORDER — INSULIN GLARGINE 100 [IU]/ML
INJECTION, SOLUTION SUBCUTANEOUS
Qty: 6 ML | Refills: 11 | Status: SHIPPED | OUTPATIENT
Start: 2022-01-04 | End: 2022-11-18

## 2022-01-04 NOTE — PROGRESS NOTES
[facility]    Assessment/Plan:    Exposure to 2019 novel coronavirus  Has a history of unprovoked PEs.  If her Covid test is positive I would strongly recommend putting her on 1 month of DOAC for VTE prophylaxis.  Also has history of asthma but no current shortness of breath and lung sounds clear  - Symptomatic; Unknown COVID-19 Virus (Coronavirus) by PCR Nose    Acute pain of left shoulder  Strong suspicion for rotator cuff pathology on the left side.  Tender to palpation and with shoulder movement so I do not suspect any cardiac etiology.  Pain control options are limited with her CKD.  She is currently on Percocet for chronic pain and has room for about 1000 more milligrams of Tylenol during the day which I encouraged her to take for the next week.  She has follow-up with her PCP in few days.  - acetaminophen (TYLENOL) 500 MG tablet  Dispense: 30 tablet; Refill: 0    Right toe pain  Stubbed right great toe a month ago.  Has had pain and swelling ever since.  We discussed potentially getting an x-ray in the future but I do not feel like this is emergent right now as it would not significantly  and given she is currently a PUI I would prefer to wait on x-ray until the very least her Covid test is negative.      Tuyet Garcia MD  PGY3, Family Medicine    I staffed with Dr. Hernandes, who agrees with my assessment and plan.       Ruthy Álvarez is a 56 year old female with a PMH of   Patient Active Problem List   Diagnosis     Chronic pain of both shoulders     Abnormal Pap smear of cervix     Moderate persistent asthma without complication     Carpal tunnel syndrome     Pulmonary embolism with infarction (H)     Health Care Home     Moderate major depression (H)     S/P hysterectomy     Hyperlipidemia     Status post bariatric surgery     Essential hypertension     Urinary incontinence     Esophageal reflux     Microalbuminuria     Type 2 diabetes mellitus with  diabetic polyneuropathy (H)     Bilateral ankle joint pain     Chronic pain disorder     Other acute pulmonary embolism without acute cor pulmonale (H)     Asthma     Compression neuropathy of left lower extremity     Hernia     Hernia of anterior abdominal wall     Obesity     Osteoarthritis of both knees     Polyneuropathy due to secondary diabetes mellitus (H)     Copper deficiency     Metabolic acidosis     Neck mass     Persons encountering health services in other specified circumstances     Hyperglycemia due to type 2 diabetes mellitus (H)     History of cholecystectomy     History of hysterectomy for benign disease     Morbid obesity (H)     Stage 3a chronic kidney disease (H)     Arthritis of both acromioclavicular joints     H/O necrotizing fasciitis     presenting to clinic today with a chief complaint of:    Patient presents with:  Cough: Sister tested positive for Covid  Pain: left shoulder pain    1 day cough.  She was around her sister and her sister today tested positive for Covid.  She has not had any shortness of breath.  She is not having fever or body aches.  She does have chronic pain and has pain in both legs chronically.    L shoulder hurts and then has a sharp pain when sitting. Hurts when move around. Tender to palpation. Started about a week ago. First noticed when lying down in bed.     Current daily dose of tylenol from percocet- 325 qid;     She stubbed her right great toe a month and half ago and continues to have pain and swelling in the toe.    Current Outpatient Medications   Medication Sig Dispense Refill     acetaminophen (TYLENOL) 500 MG tablet Take 1 tablet (500 mg) by mouth 2 times daily as needed for mild pain 30 tablet 0     albuterol (2.5 MG/3ML) 0.083% nebulizer solution Take 1 vial (2.5 mg) by nebulization every 6 hours as needed for shortness of breath / dyspnea 1 Box prn     albuterol (VENTOLIN HFA) 108 (90 Base) MCG/ACT inhaler Inhale 2 puffs into the lungs 4 times daily  Inhale 2 puffs every 4-6 hours as needed 2 Inhaler 1     Alcohol Swabs (PRO COMFORT ALCOHOL) 70 % PADS USE TO TEST BLOOD GLUCOSE FOUR TIMES DAILY 400 each 11     amitriptyline (ELAVIL) 10 MG tablet TAKE 1 TABLET(10 MG) BY MOUTH AT BEDTIME 30 tablet 11     atorvastatin (LIPITOR) 10 MG tablet TAKE 1 TABLET BY MOUTH EVERY DAY 90 tablet 3     blood glucose (NO BRAND SPECIFIED) lancets standard Check fasting glucose 4 times daily.  For Accu check monitor 360 each 11     Continuous Blood Gluc Sensor (FREESTYLE ANIKET 14 DAY SENSOR) MISC 1 each every 14 days Change every 14 days. 2 each 5     CONTOUR NEXT TEST test strip USE TO TEST BLOOD GLUCOSE FOUR TIMES DAILY 400 strip 11     cyanocobalamin (CYANOCOBALAMIN) 1000 MCG/ML injection Inject 1 mL (1,000 mcg) into the muscle every 30 days 1 mL 11     diclofenac (VOLTAREN) 1 % topical gel Apply 4 g topically 3 times daily To top of both shoulders in area of pain 350 g 2     diltiazem ER (DILT-XR) 120 MG 24 hr capsule TAKE 1 CAPSULE(120 MG) BY MOUTH AT BEDTIME 90 capsule 4     dulaglutide (TRULICITY) 1.5 MG/0.5ML pen Inject 1.5 mg Subcutaneous every 7 days 2 mL 11     DULoxetine (CYMBALTA) 60 MG capsule Take 1 capsule (60 mg) by mouth daily 30 capsule 11     famotidine (PEPCID) 20 MG tablet Take 1 tablet (20 mg) by mouth 2 times daily 60 tablet 11     ferrous sulfate (IRON) 325 (65 FE) MG tablet Take 1 tablet (325 mg) by mouth daily (with breakfast) 30 tablet 2     fluticasone-salmeterol (ADVAIR HFA) 230-21 MCG/ACT inhaler Inhale 2 puffs into the lungs       gabapentin (NEURONTIN) 250 MG/5ML solution Take 15 mLs (750 mg) by mouth 3 times daily 1350 mL 2     insulin pen needle (BD ULTRA-FINE) 29G X 12.7MM miscellaneous Use 1 time daily 90 each 3     JARDIANCE 10 MG TABS tablet TAKE 1 TABLET(10 MG) BY MOUTH DAILY 30 tablet 1     Lancets 30G MISC USE TO TEST BLOOD GLUCOSE FOUR TIMES DAILY 400 each 11     LANTUS SOLOSTAR 100 UNIT/ML soln INJECT 25 UNITS UNDER THE SKIN DAILY 6 mL 11      latanoprost (XALATAN) 0.005 % ophthalmic solution INSTILL 1 DROP IN BOTH EYES DAILY AT BEDTIME       lidocaine (LMX4) 4 % external cream Apply topically once as needed for mild pain (on upper back) 45 g 2     lisinopril (ZESTRIL) 40 MG tablet Take 1 tablet (40 mg) by mouth At Bedtime 90 tablet 3     magnesium oxide (MAG-OX) 400 (241.3 Mg) MG tablet Take 1 tablet (400 mg) by mouth 2 times daily 60 tablet 1     naloxone (NARCAN) 4 MG/0.1ML nasal spray Spray 1 spray (4 mg) into one nostril alternating nostrils once as needed for opioid reversal Every 2-3 minutes until patient responsive or EMS arrives 0.2 mL 0     order for DME Equipment being ordered: wheeled walker with seat 1 Device 0     oxyCODONE-acetaminophen (PERCOCET)  MG per tablet Take 2 tablets by mouth At Bedtime. May also take 1 tablet every 6 hours as needed for severe pain. 120 tablet 0     phentermine (ADIPEX-P) 37.5 MG tablet Take 0.5-1 tablets (18.75-37.5 mg) by mouth every morning 90 tablet 1     risperiDONE (RISPERDAL) 0.25 MG tablet Take 1 tablet (0.25 mg) by mouth At Bedtime       senna-docusate (SENOKOT-S/PERICOLACE) 8.6-50 MG tablet Take 1 tablet by mouth 2 times daily 60 tablet 11     Simethicone 180 MG CAPS Use 1 softgel as needed for gas 60 capsule 11     sodium bicarbonate 650 MG tablet Take 1 tablet (650 mg) by mouth 3 times daily 90 tablet 11     SUMAtriptan (IMITREX) 25 MG tablet One tab at onset of HA.  May repeat dose in 2 hours.  Do not exceed 200 mg in 24 hours 24 tablet 1     tolterodine ER (DETROL LA) 2 MG 24 hr capsule Take 1 capsule (2 mg) by mouth daily 90 capsule 3     vitamin D2 (ERGOCALCIFEROL) 77760 units (1250 mcg) capsule Take 1 capsule by mouth three times a week  0     vitamin D3 (CHOLECALCIFEROL) 1.25 MG (53671 UT) capsule Take 1 capsule (50,000 Units) by mouth three times a week 39 capsule 3       O: BP (!) 149/82   Pulse 117   Temp 98.7  F (37.1  C)   Resp 16   LMP  (LMP Unknown)   SpO2 96%    Gen:  Well  nourished and in no acute distress   HEENT: PERRL;  nasopharynx pink and moist; oropharynx pink and moist  Neck: supple without lymphadenopathy  CV:  RRR  - no murmurs noted   Pulm:  CTAB, no wheezes or crackles noted, good air entry   Shoulder: No bony abnormalities.  Tender to palpation over AC joint.  Decreased range of motion especially with abduction and pain with active range of motion.  Positive pain and weakness on L empty can test.   Right foot: Right great toe with dark ecchymosis and soft tissue swelling.  Tender to palpation especially over proximal phalanx.  No skin breakdown, erythema, induration.  Psych: Euthymic     This note was created using Dragon dictation system. Typos are not purposeful.

## 2022-01-04 NOTE — PROGRESS NOTES
Preceptor Attestation:    I discussed the patient with the resident and evaluated the patient in person. I have verified the content of the note, which accurately reflects my assessment of the patient and the plan of care.   Supervising Physician:  Jake Hernandes MD.

## 2022-01-05 LAB — SARS-COV-2 RNA RESP QL NAA+PROBE: NORMAL

## 2022-01-06 ENCOUNTER — TELEPHONE (OUTPATIENT)
Dept: FAMILY MEDICINE | Facility: CLINIC | Age: 57
End: 2022-01-06
Payer: COMMERCIAL

## 2022-01-06 ENCOUNTER — OFFICE VISIT (OUTPATIENT)
Dept: FAMILY MEDICINE | Facility: CLINIC | Age: 57
End: 2022-01-06
Payer: COMMERCIAL

## 2022-01-06 VITALS
RESPIRATION RATE: 16 BRPM | TEMPERATURE: 98.2 F | HEART RATE: 105 BPM | SYSTOLIC BLOOD PRESSURE: 154 MMHG | OXYGEN SATURATION: 99 % | DIASTOLIC BLOOD PRESSURE: 89 MMHG

## 2022-01-06 DIAGNOSIS — S90.111D CONTUSION OF RIGHT GREAT TOE WITHOUT DAMAGE TO NAIL, SUBSEQUENT ENCOUNTER: ICD-10-CM

## 2022-01-06 DIAGNOSIS — E11.42 TYPE 2 DIABETES MELLITUS WITH DIABETIC POLYNEUROPATHY, WITH LONG-TERM CURRENT USE OF INSULIN (H): ICD-10-CM

## 2022-01-06 DIAGNOSIS — G89.4 CHRONIC PAIN SYNDROME: Primary | ICD-10-CM

## 2022-01-06 DIAGNOSIS — Z79.4 TYPE 2 DIABETES MELLITUS WITH DIABETIC POLYNEUROPATHY, WITH LONG-TERM CURRENT USE OF INSULIN (H): ICD-10-CM

## 2022-01-06 DIAGNOSIS — U07.1 INFECTION DUE TO 2019 NOVEL CORONAVIRUS: ICD-10-CM

## 2022-01-06 LAB — SARS-COV-2 RNA RESP QL NAA+PROBE: DETECTED

## 2022-01-06 PROCEDURE — 96372 THER/PROPH/DIAG INJ SC/IM: CPT | Performed by: FAMILY MEDICINE

## 2022-01-06 PROCEDURE — 99214 OFFICE O/P EST MOD 30 MIN: CPT | Mod: 25 | Performed by: FAMILY MEDICINE

## 2022-01-06 RX ORDER — FLASH GLUCOSE SENSOR
1 KIT MISCELLANEOUS
Qty: 2 EACH | Refills: 5 | Status: SHIPPED | OUTPATIENT
Start: 2022-01-06 | End: 2022-11-09

## 2022-01-06 RX ORDER — OXYCODONE AND ACETAMINOPHEN 10; 325 MG/1; MG/1
TABLET ORAL
Qty: 120 TABLET | Refills: 0 | Status: SHIPPED | OUTPATIENT
Start: 2022-01-06 | End: 2022-02-03

## 2022-01-06 RX ADMIN — CYANOCOBALAMIN 1000 MCG: 1000 INJECTION, SOLUTION INTRAMUSCULAR; SUBCUTANEOUS at 12:08

## 2022-01-06 NOTE — NURSING NOTE
Clinic Administered Medication Documentation    Administrations This Visit     cyanocobalamin injection 1,000 mcg     Admin Date  01/06/2022 Action  Given Dose  1,000 mcg Route  Intramuscular Site  Left Deltoid Administered By  Viridiana Loaiza CMA    Ordering Provider: Jose Angel Barron MD    NDC: 65478-282-97    Lot#: [id number]    : [employer]    Patient Supplied?: No                  Injectable Medication Documentation    Patient was given Cyanocobalamin (B-12). Prior to medication administration, verified patients identity using patient s name and date of birth. Please see MAR and medication order for additional information. Patient instructed to remain in clinic for 15 minutes.      Was entire vial of medication used? Yes  Vial/Syringe: Single dose vial  Expiration Date:  12/30/2022  Was this medication supplied by the patient? No    Name of provider who requested the medication administration: Dr. barron  Name of provider on site (faculty or community preceptor) at the time of performing the medication administration: Dr. Barron    Date of next administration: N/A  Date of next office visit with provider to renew medication plan (must be seen annually): N/A

## 2022-01-06 NOTE — TELEPHONE ENCOUNTER
"Coronavirus (COVID-19) Notification    Caller Name (Patient, parent, daughter/son, grandparent, etc)  The patient states was contacted by [facility] today along with the quarantine informations.  The patient started laughing when writer asked the patient to verify herself with date of birth, name and phone #.Writer explained the reason for verification and the process.      Reason for call  Notify of Positive Coronavirus (COVID-19) lab results, assess symptoms,  review [facility] recommendations    Lab Result    Lab test:  2019-nCoV rRt-PCR or SARS-CoV-2 PCR    Oropharyngeal AND/OR nasopharyngeal swabs is POSITIVE for 2019-nCoV RNA/SARS-COV-2 PCR (COVID-19 virus)    RN Recommendations/Instructions per [facility] Coronavirus COVID-19 recommendations    Brief introduction script  Introduce self then review script:  \"I am calling on behalf of [email or web address].  We were notified that your Coronavirus test (COVID-19) for was POSITIVE for the virus.  I have some information to relay to you but first I wanted to mention that the [place] Dept of Health will be contacting you shortly [it's possible MD already called Patient] to talk to you more about how you are feeling and other people you have had contact with who might now also have the virus.  Also, [facility] is Partnering with the [facility] for Covid-19 research, you may be contacted directly by research staff.\"    Assessment (Inquire about Patient's current symptoms)   Assessment   Current Symptoms at time of phone call: (if no symptoms, document No symptoms] Patient would not say   Symptoms onset (if applicable) See comment above     If at time of call, Patients symptoms hare worsened, the Patient should contact 911 or have someone drive them to Emergency Dept promptly:      If Patient calling 911, inform 911 personal that you have tested positive for the Coronavirus (COVID-19).  Place mask on and await 911 to arrive.    If Emergency " Dept, If possible, please have another adult drive you to the Emergency Dept but you need to wear mask when in contact with other people.      Monoclonal Antibody Administration    You may be eligible to receive a new treatment with a monoclonal antibody for preventing hospitalization in patients at high risk for complications from COVID-19.   This medication is still experimental and available on a limited basis; it is given through an IV and must be given at an infusion center. Please note that not all people who are eligible will receive the medication since it is in limited supply.     Are you interested in being considered for this medication?  Was not asked. See comment above  Does the patient fit the criteria: NA    Review information with Patient    Your result was positive. This means you have COVID-19 (coronavirus).  We have sent you a letter that reviews the information that I'll be reviewing with you now.    How can I protect others?    If you have symptoms: stay home and away from others (self-isolate) until:    You've had no fever--and no medicine that reduces fever--for 1 full day (24 hours). And       Your other symptoms have gotten better. For example, your cough or breathing has improved. And     At least 10 days have passed since your symptoms started. (If you've been told by a doctor that you have a weak immune system, wait 20 days.)     If you don't have symptoms: Stay home and away from others (self-isolate) until at least 10 days have passed since your first positive COVID-19 test. (Date test collected)    During this time:    Stay in your own room, including for meals. Use your own bathroom if you can.    Stay away from others in your home. No hugging, kissing or shaking hands. No visitors.     Don't go to work, school or anywhere else.     Clean  high touch  surfaces often (doorknobs, counters, handles, etc.). Use a household cleaning spray or wipes. You'll find a full list on the EPA  website at www.epa.gov/pesticide-registration/list-n-disinfectants-use-against-sars-cov-2.     Cover your mouth and nose with a mask, tissue or other face covering to avoid spreading germs.    Wash your hands and face often with soap and water.    Make a list of people you have been in close contact with recently, even if either of you wore a face covering.   - Start your list from 2 days before you became ill or had a positive test.  - Include anyone that was within 6 feet of you for a cumulative total of 15 minutes or more in 24 hours. (Example: if you sat next to Bon for 5 minutes in the morning and 10 minutes in the afternoon, then you were in close contact for 15 minutes total that day. Bon would be added to your list.)    A public health worker will call or text you. It is important that you answer. They will ask you questions about possible exposures to COVID-19, such as people you have been in direct contact with and places you have visited.    Tell the people on your list that you have COVID-19; they should stay away from others for 14 days starting from the last time they were in contact with you (unless you are told something different from a public health worker).     Caregivers in these groups are at risk for severe illness due to COVID-19:  o People 65 years and older  o People who live in a nursing home or long-term care facility  o People with chronic disease (lung, heart, cancer, diabetes, kidney, liver, immunologic)  o People who have a weakened immune system, including those who:  - Are in cancer treatment  - Take medicine that weakens the immune system, such as corticosteroids  - Had a bone marrow or organ transplant  - Have an immune deficiency  - Have poorly controlled HIV or AIDS  - Are obese (body mass index of 40 or higher)  - Smoke regularly    Caregivers should wear gloves while washing dishes, handling laundry and cleaning bedrooms and bathrooms.    Wash and dry laundry with special  caution. Don't shake dirty laundry, and use the warmest water setting you can.    If you have a weakened immune system, ask your doctor about other actions you should take.    For more tips, go to www.cdc.gov/coronavirus/2019-ncov/downloads/10Things.pdf.    You should not go back to work until you meet the guidelines above for ending your home isolation. You don't need to be retested for COVID-19 before going back to work--studies show that you won't spread the virus if it's been at least 10 days since your symptoms started (or 20 days, if you have a weak immune system).    Employers: This document serves as formal notice of your employee's medical guidelines for going back to work. They must meet the above guidelines before going back to work in person.    How can I take care of myself?    1. Get lots of rest. Drink extra fluids (unless a doctor has told you not to).    2. Take Tylenol (acetaminophen) for fever or pain. If you have liver or kidney problems, ask your family doctor if it's okay to take Tylenol.     Take either:     650 mg (two 325 mg pills) every 4 to 6 hours, or     1,000 mg (two 500 mg pills) every 8 hours as needed.     Note: Don't take more than 3,000 mg in one day. Acetaminophen is found in many medicines (both prescribed and over-the-counter medicines). Read all labels to be sure you don't take too much.    For children, check the Tylenol bottle for the right dose (based on their age or weight).    3. If you have other health problems (like cancer, heart failure, an organ transplant or severe kidney disease): Call your specialty clinic if you don't feel better in the next 2 days.    4. Know when to call 911: Emergency warning signs include:    Trouble breathing or shortness of breath    Pain or pressure in the chest that doesn't go away    Feeling confused like you haven't felt before, or not being able to wake up    Bluish-colored lips or face    5. Sign up for [employer]. We know it's scary  to hear that you have COVID-19. We want to track your symptoms to make sure you're okay over the next 2 weeks. Please look for an email from [employer]--this is a free, online program that we'll use to keep in touch. To sign up, follow the link in the email. Learn more at [email or web address].    Where can I get more information?    [facility]: [email or web address]    Coronavirus Basics: [email or web address]/coronavirus/basics.html    What to Do If You're Sick: www.cdc.gov/coronavirus/2019-ncov/about/steps-when-sick.html    Ending Home Isolation: www.cdc.gov/coronavirus/2019-ncov/hcp/disposition-in-home-patients.html     Caring for Someone with COVID-19: www.cdc.gov/coronavirus/2019-ncov/if-you-are-sick/care-for-someone.html     [facility] clinical trials (COVID-19 research studies): [email or web address]     A Positive COVID-19 letter will be sent via [employer] or the mail. (Exception, no letters sent to Presurgerical/Preprocedure Patients)    Lina June LPN

## 2022-01-06 NOTE — PROGRESS NOTES
Patient Active Problem List    Diagnosis Date Noted     Stage 3a chronic kidney disease (H) 12/31/2020     Priority: High     Persistent protienuria       Copper deficiency 04/05/2019     Priority: High     Identified in Aug, 2018--see discharge summary from [facility] in [place].       Chronic pain disorder 10/07/2016     Priority: High     Chronic Pain Diagnosis:  neuropathy  DIRE Total Score(s):14     ORT: 5    4 - 7 =  Moderate Risk   of future problems with Opioids  FAQ5: 50 November 20, 2020  Behavioral Health Consultation: 5/12/17 with Dr. Daisy Quezada  Personal Care Plan for Chronic Pain: 5/12/17  Opioid medication:oxycodone   Dose:10mg  Number of pills per month:60  Patient is being prescribed 10mg of oxycodone IR (Percocet) per day this is 15 mg Morphine Equivalents  Benzodiazepines Prescribed? No  Naloxone prescribed? No       Clinic visit frequency required: Q 1 month Next visit due:   Controlled Substance/Opioid Treatment agreement on file (dated <12 months ago)?:    Date(s): pending  Last [place] verification: last visit  Items in red to be updated at each visit  Patient is followed by [facility] Un[facility] for ongoing prescription of pain medication.  All refills should be approved by this provider, or covering partner.Chronic Pain     Chronic Pain Diagnosis:  neuropathy  DIRE Total Score(s):14     ORT: 5    4 - 7 =  Moderate Risk   of future problems with Opioids  FAQ5: 45/100 on 9/9/16, 40/100 on 11/15/16  55 October 29, 2020    Behavioral Health Consultation: 5/12/17 with Dr. Daisy Quezada  Personal Care Plan for Chronic Pain: 5/12/17  Opioid medication:oxycodone   Dose:10mg  Number of pills per month:90  Patient is being prescribed 45 MME  Benzodiazepines Prescribed? No  Naloxone prescribed? No       Clinic visit frequency required: Q 1 month Next visit due:   Controlled Substance/Opioid Treatment agreement on file (dated <12 months ago)?:    Date(s): pending  Last [place] verification: last  visit  Items in red to be updated at each visit  Patient is followed by [facility] for ongoing prescription of pain medication.  All refills should be approved by this provider, or covering partner.Chronic Pain       Microalbuminuria 10/12/2015     Priority: High     Severely increased albuminuria       Type 2 diabetes mellitus with diabetic polyneuropathy (H) 10/12/2015     Priority: High     Essential hypertension 08/26/2013     Priority: High     Arthritis of both acromioclavicular joints 04/08/2021     Priority: Medium     xray proven        Morbid obesity (H) 11/27/2020     Priority: Medium     Hyperglycemia due to type 2 diabetes mellitus (H) 06/02/2020     Priority: Medium     Neck mass 10/11/2019     Priority: Medium     Added automatically from request for surgery [id number]       Metabolic acidosis 07/26/2019     Priority: Medium     reolved w/ discontinuation of ASA       Obesity 01/03/2019     Priority: Medium     Other acute pulmonary embolism without acute cor pulmonale (H) 12/31/2018     Priority: Medium     Hernia of anterior abdominal wall 01/28/2017     Priority: Medium     Osteoarthritis of both knees 10/31/2016     Priority: Medium     Polyneuropathy due to secondary diabetes mellitus (H) 10/31/2016     Priority: Medium     Compression neuropathy of left lower extremity 02/19/2016     Priority: Medium     Bilateral ankle joint pain 01/15/2016     Priority: Medium     Esophageal reflux 04/07/2015     Priority: Medium     Urinary incontinence 02/19/2015     Priority: Medium     Asthma 08/04/2013     Priority: Medium     History of cholecystectomy 08/04/2013     Priority: Medium     H/O necrotizing fasciitis 08/04/2013     Priority: Medium     Hyperlipidemia 06/21/2013     Priority: Medium     Status post bariatric surgery 06/21/2013     Priority: Medium     Kim-en-Y in 2006       Moderate major depression (H) 01/24/2013     Priority: Medium     Moderate persistent asthma without complication  11/15/2012     Priority: Medium     Persons encountering health services in other specified circumstances 11/15/2012     Priority: Medium     Tier 3    Status: Accept  Care Coordination Start Date: 02/27/12  State Tier Level: Level 3  Language/Barrier to Learning: No  Significant Mental Health Issues: Yes  Date Care Coordination Discontinued: 05/28/15    DX V65.8 REPLACED WITH [id number] (04/08/2013)       Abnormal Pap smear of cervix 04/22/2019     Priority: Low     3-12-15: Pap/HPV neg.  Plan: Given hx (mildly abn pap, hyster), recheck in 5 yrs.  10-11-11: Pap/HPV neg.  Plan: Given ASUS pap in 2010, recommend repeating pap in 1 year.  8/3/2010 ASCUS Pap with +BV- pt treated with Metrogel.  Pt needs repeat Pap in 1yr.  8/4/2008 Pt had total hyst for menorrhagia, pelvic pain, scar tissue, and fibroids.    3-12-15: Pap/HPV neg.  Plan: Given hx (mildly abn pap, hyster), recheck in 5 yrs.  10-11-11: Pap/HPV neg.  Plan: Given ASUS pap in 2010, recommend repeating pap in 1 year.  8/3/2010 ASCUS Pap with +BV- pt treated with Metrogel.  Pt needs repeat Pap in 1yr.  8/4/2008 Pt had total hyst for menorrhagia, pelvic pain, scar tissue, and fibroids.       Hernia 01/27/2017     Priority: Low     S/P hysterectomy 06/21/2013     Priority: Low     2008, For fibroids.  Cervix removed, but still has ovaries.       History of hysterectomy for benign disease 06/21/2013     Priority: Low     2008, For fibroids.  Cervix removed, but still has ovaries.       Carpal tunnel syndrome 11/15/2012     Priority: Low     Pulmonary embolism with infarction (H) 11/15/2012     Priority: Low     12/24/2018  Unprovoked.  + fam hx of DVT.  Treated w/ DOAC 12/2018through 9/2019 12/24/2018  Unprovoked.  + fam hx of DVT.  Plan warfarin for 6 months.       Health Care Home 11/15/2012     Priority: Low     Tier 3    Status: Accept  Care Coordination Start Date: 02/27/12  State Tier Level: Level 3  Language/Barrier to Learning:  No  Significant Mental Health Issues: Yes  Date Care Coordination Discontinued: 05/28/15    DX V65.8 REPLACED WITH [id number] (04/08/2013)         Chronic pain of both shoulders 12/23/2005     Priority: Low     Nursing Notes:   Viridiana Loaiza CMA  1/6/2022 12:12 PM  Signed  Clinic Administered Medication Documentation    Administrations This Visit     cyanocobalamin injection 1,000 mcg     Admin Date  01/06/2022 Action  Given Dose  1,000 mcg Route  Intramuscular Site  Left Deltoid Administered By  Viridiana Loaiza CMA    Ordering Provider: Jose Angel Barron MD    NDC: 76875-777-04    Lot#: [id number]    : [employer]    Patient Supplied?: No                  Injectable Medication Documentation    Patient was given Cyanocobalamin (B-12). Prior to medication administration, verified patients identity using patient s name and date of birth. Please see MAR and medication order for additional information. Patient instructed to remain in clinic for 15 minutes.      Was entire vial of medication used? Yes  Vial/Syringe: Single dose vial  Expiration Date:  12/30/2022  Was this medication supplied by the patient? No    Name of provider who requested the medication administration: Dr. barron  Name of provider on site (faculty or community preceptor) at the time of performing the medication administration: Dr. Barron    Date of next administration: N/A  Date of next office visit with provider to renew medication plan (must be seen annually): N/A          Chief Complaint   Patient presents with     Pain Management     CPM     Blood pressure (!) 154/89, pulse 105, temperature 98.2  F (36.8  C), temperature source Oral, resp. rate 16, SpO2 99 %, not currently breastfeeding.  No results found for any visits on 01/06/22.     SUBJECTIVE:  She is here for several issues.  First of all, she needs refills of her chronic pain meds.  She is on oxycodone at stable doses.  No aberrant behavior.  PDMP is  unremarkable.  Her pain is stable.  Secondly, which took priority today, is she tested positive for COVID about 4 days ago through exposure to her family members.  She has had rhinorrhea, cough and myalgias that began approximately 4 that are already improving.  She has some diarrhea, but it is mild and manageable.  She is not short of breath, and she does not have chest pain.  She has no nausea or vomiting.  She also needs a refill of her FreeStyle Kika.    OBJECTIVE:  The patient is alert and nontoxic.  Vitals are stable.  Her sats are 99% on room air.  She is not tachypneic, appears very comfortable.  Exam otherwise not done.  Urine drug screen was deferred today given her illness.    She did ask me to look at her right great toe.  She had stubbed it about 2 weeks ago and has pain and a dark discoloration over the dorsum of her foot.  I examined her foot.  Her pulses are intact.  Cap refill is less than 2 seconds.  She does have some hyperpigmentation over the dorsum of her great toe, and she is tender in the proximal phalanx of her great toe, but there is no obvious deformity.    ASSESSMENT:    1.  Chronic pain syndrome, stable.  I simply refilled her oxycodone at current doses, and she will follow up monthly.  2.  Toe contusion versus nondisplaced fracture.  She will wear a stiff-soled shoe.  I discussed that healing should take place over a period of 6-8 weeks.  I considered an x-ray today, but did not do that because of her comorbid COVID-19.  3.  COVID-19.  She has acute symptoms now.  I discussed isolation for 10 days from the onset of the symptoms and discussed treatment options. I attempted to sign her up through the department of health for monoclonal antibodies .  Although she is not eligible at this time because of supply issues.  They have her phone number and will contact her.  We discussed fluvoxamine as an alternative option, but it interacts with Cymbalta, so this is not ideal for her.  She will  monitor her symptoms and let us know if her sats get less than 94%.  A pulse oximeter was given to her today.      Follow up with me in 1 month.

## 2022-01-07 ENCOUNTER — TELEPHONE (OUTPATIENT)
Dept: FAMILY MEDICINE | Facility: CLINIC | Age: 57
End: 2022-01-07
Payer: COMMERCIAL

## 2022-01-07 DIAGNOSIS — I26.99 PULMONARY EMBOLISM WITH INFARCTION (H): ICD-10-CM

## 2022-01-07 DIAGNOSIS — U07.1 INFECTION DUE TO 2019 NOVEL CORONAVIRUS: Primary | ICD-10-CM

## 2022-01-08 NOTE — TELEPHONE ENCOUNTER
Patient had already been notified of COVID positive result, but called to discuss recommendation to go on anticoagulation prophylaxis for one month given her history of unprovoked PE.   Discussed risks and benefits of anticoagulation, and patient decided to go on the anticoagulation.  30 day supply of rivaroxaban 10 mg daily sent to her pharmacy.    Tuyet Garcia MD MD PGY3  [facility]

## 2022-01-19 ENCOUNTER — TELEPHONE (OUTPATIENT)
Dept: FAMILY MEDICINE | Facility: CLINIC | Age: 57
End: 2022-01-19
Payer: COMMERCIAL

## 2022-01-19 DIAGNOSIS — E11.42 TYPE 2 DIABETES MELLITUS WITH DIABETIC POLYNEUROPATHY, WITH LONG-TERM CURRENT USE OF INSULIN (H): ICD-10-CM

## 2022-01-19 DIAGNOSIS — Z79.4 TYPE 2 DIABETES MELLITUS WITH DIABETIC POLYNEUROPATHY, WITH LONG-TERM CURRENT USE OF INSULIN (H): ICD-10-CM

## 2022-01-19 RX ORDER — FLASH GLUCOSE SENSOR
1 KIT MISCELLANEOUS
Qty: 2 EACH | Refills: 5 | Status: CANCELLED | OUTPATIENT
Start: 2022-01-19

## 2022-01-19 NOTE — TELEPHONE ENCOUNTER
Prior Authorization Retail Medication Request    Medication/Dose: Continuous Blood Gluc Sensor (FREESTYLE ANIKET 14 DAY SENSOR) [facility]  ICD code (if different than what is on RX):  Type 2 diabetes mellitus with diabetic polyneuropathy, with long-term current use of insulin (H) [E11.42, Z79.4]   Previously Tried and Failed:    Rationale:      Insurance Name:  MEDICARE [[id number]]  Insurance ID:  [id number]       Pharmacy Information (if different than what is on RX)  Name:  [id number] [address] AT [facility]  Phone:  [phone]

## 2022-01-22 NOTE — TELEPHONE ENCOUNTER
Central Prior Authorization Team   Phone: [phone]    PA Initiation    Medication: Continuous Blood Gluc Sensor (FREESTYLE ANIKET 14 DAY SENSOR) [facility]  Insurance Company: [employer] - Phone [phone] Fax [phone]  Pharmacy Filling the Rx: [facility] #[id number] [place], [place] - [address] AT [facility] OF [facility]  Filling Pharmacy Phone: [phone]  Filling Pharmacy Fax:    Start Date: 1/22/2022

## 2022-01-25 NOTE — TELEPHONE ENCOUNTER
Prior Authorization Not Needed per Insurance    Medication: Continuous Blood Gluc Sensor (FREESTYLE ANIKET 14 DAY SENSOR) [facility]  Insurance Company: [employer]) - Phone [phone] Fax [phone]  Expected CoPay:      Pharmacy Filling the Rx: [facility] #[id number] [place], [place] - [address] AT [facility] OF [facility]  Pharmacy Notified: Yes  Patient Notified: Yes  **Instructed pharmacy to notify patient when script is ready to /ship.**

## 2022-02-03 ENCOUNTER — ANCILLARY PROCEDURE (OUTPATIENT)
Dept: GENERAL RADIOLOGY | Facility: CLINIC | Age: 57
End: 2022-02-03
Attending: FAMILY MEDICINE
Payer: MEDICARE

## 2022-02-03 ENCOUNTER — OFFICE VISIT (OUTPATIENT)
Dept: FAMILY MEDICINE | Facility: CLINIC | Age: 57
End: 2022-02-03
Payer: MEDICARE

## 2022-02-03 VITALS
BODY MASS INDEX: 40.11 KG/M2 | TEMPERATURE: 97.5 F | RESPIRATION RATE: 16 BRPM | OXYGEN SATURATION: 100 % | DIASTOLIC BLOOD PRESSURE: 70 MMHG | SYSTOLIC BLOOD PRESSURE: 124 MMHG | HEART RATE: 84 BPM | WEIGHT: 198.6 LBS

## 2022-02-03 DIAGNOSIS — G89.4 CHRONIC PAIN SYNDROME: ICD-10-CM

## 2022-02-03 DIAGNOSIS — M79.674 GREAT TOE PAIN, RIGHT: Primary | ICD-10-CM

## 2022-02-03 DIAGNOSIS — M79.674 GREAT TOE PAIN, RIGHT: ICD-10-CM

## 2022-02-03 DIAGNOSIS — G89.4 CHRONIC PAIN SYNDROME: Primary | ICD-10-CM

## 2022-02-03 LAB
AMPHETAMINES UR QL: NOT DETECTED
BARBITURATES UR QL SCN: NOT DETECTED
BENZODIAZ UR QL SCN: NOT DETECTED
BUPRENORPHINE UR QL: NOT DETECTED
CANNABINOIDS UR QL: NOT DETECTED
COCAINE UR QL SCN: NOT DETECTED
D-METHAMPHET UR QL: NOT DETECTED
METHADONE UR QL SCN: NOT DETECTED
OPIATES UR QL SCN: NOT DETECTED
OXYCODONE UR QL SCN: DETECTED
PCP UR QL SCN: NOT DETECTED
PROPOXYPH UR QL: NOT DETECTED
TRICYCLICS UR QL SCN: NOT DETECTED

## 2022-02-03 PROCEDURE — 99214 OFFICE O/P EST MOD 30 MIN: CPT | Mod: 25 | Performed by: FAMILY MEDICINE

## 2022-02-03 PROCEDURE — 96372 THER/PROPH/DIAG INJ SC/IM: CPT | Performed by: FAMILY MEDICINE

## 2022-02-03 PROCEDURE — 73660 X-RAY EXAM OF TOE(S): CPT | Mod: RT | Performed by: RADIOLOGY

## 2022-02-03 PROCEDURE — 80306 DRUG TEST PRSMV INSTRMNT: CPT | Performed by: FAMILY MEDICINE

## 2022-02-03 RX ORDER — OXYCODONE AND ACETAMINOPHEN 10; 325 MG/1; MG/1
TABLET ORAL
Qty: 120 TABLET | Refills: 0 | Status: SHIPPED | OUTPATIENT
Start: 2022-02-03 | End: 2022-02-25

## 2022-02-03 RX ADMIN — CYANOCOBALAMIN 1000 MCG: 1000 INJECTION, SOLUTION INTRAMUSCULAR; SUBCUTANEOUS at 12:17

## 2022-02-03 ASSESSMENT — ASTHMA QUESTIONNAIRES: ACT_TOTALSCORE: 11

## 2022-02-03 NOTE — NURSING NOTE
Clinic Administered Medication Documentation    Administrations This Visit     cyanocobalamin injection 1,000 mcg     Admin Date  02/03/2022 Action  Given Dose  1,000 mcg Route  Intramuscular Site  Left Deltoid Administered By  Viridiana Loaiza CMA    Ordering Provider: Jose Angel Barron MD    NDC: 23817-328-59    Lot#: [id number]    : [employer]    Patient Supplied?: No                  Injectable Medication Documentation    Patient was given Cyanocobalamin (B-12). Prior to medication administration, verified patients identity using patient s name and date of birth. Please see MAR and medication order for additional information. Patient instructed to remain in clinic for 15 minutes.      Was entire vial of medication used? Yes  Vial/Syringe: Single dose vial  Expiration Date:  12/30/2022  Was this medication supplied by the patient? No    Name of provider who requested the medication administration: Dr. barron  Name of provider on site (faculty or community preceptor) at the time of performing the medication administration: Dr. Jane    Date of next administration: monthly  Date of next office visit with provider to renew medication plan (must be seen annually): N/A

## 2022-02-13 DIAGNOSIS — I10 BENIGN ESSENTIAL HYPERTENSION: ICD-10-CM

## 2022-02-14 RX ORDER — ATORVASTATIN CALCIUM 10 MG/1
TABLET, FILM COATED ORAL
Qty: 90 TABLET | Refills: 3 | Status: SHIPPED | OUTPATIENT
Start: 2022-02-14 | End: 2023-01-16

## 2022-02-25 ENCOUNTER — ALLIED HEALTH/NURSE VISIT (OUTPATIENT)
Dept: FAMILY MEDICINE | Facility: CLINIC | Age: 57
End: 2022-02-25
Payer: COMMERCIAL

## 2022-02-25 VITALS — OXYGEN SATURATION: 100 % | RESPIRATION RATE: 16 BRPM | TEMPERATURE: 98.2 F

## 2022-02-25 DIAGNOSIS — D51.9 ANEMIA DUE TO VITAMIN B12 DEFICIENCY, UNSPECIFIED B12 DEFICIENCY TYPE: Primary | ICD-10-CM

## 2022-02-25 DIAGNOSIS — G89.4 CHRONIC PAIN SYNDROME: ICD-10-CM

## 2022-02-25 PROCEDURE — 99207 PR NO BILLABLE SERVICE THIS VISIT: CPT

## 2022-02-25 PROCEDURE — 96372 THER/PROPH/DIAG INJ SC/IM: CPT | Performed by: FAMILY MEDICINE

## 2022-02-25 RX ORDER — OXYCODONE AND ACETAMINOPHEN 10; 325 MG/1; MG/1
TABLET ORAL
Qty: 120 TABLET | Refills: 0 | Status: SHIPPED | OUTPATIENT
Start: 2022-02-25 | End: 2022-03-25

## 2022-02-25 RX ADMIN — CYANOCOBALAMIN 1000 MCG: 1000 INJECTION, SOLUTION INTRAMUSCULAR; SUBCUTANEOUS at 09:41

## 2022-02-25 NOTE — NURSING NOTE
Clinic Administered Medication Documentation    Administrations This Visit     cyanocobalamin injection 1,000 mcg     Admin Date  02/25/2022 Action  Given Dose  1,000 mcg Route  Intramuscular Site  Left Deltoid Administered By  Viridiana Loaiza CMA    Ordering Provider: Jose Angel Meyer MD    NDC: 12673-666-66    Lot#: [id number]    : [employer]    Patient Supplied?: No                  Injectable Medication Documentation    Patient was given Cyanocobalamin (B-12). Prior to medication administration, verified patients identity using patient s name and date of birth. Please see MAR and medication order for additional information. Patient instructed to remain in clinic for 15 minutes.      Was entire vial of medication used? Yes  Vial/Syringe: Single dose vial  Expiration Date:  12/30/2022  Was this medication supplied by the patient? No    Name of provider who requested the medication administration: Dr. Meyer  Name of provider on site (faculty or community preceptor) at the time of performing the medication administration: Dr. Meyer    Date of next administration: monthly  Date of next office visit with provider to renew medication plan (must be seen annually): N/A

## 2022-02-25 NOTE — TELEPHONE ENCOUNTER
Please call  I reviewed her chart and refilled her meds.  I'd like to see her at the end of March.  Thanks  CF

## 2022-03-25 ENCOUNTER — OFFICE VISIT (OUTPATIENT)
Dept: FAMILY MEDICINE | Facility: CLINIC | Age: 57
End: 2022-03-25
Payer: COMMERCIAL

## 2022-03-25 VITALS
RESPIRATION RATE: 16 BRPM | WEIGHT: 193 LBS | OXYGEN SATURATION: 95 % | HEART RATE: 106 BPM | TEMPERATURE: 98 F | SYSTOLIC BLOOD PRESSURE: 126 MMHG | DIASTOLIC BLOOD PRESSURE: 80 MMHG | BODY MASS INDEX: 38.98 KG/M2

## 2022-03-25 DIAGNOSIS — M25.512 CHRONIC PAIN OF BOTH SHOULDERS: ICD-10-CM

## 2022-03-25 DIAGNOSIS — E87.20 METABOLIC ACIDOSIS: ICD-10-CM

## 2022-03-25 DIAGNOSIS — G89.4 CHRONIC PAIN SYNDROME: Primary | ICD-10-CM

## 2022-03-25 DIAGNOSIS — M25.511 CHRONIC PAIN OF BOTH SHOULDERS: ICD-10-CM

## 2022-03-25 DIAGNOSIS — G89.29 CHRONIC PAIN OF BOTH SHOULDERS: ICD-10-CM

## 2022-03-25 DIAGNOSIS — I10 BENIGN ESSENTIAL HYPERTENSION: ICD-10-CM

## 2022-03-25 PROCEDURE — 96372 THER/PROPH/DIAG INJ SC/IM: CPT | Performed by: FAMILY MEDICINE

## 2022-03-25 PROCEDURE — 99214 OFFICE O/P EST MOD 30 MIN: CPT | Mod: 25 | Performed by: FAMILY MEDICINE

## 2022-03-25 PROCEDURE — 80306 DRUG TEST PRSMV INSTRMNT: CPT | Performed by: FAMILY MEDICINE

## 2022-03-25 RX ORDER — CYCLOBENZAPRINE HCL 10 MG
TABLET ORAL
Qty: 30 TABLET | Refills: 0 | Status: SHIPPED | OUTPATIENT
Start: 2022-03-25 | End: 2023-01-16

## 2022-03-25 RX ORDER — AMOXICILLIN 250 MG
1 CAPSULE ORAL 2 TIMES DAILY
Qty: 60 TABLET | Refills: 11 | Status: SHIPPED | OUTPATIENT
Start: 2022-03-25 | End: 2023-01-16

## 2022-03-25 RX ORDER — CITRIC ACID/SODIUM CITRATE 334-500MG
10 SOLUTION, ORAL ORAL 2 TIMES DAILY
Qty: 473 ML | Refills: 11 | Status: SHIPPED | OUTPATIENT
Start: 2022-03-25 | End: 2022-04-25

## 2022-03-25 RX ORDER — OXYCODONE AND ACETAMINOPHEN 10; 325 MG/1; MG/1
TABLET ORAL
Qty: 120 TABLET | Refills: 0 | Status: SHIPPED | OUTPATIENT
Start: 2022-03-25 | End: 2022-04-22

## 2022-03-25 RX ADMIN — CYANOCOBALAMIN 1000 MCG: 1000 INJECTION, SOLUTION INTRAMUSCULAR; SUBCUTANEOUS at 12:31

## 2022-03-25 NOTE — PROGRESS NOTES
Patient Active Problem List    Diagnosis Date Noted     Stage 3a chronic kidney disease (H) 12/31/2020     Priority: High     Persistent protienuria       Copper deficiency 04/05/2019     Priority: High     Identified in Aug, 2018--see discharge summary from [facility] in [place].       Chronic pain disorder 10/07/2016     Priority: High     Chronic Pain Diagnosis:  neuropathy  DIRE Total Score(s):14     ORT: 5    4 - 7 =  Moderate Risk   of future problems with Opioids  FAQ5: 50 November 20, 2020  Behavioral Health Consultation: 5/12/17 with Dr. Daisy Quezada  Personal Care Plan for Chronic Pain: 5/12/17  Opioid medication:oxycodone   Dose:10mg  Number of pills per month:60  Patient is being prescribed 10mg of oxycodone IR (Percocet) per day this is 15 mg Morphine Equivalents  Benzodiazepines Prescribed? No  Naloxone prescribed? No       Clinic visit frequency required: Q 1 month Next visit due:   Controlled Substance/Opioid Treatment agreement on file (dated <12 months ago)?:    Date(s): pending  Last [place] verification: last visit  Items in red to be updated at each visit  Patient is followed by Data Unavailable for ongoing prescription of pain medication.  All refills should be approved by this provider, or covering partner.Chronic Pain     Chronic Pain Diagnosis:  neuropathy  DIRE Total Score(s):14     ORT: 5    4 - 7 =  Moderate Risk   of future problems with Opioids  FAQ5: 45/100 on 9/9/16, 40/100 on 11/15/16  55 October 29, 2020    Behavioral Health Consultation: 5/12/17 with Dr. Daisy Quezada  Personal Care Plan for Chronic Pain: 5/12/17  Opioid medication:oxycodone   Dose:10mg  Number of pills per month:90  Patient is being prescribed 45 MME  Benzodiazepines Prescribed? No  Naloxone prescribed? No       Clinic visit frequency required: Q 1 month Next visit due:   Controlled Substance/Opioid Treatment agreement on file (dated <12 months ago)?:    Date(s): pending  Last [place] verification: last  visit  Items in red to be updated at each visit  Patient is followed by Data Unavailable for ongoing prescription of pain medication.  All refills should be approved by this provider, or covering partner.Chronic Pain       Microalbuminuria 10/12/2015     Priority: High     Severely increased albuminuria       Type 2 diabetes mellitus with diabetic polyneuropathy (H) 10/12/2015     Priority: High     Essential hypertension 08/26/2013     Priority: High     Arthritis of both acromioclavicular joints 04/08/2021     Priority: Medium     xray proven        Morbid obesity (H) 11/27/2020     Priority: Medium     Hyperglycemia due to type 2 diabetes mellitus (H) 06/02/2020     Priority: Medium     Neck mass 10/11/2019     Priority: Medium     Added automatically from request for surgery [id number]       Metabolic acidosis 07/26/2019     Priority: Medium     reolved w/ discontinuation of ASA       Obesity 01/03/2019     Priority: Medium     Other acute pulmonary embolism without acute cor pulmonale (H) 12/31/2018     Priority: Medium     Hernia of anterior abdominal wall 01/28/2017     Priority: Medium     Osteoarthritis of both knees 10/31/2016     Priority: Medium     Polyneuropathy due to secondary diabetes mellitus (H) 10/31/2016     Priority: Medium     Compression neuropathy of left lower extremity 02/19/2016     Priority: Medium     Bilateral ankle joint pain 01/15/2016     Priority: Medium     Esophageal reflux 04/07/2015     Priority: Medium     Urinary incontinence 02/19/2015     Priority: Medium     Asthma 08/04/2013     Priority: Medium     History of cholecystectomy 08/04/2013     Priority: Medium     H/O necrotizing fasciitis 08/04/2013     Priority: Medium     Hyperlipidemia 06/21/2013     Priority: Medium     Status post bariatric surgery 06/21/2013     Priority: Medium     Kim-en-Y in 2006       Moderate major depression (H) 01/24/2013     Priority: Medium     Moderate persistent asthma without complication  11/15/2012     Priority: Medium     Persons encountering health services in other specified circumstances 11/15/2012     Priority: Medium     Tier 3    Status: Accept  Care Coordination Start Date: 02/27/12  State Tier Level: Level 3  Language/Barrier to Learning: No  Significant Mental Health Issues: Yes  Date Care Coordination Discontinued: 05/28/15    DX V65.8 REPLACED WITH [id number] [facility] (04/08/2013)       Abnormal Pap smear of cervix 04/22/2019     Priority: Low     3-12-15: Pap/HPV neg.  Plan: Given hx (mildly abn pap, hyster), recheck in 5 yrs.  10-11-11: Pap/HPV neg.  Plan: Given ASUS pap in 2010, recommend repeating pap in 1 year.  8/3/2010 ASCUS Pap with +BV- pt treated with Metrogel.  Pt needs repeat Pap in 1yr.  8/4/2008 Pt had total hyst for menorrhagia, pelvic pain, scar tissue, and fibroids.    3-12-15: Pap/HPV neg.  Plan: Given hx (mildly abn pap, hyster), recheck in 5 yrs.  10-11-11: Pap/HPV neg.  Plan: Given ASUS pap in 2010, recommend repeating pap in 1 year.  8/3/2010 ASCUS Pap with +BV- pt treated with Metrogel.  Pt needs repeat Pap in 1yr.  8/4/2008 Pt had total hyst for menorrhagia, pelvic pain, scar tissue, and fibroids.       Hernia 01/27/2017     Priority: Low     S/P hysterectomy 06/21/2013     Priority: Low     2008, For fibroids.  Cervix removed, but still has ovaries.       History of hysterectomy for benign disease 06/21/2013     Priority: Low     2008, For fibroids.  Cervix removed, but still has ovaries.       Carpal tunnel syndrome 11/15/2012     Priority: Low     Pulmonary embolism with infarction (H) 11/15/2012     Priority: Low     12/24/2018  Unprovoked.  + fam hx of DVT.  Treated w/ DOAC 12/2018through 9/2019 12/24/2018  Unprovoked.  + fam hx of DVT.  Plan warfarin for 6 months.       Health Care Home 11/15/2012     Priority: Low     Tier 3    Status: Accept  Care Coordination Start Date: 02/27/12  State Tier Level: Level 3  Language/Barrier to Learning:  No  Significant Mental Health Issues: Yes  Date Care Coordination Discontinued: 05/28/15    DX V65.8 REPLACED WITH [id number] [facility] (04/08/2013)         Chronic pain of both shoulders 12/23/2005     Priority: Low     Nursing Notes:   Viridiana Loaiza CMA  3/25/2022 12:36 PM  Signed  Clinic Administered Medication Documentation    Administrations This Visit     cyanocobalamin injection 1,000 mcg     Admin Date  03/25/2022 Action  Given Dose  1,000 mcg Route  Intramuscular Site  Left Deltoid Administered By  Viridiana Loaiza CMA    Ordering Provider: Jose Angel Barron MD    NDC: 7224-5087-24    Lot#: [id number]    : HIKMA    Patient Supplied?: No                  Injectable Medication Documentation    Patient was given Cyanocobalamin (B-12). Prior to medication administration, verified patients identity using patient s name and date of birth. Please see MAR and medication order for additional information. Patient instructed to remain in clinic for 15 minutes.      Was entire vial of medication used? Yes  Vial/Syringe: Single dose vial  Expiration Date:  07/30/2023  Was this medication supplied by the patient? No    Name of provider who requested the medication administration: Fr. Barron  Name of provider on site (faculty or community preceptor) at the time of performing the medication administration: Dr. barron    Date of next administration: 1 month  Date of next office visit with provider to renew medication plan (must be seen annually): N/A          Chief Complaint   Patient presents with     Pain Management   SUBJECTIVE:  Ruthy Álvarez is here for chronic pain disorder.  The underlying diagnosis is osteoarthritis in multiple locations.  She has had Kim-en-Y gastric bypass surgery, which limits our ability to use NSAIDs.  She has never had any aberrant behavior, is adherent to her oxycodone, has some constipation and would like refills of her anti-constipation medications, but overall is doing  well.  She was given some cyclobenzaprine for another reason, and that helps her shoulder pain.  It did not cause excessive sedation when she was taking 5 or 10 mg, but she did take 20 mg and that caused her to be sleepy.  She wonders if she could have some available to try on a p.r.n. basis for her shoulder pain.    She mentioned some cramps in her left foot that extend all the way up to her calf.  They have bothered her multiple times at night over the past few weeks.    Review of her chart reveals that her last BMP, when she was in the emergency room last week, showed a bicarbonate in the mid-teens.  She is not currently taking her sodium bicarbonate because of the taste and difficulty swallowing pills.  She wonders if there are any alternatives.    With her oxycodone, she is able to function and go about her daily activities.  She denies any other drugs of abuse.    OBJECTIVE:  PDMP was reviewed and is normal.  Urine drug screen is appropriate today.  Affect is appropriate.  Eye contact normal.  Blood pressure is excellent.  Exam of her foot reveals no obvious deformity.  Pulses are intact.    Review of her labs shows a metabolic acidosis that is likely chronic from her chronic kidney disease.    ASSESSMENT:    1.  Chronic pain syndrome due to osteoarthritis that has been refractory to acetaminophen.  She has been on stable doses of oxycodone for quite some time, and I refilled a quantity of #120 of the 10 mg tablets for her today.  We added some Flexeril 5-10 mg q.8 hours p.r.n., quantity of #30 given.  She will watch for sedating side effects from that.  She will add Senokot-S 1 p.o. b.i.d. for constipation.  2.  For her metabolic acidosis which is due to her chronic kidney disease, we will switch from sodium bicarbonate to sodium citrate, the brand name Bicitra, 10 mL p.o. b.i.d., quantity of #473 with refills given.  She will need a BMP and A1c when she returns.  3.  Regarding her hypertension, it is very  well controlled.  No changes needed.  4.  Regarding her diabetes, she states that her blood sugars have been in the mid 100s.  She is due for an A1c in April when she returns.        Blood pressure 126/80, pulse 106, temperature 98  F (36.7  C), temperature source Oral, resp. rate 16, weight 87.5 kg (193 lb), SpO2 95 %, not currently breastfeeding.  Results for orders placed or performed in visit on 03/25/22   Urine Drugs of Abuse Screen Panel 13     Status: Abnormal   Result Value Ref Range    Cannabinoids (96-kws-4-carboxy-9-THC) Not Detected Not Detected, Indeterminate    Phencyclidine Not Detected Not Detected, Indeterminate    Cocaine (Benzoylecgonine) Not Detected Not Detected, Indeterminate    Methamphetamine (d-Methamphetamine) Not Detected Not Detected, Indeterminate    Opiates (Morphine) Not Detected Not Detected, Indeterminate    Amphetamine (d-Amphetamine) Detected (A) Not Detected, Indeterminate    Benzodiazepines (Nordiazepam) Not Detected Not Detected, Indeterminate    Tricyclic Antidepressants (Desipramine) Not Detected Not Detected, Indeterminate    Methadone Not Detected Not Detected, Indeterminate    Barbiturates (Butalbital) Not Detected Not Detected, Indeterminate    Oxycodone Detected (A) Not Detected, Indeterminate    Propoxyphene (Norpropoxyphene) Not Detected Not Detected, Indeterminate    Buprenorphine Not Detected Not Detected, Indeterminate

## 2022-03-25 NOTE — NURSING NOTE
Clinic Administered Medication Documentation    Administrations This Visit     cyanocobalamin injection 1,000 mcg     Admin Date  03/25/2022 Action  Given Dose  1,000 mcg Route  Intramuscular Site  Left Deltoid Administered By  Viridiana Loaiza CMA    Ordering Provider: Jose Angel Barron MD    NDC: 3761-4381-38    Lot#: [id number]    : HIKMA    Patient Supplied?: No                  Injectable Medication Documentation    Patient was given Cyanocobalamin (B-12). Prior to medication administration, verified patients identity using patient s name and date of birth. Please see MAR and medication order for additional information. Patient instructed to remain in clinic for 15 minutes.      Was entire vial of medication used? Yes  Vial/Syringe: Single dose vial  Expiration Date:  07/30/2023  Was this medication supplied by the patient? No    Name of provider who requested the medication administration: Fr. Barron  Name of provider on site (faculty or community preceptor) at the time of performing the medication administration: Dr. barron    Date of next administration: 1 month  Date of next office visit with provider to renew medication plan (must be seen annually): N/A

## 2022-03-28 NOTE — RESULT ENCOUNTER NOTE
Labs were available at the time of the encounter and results were discussed in the office.  Appropriate results

## 2022-04-21 ENCOUNTER — TELEPHONE (OUTPATIENT)
Dept: SURGERY | Facility: CLINIC | Age: 57
End: 2022-04-21
Payer: COMMERCIAL

## 2022-04-21 NOTE — TELEPHONE ENCOUNTER
Samina called on the patient's behalf because she received a message from us saying that she can't be seen in our clinic anymore due to her [employer] insurance.  She was very concerned and reached out to her [employer] insurance who says she doesn't show the patient having any other insurance and is wondering if someone could reach out to her to discuss for the patient and maybe explain.      Sandi Coleman RN

## 2022-04-22 ENCOUNTER — OFFICE VISIT (OUTPATIENT)
Dept: FAMILY MEDICINE | Facility: CLINIC | Age: 57
End: 2022-04-22
Payer: COMMERCIAL

## 2022-04-22 VITALS
SYSTOLIC BLOOD PRESSURE: 132 MMHG | BODY MASS INDEX: 40.88 KG/M2 | DIASTOLIC BLOOD PRESSURE: 82 MMHG | HEART RATE: 106 BPM | WEIGHT: 202.4 LBS | TEMPERATURE: 97.6 F | OXYGEN SATURATION: 98 % | RESPIRATION RATE: 16 BRPM

## 2022-04-22 DIAGNOSIS — G89.4 CHRONIC PAIN SYNDROME: Primary | ICD-10-CM

## 2022-04-22 DIAGNOSIS — N18.31 STAGE 3A CHRONIC KIDNEY DISEASE (H): ICD-10-CM

## 2022-04-22 DIAGNOSIS — E87.20 METABOLIC ACIDOSIS: ICD-10-CM

## 2022-04-22 DIAGNOSIS — E11.42 TYPE 2 DIABETES MELLITUS WITH DIABETIC POLYNEUROPATHY, WITH LONG-TERM CURRENT USE OF INSULIN (H): ICD-10-CM

## 2022-04-22 DIAGNOSIS — Z79.4 TYPE 2 DIABETES MELLITUS WITH DIABETIC POLYNEUROPATHY, WITH LONG-TERM CURRENT USE OF INSULIN (H): ICD-10-CM

## 2022-04-22 DIAGNOSIS — I10 ESSENTIAL HYPERTENSION: ICD-10-CM

## 2022-04-22 LAB
AMPHETAMINES UR QL: NOT DETECTED
ANION GAP SERPL CALCULATED.3IONS-SCNC: 11 MMOL/L (ref 5–18)
BARBITURATES UR QL SCN: NOT DETECTED
BENZODIAZ UR QL SCN: NOT DETECTED
BUN SERPL-MCNC: 28 MG/DL (ref 8–22)
BUPRENORPHINE UR QL: NOT DETECTED
CALCIUM SERPL-MCNC: 8.3 MG/DL (ref 8.5–10.5)
CANNABINOIDS UR QL: NOT DETECTED
CHLORIDE BLD-SCNC: 107 MMOL/L (ref 98–107)
CO2 SERPL-SCNC: 18 MMOL/L (ref 22–31)
COCAINE UR QL SCN: NOT DETECTED
CREAT SERPL-MCNC: 0.98 MG/DL (ref 0.6–1.1)
D-METHAMPHET UR QL: NOT DETECTED
GFR SERPL CREATININE-BSD FRML MDRD: 67 ML/MIN/1.73M2
GLUCOSE BLD-MCNC: 390 MG/DL (ref 70–125)
HBA1C MFR BLD: 8.7 % (ref 0–5.6)
METHADONE UR QL SCN: NOT DETECTED
OPIATES UR QL SCN: NOT DETECTED
OXYCODONE UR QL SCN: DETECTED
PCP UR QL SCN: NOT DETECTED
POTASSIUM BLD-SCNC: 5 MMOL/L (ref 3.5–5)
PROPOXYPH UR QL: NOT DETECTED
SODIUM SERPL-SCNC: 136 MMOL/L (ref 136–145)
TRICYCLICS UR QL SCN: NOT DETECTED

## 2022-04-22 PROCEDURE — 80048 BASIC METABOLIC PNL TOTAL CA: CPT | Performed by: FAMILY MEDICINE

## 2022-04-22 PROCEDURE — 80306 DRUG TEST PRSMV INSTRMNT: CPT | Performed by: FAMILY MEDICINE

## 2022-04-22 PROCEDURE — 83036 HEMOGLOBIN GLYCOSYLATED A1C: CPT | Performed by: FAMILY MEDICINE

## 2022-04-22 PROCEDURE — 96372 THER/PROPH/DIAG INJ SC/IM: CPT | Performed by: FAMILY MEDICINE

## 2022-04-22 PROCEDURE — 36415 COLL VENOUS BLD VENIPUNCTURE: CPT | Performed by: FAMILY MEDICINE

## 2022-04-22 PROCEDURE — 99214 OFFICE O/P EST MOD 30 MIN: CPT | Mod: 25 | Performed by: FAMILY MEDICINE

## 2022-04-22 RX ORDER — OXYCODONE AND ACETAMINOPHEN 10; 325 MG/1; MG/1
TABLET ORAL
Qty: 120 TABLET | Refills: 0 | Status: SHIPPED | OUTPATIENT
Start: 2022-04-22 | End: 2022-05-23

## 2022-04-22 RX ADMIN — CYANOCOBALAMIN 1000 MCG: 1000 INJECTION, SOLUTION INTRAMUSCULAR; SUBCUTANEOUS at 16:54

## 2022-04-22 NOTE — NURSING NOTE
Clinic Administered Medication Documentation    Administrations This Visit     cyanocobalamin injection 1,000 mcg     Admin Date  04/22/2022 Action  Given Dose  1,000 mcg Route  Intramuscular Site  Left Deltoid Administered By  Viridiana Loaiza CMA    Ordering Provider: Jose Angel Meyer MD    NDC: 3379-7011-64    Lot#: [id number]    : HIKMA    Patient Supplied?: No                  Injectable Medication Documentation    Patient was given Cyanocobalamin (B-12). Prior to medication administration, verified patients identity using patient s name and date of birth. Please see MAR and medication order for additional information. Patient instructed to remain in clinic for 15 minutes.      Was entire vial of medication used? Yes  Vial/Syringe: Single dose vial  Expiration Date:  07/30/2023  Was this medication supplied by the patient? No    Name of provider who requested the medication administration: Dr. Meyer  Name of provider on site (faculty or community preceptor) at the time of performing the medication administration: Dr. Meyer    Date of next administration: monthly  Date of next office visit with provider to renew medication plan (must be seen annually): N/A

## 2022-04-22 NOTE — PROGRESS NOTES
"  Assessment & Plan     Chronic pain syndrome  Stable refilled meds  - Urine Drugs of Abuse Screen Panel 13  - oxyCODONE-acetaminophen (PERCOCET)  MG per tablet; Take 2 tablets by mouth At Bedtime. May also take 1 tablet every 6 hours as needed for severe pain.    Metabolic acidosis  Bicarb not at goal:  Increase bicitra to 10 ml TID  - Basic metabolic panel; Future  - Basic metabolic panel    Essential hypertension  controlled  - Basic metabolic panel; Future  - Basic metabolic panel    Stage 3a chronic kidney disease (H)  Stable   - Basic metabolic panel; Future  - Basic metabolic panel    Type 2 diabetes mellitus with diabetic polyneuropathy, with long-term current use of insulin (H)  AIC not at goal.  Will call to discuss diet, in particular  - Hemoglobin A1c; Future  - Hemoglobin A1c             BMI:   Estimated body mass index is 40.88 kg/m  as calculated from the following:    Height as of 9/9/21: 1.499 m (4' 11\").    Weight as of this encounter: 91.8 kg (202 lb 6.4 oz).   Weight management plan: Patient referred to endocrine and/or weight management specialty        No follow-ups on file.    Jose Angel Meyer MD  [facility] DEDE Bliss is a 56 year old who presents for the following health issues     HPI   Needs f/up of chronic pain, htn, CKD, and DM2.    Pain stable, though shoulders a little worse.  Manageable  No pd/pu.  Gluc 110-120, no hypo.  Taking meds  Has MA--taking bicarb and tolerates it well.      Pain History:  When did you first notice your pain? - Chronic Pain   Have you seen this provider for your pain in the past?   Yes   Where in your body do you have pain? Legs and shoulders  Are you seeing anyone else for your pain? No    PHQ-9 SCORE 5/12/2017 5/6/2021 10/21/2021   PHQ-9 Total Score - - -   PHQ-9 Total Score 19 18 4   Some encounter information is confidential and restricted. Go to Review Flowsheets activity to see all data.       JAUN-7 SCORE " 5/12/2017 5/6/2021 5/6/2021   Total Score - - -   Total Score 13 16 16   Some encounter information is confidential and restricted. Go to Review Flowsheets activity to see all data.               Chronic Pain Follow Up:    Location of pain: see above  Analgesia/pain control:    - Recent changes:  no    - Overall control: Tolerable with discomfort    - Current treatments: oxycodone 10, #120/mo   Adherence:     - Do you ever take more pain medicine than prescribed? No    - When did you take your last dose of pain medicine?  This morning   Adverse effects: No   PDMP Review       Value Time User    State PDMP site checked  Yes 3/25/2022 12:22 PM Jose Angel Meyer MD        Last CSA Agreement:   CSA -- Patient Level:    Controlled Substance Agreement - Opioid - Scan on 12/14/2021  8:56 AM       Last UDS: 3/25/2022  Today:  See below          Review of Systems   Constitutional, HEENT, cardiovascular, pulmonary, gi and gu systems are negative, except as otherwise noted.      Objective    LMP  (LMP Unknown)   There is no height or weight on file to calculate BMI.  Physical Exam   GENERAL: healthy, alert and no distress  NECK: no adenopathy, no asymmetry, masses, or scars and thyroid normal to palpation  RESP: lungs clear to auscultation - no rales, rhonchi or wheezes  CV: regular rate and rhythm, normal S1 S2, no S3 or S4, no murmur, click or rub, no peripheral edema and peripheral pulses strong  ABDOMEN: soft, nontender, no hepatosplenomegaly, no masses and bowel sounds normal  MS: no gross musculoskeletal defects noted, no edema    Results for orders placed or performed in visit on 04/22/22   Urine Drugs of Abuse Screen Panel 13     Status: Abnormal   Result Value Ref Range    Cannabinoids (14-qrb-7-carboxy-9-THC) Not Detected Not Detected, Indeterminate    Phencyclidine Not Detected Not Detected, Indeterminate    Cocaine (Benzoylecgonine) Not Detected Not Detected, Indeterminate    Methamphetamine (d-Methamphetamine)  Not Detected Not Detected, Indeterminate    Opiates (Morphine) Not Detected Not Detected, Indeterminate    Amphetamine (d-Amphetamine) Not Detected Not Detected, Indeterminate    Benzodiazepines (Nordiazepam) Not Detected Not Detected, Indeterminate    Tricyclic Antidepressants (Desipramine) Not Detected Not Detected, Indeterminate    Methadone Not Detected Not Detected, Indeterminate    Barbiturates (Butalbital) Not Detected Not Detected, Indeterminate    Oxycodone Detected (A) Not Detected, Indeterminate    Propoxyphene (Norpropoxyphene) Not Detected Not Detected, Indeterminate    Buprenorphine Not Detected Not Detected, Indeterminate   Basic metabolic panel     Status: Abnormal   Result Value Ref Range    Sodium 136 136 - 145 mmol/L    Potassium 5.0 3.5 - 5.0 mmol/L    Chloride 107 98 - 107 mmol/L    Carbon Dioxide (CO2) 18 (L) 22 - 31 mmol/L    Anion Gap 11 5 - 18 mmol/L    Urea Nitrogen 28 (H) 8 - 22 mg/dL    Creatinine 0.98 0.60 - 1.10 mg/dL    Calcium 8.3 (L) 8.5 - 10.5 mg/dL    Glucose 390 (H) 70 - 125 mg/dL    GFR Estimate 67 >60 mL/min/1.73m2   Hemoglobin A1c     Status: Abnormal   Result Value Ref Range    Hemoglobin A1C 8.7 (H) 0.0 - 5.6 %

## 2022-04-25 RX ORDER — CITRIC ACID/SODIUM CITRATE 334-500MG
10 SOLUTION, ORAL ORAL
Qty: 473 ML | Refills: 11 | Status: SHIPPED | OUTPATIENT
Start: 2022-04-25 | End: 2022-05-23

## 2022-04-25 NOTE — RESULT ENCOUNTER NOTE
Please call  Ms Álvarez's kidney tests are ok, except that her CO2 level is too low.  This is a result of her kidney disease.  We should increase her bicitra (sodium bicarbonate) from 2 times daily to 3 times daily.  If have sent a Rx in for that.    In addition, her AIC test is higher than expected at 8.7.  We'd like it under 8.0, where she was in the past..  Please ask her to be careful with her eating pattern.  We will discuss this again at her next visit in May.    Her urine test was normal for her.  Thanks  CF

## 2022-04-26 NOTE — TELEPHONE ENCOUNTER
Patient called again today because she is frustrated with the weight gain but confused still if her current insurance coverage will allow her to come her.  She has since discovered that the [employer] was just for prescription coverage and was recently switched to that so she has made some phone calls and is going to change it back to what she had before so that it is no longer an issue.  She will let us know when it is all settled and then would like to come in sooner to see Dr. Carson if possible to start back up on the phentermine if still a good option for her.  I added her to the wait list.  Sandi Coleman RN

## 2022-05-11 ENCOUNTER — TELEPHONE (OUTPATIENT)
Dept: SURGERY | Facility: CLINIC | Age: 57
End: 2022-05-11
Payer: COMMERCIAL

## 2022-05-11 NOTE — TELEPHONE ENCOUNTER
Prior Authorization Retail Medication Request    Medication/Dose:  Trulicity 1.5 mg injections    Key:  [id number]    Pharmacy Information (if different than what is on RX)  Name:  [facility]  Phone:  [phone]

## 2022-05-14 DIAGNOSIS — E11.42 DIABETIC POLYNEUROPATHY ASSOCIATED WITH TYPE 2 DIABETES MELLITUS (H): ICD-10-CM

## 2022-05-14 RX ORDER — GABAPENTIN 250 MG/5ML
SOLUTION ORAL
Qty: 1410 ML | Refills: 11 | Status: SHIPPED | OUTPATIENT
Start: 2022-05-14 | End: 2022-09-01

## 2022-05-16 NOTE — TELEPHONE ENCOUNTER
Central Prior Authorization Team   Phone: [phone]    PA Initiation    Medication: Trulicity  Insurance Company: [employer] - Phone [phone] Fax [phone]  Pharmacy Filling the Rx: [id number] [address] AT [facility]  Filling Pharmacy Phone: [phone]  Filling Pharmacy Fax: [phone]  Start Date: 5/16/2022

## 2022-05-17 ENCOUNTER — MEDICAL CORRESPONDENCE (OUTPATIENT)
Dept: HEALTH INFORMATION MANAGEMENT | Facility: CLINIC | Age: 57
End: 2022-05-17
Payer: COMMERCIAL

## 2022-05-18 PROBLEM — F11.90 CHRONIC, CONTINUOUS USE OF OPIOIDS: Status: ACTIVE | Noted: 2022-05-18

## 2022-05-18 NOTE — TELEPHONE ENCOUNTER
Left a message for the pharmacy that the PA is not needed on this medication for the patient.  Sandi Coleman RN

## 2022-05-23 ENCOUNTER — TELEPHONE (OUTPATIENT)
Dept: FAMILY MEDICINE | Facility: CLINIC | Age: 57
End: 2022-05-23

## 2022-05-23 ENCOUNTER — OFFICE VISIT (OUTPATIENT)
Dept: FAMILY MEDICINE | Facility: CLINIC | Age: 57
End: 2022-05-23
Payer: MEDICARE

## 2022-05-23 VITALS
OXYGEN SATURATION: 94 % | DIASTOLIC BLOOD PRESSURE: 80 MMHG | TEMPERATURE: 97.9 F | HEART RATE: 98 BPM | BODY MASS INDEX: 40.31 KG/M2 | WEIGHT: 199.6 LBS | RESPIRATION RATE: 16 BRPM | SYSTOLIC BLOOD PRESSURE: 138 MMHG

## 2022-05-23 DIAGNOSIS — M25.511 CHRONIC PAIN OF BOTH SHOULDERS: ICD-10-CM

## 2022-05-23 DIAGNOSIS — D17.30 LIPOMA OF SKIN AND SUBCUTANEOUS TISSUE: ICD-10-CM

## 2022-05-23 DIAGNOSIS — G89.4 CHRONIC PAIN SYNDROME: Primary | ICD-10-CM

## 2022-05-23 DIAGNOSIS — G89.4 CHRONIC PAIN SYNDROME: ICD-10-CM

## 2022-05-23 DIAGNOSIS — E11.42 TYPE 2 DIABETES MELLITUS WITH DIABETIC POLYNEUROPATHY, WITH LONG-TERM CURRENT USE OF INSULIN (H): ICD-10-CM

## 2022-05-23 DIAGNOSIS — Z79.4 TYPE 2 DIABETES MELLITUS WITH HYPERGLYCEMIA, WITH LONG-TERM CURRENT USE OF INSULIN (H): ICD-10-CM

## 2022-05-23 DIAGNOSIS — G89.29 CHRONIC PAIN OF BOTH SHOULDERS: ICD-10-CM

## 2022-05-23 DIAGNOSIS — E87.20 METABOLIC ACIDOSIS: ICD-10-CM

## 2022-05-23 DIAGNOSIS — M25.512 CHRONIC PAIN OF BOTH SHOULDERS: ICD-10-CM

## 2022-05-23 DIAGNOSIS — E11.65 TYPE 2 DIABETES MELLITUS WITH HYPERGLYCEMIA, WITH LONG-TERM CURRENT USE OF INSULIN (H): ICD-10-CM

## 2022-05-23 DIAGNOSIS — Z79.4 TYPE 2 DIABETES MELLITUS WITH DIABETIC POLYNEUROPATHY, WITH LONG-TERM CURRENT USE OF INSULIN (H): ICD-10-CM

## 2022-05-23 PROCEDURE — 80306 DRUG TEST PRSMV INSTRMNT: CPT | Performed by: FAMILY MEDICINE

## 2022-05-23 PROCEDURE — 96372 THER/PROPH/DIAG INJ SC/IM: CPT | Performed by: FAMILY MEDICINE

## 2022-05-23 PROCEDURE — 99214 OFFICE O/P EST MOD 30 MIN: CPT | Mod: 25 | Performed by: FAMILY MEDICINE

## 2022-05-23 RX ORDER — LIDOCAINE 40 MG/G
CREAM TOPICAL
Qty: 45 G | Refills: 2 | Status: SHIPPED | OUTPATIENT
Start: 2022-05-23 | End: 2022-09-10

## 2022-05-23 RX ORDER — OXYCODONE AND ACETAMINOPHEN 10; 325 MG/1; MG/1
TABLET ORAL
Qty: 120 TABLET | Refills: 0 | Status: SHIPPED | OUTPATIENT
Start: 2022-05-23 | End: 2022-05-31

## 2022-05-23 RX ORDER — CITRIC ACID/SODIUM CITRATE 334-500MG
10 SOLUTION, ORAL ORAL
Qty: 473 ML | Refills: 11 | Status: SHIPPED | OUTPATIENT
Start: 2022-05-23 | End: 2022-12-28

## 2022-05-23 RX ORDER — FLASH GLUCOSE SENSOR
1 KIT MISCELLANEOUS
Qty: 2 EACH | Refills: 5 | Status: SHIPPED | OUTPATIENT
Start: 2022-05-23 | End: 2023-06-09

## 2022-05-23 RX ORDER — CAPSAICIN 0.025 %
CREAM (GRAM) TOPICAL
Qty: 118 ML | Refills: 11 | Status: SHIPPED | OUTPATIENT
Start: 2022-05-23

## 2022-05-23 RX ORDER — OXYCODONE AND ACETAMINOPHEN 10; 325 MG/1; MG/1
TABLET ORAL
Qty: 120 TABLET | Refills: 0 | Status: CANCELLED | OUTPATIENT
Start: 2022-05-23

## 2022-05-23 RX ORDER — FLASH GLUCOSE SCANNING READER
1 EACH MISCELLANEOUS ONCE
Qty: 1 EACH | Refills: 0 | Status: SHIPPED | OUTPATIENT
Start: 2022-05-23 | End: 2022-05-23

## 2022-05-23 RX ADMIN — CYANOCOBALAMIN 1000 MCG: 1000 INJECTION, SOLUTION INTRAMUSCULAR; SUBCUTANEOUS at 15:19

## 2022-05-23 NOTE — TELEPHONE ENCOUNTER
Prior Authorization Retail Medication Request    Medication/Dose: oxyCODONE-acetaminophen (PERCOCET)  MG per tablet  ICD code (if different than what is on RX):  Chronic pain syndrome [G89.4]  - Primary   Previously Tried and Failed:    Rationale:      Insurance Name:  SATYA CARNES [facility] [*  Insurance ID:  [id number]       Pharmacy Information (if different than what is on RX)  Name:  [id number] [place], [place] - [address] AT [facility] [address]  Phone:  [phone]

## 2022-05-23 NOTE — TELEPHONE ENCOUNTER
No pa needed- this medication is covered without needing a pa. Informed the pharmacy to contact the help desk for assistance.

## 2022-05-23 NOTE — TELEPHONE ENCOUNTER
[facility] phone call message- patient requesting a refill:    Full Medication Name: all her medications that she was prescribed today     Dose:     Pharmacy confirmed as   [id number] [address] AT [place] [address] [address] [place] [place] [address]  Phone: [phone] Fax: [phone]      : Yes    Additional Comments: the pharmacy will not fill her prescriptions with out the Dr calling      OK to leave a message on voice mail? Yes    Primary language: English      needed? No    Call taken on May 23, 2022 at 3:27 PM by Earl Murphy

## 2022-05-23 NOTE — PROGRESS NOTES
Ruthy is a 56 year old who presents for the following health issues   HPI   Knee pain is getting worse--feels grinding.  Both knees are a problem.  Heat doesn't help.  Primarily if sitting or laying down.  Worse for past two weeks.  Not bad standing or walking.    Pain History:  When did you first notice your pain? - Chronic Pain   Have you seen this provider for your pain in the past?   Yes   Where in your body do you have pain? Back and knees  Are you seeing anyone else for your pain? No    PHQ-9 SCORE 5/12/2017 5/6/2021 10/21/2021   PHQ-9 Total Score - - -   PHQ-9 Total Score 19 18 4   Some encounter information is confidential and restricted. Go to Review Flowsheets activity to see all data.         PEG Score 5/6/2021 2/3/2022 5/23/2022   PEG Total Score 7.33 9.33 9           Chronic Pain Follow Up:    Location of pain: knees, back of calves (locks up) and burning sensation in back.   Analgesia/pain control:    - Recent changes:  Knees are worse    - Overall control: Tolerable with discomfort    - Current treatments: oxycodone, gabapentin, and multiple topical therapies   Adherence:     - Do you ever take more pain medicine than prescribed? No    - When did you take your last dose of pain medicine?  This morning   Adverse effects: No   PDMP Review       Value Time User    State PDMP site checked  Yes 5/23/2022  2:14 PM Jose Angel Meyer MD        Last CSA Agreement:   CSA -- Patient Level:    Controlled Substance Agreement - Opioid - Scan on 12/14/2021  8:56 AM       Last UDS: 4/22/2022  Blood pressure 138/80, pulse 98, temperature 97.9  F (36.6  C), temperature source Oral, resp. rate 16, weight 90.5 kg (199 lb 9.6 oz), SpO2 94 %, not currently breastfeeding.    HISTORY OF PRESENT ILLNESS:  Ruthy Álvarez is here for followup of her chronic pain.  She also needs followup of her diabetes and metabolic acidosis.  Regarding her chronic pain, it has been worse in her knees as of late.  It is an achy  discomfort, left more than right, suprapatellar in location.  She has tried Voltaren, which seems to help some and lidocaine has also helped some.  It is worse when she is lying down.    She needs refills of her sodium bicarbonate.  She did not get the pills that I prescribed last time and she is confused by that.  She also did not get a refill for her CGM.  She was surprised to learn that her A1c was up higher as her blood sugars have generally been in the low 100s.  She denies any chest pain or shortness of breath.  Her constipation is managed.  She denies any other drug use.  PDMP reviewed today and is unremarkable.  Urine drug screen is appropriate.    OBJECTIVE:  Examination of her knees reveals normal range of motion.  She has crepitation with knee flexion and extension on the left and mild tenderness over the patella.  No joint line tenderness.      IMAGING:  X-rays from several years ago were reviewed and they showed some mild patellofemoral osteoarthritis.    ASSESSMENT:    1.  Chronic pain syndrome.  2.  Patellofemoral osteoarthritis.  3.  Type 2 diabetes mellitus.  4.  Metabolic acidosis due to her CKD.    PLAN:  I refilled her oxycodone as it is reasonable to continue this.  We will prescribe capsaicin, diclofenac and lidocaine topically for her knees as she would like to try all of these things to see which would work best for her.  I increased her Bicitra to 10 mL t.i.d. and sent a refill and wrote a prescription for her CGM.  She will follow up in 1 month.

## 2022-05-23 NOTE — TELEPHONE ENCOUNTER
Central Prior Authorization Team  Phone: [phone]    PA Initiation    Medication: oxyCODONE-acetaminophen (PERCOCET)  MG per tablet  Insurance Company: [employer] Part D - Phone [phone] Fax [phone]  Pharmacy Filling the Rx: [id number] [address] AT [facility] OF [facility]  Filling Pharmacy Phone: [phone]  Filling Pharmacy Fax:    Start Date: 5/23/2022

## 2022-05-23 NOTE — NURSING NOTE
Clinic Administered Medication Documentation    Administrations This Visit     cyanocobalamin injection 1,000 mcg     Admin Date  05/23/2022 Action  Given Dose  1,000 mcg Route  Intramuscular Site  Left Deltoid Administered By  Viridiana Loaiza CMA    Ordering Provider: Jose Angel Meyer MD    NDC: 1428-2725-16    Lot#: [id number]    : HIKMA    Patient Supplied?: No                  Injectable Medication Documentation    Patient was given Cyanocobalamin (B-12). Prior to medication administration, verified patients identity using patient s name and date of birth. Please see MAR and medication order for additional information. Patient instructed to remain in clinic for 15 minutes.      Was entire vial of medication used? Yes  Vial/Syringe: Single dose vial  Expiration Date:  07302023  Was this medication supplied by the patient? No    Name of provider who requested the medication administration: Dr. Meyer  Name of provider on site (faculty or community preceptor) at the time of performing the medication administration: Dr. Meyer    Date of next administration: monthly  Date of next office visit with provider to renew medication plan (must be seen annually): N/A

## 2022-05-23 NOTE — TELEPHONE ENCOUNTER
Prior Authorization Retail Medication Request    Medication/Dose: atorvastatin (LIPITOR) 10 MG tablet  ICD code (if different than what is on RX):  Benign essential hypertension [I10]   Previously Tried and Failed:    Rationale:      [facility]  [facility]  Insurance ID:  [id number]       Pharmacy Information (if different than what is on RX)  [id number] [address] AT [facility] OF [facility]  Phone:  [phone]

## 2022-05-24 RX ORDER — DULAGLUTIDE 1.5 MG/.5ML
1.5 INJECTION, SOLUTION SUBCUTANEOUS
Qty: 2 ML | Refills: 11 | Status: SHIPPED | OUTPATIENT
Start: 2022-05-24 | End: 2022-11-09 | Stop reason: DRUGHIGH

## 2022-05-25 ENCOUNTER — TELEPHONE (OUTPATIENT)
Dept: FAMILY MEDICINE | Facility: CLINIC | Age: 57
End: 2022-05-25

## 2022-05-25 NOTE — TELEPHONE ENCOUNTER
[facility] phone call message- medication clarification/question:    Full Medication Name: oxyCODONE-acetaminophen (PERCOCET)  MG per tablet    Clarification: The pharmacy only dispensed to the pt  35 pills of the above listed medication . The pt was supposed to receive a total of 120 pills. The pharmacy needs the pt's PCP to write a prior authorization for the remaining pills. The pt would like to be contacted once this is  finalized.     [id number] [address] AT [facility]  : Yes    OK to leave a message on voice mail? Yes    Primary language: English      needed? No    Call taken on May 25, 2022 at 9:31 AM by Jerry Dozier

## 2022-05-26 NOTE — TELEPHONE ENCOUNTER
Prior Authorization Not Needed per Insurance    Medication: atorvastatin (LIPITOR) 10 MG tablet--NO PA NEEDED  Insurance Company: [employer] Part D - Phone [phone] Fax [phone]  Expected CoPay:      [facility] Rx[id number] [place], [place] - [address] AT [facility] OF [facility]  Pharmacy Notified: Yes  Patient Notified: Yes **Instructed pharmacy to notify patient when script is ready to /ship.**

## 2022-05-27 ENCOUNTER — TELEPHONE (OUTPATIENT)
Dept: FAMILY MEDICINE | Facility: CLINIC | Age: 57
End: 2022-05-27
Payer: COMMERCIAL

## 2022-05-27 DIAGNOSIS — G89.4 CHRONIC PAIN SYNDROME: ICD-10-CM

## 2022-05-28 NOTE — TELEPHONE ENCOUNTER
Pt states she only received 30 pills and not the 120 pills. She states pharmacy told her that you needed to rewrite the prescription

## 2022-05-31 RX ORDER — OXYCODONE AND ACETAMINOPHEN 10; 325 MG/1; MG/1
TABLET ORAL
Qty: 85 TABLET | Refills: 0 | Status: SHIPPED | OUTPATIENT
Start: 2022-05-31 | End: 2022-06-23

## 2022-05-31 NOTE — TELEPHONE ENCOUNTER
Per PDMP, pt received #35 on 5/23.  Not clear to me why not give normal quantity.     Only had 35 on hand.  I wrote for quantity of 85 to be filled today.    Pt has new insurance, which seems to have driven this problem.

## 2022-06-03 ENCOUNTER — TELEPHONE (OUTPATIENT)
Dept: FAMILY MEDICINE | Facility: CLINIC | Age: 57
End: 2022-06-03

## 2022-06-03 NOTE — TELEPHONE ENCOUNTER
[facility] phone call message- general phone call:    Reason for call: She has a lot of mold in her apartment over the years and she wants to know if this has affected her in anyway its been several years her building just keeps coming in and painting over it and saying it fixed she is concerned she wants to know if this has been contributing to her health    Action desired: call back.    Return call needed: Yes    OK to leave a message on voice mail? Yes    Advised patient to response may take up to 2 business days: Yes    Primary language: English      needed? No    Call taken on Rozina 3, 2022 at 9:29 AM by Ginger Ventura

## 2022-06-03 NOTE — TELEPHONE ENCOUNTER
Patient given phone number to [facility] to have a rep come to evaluate her complaints of mold (black), leaky pipes and wet carpet. Patient is concerned this may be affecting her health.    Note routed to   /JENS

## 2022-06-23 ENCOUNTER — TELEPHONE (OUTPATIENT)
Dept: FAMILY MEDICINE | Facility: CLINIC | Age: 57
End: 2022-06-23

## 2022-06-23 ENCOUNTER — OFFICE VISIT (OUTPATIENT)
Dept: PHARMACY | Facility: CLINIC | Age: 57
End: 2022-06-23
Payer: COMMERCIAL

## 2022-06-23 ENCOUNTER — OFFICE VISIT (OUTPATIENT)
Dept: SURGERY | Facility: CLINIC | Age: 57
End: 2022-06-23
Payer: COMMERCIAL

## 2022-06-23 ENCOUNTER — LAB (OUTPATIENT)
Dept: LAB | Facility: CLINIC | Age: 57
End: 2022-06-23

## 2022-06-23 ENCOUNTER — OFFICE VISIT (OUTPATIENT)
Dept: FAMILY MEDICINE | Facility: CLINIC | Age: 57
End: 2022-06-23
Payer: COMMERCIAL

## 2022-06-23 VITALS
BODY MASS INDEX: 40.44 KG/M2 | WEIGHT: 200.2 LBS | TEMPERATURE: 98 F | OXYGEN SATURATION: 98 % | DIASTOLIC BLOOD PRESSURE: 68 MMHG | HEART RATE: 102 BPM | RESPIRATION RATE: 16 BRPM | SYSTOLIC BLOOD PRESSURE: 134 MMHG

## 2022-06-23 VITALS — HEART RATE: 90 BPM | RESPIRATION RATE: 20 BRPM | HEIGHT: 59 IN | WEIGHT: 201 LBS | BODY MASS INDEX: 40.52 KG/M2

## 2022-06-23 DIAGNOSIS — E11.42 TYPE 2 DIABETES MELLITUS WITH DIABETIC POLYNEUROPATHY, WITH LONG-TERM CURRENT USE OF INSULIN (H): ICD-10-CM

## 2022-06-23 DIAGNOSIS — J45.50 SEVERE PERSISTENT ASTHMA WITHOUT COMPLICATION (H): Primary | ICD-10-CM

## 2022-06-23 DIAGNOSIS — Z12.31 ENCOUNTER FOR SCREENING MAMMOGRAM FOR BREAST CANCER: ICD-10-CM

## 2022-06-23 DIAGNOSIS — Z79.4 TYPE 2 DIABETES MELLITUS WITH DIABETIC POLYNEUROPATHY, WITH LONG-TERM CURRENT USE OF INSULIN (H): Primary | ICD-10-CM

## 2022-06-23 DIAGNOSIS — Z79.4 TYPE 2 DIABETES MELLITUS WITH DIABETIC POLYNEUROPATHY, WITH LONG-TERM CURRENT USE OF INSULIN (H): ICD-10-CM

## 2022-06-23 DIAGNOSIS — K90.9 INTESTINAL MALABSORPTION, UNSPECIFIED TYPE: ICD-10-CM

## 2022-06-23 DIAGNOSIS — J45.40 MODERATE PERSISTENT ASTHMA WITHOUT COMPLICATION: ICD-10-CM

## 2022-06-23 DIAGNOSIS — E66.09 OBESITY DUE TO EXCESS CALORIES WITH SERIOUS COMORBIDITY, UNSPECIFIED CLASSIFICATION: ICD-10-CM

## 2022-06-23 DIAGNOSIS — G89.4 CHRONIC PAIN SYNDROME: ICD-10-CM

## 2022-06-23 DIAGNOSIS — E11.42 TYPE 2 DIABETES MELLITUS WITH DIABETIC POLYNEUROPATHY, WITH LONG-TERM CURRENT USE OF INSULIN (H): Primary | ICD-10-CM

## 2022-06-23 PROCEDURE — 96372 THER/PROPH/DIAG INJ SC/IM: CPT | Performed by: FAMILY MEDICINE

## 2022-06-23 PROCEDURE — 80306 DRUG TEST PRSMV INSTRMNT: CPT | Performed by: FAMILY MEDICINE

## 2022-06-23 PROCEDURE — 36415 COLL VENOUS BLD VENIPUNCTURE: CPT

## 2022-06-23 PROCEDURE — 91305 COVID-19,PF,PFIZER (12+ YRS): CPT | Performed by: FAMILY MEDICINE

## 2022-06-23 PROCEDURE — 0054A COVID-19,PF,PFIZER (12+ YRS): CPT | Performed by: FAMILY MEDICINE

## 2022-06-23 PROCEDURE — 99214 OFFICE O/P EST MOD 30 MIN: CPT | Mod: 25 | Performed by: FAMILY MEDICINE

## 2022-06-23 PROCEDURE — 99214 OFFICE O/P EST MOD 30 MIN: CPT | Performed by: FAMILY MEDICINE

## 2022-06-23 PROCEDURE — 84681 ASSAY OF C-PEPTIDE: CPT

## 2022-06-23 PROCEDURE — 99207 PR NO CHARGE LOS: CPT | Performed by: PHARMACIST

## 2022-06-23 RX ORDER — PHENTERMINE HYDROCHLORIDE 37.5 MG/1
.5-1 TABLET ORAL EVERY MORNING
Qty: 90 TABLET | Refills: 1 | Status: SHIPPED | OUTPATIENT
Start: 2022-06-23 | End: 2022-11-09

## 2022-06-23 RX ORDER — BUDESONIDE AND FORMOTEROL FUMARATE DIHYDRATE 80; 4.5 UG/1; UG/1
AEROSOL RESPIRATORY (INHALATION)
Qty: 20.4 G | Refills: 11 | Status: SHIPPED | OUTPATIENT
Start: 2022-06-23 | End: 2023-04-13

## 2022-06-23 RX ORDER — ACETAMINOPHEN 325 MG/1
TABLET ORAL
COMMUNITY
Start: 2022-03-04 | End: 2022-06-23

## 2022-06-23 RX ORDER — ONDANSETRON 4 MG/1
4 TABLET, ORALLY DISINTEGRATING ORAL PRN
COMMUNITY
Start: 2022-03-04 | End: 2024-04-18

## 2022-06-23 RX ORDER — OXYCODONE AND ACETAMINOPHEN 10; 325 MG/1; MG/1
TABLET ORAL
Qty: 85 TABLET | Refills: 0 | Status: SHIPPED | OUTPATIENT
Start: 2022-06-23 | End: 2022-06-24

## 2022-06-23 RX ORDER — CYCLOBENZAPRINE HCL 10 MG
10 TABLET ORAL PRN
COMMUNITY
Start: 2022-03-04 | End: 2022-06-23

## 2022-06-23 RX ADMIN — CYANOCOBALAMIN 1000 MCG: 1000 INJECTION, SOLUTION INTRAMUSCULAR; SUBCUTANEOUS at 13:52

## 2022-06-23 ASSESSMENT — PAIN SCALES - GENERAL: PAINLEVEL: EXTREME PAIN (8)

## 2022-06-23 ASSESSMENT — ASTHMA QUESTIONNAIRES
ACT_TOTALSCORE: 10
QUESTION_2 LAST FOUR WEEKS HOW OFTEN HAVE YOU HAD SHORTNESS OF BREATH: MORE THAN ONCE A DAY
QUESTION_3 LAST FOUR WEEKS HOW OFTEN DID YOUR ASTHMA SYMPTOMS (WHEEZING, COUGHING, SHORTNESS OF BREATH, CHEST TIGHTNESS OR PAIN) WAKE YOU UP AT NIGHT OR EARLIER THAN USUAL IN THE MORNING: TWO OR THREE NIGHTS A WEEK
ACT_TOTALSCORE: 10
QUESTION_1 LAST FOUR WEEKS HOW MUCH OF THE TIME DID YOUR ASTHMA KEEP YOU FROM GETTING AS MUCH DONE AT WORK, SCHOOL OR AT HOME: SOME OF THE TIME
QUESTION_5 LAST FOUR WEEKS HOW WOULD YOU RATE YOUR ASTHMA CONTROL: SOMEWHAT CONTROLLED
QUESTION_4 LAST FOUR WEEKS HOW OFTEN HAVE YOU USED YOUR RESCUE INHALER OR NEBULIZER MEDICATION (SUCH AS ALBUTEROL): THREE OR MORE TIMES PER DAY

## 2022-06-23 NOTE — LETTER
6/23/2022         RE: Ruthy Álvarez  [address]  [place] [address]        Dear Colleague,    Thank you for referring your patient, Ruthy Álvarez, to the [facility]. Please see a copy of my visit note below.    Bariatric Follow Up Visit with a History of Previous Bariatric Surgery     Date of visit: 6/23/2022  Physician: Vy Carson MD, MD  Primary Care Provider:  Jose Angel Meyer TJ Henri   56 year old  female    Date of Surgery: 2006  Initial Weight: 360#  Initial BMI:   Today's Weight:   Wt Readings from Last 1 Encounters:   06/23/22 91.2 kg (201 lb)     Body mass index is 40.6 kg/m .      Assessment and Plan     Assessment: Ruthy is a 56 year old year old female who is 16years s/p  Kim en Y Gastric Bypass with Dr. Meredith Bliss TJ Beckesvin feels as if she had achieved the goals she hoped to accomplish through bariatric surgery and weight loss.    Encounter Diagnosis   Name Primary?     Type 2 diabetes mellitus with diabetic polyneuropathy, with long-term current use of insulin (H) Yes         Current Outpatient Medications:      acetaminophen (TYLENOL) 500 MG tablet, Take 1 tablet (500 mg) by mouth 2 times daily as needed for mild pain, Disp: 30 tablet, Rfl: 0     Alcohol Swabs (PRO COMFORT ALCOHOL) 70 % PADS, USE TO TEST BLOOD GLUCOSE FOUR TIMES DAILY, Disp: 400 each, Rfl: 11     amitriptyline (ELAVIL) 10 MG tablet, TAKE 1 TABLET(10 MG) BY MOUTH AT BEDTIME, Disp: 30 tablet, Rfl: 11     atorvastatin (LIPITOR) 10 MG tablet, TAKE 1 TABLET BY MOUTH EVERY DAY, Disp: 90 tablet, Rfl: 3     budesonide-formoterol (SYMBICORT) 80-4.5 MCG/ACT Inhaler, Inhale 2 puffs once daily plus 1-2 puffs as needed. May use up to 12 puffs per day., Disp: 20.4 g, Rfl: 11     capsaicin (ZOSTRIX) 0.025 % external cream, Apply 3-4 times daily to knees, Disp: 118 mL, Rfl: 11     Continuous Blood Gluc Sensor (FREESTYLE ANIKET 14  DAY SENSOR) MISC, 1 each every 14 days Use 1 Sensor every 14 days. Use to read blood sugars per 's instructions., Disp: 2 each, Rfl: 5     Continuous Blood Gluc Sensor (FREESTYLE ANIKET 14 DAY SENSOR) MISC, 1 each every 14 days Change every 14 days., Disp: 2 each, Rfl: 5     CONTOUR NEXT TEST test strip, USE TO TEST BLOOD GLUCOSE FOUR TIMES DAILY, Disp: 400 strip, Rfl: 11     cyanocobalamin (CYANOCOBALAMIN) 1000 MCG/ML injection, Inject 1 mL (1,000 mcg) into the muscle every 30 days, Disp: 1 mL, Rfl: 11     cyclobenzaprine (FLEXERIL) 10 MG tablet, Take 0.5 to 1 tablet every 8 hours as needed., Disp: 30 tablet, Rfl: 0     diclofenac (VOLTAREN) 1 % topical gel, Apply 4 g topically 3 times daily To top of both shoulders in area of pain, Disp: 350 g, Rfl: 2     diltiazem ER (DILT-XR) 120 MG 24 hr capsule, TAKE 1 CAPSULE(120 MG) BY MOUTH AT BEDTIME, Disp: 90 capsule, Rfl: 4     dulaglutide (TRULICITY) 1.5 MG/0.5ML pen, Inject 1.5 mg Subcutaneous every 7 days, Disp: 2 mL, Rfl: 11     DULoxetine (CYMBALTA) 60 MG capsule, Take 1 capsule (60 mg) by mouth daily, Disp: 30 capsule, Rfl: 11     famotidine (PEPCID) 20 MG tablet, Take 1 tablet (20 mg) by mouth 2 times daily, Disp: 60 tablet, Rfl: 11     ferrous sulfate (IRON) 325 (65 FE) MG tablet, Take 1 tablet (325 mg) by mouth daily (with breakfast), Disp: 30 tablet, Rfl: 2     insulin pen needle (BD ULTRA-FINE) 29G X 12.7MM miscellaneous, Use 1 time daily, Disp: 90 each, Rfl: 3     JARDIANCE 10 MG TABS tablet, TAKE 1 TABLET(10 MG) BY MOUTH DAILY, Disp: 30 tablet, Rfl: 1     Lancets 30G MISC, USE TO TEST BLOOD GLUCOSE FOUR TIMES DAILY, Disp: 400 each, Rfl: 11     LANTUS SOLOSTAR 100 UNIT/ML soln, INJECT 25 UNITS UNDER THE SKIN DAILY, Disp: 6 mL, Rfl: 11     latanoprost (XALATAN) 0.005 % ophthalmic solution, INSTILL 1 DROP IN BOTH EYES DAILY AT BEDTIME, Disp: , Rfl:      lidocaine (LMX4) 4 % external cream, Apply topically once as needed for mild pain (left knee),  Disp: 45 g, Rfl: 2     lisinopril (ZESTRIL) 40 MG tablet, Take 1 tablet (40 mg) by mouth At Bedtime, Disp: 90 tablet, Rfl: 3     naloxone (NARCAN) 4 MG/0.1ML nasal spray, Spray 1 spray (4 mg) into one nostril alternating nostrils once as needed for opioid reversal Every 2-3 minutes until patient responsive or EMS arrives, Disp: 0.2 mL, Rfl: 0     ondansetron (ZOFRAN ODT) 4 MG ODT tab, Take 4 mg by mouth as needed, Disp: , Rfl:      oxyCODONE-acetaminophen (PERCOCET)  MG per tablet, Take 2 tablets by mouth At Bedtime. May also take 1 tablet every 6 hours as needed for severe pain., Disp: 85 tablet, Rfl: 0     phentermine (ADIPEX-P) 37.5 MG tablet, Take 0.5-1 tablets (18.75-37.5 mg) by mouth every morning, Disp: 90 tablet, Rfl: 1     risperiDONE (RISPERDAL) 0.25 MG tablet, Take 1 tablet (0.25 mg) by mouth At Bedtime, Disp: , Rfl:      senna-docusate (SENOKOT-S/PERICOLACE) 8.6-50 MG tablet, Take 1 tablet by mouth 2 times daily, Disp: 60 tablet, Rfl: 11     Simethicone 180 MG CAPS, Use 1 softgel as needed for gas, Disp: 60 capsule, Rfl: 11     sodium citrate-citric acid (BICITRA) 500-334 MG/5ML solution, Take 10 mLs by mouth 3 times daily (with meals), Disp: 473 mL, Rfl: 11     SUMAtriptan (IMITREX) 25 MG tablet, One tab at onset of HA.  May repeat dose in 2 hours.  Do not exceed 200 mg in 24 hours, Disp: 24 tablet, Rfl: 1     tolterodine ER (DETROL LA) 2 MG 24 hr capsule, Take 1 capsule (2 mg) by mouth daily, Disp: 90 capsule, Rfl: 3     vitamin D3 (CHOLECALCIFEROL) 1.25 MG (44918 UT) capsule, Take 1 capsule (50,000 Units) by mouth three times a week, Disp: 39 capsule, Rfl: 3     blood glucose (NO BRAND SPECIFIED) lancets standard, Check fasting glucose 4 times daily.  For Accu check monitor, Disp: 360 each, Rfl: 11     gabapentin (NEURONTIN) 250 MG/5ML solution, TAKE 15 ML(750 MG) BY MOUTH THREE TIMES DAILY, Disp: 1410 mL, Rfl: 11     order for DME, Equipment being ordered: wheeled walker with seat, Disp: 1  "Device, Rfl: 0     rivaroxaban ANTICOAGULANT (XARELTO) 10 MG TABS tablet, Take 1 tablet (10 mg) by mouth daily (with dinner), Disp: 30 tablet, Rfl: 0    Current Facility-Administered Medications:      cyanocobalamin injection 1,000 mcg, 1,000 mcg, Intramuscular, Q30 Days, Jose Angel Meyer MD, 1,000 mcg at 22 1352     Plan:refill phentermine, increase trulicity to 3.0mg, no juice. Continue walking. Provided number to Dr. Douglas.  Delaney Burns RD     Bariatric Surgery Review     Interim History/LifeChanges: A1c up to 8.7. Her daughter moved back to [place]. Her other daughter  of drug overdose in 2021.. She has been eating more at her daughter's house. She has been out of phentermine for 2 months. She was planning to have a panniculectomy and arm and thigh surgery with Dr. Douglas but missed the call.    Patient Concerns: weight gain  Appetite (1-10): up  GERD:     Medication changes: out of phentermine, on amitriptyline, insulin Lantus 25U    Vitamin Intake:   B-12   injections   MVI  2/d   Vitamin D  5,000   Calcium        Other                LABS: \"Reviewed    Nausea no  Vomiting no  Constipation no  Diarrhea no  Rashes: yes abdominal  Hair Loss no  Calf tenderness no  Breathing difficulty no  Reactive Hypoglycemia no  Light Headedness    Moods grieving daughter death 2021    12 point ROS as above and otherwise negative      Habits:  Alcohol: no  Tobacco: no  Caffeine no  NSAIDS no  Exercise Routine: walking daily  3 meals/day 2  Protein first sometimes  grams/day  Water Separate from meals yes  Calorie Containing Beverages juice  Restaurant eating/wk 1-2  Sleeping poor  Stress high  CPAP: no  Contraception: PM  DEXA:    Social History     Social History     Socioeconomic History     Marital status: Single     Spouse name: Not on file     Number of children: Not on file     Years of education: Not on file     Highest education level: Not on file   Occupational History     Not on file "   Tobacco Use     Smoking status: Never Smoker     Smokeless tobacco: Never Used   Substance and Sexual Activity     Alcohol use: No     Drug use: No     Sexual activity: Never   Other Topics Concern     Parent/sibling w/ CABG, MI or angioplasty before 65F 55M? No   Social History Narrative    Lives alone. [place], other daughter lives here 2 sons live in the [facility]     Social Determinants of Health     Financial Resource Strain: Not on file   Food Insecurity: Not on file   Transportation Needs: Not on file   Physical Activity: Not on file   Stress: Not on file   Social Connections: Not on file   Intimate Partner Violence: Not on file   Housing Stability: Not on file       Past Medical History     Past Medical History:   Diagnosis Date     Abnormal cervical Papanicolaou smear with positive human papillomavirus deoxyribonucleic acid test      Ankle joint pain     bilateral     Anxiety      Arthritis      Arthritis     rheumatoid arthritis     Asthma      Carpal tunnel syndrome     bilateral     Compression neuropathy      Depression      Depressive disorder      Diabetes (H)      Diabetes (H)     type II     Diabetic nephropathy (H)      DVT of lower extremity, bilateral (H) 2011    right     Esophageal reflux      Gastroenteritis      History of blood clots      History of blood transfusion      Hyperlipidemia      Hypertension      Hypertension      Infectious fasciitis 2011    abdominal wall      Insomnia      Neck pain      Necrotizing fasciitis (H) 11/15/2012     Necrotizing fasciitis (H)      Neuropathy      Obesity      Osteoarthritis of both knees 10/31/2016     Pulmonary embolism and infarction (H)      SOB (shortness of breath)     with exertion     Tarsal tunnel syndrome      Uncomplicated asthma      Urinary incontinence      Problem List     Patient Active Problem List   Diagnosis     Chronic pain of both shoulders     Abnormal Pap smear of cervix     Moderate persistent asthma  without complication     Carpal tunnel syndrome     Pulmonary embolism with infarction (H)     Health Care Home     Moderate major depression (H)     S/P hysterectomy     Hyperlipidemia     Status post bariatric surgery     Essential hypertension     Urinary incontinence     Esophageal reflux     Microalbuminuria     Type 2 diabetes mellitus with diabetic polyneuropathy (H)     Bilateral ankle joint pain     Chronic pain disorder     Other acute pulmonary embolism without acute cor pulmonale (H)     Asthma     Compression neuropathy of left lower extremity     Hernia     Hernia of anterior abdominal wall     Obesity     Osteoarthritis of both knees     Polyneuropathy due to secondary diabetes mellitus (H)     Copper deficiency     Metabolic acidosis     Neck mass     Persons encountering health services in other specified circumstances     Hyperglycemia due to type 2 diabetes mellitus (H)     History of cholecystectomy     History of hysterectomy for benign disease     Morbid obesity (H)     Stage 3a chronic kidney disease (H)     Arthritis of both acromioclavicular joints     H/O necrotizing fasciitis     Chronic, continuous use of opioids     Medications       Current Outpatient Medications:      acetaminophen (TYLENOL) 500 MG tablet, Take 1 tablet (500 mg) by mouth 2 times daily as needed for mild pain, Disp: 30 tablet, Rfl: 0     Alcohol Swabs (PRO COMFORT ALCOHOL) 70 % PADS, USE TO TEST BLOOD GLUCOSE FOUR TIMES DAILY, Disp: 400 each, Rfl: 11     amitriptyline (ELAVIL) 10 MG tablet, TAKE 1 TABLET(10 MG) BY MOUTH AT BEDTIME, Disp: 30 tablet, Rfl: 11     atorvastatin (LIPITOR) 10 MG tablet, TAKE 1 TABLET BY MOUTH EVERY DAY, Disp: 90 tablet, Rfl: 3     budesonide-formoterol (SYMBICORT) 80-4.5 MCG/ACT Inhaler, Inhale 2 puffs once daily plus 1-2 puffs as needed. May use up to 12 puffs per day., Disp: 20.4 g, Rfl: 11     capsaicin (ZOSTRIX) 0.025 % external cream, Apply 3-4 times daily to knees, Disp: 118 mL, Rfl:  11     Continuous Blood Gluc Sensor (FREESTYLE ANIKET 14 DAY SENSOR) MISC, 1 each every 14 days Use 1 Sensor every 14 days. Use to read blood sugars per 's instructions., Disp: 2 each, Rfl: 5     Continuous Blood Gluc Sensor (FREESTYLE ANIKET 14 DAY SENSOR) MISC, 1 each every 14 days Change every 14 days., Disp: 2 each, Rfl: 5     CONTOUR NEXT TEST test strip, USE TO TEST BLOOD GLUCOSE FOUR TIMES DAILY, Disp: 400 strip, Rfl: 11     cyanocobalamin (CYANOCOBALAMIN) 1000 MCG/ML injection, Inject 1 mL (1,000 mcg) into the muscle every 30 days, Disp: 1 mL, Rfl: 11     cyclobenzaprine (FLEXERIL) 10 MG tablet, Take 0.5 to 1 tablet every 8 hours as needed., Disp: 30 tablet, Rfl: 0     diclofenac (VOLTAREN) 1 % topical gel, Apply 4 g topically 3 times daily To top of both shoulders in area of pain, Disp: 350 g, Rfl: 2     diltiazem ER (DILT-XR) 120 MG 24 hr capsule, TAKE 1 CAPSULE(120 MG) BY MOUTH AT BEDTIME, Disp: 90 capsule, Rfl: 4     dulaglutide (TRULICITY) 1.5 MG/0.5ML pen, Inject 1.5 mg Subcutaneous every 7 days, Disp: 2 mL, Rfl: 11     DULoxetine (CYMBALTA) 60 MG capsule, Take 1 capsule (60 mg) by mouth daily, Disp: 30 capsule, Rfl: 11     famotidine (PEPCID) 20 MG tablet, Take 1 tablet (20 mg) by mouth 2 times daily, Disp: 60 tablet, Rfl: 11     ferrous sulfate (IRON) 325 (65 FE) MG tablet, Take 1 tablet (325 mg) by mouth daily (with breakfast), Disp: 30 tablet, Rfl: 2     insulin pen needle (BD ULTRA-FINE) 29G X 12.7MM miscellaneous, Use 1 time daily, Disp: 90 each, Rfl: 3     JARDIANCE 10 MG TABS tablet, TAKE 1 TABLET(10 MG) BY MOUTH DAILY, Disp: 30 tablet, Rfl: 1     Lancets 30G MISC, USE TO TEST BLOOD GLUCOSE FOUR TIMES DAILY, Disp: 400 each, Rfl: 11     LANTUS SOLOSTAR 100 UNIT/ML soln, INJECT 25 UNITS UNDER THE SKIN DAILY, Disp: 6 mL, Rfl: 11     latanoprost (XALATAN) 0.005 % ophthalmic solution, INSTILL 1 DROP IN BOTH EYES DAILY AT BEDTIME, Disp: , Rfl:      lidocaine (LMX4) 4 % external cream, Apply  topically once as needed for mild pain (left knee), Disp: 45 g, Rfl: 2     lisinopril (ZESTRIL) 40 MG tablet, Take 1 tablet (40 mg) by mouth At Bedtime, Disp: 90 tablet, Rfl: 3     naloxone (NARCAN) 4 MG/0.1ML nasal spray, Spray 1 spray (4 mg) into one nostril alternating nostrils once as needed for opioid reversal Every 2-3 minutes until patient responsive or EMS arrives, Disp: 0.2 mL, Rfl: 0     ondansetron (ZOFRAN ODT) 4 MG ODT tab, Take 4 mg by mouth as needed, Disp: , Rfl:      oxyCODONE-acetaminophen (PERCOCET)  MG per tablet, Take 2 tablets by mouth At Bedtime. May also take 1 tablet every 6 hours as needed for severe pain., Disp: 85 tablet, Rfl: 0     phentermine (ADIPEX-P) 37.5 MG tablet, Take 0.5-1 tablets (18.75-37.5 mg) by mouth every morning, Disp: 90 tablet, Rfl: 1     risperiDONE (RISPERDAL) 0.25 MG tablet, Take 1 tablet (0.25 mg) by mouth At Bedtime, Disp: , Rfl:      senna-docusate (SENOKOT-S/PERICOLACE) 8.6-50 MG tablet, Take 1 tablet by mouth 2 times daily, Disp: 60 tablet, Rfl: 11     Simethicone 180 MG CAPS, Use 1 softgel as needed for gas, Disp: 60 capsule, Rfl: 11     sodium citrate-citric acid (BICITRA) 500-334 MG/5ML solution, Take 10 mLs by mouth 3 times daily (with meals), Disp: 473 mL, Rfl: 11     SUMAtriptan (IMITREX) 25 MG tablet, One tab at onset of HA.  May repeat dose in 2 hours.  Do not exceed 200 mg in 24 hours, Disp: 24 tablet, Rfl: 1     tolterodine ER (DETROL LA) 2 MG 24 hr capsule, Take 1 capsule (2 mg) by mouth daily, Disp: 90 capsule, Rfl: 3     vitamin D3 (CHOLECALCIFEROL) 1.25 MG (69675 UT) capsule, Take 1 capsule (50,000 Units) by mouth three times a week, Disp: 39 capsule, Rfl: 3     blood glucose (NO BRAND SPECIFIED) lancets standard, Check fasting glucose 4 times daily.  For Accu check monitor, Disp: 360 each, Rfl: 11     gabapentin (NEURONTIN) 250 MG/5ML solution, TAKE 15 ML(750 MG) BY MOUTH THREE TIMES DAILY, Disp: 1410 mL, Rfl: 11     order for DME, Equipment  "being ordered: wheeled walker with seat, Disp: 1 Device, Rfl: 0     rivaroxaban ANTICOAGULANT (XARELTO) 10 MG TABS tablet, Take 1 tablet (10 mg) by mouth daily (with dinner), Disp: 30 tablet, Rfl: 0    Current Facility-Administered Medications:      cyanocobalamin injection 1,000 mcg, 1,000 mcg, Intramuscular, Q30 Days, Jose Angel Meyer MD, 1,000 mcg at 22 1352   Surgical History     Past Surgical History  She has a past surgical history that includes gastric bypass (); Hysterectomy; gastric bypass ();  Section; Uterine Fibroid Surgery (); Abdominal Wall Surgery; REVISE MEDIAN N/CARPAL TUNNEL SURG (Right, 10/24/2014); Hysterectomy; Uterine Fibroid Surgery; knee surgery (Right); Colonoscopy (N/A, 3/15/2017); Colonoscopy (N/A, 3/16/2017); and Midline Insertion - Double Lumen (2021).    Objective-Exam     Constitutional:  Pulse 90   Resp 20   Ht 1.499 m (4' 11\")   Wt 91.2 kg (201 lb)   LMP  (LMP Unknown)   BMI 40.60 kg/m      General:  Pleasant and in no acute distress   Eyes:  EOMI  ENT:  Airway 2+  Moist mucous membranes  Neck:  Supple, No LAD, No thyromegaly, No carotid bruits appreciated  Respiratory: Normal respiratory effort, no cough, wheezes or crackles  CV:  Regular rate and Rhythm,no murmurs, pulses 2+, no calf tenderness, bilateral LE edema  Gastrointestinal: Abdomen NT/ND, BS+  Musculoskeletal: muscle mass WNL  Skin:  hair baseline, incisions nicely healed  Neurological: No tremor, unassisted gait  Psychiatric: alert and oriented X3, mood and affect normal    Counseling     We reviewed the important post op bariatric recommendations:  -eating 3 meals daily  -eating protein first, getting >60gm protein daily  -eating slowly, chewing food well  -avoiding/limiting calorie containing beverages  -drinking water 15-30 minutes before or after meals  -choosing wheat, not white with breads, crackers, pastas, aleisha, bagels, tortillas, rice  -limiting restaurant or cafeteria " eating to twice a week or less    We discussed the importance of restorative sleep and stress management in maintaining a healthy weight.  We discussed the National Weight Control Registry healthy weight maintenance strategies and ways to optimize metabolism.  We discussed the importance of physical activity including cardiovascular and strength training in maintaining a healthier weight.    We discussed the importance of life-long vitamin supplementation and life-long  follow-up.    Ruthy was reminded that, to avoid marginal ulcers she should avoid tobacco at all, alcohol in excess, caffeine in excess, and NSAIDS (unless indicated for cardioprotection or othewise and opposed by a PPI).    Vy Carson MD, MD, [facility].  6/23/2022  2:08 PM      No images are attached to the encounter.  Total time spent on the date of this encounter doing: chart review, review of test results, patient visit, physical exam, education, counseling, developing plan of care, and documenting = 30 minutes.      Again, thank you for allowing me to participate in the care of your patient.        Sincerely,        Vy Carson MD

## 2022-06-23 NOTE — PROGRESS NOTES
Clinical Pharmacy Consult:                                                    uRthy Álvarez is a 56 year old female coming in for a clinical pharmacist consult.  She was referred to me from Dr. Meyer.     Reason for Consult: Asthma    Discussion: Patient reports using Albuterol and Advair as needed for symptom relief. Describes mold in apartment, heat/cold and tobacco smoke as triggers for asthma symptoms. Neither Albuterol or Advair have been filled in the last year (2021-today) for either medicine. Concern for  supplies.     ACT Total Scores 2021 2/3/2022 2022   ACT TOTAL SCORE - - -   ASTHMA ER VISITS - - -   ASTHMA HOSPITALIZATIONS - - -   ACT TOTAL SCORE (Goal Greater than or Equal to 20) 10 11 10   In the past 12 months, how many times did you visit the emergency room for your asthma without being admitted to the hospital? 0 0 -   In the past 12 months, how many times were you hospitalized overnight because of your asthma? 0 0 -     Plan:  1. After discussion w/ Dr. Meyer, stop albuterol/Advair and start SMART therapy with Symbicort. Counseling provided on dosing, administration (pt preference for AM).     Follow up - 1 month with primary care provider/pharmacy team    Charlette Batista, Pharm.D., [facility]  [address], [place] [address]  Phone: [phone]  2022 at 1:06 PM

## 2022-06-23 NOTE — TELEPHONE ENCOUNTER
[facility] phone call message- general phone call:    Reason for call: Pt was seen today by Dr Meyer.  He prescribed 85 tablets of Oxycodone.  Pt takes 4 pills a day.  That would only be enough for 21 days and patient doesn't see you again until August.  She cannot go that long without her medication.  Can you please call the pharmacy and change the dosage. Pt normally get 125 tablets to last the month.     Return call needed: Yes    OK to leave a message on voice mail? Yes    Primary language: English      needed? No    Call taken on June 23, 2022 at 4:55 PM by Aristides Bueno

## 2022-06-23 NOTE — PATIENT INSTRUCTIONS
For your asthma:   - Start using 2 puffs of Symbicort every morning  - In addition, use 1-2 puffs of Symbicort as needed for relief of your asthma symptoms. You can use up to 12 puffs per day    No colonoscopy unitl 2027!

## 2022-06-23 NOTE — PROGRESS NOTES
Patient Active Problem List    Diagnosis Date Noted     Stage 3a chronic kidney disease (H) 12/31/2020     Priority: High     Persistent protienuria       Copper deficiency 04/05/2019     Priority: High     Identified in Aug, 2018--see discharge summary from [facility] in [place].       Chronic pain disorder 10/07/2016     Priority: High     Chronic Pain Diagnosis:  neuropathy  DIRE Total Score(s):14     ORT: 5    4 - 7 =  Moderate Risk   of future problems with Opioids  FAQ5: 50 November 20, 2020  Behavioral Health Consultation: 5/12/17 with Dr. Daisy Quezada  Personal Care Plan for Chronic Pain: 5/12/17  Opioid medication:oxycodone   Dose:10mg  Number of pills per month:60  Patient is being prescribed 10mg of oxycodone IR (Percocet) per day this is 15 mg Morphine Equivalents  Benzodiazepines Prescribed? No  Naloxone prescribed? No       Clinic visit frequency required: Q 1 month Next visit due:   Controlled Substance/Opioid Treatment agreement on file (dated <12 months ago)?:    Date(s): pending  Last [place] verification: last visit  Items in red to be updated at each visit  Patient is followed by [facility] for ongoing prescription of pain medication.  All refills should be approved by this provider, or covering partner.Chronic Pain     Chronic Pain Diagnosis:  neuropathy  DIRE Total Score(s):14     ORT: 5    4 - 7 =  Moderate Risk   of future problems with Opioids  FAQ5: 45/100 on 9/9/16, 40/100 on 11/15/16  55 October 29, 2020    Behavioral Health Consultation: 5/12/17 with Dr. Daisy Quezada  Personal Care Plan for Chronic Pain: 5/12/17  Opioid medication:oxycodone   Dose:10mg  Number of pills per month:90  Patient is being prescribed 45 MME  Benzodiazepines Prescribed? No  Naloxone prescribed? No       Clinic visit frequency required: Q 1 month Next visit due:   Controlled Substance/Opioid Treatment agreement on file (dated <12 months ago)?:    Date(s): pending  Last [place] verification: last  visit  Items in red to be updated at each visit  Patient is followed by [facility] for ongoing prescription of pain medication.  All refills should be approved by this provider, or covering partner.Chronic Pain       Microalbuminuria 10/12/2015     Priority: High     Severely increased albuminuria       Type 2 diabetes mellitus with diabetic polyneuropathy (H) 10/12/2015     Priority: High     Essential hypertension 08/26/2013     Priority: High     Chronic, continuous use of opioids 05/18/2022     Priority: Medium     Arthritis of both acromioclavicular joints 04/08/2021     Priority: Medium     xray proven        Morbid obesity (H) 11/27/2020     Priority: Medium     Hyperglycemia due to type 2 diabetes mellitus (H) 06/02/2020     Priority: Medium     Neck mass 10/11/2019     Priority: Medium     Added automatically from request for surgery [id number]       Metabolic acidosis 07/26/2019     Priority: Medium     reolved w/ discontinuation of ASA       Obesity 01/03/2019     Priority: Medium     Other acute pulmonary embolism without acute cor pulmonale (H) 12/31/2018     Priority: Medium     Hernia of anterior abdominal wall 01/28/2017     Priority: Medium     Osteoarthritis of both knees 10/31/2016     Priority: Medium     Polyneuropathy due to secondary diabetes mellitus (H) 10/31/2016     Priority: Medium     Compression neuropathy of left lower extremity 02/19/2016     Priority: Medium     Bilateral ankle joint pain 01/15/2016     Priority: Medium     Esophageal reflux 04/07/2015     Priority: Medium     Urinary incontinence 02/19/2015     Priority: Medium     Asthma 08/04/2013     Priority: Medium     History of cholecystectomy 08/04/2013     Priority: Medium     H/O necrotizing fasciitis 08/04/2013     Priority: Medium     Hyperlipidemia 06/21/2013     Priority: Medium     Status post bariatric surgery 06/21/2013     Priority: Medium     Kim-en-Y in 2006       Moderate major depression (H) 01/24/2013      Priority: Medium     Moderate persistent asthma without complication 11/15/2012     Priority: Medium     Persons encountering health services in other specified circumstances 11/15/2012     Priority: Medium     Tier 3    Status: Accept  Care Coordination Start Date: 02/27/12  State Tier Level: Level 3  Language/Barrier to Learning: No  Significant Mental Health Issues: Yes  Date Care Coordination Discontinued: 05/28/15    DX V65.8 REPLACED WITH [id number] (04/08/2013)       Abnormal Pap smear of cervix 04/22/2019     Priority: Low     3-12-15: Pap/HPV neg.  Plan: Given hx (mildly abn pap, hyster), recheck in 5 yrs.  10-11-11: Pap/HPV neg.  Plan: Given ASUS pap in 2010, recommend repeating pap in 1 year.  8/3/2010 ASCUS Pap with +BV- pt treated with Metrogel.  Pt needs repeat Pap in 1yr.  8/4/2008 Pt had total hyst for menorrhagia, pelvic pain, scar tissue, and fibroids.    3-12-15: Pap/HPV neg.  Plan: Given hx (mildly abn pap, hyster), recheck in 5 yrs.  10-11-11: Pap/HPV neg.  Plan: Given ASUS pap in 2010, recommend repeating pap in 1 year.  8/3/2010 ASCUS Pap with +BV- pt treated with Metrogel.  Pt needs repeat Pap in 1yr.  8/4/2008 Pt had total hyst for menorrhagia, pelvic pain, scar tissue, and fibroids.       Hernia 01/27/2017     Priority: Low     S/P hysterectomy 06/21/2013     Priority: Low     2008, For fibroids.  Cervix removed, but still has ovaries.       History of hysterectomy for benign disease 06/21/2013     Priority: Low     2008, For fibroids.  Cervix removed, but still has ovaries.       Carpal tunnel syndrome 11/15/2012     Priority: Low     Pulmonary embolism with infarction (H) 11/15/2012     Priority: Low     12/24/2018  Unprovoked.  + fam hx of DVT.  Treated w/ DOAC 12/2018through 9/2019 12/24/2018  Unprovoked.  + fam hx of DVT.  Plan warfarin for 6 months.       Health Care Home 11/15/2012     Priority: Low     Tier 3    Status: Accept  Care Coordination Start Date:  02/27/12  State Tier Level: Level 3  Language/Barrier to Learning: No  Significant Mental Health Issues: Yes  Date Care Coordination Discontinued: 05/28/15    DX V65.8 REPLACED WITH [id number] (04/08/2013)         Chronic pain of both shoulders 12/23/2005     Priority: Low     There are no exam notes on file for this visit.  Chief Complaint   Patient presents with     pain     Pain Management     CPM  / use inhaler more often for asthma    SUBJECTIVE:  Ruthy Álvarez is here for multiple issues.  First, she has chronic pain syndrome and needs followup of that.  She reports good pain control.  Her database is unremarkable.  She denies drugs of abuse and her urine drug screen is appropriate.  She tested positive for amphetamines because she is on stimulant therapy to help her lose weight.    She would like a mammogram today.  She had a temporary lump, which seems to be gone away.  She is wondering if she needs colonoscopy and a Pap smear.  Her asthma has been worse during the daytime, triggered by mold, and not worse at night, relieved by albuterol.  She is not taking Advair.  She was seen by Dr. Jen Batista today who recommended adding Symbicort.    She gives me an interesting social history today.  She is 1 of 18 children, grew up in [place].  She apparently had a twin that was stolen from her family.    OBJECTIVE:    GENERAL:  The patient is alert, pleasant, in no acute distress.  VITAL SIGNS:  Blood pressure is excellent.  CHEST:  Surprisingly clear to auscultation despite her ACT.    BACK:  She has paraspinal lumbar tenderness.    BREASTS:  No masses.  HEART:  Regular without any murmurs.  EXTREMITIES:  Have no edema.    Urine drug screen is appropriate.    ASSESSMENT:    1.  Chronic pain syndrome, stable.  Refilled oxycodone at current doses.  She will need another refill before she can get her next appointment on 08/04/2022.  We will send myself a message to make sure that I refill it  approximately on 07/20/2022.  Next visit, we will try to get a Pap smear done as well.  2.  Colon cancer screening, she is up to date, due again in 2027.  3.  Breast cancer screening, referred for a mammogram today.  4.  Asthma.  Switched from Advair and albuterol to SMART therapy using Symbicort 2 puffs once daily and then 1-2 puffs p.r.n.  When she follows up in 1 month, she will be due for a BMP, A1c and a Pap smear.  B12 given today and second COVID booster given today as well.      Blood pressure 134/68, pulse 102, temperature 98  F (36.7  C), temperature source Oral, resp. rate 16, weight 90.8 kg (200 lb 3.2 oz), SpO2 98 %, not currently breastfeeding.  Results for orders placed or performed in visit on 06/23/22   Urine Drugs of Abuse Screen Panel 13     Status: Abnormal   Result Value Ref Range    Cannabinoids (12-awd-8-carboxy-9-THC) Not Detected Not Detected, Indeterminate    Phencyclidine Not Detected Not Detected, Indeterminate    Cocaine (Benzoylecgonine) Not Detected Not Detected, Indeterminate    Methamphetamine (d-Methamphetamine) Not Detected Not Detected, Indeterminate    Opiates (Morphine) Not Detected Not Detected, Indeterminate    Amphetamine (d-Amphetamine) Detected (A) Not Detected, Indeterminate    Benzodiazepines (Nordiazepam) Not Detected Not Detected, Indeterminate    Tricyclic Antidepressants (Desipramine) Not Detected Not Detected, Indeterminate    Methadone Not Detected Not Detected, Indeterminate    Barbiturates (Butalbital) Not Detected Not Detected, Indeterminate    Oxycodone Detected (A) Not Detected, Indeterminate    Propoxyphene (Norpropoxyphene) Not Detected Not Detected, Indeterminate    Buprenorphine Not Detected Not Detected, Indeterminate

## 2022-06-23 NOTE — TELEPHONE ENCOUNTER
Sh was seen today and she just went to the pharmacy and they dont have her pain meds she needs this taken car of asap

## 2022-06-23 NOTE — PROGRESS NOTES
Bariatric Follow Up Visit with a History of Previous Bariatric Surgery     Date of visit: 6/23/2022  Physician: Vy Carson MD, MD  Primary Care Provider:  Jose Angel Meyer   56 year old  female    Date of Surgery: 2006  Initial Weight: 360#  Initial BMI:   Today's Weight:   Wt Readings from Last 1 Encounters:   06/23/22 91.2 kg (201 lb)     Body mass index is 40.6 kg/m .      Assessment and Plan     Assessment: Ruthy is a 56 year old year old female who is 16years s/p  Kim en Y Gastric Bypass with Dr. Meredith Álvarez feels as if she had achieved the goals she hoped to accomplish through bariatric surgery and weight loss.    Encounter Diagnosis   Name Primary?     Type 2 diabetes mellitus with diabetic polyneuropathy, with long-term current use of insulin (H) Yes         Current Outpatient Medications:      acetaminophen (TYLENOL) 500 MG tablet, Take 1 tablet (500 mg) by mouth 2 times daily as needed for mild pain, Disp: 30 tablet, Rfl: 0     Alcohol Swabs (PRO COMFORT ALCOHOL) 70 % PADS, USE TO TEST BLOOD GLUCOSE FOUR TIMES DAILY, Disp: 400 each, Rfl: 11     amitriptyline (ELAVIL) 10 MG tablet, TAKE 1 TABLET(10 MG) BY MOUTH AT BEDTIME, Disp: 30 tablet, Rfl: 11     atorvastatin (LIPITOR) 10 MG tablet, TAKE 1 TABLET BY MOUTH EVERY DAY, Disp: 90 tablet, Rfl: 3     budesonide-formoterol (SYMBICORT) 80-4.5 MCG/ACT Inhaler, Inhale 2 puffs once daily plus 1-2 puffs as needed. May use up to 12 puffs per day., Disp: 20.4 g, Rfl: 11     capsaicin (ZOSTRIX) 0.025 % external cream, Apply 3-4 times daily to knees, Disp: 118 mL, Rfl: 11     Continuous Blood Gluc Sensor (FREESTYLE ANIKET 14 DAY SENSOR) MISC, 1 each every 14 days Use 1 Sensor every 14 days. Use to read blood sugars per 's instructions., Disp: 2 each, Rfl: 5     Continuous Blood Gluc Sensor (FREESTYLE ANIKET 14 DAY SENSOR) MISC, 1 each every 14 days Change every 14 days., Disp: 2 each, Rfl: 5      CONTOUR NEXT TEST test strip, USE TO TEST BLOOD GLUCOSE FOUR TIMES DAILY, Disp: 400 strip, Rfl: 11     cyanocobalamin (CYANOCOBALAMIN) 1000 MCG/ML injection, Inject 1 mL (1,000 mcg) into the muscle every 30 days, Disp: 1 mL, Rfl: 11     cyclobenzaprine (FLEXERIL) 10 MG tablet, Take 0.5 to 1 tablet every 8 hours as needed., Disp: 30 tablet, Rfl: 0     diclofenac (VOLTAREN) 1 % topical gel, Apply 4 g topically 3 times daily To top of both shoulders in area of pain, Disp: 350 g, Rfl: 2     diltiazem ER (DILT-XR) 120 MG 24 hr capsule, TAKE 1 CAPSULE(120 MG) BY MOUTH AT BEDTIME, Disp: 90 capsule, Rfl: 4     dulaglutide (TRULICITY) 1.5 MG/0.5ML pen, Inject 1.5 mg Subcutaneous every 7 days, Disp: 2 mL, Rfl: 11     DULoxetine (CYMBALTA) 60 MG capsule, Take 1 capsule (60 mg) by mouth daily, Disp: 30 capsule, Rfl: 11     famotidine (PEPCID) 20 MG tablet, Take 1 tablet (20 mg) by mouth 2 times daily, Disp: 60 tablet, Rfl: 11     ferrous sulfate (IRON) 325 (65 FE) MG tablet, Take 1 tablet (325 mg) by mouth daily (with breakfast), Disp: 30 tablet, Rfl: 2     insulin pen needle (BD ULTRA-FINE) 29G X 12.7MM miscellaneous, Use 1 time daily, Disp: 90 each, Rfl: 3     JARDIANCE 10 MG TABS tablet, TAKE 1 TABLET(10 MG) BY MOUTH DAILY, Disp: 30 tablet, Rfl: 1     Lancets 30G MISC, USE TO TEST BLOOD GLUCOSE FOUR TIMES DAILY, Disp: 400 each, Rfl: 11     LANTUS SOLOSTAR 100 UNIT/ML soln, INJECT 25 UNITS UNDER THE SKIN DAILY, Disp: 6 mL, Rfl: 11     latanoprost (XALATAN) 0.005 % ophthalmic solution, INSTILL 1 DROP IN BOTH EYES DAILY AT BEDTIME, Disp: , Rfl:      lidocaine (LMX4) 4 % external cream, Apply topically once as needed for mild pain (left knee), Disp: 45 g, Rfl: 2     lisinopril (ZESTRIL) 40 MG tablet, Take 1 tablet (40 mg) by mouth At Bedtime, Disp: 90 tablet, Rfl: 3     naloxone (NARCAN) 4 MG/0.1ML nasal spray, Spray 1 spray (4 mg) into one nostril alternating nostrils once as needed for opioid reversal Every 2-3 minutes until  patient responsive or EMS arrives, Disp: 0.2 mL, Rfl: 0     ondansetron (ZOFRAN ODT) 4 MG ODT tab, Take 4 mg by mouth as needed, Disp: , Rfl:      oxyCODONE-acetaminophen (PERCOCET)  MG per tablet, Take 2 tablets by mouth At Bedtime. May also take 1 tablet every 6 hours as needed for severe pain., Disp: 85 tablet, Rfl: 0     phentermine (ADIPEX-P) 37.5 MG tablet, Take 0.5-1 tablets (18.75-37.5 mg) by mouth every morning, Disp: 90 tablet, Rfl: 1     risperiDONE (RISPERDAL) 0.25 MG tablet, Take 1 tablet (0.25 mg) by mouth At Bedtime, Disp: , Rfl:      senna-docusate (SENOKOT-S/PERICOLACE) 8.6-50 MG tablet, Take 1 tablet by mouth 2 times daily, Disp: 60 tablet, Rfl: 11     Simethicone 180 MG CAPS, Use 1 softgel as needed for gas, Disp: 60 capsule, Rfl: 11     sodium citrate-citric acid (BICITRA) 500-334 MG/5ML solution, Take 10 mLs by mouth 3 times daily (with meals), Disp: 473 mL, Rfl: 11     SUMAtriptan (IMITREX) 25 MG tablet, One tab at onset of HA.  May repeat dose in 2 hours.  Do not exceed 200 mg in 24 hours, Disp: 24 tablet, Rfl: 1     tolterodine ER (DETROL LA) 2 MG 24 hr capsule, Take 1 capsule (2 mg) by mouth daily, Disp: 90 capsule, Rfl: 3     vitamin D3 (CHOLECALCIFEROL) 1.25 MG (49744 UT) capsule, Take 1 capsule (50,000 Units) by mouth three times a week, Disp: 39 capsule, Rfl: 3     blood glucose (NO BRAND SPECIFIED) lancets standard, Check fasting glucose 4 times daily.  For Accu check monitor, Disp: 360 each, Rfl: 11     gabapentin (NEURONTIN) 250 MG/5ML solution, TAKE 15 ML(750 MG) BY MOUTH THREE TIMES DAILY, Disp: 1410 mL, Rfl: 11     order for DME, Equipment being ordered: wheeled walker with seat, Disp: 1 Device, Rfl: 0     rivaroxaban ANTICOAGULANT (XARELTO) 10 MG TABS tablet, Take 1 tablet (10 mg) by mouth daily (with dinner), Disp: 30 tablet, Rfl: 0    Current Facility-Administered Medications:      cyanocobalamin injection 1,000 mcg, 1,000 mcg, Intramuscular, Q30 Days, Betsy  "MD Jose Angel, 1,000 mcg at 22 1352     Plan:refill phentermine, increase trulicity to 3.0mg, no juice. Continue walking. Provided number to Dr. Douglas.  Delaney Burns RD     Bariatric Surgery Review     Interim History/LifeChanges: A1c up to 8.7. Her daughter moved back to [place]. Her other daughter  of drug overdose in 2021.. She has been eating more at her daughter's house. She has been out of phentermine for 2 months. She was planning to have a panniculectomy and arm and thigh surgery with Dr. Douglas but missed the call.    Patient Concerns: weight gain  Appetite (1-10): up  GERD:     Medication changes: out of phentermine, on amitriptyline, insulin Lantus 25U    Vitamin Intake:   B-12   injections   MVI  2/d   Vitamin D  5,000   Calcium        Other                LABS: \"Reviewed    Nausea no  Vomiting no  Constipation no  Diarrhea no  Rashes: yes abdominal  Hair Loss no  Calf tenderness no  Breathing difficulty no  Reactive Hypoglycemia no  Light Headedness    Moods grieving daughter death 2021    12 point ROS as above and otherwise negative      Habits:  Alcohol: no  Tobacco: no  Caffeine no  NSAIDS no  Exercise Routine: walking daily  3 meals/day 2  Protein first sometimes  grams/day  Water Separate from meals yes  Calorie Containing Beverages juice  Restaurant eating/wk 1-2  Sleeping poor  Stress high  CPAP: no  Contraception: PM  DEXA:    Social History     Social History     Socioeconomic History     Marital status: Single     Spouse name: Not on file     Number of children: Not on file     Years of education: Not on file     Highest education level: Not on file   Occupational History     Not on file   Tobacco Use     Smoking status: Never Smoker     Smokeless tobacco: Never Used   Substance and Sexual Activity     Alcohol use: No     Drug use: No     Sexual activity: Never   Other Topics Concern     Parent/sibling w/ CABG, MI or angioplasty before 65F 55M? No   Social History " Narrative    Lives alone. [place], other daughter lives here 2 sons live in the [facility]     Social Determinants of Health     Financial Resource Strain: Not on file   Food Insecurity: Not on file   Transportation Needs: Not on file   Physical Activity: Not on file   Stress: Not on file   Social Connections: Not on file   Intimate Partner Violence: Not on file   Housing Stability: Not on file       Past Medical History     Past Medical History:   Diagnosis Date     Abnormal cervical Papanicolaou smear with positive human papillomavirus deoxyribonucleic acid test      Ankle joint pain     bilateral     Anxiety      Arthritis      Arthritis     rheumatoid arthritis     Asthma      Carpal tunnel syndrome     bilateral     Compression neuropathy      Depression      Depressive disorder      Diabetes (H)      Diabetes (H)     type II     Diabetic nephropathy (H)      DVT of lower extremity, bilateral (H) 2011    right     Esophageal reflux      Gastroenteritis      History of blood clots      History of blood transfusion      Hyperlipidemia      Hypertension      Hypertension      Infectious fasciitis 2011    abdominal wall      Insomnia      Neck pain      Necrotizing fasciitis (H) 11/15/2012     Necrotizing fasciitis (H)      Neuropathy      Obesity      Osteoarthritis of both knees 10/31/2016     Pulmonary embolism and infarction (H)      SOB (shortness of breath)     with exertion     Tarsal tunnel syndrome      Uncomplicated asthma      Urinary incontinence      Problem List     Patient Active Problem List   Diagnosis     Chronic pain of both shoulders     Abnormal Pap smear of cervix     Moderate persistent asthma without complication     Carpal tunnel syndrome     Pulmonary embolism with infarction (H)     Health Care Home     Moderate major depression (H)     S/P hysterectomy     Hyperlipidemia     Status post bariatric surgery     Essential hypertension     Urinary incontinence      Esophageal reflux     Microalbuminuria     Type 2 diabetes mellitus with diabetic polyneuropathy (H)     Bilateral ankle joint pain     Chronic pain disorder     Other acute pulmonary embolism without acute cor pulmonale (H)     Asthma     Compression neuropathy of left lower extremity     Hernia     Hernia of anterior abdominal wall     Obesity     Osteoarthritis of both knees     Polyneuropathy due to secondary diabetes mellitus (H)     Copper deficiency     Metabolic acidosis     Neck mass     Persons encountering health services in other specified circumstances     Hyperglycemia due to type 2 diabetes mellitus (H)     History of cholecystectomy     History of hysterectomy for benign disease     Morbid obesity (H)     Stage 3a chronic kidney disease (H)     Arthritis of both acromioclavicular joints     H/O necrotizing fasciitis     Chronic, continuous use of opioids     Medications       Current Outpatient Medications:      acetaminophen (TYLENOL) 500 MG tablet, Take 1 tablet (500 mg) by mouth 2 times daily as needed for mild pain, Disp: 30 tablet, Rfl: 0     Alcohol Swabs (PRO COMFORT ALCOHOL) 70 % PADS, USE TO TEST BLOOD GLUCOSE FOUR TIMES DAILY, Disp: 400 each, Rfl: 11     amitriptyline (ELAVIL) 10 MG tablet, TAKE 1 TABLET(10 MG) BY MOUTH AT BEDTIME, Disp: 30 tablet, Rfl: 11     atorvastatin (LIPITOR) 10 MG tablet, TAKE 1 TABLET BY MOUTH EVERY DAY, Disp: 90 tablet, Rfl: 3     budesonide-formoterol (SYMBICORT) 80-4.5 MCG/ACT Inhaler, Inhale 2 puffs once daily plus 1-2 puffs as needed. May use up to 12 puffs per day., Disp: 20.4 g, Rfl: 11     capsaicin (ZOSTRIX) 0.025 % external cream, Apply 3-4 times daily to knees, Disp: 118 mL, Rfl: 11     Continuous Blood Gluc Sensor (FREESTYLE ANIKET 14 DAY SENSOR) MISC, 1 each every 14 days Use 1 Sensor every 14 days. Use to read blood sugars per 's instructions., Disp: 2 each, Rfl: 5     Continuous Blood Gluc Sensor (FREESTYLE ANIKET 14 DAY SENSOR) MISC, 1  each every 14 days Change every 14 days., Disp: 2 each, Rfl: 5     CONTOUR NEXT TEST test strip, USE TO TEST BLOOD GLUCOSE FOUR TIMES DAILY, Disp: 400 strip, Rfl: 11     cyanocobalamin (CYANOCOBALAMIN) 1000 MCG/ML injection, Inject 1 mL (1,000 mcg) into the muscle every 30 days, Disp: 1 mL, Rfl: 11     cyclobenzaprine (FLEXERIL) 10 MG tablet, Take 0.5 to 1 tablet every 8 hours as needed., Disp: 30 tablet, Rfl: 0     diclofenac (VOLTAREN) 1 % topical gel, Apply 4 g topically 3 times daily To top of both shoulders in area of pain, Disp: 350 g, Rfl: 2     diltiazem ER (DILT-XR) 120 MG 24 hr capsule, TAKE 1 CAPSULE(120 MG) BY MOUTH AT BEDTIME, Disp: 90 capsule, Rfl: 4     dulaglutide (TRULICITY) 1.5 MG/0.5ML pen, Inject 1.5 mg Subcutaneous every 7 days, Disp: 2 mL, Rfl: 11     DULoxetine (CYMBALTA) 60 MG capsule, Take 1 capsule (60 mg) by mouth daily, Disp: 30 capsule, Rfl: 11     famotidine (PEPCID) 20 MG tablet, Take 1 tablet (20 mg) by mouth 2 times daily, Disp: 60 tablet, Rfl: 11     ferrous sulfate (IRON) 325 (65 FE) MG tablet, Take 1 tablet (325 mg) by mouth daily (with breakfast), Disp: 30 tablet, Rfl: 2     insulin pen needle (BD ULTRA-FINE) 29G X 12.7MM miscellaneous, Use 1 time daily, Disp: 90 each, Rfl: 3     JARDIANCE 10 MG TABS tablet, TAKE 1 TABLET(10 MG) BY MOUTH DAILY, Disp: 30 tablet, Rfl: 1     Lancets 30G MISC, USE TO TEST BLOOD GLUCOSE FOUR TIMES DAILY, Disp: 400 each, Rfl: 11     LANTUS SOLOSTAR 100 UNIT/ML soln, INJECT 25 UNITS UNDER THE SKIN DAILY, Disp: 6 mL, Rfl: 11     latanoprost (XALATAN) 0.005 % ophthalmic solution, INSTILL 1 DROP IN BOTH EYES DAILY AT BEDTIME, Disp: , Rfl:      lidocaine (LMX4) 4 % external cream, Apply topically once as needed for mild pain (left knee), Disp: 45 g, Rfl: 2     lisinopril (ZESTRIL) 40 MG tablet, Take 1 tablet (40 mg) by mouth At Bedtime, Disp: 90 tablet, Rfl: 3     naloxone (NARCAN) 4 MG/0.1ML nasal spray, Spray 1 spray (4 mg) into one nostril alternating  nostrils once as needed for opioid reversal Every 2-3 minutes until patient responsive or EMS arrives, Disp: 0.2 mL, Rfl: 0     ondansetron (ZOFRAN ODT) 4 MG ODT tab, Take 4 mg by mouth as needed, Disp: , Rfl:      oxyCODONE-acetaminophen (PERCOCET)  MG per tablet, Take 2 tablets by mouth At Bedtime. May also take 1 tablet every 6 hours as needed for severe pain., Disp: 85 tablet, Rfl: 0     phentermine (ADIPEX-P) 37.5 MG tablet, Take 0.5-1 tablets (18.75-37.5 mg) by mouth every morning, Disp: 90 tablet, Rfl: 1     risperiDONE (RISPERDAL) 0.25 MG tablet, Take 1 tablet (0.25 mg) by mouth At Bedtime, Disp: , Rfl:      senna-docusate (SENOKOT-S/PERICOLACE) 8.6-50 MG tablet, Take 1 tablet by mouth 2 times daily, Disp: 60 tablet, Rfl: 11     Simethicone 180 MG CAPS, Use 1 softgel as needed for gas, Disp: 60 capsule, Rfl: 11     sodium citrate-citric acid (BICITRA) 500-334 MG/5ML solution, Take 10 mLs by mouth 3 times daily (with meals), Disp: 473 mL, Rfl: 11     SUMAtriptan (IMITREX) 25 MG tablet, One tab at onset of HA.  May repeat dose in 2 hours.  Do not exceed 200 mg in 24 hours, Disp: 24 tablet, Rfl: 1     tolterodine ER (DETROL LA) 2 MG 24 hr capsule, Take 1 capsule (2 mg) by mouth daily, Disp: 90 capsule, Rfl: 3     vitamin D3 (CHOLECALCIFEROL) 1.25 MG (85021 UT) capsule, Take 1 capsule (50,000 Units) by mouth three times a week, Disp: 39 capsule, Rfl: 3     blood glucose (NO BRAND SPECIFIED) lancets standard, Check fasting glucose 4 times daily.  For Accu check monitor, Disp: 360 each, Rfl: 11     gabapentin (NEURONTIN) 250 MG/5ML solution, TAKE 15 ML(750 MG) BY MOUTH THREE TIMES DAILY, Disp: 1410 mL, Rfl: 11     order for DME, Equipment being ordered: wheeled walker with seat, Disp: 1 Device, Rfl: 0     rivaroxaban ANTICOAGULANT (XARELTO) 10 MG TABS tablet, Take 1 tablet (10 mg) by mouth daily (with dinner), Disp: 30 tablet, Rfl: 0    Current Facility-Administered Medications:      cyanocobalamin injection  "1,000 mcg, 1,000 mcg, Intramuscular, Q30 Days, Jose Angel Meyer MD, 1,000 mcg at 22 1352   Surgical History     Past Surgical History  She has a past surgical history that includes gastric bypass (); Hysterectomy; gastric bypass ();  Section; Uterine Fibroid Surgery (); Abdominal Wall Surgery; REVISE MEDIAN N/CARPAL TUNNEL SURG (Right, 10/24/2014); Hysterectomy; Uterine Fibroid Surgery; knee surgery (Right); Colonoscopy (N/A, 3/15/2017); Colonoscopy (N/A, 3/16/2017); and Midline Insertion - Double Lumen (2021).    Objective-Exam     Constitutional:  Pulse 90   Resp 20   Ht 1.499 m (4' 11\")   Wt 91.2 kg (201 lb)   LMP  (LMP Unknown)   BMI 40.60 kg/m      General:  Pleasant and in no acute distress   Eyes:  EOMI  ENT:  Airway 2+  Moist mucous membranes  Neck:  Supple, No LAD, No thyromegaly, No carotid bruits appreciated  Respiratory: Normal respiratory effort, no cough, wheezes or crackles  CV:  Regular rate and Rhythm,no murmurs, pulses 2+, no calf tenderness, bilateral LE edema  Gastrointestinal: Abdomen NT/ND, BS+  Musculoskeletal: muscle mass WNL  Skin:  hair baseline, incisions nicely healed  Neurological: No tremor, unassisted gait  Psychiatric: alert and oriented X3, mood and affect normal    Counseling     We reviewed the important post op bariatric recommendations:  -eating 3 meals daily  -eating protein first, getting >60gm protein daily  -eating slowly, chewing food well  -avoiding/limiting calorie containing beverages  -drinking water 15-30 minutes before or after meals  -choosing wheat, not white with breads, crackers, pastas, aleisha, bagels, tortillas, rice  -limiting restaurant or cafeteria eating to twice a week or less    We discussed the importance of restorative sleep and stress management in maintaining a healthy weight.  We discussed the National Weight Control Registry healthy weight maintenance strategies and ways to optimize metabolism.  We discussed the " importance of physical activity including cardiovascular and strength training in maintaining a healthier weight.    We discussed the importance of life-long vitamin supplementation and life-long  follow-up.    Ruthy was reminded that, to avoid marginal ulcers she should avoid tobacco at all, alcohol in excess, caffeine in excess, and NSAIDS (unless indicated for cardioprotection or othewise and opposed by a PPI).    Vy Carson MD, MD, [facility].  6/23/2022  2:08 PM      No images are attached to the encounter.  Total time spent on the date of this encounter doing: chart review, review of test results, patient visit, physical exam, education, counseling, developing plan of care, and documenting = 30 minutes.

## 2022-06-23 NOTE — PATIENT INSTRUCTIONS
Dr. Douglas  [facility]  [address] [place] [address]  P: [phone]  [email or web address]

## 2022-06-23 NOTE — NURSING NOTE
Clinic Administered Medication Documentation    Administrations This Visit     cyanocobalamin injection 1,000 mcg     Admin Date  06/23/2022 Action  Given Dose  1,000 mcg Route  Intramuscular Site  Right Deltoid Administered By  Viridiana Loaiza CMA    Ordering Provider: Jose Angel Meyer MD    NDC: 46311-027-18    Lot#: [id number]    : [employer]    Patient Supplied?: No                  Injectable Medication Documentation    Patient was given Cyanocobalamin (B-12). Prior to medication administration, verified patients identity using patient s name and date of birth. Please see MAR and medication order for additional information. Patient instructed to remain in clinic for 15 minutes.      Was entire vial of medication used? Yes  Vial/Syringe: Single dose vial  Expiration Date:  01/30/2024  Was this medication supplied by the patient? No    Name of provider who requested the medication administration:   Name of provider on site (faculty or community preceptor) at the time of performing the medication administration: Dr. Meyer    Date of next administration: Monthly  Date of next office visit with provider to renew medication plan (must be seen annually): N/A

## 2022-06-24 LAB — C PEPTIDE SERPL-MCNC: 2.9 NG/ML (ref 0.9–6.9)

## 2022-06-24 RX ORDER — OXYCODONE AND ACETAMINOPHEN 10; 325 MG/1; MG/1
TABLET ORAL
Qty: 120 TABLET | Refills: 0 | Status: SHIPPED | OUTPATIENT
Start: 2022-06-24 | End: 2022-07-11

## 2022-06-28 NOTE — TELEPHONE ENCOUNTER
Pt tried picking up the her oxycodone after you resent the new prescription on 6/24. Pt states they told her that she needs the doctor to call and ok the refill

## 2022-07-07 ENCOUNTER — ANCILLARY PROCEDURE (OUTPATIENT)
Dept: MAMMOGRAPHY | Facility: HOSPITAL | Age: 57
End: 2022-07-07
Attending: FAMILY MEDICINE
Payer: COMMERCIAL

## 2022-07-07 DIAGNOSIS — Z12.31 ENCOUNTER FOR SCREENING MAMMOGRAM FOR BREAST CANCER: ICD-10-CM

## 2022-07-07 PROCEDURE — 77067 SCR MAMMO BI INCL CAD: CPT

## 2022-07-11 DIAGNOSIS — G89.4 CHRONIC PAIN SYNDROME: ICD-10-CM

## 2022-07-11 RX ORDER — OXYCODONE AND ACETAMINOPHEN 10; 325 MG/1; MG/1
TABLET ORAL
Qty: 120 TABLET | Refills: 0 | Status: SHIPPED | OUTPATIENT
Start: 2022-07-11 | End: 2022-08-04

## 2022-07-11 NOTE — TELEPHONE ENCOUNTER
Pt needs her second half of her pills refilled until her next appointment. She states she only has a few left

## 2022-07-13 ENCOUNTER — TELEPHONE (OUTPATIENT)
Dept: FAMILY MEDICINE | Facility: CLINIC | Age: 57
End: 2022-07-13

## 2022-07-13 NOTE — TELEPHONE ENCOUNTER
He pharmacy is holding her pain med till the 19th she is out of her last pill today she needs  or anita to call them and let them know she needs them

## 2022-07-14 NOTE — TELEPHONE ENCOUNTER
Originally the prescription she received in June was for 120 tablets and she only received 85 pills.  They never gave her the remaining 35 tablets.  Now they are holding her current prescription and will not fill it until the 07/19.  She needs Dr Meyer to call and straighten this all out.

## 2022-07-15 ENCOUNTER — TELEPHONE (OUTPATIENT)
Dept: FAMILY MEDICINE | Facility: CLINIC | Age: 57
End: 2022-07-15

## 2022-07-15 NOTE — TELEPHONE ENCOUNTER
Called patient to notify that her oxyCODONE-acetaminophen (PERCOCET)  MG per tablet was filled for #120. She states that she will need to go to the same struggles in having to call us and pharmacy not being able to refill her medication when you fill her meds next time.

## 2022-07-15 NOTE — TELEPHONE ENCOUNTER
I called pharmacy and they said they should have her Rx for quantity of #120 filled by ~ 3 PM today.  JIGNESH Meyer

## 2022-07-15 NOTE — TELEPHONE ENCOUNTER
She still does not have her pain medications he needs this taken care of before the end of the day.

## 2022-08-04 ENCOUNTER — OFFICE VISIT (OUTPATIENT)
Dept: FAMILY MEDICINE | Facility: CLINIC | Age: 57
End: 2022-08-04
Payer: COMMERCIAL

## 2022-08-04 VITALS
BODY MASS INDEX: 41 KG/M2 | SYSTOLIC BLOOD PRESSURE: 132 MMHG | RESPIRATION RATE: 16 BRPM | HEART RATE: 108 BPM | OXYGEN SATURATION: 99 % | TEMPERATURE: 98 F | DIASTOLIC BLOOD PRESSURE: 86 MMHG | WEIGHT: 203 LBS

## 2022-08-04 DIAGNOSIS — Z79.4 TYPE 2 DIABETES MELLITUS WITH HYPERGLYCEMIA, WITH LONG-TERM CURRENT USE OF INSULIN (H): ICD-10-CM

## 2022-08-04 DIAGNOSIS — G89.4 CHRONIC PAIN SYNDROME: Primary | ICD-10-CM

## 2022-08-04 DIAGNOSIS — E11.65 TYPE 2 DIABETES MELLITUS WITH HYPERGLYCEMIA, WITH LONG-TERM CURRENT USE OF INSULIN (H): ICD-10-CM

## 2022-08-04 DIAGNOSIS — E87.29 METABOLIC ACIDOSIS, INCREASED ANION GAP: ICD-10-CM

## 2022-08-04 DIAGNOSIS — R80.9 MICROALBUMINURIA: ICD-10-CM

## 2022-08-04 LAB
AMPHETAMINES UR QL: NOT DETECTED
BARBITURATES UR QL SCN: NOT DETECTED
BENZODIAZ UR QL SCN: NOT DETECTED
BUPRENORPHINE UR QL: NOT DETECTED
CANNABINOIDS UR QL: NOT DETECTED
COCAINE UR QL SCN: NOT DETECTED
CREAT UR-MCNC: 101 MG/DL
D-METHAMPHET UR QL: NOT DETECTED
HBA1C MFR BLD: 6.9 % (ref 0–5.6)
METHADONE UR QL SCN: NOT DETECTED
MICROALBUMIN UR-MCNC: 3360 MG/L
MICROALBUMIN/CREAT UR: 3326.73 MG/G CR (ref 0–25)
OPIATES UR QL SCN: NOT DETECTED
OXYCODONE UR QL SCN: DETECTED
PCP UR QL SCN: NOT DETECTED
PROPOXYPH UR QL: NOT DETECTED
TRICYCLICS UR QL SCN: NOT DETECTED

## 2022-08-04 PROCEDURE — 80048 BASIC METABOLIC PNL TOTAL CA: CPT | Performed by: FAMILY MEDICINE

## 2022-08-04 PROCEDURE — 83036 HEMOGLOBIN GLYCOSYLATED A1C: CPT | Performed by: FAMILY MEDICINE

## 2022-08-04 PROCEDURE — 99214 OFFICE O/P EST MOD 30 MIN: CPT | Mod: 25 | Performed by: FAMILY MEDICINE

## 2022-08-04 PROCEDURE — 82043 UR ALBUMIN QUANTITATIVE: CPT | Performed by: FAMILY MEDICINE

## 2022-08-04 PROCEDURE — 96372 THER/PROPH/DIAG INJ SC/IM: CPT | Performed by: FAMILY MEDICINE

## 2022-08-04 PROCEDURE — 80306 DRUG TEST PRSMV INSTRMNT: CPT | Performed by: FAMILY MEDICINE

## 2022-08-04 PROCEDURE — 36415 COLL VENOUS BLD VENIPUNCTURE: CPT | Performed by: FAMILY MEDICINE

## 2022-08-04 RX ORDER — OXYCODONE AND ACETAMINOPHEN 10; 325 MG/1; MG/1
TABLET ORAL
Qty: 120 TABLET | Refills: 0 | Status: SHIPPED | OUTPATIENT
Start: 2022-08-12 | End: 2022-09-01

## 2022-08-04 RX ADMIN — CYANOCOBALAMIN 1000 MCG: 1000 INJECTION, SOLUTION INTRAMUSCULAR; SUBCUTANEOUS at 15:18

## 2022-08-04 NOTE — PROGRESS NOTES
Patient Active Problem List    Diagnosis Date Noted     Stage 3a chronic kidney disease (H) 12/31/2020     Priority: High     Persistent protienuria       Copper deficiency 04/05/2019     Priority: High     Identified in Aug, 2018--see discharge summary from [facility] in [place].       Chronic pain disorder 10/07/2016     Priority: High     Chronic Pain Diagnosis:  neuropathy  DIRE Total Score(s):14     ORT: 5    4 - 7 =  Moderate Risk   of future problems with Opioids  FAQ5: 50 November 20, 2020  Behavioral Health Consultation: 5/12/17 with Dr. Daisy Quezada  Personal Care Plan for Chronic Pain: 5/12/17  Opioid medication:oxycodone   Dose:10mg  Number of pills per month:60  Patient is being prescribed 10mg of oxycodone IR (Percocet) per day this is 15 mg Morphine Equivalents  Benzodiazepines Prescribed? No  Naloxone prescribed? No       Clinic visit frequency required: Q 1 month Next visit due:   Controlled Substance/Opioid Treatment agreement on file (dated <12 months ago)?:    Date(s): pending  Last [place] verification: last visit  Items in red to be updated at each visit  Patient is followed by [facility] Un[facility] for ongoing prescription of pain medication.  All refills should be approved by this provider, or covering partner.Chronic Pain     Chronic Pain Diagnosis:  neuropathy  DIRE Total Score(s):14     ORT: 5    4 - 7 =  Moderate Risk   of future problems with Opioids  FAQ5: 45/100 on 9/9/16, 40/100 on 11/15/16  55 October 29, 2020    Behavioral Health Consultation: 5/12/17 with Dr. Daisy Quezada  Personal Care Plan for Chronic Pain: 5/12/17  Opioid medication:oxycodone   Dose:10mg  Number of pills per month:90  Patient is being prescribed 45 MME  Benzodiazepines Prescribed? No  Naloxone prescribed? No       Clinic visit frequency required: Q 1 month Next visit due:   Controlled Substance/Opioid Treatment agreement on file (dated <12 months ago)?:    Date(s): pending  Last [place] verification: last  visit  Items in red to be updated at each visit  Patient is followed by [facility] for ongoing prescription of pain medication.  All refills should be approved by this provider, or covering partner.Chronic Pain       Microalbuminuria 10/12/2015     Priority: High     Severely increased albuminuria       Type 2 diabetes mellitus with diabetic polyneuropathy (H) 10/12/2015     Priority: High     Essential hypertension 08/26/2013     Priority: High     Chronic, continuous use of opioids 05/18/2022     Priority: Medium     Arthritis of both acromioclavicular joints 04/08/2021     Priority: Medium     xray proven        Morbid obesity (H) 11/27/2020     Priority: Medium     Hyperglycemia due to type 2 diabetes mellitus (H) 06/02/2020     Priority: Medium     Neck mass 10/11/2019     Priority: Medium     Added automatically from request for surgery [id number]       Metabolic acidosis 07/26/2019     Priority: Medium     reolved w/ discontinuation of ASA       Obesity 01/03/2019     Priority: Medium     Other acute pulmonary embolism without acute cor pulmonale (H) 12/31/2018     Priority: Medium     Hernia of anterior abdominal wall 01/28/2017     Priority: Medium     Osteoarthritis of both knees 10/31/2016     Priority: Medium     Polyneuropathy due to secondary diabetes mellitus (H) 10/31/2016     Priority: Medium     Compression neuropathy of left lower extremity 02/19/2016     Priority: Medium     Bilateral ankle joint pain 01/15/2016     Priority: Medium     Esophageal reflux 04/07/2015     Priority: Medium     Urinary incontinence 02/19/2015     Priority: Medium     Asthma 08/04/2013     Priority: Medium     History of cholecystectomy 08/04/2013     Priority: Medium     H/O necrotizing fasciitis 08/04/2013     Priority: Medium     Hyperlipidemia 06/21/2013     Priority: Medium     Status post bariatric surgery 06/21/2013     Priority: Medium     Kim-en-Y in 2006       Moderate major depression (H) 01/24/2013      Priority: Medium     Moderate persistent asthma without complication 11/15/2012     Priority: Medium     Persons encountering health services in other specified circumstances 11/15/2012     Priority: Medium     Tier 3    Status: Accept  Care Coordination Start Date: 02/27/12  State Tier Level: Level 3  Language/Barrier to Learning: No  Significant Mental Health Issues: Yes  Date Care Coordination Discontinued: 05/28/15    DX V65.8 REPLACED WITH [id number] (04/08/2013)       Abnormal Pap smear of cervix 04/22/2019     Priority: Low     3-12-15: Pap/HPV neg.  Plan: Given hx (mildly abn pap, hyster), recheck in 5 yrs.  10-11-11: Pap/HPV neg.  Plan: Given ASUS pap in 2010, recommend repeating pap in 1 year.  8/3/2010 ASCUS Pap with +BV- pt treated with Metrogel.  Pt needs repeat Pap in 1yr.  8/4/2008 Pt had total hyst for menorrhagia, pelvic pain, scar tissue, and fibroids.    3-12-15: Pap/HPV neg.  Plan: Given hx (mildly abn pap, hyster), recheck in 5 yrs.  10-11-11: Pap/HPV neg.  Plan: Given ASUS pap in 2010, recommend repeating pap in 1 year.  8/3/2010 ASCUS Pap with +BV- pt treated with Metrogel.  Pt needs repeat Pap in 1yr.  8/4/2008 Pt had total hyst for menorrhagia, pelvic pain, scar tissue, and fibroids.       Hernia 01/27/2017     Priority: Low     S/P hysterectomy 06/21/2013     Priority: Low     2008, For fibroids.  Cervix removed, but still has ovaries.       History of hysterectomy for benign disease 06/21/2013     Priority: Low     2008, For fibroids.  Cervix removed, but still has ovaries.       Carpal tunnel syndrome 11/15/2012     Priority: Low     Pulmonary embolism with infarction (H) 11/15/2012     Priority: Low     12/24/2018  Unprovoked.  + fam hx of DVT.  Treated w/ DOAC 12/2018through 9/2019 12/24/2018  Unprovoked.  + fam hx of DVT.  Plan warfarin for 6 months.       Health Care Home 11/15/2012     Priority: Low     Tier 3    Status: Accept  Care Coordination Start Date:  12  State Tier Level: Level 3  Language/Barrier to Learning: No  Significant Mental Health Issues: Yes  Date Care Coordination Discontinued: 05/28/15    DX V65.8 REPLACED WITH [id number] (2013)         Chronic pain of both shoulders 2005     Priority: Low     There are no exam notes on file for this visit.  Chief Complaint   Patient presents with     Pain Management     CPM     Blood pressure 132/86, pulse 108, temperature 98  F (36.7  C), temperature source Oral, resp. rate 16, weight 92.1 kg (203 lb), SpO2 99 %, not currently breastfeeding.  No results found for any visits on 22.   Here primarily for refills.  No real complaints.  Not testing blood sugars.  Stress due to move.  Not as adherent to meds.  No PD/PU.   No CP/  More nausea over last month.    Memory not as clear  No additional drug use.  Is moving to new appt, so this has been a distraction, and she's more sore, in general.    Considering surgery in near future on skin folds, but is waiting to hear from surgeon  Due for AIC and BMP today.  Only taking bicitra once daily.      Declines pap today but will do it next visit.    O:  VSS  Exam:  Constitutional: healthy, alert and no distress  Head:   Neck:   ENT: ENT exam normal, no neck nodes or sinus tenderness  Cardiovascular: negative, PMI normal. No lifts, heaves, or thrills. RRR. No murmurs, clicks gallops or rub  Respiratory: negative, Percussion normal. Good diaphragmatic excursion. Lungs clear  Gastrointestinal: Abdomen soft, non-tender. BS normal. No masses, organomegaly  : Deferred    Psychiatric: mentation appears normal and affect normal/bright  At baseline  Results for orders placed or performed in visit on 22   Urine Drugs of Abuse Screen Panel 13     Status: Abnormal   Result Value Ref Range    Cannabinoids (33-hnm-0-carboxy-9-THC) Not Detected Not Detected, Indeterminate    Phencyclidine Not Detected Not Detected, Indeterminate    Cocaine  (Benzoylecgonine) Not Detected Not Detected, Indeterminate    Methamphetamine (d-Methamphetamine) Not Detected Not Detected, Indeterminate    Opiates (Morphine) Not Detected Not Detected, Indeterminate    Amphetamine (d-Amphetamine) Not Detected Not Detected, Indeterminate    Benzodiazepines (Nordiazepam) Not Detected Not Detected, Indeterminate    Tricyclic Antidepressants (Desipramine) Not Detected Not Detected, Indeterminate    Methadone Not Detected Not Detected, Indeterminate    Barbiturates (Butalbital) Not Detected Not Detected, Indeterminate    Oxycodone Detected (A) Not Detected, Indeterminate    Propoxyphene (Norpropoxyphene) Not Detected Not Detected, Indeterminate    Buprenorphine Not Detected Not Detected, Indeterminate   Albumin Random Urine Quantitative with Creat Ratio     Status: Abnormal   Result Value Ref Range    Albumin Urine mg/L 3,360.0 mg/L    Albumin Urine mg/g Cr 3,326.73 (H) 0.00 - 25.00 mg/g Cr    Creatinine Urine mg/dL 101.0 mg/dL   Hemoglobin A1c     Status: Abnormal   Result Value Ref Range    Hemoglobin A1C 6.9 (H) 0.0 - 5.6 %   Basic metabolic panel     Status: Abnormal   Result Value Ref Range    Creatinine 0.84 0.51 - 0.95 mg/dL    Sodium 143 136 - 145 mmol/L    Potassium 5.0 3.4 - 5.3 mmol/L    Urea Nitrogen 25.8 (H) 6.0 - 20.0 mg/dL    Chloride 115 (H) 98 - 107 mmol/L    Carbon Dioxide (CO2) 8 (LL) 22 - 29 mmol/L    Anion Gap 20 (H) 7 - 15 mmol/L    Glucose 216 (H) 70 - 99 mg/dL    GFR Estimate 81 >60 mL/min/1.73m2    Calcium 8.9 8.6 - 10.0 mg/dL     (G89.4) Chronic pain syndrome  (primary encounter diagnosis)  Comment: UDS and PDMP appropriate  Plan: Urine Drugs of Abuse Screen Panel 13,         oxyCODONE-acetaminophen (PERCOCET)  MG         per tablet        Refilled oxycodone at previous dose.      (R80.9) Microalbuminuria  Comment: marked protienuria.    Plan: will need f/up with her nephrologist, who I spoke to on 8/5, and will reach out to her soon      (E11.65,   Z79.4) Type 2 diabetes mellitus with hyperglycemia, with long-term current use of insulin (H)  Comment: AIC much better.  See comments re: AGMA  Plan: Basic metabolic panel, Hemoglobin A1c, Albumin         Random Urine Quantitative with Creat Ratio            (E87.2) Metabolic acidosis, increased anion gap  Comment: surprising, given minimal sx.  DDX includes DKA assoc w/ SGLT2, lactic acidosis, D-lactic acidosis (given h/o gastric bypass)  Plan: I spoke with patient, nephrologist Dr Brunson,  ED, and Dr Cardona on our inpatient team.  Pt will go to  ED for clinical evaluation and labs, including repeat BMP, UA, lactate, D-lactate, and ketones.

## 2022-08-04 NOTE — NURSING NOTE
Clinic Administered Medication Documentation    Administrations This Visit     cyanocobalamin injection 1,000 mcg     Admin Date  08/04/2022 Action  Given Dose  1,000 mcg Route  Intramuscular Site  Left Deltoid Administered By  Viridiana Loaiza CMA    Ordering Provider: Jose Angel Meyer MD    NDC: 77617-994-88    Lot#: [id number]    : [employer]    Patient Supplied?: No                  Injectable Medication Documentation    Patient was given Cyanocobalamin (B-12). Prior to medication administration, verified patients identity using patient s name and date of birth. Please see MAR and medication order for additional information. Patient instructed to remain in clinic for 15 minutes.      Was entire vial of medication used? Yes  Vial/Syringe: Single dose vial  Expiration Date:  01/30/2024  Was this medication supplied by the patient? No    Name of provider who requested the medication administration: Dr. Meyer  Name of provider on site (faculty or community preceptor) at the time of performing the medication administration: Dr. Meyer    Date of next administration: N/A  Date of next office visit with provider to renew medication plan (must be seen annually): N/A

## 2022-08-05 ENCOUNTER — CARE COORDINATION (OUTPATIENT)
Dept: FAMILY MEDICINE | Facility: CLINIC | Age: 57
End: 2022-08-05

## 2022-08-05 ENCOUNTER — TELEPHONE (OUTPATIENT)
Dept: NEPHROLOGY | Facility: CLINIC | Age: 57
End: 2022-08-05

## 2022-08-05 ENCOUNTER — HOSPITAL ENCOUNTER (EMERGENCY)
Facility: CLINIC | Age: 57
Discharge: HOME | End: 2022-08-05
Attending: EMERGENCY MEDICINE | Admitting: EMERGENCY MEDICINE
Payer: COMMERCIAL

## 2022-08-05 VITALS
DIASTOLIC BLOOD PRESSURE: 67 MMHG | OXYGEN SATURATION: 98 % | RESPIRATION RATE: 18 BRPM | BODY MASS INDEX: 40.92 KG/M2 | HEART RATE: 101 BPM | TEMPERATURE: 98.3 F | SYSTOLIC BLOOD PRESSURE: 149 MMHG | HEIGHT: 59 IN | WEIGHT: 203 LBS

## 2022-08-05 DIAGNOSIS — R80.9 PROTEINURIA, UNSPECIFIED TYPE: ICD-10-CM

## 2022-08-05 LAB
ALBUMIN SERPL-MCNC: 3.1 G/DL (ref 3.5–5)
ALBUMIN UR-MCNC: 300 MG/DL
ALP SERPL-CCNC: 204 U/L (ref 45–120)
ALT SERPL W P-5'-P-CCNC: 16 U/L (ref 0–45)
ANION GAP SERPL CALCULATED.3IONS-SCNC: 20 MMOL/L (ref 7–15)
ANION GAP SERPL CALCULATED.3IONS-SCNC: 6 MMOL/L (ref 5–18)
APAP SERPL-MCNC: <3 UG/ML (ref 10–20)
APPEARANCE UR: CLEAR
AST SERPL W P-5'-P-CCNC: 16 U/L (ref 0–40)
BACTERIA #/AREA URNS HPF: ABNORMAL /HPF
BASE EXCESS BLDV CALC-SCNC: -2.5 MMOL/L
BASOPHILS # BLD AUTO: 0 10E3/UL (ref 0–0.2)
BASOPHILS NFR BLD AUTO: 0 %
BILIRUB DIRECT SERPL-MCNC: 0.1 MG/DL
BILIRUB SERPL-MCNC: 0.4 MG/DL (ref 0–1)
BILIRUB UR QL STRIP: NEGATIVE
BUN SERPL-MCNC: 24 MG/DL (ref 8–22)
BUN SERPL-MCNC: 25.8 MG/DL (ref 6–20)
C REACTIVE PROTEIN LHE: 0.4 MG/DL (ref 0–?)
CALCIUM SERPL-MCNC: 8.7 MG/DL (ref 8.5–10.5)
CALCIUM SERPL-MCNC: 8.9 MG/DL (ref 8.6–10)
CHLORIDE BLD-SCNC: 114 MMOL/L (ref 98–107)
CHLORIDE SERPL-SCNC: 115 MMOL/L (ref 98–107)
CO2 SERPL-SCNC: 21 MMOL/L (ref 22–31)
COLOR UR AUTO: ABNORMAL
CREAT SERPL-MCNC: 0.84 MG/DL (ref 0.51–0.95)
CREAT SERPL-MCNC: 0.87 MG/DL (ref 0.6–1.1)
DEPRECATED HCO3 PLAS-SCNC: 8 MMOL/L (ref 22–29)
EOSINOPHIL # BLD AUTO: 0 10E3/UL (ref 0–0.7)
EOSINOPHIL NFR BLD AUTO: 0 %
ERYTHROCYTE [DISTWIDTH] IN BLOOD BY AUTOMATED COUNT: 13 % (ref 10–15)
GFR SERPL CREATININE-BSD FRML MDRD: 78 ML/MIN/1.73M2
GFR SERPL CREATININE-BSD FRML MDRD: 81 ML/MIN/1.73M2
GLUCOSE BLD-MCNC: 118 MG/DL (ref 70–125)
GLUCOSE SERPL-MCNC: 216 MG/DL (ref 70–99)
GLUCOSE UR STRIP-MCNC: 70 MG/DL
HCO3 BLDV-SCNC: 22 MMOL/L (ref 24–30)
HCT VFR BLD AUTO: 32.3 % (ref 35–47)
HGB BLD-MCNC: 10.2 G/DL (ref 11.7–15.7)
HGB UR QL STRIP: ABNORMAL
IMM GRANULOCYTES # BLD: 0 10E3/UL
IMM GRANULOCYTES NFR BLD: 0 %
KETONES BLD-SCNC: <0.1 MMOL/L
KETONES UR STRIP-MCNC: NEGATIVE MG/DL
LACTATE SERPL-SCNC: 0.7 MMOL/L (ref 0.7–2)
LEUKOCYTE ESTERASE UR QL STRIP: NEGATIVE
LIPASE SERPL-CCNC: 59 U/L (ref 0–52)
LYMPHOCYTES # BLD AUTO: 3.1 10E3/UL (ref 0.8–5.3)
LYMPHOCYTES NFR BLD AUTO: 26 %
MAGNESIUM SERPL-MCNC: 1.7 MG/DL (ref 1.8–2.6)
MCH RBC QN AUTO: 31.6 PG (ref 26.5–33)
MCHC RBC AUTO-ENTMCNC: 31.6 G/DL (ref 31.5–36.5)
MCV RBC AUTO: 100 FL (ref 78–100)
MONOCYTES # BLD AUTO: 0.8 10E3/UL (ref 0–1.3)
MONOCYTES NFR BLD AUTO: 7 %
MUCOUS THREADS #/AREA URNS LPF: PRESENT /LPF
NEUTROPHILS # BLD AUTO: 7.9 10E3/UL (ref 1.6–8.3)
NEUTROPHILS NFR BLD AUTO: 67 %
NITRATE UR QL: NEGATIVE
NRBC # BLD AUTO: 0 10E3/UL
NRBC BLD AUTO-RTO: 0 /100
OXYHGB MFR BLDV: 65 % (ref 70–75)
PCO2 BLDV: 46 MM HG (ref 35–50)
PH BLDV: 7.32 [PH] (ref 7.35–7.45)
PH UR STRIP: 5.5 [PH] (ref 5–7)
PHOSPHATE SERPL-MCNC: 4.1 MG/DL (ref 2.5–4.5)
PLATELET # BLD AUTO: 322 10E3/UL (ref 150–450)
PO2 BLDV: 39 MM HG (ref 25–47)
POTASSIUM BLD-SCNC: 4.5 MMOL/L (ref 3.5–5)
POTASSIUM SERPL-SCNC: 5 MMOL/L (ref 3.4–5.3)
PROT SERPL-MCNC: 6.4 G/DL (ref 6–8)
RBC # BLD AUTO: 3.23 10E6/UL (ref 3.8–5.2)
RBC URINE: 2 /HPF
SALICYLATES SERPL-MCNC: <8 MG/DL (ref 2–25)
SAO2 % BLDV: 66.7 % (ref 70–75)
SARS-COV-2 RNA RESP QL NAA+PROBE: NEGATIVE
SODIUM SERPL-SCNC: 141 MMOL/L (ref 136–145)
SODIUM SERPL-SCNC: 143 MMOL/L (ref 136–145)
SP GR UR STRIP: 1.02 (ref 1–1.03)
SQUAMOUS EPITHELIAL: 7 /HPF
TSH SERPL DL<=0.005 MIU/L-ACNC: 0.92 UIU/ML (ref 0.3–5)
UROBILINOGEN UR STRIP-MCNC: <2 MG/DL
WBC # BLD AUTO: 11.9 10E3/UL (ref 4–11)
WBC URINE: 1 /HPF

## 2022-08-05 PROCEDURE — 82248 BILIRUBIN DIRECT: CPT | Performed by: EMERGENCY MEDICINE

## 2022-08-05 PROCEDURE — C9803 HOPD COVID-19 SPEC COLLECT: HCPCS

## 2022-08-05 PROCEDURE — 99284 EMERGENCY DEPT VISIT MOD MDM: CPT | Mod: 25

## 2022-08-05 PROCEDURE — 85025 COMPLETE CBC W/AUTO DIFF WBC: CPT | Performed by: EMERGENCY MEDICINE

## 2022-08-05 PROCEDURE — 36415 COLL VENOUS BLD VENIPUNCTURE: CPT | Performed by: EMERGENCY MEDICINE

## 2022-08-05 PROCEDURE — 81001 URINALYSIS AUTO W/SCOPE: CPT | Performed by: EMERGENCY MEDICINE

## 2022-08-05 PROCEDURE — 83690 ASSAY OF LIPASE: CPT | Performed by: EMERGENCY MEDICINE

## 2022-08-05 PROCEDURE — 999N000127 HC STATISTIC PERIPHERAL IV START W US GUIDANCE

## 2022-08-05 PROCEDURE — 80053 COMPREHEN METABOLIC PANEL: CPT | Performed by: EMERGENCY MEDICINE

## 2022-08-05 PROCEDURE — 86140 C-REACTIVE PROTEIN: CPT | Performed by: EMERGENCY MEDICINE

## 2022-08-05 PROCEDURE — 82805 BLOOD GASES W/O2 SATURATION: CPT | Performed by: EMERGENCY MEDICINE

## 2022-08-05 PROCEDURE — 96360 HYDRATION IV INFUSION INIT: CPT

## 2022-08-05 PROCEDURE — 258N000003 HC RX IP 258 OP 636: Performed by: EMERGENCY MEDICINE

## 2022-08-05 PROCEDURE — 83605 ASSAY OF LACTIC ACID: CPT | Performed by: EMERGENCY MEDICINE

## 2022-08-05 PROCEDURE — 84443 ASSAY THYROID STIM HORMONE: CPT | Performed by: EMERGENCY MEDICINE

## 2022-08-05 PROCEDURE — 83735 ASSAY OF MAGNESIUM: CPT | Performed by: EMERGENCY MEDICINE

## 2022-08-05 PROCEDURE — 250N000013 HC RX MED GY IP 250 OP 250 PS 637: Performed by: EMERGENCY MEDICINE

## 2022-08-05 PROCEDURE — 84100 ASSAY OF PHOSPHORUS: CPT | Performed by: EMERGENCY MEDICINE

## 2022-08-05 PROCEDURE — U0005 INFEC AGEN DETEC AMPLI PROBE: HCPCS | Performed by: EMERGENCY MEDICINE

## 2022-08-05 PROCEDURE — 80179 DRUG ASSAY SALICYLATE: CPT | Performed by: EMERGENCY MEDICINE

## 2022-08-05 PROCEDURE — 82010 KETONE BODYS QUAN: CPT | Performed by: EMERGENCY MEDICINE

## 2022-08-05 PROCEDURE — 80143 DRUG ASSAY ACETAMINOPHEN: CPT | Performed by: EMERGENCY MEDICINE

## 2022-08-05 RX ORDER — OXYCODONE AND ACETAMINOPHEN 5; 325 MG/1; MG/1
1 TABLET ORAL ONCE
Status: COMPLETED | OUTPATIENT
Start: 2022-08-05 | End: 2022-08-05

## 2022-08-05 RX ADMIN — OXYCODONE AND ACETAMINOPHEN 1 TABLET: 5; 325 TABLET ORAL at 19:26

## 2022-08-05 RX ADMIN — SODIUM CHLORIDE 1000 ML: 9 INJECTION, SOLUTION INTRAVENOUS at 18:46

## 2022-08-05 ASSESSMENT — ENCOUNTER SYMPTOMS: APPETITE CHANGE: 0

## 2022-08-05 NOTE — ED PROVIDER NOTES
EMERGENCY DEPARTMENT ENCOUNTER      NAME: Ruthy Álvarez  AGE: 56 year old female  YOB: 1965  MRN: [id number]  EVALUATION DATE & TIME: 8/5/2022  5:26 PM    PCP: Jose Angel Meyer    ED PROVIDER: Vance Alvarez M.D.      Chief Complaint   Patient presents with     Abnormal Labs         IMPRESSION  1. Proteinuria, unspecified type        PLAN  - close PCP & nephrology follow up  - discharge to home    ED COURSE & MEDICAL DECISION MAKING    ED Course as of 08/05/22 2018   Fri Aug 05, 2022   2004 56yoF with history of PE (takes Xarelto), HTN, T2DM presenting for evaluation of low bicarb on routine blood draw yesterday; also had proteinuria. Bicarb 8 yesterday so called to present to the ED. Patient states she feels well here now; notes mild chronic pain to back & legs but nothing new. No difficulty breathing or increased work of breathing today or yesterday.    HR 100s on presentation (normalized on exam) with BP 140s/70s and otherwise normal vitals. Calm on exam with clear lungs, normal work of breathing, benign abdomen, normal neuro exam, no tenderness.    Labs rechecked and reassuring here today; bicarb 21 with AG 6, negative lactic acid, negative BHOB, negative Aspirin/Tylenol levels, pH 7.32 with pCO2 46. Negative CRP and negative procal with WBC 11; doubt infectious process. Does have proteinuria but normal creatinine on BMP; no UTI. No glaring electrolyte abnormality either. Overall, suspect lab error on labs yesterday; may have self-corrected as well. No metabolic acidosis ongoing at this time; ok for outpatient management. Patient able to tolerate PO and walk without difficulty. Patient comfortable with discharge at this time. Return precautions and need for PCP & nephrology follow up discussed and understood. No further questions at the time of discharge.       --------------------------------------------------------------------------------    --------------------------------------------------------------------------------     7:57 PM I met with the patient for the initial interview and physical examination. Discussed plan for treatment and workup in the ED.  8:04 PM I discussed the plan for discharge with the patient, and patient is agreeable.  We discussed supportive cares at home and reasons for return to the ER including new or worsening symptoms - all questions and concerns addressed.  Patient to be discharged by RN.       This patient involved a high degree of complexity in medical decision making, as significant risks were present and assessed.    Broad differential considered for this patient presenting, including but not limited to:  AGMA, NAGMA, lab error, DKA, renal failure, proteinuria, infectious process    I wore the following PPE during this patient encounter:  N95 mask, face shield w/ eye protection, gloves    MEDICATIONS GIVEN IN THE EMERGENCY DEPARTMENT  Medications   0.9% sodium chloride BOLUS (0 mLs Intravenous Stopped 8/5/22 2008)   oxyCODONE-acetaminophen (PERCOCET) 5-325 MG per tablet 1 tablet (1 tablet Oral Given 8/5/22 1926)       NEW PRESCRIPTIONS STARTED AT TODAY'S ER VISIT  Current Discharge Medication List      CONTINUE these medications which have NOT CHANGED    Details   acetaminophen (TYLENOL) 500 MG tablet Take 1 tablet (500 mg) by mouth 2 times daily as needed for mild pain  Qty: 30 tablet, Refills: 0    Associated Diagnoses: Acute pain of left shoulder      Alcohol Swabs (PRO COMFORT ALCOHOL) 70 % PADS USE TO TEST BLOOD GLUCOSE FOUR TIMES DAILY  Qty: 400 each, Refills: 11    Associated Diagnoses: Type 2 diabetes mellitus with complication, with long-term current use of insulin (H)      amitriptyline (ELAVIL) 10 MG tablet TAKE 1 TABLET(10 MG) BY MOUTH AT BEDTIME  Qty: 30 tablet, Refills: 11    Associated Diagnoses: Diabetic polyneuropathy associated with type 2 diabetes mellitus (H)      atorvastatin (LIPITOR) 10 MG  tablet TAKE 1 TABLET BY MOUTH EVERY DAY  Qty: 90 tablet, Refills: 3    Associated Diagnoses: Benign essential hypertension      blood glucose (NO BRAND SPECIFIED) lancets standard Check fasting glucose 4 times daily.  For Accu check monitor  Qty: 360 each, Refills: 11    Associated Diagnoses: Type 2 diabetes mellitus with diabetic polyneuropathy, with long-term current use of insulin (H)      budesonide-formoterol (SYMBICORT) 80-4.5 MCG/ACT Inhaler Inhale 2 puffs once daily plus 1-2 puffs as needed. May use up to 12 puffs per day.  Qty: 20.4 g, Refills: 11    Associated Diagnoses: Moderate persistent asthma without complication      capsaicin (ZOSTRIX) 0.025 % external cream Apply 3-4 times daily to knees  Qty: 118 mL, Refills: 11    Associated Diagnoses: Chronic pain syndrome      !! Continuous Blood Gluc Sensor (FREESTYLE ANIKET 14 DAY SENSOR) MISC 1 each every 14 days Use 1 Sensor every 14 days. Use to read blood sugars per 's instructions.  Qty: 2 each, Refills: 5    Associated Diagnoses: Type 2 diabetes mellitus with hyperglycemia, with long-term current use of insulin (H)      !! Continuous Blood Gluc Sensor (FREESTYLE ANIKET 14 DAY SENSOR) MISC 1 each every 14 days Change every 14 days.  Qty: 2 each, Refills: 5    Associated Diagnoses: Type 2 diabetes mellitus with diabetic polyneuropathy, with long-term current use of insulin (H)      CONTOUR NEXT TEST test strip USE TO TEST BLOOD GLUCOSE FOUR TIMES DAILY  Qty: 400 strip, Refills: 11    Associated Diagnoses: Type 2 diabetes mellitus with complication, with long-term current use of insulin (H)      cyanocobalamin (CYANOCOBALAMIN) 1000 MCG/ML injection Inject 1 mL (1,000 mcg) into the muscle every 30 days  Qty: 1 mL, Refills: 11    Associated Diagnoses: S/P bariatric surgery; Vitamin B12 deficiency (non anemic)      cyclobenzaprine (FLEXERIL) 10 MG tablet Take 0.5 to 1 tablet every 8 hours as needed.  Qty: 30 tablet, Refills: 0    Associated  Diagnoses: Chronic pain syndrome      diclofenac (VOLTAREN) 1 % topical gel Apply 4 g topically 3 times daily To top of both shoulders in area of pain  Qty: 350 g, Refills: 2    Associated Diagnoses: Chronic pain of both shoulders      diltiazem ER (DILT-XR) 120 MG 24 hr capsule TAKE 1 CAPSULE(120 MG) BY MOUTH AT BEDTIME  Qty: 90 capsule, Refills: 4    Associated Diagnoses: Essential hypertension      dulaglutide (TRULICITY) 1.5 MG/0.5ML pen Inject 1.5 mg Subcutaneous every 7 days  Qty: 2 mL, Refills: 11    Associated Diagnoses: Type 2 diabetes mellitus with diabetic polyneuropathy, with long-term current use of insulin (H)      DULoxetine (CYMBALTA) 60 MG capsule Take 1 capsule (60 mg) by mouth daily  Qty: 30 capsule, Refills: 11    Associated Diagnoses: Chronic pain syndrome      famotidine (PEPCID) 20 MG tablet Take 1 tablet (20 mg) by mouth 2 times daily  Qty: 60 tablet, Refills: 11    Associated Diagnoses: Gastroesophageal reflux disease, esophagitis presence not specified      ferrous sulfate (IRON) 325 (65 FE) MG tablet Take 1 tablet (325 mg) by mouth daily (with breakfast)  Qty: 30 tablet, Refills: 2      gabapentin (NEURONTIN) 250 MG/5ML solution TAKE 15 ML(750 MG) BY MOUTH THREE TIMES DAILY  Qty: 1410 mL, Refills: 11    Associated Diagnoses: Diabetic polyneuropathy associated with type 2 diabetes mellitus (H)      insulin pen needle (BD ULTRA-FINE) 29G X 12.7MM miscellaneous Use 1 time daily  Qty: 90 each, Refills: 3    Associated Diagnoses: Type 2 diabetes mellitus with complication, with long-term current use of insulin (H)      JARDIANCE 10 MG TABS tablet TAKE 1 TABLET(10 MG) BY MOUTH DAILY  Qty: 30 tablet, Refills: 1    Associated Diagnoses: Type 2 diabetes mellitus with diabetic polyneuropathy, with long-term current use of insulin (H)      Lancets 30G MISC USE TO TEST BLOOD GLUCOSE FOUR TIMES DAILY  Qty: 400 each, Refills: 11    Associated Diagnoses: Type 2 diabetes mellitus with diabetic  polyneuropathy, with long-term current use of insulin (H)      LANTUS SOLOSTAR 100 UNIT/ML soln INJECT 25 UNITS UNDER THE SKIN DAILY  Qty: 6 mL, Refills: 11    Associated Diagnoses: Type 2 diabetes mellitus with diabetic polyneuropathy, with long-term current use of insulin (H)      latanoprost (XALATAN) 0.005 % ophthalmic solution INSTILL 1 DROP IN BOTH EYES DAILY AT BEDTIME      lidocaine (LMX4) 4 % external cream Apply topically once as needed for mild pain (left knee)  Qty: 45 g, Refills: 2    Associated Diagnoses: Lipoma of skin and subcutaneous tissue      lisinopril (ZESTRIL) 40 MG tablet Take 1 tablet (40 mg) by mouth At Bedtime  Qty: 90 tablet, Refills: 3    Comments: New dose  Associated Diagnoses: Essential hypertension; Microalbuminuria      naloxone (NARCAN) 4 MG/0.1ML nasal spray Spray 1 spray (4 mg) into one nostril alternating nostrils once as needed for opioid reversal Every 2-3 minutes until patient responsive or EMS arrives  Qty: 0.2 mL, Refills: 0    Associated Diagnoses: Chronic pain syndrome      ondansetron (ZOFRAN ODT) 4 MG ODT tab Take 4 mg by mouth as needed      order for DME Equipment being ordered: wheeled walker with seat  Qty: 1 Device, Refills: 0    Associated Diagnoses: Primary osteoarthritis of both knees      oxyCODONE-acetaminophen (PERCOCET)  MG per tablet Take 2 tablets by mouth At Bedtime. May also take 1 tablet every 6 hours as needed for severe pain.  Qty: 120 tablet, Refills: 0    Associated Diagnoses: Chronic pain syndrome      phentermine (ADIPEX-P) 37.5 MG tablet Take 0.5-1 tablets (18.75-37.5 mg) by mouth every morning  Qty: 90 tablet, Refills: 1    Associated Diagnoses: Obesity due to excess calories with serious comorbidity, unspecified classification      risperiDONE (RISPERDAL) 0.25 MG tablet Take 1 tablet (0.25 mg) by mouth At Bedtime    Associated Diagnoses: Diabetes mellitus, type 2 (H); Benign essential hypertension; Type II or unspecified type diabetes  mellitus without mention of complication, not stated as uncontrolled      rivaroxaban ANTICOAGULANT (XARELTO) 10 MG TABS tablet Take 1 tablet (10 mg) by mouth daily (with dinner)  Qty: 30 tablet, Refills: 0    Associated Diagnoses: Infection due to 2019 novel coronavirus; Pulmonary embolism with infarction (H)      senna-docusate (SENOKOT-S/PERICOLACE) 8.6-50 MG tablet Take 1 tablet by mouth 2 times daily  Qty: 60 tablet, Refills: 11    Associated Diagnoses: Chronic pain syndrome      Simethicone 180 MG CAPS Use 1 softgel as needed for gas  Qty: 60 capsule, Refills: 11    Associated Diagnoses: Flatulence, eructation and gas pain      sodium citrate-citric acid (BICITRA) 500-334 MG/5ML solution Take 10 mLs by mouth 3 times daily (with meals)  Qty: 473 mL, Refills: 11    Associated Diagnoses: Metabolic acidosis      SUMAtriptan (IMITREX) 25 MG tablet One tab at onset of HA.  May repeat dose in 2 hours.  Do not exceed 200 mg in 24 hours  Qty: 24 tablet, Refills: 1    Associated Diagnoses: Headache(784.0)      tolterodine ER (DETROL LA) 2 MG 24 hr capsule Take 1 capsule (2 mg) by mouth daily  Qty: 90 capsule, Refills: 3    Associated Diagnoses: Overactive bladder      vitamin D3 (CHOLECALCIFEROL) 1.25 MG (55175 UT) capsule Take 1 capsule (50,000 Units) by mouth three times a week  Qty: 39 capsule, Refills: 3    Associated Diagnoses: Postoperative malabsorption       !! - Potential duplicate medications found. Please discuss with provider.              =================================================================      HPI  Patient information was obtained from: Patient    Use of : N/A         Ruthy TJ Álvarez is a 56 year old female with a pertinent history of asthma, PE, hyperlipidemia, hypertension, diabetes mellitus type 2, hyperglycemia, CKD stage 3, and s/p cholecystectomy and hysterectomy who presents to this ED by walk-in for evaluation of abnormal labs.     Patient endorses getting labs done at  clinic yesterday. She was told that her labs were concerning, and her CO2 was at 8, and she should present to the ED. She notes yesterday she had left sided pain that lasted for a few seconds. Patient notes she is eating and drinking fine. She endorses seeing a kidney doctor twice now.      REVIEW OF SYSTEMS   Review of Systems   Constitutional: Negative for appetite change.        Positive for low bicarb on lab test.   Musculoskeletal:        Positive for left sided pain (resolved)   All other systems reviewed and are negative.            --------------- MEDICAL HISTORY ---------------  PAST MEDICAL HISTORY:  Past Medical History:   Diagnosis Date     Abnormal cervical Papanicolaou smear with positive human papillomavirus deoxyribonucleic acid test      Ankle joint pain     bilateral     Anxiety      Arthritis      Arthritis     rheumatoid arthritis     Asthma      Carpal tunnel syndrome     bilateral     Compression neuropathy      Depression      Depressive disorder      Diabetes (H)      Diabetes (H)     type II     Diabetic nephropathy (H)      DVT of lower extremity, bilateral (H) 2011    right     Esophageal reflux      Gastroenteritis      History of blood clots      History of blood transfusion      Hyperlipidemia      Hypertension      Hypertension      Infectious fasciitis 2011    abdominal wall      Insomnia      Neck pain      Necrotizing fasciitis (H) 11/15/2012     Necrotizing fasciitis (H)      Neuropathy      Obesity      Osteoarthritis of both knees 10/31/2016     Pulmonary embolism and infarction (H)      SOB (shortness of breath)     with exertion     Tarsal tunnel syndrome      Uncomplicated asthma      Urinary incontinence      Patient Active Problem List   Diagnosis     Chronic pain of both shoulders     Abnormal Pap smear of cervix     Moderate persistent asthma without complication     Carpal tunnel syndrome     Pulmonary embolism with infarction (H)     Health Care Home     Moderate major  depression (H)     S/P hysterectomy     Hyperlipidemia     Status post bariatric surgery     Essential hypertension     Urinary incontinence     Esophageal reflux     Microalbuminuria     Type 2 diabetes mellitus with diabetic polyneuropathy (H)     Bilateral ankle joint pain     Chronic pain disorder     Other acute pulmonary embolism without acute cor pulmonale (H)     Asthma     Compression neuropathy of left lower extremity     Hernia     Hernia of anterior abdominal wall     Obesity     Osteoarthritis of both knees     Polyneuropathy due to secondary diabetes mellitus (H)     Copper deficiency     Metabolic acidosis     Neck mass     Persons encountering health services in other specified circumstances     Hyperglycemia due to type 2 diabetes mellitus (H)     History of cholecystectomy     History of hysterectomy for benign disease     Morbid obesity (H)     Stage 3a chronic kidney disease (H)     Arthritis of both acromioclavicular joints     H/O necrotizing fasciitis     Chronic, continuous use of opioids       PAST SURGICAL HISTORY:  Past Surgical History:   Procedure Laterality Date     ABDOMINAL WALL SURGERY      for necrotizing fasciitis      SECTION       COLONOSCOPY N/A 3/15/2017    Procedure: INCOMPLETE COLONOSCOPY;  Surgeon: Topher Street MD;  Location: [facility];  Service:      COLONOSCOPY N/A 3/16/2017    Procedure: COLONOSCOPY;  Surgeon: Topher Street MD;  Location: [facility];  Service:      GASTRIC BYPASS  2006    Kim'n'Y.  wt then was in low 300lb range     GASTRIC BYPASS  2006    Kim-en-Y     HC REVISE MEDIAN N/CARPAL TUNNEL SURG Right 10/24/2014    Procedure: RIGHT REVISION CARPAL TUNNEL RELEASE WITH HYPOTHENAR FAT PAD;  Surgeon: Aaron Oreilly MD;  Location: [facility];  Service: Orthopedics     HYSTERECTOMY       HYSTERECTOMY       KNEE SURGERY Right      MIDLINE INSERTION - DOUBLE LUMEN  2021          UTERINE FIBROID SURGERY       hysterectomy     UTERINE FIBROID SURGERY         CURRENT MEDICATIONS:      Current Facility-Administered Medications:      cyanocobalamin injection 1,000 mcg, 1,000 mcg, Intramuscular, Q30 Days, Jose Angel Meyer MD, 1,000 mcg at 08/04/22 1518    Current Outpatient Medications:      acetaminophen (TYLENOL) 500 MG tablet, Take 1 tablet (500 mg) by mouth 2 times daily as needed for mild pain, Disp: 30 tablet, Rfl: 0     Alcohol Swabs (PRO COMFORT ALCOHOL) 70 % PADS, USE TO TEST BLOOD GLUCOSE FOUR TIMES DAILY, Disp: 400 each, Rfl: 11     amitriptyline (ELAVIL) 10 MG tablet, TAKE 1 TABLET(10 MG) BY MOUTH AT BEDTIME, Disp: 30 tablet, Rfl: 11     atorvastatin (LIPITOR) 10 MG tablet, TAKE 1 TABLET BY MOUTH EVERY DAY, Disp: 90 tablet, Rfl: 3     blood glucose (NO BRAND SPECIFIED) lancets standard, Check fasting glucose 4 times daily.  For Accu check monitor, Disp: 360 each, Rfl: 11     budesonide-formoterol (SYMBICORT) 80-4.5 MCG/ACT Inhaler, Inhale 2 puffs once daily plus 1-2 puffs as needed. May use up to 12 puffs per day., Disp: 20.4 g, Rfl: 11     capsaicin (ZOSTRIX) 0.025 % external cream, Apply 3-4 times daily to knees, Disp: 118 mL, Rfl: 11     Continuous Blood Gluc Sensor (FREESTYLE ANIKET 14 DAY SENSOR) MISC, 1 each every 14 days Use 1 Sensor every 14 days. Use to read blood sugars per 's instructions., Disp: 2 each, Rfl: 5     Continuous Blood Gluc Sensor (FREESTYLE ANIKET 14 DAY SENSOR) MISC, 1 each every 14 days Change every 14 days., Disp: 2 each, Rfl: 5     CONTOUR NEXT TEST test strip, USE TO TEST BLOOD GLUCOSE FOUR TIMES DAILY, Disp: 400 strip, Rfl: 11     cyanocobalamin (CYANOCOBALAMIN) 1000 MCG/ML injection, Inject 1 mL (1,000 mcg) into the muscle every 30 days, Disp: 1 mL, Rfl: 11     cyclobenzaprine (FLEXERIL) 10 MG tablet, Take 0.5 to 1 tablet every 8 hours as needed., Disp: 30 tablet, Rfl: 0     diclofenac (VOLTAREN) 1 % topical gel, Apply 4 g topically 3 times daily To top of both  shoulders in area of pain, Disp: 350 g, Rfl: 2     diltiazem ER (DILT-XR) 120 MG 24 hr capsule, TAKE 1 CAPSULE(120 MG) BY MOUTH AT BEDTIME, Disp: 90 capsule, Rfl: 4     dulaglutide (TRULICITY) 1.5 MG/0.5ML pen, Inject 1.5 mg Subcutaneous every 7 days, Disp: 2 mL, Rfl: 11     DULoxetine (CYMBALTA) 60 MG capsule, Take 1 capsule (60 mg) by mouth daily, Disp: 30 capsule, Rfl: 11     famotidine (PEPCID) 20 MG tablet, Take 1 tablet (20 mg) by mouth 2 times daily, Disp: 60 tablet, Rfl: 11     ferrous sulfate (IRON) 325 (65 FE) MG tablet, Take 1 tablet (325 mg) by mouth daily (with breakfast), Disp: 30 tablet, Rfl: 2     gabapentin (NEURONTIN) 250 MG/5ML solution, TAKE 15 ML(750 MG) BY MOUTH THREE TIMES DAILY, Disp: 1410 mL, Rfl: 11     insulin pen needle (BD ULTRA-FINE) 29G X 12.7MM miscellaneous, Use 1 time daily, Disp: 90 each, Rfl: 3     JARDIANCE 10 MG TABS tablet, TAKE 1 TABLET(10 MG) BY MOUTH DAILY, Disp: 30 tablet, Rfl: 1     Lancets 30G MISC, USE TO TEST BLOOD GLUCOSE FOUR TIMES DAILY, Disp: 400 each, Rfl: 11     LANTUS SOLOSTAR 100 UNIT/ML soln, INJECT 25 UNITS UNDER THE SKIN DAILY, Disp: 6 mL, Rfl: 11     latanoprost (XALATAN) 0.005 % ophthalmic solution, INSTILL 1 DROP IN BOTH EYES DAILY AT BEDTIME, Disp: , Rfl:      lidocaine (LMX4) 4 % external cream, Apply topically once as needed for mild pain (left knee), Disp: 45 g, Rfl: 2     lisinopril (ZESTRIL) 40 MG tablet, Take 1 tablet (40 mg) by mouth At Bedtime, Disp: 90 tablet, Rfl: 3     naloxone (NARCAN) 4 MG/0.1ML nasal spray, Spray 1 spray (4 mg) into one nostril alternating nostrils once as needed for opioid reversal Every 2-3 minutes until patient responsive or EMS arrives, Disp: 0.2 mL, Rfl: 0     ondansetron (ZOFRAN ODT) 4 MG ODT tab, Take 4 mg by mouth as needed, Disp: , Rfl:      order for DME, Equipment being ordered: wheeled walker with seat, Disp: 1 Device, Rfl: 0     [START ON 8/12/2022] oxyCODONE-acetaminophen (PERCOCET)  MG per tablet, Take 2  tablets by mouth At Bedtime. May also take 1 tablet every 6 hours as needed for severe pain., Disp: 120 tablet, Rfl: 0     phentermine (ADIPEX-P) 37.5 MG tablet, Take 0.5-1 tablets (18.75-37.5 mg) by mouth every morning, Disp: 90 tablet, Rfl: 1     risperiDONE (RISPERDAL) 0.25 MG tablet, Take 1 tablet (0.25 mg) by mouth At Bedtime, Disp: , Rfl:      rivaroxaban ANTICOAGULANT (XARELTO) 10 MG TABS tablet, Take 1 tablet (10 mg) by mouth daily (with dinner), Disp: 30 tablet, Rfl: 0     senna-docusate (SENOKOT-S/PERICOLACE) 8.6-50 MG tablet, Take 1 tablet by mouth 2 times daily, Disp: 60 tablet, Rfl: 11     Simethicone 180 MG CAPS, Use 1 softgel as needed for gas, Disp: 60 capsule, Rfl: 11     sodium citrate-citric acid (BICITRA) 500-334 MG/5ML solution, Take 10 mLs by mouth 3 times daily (with meals), Disp: 473 mL, Rfl: 11     SUMAtriptan (IMITREX) 25 MG tablet, One tab at onset of HA.  May repeat dose in 2 hours.  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, 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, 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, 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  Other reaction(s): Renal Failure  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, 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, 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       FAMILY HISTORY:  Family History   Problem Relation Age of Onset     Diabetes Mother      Coronary Artery Disease Mother          of MI at age of 51 years     Diabetes Brother       Diabetes Sister      No Known Problems Father      No Known Problems Maternal Grandmother      No Known Problems Maternal Grandfather      No Known Problems Paternal Grandmother      No Known Problems Paternal Grandfather      No Known Problems Son      No Known Problems Daughter      No Known Problems Maternal Half-Brother      No Known Problems Maternal Half-Sister      No Known Problems Paternal Half-Brother      No Known Problems Paternal Half-Sister      No Known Problems Niece      No Known Problems Nephew      No Known Problems Cousin      No Known Problems Other      Cancer Brother      Heart Disease No family hx of      Hypertension No family hx of      Hyperlipidemia No family hx of      Kidney Disease No family hx of      Cerebrovascular Disease No family hx of      Obesity No family hx of      Thrombosis No family hx of      Asthma No family hx of      Arthritis No family hx of      Thyroid Disease No family hx of      Depression No family hx of      Mental Illness No family hx of      Substance Abuse No family hx of      Cystic Fibrosis No family hx of      Early Death No family hx of      Coronary Artery Disease Early Onset No family hx of      Heart Failure No family hx of      Bleeding Diathesis No family hx of      Dementia No family hx of      Breast Cancer No family hx of      Ovarian Cancer No family hx of      Uterine Cancer No family hx of      Prostate Cancer No family hx of      Colorectal Cancer No family hx of      Pancreatic Cancer No family hx of      Lung Cancer No family hx of      Melanoma No family hx of      Autoimmune Disease No family hx of      Unknown/Adopted No family hx of      Genetic Disorder No family hx of        SOCIAL HISTORY:   Social History     Socioeconomic History     Marital status: Single   Tobacco Use     Smoking status: Never Smoker     Smokeless tobacco: Never Used   Substance and Sexual Activity     Alcohol use: No     Drug use: No     Sexual activity:  "Never   Other Topics Concern     Parent/sibling w/ CABG, MI or angioplasty before 65F 55M? No   Social History Narrative    Lives alone. [place], other daughter lives here 2 sons [place] the [place]       --------------- PHYSICAL EXAM ---------------  Nursing notes and vitals reviewed by me.  VITALS:  Vitals:    08/05/22 1540 08/05/22 1852 08/05/22 2014   BP: (!) 144/78 (!) 168/96 (!) 149/67   Pulse: 107 98 101   Resp: 18 16 18   Temp: 98.3  F (36.8  C)     TempSrc: Oral     SpO2: 97% 95% 98%   Weight: 92.1 kg (203 lb)     Height: 1.499 m (4' 11\")         PHYSICAL EXAM:    General:  alert, interactive, no distress  Eyes:  conjunctivae clear, conjugate gaze  HENT:  atraumatic, nose with no rhinorrhea, oropharynx clear, moist mucus membranes   Neck:  no meningismus  Cardiovascular:  HR 70s during exam, regular rhythm, no murmurs, brisk cap refill  Chest:  no chest wall tenderness  Pulmonary:  no stridor, normal phonation, normal work of breathing, clear lungs bilaterally  Abdomen:  soft, nondistended, nontender  :  no CVA tenderness  Back:  no midline spinal tenderness  Musculoskeletal:  no pretibial edema, no calf tenderness. Gross ROM intact to joints of extremities with no obvious deformities.  Skin:  warm, dry, no rash  Neuro:  awake, alert, answers questions appropriately, follows commands, moves all limbs  Psych:  calm, normal affect      --------------- RESULTS ---------------  LAB:  Reviewed and interpreted by me.  Results for orders placed or performed during the hospital encounter of 08/05/22   UA with Microscopic reflex to Culture    Specimen: Urine, Clean Catch   Result Value Ref Range    Color Urine Light Yellow Colorless, Straw, Light Yellow, Yellow    Appearance Urine Clear Clear    Glucose Urine 70  (A) Negative mg/dL    Bilirubin Urine Negative Negative    Ketones Urine Negative Negative mg/dL    Specific Gravity Urine 1.021 1.001 - 1.030    Blood Urine 0.03 mg/dL (A) Negative "    pH Urine 5.5 5.0 - 7.0    Protein Albumin Urine 300  (A) Negative mg/dL    Urobilinogen Urine <2.0 <2.0 mg/dL    Nitrite Urine Negative Negative    Leukocyte Esterase Urine Negative Negative    Bacteria Urine Few (A) None Seen /HPF    Mucus Urine Present (A) None Seen /LPF    RBC Urine 2 <=2 /HPF    WBC Urine 1 <=5 /HPF    Squamous Epithelials Urine 7 (H) <=1 /HPF   Result Value Ref Range    Magnesium 1.7 (L) 1.8 - 2.6 mg/dL   Result Value Ref Range    Phosphorus 4.1 2.5 - 4.5 mg/dL   TSH with free T4 reflex   Result Value Ref Range    TSH 0.92 0.30 - 5.00 uIU/mL   CRP inflammation   Result Value Ref Range    CRP 0.4 0.0 - <0.8 mg/dL   Basic metabolic panel   Result Value Ref Range    Sodium 141 136 - 145 mmol/L    Potassium 4.5 3.5 - 5.0 mmol/L    Chloride 114 (H) 98 - 107 mmol/L    Carbon Dioxide (CO2) 21 (L) 22 - 31 mmol/L    Anion Gap 6 5 - 18 mmol/L    Urea Nitrogen 24 (H) 8 - 22 mg/dL    Creatinine 0.87 0.60 - 1.10 mg/dL    Calcium 8.7 8.5 - 10.5 mg/dL    Glucose 118 70 - 125 mg/dL    GFR Estimate 78 >60 mL/min/1.73m2   Hepatic function panel   Result Value Ref Range    Bilirubin Total 0.4 0.0 - 1.0 mg/dL    Bilirubin Direct 0.1 <=0.5 mg/dL    Protein Total 6.4 6.0 - 8.0 g/dL    Albumin 3.1 (L) 3.5 - 5.0 g/dL    Alkaline Phosphatase 204 (H) 45 - 120 U/L    AST 16 0 - 40 U/L    ALT 16 0 - 45 U/L   Result Value Ref Range    Lipase 59 (H) 0 - 52 U/L   Blood gas venous   Result Value Ref Range    pH Venous 7.32 (L) 7.35 - 7.45    pCO2 Venous 46 35 - 50 mm Hg    pO2 Venous 39 25 - 47 mm Hg    Bicarbonate Venous 22 (L) 24 - 30 mmol/L    Base Excess/Deficit (+/-) -2.5   mmol/L    Oxyhemoglobin Venous 65.0 (L) 70.0 - 75.0 %    O2 Sat, Venous 66.7 (L) 70.0 - 75.0 %   CBC with platelets and differential   Result Value Ref Range    WBC Count 11.9 (H) 4.0 - 11.0 10e3/uL    RBC Count 3.23 (L) 3.80 - 5.20 10e6/uL    Hemoglobin 10.2 (L) 11.7 - 15.7 g/dL    Hematocrit 32.3 (L) 35.0 - 47.0 %     78 - 100 fL    MCH  31.6 26.5 - 33.0 pg    MCHC 31.6 31.5 - 36.5 g/dL    RDW 13.0 10.0 - 15.0 %    Platelet Count 322 150 - 450 10e3/uL    % Neutrophils 67 %    % Lymphocytes 26 %    % Monocytes 7 %    % Eosinophils 0 %    % Basophils 0 %    % Immature Granulocytes 0 %    NRBCs per 100 WBC 0 <1 /100    Absolute Neutrophils 7.9 1.6 - 8.3 10e3/uL    Absolute Lymphocytes 3.1 0.8 - 5.3 10e3/uL    Absolute Monocytes 0.8 0.0 - 1.3 10e3/uL    Absolute Eosinophils 0.0 0.0 - 0.7 10e3/uL    Absolute Basophils 0.0 0.0 - 0.2 10e3/uL    Absolute Immature Granulocytes 0.0 <=0.4 10e3/uL    Absolute NRBCs 0.0 10e3/uL   Lactic acid whole blood   Result Value Ref Range    Lactic Acid 0.7 0.7 - 2.0 mmol/L   Ketone Beta-Hydroxybutyrate Quantitative   Result Value Ref Range    Ketone (Beta-Hydroxybutyrate) Quantitative <0.10 <=0.3 mmol/L   Asymptomatic COVID-19 Virus (Coronavirus) by PCR Nasopharyngeal    Specimen: Nasopharyngeal; Swab   Result Value Ref Range    SARS CoV2 PCR Negative Negative   Result Value Ref Range    Salicylate <8 2 - 25 mg/dL   Acetaminophen level   Result Value Ref Range    Acetaminophen <3.0 (L) 10.0 - 20.0 ug/mL           I, Geno Gonsalez, am serving as a scribe to document services personally performed by Dr. Vance Alvarez based on my observation and the provider's statements to me. I, Vance Alvarez MD attest that Geno Gonsalez is acting in a scribe capacity, has observed my performance of the services and has documented them in accordance with my direction.      Vance Alvarez MD  08/05/22  Emergency Medicine  [facility] EMERGENCY ROOM  [address] [address] [phone]  Dept: [phone]     Vance Alvarez MD  08/05/22 2018

## 2022-08-05 NOTE — TELEPHONE ENCOUNTER
08/05/2022   Received page from Dr. Meyer regarding new anion gap acidosis. Chart briefly reviewed, I last saw this patient September 2021. Added SGLT2 inhibitor. Given this, euglycemic diabetic ketoacidosis is in the differential among other causes of high anion gap metabolic acidosis. I advised that patient be sent to ED for further evaluation.    Regarding proteinuria, will arrange follow up in CKD clinic.    Maribell Brunson MD

## 2022-08-05 NOTE — ED NOTES
Expected Patient Referral to ED  12:22 PM    Referring Clinic/Provider:  PCP clinic---Dr. Meyer    Reason for referral/Clinical facts:  55 y/o F  Seen in clinic yesterday and and had routine labs which showed bicarb is 8, AG is 20  PCP discussed with nephrologist who recommended she be evaluated here--concerned for d-lactic acidosis? (would need d-lactate), DKA with normal glucose (on SGLT-2 inhibitor)  Can consult nephrology with further questions    Recommendations provided:  Send to ED for further evaluation    Caller was informed that this institution does possess the capabilities and/or resources to provide for patient and should be transferred to our facility.    Discussed that if direct admit is sought and any hurdles are encountered, this ED would be happy to see the patient and evaluate.    Informed caller that recommendations provided are recommendations based only on the facts provided and that they responsible to accept or reject the advice, or to seek a formal in person consultation as needed and that this ED will see/treat patient should they arrive.      Alma Burnett MD  [facility] EMERGENCY ROOM  [address] [address]  [phone]       Alma Burnett MD  08/05/22 5514

## 2022-08-05 NOTE — PROGRESS NOTES
Spoke with Dr. Meyer. Recommendation for clinic visit today with a repeat lab draw. Visit scheduled and confirmed with pt. See below.     Future Appointments   Date Time Provider Department Center   8/5/2022  4:30 PM Morteza Proctor MD [facility]     ZAID SaezN, RN, PHN, CHFN, HNB-BC   8/5/2022 at 12:03 PM

## 2022-08-05 NOTE — RESULT ENCOUNTER NOTE
I called pt and she will return today to repeat this.    She has had some nausea for a month and her memory hasn't been quite so clear.  She has been taking her bicarbonate only about once daily.  Will need stat BMP, lactate, ketones, and UA, at minimum.  May need to consider ER evaluation.    Will attempt to coordinate care with her nephrologist, as well.  Message sent.

## 2022-08-05 NOTE — PROGRESS NOTES
S-(situation): Critical CO2 8 mmol/L    B-(background): Pt saw Dr. Meyer yesterday for follow-up CKDIII, chronic pain, DM2. Labs drawn at visit. Note CO2 was low at 18 mmol/L back in spring, bictra increased.     A-(assessment): Called pt to assess. Phone went straight to [facility].     R-(recommendations): Will notify Dr. Meyer of critical result.     Cheryl Marroquin, ZAIDN, RN, PHN, CHFN, HNB-BC   8/5/2022 at 11:11 AM

## 2022-08-05 NOTE — ED TRIAGE NOTES
Patient here after having labs done at clinic yesterday, she was told that her kidneys are bad, according to labs, CO2 is 8 and albumin in the urine, patient reports that she has pain to R flank.  Stephanie Chery RN.......8/5/2022 3:43 PM     Triage Assessment     Row Name 08/05/22 1541       Triage Assessment (Adult)    Airway WDL WDL       Respiratory WDL    Respiratory WDL WDL       Skin Circulation/Temperature WDL    Skin Circulation/Temperature WDL WDL       Cardiac WDL    Cardiac WDL WDL       Peripheral/Neurovascular WDL    Peripheral Neurovascular WDL WDL       Cognitive/Neuro/Behavioral WDL    Cognitive/Neuro/Behavioral WDL WDL

## 2022-08-06 NOTE — DISCHARGE INSTRUCTIONS
Call your nephrology clinic to arrange follow up with them.    The blood tests today looked much better than they did yesterday.    Drink plenty of fluids to stay hydrated.    Follow up with your Primary Care provider in 2 days for a recheck.    Return to the Emergency Department for any difficulty breathing, persistent vomiting, or any other concerns.

## 2022-08-06 NOTE — ED NOTES
Patient wants to eat, per provider she is not NPO.    Patient also requesting home dose of percocet for chronic leg pain

## 2022-08-12 ENCOUNTER — APPOINTMENT (OUTPATIENT)
Dept: CT IMAGING | Facility: HOSPITAL | Age: 57
End: 2022-08-12
Attending: EMERGENCY MEDICINE
Payer: COMMERCIAL

## 2022-08-12 ENCOUNTER — HOSPITAL ENCOUNTER (EMERGENCY)
Facility: HOSPITAL | Age: 57
Discharge: HOME | End: 2022-08-13
Attending: EMERGENCY MEDICINE | Admitting: EMERGENCY MEDICINE
Payer: COMMERCIAL

## 2022-08-12 DIAGNOSIS — E87.20 ACIDOSIS: ICD-10-CM

## 2022-08-12 DIAGNOSIS — R07.9 ACUTE CHEST PAIN: ICD-10-CM

## 2022-08-12 LAB
ALBUMIN SERPL-MCNC: 2.8 G/DL (ref 3.5–5)
ALBUMIN UR-MCNC: 300 MG/DL
ALP SERPL-CCNC: 186 U/L (ref 45–120)
ALT SERPL W P-5'-P-CCNC: 17 U/L (ref 0–45)
ANION GAP SERPL CALCULATED.3IONS-SCNC: 8 MMOL/L (ref 5–18)
APPEARANCE UR: CLEAR
AST SERPL W P-5'-P-CCNC: 19 U/L (ref 0–40)
BASE EXCESS BLDV CALC-SCNC: -4.6 MMOL/L
BILIRUB DIRECT SERPL-MCNC: <0.1 MG/DL
BILIRUB SERPL-MCNC: 0.3 MG/DL (ref 0–1)
BILIRUB UR QL STRIP: NEGATIVE
BUN SERPL-MCNC: 29 MG/DL (ref 8–22)
CALCIUM SERPL-MCNC: 7.9 MG/DL (ref 8.5–10.5)
CHLORIDE BLD-SCNC: 114 MMOL/L (ref 98–107)
CO2 SERPL-SCNC: 18 MMOL/L (ref 22–31)
COLOR UR AUTO: ABNORMAL
CREAT SERPL-MCNC: 1.1 MG/DL (ref 0.6–1.1)
ERYTHROCYTE [DISTWIDTH] IN BLOOD BY AUTOMATED COUNT: 13 % (ref 10–15)
FLUAV RNA SPEC QL NAA+PROBE: NEGATIVE
FLUBV RNA RESP QL NAA+PROBE: NEGATIVE
GFR SERPL CREATININE-BSD FRML MDRD: 59 ML/MIN/1.73M2
GLUCOSE BLD-MCNC: 209 MG/DL (ref 70–125)
GLUCOSE BLDC GLUCOMTR-MCNC: 84 MG/DL (ref 70–99)
GLUCOSE UR STRIP-MCNC: 200 MG/DL
HCO3 BLDV-SCNC: 20 MMOL/L (ref 24–30)
HCT VFR BLD AUTO: 30.9 % (ref 35–47)
HGB BLD-MCNC: 9.4 G/DL (ref 11.7–15.7)
HGB UR QL STRIP: NEGATIVE
HYALINE CASTS: 3 /LPF
KETONES BLD-SCNC: <0.1 MMOL/L
KETONES UR STRIP-MCNC: NEGATIVE MG/DL
LEUKOCYTE ESTERASE UR QL STRIP: NEGATIVE
LIPASE SERPL-CCNC: 35 U/L (ref 0–52)
MCH RBC QN AUTO: 31.5 PG (ref 26.5–33)
MCHC RBC AUTO-ENTMCNC: 30.4 G/DL (ref 31.5–36.5)
MCV RBC AUTO: 104 FL (ref 78–100)
MUCOUS THREADS #/AREA URNS LPF: PRESENT /LPF
NITRATE UR QL: NEGATIVE
OXYHGB MFR BLDV: 44.4 % (ref 70–75)
PCO2 BLDV: 48 MM HG (ref 35–50)
PH BLDV: 7.28 [PH] (ref 7.35–7.45)
PH UR STRIP: 5.5 [PH] (ref 5–7)
PLATELET # BLD AUTO: 284 10E3/UL (ref 150–450)
PO2 BLDV: 29 MM HG (ref 25–47)
POTASSIUM BLD-SCNC: 4.6 MMOL/L (ref 3.5–5)
PROT SERPL-MCNC: 5.8 G/DL (ref 6–8)
RBC # BLD AUTO: 2.98 10E6/UL (ref 3.8–5.2)
RBC URINE: 2 /HPF
RSV RNA SPEC NAA+PROBE: NEGATIVE
SAO2 % BLDV: 45.4 % (ref 70–75)
SARS-COV-2 RNA RESP QL NAA+PROBE: NEGATIVE
SODIUM SERPL-SCNC: 140 MMOL/L (ref 136–145)
SP GR UR STRIP: 1.02 (ref 1–1.03)
SQUAMOUS EPITHELIAL: <1 /HPF
TROPONIN I SERPL-MCNC: <0.01 NG/ML (ref 0–0.29)
UROBILINOGEN UR STRIP-MCNC: <2 MG/DL
WBC # BLD AUTO: 10.9 10E3/UL (ref 4–11)
WBC URINE: 1 /HPF

## 2022-08-12 PROCEDURE — 82248 BILIRUBIN DIRECT: CPT | Performed by: EMERGENCY MEDICINE

## 2022-08-12 PROCEDURE — 999N000127 HC STATISTIC PERIPHERAL IV START W US GUIDANCE

## 2022-08-12 PROCEDURE — 250N000011 HC RX IP 250 OP 636: Performed by: EMERGENCY MEDICINE

## 2022-08-12 PROCEDURE — 82010 KETONE BODYS QUAN: CPT | Performed by: EMERGENCY MEDICINE

## 2022-08-12 PROCEDURE — 85027 COMPLETE CBC AUTOMATED: CPT | Performed by: EMERGENCY MEDICINE

## 2022-08-12 PROCEDURE — 81001 URINALYSIS AUTO W/SCOPE: CPT | Performed by: EMERGENCY MEDICINE

## 2022-08-12 PROCEDURE — 87637 SARSCOV2&INF A&B&RSV AMP PRB: CPT | Performed by: EMERGENCY MEDICINE

## 2022-08-12 PROCEDURE — 82805 BLOOD GASES W/O2 SATURATION: CPT | Performed by: EMERGENCY MEDICINE

## 2022-08-12 PROCEDURE — 80307 DRUG TEST PRSMV CHEM ANLYZR: CPT | Performed by: STUDENT IN AN ORGANIZED HEALTH CARE EDUCATION/TRAINING PROGRAM

## 2022-08-12 PROCEDURE — 250N000013 HC RX MED GY IP 250 OP 250 PS 637: Performed by: EMERGENCY MEDICINE

## 2022-08-12 PROCEDURE — 84484 ASSAY OF TROPONIN QUANT: CPT | Mod: 91 | Performed by: EMERGENCY MEDICINE

## 2022-08-12 PROCEDURE — 999N000285 HC STATISTIC VASC ACCESS LAB DRAW WITH PIV START

## 2022-08-12 PROCEDURE — 258N000003 HC RX IP 258 OP 636: Performed by: EMERGENCY MEDICINE

## 2022-08-12 PROCEDURE — 96360 HYDRATION IV INFUSION INIT: CPT | Mod: 59

## 2022-08-12 PROCEDURE — C9803 HOPD COVID-19 SPEC COLLECT: HCPCS

## 2022-08-12 PROCEDURE — 83690 ASSAY OF LIPASE: CPT | Performed by: EMERGENCY MEDICINE

## 2022-08-12 PROCEDURE — 82947 ASSAY GLUCOSE BLOOD QUANT: CPT | Performed by: EMERGENCY MEDICINE

## 2022-08-12 PROCEDURE — 71275 CT ANGIOGRAPHY CHEST: CPT

## 2022-08-12 PROCEDURE — 36415 COLL VENOUS BLD VENIPUNCTURE: CPT | Performed by: EMERGENCY MEDICINE

## 2022-08-12 PROCEDURE — 93005 ELECTROCARDIOGRAM TRACING: CPT | Performed by: EMERGENCY MEDICINE

## 2022-08-12 PROCEDURE — 99285 EMERGENCY DEPT VISIT HI MDM: CPT | Mod: CS,25

## 2022-08-12 PROCEDURE — 82310 ASSAY OF CALCIUM: CPT | Performed by: EMERGENCY MEDICINE

## 2022-08-12 RX ORDER — IOPAMIDOL 755 MG/ML
100 INJECTION, SOLUTION INTRAVASCULAR ONCE
Status: COMPLETED | OUTPATIENT
Start: 2022-08-12 | End: 2022-08-12

## 2022-08-12 RX ORDER — ACETAMINOPHEN 325 MG/1
975 TABLET ORAL ONCE
Status: DISCONTINUED | OUTPATIENT
Start: 2022-08-12 | End: 2022-08-12

## 2022-08-12 RX ORDER — ASPIRIN 325 MG
325 TABLET ORAL ONCE
Status: COMPLETED | OUTPATIENT
Start: 2022-08-12 | End: 2022-08-12

## 2022-08-12 RX ORDER — ACETAMINOPHEN 325 MG/1
975 TABLET ORAL ONCE
Status: COMPLETED | OUTPATIENT
Start: 2022-08-12 | End: 2022-08-12

## 2022-08-12 RX ADMIN — IOPAMIDOL 100 ML: 755 INJECTION, SOLUTION INTRAVENOUS at 20:37

## 2022-08-12 RX ADMIN — SODIUM CHLORIDE 1000 ML: 9 INJECTION, SOLUTION INTRAVENOUS at 21:18

## 2022-08-12 RX ADMIN — ASPIRIN 325 MG: 325 TABLET ORAL at 18:30

## 2022-08-12 RX ADMIN — ACETAMINOPHEN 975 MG: 325 TABLET, FILM COATED ORAL at 18:30

## 2022-08-12 ASSESSMENT — ENCOUNTER SYMPTOMS
TROUBLE SWALLOWING: 0
FEVER: 0
VOMITING: 0
WEAKNESS: 1
DYSURIA: 0
DIARRHEA: 0
COUGH: 1
SHORTNESS OF BREATH: 0
ABDOMINAL PAIN: 0
FATIGUE: 1
CHILLS: 0

## 2022-08-12 ASSESSMENT — ACTIVITIES OF DAILY LIVING (ADL)
ADLS_ACUITY_SCORE: 35
ADLS_ACUITY_SCORE: 37
ADLS_ACUITY_SCORE: 37

## 2022-08-12 NOTE — ED TRIAGE NOTES
Ambulance arrives to ED with ambulatory female patient reporting chest pain; hx of renal insufficiency, high glucose reading en route over 300. Pre hospital IV attempts failed. Pt has several layers of tight clothing requiring 2 staff members to undress her. Pt is not pleased with having to remove her clothes. She is currently in the bathroom and EDT is attempting to collect a urine sample. Initial attempts at doing an immediate EKG have been declined by the patient due to hesitancy to undress and then need for bathroom.      Triage Assessment     Row Name 08/12/22 0472       Triage Assessment (Adult)    Airway WDL WDL       Respiratory WDL    Respiratory WDL WDL       Skin Circulation/Temperature WDL    Skin Circulation/Temperature WDL WDL       Cardiac WDL    Cardiac WDL X  tachycardia       Peripheral/Neurovascular WDL    Peripheral Neurovascular WDL WDL       Cognitive/Neuro/Behavioral WDL    Cognitive/Neuro/Behavioral WDL all  drowsy but able to walk to the bathroom and back to void

## 2022-08-12 NOTE — ED NOTES
Bed: JNED-15  Expected date: 8/12/22  Expected time: 4:52 PM  Means of arrival: Ambulance  Comments:  SPF - CP, hyperglycemia.

## 2022-08-13 VITALS
DIASTOLIC BLOOD PRESSURE: 74 MMHG | SYSTOLIC BLOOD PRESSURE: 139 MMHG | WEIGHT: 189 LBS | OXYGEN SATURATION: 96 % | BODY MASS INDEX: 37.11 KG/M2 | HEIGHT: 60 IN | TEMPERATURE: 97.9 F | RESPIRATION RATE: 17 BRPM | HEART RATE: 87 BPM

## 2022-08-13 LAB
AMPHETAMINES UR QL SCN: ABNORMAL
ATRIAL RATE - MUSE: 111 BPM
BARBITURATES UR QL: ABNORMAL
BENZODIAZ UR QL: ABNORMAL
CANNABINOIDS UR QL SCN: ABNORMAL
COCAINE UR QL: ABNORMAL
CREAT UR-MCNC: 102 MG/DL
DIASTOLIC BLOOD PRESSURE - MUSE: 76 MMHG
GLUCOSE BLDC GLUCOMTR-MCNC: 73 MG/DL (ref 70–99)
INTERPRETATION ECG - MUSE: NORMAL
OPIATES UR QL SCN: ABNORMAL
OXYCODONE UR QL: ABNORMAL
P AXIS - MUSE: 63 DEGREES
PCP UR QL SCN: ABNORMAL
PR INTERVAL - MUSE: 138 MS
QRS DURATION - MUSE: 70 MS
QT - MUSE: 342 MS
QTC - MUSE: 465 MS
R AXIS - MUSE: -20 DEGREES
SYSTOLIC BLOOD PRESSURE - MUSE: 136 MMHG
T AXIS - MUSE: 68 DEGREES
TROPONIN I SERPL-MCNC: 0.01 NG/ML (ref 0–0.29)
TROPONIN I SERPL-MCNC: <0.01 NG/ML (ref 0–0.29)
TSH SERPL DL<=0.005 MIU/L-ACNC: 1.41 UIU/ML (ref 0.3–5)
VENTRICULAR RATE- MUSE: 111 BPM

## 2022-08-13 PROCEDURE — 258N000003 HC RX IP 258 OP 636

## 2022-08-13 PROCEDURE — 36415 COLL VENOUS BLD VENIPUNCTURE: CPT | Mod: 91

## 2022-08-13 PROCEDURE — 84443 ASSAY THYROID STIM HORMONE: CPT | Performed by: STUDENT IN AN ORGANIZED HEALTH CARE EDUCATION/TRAINING PROGRAM

## 2022-08-13 PROCEDURE — 84484 ASSAY OF TROPONIN QUANT: CPT

## 2022-08-13 PROCEDURE — 96360 HYDRATION IV INFUSION INIT: CPT | Mod: 59

## 2022-08-13 PROCEDURE — 250N000013 HC RX MED GY IP 250 OP 250 PS 637

## 2022-08-13 PROCEDURE — 96361 HYDRATE IV INFUSION ADD-ON: CPT

## 2022-08-13 RX ORDER — LIDOCAINE 40 MG/G
CREAM TOPICAL
Status: DISCONTINUED | OUTPATIENT
Start: 2022-08-13 | End: 2022-08-13 | Stop reason: HOSPADM

## 2022-08-13 RX ORDER — ONDANSETRON 2 MG/ML
4 INJECTION INTRAMUSCULAR; INTRAVENOUS EVERY 6 HOURS PRN
Status: DISCONTINUED | OUTPATIENT
Start: 2022-08-13 | End: 2022-08-13 | Stop reason: HOSPADM

## 2022-08-13 RX ORDER — ONDANSETRON 4 MG/1
4 TABLET, ORALLY DISINTEGRATING ORAL EVERY 6 HOURS PRN
Status: DISCONTINUED | OUTPATIENT
Start: 2022-08-13 | End: 2022-08-13 | Stop reason: HOSPADM

## 2022-08-13 RX ORDER — PROCHLORPERAZINE 25 MG
25 SUPPOSITORY, RECTAL RECTAL EVERY 12 HOURS PRN
Status: DISCONTINUED | OUTPATIENT
Start: 2022-08-13 | End: 2022-08-13 | Stop reason: HOSPADM

## 2022-08-13 RX ORDER — SODIUM CHLORIDE, SODIUM LACTATE, POTASSIUM CHLORIDE, CALCIUM CHLORIDE 600; 310; 30; 20 MG/100ML; MG/100ML; MG/100ML; MG/100ML
INJECTION, SOLUTION INTRAVENOUS CONTINUOUS
Status: DISCONTINUED | OUTPATIENT
Start: 2022-08-13 | End: 2022-08-13 | Stop reason: HOSPADM

## 2022-08-13 RX ORDER — OXYCODONE HYDROCHLORIDE 5 MG/1
10 TABLET ORAL ONCE
Status: COMPLETED | OUTPATIENT
Start: 2022-08-13 | End: 2022-08-13

## 2022-08-13 RX ORDER — PROCHLORPERAZINE MALEATE 10 MG
10 TABLET ORAL EVERY 6 HOURS PRN
Status: DISCONTINUED | OUTPATIENT
Start: 2022-08-13 | End: 2022-08-13 | Stop reason: HOSPADM

## 2022-08-13 RX ADMIN — SODIUM CHLORIDE, POTASSIUM CHLORIDE, SODIUM LACTATE AND CALCIUM CHLORIDE: 600; 310; 30; 20 INJECTION, SOLUTION INTRAVENOUS at 04:57

## 2022-08-13 RX ADMIN — OXYCODONE HYDROCHLORIDE 10 MG: 5 TABLET ORAL at 04:56

## 2022-08-13 ASSESSMENT — ACTIVITIES OF DAILY LIVING (ADL)
ADLS_ACUITY_SCORE: 37

## 2022-08-13 NOTE — ED NOTES
Unable to draw from RPIV or straight draw. Requested assistance with ultrasound to place new PIV and draw trop.

## 2022-08-13 NOTE — H&P
[facility]    History and Physical - Hospitalist Service       Date of Admission:  8/12/2022    Assessment & Plan      Ruthy Álvarez is a 56 year old female with PMH of Type II DM, HTN, HLD, chronic pain (on opioids), morbid obesity, hx of daksha-en-y gastric bypass, moderate persistent asthma   admitted on 8/12/2022 chest pain found to have chronic metabolic acidosis.         Acute chest pain  R/o CAD   Pt admitted via ambulance reporting chest pain while driving yesterday, endorses heaviness in the chest with pain, but no sob, no lightheadedness, no palpitation. Chest pain improved, vitally stable, heart rate normal, 98, EKG show sinus rhythm. No ST changes. Normal trop, CT PE negative.   Pt has extensive hx of atypical chest pain, and given multiple risk factors for CAD ( HTN, DM2, HLD, obesity) she had EKG and multiple stress tests over the years that were negative for reproducible MI. Echo in 2019 : showed normal LV and  RV, EF > 55%, no regional wall abnormalities.   - cardiac telemetry continue monitoring  -  Aspirin 325 mg.  - trend troponin q 4 hrs.     Chronic metabolic acidosis  CKD type2   Diabetic nephropathy  Metabolic acidosis, normal CO2, low bicarb, normal anion gap, no ketones, hyperchloremia, glucose 300 prior to admission down 72 in ED.   Pt has hx of persistently low bicarb since 2017 per notes( nephro 2021). She was started on sodium bicarb 650mg TID by PCP in July 2021, pt reports she wasn't taking it recently.   Metabolic acidosis, normal anion gap, debby CO2, no ketones, hx of acidoses ( chronic). Given chronic hx and being asymptomatic differential diagnosis include euglycemic DKA associated with SGLT inhib, D- lactic acidosis ( given bypass surgery) and lactic acidosis.   -follow serum creat is 1.1 baseline 0.8  -Bolus 1 L IVF NS.   -maintanence  ml/hr.   -consider restarting PTA sodium bicarbonate, no sure when pt stopped.  -consider nephrology  "consult in the AM.        Type 2 DM-HbA1c 6.9 8/4/22  Pt had hx of poorly controlled DM, had better control s/p gastric bypass, latest HbA1c 6.9 shows adequate control. was on home medication oral DM meds plus lantus 25 units. Blood glucose was 209 on admission dropped to 70s.    - hold PTA oral DM medication including, Dulaglutide ( trulicity), Empagliflozine ( jardiance).  -Hold insulin for now given hypoglycemia.  -Consider restarting insulin in the AM.      Hx of chronic pain  Pt has hx of chronic pain, mostly chronic leg pain, pt was on chronic opioid on percocet  Q 6hr daily.       Hypoalbuminemia   Low serum protein  Could likely be due to malnutrition secondary to hx of bypass surgery and poor nurtition diet.         Chronic issues; awaiting med rec, pt unable to tell us what she takes.   HTN- lisinopril   Mood disorder-on amitrypline, risperidone and duloxetine   Anemia in CKD -monthly vit b12 and daily iron supplement.   GERD- continue famotidine.         Diet:  low carb diabetic diet  DVT Prophylaxis: Pneumatic Compression Devices  [place] Catheter: Not present  Fluids:  ML/HR  Central Lines: None  Cardiac Monitoring: None  Code Status:  full code.    Clinically Significant Risk Factors Present on Admission   Expected to discharge home after further management and ruling out cardiac chest pain.          # Hypoalbuminemia: Albumin = 2.8 g/dL (Ref range: 3.5 - 5.0 g/dL) on admission, will monitor as appropriate   # Coagulation Defect: home medication list includes an anticoagulant medication   # Hypertension: home medication list includes antihypertensive(s)    # DMII: A1C = N/A within past 3 months  # Obesity: Estimated body mass index is 37.53 kg/m  as calculated from the following:    Height as of this encounter: 1.511 m (4' 11.5\").    Weight as of this encounter: 85.7 kg (189 lb).        Disposition Plan      Most Recent 3 CBC's:Recent Labs   Lab Test 08/12/22  1813 08/05/22  1843 " 09/22/21  1434   WBC 10.9 11.9* 12.2*   HGB 9.4* 10.2* 11.0*   * 100 102*    322 332     Most Recent 3 BMP's:Recent Labs   Lab Test 08/13/22  0014 08/12/22  2221 08/12/22  1813 08/05/22  1843 08/04/22  1502   NA  --   --  140 141 143   POTASSIUM  --   --  4.6 4.5 5.0   CHLORIDE  --   --  114* 114* 115*   CO2  --   --  18* 21* 8*   BUN  --   --  29* 24* 25.8*   CR  --   --  1.10 0.87 0.84   ANIONGAP  --   --  8 6 20*   MIKAELA  --   --  7.9* 8.7 8.9   GLC 73 84 209* 118 216*     Most Recent 3 Creatinines:Recent Labs   Lab Test 08/12/22 1813 08/05/22  1843 08/04/22  1502   CR 1.10 0.87 0.84        The patient's care was discussed with the Attending Physician, Dr. Nunn.    Kimber Al MD  Hospitalist Service  [facility]  Securely message with the [employer] Web Console (learn more here)  Text page via [employer] Paging/Directory       ______________________________________________________________________    Chief Complaint   Chest pain and generalized weakness      History of Present Illness   Ruthy Álvarez is a 56 year old female who has PMH of Type II DM, HTN, HLD, chronic pain (on opioids), morbid obesity, hx of daksha-en-y gastric bypass, moderate persistent asthma. She presented to ED with chest pain( heaviness) in the center of her chest.  that started this afternoon while driving her car, she denies lightheadedness, sob, palpitation, diaphoresis. She also felt generally weak and had pain in her legs and arms. Otherwise no fever, no N/V no diarrhea or abdominal pain.       Review of Systems    The 10 point Review of Systems is negative other than noted in the HPI or here.    Past Medical History    I have reviewed this patient's medical history and updated it with pertinent information if needed.   Past Medical History:   Diagnosis Date     Abnormal cervical Papanicolaou smear with positive human papillomavirus deoxyribonucleic acid test      Ankle joint pain     bilateral      Anxiety      Arthritis      Arthritis     rheumatoid arthritis     Asthma      Carpal tunnel syndrome     bilateral     Compression neuropathy      Depression      Depressive disorder      Diabetes (H)      Diabetes (H)     type II     Diabetic nephropathy (H)      DVT of lower extremity, bilateral (H) 2011    right     Esophageal reflux      Gastroenteritis      History of blood clots      History of blood transfusion      Hyperlipidemia      Hypertension      Hypertension      Infectious fasciitis 2011    abdominal wall      Insomnia      Neck pain      Necrotizing fasciitis (H) 11/15/2012     Necrotizing fasciitis (H)      Neuropathy      Obesity      Osteoarthritis of both knees 10/31/2016     Pulmonary embolism and infarction (H)      SOB (shortness of breath)     with exertion     Tarsal tunnel syndrome      Uncomplicated asthma      Urinary incontinence         Past Surgical History   I have reviewed this patient's surgical history and updated it with pertinent information if needed.  Past Surgical History:   Procedure Laterality Date     ABDOMINAL WALL SURGERY      for necrotizing fasciitis      SECTION       COLONOSCOPY N/A 3/15/2017    Procedure: INCOMPLETE COLONOSCOPY;  Surgeon: Topher Street MD;  Location: [facility];  Service:      COLONOSCOPY N/A 3/16/2017    Procedure: COLONOSCOPY;  Surgeon: Topher Street MD;  Location: [facility];  Service:      GASTRIC BYPASS  2006    Kim'n'Y.  wt then was in low 300lb range     GASTRIC BYPASS  2006    Kim-en-Y     HC REVISE MEDIAN N/CARPAL TUNNEL SURG Right 10/24/2014    Procedure: RIGHT REVISION CARPAL TUNNEL RELEASE WITH HYPOTHENAR FAT PAD;  Surgeon: Aaron Oreilly MD;  Location: [facility];  Service: Orthopedics     HYSTERECTOMY       HYSTERECTOMY       KNEE SURGERY Right      MIDLINE INSERTION - DOUBLE LUMEN  2021          UTERINE FIBROID SURGERY  2008    hysterectomy     UTERINE FIBROID SURGERY          Social  History   I have reviewed this patient's social history and updated it with pertinent information if needed. Ruthy Álvarez  reports that she has never smoked. She has never used smokeless tobacco. She reports that she does not drink alcohol and does not use drugs.    Family History   I have reviewed this patient's family history and updated it with pertinent information if needed.  Family History   Problem Relation Age of Onset     Diabetes Mother      Coronary Artery Disease Mother          of MI at age of 51 years     Diabetes Brother      Diabetes Sister      No Known Problems Father      No Known Problems Maternal Grandmother      No Known Problems Maternal Grandfather      No Known Problems Paternal Grandmother      No Known Problems Paternal Grandfather      No Known Problems Son      No Known Problems Daughter      No Known Problems Maternal Half-Brother      No Known Problems Maternal Half-Sister      No Known Problems Paternal Half-Brother      No Known Problems Paternal Half-Sister      No Known Problems Niece      No Known Problems Nephew      No Known Problems Cousin      No Known Problems Other      Cancer Brother      Heart Disease No family hx of      Hypertension No family hx of      Hyperlipidemia No family hx of      Kidney Disease No family hx of      Cerebrovascular Disease No family hx of      Obesity No family hx of      Thrombosis No family hx of      Asthma No family hx of      Arthritis No family hx of      Thyroid Disease No family hx of      Depression No family hx of      Mental Illness No family hx of      Substance Abuse No family hx of      Cystic Fibrosis No family hx of      Early Death No family hx of      Coronary Artery Disease Early Onset No family hx of      Heart Failure No family hx of      Bleeding Diathesis No family hx of      Dementia No family hx of      Breast Cancer No family hx of      Ovarian Cancer No family hx of      Uterine Cancer No family hx of       Prostate Cancer No family hx of      Colorectal Cancer No family hx of      Pancreatic Cancer No family hx of      Lung Cancer No family hx of      Melanoma No family hx of      Autoimmune Disease No family hx of      Unknown/Adopted No family hx of      Genetic Disorder No family hx of        Prior to Admission Medications   Prior to Admission Medications   Prescriptions Last Dose Informant Patient Reported? Taking?   Alcohol Swabs (PRO COMFORT ALCOHOL) 70 % PADS   No No   Sig: USE TO TEST BLOOD GLUCOSE FOUR TIMES DAILY   CONTOUR NEXT TEST test strip   No No   Sig: USE TO TEST BLOOD GLUCOSE FOUR TIMES DAILY   Continuous Blood Gluc Sensor (FREESTYLE ANIKET 14 DAY SENSOR) [facility]   No No   Si each every 14 days Change every 14 days.   Continuous Blood Gluc Sensor (FREESTYLE ANIKET 14 DAY SENSOR) [facility]   No No   Si each every 14 days Use 1 Sensor every 14 days. Use to read blood sugars per 's instructions.   DULoxetine (CYMBALTA) 60 MG capsule   No No   Sig: Take 1 capsule (60 mg) by mouth daily   JARDIANCE 10 MG TABS tablet   No No   Sig: TAKE 1 TABLET(10 MG) BY MOUTH DAILY   LANTUS SOLOSTAR 100 UNIT/ML soln   No No   Sig: INJECT 25 UNITS UNDER THE SKIN DAILY   Lancets 30G MISC   No No   Sig: USE TO TEST BLOOD GLUCOSE FOUR TIMES DAILY   SUMAtriptan (IMITREX) 25 MG tablet   No No   Sig: One tab at onset of HA.  May repeat dose in 2 hours.  Do not exceed 200 mg in 24 hours   Simethicone 180 MG CAPS   No No   Sig: Use 1 softgel as needed for gas   acetaminophen (TYLENOL) 500 MG tablet   No No   Sig: Take 1 tablet (500 mg) by mouth 2 times daily as needed for mild pain   amitriptyline (ELAVIL) 10 MG tablet   No No   Sig: TAKE 1 TABLET(10 MG) BY MOUTH AT BEDTIME   atorvastatin (LIPITOR) 10 MG tablet   No No   Sig: TAKE 1 TABLET BY MOUTH EVERY DAY   blood glucose (NO BRAND SPECIFIED) lancets standard   No No   Sig: Check fasting glucose 4 times daily.  For Accu check monitor   budesonide-formoterol  (SYMBICORT) 80-4.5 MCG/ACT Inhaler   No No   Sig: Inhale 2 puffs once daily plus 1-2 puffs as needed. May use up to 12 puffs per day.   capsaicin (ZOSTRIX) 0.025 % external cream   No No   Sig: Apply 3-4 times daily to knees   cyanocobalamin (CYANOCOBALAMIN) 1000 MCG/ML injection   No No   Sig: Inject 1 mL (1,000 mcg) into the muscle every 30 days   cyclobenzaprine (FLEXERIL) 10 MG tablet   No No   Sig: Take 0.5 to 1 tablet every 8 hours as needed.   diclofenac (VOLTAREN) 1 % topical gel   No No   Sig: Apply 4 g topically 3 times daily To top of both shoulders in area of pain   diltiazem ER (DILT-XR) 120 MG 24 hr capsule   No No   Sig: TAKE 1 CAPSULE(120 MG) BY MOUTH AT BEDTIME   dulaglutide (TRULICITY) 1.5 MG/0.5ML pen   No No   Sig: Inject 1.5 mg Subcutaneous every 7 days   famotidine (PEPCID) 20 MG tablet   No No   Sig: Take 1 tablet (20 mg) by mouth 2 times daily   ferrous sulfate (IRON) 325 (65 FE) MG tablet   Yes No   Sig: Take 1 tablet (325 mg) by mouth daily (with breakfast)   gabapentin (NEURONTIN) 250 MG/5ML solution   No No   Sig: TAKE 15 ML(750 MG) BY MOUTH THREE TIMES DAILY   insulin pen needle (BD ULTRA-FINE) 29G X 12.7MM miscellaneous   No No   Sig: Use 1 time daily   latanoprost (XALATAN) 0.005 % ophthalmic solution   Yes No   Sig: INSTILL 1 DROP IN BOTH EYES DAILY AT BEDTIME   lidocaine (LMX4) 4 % external cream   No No   Sig: Apply topically once as needed for mild pain (left knee)   lisinopril (ZESTRIL) 40 MG tablet   No No   Sig: Take 1 tablet (40 mg) by mouth At Bedtime   naloxone (NARCAN) 4 MG/0.1ML nasal spray   No No   Sig: Spray 1 spray (4 mg) into one nostril alternating nostrils once as needed for opioid reversal Every 2-3 minutes until patient responsive or EMS arrives   ondansetron (ZOFRAN ODT) 4 MG ODT tab   Yes No   Sig: Take 4 mg by mouth as needed   order for DME   No No   Sig: Equipment being ordered: wheeled walker with seat   oxyCODONE-acetaminophen (PERCOCET)  MG per tablet    No No   Sig: Take 2 tablets by mouth At Bedtime. May also take 1 tablet every 6 hours as needed for severe pain.   phentermine (ADIPEX-P) 37.5 MG tablet   No No   Sig: Take 0.5-1 tablets (18.75-37.5 mg) by mouth every morning   risperiDONE (RISPERDAL) 0.25 MG tablet   Yes No   Sig: Take 1 tablet (0.25 mg) by mouth At Bedtime   rivaroxaban ANTICOAGULANT (XARELTO) 10 MG TABS tablet   No No   Sig: Take 1 tablet (10 mg) by mouth daily (with dinner)   senna-docusate (SENOKOT-S/PERICOLACE) 8.6-50 MG tablet   No No   Sig: Take 1 tablet by mouth 2 times daily   sodium citrate-citric acid (BICITRA) 500-334 MG/5ML solution   No No   Sig: Take 10 mLs by mouth 3 times daily (with meals)   tolterodine ER (DETROL LA) 2 MG 24 hr capsule   No No   Sig: Take 1 capsule (2 mg) by mouth daily   vitamin D3 (CHOLECALCIFEROL) 1.25 MG (82320 UT) capsule   No No   Sig: Take 1 capsule (50,000 Units) by mouth three times a week      Facility-Administered Medications Last Administration Doses Remaining   cyanocobalamin injection 1,000 mcg 8/4/2022  3:18 PM         Allergies   Allergies   Allergen Reactions     Sulfamethoxazole-Trimethoprim Other (See Comments) and Nephrotoxicity     Other reaction(s): Renal Failure  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, 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, 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, 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, 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  Other reaction(s): Renal Failure  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, 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, 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       Physical Exam   Vital Signs: Temp: 97.9  F (36.6  C) Temp src: Oral BP: 139/74 Pulse: 98   Resp: 21 SpO2: 99 %      Weight: 189 lbs 0 oz    Constitutional: very sleepy, during short period she is awake she is alert and oriented,no apparent distress.  HEENT: Normocephalic, without obvious abnormality, atraumatic, sclera clear, conjunctiva normal, normal  oropharynx/  NECK: supple, no cervical lymphadenopathy.   Respiratory: No increased work of breathing, good air exchange, clear to auscultation bilaterally, no crackles or wheezing  Cardiovascular:  regular rate and rhythm, normal S1 and S2, no murmur noted.  GI:  Midline scar, normal bowel sounds, soft, non-distended, non-tender, no masses palpated, no hepatosplenomegaly.  Skin: no redness, warmth, or swelling and no rashes  Musculoskeletal: lower extremity pitting edema +1 present  there is no redness, warmth, or swelling of the joints  Neurologic: drowsy, alert, oriented to name, place and time.  time.Neuropsychiatric: General: normal  Level of consciousness: drowsy  Affect: flat  Orientation: oriented to self, place, time and situation    Data   Data reviewed today: I reviewed all medications, new labs and imaging results over the last 24 hours. I personally reviewed the EKG tracing showing normal sinus rhythm, no ST changes noted. CT PE negative. .    Recent Labs   Lab 08/13/22 0014 08/12/22 2221 08/12/22 1813   WBC  --   --  10.9   HGB  --   --  9.4*   MCV  --   --  104*   PLT  --   --  284   NA  --   --  140   POTASSIUM  --   --  4.6   CHLORIDE  --   --  114*   CO2  --   --  18*   BUN  --   --  29*   CR  --   --  1.10   ANIONGAP  --   --  8   MIKAELA  --   --  7.9*   GLC 73 84 209*   ALBUMIN  --   --  2.8*   PROTTOTAL  --   --  5.8*   BILITOTAL  --   --  0.3   ALKPHOS  --   --  186*   ALT  --   --  17   AST  --   --  19   LIPASE  --   --  35     Most Recent 3 CBC's:Recent Labs   Lab Test 08/12/22 1813 08/05/22 1843 09/22/21  1434   WBC 10.9 11.9* 12.2*   HGB 9.4* 10.2* 11.0*   * 100 102*    322 332     Most Recent 3 BMP's:Recent Labs   Lab Test 08/13/22  0014 08/12/22 2221 08/12/22 1813 08/05/22 1843 08/04/22  1502   NA  --   --  140 141 143   POTASSIUM  --   --  4.6 4.5 5.0   CHLORIDE  --   --  114* 114* 115*   CO2  --   --  18* 21* 8*   BUN  --   --  29* 24* 25.8*   CR  --   --  1.10 0.87  0.84   ANIONGAP  --   --  8 6 20*   MIKAELA  --   --  7.9* 8.7 8.9   GLC 73 84 209* 118 216*     Most Recent 3 Creatinines:Recent Labs   Lab Test 08/12/22  1813 08/05/22  1843 08/04/22  1502   CR 1.10 0.87 0.84     10.9    \    9.4 (L)    /    284   N N/A    L N/A    140    114 (H)    29 (H) /   ------------------------------------ 73   ALT 17   AST 19    (H)   ALB 2.8 (L)   Ca 7.9 (L)  4.6    18 (L)    1.10 \    % RETIC N/A    LDH N/A  Troponin N/A    BNP N/A    CK N/A  INR N/A   PTT N/A    D-dimer N/A    Fibrinogen N/A    Antithrombin N/A  Ferritin N/A  CRP N/A    IL-6 N/A  Recent Results (from the past 24 hour(s))   CT Chest Pulmonary Embolism w Contrast    Narrative    EXAM: CT CHEST PULMONARY EMBOLISM W CONTRAST  LOCATION: [facility]  DATE/TIME: 8/12/2022 8:33 PM    INDICATION: Chest pain, history of PE.  COMPARISON: No previous CTs of the chest.  TECHNIQUE: CT chest pulmonary angiogram during arterial phase injection of IV contrast. Multiplanar reformats and MIP reconstructions were performed. Dose reduction techniques were used.   CONTRAST: Isovue 370 100 mL    FINDINGS:  ANGIOGRAM CHEST: Pulmonary arteries are normal caliber and negative for pulmonary emboli. Thoracic aorta is negative for dissection. No CT evidence of right heart strain.    LUNGS AND PLEURA: There is some mild atelectasis seen in both lungs with no central airway obstruction. There is some mild mosaic perfusion seen in both lungs which is nonspecific, most typical for air trapping secondary to small airway inflammation.    MEDIASTINUM/AXILLAE: Normal.    CORONARY ARTERY CALCIFICATION: None.    UPPER ABDOMEN:  The gallbladder is surgically absent.    MUSCULOSKELETAL: Mild multilevel degenerative changes of the spine.      Impression    IMPRESSION:  1.  No PE, dissection or aneurysm.    2.  Mild atelectasis seen in both lungs with no central airway obstruction.    3.  Mild mosaic perfusion seen in both lungs  which is nonspecific, most typical for air trapping secondary to small airway inflammation.

## 2022-08-13 NOTE — PHARMACY-ADMISSION MEDICATION HISTORY
Pharmacy Note - Admission Medication History    Pertinent Provider Information: she has multiple office visits, phone calls, and labs so I left her medication regimen alone but the pharmacy dispensing data does not support many medications. See below for examples.     ______________________________________________________________________    Prior To Admission (PTA) med list completed and updated in EMR.       Current Facility-Administered Medications for the 8/12/22 encounter (Hospital Encounter)   Medication     cyanocobalamin injection 1,000 mcg     PTA Med List   Medication Sig Last Dose     acetaminophen (TYLENOL) 500 MG tablet Take 1 tablet (500 mg) by mouth 2 times daily as needed for mild pain      amitriptyline (ELAVIL) 10 MG tablet TAKE 1 TABLET(10 MG) BY MOUTH AT BEDTIME 8/12/2022 at hs     atorvastatin (LIPITOR) 10 MG tablet TAKE 1 TABLET BY MOUTH EVERY DAY 8/12/2022 at hs     budesonide-formoterol (SYMBICORT) 80-4.5 MCG/ACT Inhaler Inhale 2 puffs once daily plus 1-2 puffs as needed. May use up to 12 puffs per day. 8/12/2022 at pm     capsaicin (ZOSTRIX) 0.025 % external cream Apply 3-4 times daily to knees 8/12/2022 at pm     cyanocobalamin (CYANOCOBALAMIN) 1000 MCG/ML injection Inject 1 mL (1,000 mcg) into the muscle every 30 days 8/4/2022     cyclobenzaprine (FLEXERIL) 10 MG tablet Take 0.5 to 1 tablet every 8 hours as needed.      diclofenac (VOLTAREN) 1 % topical gel Apply 4 g topically 3 times daily To top of both shoulders in area of pain 8/12/2022 at pm     diltiazem ER (DILT-XR) 120 MG 24 hr capsule TAKE 1 CAPSULE(120 MG) BY MOUTH AT BEDTIME 8/12/2022 at hs     dulaglutide (TRULICITY) 1.5 MG/0.5ML pen Inject 1.5 mg Subcutaneous every 7 days 8/8/2022     DULoxetine (CYMBALTA) 60 MG capsule Take 1 capsule (60 mg) by mouth daily 8/12/2022 at am     famotidine (PEPCID) 20 MG tablet Take 1 tablet (20 mg) by mouth 2 times daily 8/12/2022 at pm     ferrous sulfate (IRON) 325 (65 FE) MG tablet Take 1  tablet (325 mg) by mouth daily (with breakfast) 8/12/2022 at am     gabapentin (NEURONTIN) 250 MG/5ML solution TAKE 15 ML(750 MG) BY MOUTH THREE TIMES DAILY 8/12/2022 at pm     JARDIANCE 10 MG TABS tablet TAKE 1 TABLET(10 MG) BY MOUTH DAILY 8/12/2022 at am     LANTUS SOLOSTAR 100 UNIT/ML soln INJECT 25 UNITS UNDER THE SKIN DAILY (Patient taking differently: Inject 36 Units Subcutaneous At Bedtime) 8/12/2022 at hs     latanoprost (XALATAN) 0.005 % ophthalmic solution INSTILL 1 DROP IN BOTH EYES DAILY AT BEDTIME 8/12/2022 at hs     lidocaine (LMX4) 4 % external cream Apply topically once as needed for mild pain (left knee)      lisinopril (ZESTRIL) 40 MG tablet Take 1 tablet (40 mg) by mouth At Bedtime 8/12/2022 at hs     naloxone (NARCAN) 4 MG/0.1ML nasal spray Spray 1 spray (4 mg) into one nostril alternating nostrils once as needed for opioid reversal Every 2-3 minutes until patient responsive or EMS arrives      ondansetron (ZOFRAN ODT) 4 MG ODT tab Take 4 mg by mouth as needed      oxyCODONE-acetaminophen (PERCOCET)  MG per tablet Take 2 tablets by mouth At Bedtime. May also take 1 tablet every 6 hours as needed for severe pain. 8/12/2022 at hs     phentermine (ADIPEX-P) 37.5 MG tablet Take 0.5-1 tablets (18.75-37.5 mg) by mouth every morning 8/12/2022 at am     risperiDONE (RISPERDAL) 0.25 MG tablet Take 1 tablet (0.25 mg) by mouth At Bedtime 8/12/2022 at hs     senna-docusate (SENOKOT-S/PERICOLACE) 8.6-50 MG tablet Take 1 tablet by mouth 2 times daily 8/12/2022 at hs     Simethicone 180 MG CAPS Use 1 softgel as needed for gas      sodium citrate-citric acid (BICITRA) 500-334 MG/5ML solution Take 10 mLs by mouth 3 times daily (with meals) 8/12/2022 at pm     SUMAtriptan (IMITREX) 25 MG tablet One tab at onset of HA.  May repeat dose in 2 hours.  Do not exceed 200 mg in 24 hours      tolterodine ER (DETROL LA) 2 MG 24 hr capsule Take 1 capsule (2 mg) by mouth daily 8/12/2022 at am     vitamin D3  (CHOLECALCIFEROL) 1.25 MG (02441 UT) capsule Take 1 capsule (50,000 Units) by mouth three times a week 8/12/2022 at am       Information source(s): Patient, Clinic records and [facility]  Method of interview communication: in-person    Summary of Changes to PTA Med List  New: Bicitra, simethicone   Discontinued: nothing   Changed: Lantus dose    No refill history for more than one year: amitriptyline, duloxetine, famotidine, ferrous sulfate, risperidone, tolterodine, sumatriptan    Not refilled for > 6 months: Jardiance, Lantus, latanoprost, lisinopril,     Patient was asked about OTC/herbal products specifically.  PTA med list reflects this.    In the past week, patient estimated taking medication this percent of the time:  50-90% due to running out.    Allergies were reviewed, assessed, and updated with the patient.      Patient did not bring any medications to the hospital and can't retrieve from home. No multi-dose medications are available for use during hospital stay.     The information provided in this note is only as accurate as the sources available at the time of the update(s).    Thank you for the opportunity to participate in the care of this patient.    Eric Hedrick [facility]  8/13/2022 8:02 AM

## 2022-08-13 NOTE — ED NOTES
"She has decided she is leaving. She is upset no provider has seen her yet today and that her \"26 medications I take each day\" are not ready for her. She says \"I can do this shit at home.\" And \"I am tired of the service around here.\"  "

## 2022-08-13 NOTE — DISCHARGE SUMMARY
[facility]  Discharge Summary - Medicine & Pediatrics       Date of Admission:  8/12/2022  Date of Discharge:  8/13/2022 11:32 AM  Discharging Provider: Pankaj Verdin DO  Discharge Service: Hospitalist Service    Discharge Diagnoses   Chest pain rule out  Metabolic acidosis, chronic  Encephalopathy, likely due to polypharmacy    Follow-ups Needed After Discharge   Follow-up Appointments     Follow-up and recommended labs and tests       Follow up with primary care provider, Jose Angel Meyer, within 7 days   for hospital follow- up.  The following labs/tests are recommended: BMP.               Unresulted Labs Ordered in the Past 30 Days of this Admission     No orders found for last 31 day(s).      These results will be followed up by n/a    Discharge Disposition   Discharged to home  Condition at discharge: Stable    Hospital Course   Ruthy Álvarez is a 56 year old female with PMH of Type II DM, HTN, HLD, chronic pain (on opioids), morbid obesity, hx of daksha-en-y gastric bypass, moderate persistent asthma, admitted on 8/12/2022 for chest pain rule out,  found to have chronic metabolic acidosis. Pt eloped on hosp day #1 prior to being seen by attending physician.     Acute chest pain  R/o CAD   Pt admitted via ambulance reporting chest pain while driving yesterday, endorses heaviness in the chest with pain, but no sob, no lightheadedness, no palpitation. Chest pain improved, vitally stable, heart rate normal, 98, EKG show sinus rhythm. No ST changes. Normal trop, CT PE negative.   Pt has extensive hx of atypical chest pain, and given multiple risk factors for CAD ( HTN, DM2, HLD, obesity) she had EKG and multiple stress tests over the years that were negative for reproducible MI. Echo in 2019 : showed normal LV and  RV, EF > 55%, no regional wall abnormalities.   -  Given Aspirin 325 mg.  - troponin negative x3    Encephalopathy- metabolic, secondary to polypharmacy- reviewed  Drowsy  on admission difficult to get story from her. Pt taking a number of mood medications and some pain meds. Seemed to have resolved difficult to say whether this is because not receiving morning meds or not. UDS negative, alcohol level <10. Labs overall reassuring.   -In light of patient eloping, did not discuss titration of particular medications that could have contributed (risperidone, amitriptyline, gabapentin, lantus, percocet), though these were to be initially held. Anticipate that she will resume these.   -Recommend outpatient med review.        Chronic metabolic acidosis  CKD type2   Diabetic nephropathy  Metabolic acidosis, normal CO2, low bicarb, normal anion gap, no ketones, hyperchloremia, glucose 300 prior to admission down 72 in ED. ABG showing ph 7.28/pCO2 48/bicarb 20  Pt has hx of persistently low bicarb since 2017 per notes( nephro 2021). She was started on sodium bicarb 650mg TID by PCP in July 2021, pt reports she wasn't taking it recently.  Given chronic hx and being asymptomatic differential diagnosis include euglycemic DKA associated with SGLT inhib, D- lactic acidosis ( given bypass surgery) and lactic acidosis. Possible that this Is her baseline due to her chronic acidosis.  -s/p Bolus 1 L IVF NS and maintenance LF 100ml/hr  -Restart sodium bicarbonate, no sure when pt stopped.  -Recheck BMP at follow up     Type 2 DM-HbA1c 6.9 8/4/22  Pt had hx of poorly controlled DM, had better control s/p gastric bypass, latest HbA1c 6.9 shows adequate control. was on home medication oral DM meds plus lantus 25 units. Blood glucose was 209 on admission dropped to 70s. Given that patient eloped prior to repeat labs, unsure what repeat glucose was.   - Continue PTA, Dulaglutide, Empagliflozin  -Continue PTA insulin  - Further management with primary        Hx of chronic pain  Pt has hx of chronic pain, mostly chronic leg pain  - percocet  Q 6hr daily.         Hypoalbuminemia   Low serum protein  Could  likely be due to malnutrition secondary to hx of bypass surgery and poor nurtition diet.            Chronic issues;   HTN- lisinopril   Mood disorder-on amitrypline, risperidone and duloxetine   Anemia in CKD -monthly vit b12 and daily iron supplement.   GERD- continue famotidine.    Consultations This Hospital Stay   None    Code Status   Full Code       The patient was discussed with MD Pankaj Bradford, DO [place] Service [facility] EMERGENCY DEPARTMENT  [address] [address]  Phone: [phone]  ______________________________________________________________________    Physical Exam   Vital Signs: Temp: 97.9  F (36.6  C) Temp src: Oral BP: 139/74 Pulse: 87   Resp: 17 SpO2: 96 %      Weight: 189 lbs 0 oz  Constitutional: awake, alert, irritated.  HEENT: Normocephalic, without obvious abnormality, atraumatic, sclera clear, conjunctiva normal, normal oropharynx/  NECK: supple, no cervical lymphadenopathy.   Respiratory: No increased work of breathing, good air exchange, no crackles or wheezing  Cardiovascular:  regular rate and rhythm, normal S1 and S2, no murmur noted..  Skin: no redness or swelling and no rashes  Musculoskeletal: lower extremity pitting edema +1 present  there is no redness or swelling of the joints  Neurologic: alert, oriented x3  .Neuropsychiatric: General: normal  Level of consciousness: awake  Orientation: oriented to self, place, time and situation      Primary Care Physician   Jose Angel Meyer    Discharge Orders      Reason for your hospital stay    Chest pain rule out     Follow-up and recommended labs and tests     Follow up with primary care provider, Jose Angel Meyer, within 7 days for hospital follow- up.  The following labs/tests are recommended: BMP.     Activity    Your activity upon discharge: activity as tolerated     Diet    Follow this diet upon discharge: Orders Placed This Encounter       Moderate Consistent Carb (60 g CHO per Meal) Diet       Significant Results and Procedures   Most Recent 3 CBC's:Recent Labs   Lab Test 08/12/22  1813 08/05/22  1843 09/22/21  1434   WBC 10.9 11.9* 12.2*   HGB 9.4* 10.2* 11.0*   * 100 102*    322 332     Most Recent 3 BMP's:Recent Labs   Lab Test 08/13/22  0014 08/12/22  2221 08/12/22  1813 08/05/22  1843 08/04/22  1502   NA  --   --  140 141 143   POTASSIUM  --   --  4.6 4.5 5.0   CHLORIDE  --   --  114* 114* 115*   CO2  --   --  18* 21* 8*   BUN  --   --  29* 24* 25.8*   CR  --   --  1.10 0.87 0.84   ANIONGAP  --   --  8 6 20*   MIKAELA  --   --  7.9* 8.7 8.9   GLC 73 84 209* 118 216*   ,   Results for orders placed or performed during the hospital encounter of 08/12/22   CT Chest Pulmonary Embolism w Contrast    Narrative    EXAM: CT CHEST PULMONARY EMBOLISM W CONTRAST  LOCATION: [facility]  DATE/TIME: 8/12/2022 8:33 PM    INDICATION: Chest pain, history of PE.  COMPARISON: No previous CTs of the chest.  TECHNIQUE: CT chest pulmonary angiogram during arterial phase injection of IV contrast. Multiplanar reformats and MIP reconstructions were performed. Dose reduction techniques were used.   CONTRAST: Isovue 370 100 mL    FINDINGS:  ANGIOGRAM CHEST: Pulmonary arteries are normal caliber and negative for pulmonary emboli. Thoracic aorta is negative for dissection. No CT evidence of right heart strain.    LUNGS AND PLEURA: There is some mild atelectasis seen in both lungs with no central airway obstruction. There is some mild mosaic perfusion seen in both lungs which is nonspecific, most typical for air trapping secondary to small airway inflammation.    MEDIASTINUM/AXILLAE: Normal.    CORONARY ARTERY CALCIFICATION: None.    UPPER ABDOMEN:  The gallbladder is surgically absent.    MUSCULOSKELETAL: Mild multilevel degenerative changes of the spine.      Impression    IMPRESSION:  1.  No PE, dissection or aneurysm.    2.  Mild  atelectasis seen in both lungs with no central airway obstruction.    3.  Mild mosaic perfusion seen in both lungs which is nonspecific, most typical for air trapping secondary to small airway inflammation.       Discharge Medications   Current Discharge Medication List      CONTINUE these medications which have NOT CHANGED    Details   acetaminophen (TYLENOL) 500 MG tablet Take 1 tablet (500 mg) by mouth 2 times daily as needed for mild pain  Qty: 30 tablet, Refills: 0    Associated Diagnoses: Acute pain of left shoulder      amitriptyline (ELAVIL) 10 MG tablet TAKE 1 TABLET(10 MG) BY MOUTH AT BEDTIME  Qty: 30 tablet, Refills: 11    Associated Diagnoses: Diabetic polyneuropathy associated with type 2 diabetes mellitus (H)      atorvastatin (LIPITOR) 10 MG tablet TAKE 1 TABLET BY MOUTH EVERY DAY  Qty: 90 tablet, Refills: 3    Associated Diagnoses: Benign essential hypertension      budesonide-formoterol (SYMBICORT) 80-4.5 MCG/ACT Inhaler Inhale 2 puffs once daily plus 1-2 puffs as needed. May use up to 12 puffs per day.  Qty: 20.4 g, Refills: 11    Associated Diagnoses: Moderate persistent asthma without complication      capsaicin (ZOSTRIX) 0.025 % external cream Apply 3-4 times daily to knees  Qty: 118 mL, Refills: 11    Associated Diagnoses: Chronic pain syndrome      cyanocobalamin (CYANOCOBALAMIN) 1000 MCG/ML injection Inject 1 mL (1,000 mcg) into the muscle every 30 days  Qty: 1 mL, Refills: 11    Associated Diagnoses: S/P bariatric surgery; Vitamin B12 deficiency (non anemic)      cyclobenzaprine (FLEXERIL) 10 MG tablet Take 0.5 to 1 tablet every 8 hours as needed.  Qty: 30 tablet, Refills: 0    Associated Diagnoses: Chronic pain syndrome      diclofenac (VOLTAREN) 1 % topical gel Apply 4 g topically 3 times daily To top of both shoulders in area of pain  Qty: 350 g, Refills: 2    Associated Diagnoses: Chronic pain of both shoulders      diltiazem ER (DILT-XR) 120 MG 24 hr capsule TAKE 1 CAPSULE(120 MG) BY  MOUTH AT BEDTIME  Qty: 90 capsule, Refills: 4    Associated Diagnoses: Essential hypertension      dulaglutide (TRULICITY) 1.5 MG/0.5ML pen Inject 1.5 mg Subcutaneous every 7 days  Qty: 2 mL, Refills: 11    Associated Diagnoses: Type 2 diabetes mellitus with diabetic polyneuropathy, with long-term current use of insulin (H)      DULoxetine (CYMBALTA) 60 MG capsule Take 1 capsule (60 mg) by mouth daily  Qty: 30 capsule, Refills: 11    Associated Diagnoses: Chronic pain syndrome      famotidine (PEPCID) 20 MG tablet Take 1 tablet (20 mg) by mouth 2 times daily  Qty: 60 tablet, Refills: 11    Associated Diagnoses: Gastroesophageal reflux disease, esophagitis presence not specified      ferrous sulfate (IRON) 325 (65 FE) MG tablet Take 1 tablet (325 mg) by mouth daily (with breakfast)  Qty: 30 tablet, Refills: 2      gabapentin (NEURONTIN) 250 MG/5ML solution TAKE 15 ML(750 MG) BY MOUTH THREE TIMES DAILY  Qty: 1410 mL, Refills: 11    Associated Diagnoses: Diabetic polyneuropathy associated with type 2 diabetes mellitus (H)      JARDIANCE 10 MG TABS tablet TAKE 1 TABLET(10 MG) BY MOUTH DAILY  Qty: 30 tablet, Refills: 1    Associated Diagnoses: Type 2 diabetes mellitus with diabetic polyneuropathy, with long-term current use of insulin (H)      LANTUS SOLOSTAR 100 UNIT/ML soln INJECT 25 UNITS UNDER THE SKIN DAILY  Qty: 6 mL, Refills: 11    Associated Diagnoses: Type 2 diabetes mellitus with diabetic polyneuropathy, with long-term current use of insulin (H)      latanoprost (XALATAN) 0.005 % ophthalmic solution INSTILL 1 DROP IN BOTH EYES DAILY AT BEDTIME      lidocaine (LMX4) 4 % external cream Apply topically once as needed for mild pain (left knee)  Qty: 45 g, Refills: 2    Associated Diagnoses: Lipoma of skin and subcutaneous tissue      lisinopril (ZESTRIL) 40 MG tablet Take 1 tablet (40 mg) by mouth At Bedtime  Qty: 90 tablet, Refills: 3    Comments: New dose  Associated Diagnoses: Essential hypertension;  Microalbuminuria      naloxone (NARCAN) 4 MG/0.1ML nasal spray Spray 1 spray (4 mg) into one nostril alternating nostrils once as needed for opioid reversal Every 2-3 minutes until patient responsive or EMS arrives  Qty: 0.2 mL, Refills: 0    Associated Diagnoses: Chronic pain syndrome      ondansetron (ZOFRAN ODT) 4 MG ODT tab Take 4 mg by mouth as needed      oxyCODONE-acetaminophen (PERCOCET)  MG per tablet Take 2 tablets by mouth At Bedtime. May also take 1 tablet every 6 hours as needed for severe pain.  Qty: 120 tablet, Refills: 0    Associated Diagnoses: Chronic pain syndrome      phentermine (ADIPEX-P) 37.5 MG tablet Take 0.5-1 tablets (18.75-37.5 mg) by mouth every morning  Qty: 90 tablet, Refills: 1    Associated Diagnoses: Obesity due to excess calories with serious comorbidity, unspecified classification      risperiDONE (RISPERDAL) 0.25 MG tablet Take 1 tablet (0.25 mg) by mouth At Bedtime    Associated Diagnoses: Diabetes mellitus, type 2 (H); Benign essential hypertension; Type II or unspecified type diabetes mellitus without mention of complication, not stated as uncontrolled      senna-docusate (SENOKOT-S/PERICOLACE) 8.6-50 MG tablet Take 1 tablet by mouth 2 times daily  Qty: 60 tablet, Refills: 11    Associated Diagnoses: Chronic pain syndrome      Simethicone 180 MG CAPS Use 1 softgel as needed for gas  Qty: 60 capsule, Refills: 11    Associated Diagnoses: Flatulence, eructation and gas pain      sodium citrate-citric acid (BICITRA) 500-334 MG/5ML solution Take 10 mLs by mouth 3 times daily (with meals)  Qty: 473 mL, Refills: 11    Associated Diagnoses: Metabolic acidosis      SUMAtriptan (IMITREX) 25 MG tablet One tab at onset of HA.  May repeat dose in 2 hours.  Do not exceed 200 mg in 24 hours  Qty: 24 tablet, Refills: 1    Associated Diagnoses: Headache(784.0)      tolterodine ER (DETROL LA) 2 MG 24 hr capsule Take 1 capsule (2 mg) by mouth daily  Qty: 90 capsule, Refills: 3     Associated Diagnoses: Overactive bladder      vitamin D3 (CHOLECALCIFEROL) 1.25 MG (16767 UT) capsule Take 1 capsule (50,000 Units) by mouth three times a week  Qty: 39 capsule, Refills: 3    Associated Diagnoses: Postoperative malabsorption      Alcohol Swabs (PRO COMFORT ALCOHOL) 70 % PADS USE TO TEST BLOOD GLUCOSE FOUR TIMES DAILY  Qty: 400 each, Refills: 11    Associated Diagnoses: Type 2 diabetes mellitus with complication, with long-term current use of insulin (H)      blood glucose (NO BRAND SPECIFIED) lancets standard Check fasting glucose 4 times daily.  For Accu check monitor  Qty: 360 each, Refills: 11    Associated Diagnoses: Type 2 diabetes mellitus with diabetic polyneuropathy, with long-term current use of insulin (H)      !! Continuous Blood Gluc Sensor (FREESTYLE ANIKET 14 DAY SENSOR) MISC 1 each every 14 days Use 1 Sensor every 14 days. Use to read blood sugars per 's instructions.  Qty: 2 each, Refills: 5    Associated Diagnoses: Type 2 diabetes mellitus with hyperglycemia, with long-term current use of insulin (H)      !! Continuous Blood Gluc Sensor (FREESTYLE ANIKET 14 DAY SENSOR) MISC 1 each every 14 days Change every 14 days.  Qty: 2 each, Refills: 5    Associated Diagnoses: Type 2 diabetes mellitus with diabetic polyneuropathy, with long-term current use of insulin (H)      CONTOUR NEXT TEST test strip USE TO TEST BLOOD GLUCOSE FOUR TIMES DAILY  Qty: 400 strip, Refills: 11    Associated Diagnoses: Type 2 diabetes mellitus with complication, with long-term current use of insulin (H)      insulin pen needle (BD ULTRA-FINE) 29G X 12.7MM miscellaneous Use 1 time daily  Qty: 90 each, Refills: 3    Associated Diagnoses: Type 2 diabetes mellitus with complication, with long-term current use of insulin (H)      Lancets 30G MISC USE TO TEST BLOOD GLUCOSE FOUR TIMES DAILY  Qty: 400 each, Refills: 11    Associated Diagnoses: Type 2 diabetes mellitus with diabetic polyneuropathy, with  long-term current use of insulin (H)      order for DME Equipment being ordered: wheeled walker with seat  Qty: 1 Device, Refills: 0    Associated Diagnoses: Primary osteoarthritis of both knees      rivaroxaban ANTICOAGULANT (XARELTO) 10 MG TABS tablet Take 1 tablet (10 mg) by mouth daily (with dinner)  Qty: 30 tablet, Refills: 0    Associated Diagnoses: Infection due to 2019 novel coronavirus; Pulmonary embolism with infarction (H)       !! - Potential duplicate medications found. Please discuss with provider.        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  Patient left prior to my arrival for assessment 8/13/22

## 2022-08-22 ENCOUNTER — TELEPHONE (OUTPATIENT)
Dept: SURGERY | Facility: CLINIC | Age: 57
End: 2022-08-22

## 2022-08-22 NOTE — TELEPHONE ENCOUNTER
Called patient for our video visit at 3:30p. She states she didn't know she had an appointment today and would like to wait until after her visit with  in Sept to reschedule.

## 2022-08-29 ENCOUNTER — TELEPHONE (OUTPATIENT)
Dept: FAMILY MEDICINE | Facility: CLINIC | Age: 57
End: 2022-08-29

## 2022-08-29 NOTE — TELEPHONE ENCOUNTER
Central Prior Authorization Team - Phone: [phone]     PA Initiation    Medication: budesonide-formoterol (SYMBICORT) 80-4.5 MCG/ACT Inhaler  Insurance Company:    Pharmacy Filling the Rx: [id number] [place], [place] - [address] AT [facility]  Filling Pharmacy Phone: [phone]  Filling Pharmacy Fax:    Start Date: 8/29/2022

## 2022-08-29 NOTE — TELEPHONE ENCOUNTER
Central Prior Authorization Team - Phone: [phone]     Prior Authorization Approval    Authorization Effective Date: 8/29/2022  Authorization Expiration Date: 12/31/2022  Medication: budesonide-formoterol (SYMBICORT) 80-4.5 MCG/ACT Inhaler- [place] APPROVED  Approved Dose/Quantity: 20.4  Reference #:     Insurance Company:    Expected CoPay:       CoPay Card Available:      Foundation Assistance Needed:    Which Pharmacy is filling the prescription (Not needed for infusion/clinic administered): [id number] [address] AT [facility]  Pharmacy Notified: Yes  Patient Notified: YesComment:  pharmacy will notify when ready

## 2022-08-29 NOTE — TELEPHONE ENCOUNTER
Prior Authorization Retail Medication Request    Medication/Dose: budesonide-formoterol (SYMBICORT) 80-4.5 MCG/ACT Inhaler  ICD code (if different than what is on RX):  Moderate persistent asthma without complication [J45.40]   Previously Tried and Failed:    Rationale:      Insurance Name:  [employer] MEDICARE AD*  Insurance ID:  [id number]       Pharmacy Information (if different than what is on RX)  Name:  [facility] [employer] [id number] [address] AT [facility] OF [facility]  Phone:  [phone]

## 2022-09-01 ENCOUNTER — OFFICE VISIT (OUTPATIENT)
Dept: FAMILY MEDICINE | Facility: CLINIC | Age: 57
End: 2022-09-01
Payer: COMMERCIAL

## 2022-09-01 VITALS
TEMPERATURE: 97.6 F | SYSTOLIC BLOOD PRESSURE: 140 MMHG | RESPIRATION RATE: 16 BRPM | HEART RATE: 107 BPM | DIASTOLIC BLOOD PRESSURE: 84 MMHG

## 2022-09-01 DIAGNOSIS — R07.9 CHEST PAIN, UNSPECIFIED TYPE: ICD-10-CM

## 2022-09-01 DIAGNOSIS — G89.4 CHRONIC PAIN SYNDROME: Primary | ICD-10-CM

## 2022-09-01 DIAGNOSIS — E11.42 DIABETIC POLYNEUROPATHY ASSOCIATED WITH TYPE 2 DIABETES MELLITUS (H): ICD-10-CM

## 2022-09-01 PROCEDURE — 96372 THER/PROPH/DIAG INJ SC/IM: CPT | Performed by: FAMILY MEDICINE

## 2022-09-01 PROCEDURE — 80306 DRUG TEST PRSMV INSTRMNT: CPT | Performed by: FAMILY MEDICINE

## 2022-09-01 PROCEDURE — 99214 OFFICE O/P EST MOD 30 MIN: CPT | Mod: 25 | Performed by: FAMILY MEDICINE

## 2022-09-01 RX ORDER — OXYCODONE AND ACETAMINOPHEN 10; 325 MG/1; MG/1
TABLET ORAL
Qty: 120 TABLET | Refills: 0 | Status: SHIPPED | OUTPATIENT
Start: 2022-09-08 | End: 2022-09-29

## 2022-09-01 RX ORDER — GABAPENTIN 250 MG/5ML
250 SOLUTION ORAL 4 TIMES DAILY PRN
Qty: 0.1 ML | Refills: 0 | COMMUNITY
Start: 2022-09-01 | End: 2022-09-19

## 2022-09-01 RX ADMIN — CYANOCOBALAMIN 1000 MCG: 1000 INJECTION, SOLUTION INTRAMUSCULAR; SUBCUTANEOUS at 12:17

## 2022-09-01 NOTE — NURSING NOTE
Clinic Administered Medication Documentation    Administrations This Visit     cyanocobalamin injection 1,000 mcg     Admin Date  09/01/2022 Action  Given Dose  1,000 mcg Route  Intramuscular Site  Left Deltoid Administered By  Viridiana Loaiza CMA    Ordering Provider: Jose Angel Meyer MD    NDC: 11907-829-01    Lot#: [id number]    : InforS[employer]    Patient Supplied?: No                  Injectable Medication Documentation    Patient was given Cyanocobalamin (B-12). Prior to medication administration, verified patients identity using patient s name and date of birth. Please see MAR and medication order for additional information. Patient instructed to remain in clinic for 15 minutes.      Was entire vial of medication used? Yes  Vial/Syringe: Single dose vial  Expiration Date:  01/30/2024  Was this medication supplied by the patient? No    Name of provider who requested the medication administration: Dr. Meyer  Name of provider on site (faculty or community preceptor) at the time of performing the medication administration: Dr. Meyer    Date of next administration: N/A  Date of next office visit with provider to renew medication plan (must be seen annually): N/A
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
## 2022-09-09 DIAGNOSIS — E11.8 TYPE 2 DIABETES MELLITUS WITH COMPLICATION, WITH LONG-TERM CURRENT USE OF INSULIN (H): ICD-10-CM

## 2022-09-09 DIAGNOSIS — D17.30 LIPOMA OF SKIN AND SUBCUTANEOUS TISSUE: ICD-10-CM

## 2022-09-09 DIAGNOSIS — Z79.4 TYPE 2 DIABETES MELLITUS WITH COMPLICATION, WITH LONG-TERM CURRENT USE OF INSULIN (H): ICD-10-CM

## 2022-09-10 RX ORDER — PEN NEEDLE, DIABETIC 32GX 5/32"
1 NEEDLE, DISPOSABLE MISCELLANEOUS 4 TIMES DAILY PRN
Qty: 400 EACH | Refills: 11 | Status: SHIPPED | OUTPATIENT
Start: 2022-09-10

## 2022-09-10 RX ORDER — LIDOCAINE 40 MG/G
CREAM TOPICAL
Qty: 45 G | Refills: 11 | Status: SHIPPED | OUTPATIENT
Start: 2022-09-10 | End: 2023-04-20

## 2022-09-19 DIAGNOSIS — E11.42 DIABETIC POLYNEUROPATHY ASSOCIATED WITH TYPE 2 DIABETES MELLITUS (H): ICD-10-CM

## 2022-09-19 RX ORDER — GABAPENTIN 250 MG/5ML
SOLUTION ORAL
Qty: 1360 ML | Refills: 2 | Status: SHIPPED | OUTPATIENT
Start: 2022-09-19 | End: 2023-02-14

## 2022-09-29 ENCOUNTER — OFFICE VISIT (OUTPATIENT)
Dept: PHARMACY | Facility: CLINIC | Age: 57
End: 2022-09-29
Payer: COMMERCIAL

## 2022-09-29 ENCOUNTER — OFFICE VISIT (OUTPATIENT)
Dept: FAMILY MEDICINE | Facility: CLINIC | Age: 57
End: 2022-09-29
Payer: COMMERCIAL

## 2022-09-29 VITALS
OXYGEN SATURATION: 96 % | BODY MASS INDEX: 40.04 KG/M2 | DIASTOLIC BLOOD PRESSURE: 79 MMHG | TEMPERATURE: 97.9 F | WEIGHT: 201.6 LBS | HEART RATE: 115 BPM | RESPIRATION RATE: 16 BRPM | SYSTOLIC BLOOD PRESSURE: 136 MMHG

## 2022-09-29 DIAGNOSIS — Z23 NEED FOR VACCINATION: ICD-10-CM

## 2022-09-29 DIAGNOSIS — G89.4 CHRONIC PAIN SYNDROME: Primary | ICD-10-CM

## 2022-09-29 DIAGNOSIS — J45.50 SEVERE PERSISTENT ASTHMA WITHOUT COMPLICATION (H): Primary | ICD-10-CM

## 2022-09-29 PROBLEM — I26.99 OTHER ACUTE PULMONARY EMBOLISM WITHOUT ACUTE COR PULMONALE (H): Status: RESOLVED | Noted: 2018-12-31 | Resolved: 2022-09-29

## 2022-09-29 LAB
AMPHETAMINES UR QL: DETECTED
BARBITURATES UR QL SCN: NOT DETECTED
BENZODIAZ UR QL SCN: NOT DETECTED
BUPRENORPHINE UR QL: NOT DETECTED
CANNABINOIDS UR QL: DETECTED
COCAINE UR QL SCN: NOT DETECTED
D-METHAMPHET UR QL: NOT DETECTED
METHADONE UR QL SCN: NOT DETECTED
OPIATES UR QL SCN: NOT DETECTED
OXYCODONE UR QL SCN: DETECTED
PCP UR QL SCN: NOT DETECTED
PROPOXYPH UR QL: NOT DETECTED
TRICYCLICS UR QL SCN: NOT DETECTED

## 2022-09-29 PROCEDURE — 0124A COVID-19,PF,PFIZER BOOSTER BIVALENT: CPT | Performed by: FAMILY MEDICINE

## 2022-09-29 PROCEDURE — 91312 COVID-19,PF,PFIZER BOOSTER BIVALENT: CPT | Performed by: FAMILY MEDICINE

## 2022-09-29 PROCEDURE — G0008 ADMIN INFLUENZA VIRUS VAC: HCPCS | Performed by: FAMILY MEDICINE

## 2022-09-29 PROCEDURE — 99207 PR NO CHARGE LOS: CPT | Performed by: PHARMACIST

## 2022-09-29 PROCEDURE — 80306 DRUG TEST PRSMV INSTRMNT: CPT | Performed by: FAMILY MEDICINE

## 2022-09-29 PROCEDURE — 90686 IIV4 VACC NO PRSV 0.5 ML IM: CPT | Performed by: FAMILY MEDICINE

## 2022-09-29 PROCEDURE — 99214 OFFICE O/P EST MOD 30 MIN: CPT | Mod: 25 | Performed by: FAMILY MEDICINE

## 2022-09-29 RX ORDER — OXYCODONE AND ACETAMINOPHEN 10; 325 MG/1; MG/1
TABLET ORAL
Qty: 120 TABLET | Refills: 0 | Status: SHIPPED | OUTPATIENT
Start: 2022-09-29 | End: 2022-10-27

## 2022-09-29 NOTE — PROGRESS NOTES
Clinical Pharmacy Consult:                                                    Ruthy Álvarez is a 57 year old female seen for a clinical pharmacist consult.  She was referred to me from Dr Hernandez/Betsy.     Reason for Consult: Asthma follow up    Discussion: Briefly met with patient for follow up of asthma.  Patient was switched to SMART therapy in March by Dr Jen Batista.  Patient reports that this works well for her and has no complaints of her asthma today.      Plan:  1. Continue current medications.    Angelina Martines Pharm.D.

## 2022-09-29 NOTE — PROGRESS NOTES
Patient Active Problem List    Diagnosis Date Noted     Stage 3a chronic kidney disease (H) 12/31/2020     Priority: High     Persistent protienuria       Copper deficiency 04/05/2019     Priority: High     Identified in Aug, 2018--see discharge summary from [facility] in [place].       Chronic pain disorder 10/07/2016     Priority: High     Chronic Pain Diagnosis:  neuropathy  DIRE Total Score(s):14     ORT: 5    4 - 7 =  Moderate Risk   of future problems with Opioids  FAQ5: 50 November 20, 2020  Behavioral Health Consultation: 5/12/17 with Dr. Daisy Quezada  Personal Care Plan for Chronic Pain: 5/12/17  Opioid medication:oxycodone   Dose:10mg  Number of pills per month:60  Patient is being prescribed 10mg of oxycodone IR (Percocet) per day this is 15 mg Morphine Equivalents  Benzodiazepines Prescribed? No  Naloxone prescribed? No       Clinic visit frequency required: Q 1 month Next visit due:   Controlled Substance/Opioid Treatment agreement on file (dated <12 months ago)?:    Date(s): pending  Last MNPMP verification: last visit  Items in red to be updated at each visit  Patient is followed by Data Unavailable for ongoing prescription of pain medication.  All refills should be approved by this provider, or covering partner.Chronic Pain     Chronic Pain Diagnosis:  neuropathy  DIRE Total Score(s):14     ORT: 5    4 - 7 =  Moderate Risk   of future problems with Opioids  FAQ5: 45/100 on 9/9/16, 40/100 on 11/15/16  55 October 29, 2020    Behavioral Health Consultation: 5/12/17 with Dr. Daisy Quezada  Personal Care Plan for Chronic Pain: 5/12/17  Opioid medication:oxycodone   Dose:10mg  Number of pills per month:90  Patient is being prescribed 45 MME  Benzodiazepines Prescribed? No  Naloxone prescribed? No       Clinic visit frequency required: Q 1 month Next visit due:   Controlled Substance/Opioid Treatment agreement on file (dated <12 months ago)?:    Date(s): pending  Last MNPMP verification: last  visit  Items in red to be updated at each visit  Patient is followed by Data Unavailable for ongoing prescription of pain medication.  All refills should be approved by this provider, or covering partner.Chronic Pain       Microalbuminuria 10/12/2015     Priority: High     Severely increased albuminuria       Type 2 diabetes mellitus with diabetic polyneuropathy (H) 10/12/2015     Priority: High     Essential hypertension 08/26/2013     Priority: High     Acidosis 08/12/2022     Priority: Medium     Acute chest pain 08/12/2022     Priority: Medium     Chronic, continuous use of opioids 05/18/2022     Priority: Medium     Arthritis of both acromioclavicular joints 04/08/2021     Priority: Medium     xray proven        Morbid obesity (H) 11/27/2020     Priority: Medium     Hyperglycemia due to type 2 diabetes mellitus (H) 06/02/2020     Priority: Medium     Neck mass 10/11/2019     Priority: Medium     Added automatically from request for surgery [id number]       Metabolic acidosis 07/26/2019     Priority: Medium     reolved w/ discontinuation of ASA       Obesity 01/03/2019     Priority: Medium     Other acute pulmonary embolism without acute cor pulmonale (H) 12/31/2018     Priority: Medium     Hernia of anterior abdominal wall 01/28/2017     Priority: Medium     Osteoarthritis of both knees 10/31/2016     Priority: Medium     Polyneuropathy due to secondary diabetes mellitus (H) 10/31/2016     Priority: Medium     Compression neuropathy of left lower extremity 02/19/2016     Priority: Medium     Bilateral ankle joint pain 01/15/2016     Priority: Medium     Esophageal reflux 04/07/2015     Priority: Medium     Urinary incontinence 02/19/2015     Priority: Medium     Asthma 08/04/2013     Priority: Medium     History of cholecystectomy 08/04/2013     Priority: Medium     H/O necrotizing fasciitis 08/04/2013     Priority: Medium     Hyperlipidemia 06/21/2013     Priority: Medium     Status post bariatric surgery  06/21/2013     Priority: Medium     Kim-en-Y in 2006       Moderate major depression (H) 01/24/2013     Priority: Medium     Moderate persistent asthma without complication 11/15/2012     Priority: Medium     Persons encountering health services in other specified circumstances 11/15/2012     Priority: Medium     Tier 3    Status: Accept  Care Coordination Start Date: 02/27/12  State Tier Level: Level 3  Language/Barrier to Learning: No  Significant Mental Health Issues: Yes  Date Care Coordination Discontinued: 05/28/15    DX V65.8 REPLACED WITH [id number] [facility] (04/08/2013)       Abnormal Pap smear of cervix 04/22/2019     Priority: Low     3-12-15: Pap/HPV neg.  Plan: Given hx (mildly abn pap, hyster), recheck in 5 yrs.  10-11-11: Pap/HPV neg.  Plan: Given ASUS pap in 2010, recommend repeating pap in 1 year.  8/3/2010 ASCUS Pap with +BV- pt treated with Metrogel.  Pt needs repeat Pap in 1yr.  8/4/2008 Pt had total hyst for menorrhagia, pelvic pain, scar tissue, and fibroids.    3-12-15: Pap/HPV neg.  Plan: Given hx (mildly abn pap, hyster), recheck in 5 yrs.  10-11-11: Pap/HPV neg.  Plan: Given ASUS pap in 2010, recommend repeating pap in 1 year.  8/3/2010 ASCUS Pap with +BV- pt treated with Metrogel.  Pt needs repeat Pap in 1yr.  8/4/2008 Pt had total hyst for menorrhagia, pelvic pain, scar tissue, and fibroids.       Hernia 01/27/2017     Priority: Low     S/P hysterectomy 06/21/2013     Priority: Low     2008, For fibroids.  Cervix removed, but still has ovaries.       History of hysterectomy for benign disease 06/21/2013     Priority: Low     2008, For fibroids.  Cervix removed, but still has ovaries.       Carpal tunnel syndrome 11/15/2012     Priority: Low     Pulmonary embolism with infarction (H) 11/15/2012     Priority: Low     12/24/2018  Unprovoked.  + fam hx of DVT.  Treated w/ DOAC 12/2018through 9/2019 12/24/2018  Unprovoked.  + fam hx of DVT.  Plan warfarin for 6 months.       Health Care  Home 11/15/2012     Priority: Low     Tier 3    Status: Accept  Care Coordination Start Date: 02/27/12  State Tier Level: Level 3  Language/Barrier to Learning: No  Significant Mental Health Issues: Yes  Date Care Coordination Discontinued: 05/28/15    DX V65.8 REPLACED WITH [id number] [facility] HOME (04/08/2013)         Chronic pain of both shoulders 12/23/2005     Priority: Low     There are no exam notes on file for this visit.  Chief Complaint   Patient presents with     Pain Management     Blood pressure 136/79, pulse 115, temperature 97.9  F (36.6  C), temperature source Oral, resp. rate 16, weight 91.4 kg (201 lb 9.6 oz), SpO2 96 %, not currently breastfeeding.  Results for orders placed or performed in visit on 09/29/22   Urine Drugs of Abuse Screen Panel 13     Status: Abnormal   Result Value Ref Range    Cannabinoids (41-sgg-7-carboxy-9-THC) Detected (A) Not Detected, Indeterminate    Phencyclidine Not Detected Not Detected, Indeterminate    Cocaine (Benzoylecgonine) Not Detected Not Detected, Indeterminate    Methamphetamine (d-Methamphetamine) Not Detected Not Detected, Indeterminate    Opiates (Morphine) Not Detected Not Detected, Indeterminate    Amphetamine (d-Amphetamine) Detected (A) Not Detected, Indeterminate    Benzodiazepines (Nordiazepam) Not Detected Not Detected, Indeterminate    Tricyclic Antidepressants (Desipramine) Not Detected Not Detected, Indeterminate    Methadone Not Detected Not Detected, Indeterminate    Barbiturates (Butalbital) Not Detected Not Detected, Indeterminate    Oxycodone Detected (A) Not Detected, Indeterminate    Propoxyphene (Norpropoxyphene) Not Detected Not Detected, Indeterminate    Buprenorphine Not Detected Not Detected, Indeterminate   exposed to covid 2 weeks ago  Low riskSUBJECTIVE:  Ruthy Álvarez is here for several issues.  First of all, she needs a refill of her oxycodone.  She takes a total of #120 in a month and tolerates it well and has nice relief of  her pain with that.  She is having a new problem of lower back and groin pain.  It seems to be worse when she is standing, not clearly worsened when she is walking.    She was exposed to COVID briefly with contact with her grandson 2 weeks ago when she was outside.  She is having no symptoms.  She is wondering about vaccination boosters for her.  She did get COVID once and got immunoglobulin therapy.    She needs a B12 shot today as well.    OBJECTIVE:  The patient is alert, pleasant, in no acute distress.  She is able to get up slowly to the exam table.  She is well groomed.  She has diffuse lumbar tenderness.  She has mild pain with internal rotation of both of her hips and mild tenderness over both of her inguinal canals.    I reviewed her chart.  She did have x-rays of her pelvis in 2016 that were unremarkable, but subsequent x-rays in 2019 did suggest some mild osteoarthritis.    ASSESSMENT:    1.  Chronic pain syndrome.  Database reviewed, unremarkable.  Urine drug screen.  No red flags.  Refilled oxycodone.  2.  Regarding her COVID exposure, it is extremely low risk.  She is asymptomatic.  No intervention done.  3.  Regarding immunizations, influenza and COVID given today.  4.  Regarding her B12 deficiency, B12 shot given today.      No sx  Got immunoglobulin tx

## 2022-10-24 ENCOUNTER — TELEPHONE (OUTPATIENT)
Dept: FAMILY MEDICINE | Facility: CLINIC | Age: 57
End: 2022-10-24

## 2022-10-24 NOTE — TELEPHONE ENCOUNTER
[facility] phone call message- general phone call:    Reason for call: They received a script for Lidocaine HCL 4% solution with refills. The question is - is it 4 or 9 refills.    Return call needed: Yes    OK to leave a message on voice mail? Yes    Primary language: English      needed? No    Call taken on October 24, 2022 at 8:47 AM by Aristides Bueno

## 2022-10-27 ENCOUNTER — OFFICE VISIT (OUTPATIENT)
Dept: FAMILY MEDICINE | Facility: CLINIC | Age: 57
End: 2022-10-27
Payer: COMMERCIAL

## 2022-10-27 VITALS
SYSTOLIC BLOOD PRESSURE: 110 MMHG | HEART RATE: 103 BPM | OXYGEN SATURATION: 98 % | TEMPERATURE: 98.4 F | DIASTOLIC BLOOD PRESSURE: 71 MMHG | RESPIRATION RATE: 18 BRPM

## 2022-10-27 DIAGNOSIS — G89.4 CHRONIC PAIN DISORDER: Primary | ICD-10-CM

## 2022-10-27 DIAGNOSIS — M25.512 CHRONIC PAIN OF BOTH SHOULDERS: Primary | ICD-10-CM

## 2022-10-27 DIAGNOSIS — M19.012 ARTHRITIS OF BOTH ACROMIOCLAVICULAR JOINTS: ICD-10-CM

## 2022-10-27 DIAGNOSIS — Z98.84 STATUS POST BARIATRIC SURGERY: ICD-10-CM

## 2022-10-27 DIAGNOSIS — G89.4 CHRONIC PAIN SYNDROME: ICD-10-CM

## 2022-10-27 DIAGNOSIS — M25.511 CHRONIC PAIN OF BOTH SHOULDERS: Primary | ICD-10-CM

## 2022-10-27 DIAGNOSIS — G89.29 CHRONIC PAIN OF BOTH SHOULDERS: Primary | ICD-10-CM

## 2022-10-27 DIAGNOSIS — Z79.4 TYPE 2 DIABETES MELLITUS WITH HYPERGLYCEMIA, WITH LONG-TERM CURRENT USE OF INSULIN (H): ICD-10-CM

## 2022-10-27 DIAGNOSIS — E11.65 TYPE 2 DIABETES MELLITUS WITH HYPERGLYCEMIA, WITH LONG-TERM CURRENT USE OF INSULIN (H): ICD-10-CM

## 2022-10-27 DIAGNOSIS — M19.011 ARTHRITIS OF BOTH ACROMIOCLAVICULAR JOINTS: ICD-10-CM

## 2022-10-27 DIAGNOSIS — E53.8 VITAMIN B12 DEFICIENCY (NON ANEMIC): ICD-10-CM

## 2022-10-27 DIAGNOSIS — M17.0 PRIMARY OSTEOARTHRITIS OF BOTH KNEES: ICD-10-CM

## 2022-10-27 DIAGNOSIS — Z98.84 S/P BARIATRIC SURGERY: ICD-10-CM

## 2022-10-27 PROCEDURE — G0009 ADMIN PNEUMOCOCCAL VACCINE: HCPCS | Performed by: FAMILY MEDICINE

## 2022-10-27 PROCEDURE — 90746 HEPB VACCINE 3 DOSE ADULT IM: CPT | Performed by: FAMILY MEDICINE

## 2022-10-27 PROCEDURE — G0010 ADMIN HEPATITIS B VACCINE: HCPCS | Performed by: FAMILY MEDICINE

## 2022-10-27 PROCEDURE — 90677 PCV20 VACCINE IM: CPT | Performed by: FAMILY MEDICINE

## 2022-10-27 PROCEDURE — 99214 OFFICE O/P EST MOD 30 MIN: CPT | Mod: 25 | Performed by: FAMILY MEDICINE

## 2022-10-27 PROCEDURE — 80306 DRUG TEST PRSMV INSTRMNT: CPT | Performed by: FAMILY MEDICINE

## 2022-10-27 PROCEDURE — 96372 THER/PROPH/DIAG INJ SC/IM: CPT | Mod: 25 | Performed by: FAMILY MEDICINE

## 2022-10-27 RX ORDER — CYANOCOBALAMIN 1000 UG/ML
1000 INJECTION, SOLUTION INTRAMUSCULAR; SUBCUTANEOUS
Status: ACTIVE | OUTPATIENT
Start: 2022-10-27 | End: 2023-10-22

## 2022-10-27 RX ORDER — PEN NEEDLE, DIABETIC 29 G X1/2"
NEEDLE, DISPOSABLE MISCELLANEOUS
Qty: 90 EACH | Refills: 3 | Status: SHIPPED | OUTPATIENT
Start: 2022-10-27 | End: 2023-06-27

## 2022-10-27 RX ORDER — OXYCODONE AND ACETAMINOPHEN 10; 325 MG/1; MG/1
TABLET ORAL
Qty: 120 TABLET | Refills: 0 | Status: SHIPPED | OUTPATIENT
Start: 2022-10-27 | End: 2022-11-18

## 2022-10-27 RX ORDER — OXYCODONE AND ACETAMINOPHEN 10; 325 MG/1; MG/1
TABLET ORAL
Qty: 120 TABLET | Refills: 0 | Status: SHIPPED | OUTPATIENT
Start: 2022-10-27 | End: 2022-10-27

## 2022-10-27 RX ADMIN — CYANOCOBALAMIN 1000 MCG: 1000 INJECTION, SOLUTION INTRAMUSCULAR; SUBCUTANEOUS at 11:50

## 2022-10-27 ASSESSMENT — PAIN SCALES - GENERAL: PAINLEVEL: EXTREME PAIN (8)

## 2022-10-27 NOTE — PROGRESS NOTES
Patient Active Problem List    Diagnosis Date Noted     Stage 3a chronic kidney disease (H) 12/31/2020     Priority: High     Persistent protienuria       Copper deficiency 04/05/2019     Priority: High     Identified in Aug, 2018--see discharge summary from [facility] in [place].       Chronic pain disorder 10/07/2016     Priority: High     Chronic Pain Diagnosis:  neuropathy  DIRE Total Score(s):14     ORT: 5    4 - 7 =  Moderate Risk   of future problems with Opioids  FAQ5: 50 November 20, 2020  Behavioral Health Consultation: 5/12/17 with Dr. Daisy Quezada  Personal Care Plan for Chronic Pain: 5/12/17  Opioid medication:oxycodone   Dose:10mg  Number of pills per month:60  Patient is being prescribed 10mg of oxycodone IR (Percocet) per day this is 15 mg Morphine Equivalents  Benzodiazepines Prescribed? No  Naloxone prescribed? No       Clinic visit frequency required: Q 1 month Next visit due:   Controlled Substance/Opioid Treatment agreement on file (dated <12 months ago)?:    Date(s): pending  Last [place] verification: last visit  Items in red to be updated at each visit  Patient is followed by [facility] for ongoing prescription of pain medication.  All refills should be approved by this provider, or covering partner.Chronic Pain     Chronic Pain Diagnosis:  neuropathy  DIRE Total Score(s):14     ORT: 5    4 - 7 =  Moderate Risk   of future problems with Opioids  FAQ5: 45/100 on 9/9/16, 40/100 on 11/15/16  55 October 29, 2020    Behavioral Health Consultation: 5/12/17 with Dr. Daisy Quezada  Personal Care Plan for Chronic Pain: 5/12/17  Opioid medication:oxycodone   Dose:10mg  Number of pills per month:90  Patient is being prescribed 45 MME  Benzodiazepines Prescribed? No  Naloxone prescribed? No       Clinic visit frequency required: Q 1 month Next visit due:   Controlled Substance/Opioid Treatment agreement on file (dated <12 months ago)?:    Date(s): pending  Last [place] verification: last  visit  Items in red to be updated at each visit  Patient is followed by [facility] for ongoing prescription of pain medication.  All refills should be approved by this provider, or covering partner.Chronic Pain       Microalbuminuria 10/12/2015     Priority: High     Severely increased albuminuria       Type 2 diabetes mellitus with diabetic polyneuropathy (H) 10/12/2015     Priority: High     Essential hypertension 08/26/2013     Priority: High     Acidosis 08/12/2022     Priority: Medium     Acute chest pain 08/12/2022     Priority: Medium     Chronic, continuous use of opioids 05/18/2022     Priority: Medium     Arthritis of both acromioclavicular joints 04/08/2021     Priority: Medium     xray proven        Morbid obesity (H) 11/27/2020     Priority: Medium     Hyperglycemia due to type 2 diabetes mellitus (H) 06/02/2020     Priority: Medium     Neck mass 10/11/2019     Priority: Medium     Added automatically from request for surgery [id number]       Metabolic acidosis 07/26/2019     Priority: Medium     reolved w/ discontinuation of ASA       Obesity 01/03/2019     Priority: Medium     Hernia of anterior abdominal wall 01/28/2017     Priority: Medium     Osteoarthritis of both knees 10/31/2016     Priority: Medium     Polyneuropathy due to secondary diabetes mellitus (H) 10/31/2016     Priority: Medium     Compression neuropathy of left lower extremity 02/19/2016     Priority: Medium     Bilateral ankle joint pain 01/15/2016     Priority: Medium     Esophageal reflux 04/07/2015     Priority: Medium     Urinary incontinence 02/19/2015     Priority: Medium     Asthma 08/04/2013     Priority: Medium     History of cholecystectomy 08/04/2013     Priority: Medium     H/O necrotizing fasciitis 08/04/2013     Priority: Medium     Hyperlipidemia 06/21/2013     Priority: Medium     Status post bariatric surgery 06/21/2013     Priority: Medium     Kim-en-Y in 2006       Moderate major depression (H) 01/24/2013      Priority: Medium     Moderate persistent asthma without complication 11/15/2012     Priority: Medium     Persons encountering health services in other specified circumstances 11/15/2012     Priority: Medium     Tier 3    Status: Accept  Care Coordination Start Date: 02/27/12  State Tier Level: Level 3  Language/Barrier to Learning: No  Significant Mental Health Issues: Yes  Date Care Coordination Discontinued: 05/28/15    DX V65.8 REPLACED WITH [id number] (04/08/2013)       Abnormal Pap smear of cervix 04/22/2019     Priority: Low     3-12-15: Pap/HPV neg.  Plan: Given hx (mildly abn pap, hyster), recheck in 5 yrs.  10-11-11: Pap/HPV neg.  Plan: Given ASUS pap in 2010, recommend repeating pap in 1 year.  8/3/2010 ASCUS Pap with +BV- pt treated with Metrogel.  Pt needs repeat Pap in 1yr.  8/4/2008 Pt had total hyst for menorrhagia, pelvic pain, scar tissue, and fibroids.    3-12-15: Pap/HPV neg.  Plan: Given hx (mildly abn pap, hyster), recheck in 5 yrs.  10-11-11: Pap/HPV neg.  Plan: Given ASUS pap in 2010, recommend repeating pap in 1 year.  8/3/2010 ASCUS Pap with +BV- pt treated with Metrogel.  Pt needs repeat Pap in 1yr.  8/4/2008 Pt had total hyst for menorrhagia, pelvic pain, scar tissue, and fibroids.       Hernia 01/27/2017     Priority: Low     S/P hysterectomy 06/21/2013     Priority: Low     2008, For fibroids.  Cervix removed, but still has ovaries.       History of hysterectomy for benign disease 06/21/2013     Priority: Low     2008, For fibroids.  Cervix removed, but still has ovaries.       Carpal tunnel syndrome 11/15/2012     Priority: Low     Health Care Home 11/15/2012     Priority: Low     Tier 3    Status: Accept  Care Coordination Start Date: 02/27/12  State Tier Level: Level 3  Language/Barrier to Learning: No  Significant Mental Health Issues: Yes  Date Care Coordination Discontinued: 05/28/15    DX V65.8 REPLACED WITH [id number] (04/08/2013)         Chronic pain of  both shoulders 12/23/2005     Priority: Low     There are no exam notes on file for this visit.  Chief Complaint   Patient presents with     Pain Management     Follow up cpm     Blood pressure 110/71, pulse 103, temperature 98.4  F (36.9  C), temperature source Oral, resp. rate 18, SpO2 98 %, not currently breastfeeding.  No results found for any visits on 10/27/22.   SUBJECTIVE:  Ruthy is here for followup of her chronic pain syndrome.  She is on oxycodone 10 mg t.i.d., occasionally takes a breakthrough a pill when she has more pain in her knees and in her shoulder.  She gets a quantity of 120 a month.  She has an achiness in her right knee that bothers her at the end of the day and then bilateral shoulder pain.  She had x-rays of her right knee in 2019 that actually were not that remarkable, but of her left knee showed some osteoarthritis.  In addition to that, she has tried diclofenac, but not consistently.  She brings up some Tylenol that she has been taking that has actually Tylenol, aspirin and caffeine in it.  She says it helps a little bit.    She is due for a B12 vaccine today.  She has no other concerns.  She denies any other drugs of abuse.  The PDMP was reviewed today and is unremarkable.  Her last refill was on 09/29/2022.  Urine drug screen is currently pending.  She has never had any aberrant behavior.    OBJECTIVE:  Blood pressure is excellent today.  Exam of her right knee shows full range of motion.  She has tenderness along the medial and lateral joint line.  There is no crepitus.  Affect is appropriate.  Eye contact is normal.  She appears in a good mood.  She is appropriately groomed.    ASSESSMENT/PLAN:  Chronic pain syndrome, stable.  She has adequate Narcan at home.  She will continue oxycodone, which was refilled with a quantity of 120 given.  She has had no aberrant behavior.  She will follow up on 11/18/2022.  I recommended she stop the aspirin-containing product as she has CKD and  metabolic acidosis and this might be contributing to that.  Instead, she can buy over-the-counter acetaminophen 500 mg and take up to 3 of them daily to augment her pain.  She can try this along with diclofenac.  She will follow up with me on 11/18/2022.  She will be due for an A1c and a BMP at that time.

## 2022-10-27 NOTE — NURSING NOTE
Clinic Administered Medication Documentation    Administrations This Visit     cyanocobalamin injection 1,000 mcg     Admin Date  10/27/2022 Action  Given Dose  1,000 mcg Route  Intramuscular Site  Left Deltoid Administered By  Ronni Cardona CMA    Ordering Provider: Jose Angel Meyer MD    Patient Supplied?: No    Comments: manufacture: fresenius kabi                  Injectable Medication Documentation    Patient was given Cyanocobalamin (B-12). Prior to medication administration, verified patients identity using patient s name and date of birth. Please see MAR and medication order for additional information. Patient instructed to stay in clinic after the injection but patient declined.      Was entire vial of medication used? Yes  Vial/Syringe: Single dose vial  Expiration Date:  03/30/24  Was this medication supplied by the patient? No    Name of provider who requested the medication administration: Dr. Meyer  Name of provider on site (faculty or community preceptor) at the time of performing the medication administration: Dr. Meyer    Date of next administration: 11/27/22  Date of next office visit with provider to renew medication plan (must be seen annually): n/a

## 2022-10-27 NOTE — TELEPHONE ENCOUNTER
[facility] phone call message- patient requesting a refill:    Full Medication Name: oxycodone     Dose:  mg    Pharmacy confirmed as   [facility][id number] [address] [address]  Phone: [phone] Fax: [phone]  : Yes    Additional Comments: the pharmacy will not give the 120 amount only a 30 count  And she needs the Dr to call them     OK to leave a message on voice mail? Yes    Primary language: English      needed? No    Call taken on October 27, 2022 at 2:25 PM by Earl Murphy

## 2022-10-27 NOTE — PATIENT INSTRUCTIONS
"[address] [place] [address]  Service Area: [facility]  [phone]  Notes: Hours: December-March: Friday, 11am-6pm, Saturday, 9am-4pm. April-November: Tuesday-Friday, 11am-6pm, Saturday, 9am-4pm.    Needles and syringes must be empty, sealed in a hard-sided container, like a detergent jug or coffee can, and labeled \"Sharps.\" Please inform staff when you drop them off. They will ask you to place sharps in the collection box. Photo ID required.    For additional pain relief, but extra strength tylenol (500 mg)--you can take 1 every 6 hours as needed, maximun 3 per day.    Make sure that narcan isn't .      You can try diclofenac gel on knee or shoulder as needed.          "

## 2022-11-09 ENCOUNTER — VIRTUAL VISIT (OUTPATIENT)
Dept: SURGERY | Facility: CLINIC | Age: 57
End: 2022-11-09
Payer: COMMERCIAL

## 2022-11-09 VITALS — WEIGHT: 201 LBS | HEIGHT: 60 IN | BODY MASS INDEX: 39.46 KG/M2

## 2022-11-09 DIAGNOSIS — E66.09 OBESITY DUE TO EXCESS CALORIES WITH SERIOUS COMORBIDITY, UNSPECIFIED CLASSIFICATION: ICD-10-CM

## 2022-11-09 DIAGNOSIS — E11.42 TYPE 2 DIABETES MELLITUS WITH DIABETIC POLYNEUROPATHY, WITH LONG-TERM CURRENT USE OF INSULIN (H): ICD-10-CM

## 2022-11-09 DIAGNOSIS — K91.2 POSTOPERATIVE MALABSORPTION: Primary | ICD-10-CM

## 2022-11-09 DIAGNOSIS — Z79.4 TYPE 2 DIABETES MELLITUS WITH DIABETIC POLYNEUROPATHY, WITH LONG-TERM CURRENT USE OF INSULIN (H): ICD-10-CM

## 2022-11-09 DIAGNOSIS — K90.9 INTESTINAL MALABSORPTION, UNSPECIFIED TYPE: ICD-10-CM

## 2022-11-09 PROBLEM — E66.01 MORBID OBESITY (H): Status: ACTIVE | Noted: 2020-11-27

## 2022-11-09 PROCEDURE — 99443 PR PHYSICIAN TELEPHONE EVALUATION 21-30 MIN: CPT | Performed by: FAMILY MEDICINE

## 2022-11-09 RX ORDER — ALCOHOL ANTISEPTIC PADS
PADS, MEDICATED (EA) TOPICAL
COMMUNITY
Start: 2021-11-16 | End: 2023-06-27

## 2022-11-09 RX ORDER — PHENTERMINE HYDROCHLORIDE 37.5 MG/1
.5-1 TABLET ORAL EVERY MORNING
Qty: 90 TABLET | Refills: 1 | Status: SHIPPED | OUTPATIENT
Start: 2022-11-09 | End: 2023-06-01

## 2022-11-09 RX ORDER — BLOOD-GLUCOSE METER
EACH MISCELLANEOUS SEE ADMIN INSTRUCTIONS
COMMUNITY
Start: 2022-10-03

## 2022-11-09 RX ORDER — DULAGLUTIDE 3 MG/.5ML
INJECTION, SOLUTION SUBCUTANEOUS
COMMUNITY
Start: 2022-09-20 | End: 2023-04-12

## 2022-11-09 RX ORDER — FLUOCINOLONE ACETONIDE 0.1 MG/ML
SOLUTION TOPICAL
COMMUNITY
Start: 2022-10-17 | End: 2024-06-28

## 2022-11-09 NOTE — LETTER
"    11/9/2022         RE: Ruthy TJ Langesvin  [address]  [address]  [facility] [address]        Dear Colleague,    Thank you for referring your patient, Ruthy Álvarez, to the [facility] [place]. Please see a copy of my visit note below.      The patient has been notified of following:     \"This telephone visit will be conducted via a call between you and your physician/provider. We have found that certain health care needs can be provided without the need for a physical exam.  This service lets us provide the care you need with a short phone conversation.  If a prescription is necessary we can send it directly to your pharmacy.  If lab work is needed we can place an order for that and you can then stop by our lab to have the test done at a later time.    Telephone visits are billed at different rates depending on your insurance coverage. During this emergency period, for some insurers they may be billed the same as an in-person visit.  Please reach out to your insurance provider with any questions.    If during the course of the call the physician/provider feels a telephone visit is not appropriate, you will not be charged for this service.\"    Patient has given verbal consent to a Telephone visit? Yes    What phone number would you like to be contacted at? [phone]     Patient would like to receive their AVS by [employer]    Are there any specific questions or needs that you would like addressed at your visit today? Return after LSG - med re-fill         Distant Location (provider location):  On-site      Bariatric Follow Up Visit with a History of Previous Bariatric Surgery     Date of visit: 11/9/2022  Physician: Vy Carson MD, MD  Primary Care Provider:  Jose Angel Meyer   57 year old  female    Date of Surgery: 2006 RYGB Dr. Harper  Initial Weight: 360#  Initial BMI: 70.30  Today's Weight:   Wt Readings from Last 1 " Encounters:   11/09/22 91.2 kg (201 lb)     Body mass index is 39.92 kg/m .      Assessment and Plan     Assessment: Ruthy is a 57 year old year old female who is 16 yrs s/p  Kim en Y Gastric Bypass with Dr. Meredith Álvarez feels as if she has achieved the goals she hoped to accomplish through bariatric surgery and weight loss.    Encounter Diagnoses   Name Primary?     Postoperative malabsorption Yes     Obesity due to excess calories with serious comorbidity, unspecified classification      Intestinal malabsorption, unspecified type          Current Outpatient Medications:      phentermine (ADIPEX-P) 37.5 MG tablet, Take 0.5-1 tablets (18.75-37.5 mg) by mouth every morning, Disp: 90 tablet, Rfl: 1     acetaminophen (TYLENOL) 500 MG tablet, Take 1 tablet (500 mg) by mouth 2 times daily as needed for mild pain, Disp: 30 tablet, Rfl: 0     Alcohol Swabs (PRO COMFORT ALCOHOL) 70 % PADS, 1 pad 4 times daily as needed (blood sugar testing) Use QID for blood sugar monitoring, Disp: 400 each, Rfl: 11     amitriptyline (ELAVIL) 10 MG tablet, TAKE 1 TABLET(10 MG) BY MOUTH AT BEDTIME, Disp: 30 tablet, Rfl: 11     atorvastatin (LIPITOR) 10 MG tablet, TAKE 1 TABLET BY MOUTH EVERY DAY, Disp: 90 tablet, Rfl: 3     blood glucose (NO BRAND SPECIFIED) lancets standard, Check fasting glucose 4 times daily.  For Accu check monitor, Disp: 360 each, Rfl: 11     blood glucose calibration (ONETOUCH ULTRA CONTROL) solution, See Admin Instructions, Disp: , Rfl:      blood glucose monitoring (ONE TOUCH ULTRA 2) meter device kit, See Admin Instructions, Disp: , Rfl:      budesonide-formoterol (SYMBICORT) 80-4.5 MCG/ACT Inhaler, Inhale 2 puffs once daily plus 1-2 puffs as needed. May use up to 12 puffs per day., Disp: 20.4 g, Rfl: 11     capsaicin (ZOSTRIX) 0.025 % external cream, Apply 3-4 times daily to knees, Disp: 118 mL, Rfl: 11     Continuous Blood Gluc Sensor (FREESTYLE ANIKET 14 DAY SENSOR) MISC, 1 each every 14 days  Use 1 Sensor every 14 days. Use to read blood sugars per 's instructions., Disp: 2 each, Rfl: 5     CONTOUR NEXT TEST test strip, USE TO TEST BLOOD GLUCOSE FOUR TIMES DAILY, Disp: 400 strip, Rfl: 11     cyanocobalamin (CYANOCOBALAMIN) 1000 MCG/ML injection, Inject 1 mL (1,000 mcg) into the muscle every 30 days, Disp: 1 mL, Rfl: 11     cyclobenzaprine (FLEXERIL) 10 MG tablet, Take 0.5 to 1 tablet every 8 hours as needed., Disp: 30 tablet, Rfl: 0     diclofenac (VOLTAREN) 1 % topical gel, Apply 4 g topically 3 times daily To top of both shoulders in area of pain, Disp: 350 g, Rfl: 2     diltiazem ER (DILT-XR) 120 MG 24 hr capsule, TAKE 1 CAPSULE(120 MG) BY MOUTH AT BEDTIME, Disp: 90 capsule, Rfl: 4     DULoxetine (CYMBALTA) 60 MG capsule, Take 1 capsule (60 mg) by mouth daily, Disp: 30 capsule, Rfl: 11     EASY COMFORT PEN NEEDLES 31G X 5 MM miscellaneous, USE TO INJECT INSULIN ONCE DAILY, Disp: , Rfl:      famotidine (PEPCID) 20 MG tablet, Take 1 tablet (20 mg) by mouth 2 times daily, Disp: 60 tablet, Rfl: 11     ferrous sulfate (IRON) 325 (65 FE) MG tablet, Take 1 tablet (325 mg) by mouth daily (with breakfast), Disp: 30 tablet, Rfl: 2     fluocinolone (SYNALAR) 0.01 % solution, APPLY 1-2 ML TOPICALLY TO AFFECTED AREA 1-2 TIMES PER DAY., Disp: , Rfl:      gabapentin (NEURONTIN) 250 MG/5ML solution, TAKE 15 ML(750 MG) BY MOUTH THREE TIMES DAILY, Disp: 1360 mL, Rfl: 2     insulin pen needle (BD ULTRA-FINE) 29G X 12.7MM miscellaneous, Use 1 time daily, Disp: 90 each, Rfl: 3     JARDIANCE 10 MG TABS tablet, TAKE 1 TABLET(10 MG) BY MOUTH DAILY, Disp: 30 tablet, Rfl: 1     Lancet Devices (EASY MINI EJECT LANCING DEVICE) MISC, See Admin Instructions, Disp: , Rfl:      Lancets 30G MISC, USE TO TEST BLOOD GLUCOSE FOUR TIMES DAILY, Disp: 400 each, Rfl: 11     LANTUS SOLOSTAR 100 UNIT/ML soln, INJECT 25 UNITS UNDER THE SKIN DAILY (Patient taking differently: Inject 36 Units Subcutaneous At Bedtime), Disp: 6 mL,  Rfl: 11     latanoprost (XALATAN) 0.005 % ophthalmic solution, INSTILL 1 DROP IN BOTH EYES DAILY AT BEDTIME, Disp: , Rfl:      lidocaine (LMX4) 4 % external cream, Apply topically once as needed for mild pain (left knee), Disp: 45 g, Rfl: 11     lisinopril (ZESTRIL) 40 MG tablet, Take 1 tablet (40 mg) by mouth At Bedtime, Disp: 90 tablet, Rfl: 3     naloxone (NARCAN) 4 MG/0.1ML nasal spray, Spray 1 spray (4 mg) into one nostril alternating nostrils once as needed for opioid reversal Every 2-3 minutes until patient responsive or EMS arrives, Disp: 0.2 mL, Rfl: 0     ondansetron (ZOFRAN ODT) 4 MG ODT tab, Take 4 mg by mouth as needed, Disp: , Rfl:      order for DME, Equipment being ordered: wheeled walker with seat, Disp: 1 Device, Rfl: 0     oxyCODONE-acetaminophen (PERCOCET)  MG per tablet, Take 2 tablets by mouth At Bedtime. May also take 1 tablet every 6 hours as needed for severe pain., Disp: 120 tablet, Rfl: 0     risperiDONE (RISPERDAL) 0.25 MG tablet, Take 1 tablet (0.25 mg) by mouth At Bedtime, Disp: , Rfl:      rivaroxaban ANTICOAGULANT (XARELTO) 10 MG TABS tablet, Take 1 tablet (10 mg) by mouth daily (with dinner), Disp: 30 tablet, Rfl: 0     senna-docusate (SENOKOT-S/PERICOLACE) 8.6-50 MG tablet, Take 1 tablet by mouth 2 times daily, Disp: 60 tablet, Rfl: 11     Simethicone 180 MG CAPS, Use 1 softgel as needed for gas, Disp: 60 capsule, Rfl: 11     sodium citrate-citric acid (BICITRA) 500-334 MG/5ML solution, Take 10 mLs by mouth 3 times daily (with meals), Disp: 473 mL, Rfl: 11     SUMAtriptan (IMITREX) 25 MG tablet, One tab at onset of HA.  May repeat dose in 2 hours.  Do not exceed 200 mg in 24 hours, Disp: 24 tablet, Rfl: 1     tolterodine ER (DETROL LA) 2 MG 24 hr capsule, Take 1 capsule (2 mg) by mouth daily, Disp: 90 capsule, Rfl: 3     TRULICITY 3 MG/0.5ML SOPN, INJECT 3MG UNDER THE SKIN ONCE WEEKLY, Disp: , Rfl:     Current Facility-Administered Medications:      cyanocobalamin injection  1,000 mcg, 1,000 mcg, Intramuscular, Q30 Days, Jose Angel Meyer MD     cyanocobalamin injection 1,000 mcg, 1,000 mcg, Intramuscular, Q30 Days, Jose Angel Meyer MD, 1,000 mcg at 10/27/22 1150    Plan: Refill phentermine Continue Trulicity. Can increase to 4.5 but prefers to stay at 3.0. Last A1c 6.9. Annual labs due. Has has other labs showing an anemia, elevated PTH.  Return in about 6 months (around 5/9/2023).    Bariatric Surgery Review     Interim History/LifeChanges: Vitamins going well. Hip acting up. Needs a refill phentermine. Taking 36U Long acting insulin. No lows. Due for A1c on the 18th.    Patient Concerns: needs refill  Appetite (1-10): down  GERD: no    Medication changes: no    Vitamin Intake:   B-12   injections   MVI  2/d   Vitamin D  5,000   Calcium   dietary     Other                LABS: ordered    Nausea no  Vomiting no  Constipation no  Diarrhea no  Rashes no  Hair Loss no  Calf tenderness no  Breathing difficulty no  Reactive Hypoglycemia no  Light Headedness no   Moods euthymic    12 point ROS as above and otherwise negative      Habits:  Alcohol: no  Tobacco: no  Caffeine rare soda NSAIDS no  Exercise Routine: using the gym at her complex up to 3X/wk  3 meals/day yes  Protein first yes  6-grams/day  Water Separate from meals yes  Calorie Containing Beverages no  Restaurant eating/wk rare  Sleeping not a lot of sleep d/t pain in her feet (RLS)  Stress moderate  CPAP: NA  Contraception: hyst  DEXA:    Social History     Social History     Socioeconomic History     Marital status: Single     Spouse name: Not on file     Number of children: Not on file     Years of education: Not on file     Highest education level: Not on file   Occupational History     Not on file   Tobacco Use     Smoking status: Never     Smokeless tobacco: Never   Substance and Sexual Activity     Alcohol use: No     Drug use: No     Sexual activity: Never   Other Topics Concern     Parent/sibling w/ CABG, MI or  angioplasty before 65F 55M? No   Social History Narrative    Lives alone. [place], other daughter lives here 2 sons live in the [facility]     Social Determinants of Health     Financial Resource Strain: Not on file   Food Insecurity: Not on file   Transportation Needs: Not on file   Physical Activity: Not on file   Stress: Not on file   Social Connections: Not on file   Intimate Partner Violence: Not on file   Housing Stability: Not on file       Past Medical History     Past Medical History:   Diagnosis Date     Abnormal cervical Papanicolaou smear with positive human papillomavirus deoxyribonucleic acid test      Ankle joint pain     bilateral     Anxiety      Arthritis      Arthritis     rheumatoid arthritis     Asthma      Carpal tunnel syndrome     bilateral     Compression neuropathy      Depression      Depressive disorder      Diabetes (H)      Diabetes (H)     type II     Diabetic nephropathy (H)      DVT of lower extremity, bilateral (H) 2011    right     Esophageal reflux      Gastroenteritis      History of blood clots      History of blood transfusion      Hyperlipidemia      Hypertension      Hypertension      Infectious fasciitis 2011    abdominal wall      Insomnia      Neck pain      Necrotizing fasciitis (H) 11/15/2012     Necrotizing fasciitis (H)      Neuropathy      Obesity      Osteoarthritis of both knees 10/31/2016     Pulmonary embolism and infarction (H)      SOB (shortness of breath)     with exertion     Tarsal tunnel syndrome      Uncomplicated asthma      Urinary incontinence      Problem List     Patient Active Problem List   Diagnosis     Chronic pain of both shoulders     Abnormal Pap smear of cervix     Moderate persistent asthma without complication     Carpal tunnel syndrome     Health Care Home     Moderate major depression (H)     S/P hysterectomy     Hyperlipidemia     Status post bariatric surgery     Essential hypertension     Urinary incontinence      Esophageal reflux     Microalbuminuria     Type 2 diabetes mellitus with diabetic polyneuropathy (H)     Bilateral ankle joint pain     Chronic pain disorder     Asthma     Compression neuropathy of left lower extremity     Hernia     Hernia of anterior abdominal wall     Obesity     Osteoarthritis of both knees     Polyneuropathy due to secondary diabetes mellitus (H)     Copper deficiency     Metabolic acidosis     Neck mass     Persons encountering health services in other specified circumstances     Hyperglycemia due to type 2 diabetes mellitus (H)     History of cholecystectomy     History of hysterectomy for benign disease     Morbid obesity (H)     Stage 3a chronic kidney disease (H)     Arthritis of both acromioclavicular joints     H/O necrotizing fasciitis     Chronic, continuous use of opioids     Acidosis     Acute chest pain     Medications       Current Outpatient Medications:      phentermine (ADIPEX-P) 37.5 MG tablet, Take 0.5-1 tablets (18.75-37.5 mg) by mouth every morning, Disp: 90 tablet, Rfl: 1     acetaminophen (TYLENOL) 500 MG tablet, Take 1 tablet (500 mg) by mouth 2 times daily as needed for mild pain, Disp: 30 tablet, Rfl: 0     Alcohol Swabs (PRO COMFORT ALCOHOL) 70 % PADS, 1 pad 4 times daily as needed (blood sugar testing) Use QID for blood sugar monitoring, Disp: 400 each, Rfl: 11     amitriptyline (ELAVIL) 10 MG tablet, TAKE 1 TABLET(10 MG) BY MOUTH AT BEDTIME, Disp: 30 tablet, Rfl: 11     atorvastatin (LIPITOR) 10 MG tablet, TAKE 1 TABLET BY MOUTH EVERY DAY, Disp: 90 tablet, Rfl: 3     blood glucose (NO BRAND SPECIFIED) lancets standard, Check fasting glucose 4 times daily.  For Accu check monitor, Disp: 360 each, Rfl: 11     blood glucose calibration (ONETOUCH ULTRA CONTROL) solution, See Admin Instructions, Disp: , Rfl:      blood glucose monitoring (ONE TOUCH ULTRA 2) meter device kit, See Admin Instructions, Disp: , Rfl:      budesonide-formoterol (SYMBICORT) 80-4.5 MCG/ACT  Inhaler, Inhale 2 puffs once daily plus 1-2 puffs as needed. May use up to 12 puffs per day., Disp: 20.4 g, Rfl: 11     capsaicin (ZOSTRIX) 0.025 % external cream, Apply 3-4 times daily to knees, Disp: 118 mL, Rfl: 11     Continuous Blood Gluc Sensor (FREESTYLE ANIKET 14 DAY SENSOR) MISC, 1 each every 14 days Use 1 Sensor every 14 days. Use to read blood sugars per 's instructions., Disp: 2 each, Rfl: 5     CONTOUR NEXT TEST test strip, USE TO TEST BLOOD GLUCOSE FOUR TIMES DAILY, Disp: 400 strip, Rfl: 11     cyanocobalamin (CYANOCOBALAMIN) 1000 MCG/ML injection, Inject 1 mL (1,000 mcg) into the muscle every 30 days, Disp: 1 mL, Rfl: 11     cyclobenzaprine (FLEXERIL) 10 MG tablet, Take 0.5 to 1 tablet every 8 hours as needed., Disp: 30 tablet, Rfl: 0     diclofenac (VOLTAREN) 1 % topical gel, Apply 4 g topically 3 times daily To top of both shoulders in area of pain, Disp: 350 g, Rfl: 2     diltiazem ER (DILT-XR) 120 MG 24 hr capsule, TAKE 1 CAPSULE(120 MG) BY MOUTH AT BEDTIME, Disp: 90 capsule, Rfl: 4     DULoxetine (CYMBALTA) 60 MG capsule, Take 1 capsule (60 mg) by mouth daily, Disp: 30 capsule, Rfl: 11     EASY COMFORT PEN NEEDLES 31G X 5 MM miscellaneous, USE TO INJECT INSULIN ONCE DAILY, Disp: , Rfl:      famotidine (PEPCID) 20 MG tablet, Take 1 tablet (20 mg) by mouth 2 times daily, Disp: 60 tablet, Rfl: 11     ferrous sulfate (IRON) 325 (65 FE) MG tablet, Take 1 tablet (325 mg) by mouth daily (with breakfast), Disp: 30 tablet, Rfl: 2     fluocinolone (SYNALAR) 0.01 % solution, APPLY 1-2 ML TOPICALLY TO AFFECTED AREA 1-2 TIMES PER DAY., Disp: , Rfl:      gabapentin (NEURONTIN) 250 MG/5ML solution, TAKE 15 ML(750 MG) BY MOUTH THREE TIMES DAILY, Disp: 1360 mL, Rfl: 2     insulin pen needle (BD ULTRA-FINE) 29G X 12.7MM miscellaneous, Use 1 time daily, Disp: 90 each, Rfl: 3     JARDIANCE 10 MG TABS tablet, TAKE 1 TABLET(10 MG) BY MOUTH DAILY, Disp: 30 tablet, Rfl: 1     Lancet Devices (EASY MINI EJECT  LANCING DEVICE) MISC, See Admin Instructions, Disp: , Rfl:      Lancets 30G MISC, USE TO TEST BLOOD GLUCOSE FOUR TIMES DAILY, Disp: 400 each, Rfl: 11     LANTUS SOLOSTAR 100 UNIT/ML soln, INJECT 25 UNITS UNDER THE SKIN DAILY (Patient taking differently: Inject 36 Units Subcutaneous At Bedtime), Disp: 6 mL, Rfl: 11     latanoprost (XALATAN) 0.005 % ophthalmic solution, INSTILL 1 DROP IN BOTH EYES DAILY AT BEDTIME, Disp: , Rfl:      lidocaine (LMX4) 4 % external cream, Apply topically once as needed for mild pain (left knee), Disp: 45 g, Rfl: 11     lisinopril (ZESTRIL) 40 MG tablet, Take 1 tablet (40 mg) by mouth At Bedtime, Disp: 90 tablet, Rfl: 3     naloxone (NARCAN) 4 MG/0.1ML nasal spray, Spray 1 spray (4 mg) into one nostril alternating nostrils once as needed for opioid reversal Every 2-3 minutes until patient responsive or EMS arrives, Disp: 0.2 mL, Rfl: 0     ondansetron (ZOFRAN ODT) 4 MG ODT tab, Take 4 mg by mouth as needed, Disp: , Rfl:      order for DME, Equipment being ordered: wheeled walker with seat, Disp: 1 Device, Rfl: 0     oxyCODONE-acetaminophen (PERCOCET)  MG per tablet, Take 2 tablets by mouth At Bedtime. May also take 1 tablet every 6 hours as needed for severe pain., Disp: 120 tablet, Rfl: 0     risperiDONE (RISPERDAL) 0.25 MG tablet, Take 1 tablet (0.25 mg) by mouth At Bedtime, Disp: , Rfl:      rivaroxaban ANTICOAGULANT (XARELTO) 10 MG TABS tablet, Take 1 tablet (10 mg) by mouth daily (with dinner), Disp: 30 tablet, Rfl: 0     senna-docusate (SENOKOT-S/PERICOLACE) 8.6-50 MG tablet, Take 1 tablet by mouth 2 times daily, Disp: 60 tablet, Rfl: 11     Simethicone 180 MG CAPS, Use 1 softgel as needed for gas, Disp: 60 capsule, Rfl: 11     sodium citrate-citric acid (BICITRA) 500-334 MG/5ML solution, Take 10 mLs by mouth 3 times daily (with meals), Disp: 473 mL, Rfl: 11     SUMAtriptan (IMITREX) 25 MG tablet, One tab at onset of HA.  May repeat dose in 2 hours.  Do not exceed 200 mg in 24  "hours, Disp: 24 tablet, Rfl: 1     tolterodine ER (DETROL LA) 2 MG 24 hr capsule, Take 1 capsule (2 mg) by mouth daily, Disp: 90 capsule, Rfl: 3     TRULICITY 3 MG/0.5ML SOPN, INJECT 3MG UNDER THE SKIN ONCE WEEKLY, Disp: , Rfl:     Current Facility-Administered Medications:      cyanocobalamin injection 1,000 mcg, 1,000 mcg, Intramuscular, Q30 Days, Jose Angel Meyer MD     cyanocobalamin injection 1,000 mcg, 1,000 mcg, Intramuscular, Q30 Days, Jose Angel Meyer MD, 1,000 mcg at 10/27/22 1150   Surgical History     Past Surgical History  She has a past surgical history that includes gastric bypass (); Hysterectomy; gastric bypass ();  Section; Uterine Fibroid Surgery (); Abdominal Wall Surgery; REVISE MEDIAN N/CARPAL TUNNEL SURG (Right, 10/24/2014); Hysterectomy; Uterine Fibroid Surgery; knee surgery (Right); Colonoscopy (N/A, 3/15/2017); Colonoscopy (N/A, 3/16/2017); and Midline Insertion - Double Lumen (2021).    Objective-Exam     Constitutional:  Ht 1.511 m (4' 11.5\")   Wt 91.2 kg (201 lb)   LMP  (LMP Unknown)   BMI 39.92 kg/m    General:  Pleasant and in no acute distress     Psychiatric: alert and oriented X3, mood and affect normal    Counseling     We reviewed the important post op bariatric recommendations:  -eating 3 meals daily  -eating protein first, getting >60gm protein daily  -eating slowly, chewing food well  -avoiding/limiting calorie containing beverages  -drinking water 15-30 minutes before or after meals  -choosing wheat, not white with breads, crackers, pastas, aleisha, bagels, tortillas, rice  -limiting restaurant or cafeteria eating to twice a week or less    We discussed the importance of restorative sleep and stress management in maintaining a healthy weight.  We discussed the National Weight Control Registry healthy weight maintenance strategies and ways to optimize metabolism.  We discussed the importance of physical activity including cardiovascular and " strength training in maintaining a healthier weight.    We discussed the importance of life-long vitamin supplementation and life-long  follow-up.    Ruthy was reminded that, to avoid marginal ulcers she should avoid tobacco at all, alcohol in excess, caffeine in excess, and NSAIDS (unless indicated for cardioprotection or othewise and opposed by a PPI).    Vy Carson MD, MD, [facility].  11/9/2022  3:08 PM  Total time spent on the date of this encounter doing: chart review, review of test results, patient visit, physical exam, education, counseling, developing plan of care, and documenting = 30 minutes.    Phone call duration: 30  minutes        Again, thank you for allowing me to participate in the care of your patient.        Sincerely,        Vy Carson MD

## 2022-11-09 NOTE — PROGRESS NOTES
"  The patient has been notified of following:     \"This telephone visit will be conducted via a call between you and your physician/provider. We have found that certain health care needs can be provided without the need for a physical exam.  This service lets us provide the care you need with a short phone conversation.  If a prescription is necessary we can send it directly to your pharmacy.  If lab work is needed we can place an order for that and you can then stop by our lab to have the test done at a later time.    Telephone visits are billed at different rates depending on your insurance coverage. During this emergency period, for some insurers they may be billed the same as an in-person visit.  Please reach out to your insurance provider with any questions.    If during the course of the call the physician/provider feels a telephone visit is not appropriate, you will not be charged for this service.\"    Patient has given verbal consent to a Telephone visit? Yes    What phone number would you like to be contacted at? [phone]     Patient would like to receive their AVS by [employer]    Are there any specific questions or needs that you would like addressed at your visit today? Return after LSG - med re-fill         Distant Location (provider location):  On-site      Bariatric Follow Up Visit with a History of Previous Bariatric Surgery     Date of visit: 11/9/2022  Physician: Vy Carson MD, MD  Primary Care Provider:  Jose Angel Meyer   57 year old  female    Date of Surgery: 2006 RYGB Dr. Harper  Initial Weight: 360#  Initial BMI: 70.30  Today's Weight:   Wt Readings from Last 1 Encounters:   11/09/22 91.2 kg (201 lb)     Body mass index is 39.92 kg/m .      Assessment and Plan     Assessment: Ruthy is a 57 year old year old female who is 16 yrs s/p  Kim en Y Gastric Bypass with Dr. Meredith Álvarez feels as if she has achieved the goals she hoped to " accomplish through bariatric surgery and weight loss.    Encounter Diagnoses   Name Primary?     Postoperative malabsorption Yes     Obesity due to excess calories with serious comorbidity, unspecified classification      Intestinal malabsorption, unspecified type          Current Outpatient Medications:      phentermine (ADIPEX-P) 37.5 MG tablet, Take 0.5-1 tablets (18.75-37.5 mg) by mouth every morning, Disp: 90 tablet, Rfl: 1     acetaminophen (TYLENOL) 500 MG tablet, Take 1 tablet (500 mg) by mouth 2 times daily as needed for mild pain, Disp: 30 tablet, Rfl: 0     Alcohol Swabs (PRO COMFORT ALCOHOL) 70 % PADS, 1 pad 4 times daily as needed (blood sugar testing) Use QID for blood sugar monitoring, Disp: 400 each, Rfl: 11     amitriptyline (ELAVIL) 10 MG tablet, TAKE 1 TABLET(10 MG) BY MOUTH AT BEDTIME, Disp: 30 tablet, Rfl: 11     atorvastatin (LIPITOR) 10 MG tablet, TAKE 1 TABLET BY MOUTH EVERY DAY, Disp: 90 tablet, Rfl: 3     blood glucose (NO BRAND SPECIFIED) lancets standard, Check fasting glucose 4 times daily.  For Accu check monitor, Disp: 360 each, Rfl: 11     blood glucose calibration (ONETOUCH ULTRA CONTROL) solution, See Admin Instructions, Disp: , Rfl:      blood glucose monitoring (ONE TOUCH ULTRA 2) meter device kit, See Admin Instructions, Disp: , Rfl:      budesonide-formoterol (SYMBICORT) 80-4.5 MCG/ACT Inhaler, Inhale 2 puffs once daily plus 1-2 puffs as needed. May use up to 12 puffs per day., Disp: 20.4 g, Rfl: 11     capsaicin (ZOSTRIX) 0.025 % external cream, Apply 3-4 times daily to knees, Disp: 118 mL, Rfl: 11     Continuous Blood Gluc Sensor (FREESTYLE ANIKET 14 DAY SENSOR) MISC, 1 each every 14 days Use 1 Sensor every 14 days. Use to read blood sugars per 's instructions., Disp: 2 each, Rfl: 5     CONTOUR NEXT TEST test strip, USE TO TEST BLOOD GLUCOSE FOUR TIMES DAILY, Disp: 400 strip, Rfl: 11     cyanocobalamin (CYANOCOBALAMIN) 1000 MCG/ML injection, Inject 1 mL (1,000 mcg)  into the muscle every 30 days, Disp: 1 mL, Rfl: 11     cyclobenzaprine (FLEXERIL) 10 MG tablet, Take 0.5 to 1 tablet every 8 hours as needed., Disp: 30 tablet, Rfl: 0     diclofenac (VOLTAREN) 1 % topical gel, Apply 4 g topically 3 times daily To top of both shoulders in area of pain, Disp: 350 g, Rfl: 2     diltiazem ER (DILT-XR) 120 MG 24 hr capsule, TAKE 1 CAPSULE(120 MG) BY MOUTH AT BEDTIME, Disp: 90 capsule, Rfl: 4     DULoxetine (CYMBALTA) 60 MG capsule, Take 1 capsule (60 mg) by mouth daily, Disp: 30 capsule, Rfl: 11     EASY COMFORT PEN NEEDLES 31G X 5 MM miscellaneous, USE TO INJECT INSULIN ONCE DAILY, Disp: , Rfl:      famotidine (PEPCID) 20 MG tablet, Take 1 tablet (20 mg) by mouth 2 times daily, Disp: 60 tablet, Rfl: 11     ferrous sulfate (IRON) 325 (65 FE) MG tablet, Take 1 tablet (325 mg) by mouth daily (with breakfast), Disp: 30 tablet, Rfl: 2     fluocinolone (SYNALAR) 0.01 % solution, APPLY 1-2 ML TOPICALLY TO AFFECTED AREA 1-2 TIMES PER DAY., Disp: , Rfl:      gabapentin (NEURONTIN) 250 MG/5ML solution, TAKE 15 ML(750 MG) BY MOUTH THREE TIMES DAILY, Disp: 1360 mL, Rfl: 2     insulin pen needle (BD ULTRA-FINE) 29G X 12.7MM miscellaneous, Use 1 time daily, Disp: 90 each, Rfl: 3     JARDIANCE 10 MG TABS tablet, TAKE 1 TABLET(10 MG) BY MOUTH DAILY, Disp: 30 tablet, Rfl: 1     Lancet Devices (EASY MINI EJECT LANCING DEVICE) MISC, See Admin Instructions, Disp: , Rfl:      Lancets 30G MISC, USE TO TEST BLOOD GLUCOSE FOUR TIMES DAILY, Disp: 400 each, Rfl: 11     LANTUS SOLOSTAR 100 UNIT/ML soln, INJECT 25 UNITS UNDER THE SKIN DAILY (Patient taking differently: Inject 36 Units Subcutaneous At Bedtime), Disp: 6 mL, Rfl: 11     latanoprost (XALATAN) 0.005 % ophthalmic solution, INSTILL 1 DROP IN BOTH EYES DAILY AT BEDTIME, Disp: , Rfl:      lidocaine (LMX4) 4 % external cream, Apply topically once as needed for mild pain (left knee), Disp: 45 g, Rfl: 11     lisinopril (ZESTRIL) 40 MG tablet, Take 1 tablet  (40 mg) by mouth At Bedtime, Disp: 90 tablet, Rfl: 3     naloxone (NARCAN) 4 MG/0.1ML nasal spray, Spray 1 spray (4 mg) into one nostril alternating nostrils once as needed for opioid reversal Every 2-3 minutes until patient responsive or EMS arrives, Disp: 0.2 mL, Rfl: 0     ondansetron (ZOFRAN ODT) 4 MG ODT tab, Take 4 mg by mouth as needed, Disp: , Rfl:      order for DME, Equipment being ordered: wheeled walker with seat, Disp: 1 Device, Rfl: 0     oxyCODONE-acetaminophen (PERCOCET)  MG per tablet, Take 2 tablets by mouth At Bedtime. May also take 1 tablet every 6 hours as needed for severe pain., Disp: 120 tablet, Rfl: 0     risperiDONE (RISPERDAL) 0.25 MG tablet, Take 1 tablet (0.25 mg) by mouth At Bedtime, Disp: , Rfl:      rivaroxaban ANTICOAGULANT (XARELTO) 10 MG TABS tablet, Take 1 tablet (10 mg) by mouth daily (with dinner), Disp: 30 tablet, Rfl: 0     senna-docusate (SENOKOT-S/PERICOLACE) 8.6-50 MG tablet, Take 1 tablet by mouth 2 times daily, Disp: 60 tablet, Rfl: 11     Simethicone 180 MG CAPS, Use 1 softgel as needed for gas, Disp: 60 capsule, Rfl: 11     sodium citrate-citric acid (BICITRA) 500-334 MG/5ML solution, Take 10 mLs by mouth 3 times daily (with meals), Disp: 473 mL, Rfl: 11     SUMAtriptan (IMITREX) 25 MG tablet, One tab at onset of HA.  May repeat dose in 2 hours.  Do not exceed 200 mg in 24 hours, Disp: 24 tablet, Rfl: 1     tolterodine ER (DETROL LA) 2 MG 24 hr capsule, Take 1 capsule (2 mg) by mouth daily, Disp: 90 capsule, Rfl: 3     TRULICITY 3 MG/0.5ML SOPN, INJECT 3MG UNDER THE SKIN ONCE WEEKLY, Disp: , Rfl:     Current Facility-Administered Medications:      cyanocobalamin injection 1,000 mcg, 1,000 mcg, Intramuscular, Q30 Days, Jose Angel Meyer MD     cyanocobalamin injection 1,000 mcg, 1,000 mcg, Intramuscular, Q30 Days, Jose Angel Meyer MD, 1,000 mcg at 10/27/22 1150    Plan: Refill phentermine Continue Trulicity. Can increase to 4.5 but prefers to stay at 3.0.  Last A1c 6.9. Annual labs due. Has has other labs showing an anemia, elevated PTH.  Return in about 6 months (around 5/9/2023).    Bariatric Surgery Review     Interim History/LifeChanges: Vitamins going well. Hip acting up. Needs a refill phentermine. Taking 36U Long acting insulin. No lows. Due for A1c on the 18th.    Patient Concerns: needs refill  Appetite (1-10): down  GERD: no    Medication changes: no    Vitamin Intake:   B-12   injections   MVI  2/d   Vitamin D  5,000   Calcium   dietary     Other                LABS: ordered    Nausea no  Vomiting no  Constipation no  Diarrhea no  Rashes no  Hair Loss no  Calf tenderness no  Breathing difficulty no  Reactive Hypoglycemia no  Light Headedness no   Moods euthymic    12 point ROS as above and otherwise negative      Habits:  Alcohol: no  Tobacco: no  Caffeine rare soda NSAIDS no  Exercise Routine: using the gym at her complex up to 3X/wk  3 meals/day yes  Protein first yes  6-grams/day  Water Separate from meals yes  Calorie Containing Beverages no  Restaurant eating/wk rare  Sleeping not a lot of sleep d/t pain in her feet (RLS)  Stress moderate  CPAP: NA  Contraception: hyst  DEXA:    Social History     Social History     Socioeconomic History     Marital status: Single     Spouse name: Not on file     Number of children: Not on file     Years of education: Not on file     Highest education level: Not on file   Occupational History     Not on file   Tobacco Use     Smoking status: Never     Smokeless tobacco: Never   Substance and Sexual Activity     Alcohol use: No     Drug use: No     Sexual activity: Never   Other Topics Concern     Parent/sibling w/ CABG, MI or angioplasty before 65F 55M? No   Social History Narrative    Lives alone. [place], other daughter lives here 2 sons live in the [facility]     Social Determinants of Health     Financial Resource Strain: Not on file   Food Insecurity: Not on file   Transportation Needs: Not on  file   Physical Activity: Not on file   Stress: Not on file   Social Connections: Not on file   Intimate Partner Violence: Not on file   Housing Stability: Not on file       Past Medical History     Past Medical History:   Diagnosis Date     Abnormal cervical Papanicolaou smear with positive human papillomavirus deoxyribonucleic acid test      Ankle joint pain     bilateral     Anxiety      Arthritis      Arthritis     rheumatoid arthritis     Asthma      Carpal tunnel syndrome     bilateral     Compression neuropathy      Depression      Depressive disorder      Diabetes (H)      Diabetes (H)     type II     Diabetic nephropathy (H)      DVT of lower extremity, bilateral (H) 2011    right     Esophageal reflux      Gastroenteritis      History of blood clots      History of blood transfusion      Hyperlipidemia      Hypertension      Hypertension      Infectious fasciitis 2011    abdominal wall      Insomnia      Neck pain      Necrotizing fasciitis (H) 11/15/2012     Necrotizing fasciitis (H)      Neuropathy      Obesity      Osteoarthritis of both knees 10/31/2016     Pulmonary embolism and infarction (H)      SOB (shortness of breath)     with exertion     Tarsal tunnel syndrome      Uncomplicated asthma      Urinary incontinence      Problem List     Patient Active Problem List   Diagnosis     Chronic pain of both shoulders     Abnormal Pap smear of cervix     Moderate persistent asthma without complication     Carpal tunnel syndrome     Health Care Home     Moderate major depression (H)     S/P hysterectomy     Hyperlipidemia     Status post bariatric surgery     Essential hypertension     Urinary incontinence     Esophageal reflux     Microalbuminuria     Type 2 diabetes mellitus with diabetic polyneuropathy (H)     Bilateral ankle joint pain     Chronic pain disorder     Asthma     Compression neuropathy of left lower extremity     Hernia     Hernia of anterior abdominal wall     Obesity     Osteoarthritis  of both knees     Polyneuropathy due to secondary diabetes mellitus (H)     Copper deficiency     Metabolic acidosis     Neck mass     Persons encountering health services in other specified circumstances     Hyperglycemia due to type 2 diabetes mellitus (H)     History of cholecystectomy     History of hysterectomy for benign disease     Morbid obesity (H)     Stage 3a chronic kidney disease (H)     Arthritis of both acromioclavicular joints     H/O necrotizing fasciitis     Chronic, continuous use of opioids     Acidosis     Acute chest pain     Medications       Current Outpatient Medications:      phentermine (ADIPEX-P) 37.5 MG tablet, Take 0.5-1 tablets (18.75-37.5 mg) by mouth every morning, Disp: 90 tablet, Rfl: 1     acetaminophen (TYLENOL) 500 MG tablet, Take 1 tablet (500 mg) by mouth 2 times daily as needed for mild pain, Disp: 30 tablet, Rfl: 0     Alcohol Swabs (PRO COMFORT ALCOHOL) 70 % PADS, 1 pad 4 times daily as needed (blood sugar testing) Use QID for blood sugar monitoring, Disp: 400 each, Rfl: 11     amitriptyline (ELAVIL) 10 MG tablet, TAKE 1 TABLET(10 MG) BY MOUTH AT BEDTIME, Disp: 30 tablet, Rfl: 11     atorvastatin (LIPITOR) 10 MG tablet, TAKE 1 TABLET BY MOUTH EVERY DAY, Disp: 90 tablet, Rfl: 3     blood glucose (NO BRAND SPECIFIED) lancets standard, Check fasting glucose 4 times daily.  For Accu check monitor, Disp: 360 each, Rfl: 11     blood glucose calibration (ONETOUCH ULTRA CONTROL) solution, See Admin Instructions, Disp: , Rfl:      blood glucose monitoring (ONE TOUCH ULTRA 2) meter device kit, See Admin Instructions, Disp: , Rfl:      budesonide-formoterol (SYMBICORT) 80-4.5 MCG/ACT Inhaler, Inhale 2 puffs once daily plus 1-2 puffs as needed. May use up to 12 puffs per day., Disp: 20.4 g, Rfl: 11     capsaicin (ZOSTRIX) 0.025 % external cream, Apply 3-4 times daily to knees, Disp: 118 mL, Rfl: 11     Continuous Blood Gluc Sensor (FREESTYLE ANIKET 14 DAY SENSOR) MISC, 1 each every 14  days Use 1 Sensor every 14 days. Use to read blood sugars per 's instructions., Disp: 2 each, Rfl: 5     CONTOUR NEXT TEST test strip, USE TO TEST BLOOD GLUCOSE FOUR TIMES DAILY, Disp: 400 strip, Rfl: 11     cyanocobalamin (CYANOCOBALAMIN) 1000 MCG/ML injection, Inject 1 mL (1,000 mcg) into the muscle every 30 days, Disp: 1 mL, Rfl: 11     cyclobenzaprine (FLEXERIL) 10 MG tablet, Take 0.5 to 1 tablet every 8 hours as needed., Disp: 30 tablet, Rfl: 0     diclofenac (VOLTAREN) 1 % topical gel, Apply 4 g topically 3 times daily To top of both shoulders in area of pain, Disp: 350 g, Rfl: 2     diltiazem ER (DILT-XR) 120 MG 24 hr capsule, TAKE 1 CAPSULE(120 MG) BY MOUTH AT BEDTIME, Disp: 90 capsule, Rfl: 4     DULoxetine (CYMBALTA) 60 MG capsule, Take 1 capsule (60 mg) by mouth daily, Disp: 30 capsule, Rfl: 11     EASY COMFORT PEN NEEDLES 31G X 5 MM miscellaneous, USE TO INJECT INSULIN ONCE DAILY, Disp: , Rfl:      famotidine (PEPCID) 20 MG tablet, Take 1 tablet (20 mg) by mouth 2 times daily, Disp: 60 tablet, Rfl: 11     ferrous sulfate (IRON) 325 (65 FE) MG tablet, Take 1 tablet (325 mg) by mouth daily (with breakfast), Disp: 30 tablet, Rfl: 2     fluocinolone (SYNALAR) 0.01 % solution, APPLY 1-2 ML TOPICALLY TO AFFECTED AREA 1-2 TIMES PER DAY., Disp: , Rfl:      gabapentin (NEURONTIN) 250 MG/5ML solution, TAKE 15 ML(750 MG) BY MOUTH THREE TIMES DAILY, Disp: 1360 mL, Rfl: 2     insulin pen needle (BD ULTRA-FINE) 29G X 12.7MM miscellaneous, Use 1 time daily, Disp: 90 each, Rfl: 3     JARDIANCE 10 MG TABS tablet, TAKE 1 TABLET(10 MG) BY MOUTH DAILY, Disp: 30 tablet, Rfl: 1     Lancet Devices (EASY MINI EJECT LANCING DEVICE) MISC, See Admin Instructions, Disp: , Rfl:      Lancets 30G MISC, USE TO TEST BLOOD GLUCOSE FOUR TIMES DAILY, Disp: 400 each, Rfl: 11     LANTUS SOLOSTAR 100 UNIT/ML soln, INJECT 25 UNITS UNDER THE SKIN DAILY (Patient taking differently: Inject 36 Units Subcutaneous At Bedtime), Disp: 6  mL, Rfl: 11     latanoprost (XALATAN) 0.005 % ophthalmic solution, INSTILL 1 DROP IN BOTH EYES DAILY AT BEDTIME, Disp: , Rfl:      lidocaine (LMX4) 4 % external cream, Apply topically once as needed for mild pain (left knee), Disp: 45 g, Rfl: 11     lisinopril (ZESTRIL) 40 MG tablet, Take 1 tablet (40 mg) by mouth At Bedtime, Disp: 90 tablet, Rfl: 3     naloxone (NARCAN) 4 MG/0.1ML nasal spray, Spray 1 spray (4 mg) into one nostril alternating nostrils once as needed for opioid reversal Every 2-3 minutes until patient responsive or EMS arrives, Disp: 0.2 mL, Rfl: 0     ondansetron (ZOFRAN ODT) 4 MG ODT tab, Take 4 mg by mouth as needed, Disp: , Rfl:      order for DME, Equipment being ordered: wheeled walker with seat, Disp: 1 Device, Rfl: 0     oxyCODONE-acetaminophen (PERCOCET)  MG per tablet, Take 2 tablets by mouth At Bedtime. May also take 1 tablet every 6 hours as needed for severe pain., Disp: 120 tablet, Rfl: 0     risperiDONE (RISPERDAL) 0.25 MG tablet, Take 1 tablet (0.25 mg) by mouth At Bedtime, Disp: , Rfl:      rivaroxaban ANTICOAGULANT (XARELTO) 10 MG TABS tablet, Take 1 tablet (10 mg) by mouth daily (with dinner), Disp: 30 tablet, Rfl: 0     senna-docusate (SENOKOT-S/PERICOLACE) 8.6-50 MG tablet, Take 1 tablet by mouth 2 times daily, Disp: 60 tablet, Rfl: 11     Simethicone 180 MG CAPS, Use 1 softgel as needed for gas, Disp: 60 capsule, Rfl: 11     sodium citrate-citric acid (BICITRA) 500-334 MG/5ML solution, Take 10 mLs by mouth 3 times daily (with meals), Disp: 473 mL, Rfl: 11     SUMAtriptan (IMITREX) 25 MG tablet, One tab at onset of HA.  May repeat dose in 2 hours.  Do not exceed 200 mg in 24 hours, Disp: 24 tablet, Rfl: 1     tolterodine ER (DETROL LA) 2 MG 24 hr capsule, Take 1 capsule (2 mg) by mouth daily, Disp: 90 capsule, Rfl: 3     TRULICITY 3 MG/0.5ML SOPN, INJECT 3MG UNDER THE SKIN ONCE WEEKLY, Disp: , Rfl:     Current Facility-Administered Medications:      cyanocobalamin  "injection 1,000 mcg, 1,000 mcg, Intramuscular, Q30 Days, Jose Angel Meyer MD     cyanocobalamin injection 1,000 mcg, 1,000 mcg, Intramuscular, Q30 Days, Jose Angel Meyer MD, 1,000 mcg at 10/27/22 1150   Surgical History     Past Surgical History  She has a past surgical history that includes gastric bypass (); Hysterectomy; gastric bypass ();  Section; Uterine Fibroid Surgery (); Abdominal Wall Surgery; REVISE MEDIAN N/CARPAL TUNNEL SURG (Right, 10/24/2014); Hysterectomy; Uterine Fibroid Surgery; knee surgery (Right); Colonoscopy (N/A, 3/15/2017); Colonoscopy (N/A, 3/16/2017); and Midline Insertion - Double Lumen (2021).    Objective-Exam     Constitutional:  Ht 1.511 m (4' 11.5\")   Wt 91.2 kg (201 lb)   LMP  (LMP Unknown)   BMI 39.92 kg/m    General:  Pleasant and in no acute distress     Psychiatric: alert and oriented X3, mood and affect normal    Counseling     We reviewed the important post op bariatric recommendations:  -eating 3 meals daily  -eating protein first, getting >60gm protein daily  -eating slowly, chewing food well  -avoiding/limiting calorie containing beverages  -drinking water 15-30 minutes before or after meals  -choosing wheat, not white with breads, crackers, pastas, aleisha, bagels, tortillas, rice  -limiting restaurant or cafeteria eating to twice a week or less    We discussed the importance of restorative sleep and stress management in maintaining a healthy weight.  We discussed the National Weight Control Registry healthy weight maintenance strategies and ways to optimize metabolism.  We discussed the importance of physical activity including cardiovascular and strength training in maintaining a healthier weight.    We discussed the importance of life-long vitamin supplementation and life-long  follow-up.    Ruthy was reminded that, to avoid marginal ulcers she should avoid tobacco at all, alcohol in excess, caffeine in excess, and NSAIDS (unless " indicated for cardioprotection or othewise and opposed by a PPI).    Vy Carson MD, MD, [facility].  11/9/2022  3:08 PM  Total time spent on the date of this encounter doing: chart review, review of test results, patient visit, physical exam, education, counseling, developing plan of care, and documenting = 30 minutes.    Phone call duration: 30  minutes

## 2022-11-09 NOTE — PATIENT INSTRUCTIONS
Google: Best Sciatic Nerve Stretches  [facility]  Nutritional Guidelines  Gastric Bypass 18 Months Post Op and Beyond    General Guidelines and Helpful Hints:  Eat 3 meals per day + protein supplement(s). No snacks between meals.  Do not skip meals.  This can cause overeating at the next meal and will prevent adequate protein and nutritional intake.  Aim for 60-80 grams of protein per day.  Always eat your protein first. This assists with optimal nutrition and helps you stay full longer.  Depending on your portion size, you may need to drink approved protein supplement between meals to achieve protein goals. Follow recommendations of your Dietitian.   Eat your protein first, and then follow with fiber.   It is not necessary to count your fiber, but 15-20 grams per day is recommended.    Add fiber by including fruits, vegetables, whole grains, and beans.   Portions should remain about 1 cup per meal. Use measuring cups to be accurate.  Continue to use saucer/salad plates, infant/toddler silverware to keep portion sizes small and take small bites.  Eat S-L-O-W-L-Y to make each meal last 20-30 minutes. Always stop eating when satisfied.  Continue to use caution with foods containing skins, peels or membranes. Chew well!  Aim for 64 oz. of calorie-free fluids daily.  Continue to avoid caffeine and carbonation. If you choose to drink alcohol, do so in moderation.   Remember to avoid drinking during meals, 15-30 minutes before and 30 minutes after.  Exercise is wu for continued weight loss and weight maintenance. Aim for 30-60 minutes of physical activity most days of the week. Include cardiovascular and strength training.  If having trouble tolerating meat, try using a crock-pot, tinfoil tent, steamer or other moist cooking method to create tender meats. Add broth or low-fat gravy to help meat stay moist.   Avoid high sugar and high fat foods to prevent dumping syndrome.  Check nutrition labels for less  than 10 grams of sugar and less than 10 grams of fat per serving.  Continue Taking Vitamins/Minerals:  2168-2817 mcg of Sublingual B-12 daily  1 Multivitamin with Iron twice daily (chewable or swallow tabs)  500-600 mg Calcium Citrate twice daily (chewable or swallow tabs)  5000 IU Vitamin D3 daily    Sample Grocery List    Protein:  Fat free Greek or light yogurt (less than 10 grams sugar)  Fat free or low-fat cottage cheese  String cheese or reduced fat cheese slices  Tuna, salmon, crab, egg, or chicken salad made with light or fat free mayonnaise  Egg or Egg Substitute  Lean/extra lean turkey, beef, bison, venison (ground, sirloin, round, flank)  Pork loin or tenderloin (grilled, baked, broiled)  Fish such as salmon, tuna, trout, tilapia, etc. (grilled, baked, broiled)  Tender cuts of lean (skinless) turkey or chicken  Lean deli meats: turkey, lean ham, chicken, lean roast beef  Beans such as kidney, garbanzo, black, hunter, or low-fat/fat free refried beans  Peanut butter (natural preferred). Limit to 1 Tbsp. per day.  Low-fat meatloaf (made with lean ground beef or turkey)  Sloppy Joes made with low-sugar ketchup and lean ground beef or turkey  Soy or vegetable protein (i.e. vegan crumbles, soy/veggie burger, tofu)  Hummus    Vegetables:  Fresh: cooked or raw (as tolerated)  Frozen vegetables  Canned vegetables (low sodium or no salt added, rinse before cooking/eating)  (Ok to have skins/peels/membranes/seeds - just chew well)    Fruits:  Fresh fruit  Frozen fruit (no sugar added)  Canned fruit (packed in its own juice, NOT syrup)  (Ok to have skins/peels/membranes/seeds - just chew well)    Starch:  Unsweetened whole-grain hot cereal (or high fiber cold cereal, dry)  Toasted whole wheat bread or [place] Thins  Whole grain crackers  Baked /boiled/mashed potato/sweet potato  Cooked whole grain pasta, brown rice, or other cooked whole grains  Starchy vegetables: corn, peas, winter squash    Protein Supplement:    Ready to drink protein shake with:  15-30 grams protein per serving  Less than 10 grams total carbohydrate per serving   Protein powder mixed with:   Skim or 1% milk  Low fat or fat free Lactaid milk, plain or no sugar added soymilk  Water     Fats: (use in moderation)  1 teaspoon of soft tub margarine  1 teaspoon olive oil, canola oil, or peanut oil  1 tablespoon of low-fat miller or salad dressing     Sample Menu for 18+ months after Gastric Bypass    You do NOT need to eat/drink the full portion sizes listed below  Always stop when you are satisfied    Breakfast   cup 1% cottage cheese     cup mixed berries   Lunch 2 oz lean roast beef on   White Salmon Thin with 1 tsp. light miller    small tomato, chopped, mixed with 1 tsp. light vinaigrette dressing   Supplement Approved protein supplement (if needed between meals)   Dinner 2 oz grilled salmon    cup salad greens with 1 tsp. light salad dressing and 1 tsp. ground flax seed    cup quinoa or brown rice     Breakfast   cup egg substitute with   cup sautéed chopped vegetables  2 light Zahl Krisp crackers   Lunch Tuna Melt:   cup tuna mixed with 1 tsp. light miller over   White Salmon Thin. Top with 2-3 slices cucumber and 1 oz slice of low fat cheese   Supplement 1 cup skim milk (if needed between meals)   Dinner 3 oz  grilled, broiled, or baked seasoned skinless chicken breast    cup asparagus     Breakfast   cup plain oatmeal made with skim or 1% milk with 1 Tbsp. flavored/unflavored protein powder added  1 mozzarella string cheese   Lunch 2 oz deli turkey breast  1/3 cup salad with 1 tsp. light salad dressing, 1/8 of a whole avocado and 1 Tbsp. sunflower seeds   Dinner 3 oz. pork loin made in a crock pot, seasoned with a spice rub    cup cooked carrots   Supplement Approved protein supplement (if needed between meals)     Breakfast 1 cup breakfast casserole made with egg substitute, turkey sausage,  and steamed, chopped bell peppers   Supplement  1 cup light Greek yogurt  (if needed between meals)   Lunch 2 oz. teriyaki turkey    cup mashed sweet potato with 1-2 spritzes of spray butter (like [place])    cup fresh pineapple   Dinner 3 oz low fat meatloaf    cup roasted garlic zucchini     Breakfast   cup leftover breakfast casserole    cup no sugar added applesauce with 1 Tbsp. unflavored protein powder and a sprinkle of cinnamon    Lunch 3 oz shrimp with 1-2 Tbsp. low-sugar cocktail sauce for dipping    c. whole wheat pasta drizzled with   tsp. olive oil   Supplement 1 cup skim/1% milk with scoop of protein powder (if needed between meals)   Dinner Grilled, seasoned kebob with 2 oz lean beef and   cup vegetables     Breakfast Breakfast pizza:   [place] Thin spread with 1 Tbsp. low sugar spaghetti sauce,   cup shredded low fat cheese, melted and 1 slice of Strang mar     cup fresh fruit mixed with chopped almonds   Lunch   cup black bean soup  4-5 whole grain crackers   Dinner 3 oz  tilapia with lemon pepper seasoning    cup stewed tomatoes   Supplement 1 string cheese (if needed between meals)     Breakfast 2 hard boiled eggs (discard 1 egg yolk)    whole wheat English Muffin with 1 tsp. low sugar jelly   Lunch   cup leftover black bean soup topped with 1-2 Tbsp. low fat cheese  2-3 light Rye Krisp crackers   Supplement Approved protein supplement (if needed between meals)   Dinner 3 oz sirloin steak    cup steamed broccoli

## 2022-11-18 ENCOUNTER — OFFICE VISIT (OUTPATIENT)
Dept: FAMILY MEDICINE | Facility: CLINIC | Age: 57
End: 2022-11-18
Payer: COMMERCIAL

## 2022-11-18 VITALS
OXYGEN SATURATION: 98 % | SYSTOLIC BLOOD PRESSURE: 167 MMHG | TEMPERATURE: 98.2 F | DIASTOLIC BLOOD PRESSURE: 96 MMHG | RESPIRATION RATE: 16 BRPM | HEART RATE: 101 BPM

## 2022-11-18 DIAGNOSIS — Z79.4 TYPE 2 DIABETES MELLITUS WITH DIABETIC POLYNEUROPATHY, WITH LONG-TERM CURRENT USE OF INSULIN (H): ICD-10-CM

## 2022-11-18 DIAGNOSIS — G89.4 CHRONIC PAIN SYNDROME: ICD-10-CM

## 2022-11-18 DIAGNOSIS — K91.2 POSTOPERATIVE MALABSORPTION: ICD-10-CM

## 2022-11-18 DIAGNOSIS — M19.012 ARTHRITIS OF BOTH ACROMIOCLAVICULAR JOINTS: ICD-10-CM

## 2022-11-18 DIAGNOSIS — G89.4 CHRONIC PAIN SYNDROME: Primary | ICD-10-CM

## 2022-11-18 DIAGNOSIS — E11.65 TYPE 2 DIABETES MELLITUS WITH HYPERGLYCEMIA, WITH LONG-TERM CURRENT USE OF INSULIN (H): ICD-10-CM

## 2022-11-18 DIAGNOSIS — Z79.4 TYPE 2 DIABETES MELLITUS WITH HYPERGLYCEMIA, WITH LONG-TERM CURRENT USE OF INSULIN (H): ICD-10-CM

## 2022-11-18 DIAGNOSIS — E11.42 TYPE 2 DIABETES MELLITUS WITH DIABETIC POLYNEUROPATHY, WITH LONG-TERM CURRENT USE OF INSULIN (H): ICD-10-CM

## 2022-11-18 DIAGNOSIS — K90.9 INTESTINAL MALABSORPTION, UNSPECIFIED TYPE: ICD-10-CM

## 2022-11-18 DIAGNOSIS — M19.011 ARTHRITIS OF BOTH ACROMIOCLAVICULAR JOINTS: ICD-10-CM

## 2022-11-18 DIAGNOSIS — M17.0 PRIMARY OSTEOARTHRITIS OF BOTH KNEES: ICD-10-CM

## 2022-11-18 PROCEDURE — 96372 THER/PROPH/DIAG INJ SC/IM: CPT | Performed by: FAMILY MEDICINE

## 2022-11-18 PROCEDURE — 99214 OFFICE O/P EST MOD 30 MIN: CPT | Mod: 25 | Performed by: FAMILY MEDICINE

## 2022-11-18 PROCEDURE — 80306 DRUG TEST PRSMV INSTRMNT: CPT | Performed by: FAMILY MEDICINE

## 2022-11-18 RX ORDER — INSULIN GLARGINE 100 [IU]/ML
26 INJECTION, SOLUTION SUBCUTANEOUS AT BEDTIME
Qty: 30 ML | Refills: 3 | Status: SHIPPED | OUTPATIENT
Start: 2022-11-18 | End: 2023-01-16

## 2022-11-18 RX ORDER — OXYCODONE AND ACETAMINOPHEN 10; 325 MG/1; MG/1
TABLET ORAL
Qty: 120 TABLET | Refills: 0 | Status: SHIPPED | OUTPATIENT
Start: 2022-11-23 | End: 2022-12-16

## 2022-11-18 RX ADMIN — CYANOCOBALAMIN 1000 MCG: 1000 INJECTION, SOLUTION INTRAMUSCULAR; SUBCUTANEOUS at 16:47

## 2022-11-18 NOTE — NURSING NOTE
Clinic Administered Medication Documentation    Administrations This Visit     cyanocobalamin injection 1,000 mcg     Admin Date  11/18/2022 Action  Given Dose  1,000 mcg Route  Intramuscular Site  Left Deltoid Administered By  Viridiana Loaiza CMA    Ordering Provider: Jose Angel Meyer MD    NDC: 63781-037-42    Lot#: [id number]    : [employer] [facility] [[email or web address]]    Patient Supplied?: No

## 2022-11-18 NOTE — PROGRESS NOTES
Assessment & Plan     Type 2 diabetes mellitus with diabetic polyneuropathy, with long-term current use of insulin (H)  Stable.  AIC ordered but could not be drawn today (tough stick).  Will return next week for lab visit    - insulin glargine (LANTUS SOLOSTAR) 100 UNIT/ML pen; Inject 26 Units Subcutaneous At Bedtime    Chronic pain syndrome  Stable.  Predated Rx given.  UDS appropriate.   - oxyCODONE-acetaminophen (PERCOCET)  MG per tablet; Take 2 tablets by mouth At Bedtime. May also take 1 tablet every 6 hours as needed for severe pain (7-10).  - Urine Drugs of Abuse Screen Panel 13    Arthritis of both acromioclavicular joints  A source of chronic pain  - oxyCODONE-acetaminophen (PERCOCET)  MG per tablet; Take 2 tablets by mouth At Bedtime. May also take 1 tablet every 6 hours as needed for severe pain (7-10).    Primary osteoarthritis of both knees    - oxyCODONE-acetaminophen (PERCOCET)  MG per tablet; Take 2 tablets by mouth At Bedtime. May also take 1 tablet every 6 hours as needed for severe pain (7-10).    Postoperative malabsorption    - Vitamin B1 whole blood; Future  - Zinc; Future  - Vitamin A; Future  - Folate; Future  - CBC with platelets; Future  - Comprehensive metabolic panel; Future  - Vitamin D Deficiency; Future  - Vitamin B12; Future  - Ferritin; Future  - Hemoglobin A1c; Future    Intestinal malabsorption, unspecified type    - Zinc; Future    F/up last December  No follow-ups on file.    Jose Angel Meyer MD  [facility] is a 57 year old, presenting for the following health issues:  Pain Management (CPM)      HPI     Pain History:  When did you first notice your pain? - Chronic Pain   Have you seen this provider for your pain in the past?   Yes   Where in your body do you have pain? Multiple sites  Are you seeing anyone else for your pain? No    JUAN-7 SCORE 12/5/2019 5/6/2021 5/6/2021   Total Score - - -   Total Score 12  16 16     PHQ 5/6/2021 10/21/2021 10/21/2021   PHQ-9 Total Score 18 - 4   Q9: Thoughts of better off dead/self-harm past 2 weeks Not at all Not at all Not at all   [id number]}  PEG Score 9/1/2022 10/27/2022 11/18/2022   PEG Total Score 7 9 9           Chronic Pain Follow Up:    Location of pain: arms and legs  Analgesia/pain control:    - Recent changes:  no    - Overall control: Tolerable with discomfort    - Current treatments: oxycodone tid-qid prn   Adherence:     - Do you ever take more pain medicine than prescribed? No    - When did you take your last dose of pain medicine?  today   Adverse effects: No   PDMP Review       Value Time User    State PDMP site checked  Yes 10/27/2022 12:13 PM Jose Angel Meyer MD        Last CSA Agreement:   CSA -- Patient Level:     [Media Unavailable] Controlled Substance Agreement - Opioid - Scan on 12/14/2021  8:56 AM       Last UDS: 10/27/2022      Needs B12 shot.  Due for labs from bariatric clinic, and diabetes, too.  Blood sugars loww 100s in AM.  No hypoglycemia.        Review of Systems   CONSTITUTIONAL: NEGATIVE for fever, chills, change in weight  ENT/MOUTH: NEGATIVE for ear, mouth and throat problems  RESP: NEGATIVE for significant cough or SOB  CV: NEGATIVE for chest pain, palpitations or peripheral edema      Objective    BP (!) 167/96 (BP Location: Left arm, Patient Position: Sitting, Cuff Size: Adult Regular)   Pulse 101   Temp 98.2  F (36.8  C) (Oral)   Resp 16   LMP  (LMP Unknown)   SpO2 98%   There is no height or weight on file to calculate BMI.  Physical Exam   GENERAL: healthy, alert and no distress  NECK: no adenopathy, no asymmetry, masses, or scars and thyroid normal to palpation  RESP: lungs clear to auscultation - no rales, rhonchi or wheezes  CV: regular rate and rhythm, normal S1 S2, no S3 or S4, no murmur, click or rub, no peripheral edema and peripheral pulses strong  MS: no gross musculoskeletal defects noted, no edema

## 2022-11-28 DIAGNOSIS — I10 ESSENTIAL HYPERTENSION: ICD-10-CM

## 2022-11-28 RX ORDER — DILTIAZEM HYDROCHLORIDE 120 MG/1
CAPSULE, EXTENDED RELEASE ORAL
Qty: 90 CAPSULE | Refills: 4 | Status: SHIPPED | OUTPATIENT
Start: 2022-11-28 | End: 2023-08-31

## 2022-12-03 ENCOUNTER — TELEPHONE (OUTPATIENT)
Dept: FAMILY MEDICINE | Facility: CLINIC | Age: 57
End: 2022-12-03

## 2022-12-03 NOTE — TELEPHONE ENCOUNTER
Re: Black watery stools x 8 days    Past Medical History:   Diagnosis Date     Abnormal cervical Papanicolaou smear with positive human papillomavirus deoxyribonucleic acid test      Ankle joint pain     bilateral     Anxiety      Arthritis      Arthritis     rheumatoid arthritis     Asthma      Carpal tunnel syndrome     bilateral     Compression neuropathy      Depression      Depressive disorder      Diabetes (H)      Diabetes (H)     type II     Diabetic nephropathy (H)      DVT of lower extremity, bilateral (H) 2011    right     Esophageal reflux      Gastroenteritis      History of blood clots      History of blood transfusion      Hyperlipidemia      Hypertension      Hypertension      Infectious fasciitis 2011    abdominal wall      Insomnia      Neck pain      Necrotizing fasciitis (H) 11/15/2012     Necrotizing fasciitis (H)      Neuropathy      Obesity      Osteoarthritis of both knees 10/31/2016     Pulmonary embolism and infarction (H)      SOB (shortness of breath)     with exertion     Tarsal tunnel syndrome      Uncomplicated asthma      Urinary incontinence      Current Outpatient Medications   Medication     acetaminophen (TYLENOL) 500 MG tablet     Alcohol Swabs (PRO COMFORT ALCOHOL) 70 % PADS     amitriptyline (ELAVIL) 10 MG tablet     atorvastatin (LIPITOR) 10 MG tablet     blood glucose (NO BRAND SPECIFIED) lancets standard     blood glucose calibration (ONETOUCH ULTRA CONTROL) solution     blood glucose monitoring (ONE TOUCH ULTRA 2) meter device kit     budesonide-formoterol (SYMBICORT) 80-4.5 MCG/ACT Inhaler     capsaicin (ZOSTRIX) 0.025 % external cream     Continuous Blood Gluc Sensor ([employer] ANIKET 14 DAY SENSOR) MISC     CONTOUR NEXT TEST test strip     cyanocobalamin (CYANOCOBALAMIN) 1000 MCG/ML injection     cyclobenzaprine (FLEXERIL) 10 MG tablet     diclofenac (VOLTAREN) 1 % topical gel     diltiazem ER (DILT-XR) 120 MG 24 hr capsule     DULoxetine (CYMBALTA) 60 MG capsule     EASY  COMFORT PEN NEEDLES 31G X 5 MM miscellaneous     famotidine (PEPCID) 20 MG tablet     ferrous sulfate (IRON) 325 (65 FE) MG tablet     fluocinolone (SYNALAR) 0.01 % solution     gabapentin (NEURONTIN) 250 MG/5ML solution     insulin glargine (LANTUS SOLOSTAR) 100 UNIT/ML pen     insulin pen needle (BD ULTRA-FINE) 29G X 12.7MM miscellaneous     JARDIANCE 10 MG TABS tablet     Lancet Devices (EASY MINI EJECT LANCING DEVICE) MISC     Lancets 30G MISC     latanoprost (XALATAN) 0.005 % ophthalmic solution     lidocaine (LMX4) 4 % external cream     lisinopril (ZESTRIL) 40 MG tablet     naloxone (NARCAN) 4 MG/0.1ML nasal spray     ondansetron (ZOFRAN ODT) 4 MG ODT tab     order for DME     oxyCODONE-acetaminophen (PERCOCET)  MG per tablet     phentermine (ADIPEX-P) 37.5 MG tablet     risperiDONE (RISPERDAL) 0.25 MG tablet     rivaroxaban ANTICOAGULANT (XARELTO) 10 MG TABS tablet     senna-docusate (SENOKOT-S/PERICOLACE) 8.6-50 MG tablet     Simethicone 180 MG CAPS     sodium citrate-citric acid (BICITRA) 500-334 MG/5ML solution     SUMAtriptan (IMITREX) 25 MG tablet     tolterodine ER (DETROL LA) 2 MG 24 hr capsule     TRULICITY 3 MG/0.5ML SOPN     Current Facility-Administered Medications   Medication     cyanocobalamin injection 1,000 mcg     cyanocobalamin injection 1,000 mcg     Patient has significant history of Kim-en-Y bypass, she is complaining of multiple day history of watery black stools.  She has been taking Pepto-Bismol has been drinking Pepto-Bismol almost daily during this time, states that symptoms started roughly after Thanksgiving.  She also states that maybe she had contact with family members that we had influenza.  She does have a mild cough at this time.  She continues to orally hydrate well, denies any significant abdominal pain although does have a little bit of discomfort.  She does have a visit with Dr. Koo on 12/16/2022.  Denies any nausea, vomiting, fevers, chills.    Discussed with  her that Pepto-Bismol will turn her stools dark.  Recommended that she continue to orally hydrate best she can, eat small meals and follow eating guidelines as set by her nutritionist and bariatric team.  Her symptoms significantly worsen or develops fevers, chills recommend that she be seen in person at the clinic.  At which point she could talk about antiviral medications and were testing for influenza.    Senthil Rider PGY3  [facility]

## 2022-12-09 ENCOUNTER — TELEPHONE (OUTPATIENT)
Dept: NEPHROLOGY | Facility: CLINIC | Age: 57
End: 2022-12-09

## 2022-12-09 DIAGNOSIS — E55.9 VITAMIN D DEFICIENCY, UNSPECIFIED: ICD-10-CM

## 2022-12-09 DIAGNOSIS — R80.9 MICROALBUMINURIA: Primary | ICD-10-CM

## 2022-12-09 DIAGNOSIS — D64.9 ANEMIA, UNSPECIFIED TYPE: ICD-10-CM

## 2022-12-09 NOTE — TELEPHONE ENCOUNTER
Called patient with a appointment reminder for Wed. 12/14/22 @ 2:00 pm with Dr. Brunson and a lab draw @ 1:00 pm.    Ernesto Westbrook on 12/9/2022 at 11:35 AM

## 2022-12-15 ENCOUNTER — OFFICE VISIT (OUTPATIENT)
Dept: FAMILY MEDICINE | Facility: CLINIC | Age: 57
End: 2022-12-15
Payer: COMMERCIAL

## 2022-12-15 VITALS
DIASTOLIC BLOOD PRESSURE: 64 MMHG | WEIGHT: 211 LBS | TEMPERATURE: 98.7 F | RESPIRATION RATE: 16 BRPM | BODY MASS INDEX: 41.9 KG/M2 | HEART RATE: 121 BPM | SYSTOLIC BLOOD PRESSURE: 136 MMHG | OXYGEN SATURATION: 98 %

## 2022-12-15 DIAGNOSIS — Z79.4 TYPE 2 DIABETES MELLITUS WITH HYPERGLYCEMIA, WITH LONG-TERM CURRENT USE OF INSULIN (H): ICD-10-CM

## 2022-12-15 DIAGNOSIS — M19.012 ARTHRITIS OF BOTH ACROMIOCLAVICULAR JOINTS: ICD-10-CM

## 2022-12-15 DIAGNOSIS — G89.4 CHRONIC PAIN SYNDROME: ICD-10-CM

## 2022-12-15 DIAGNOSIS — Z00.00 ENCOUNTER FOR PREVENTIVE HEALTH EXAMINATION: ICD-10-CM

## 2022-12-15 DIAGNOSIS — Z13.220 SCREENING FOR HYPERLIPIDEMIA: ICD-10-CM

## 2022-12-15 DIAGNOSIS — M17.0 PRIMARY OSTEOARTHRITIS OF BOTH KNEES: ICD-10-CM

## 2022-12-15 DIAGNOSIS — M19.011 ARTHRITIS OF BOTH ACROMIOCLAVICULAR JOINTS: ICD-10-CM

## 2022-12-15 DIAGNOSIS — D64.9 ANEMIA, UNSPECIFIED TYPE: Primary | ICD-10-CM

## 2022-12-15 DIAGNOSIS — E11.65 TYPE 2 DIABETES MELLITUS WITH HYPERGLYCEMIA, WITH LONG-TERM CURRENT USE OF INSULIN (H): ICD-10-CM

## 2022-12-15 LAB
ANION GAP SERPL CALCULATED.3IONS-SCNC: 12 MMOL/L (ref 7–15)
BUN SERPL-MCNC: 23.3 MG/DL (ref 6–20)
CALCIUM SERPL-MCNC: 8.2 MG/DL (ref 8.6–10)
CHLORIDE SERPL-SCNC: 110 MMOL/L (ref 98–107)
CHOLEST SERPL-MCNC: 253 MG/DL
CREAT SERPL-MCNC: 1.14 MG/DL (ref 0.51–0.95)
DEPRECATED HCO3 PLAS-SCNC: 24 MMOL/L (ref 22–29)
ERYTHROCYTE [DISTWIDTH] IN BLOOD BY AUTOMATED COUNT: 14.1 % (ref 10–15)
GFR SERPL CREATININE-BSD FRML MDRD: 56 ML/MIN/1.73M2
GLUCOSE SERPL-MCNC: 105 MG/DL (ref 70–99)
HBA1C MFR BLD: 5.6 % (ref 0–5.6)
HCT VFR BLD AUTO: 32.3 % (ref 35–47)
HDLC SERPL-MCNC: 71 MG/DL
HGB BLD-MCNC: 10.2 G/DL (ref 11.7–15.7)
LDLC SERPL CALC-MCNC: 151 MG/DL
MCH RBC QN AUTO: 31.8 PG (ref 26.5–33)
MCHC RBC AUTO-ENTMCNC: 31.6 G/DL (ref 31.5–36.5)
MCV RBC AUTO: 101 FL (ref 78–100)
NONHDLC SERPL-MCNC: 182 MG/DL
PLATELET # BLD AUTO: 358 10E3/UL (ref 150–450)
POTASSIUM SERPL-SCNC: 3.9 MMOL/L (ref 3.4–5.3)
RBC # BLD AUTO: 3.21 10E6/UL (ref 3.8–5.2)
SODIUM SERPL-SCNC: 146 MMOL/L (ref 136–145)
TRIGL SERPL-MCNC: 155 MG/DL
WBC # BLD AUTO: 12.8 10E3/UL (ref 4–11)

## 2022-12-15 PROCEDURE — 80061 LIPID PANEL: CPT | Performed by: FAMILY MEDICINE

## 2022-12-15 PROCEDURE — 99396 PREV VISIT EST AGE 40-64: CPT | Mod: 25 | Performed by: FAMILY MEDICINE

## 2022-12-15 PROCEDURE — 36415 COLL VENOUS BLD VENIPUNCTURE: CPT | Performed by: FAMILY MEDICINE

## 2022-12-15 PROCEDURE — 80048 BASIC METABOLIC PNL TOTAL CA: CPT | Performed by: FAMILY MEDICINE

## 2022-12-15 PROCEDURE — 83036 HEMOGLOBIN GLYCOSYLATED A1C: CPT | Performed by: FAMILY MEDICINE

## 2022-12-15 PROCEDURE — 96372 THER/PROPH/DIAG INJ SC/IM: CPT | Performed by: FAMILY MEDICINE

## 2022-12-15 PROCEDURE — 85027 COMPLETE CBC AUTOMATED: CPT | Performed by: FAMILY MEDICINE

## 2022-12-15 RX ADMIN — CYANOCOBALAMIN 1000 MCG: 1000 INJECTION, SOLUTION INTRAMUSCULAR; SUBCUTANEOUS at 12:51

## 2022-12-15 ASSESSMENT — ENCOUNTER SYMPTOMS
WEAKNESS: 1
ABDOMINAL PAIN: 0
MYALGIAS: 1
CONSTIPATION: 0
NAUSEA: 0
CHILLS: 1
DIZZINESS: 1
HEMATURIA: 0
NERVOUS/ANXIOUS: 1
SHORTNESS OF BREATH: 1
HEARTBURN: 0
HEMATOCHEZIA: 1
HEADACHES: 1
BREAST MASS: 0
FEVER: 0
PALPITATIONS: 1
SORE THROAT: 0
DIARRHEA: 0
EYE PAIN: 0
COUGH: 0
PARESTHESIAS: 0
ARTHRALGIAS: 1
FREQUENCY: 1
JOINT SWELLING: 1
DYSURIA: 0

## 2022-12-15 ASSESSMENT — ACTIVITIES OF DAILY LIVING (ADL): CURRENT_FUNCTION: PREPARING MEALS REQUIRES ASSISTANCE

## 2022-12-15 NOTE — LETTER
December 22, 2022      Ruthy Álvarez  [address]    [place] [facility] [address]        Dear ,    We are writing to inform you of your test results.    Your kidney tests have some mild abnormalities, but indicates that generally that our treatments are working fine.  I'm most encouraged that the CO2 level is now normal.     Your diabetes control is great.   Your cholesterol is somewhat higher than expected.  Please be sure to take atorvastatin every day.  We'll check this again in 3 months.     Thanks for your trust.       Resulted Orders   Basic metabolic panel   Result Value Ref Range    Sodium 146 (H) 136 - 145 mmol/L    Potassium 3.9 3.4 - 5.3 mmol/L    Chloride 110 (H) 98 - 107 mmol/L    Carbon Dioxide (CO2) 24 22 - 29 mmol/L    Anion Gap 12 7 - 15 mmol/L    Urea Nitrogen 23.3 (H) 6.0 - 20.0 mg/dL    Creatinine 1.14 (H) 0.51 - 0.95 mg/dL    Calcium 8.2 (L) 8.6 - 10.0 mg/dL    Glucose 105 (H) 70 - 99 mg/dL    GFR Estimate 56 (L) >60 mL/min/1.73m2      Comment:      Effective December 21, 2021 eGFRcr in adults is calculated using the 2021 CKD-EPI creatinine equation which includes age and gender ([place] et al., NE, DOI: 10.1056/LKKHpj3633812)   Hemoglobin A1c   Result Value Ref Range    Hemoglobin A1C 5.6 0.0 - 5.6 %      Comment:      Normal <5.7%   Prediabetes 5.7-6.4%    Diabetes 6.5% or higher     Note: Adopted from ADA consensus guidelines.   Lipid Profile   Result Value Ref Range    Cholesterol 253 (H) <200 mg/dL    Triglycerides 155 (H) <150 mg/dL    Direct Measure HDL 71 >=50 mg/dL    LDL Cholesterol Calculated 151 (H) <=100 mg/dL    Non HDL Cholesterol 182 (H) <130 mg/dL    Narrative    Cholesterol  Desirable:  <200 mg/dL    Triglycerides  Normal:  Less than 150 mg/dL  Borderline High:  150-199 mg/dL  High:  200-499 mg/dL  Very High:  Greater than or equal to 500 mg/dL    Direct Measure HDL  Female:  Greater than or equal to 50 mg/dL   Male:  Greater than or equal to 40 mg/dL    LDL  Cholesterol  Desirable:  <100mg/dL  Above Desirable:  100-129 mg/dL   Borderline High:  130-159 mg/dL   High:  160-189 mg/dL   Very High:  >= 190 mg/dL    Non HDL Cholesterol  Desirable:  130 mg/dL  Above Desirable:  130-159 mg/dL  Borderline High:  160-189 mg/dL  High:  190-219 mg/dL  Very High:  Greater than or equal to 220 mg/dL   CBC with platelets   Result Value Ref Range    WBC Count 12.8 (H) 4.0 - 11.0 10e3/uL    RBC Count 3.21 (L) 3.80 - 5.20 10e6/uL    Hemoglobin 10.2 (L) 11.7 - 15.7 g/dL    Hematocrit 32.3 (L) 35.0 - 47.0 %     (H) 78 - 100 fL    MCH 31.8 26.5 - 33.0 pg    MCHC 31.6 31.5 - 36.5 g/dL    RDW 14.1 10.0 - 15.0 %    Platelet Count 358 150 - 450 10e3/uL       If you have any questions or concerns, please call the clinic at the number listed above.       Sincerely,      Jose Angel Meyer MD

## 2022-12-15 NOTE — NURSING NOTE
Clinic Administered Medication Documentation    Administrations This Visit     cyanocobalamin injection 1,000 mcg     Admin Date  12/15/2022 Action  Given Dose  1,000 mcg Route  Intramuscular Site  Left Deltoid Administered By  Viridiana Loaiza CMA    Ordering Provider: Jose Angel Meyer MD    NDC: 98434-930-38    Lot#: [id number]    : [facility]    Patient Supplied?: No

## 2022-12-15 NOTE — PROGRESS NOTES
"   SUBJECTIVE:   CC: Ruthy is an 57 year old who presents for preventive health visit.       Healthy Habits:     In general, how would you rate your overall health?  Poor    Frequency of exercise:  2-3 days/week    Duration of exercise:  15-30 minutes    Do you usually eat at least 4 servings of fruit and vegetables a day, include whole grains    & fiber and avoid regularly eating high fat or \"junk\" foods?  Yes    Taking medications regularly:  No    Barriers to taking medications:  Other    Medication side effects:  Muscle aches    Ability to successfully perform activities of daily living:  Preparing meals requires assistance    Home Safety:  Lack of grab bars in the bathroom    Hearing Impairment:  Difficult to understand a speaker at a public meeting or [employer] service and difficulty understanding soft or whispered speech    In the past 6 months, have you been bothered by leaking of urine? Yes    In general, how would you rate your overall mental or emotional health?  Poor      PHQ-2 Total Score: 0    Additional concerns today:  No        Today's PHQ-2 Score:   PHQ-2 ( 1999 [employer]) 12/15/2022   Q1: Little interest or pleasure in doing things 0   Q2: Feeling down, depressed or hopeless 0   PHQ-2 Score 0   PHQ-2 Total Score (12-17 Years)- Positive if 3 or more points; Administer PHQ-A if positive -   Q1: Little interest or pleasure in doing things Not at all   Q2: Feeling down, depressed or hopeless Not at all   PHQ-2 Score 0       Have you ever done Advance Care Planning? (For example, a Health Directive, POLST, or a discussion with a medical provider or your loved ones about your wishes):     Social History     Tobacco Use     Smoking status: Never     Smokeless tobacco: Never   Substance Use Topics     Alcohol use: No         Alcohol Use 12/15/2022   Prescreen: >3 drinks/day or >7 drinks/week? Not Applicable       Reviewed orders with patient.  Reviewed health maintenance and updated orders accordingly - " Yes    Breast Cancer Screening:  Any new diagnosis of family breast, ovarian, or bowel cancer? No    FHS-7:   Breast CA Risk Assessment (FHS-7) 7/7/2022   Did any of your first-degree relatives have breast or ovarian cancer? No   Did any of your relatives have bilateral breast cancer? No   Did any man in your family have breast cancer? No   Did any woman in your family have breast and ovarian cancer? No   Did any woman in your family have breast cancer before age 50 y? No   Do you have 2 or more relatives with breast and/or ovarian cancer? No   Do you have 2 or more relatives with breast and/or bowel cancer? No     click delete button to remove this line now  Mammogram Screening: Recommended mammography every 1-2 years with patient discussion and risk factor consideration and mammo neg 7/2022  Pertinent mammograms are reviewed under the imaging tab.    History of abnormal Pap smear: Status post benign hysterectomy. Health Maintenance and Surgical History updated.  PAP / HPV 3/12/2015   PAP Negative for squamous intraepithelial lesion or malignancy  Electronically signed by Julia Dallas CT (ASCP) on 3/20/2015 at  9:31 AM       Reviewed and updated as needed this visit by clinical staff   Tobacco  Allergies  Meds              Reviewed and updated as needed this visit by Provider                 Past Medical History:   Diagnosis Date     Abnormal cervical Papanicolaou smear with positive human papillomavirus deoxyribonucleic acid test      Ankle joint pain     bilateral     Anxiety      Arthritis      Arthritis     rheumatoid arthritis     Asthma      Carpal tunnel syndrome     bilateral     Compression neuropathy      Depression      Depressive disorder      Diabetes (H)      Diabetes (H)     type II     Diabetic nephropathy (H)      DVT of lower extremity, bilateral (H) 2011    right     Esophageal reflux      Gastroenteritis      History of blood clots      History of blood transfusion      Hyperlipidemia       Hypertension      Hypertension      Infectious fasciitis 2011    abdominal wall      Insomnia      Neck pain      Necrotizing fasciitis (H) 11/15/2012     Necrotizing fasciitis (H)      Neuropathy      Obesity      Osteoarthritis of both knees 10/31/2016     Pulmonary embolism and infarction (H)      SOB (shortness of breath)     with exertion     Tarsal tunnel syndrome      Uncomplicated asthma      Urinary incontinence       Past Surgical History:   Procedure Laterality Date     ABDOMINAL WALL SURGERY      for necrotizing fasciitis      SECTION       COLONOSCOPY N/A 3/15/2017    Procedure: INCOMPLETE COLONOSCOPY;  Surgeon: Topher Street MD;  Location: [facility];  Service:      COLONOSCOPY N/A 3/16/2017    Procedure: COLONOSCOPY;  Surgeon: Topher Street MD;  Location: [facility] OR;  Service:      GASTRIC BYPASS  2006    Kim'n'Y.  wt then was in low 300lb range     GASTRIC BYPASS  2006    Kim-en-Y     HC REVISE MEDIAN N/CARPAL TUNNEL SURG Right 10/24/2014    Procedure: RIGHT REVISION CARPAL TUNNEL RELEASE WITH HYPOTHENAR FAT PAD;  Surgeon: Aaron Oreilly MD;  Location: [facility];  Service: Orthopedics     HYSTERECTOMY       HYSTERECTOMY       KNEE SURGERY Right      MIDLINE INSERTION - DOUBLE LUMEN  2021          UTERINE FIBROID SURGERY  2008    hysterectomy     UTERINE FIBROID SURGERY       OB History    Para Term  AB Living   5 5 5 0 0 0   SAB IAB Ectopic Multiple Live Births   0 0 0 0 0      # Outcome Date GA Lbr Varun/2nd Weight Sex Delivery Anes PTL Lv   5 Term            4 Term            3 Term            2 Term            1 Term                Review of Systems   Constitutional: Positive for chills. Negative for fever.   HENT: Negative for congestion, ear pain, hearing loss and sore throat.    Eyes: Positive for visual disturbance. Negative for pain.   Respiratory: Positive for shortness of breath. Negative for cough.    Cardiovascular: Positive for  palpitations and peripheral edema. Negative for chest pain.   Gastrointestinal: Positive for hematochezia. Negative for abdominal pain, constipation, diarrhea, heartburn and nausea.   Breasts:  Positive for tenderness. Negative for breast mass and discharge.   Genitourinary: Positive for frequency, pelvic pain, urgency, vaginal bleeding and vaginal discharge. Negative for dysuria, genital sores and hematuria.   Musculoskeletal: Positive for arthralgias, joint swelling and myalgias.   Skin: Negative for rash.   Neurological: Positive for dizziness, weakness and headaches. Negative for paresthesias.   Psychiatric/Behavioral: Positive for mood changes. The patient is nervous/anxious.           OBJECTIVE:   /64 (BP Location: Left arm, Patient Position: Sitting, Cuff Size: Adult Regular)   Pulse (!) 121   Temp 98.7  F (37.1  C) (Oral)   Resp 16   Wt 95.7 kg (211 lb)   LMP  (LMP Unknown)   SpO2 98%   BMI 41.90 kg/m    Physical Exam  Constitutional:       General: She is not in acute distress.     Appearance: She is obese. She is not toxic-appearing.   HENT:      Head: Normocephalic.      Right Ear: Tympanic membrane normal.      Left Ear: Tympanic membrane normal.      Nose: Nose normal.      Mouth/Throat:      Mouth: Mucous membranes are dry.   Eyes:      Conjunctiva/sclera: Conjunctivae normal.   Cardiovascular:      Rate and Rhythm: Regular rhythm. Tachycardia present.      Pulses: Normal pulses.      Heart sounds: Normal heart sounds. No murmur heard.  Pulmonary:      Effort: Pulmonary effort is normal.      Breath sounds: Normal breath sounds.   Abdominal:      General: There is no distension.      Tenderness: There is no abdominal tenderness. There is no guarding.   Skin:     General: Skin is warm and dry.      Capillary Refill: Capillary refill takes less than 2 seconds.      Coloration: Skin is not jaundiced.      Findings: No bruising or lesion.      Comments: Extensive scars from nec fasc    Neurological:      Mental Status: She is alert.   Psychiatric:         Mood and Affect: Mood normal.      Comments: Guarded but pleasant           ASSESSMENT/PLAN:       ICD-10-CM    1. Anemia, unspecified type  D64.9 CBC with platelets     CBC with platelets      2. Encounter for preventive health examination  Z00.00       3. Screening for hyperlipidemia  Z13.220       4. Type 2 diabetes mellitus with hyperglycemia, with long-term current use of insulin (H)  E11.65 Basic metabolic panel    Z79.4 Hemoglobin A1c     Lipid Profile     Basic metabolic panel     Hemoglobin A1c     Lipid Profile      5. Chronic pain syndrome  G89.4 DISCONTINUED: oxyCODONE-acetaminophen (PERCOCET)  MG per tablet      6. Arthritis of both acromioclavicular joints  M19.011 DISCONTINUED: oxyCODONE-acetaminophen (PERCOCET)  MG per tablet    M19.012       7. Primary osteoarthritis of both knees  M17.0 DISCONTINUED: oxyCODONE-acetaminophen (PERCOCET)  MG per tablet      Rev'd health maint:  No pap needed.  Immunizations utd  Annual mammo  Colon ca screeening utd    Needs statin:  Discuss @ F/up compliance.  CKD is stable and hco2 better.      Follows w/ Dr Carson for obesity    Chronic pain is stable.    Results for orders placed or performed in visit on 12/15/22   Basic metabolic panel     Status: Abnormal   Result Value Ref Range    Sodium 146 (H) 136 - 145 mmol/L    Potassium 3.9 3.4 - 5.3 mmol/L    Chloride 110 (H) 98 - 107 mmol/L    Carbon Dioxide (CO2) 24 22 - 29 mmol/L    Anion Gap 12 7 - 15 mmol/L    Urea Nitrogen 23.3 (H) 6.0 - 20.0 mg/dL    Creatinine 1.14 (H) 0.51 - 0.95 mg/dL    Calcium 8.2 (L) 8.6 - 10.0 mg/dL    Glucose 105 (H) 70 - 99 mg/dL    GFR Estimate 56 (L) >60 mL/min/1.73m2   Hemoglobin A1c     Status: Normal   Result Value Ref Range    Hemoglobin A1C 5.6 0.0 - 5.6 %   Lipid Profile     Status: Abnormal   Result Value Ref Range    Cholesterol 253 (H) <200 mg/dL    Triglycerides 155 (H) <150 mg/dL     "Direct Measure HDL 71 >=50 mg/dL    LDL Cholesterol Calculated 151 (H) <=100 mg/dL    Non HDL Cholesterol 182 (H) <130 mg/dL    Narrative    Cholesterol  Desirable:  <200 mg/dL    Triglycerides  Normal:  Less than 150 mg/dL  Borderline High:  150-199 mg/dL  High:  200-499 mg/dL  Very High:  Greater than or equal to 500 mg/dL    Direct Measure HDL  Female:  Greater than or equal to 50 mg/dL   Male:  Greater than or equal to 40 mg/dL    LDL Cholesterol  Desirable:  <100mg/dL  Above Desirable:  100-129 mg/dL   Borderline High:  130-159 mg/dL   High:  160-189 mg/dL   Very High:  >= 190 mg/dL    Non HDL Cholesterol  Desirable:  130 mg/dL  Above Desirable:  130-159 mg/dL  Borderline High:  160-189 mg/dL  High:  190-219 mg/dL  Very High:  Greater than or equal to 220 mg/dL   CBC with platelets     Status: Abnormal   Result Value Ref Range    WBC Count 12.8 (H) 4.0 - 11.0 10e3/uL    RBC Count 3.21 (L) 3.80 - 5.20 10e6/uL    Hemoglobin 10.2 (L) 11.7 - 15.7 g/dL    Hematocrit 32.3 (L) 35.0 - 47.0 %     (H) 78 - 100 fL    MCH 31.8 26.5 - 33.0 pg    MCHC 31.6 31.5 - 36.5 g/dL    RDW 14.1 10.0 - 15.0 %    Platelet Count 358 150 - 450 10e3/uL     BMI:   Estimated body mass index is 41.9 kg/m  as calculated from the following:    Height as of 11/9/22: 1.511 m (4' 11.5\").    Weight as of this encounter: 95.7 kg (211 lb).         She reports that she has never smoked. She has never used smokeless tobacco.          Jose Angel Meyer MD  [facility]"

## 2022-12-16 RX ORDER — OXYCODONE AND ACETAMINOPHEN 10; 325 MG/1; MG/1
TABLET ORAL
Qty: 120 TABLET | Refills: 0 | Status: SHIPPED | OUTPATIENT
Start: 2022-12-19 | End: 2023-01-16

## 2022-12-22 NOTE — RESULT ENCOUNTER NOTE
Please mail labs to patient with this message.    Ms Álvarez  Your kidney tests have some mild abnormalities, but indicates that generally that our treatments are working fine.  I'm most encouraged that the CO2 level is now normal.    Your diabetes control is great.  Your cholesterol is somewhat higher than expected.  Please be sure to take atorvastatin every day.  We'll check this again in 3 months.    Thanks for your trust.  Nehemias Meyer MD

## 2022-12-23 ENCOUNTER — TELEPHONE (OUTPATIENT)
Dept: FAMILY MEDICINE | Facility: CLINIC | Age: 57
End: 2022-12-23

## 2022-12-23 DIAGNOSIS — E87.20 METABOLIC ACIDOSIS: ICD-10-CM

## 2022-12-28 RX ORDER — CITRIC ACID/SODIUM CITRATE 334-500MG
10 SOLUTION, ORAL ORAL 2 TIMES DAILY WITH MEALS
Qty: 473 ML | Refills: 11 | Status: SHIPPED | OUTPATIENT
Start: 2022-12-28 | End: 2023-01-16

## 2022-12-28 NOTE — TELEPHONE ENCOUNTER
I called.  Has been off metformin for several years.  She meant bicitra 10 ml bid, which is what I refilled.

## 2023-01-16 ENCOUNTER — OFFICE VISIT (OUTPATIENT)
Dept: FAMILY MEDICINE | Facility: CLINIC | Age: 58
End: 2023-01-16
Payer: COMMERCIAL

## 2023-01-16 VITALS
OXYGEN SATURATION: 96 % | TEMPERATURE: 98.4 F | SYSTOLIC BLOOD PRESSURE: 136 MMHG | HEART RATE: 104 BPM | RESPIRATION RATE: 16 BRPM | DIASTOLIC BLOOD PRESSURE: 74 MMHG

## 2023-01-16 DIAGNOSIS — Z79.4 TYPE 2 DIABETES MELLITUS WITH DIABETIC POLYNEUROPATHY, WITH LONG-TERM CURRENT USE OF INSULIN (H): ICD-10-CM

## 2023-01-16 DIAGNOSIS — M17.0 PRIMARY OSTEOARTHRITIS OF BOTH KNEES: ICD-10-CM

## 2023-01-16 DIAGNOSIS — M19.012 ARTHRITIS OF BOTH ACROMIOCLAVICULAR JOINTS: ICD-10-CM

## 2023-01-16 DIAGNOSIS — G89.4 CHRONIC PAIN SYNDROME: Primary | ICD-10-CM

## 2023-01-16 DIAGNOSIS — N18.31 STAGE 3A CHRONIC KIDNEY DISEASE (H): ICD-10-CM

## 2023-01-16 DIAGNOSIS — N32.81 OVERACTIVE BLADDER: ICD-10-CM

## 2023-01-16 DIAGNOSIS — R80.1 PERSISTENT PROTEINURIA: ICD-10-CM

## 2023-01-16 DIAGNOSIS — M19.011 ARTHRITIS OF BOTH ACROMIOCLAVICULAR JOINTS: ICD-10-CM

## 2023-01-16 DIAGNOSIS — E87.20 METABOLIC ACIDOSIS: ICD-10-CM

## 2023-01-16 DIAGNOSIS — I10 BENIGN ESSENTIAL HYPERTENSION: ICD-10-CM

## 2023-01-16 DIAGNOSIS — E11.42 TYPE 2 DIABETES MELLITUS WITH DIABETIC POLYNEUROPATHY, WITH LONG-TERM CURRENT USE OF INSULIN (H): ICD-10-CM

## 2023-01-16 DIAGNOSIS — K62.5 RECTAL BLEEDING: ICD-10-CM

## 2023-01-16 PROCEDURE — 96372 THER/PROPH/DIAG INJ SC/IM: CPT | Performed by: FAMILY MEDICINE

## 2023-01-16 PROCEDURE — 99214 OFFICE O/P EST MOD 30 MIN: CPT | Mod: 25 | Performed by: FAMILY MEDICINE

## 2023-01-16 PROCEDURE — 80306 DRUG TEST PRSMV INSTRMNT: CPT | Performed by: FAMILY MEDICINE

## 2023-01-16 RX ORDER — TOLTERODINE 2 MG/1
2 CAPSULE, EXTENDED RELEASE ORAL DAILY
Qty: 90 CAPSULE | Refills: 3 | Status: SHIPPED | OUTPATIENT
Start: 2023-01-16 | End: 2023-06-09

## 2023-01-16 RX ORDER — AMOXICILLIN 250 MG
1 CAPSULE ORAL 2 TIMES DAILY
Qty: 60 TABLET | Refills: 11 | Status: SHIPPED | OUTPATIENT
Start: 2023-01-16 | End: 2024-04-18

## 2023-01-16 RX ORDER — DAPAGLIFLOZIN 10 MG/1
10 TABLET, FILM COATED ORAL DAILY
Qty: 30 TABLET | Refills: 11 | Status: SHIPPED | OUTPATIENT
Start: 2023-01-16 | End: 2023-02-16

## 2023-01-16 RX ORDER — OXYCODONE AND ACETAMINOPHEN 10; 325 MG/1; MG/1
TABLET ORAL
Qty: 120 TABLET | Refills: 0 | Status: SHIPPED | OUTPATIENT
Start: 2023-01-16 | End: 2023-02-16

## 2023-01-16 RX ORDER — CITRIC ACID/SODIUM CITRATE 334-500MG
10 SOLUTION, ORAL ORAL 2 TIMES DAILY WITH MEALS
Qty: 473 ML | Refills: 11 | Status: SHIPPED | OUTPATIENT
Start: 2023-01-16 | End: 2023-04-13

## 2023-01-16 RX ORDER — ATORVASTATIN CALCIUM 10 MG/1
10 TABLET, FILM COATED ORAL DAILY
Qty: 90 TABLET | Refills: 3 | Status: SHIPPED | OUTPATIENT
Start: 2023-01-16 | End: 2023-02-27

## 2023-01-16 RX ORDER — INSULIN GLARGINE 100 [IU]/ML
36 INJECTION, SOLUTION SUBCUTANEOUS AT BEDTIME
Qty: 0.1 ML | Refills: 3 | COMMUNITY
Start: 2023-01-16 | End: 2023-02-16

## 2023-01-16 RX ADMIN — CYANOCOBALAMIN 1000 MCG: 1000 INJECTION, SOLUTION INTRAMUSCULAR; SUBCUTANEOUS at 11:33

## 2023-01-16 NOTE — PROGRESS NOTES
Patient Active Problem List    Diagnosis Date Noted     Stage 3a chronic kidney disease (H) 12/31/2020     Priority: High     Persistent protienuria       Copper deficiency 04/05/2019     Priority: High     Identified in Aug, 2018--see discharge summary from [facility] in [place].       Chronic pain disorder 10/07/2016     Priority: High     Chronic Pain Diagnosis:  neuropathy  DIRE Total Score(s):14     ORT: 5    4 - 7 =  Moderate Risk   of future problems with Opioids  FAQ5: 50 November 20, 2020  Behavioral Health Consultation: 5/12/17 with Dr. Daisy Quezada  Personal Care Plan for Chronic Pain: 5/12/17  Opioid medication:oxycodone   Dose:10mg  Number of pills per month:60  Patient is being prescribed 10mg of oxycodone IR (Percocet) per day this is 15 mg Morphine Equivalents  Benzodiazepines Prescribed? No  Naloxone prescribed? No       Clinic visit frequency required: Q 1 month Next visit due:   Controlled Substance/Opioid Treatment agreement on file (dated <12 months ago)?:    Date(s): pending  Last [place] verification: last visit  Items in red to be updated at each visit  Patient is followed by Data Unavailable for ongoing prescription of pain medication.  All refills should be approved by this provider, or covering partner.Chronic Pain     Chronic Pain Diagnosis:  neuropathy  DIRE Total Score(s):14     ORT: 5    4 - 7 =  Moderate Risk   of future problems with Opioids  FAQ5: 45/100 on 9/9/16, 40/100 on 11/15/16  55 October 29, 2020    Behavioral Health Consultation: 5/12/17 with Dr. Daisy Quezada  Personal Care Plan for Chronic Pain: 5/12/17  Opioid medication:oxycodone   Dose:10mg  Number of pills per month:90  Patient is being prescribed 45 MME  Benzodiazepines Prescribed? No  Naloxone prescribed? No       Clinic visit frequency required: Q 1 month Next visit due:   Controlled Substance/Opioid Treatment agreement on file (dated <12 months ago)?:    Date(s): pending  Last [place]MP verification: last  visit  Items in red to be updated at each visit  Patient is followed by Data Unavailable for ongoing prescription of pain medication.  All refills should be approved by this provider, or covering partner.Chronic Pain       Microalbuminuria 10/12/2015     Priority: High     Severely increased albuminuria       Type 2 diabetes mellitus with diabetic polyneuropathy (H) 10/12/2015     Priority: High     Essential hypertension 08/26/2013     Priority: High     Acidosis 08/12/2022     Priority: Medium     Acute chest pain 08/12/2022     Priority: Medium     Chronic, continuous use of opioids 05/18/2022     Priority: Medium     Arthritis of both acromioclavicular joints 04/08/2021     Priority: Medium     xray proven        Morbid obesity (H) 11/27/2020     Priority: Medium     Date of Surgery: 2006 R[facility] Dr. Harper  Initial Weight: 360#  Initial BMI: 70.30       Hyperglycemia due to type 2 diabetes mellitus (H) 06/02/2020     Priority: Medium     Neck mass 10/11/2019     Priority: Medium     Added automatically from request for surgery [id number]       Metabolic acidosis 07/26/2019     Priority: Medium     reolved w/ discontinuation of ASA       Obesity 01/03/2019     Priority: Medium     Hernia of anterior abdominal wall 01/28/2017     Priority: Medium     Osteoarthritis of both knees 10/31/2016     Priority: Medium     Polyneuropathy due to secondary diabetes mellitus (H) 10/31/2016     Priority: Medium     Compression neuropathy of left lower extremity 02/19/2016     Priority: Medium     Bilateral ankle joint pain 01/15/2016     Priority: Medium     Esophageal reflux 04/07/2015     Priority: Medium     Urinary incontinence 02/19/2015     Priority: Medium     Asthma 08/04/2013     Priority: Medium     History of cholecystectomy 08/04/2013     Priority: Medium     H/O necrotizing fasciitis 08/04/2013     Priority: Medium     Hyperlipidemia 06/21/2013     Priority: Medium     Status post bariatric surgery 06/21/2013      Priority: Medium     Kim-en-Y in 2006       Moderate major depression (H) 01/24/2013     Priority: Medium     Moderate persistent asthma without complication 11/15/2012     Priority: Medium     Persons encountering health services in other specified circumstances 11/15/2012     Priority: Medium     Tier 3    Status: Accept  Care Coordination Start Date: 02/27/12  State Tier Level: Level 3  Language/Barrier to Learning: No  Significant Mental Health Issues: Yes  Date Care Coordination Discontinued: 05/28/15    DX V65.8 REPLACED WITH [id number] [facility] (04/08/2013)       Abnormal Pap smear of cervix 04/22/2019     Priority: Low     3-12-15: Pap/HPV neg.  Plan: Given hx (mildly abn pap, hyster), recheck in 5 yrs.  10-11-11: Pap/HPV neg.  Plan: Given ASUS pap in 2010, recommend repeating pap in 1 year.  8/3/2010 ASCUS Pap with +BV- pt treated with Metrogel.  Pt needs repeat Pap in 1yr.  8/4/2008 Pt had total hyst for menorrhagia, pelvic pain, scar tissue, and fibroids.    3-12-15: Pap/HPV neg.  Plan: Given hx (mildly abn pap, hyster), recheck in 5 yrs.  10-11-11: Pap/HPV neg.  Plan: Given ASUS pap in 2010, recommend repeating pap in 1 year.  8/3/2010 ASCUS Pap with +BV- pt treated with Metrogel.  Pt needs repeat Pap in 1yr.  8/4/2008 Pt had total hyst for menorrhagia, pelvic pain, scar tissue, and fibroids.       Hernia 01/27/2017     Priority: Low     S/P hysterectomy 06/21/2013     Priority: Low     2008, For fibroids.  Cervix removed, but still has ovaries.       History of hysterectomy for benign disease 06/21/2013     Priority: Low     2008, For fibroids.  Cervix removed, but still has ovaries.       Carpal tunnel syndrome 11/15/2012     Priority: Low     Health Care Home 11/15/2012     Priority: Low     Tier 3    Status: Accept  Care Coordination Start Date: 02/27/12  State Tier Level: Level 3  Language/Barrier to Learning: No  Significant Mental Health Issues: Yes  Date Care Coordination Discontinued:  05/28/15    DX V65.8 REPLACED WITH [id number] [facility] (04/08/2013)         Chronic pain of both shoulders 12/23/2005     Priority: Low     Nursing Notes:   Viridiana Loaiza CMA  1/16/2023 11:34 AM  Signed  Clinic Administered Medication Documentation    Administrations This Visit     cyanocobalamin injection 1,000 mcg     Admin Date  01/16/2023 Action  Given Dose  1,000 mcg Route  Intramuscular Site  Left Deltoid Administered By  Viridiana Loaiza CMA    Ordering Provider: Jose Angel Meyer MD    NDC: 30019-827-88    Lot#: [id number]    : [employer]    Patient Supplied?: No                      Chief Complaint   Patient presents with     Pain Management     Bleeding while leaving stool  Has been feeling dizzy for the past few days      Blood pressure 136/74, pulse 104, temperature 98.4  F (36.9  C), temperature source Oral, resp. rate 16, SpO2 96 %, not currently breastfeeding.   SUBJECTIVE:  Ruthy Álvarez is here for followup of her chronic pain syndrome, hypertension, diabetic nephropathy and albuminuria.  Regarding her chronic pain, it is well controlled.  She has had no aberrant behaviors.  She is due for a refill of oxycodone.  Her PDMP is unremarkable.    Regarding her hypertension, she is on a diltiazem, is bothered by edema.  She does not any unusual shortness of breath.     Regarding her diabetes, it is complicated by diabetic neuropathy and nephropathy.  She has significant proteinuria, is on maximum ACE inhibitor and on recently increased and GLP-1.  Her last A1c was excellent.  She is not consistently taking her sodium bicarb interestingly, but her last bicarb was normal.  She was taking an SGLT2 inhibitor at one point in time, but is not currently taking it.  She does not have a history of recurrent UTIs, but does have a history of overactive bladder.  She would like to refill her Detrol for that.    OBJECTIVE:  Blood pressure is excellent.  The patient is comfortable, pleasant,  in no acute distress.    I spent about 20 minutes reviewing all of her medications including of her list.  The medication list that she leaves with her is as established.    ASSESSMENT:    1.  Chronic pain syndrome is stable.  She has been low risk and very adherent with no aberrant behavior.  I refilled her oxycodone as previously dosed.  She will follow up on a monthly basis.  2.  Regarding her hypertension, it is very well controlled.  /74 on diltiazem 120 mg a day and lisinopril 40 mg daily.  No changes made.  3.  Regarding her CKD 3 and proteinuria, she is on a GLP-1.  She is on maximum dose ACE inhibitor with good blood pressure control.  I am going to add Farxiga 10 mg daily and cut back on the dose of her Lantus insulin by about 20%, cutting back from 36 to 26 units daily.  Her last A1c was excellent.  She is at risk for hypoglycemia.  4.  For her diabetes, continue her current therapy other than as noted above.  Her Lantus we will decrease to 26 units daily.  She is on Bydureon.  She is not taking metformin.  We will call her with that recommendation.    At the end of the visit, she mentioned significant bright red rectal bleeding.  She had a colonoscopy in 2018 that was unremarkable.  Her last CBC in December showing a hemoglobin of 10.2 with an MCV of 101.  She is known to be B12 deficient and this was given today.  I will refer her to see Surgery as I think she should have a flexible sigmoidoscopy to investigate this.      Results for orders placed or performed in visit on 01/16/23   Urine Drugs of Abuse Screen Panel 13     Status: Abnormal   Result Value Ref Range    Cannabinoids (67-slf-0-carboxy-9-THC) Not Detected Not Detected, Indeterminate    Phencyclidine Not Detected Not Detected, Indeterminate    Cocaine (Benzoylecgonine) Not Detected Not Detected, Indeterminate    Methamphetamine (d-Methamphetamine) Not Detected Not Detected, Indeterminate    Opiates (Morphine) Not Detected Not Detected,  Indeterminate    Amphetamine (d-Amphetamine) Not Detected Not Detected, Indeterminate    Benzodiazepines (Nordiazepam) Not Detected Not Detected, Indeterminate    Tricyclic Antidepressants (Desipramine) Not Detected Not Detected, Indeterminate    Methadone Not Detected Not Detected, Indeterminate    Barbiturates (Butalbital) Not Detected Not Detected, Indeterminate    Oxycodone Detected (A) Not Detected, Indeterminate    Propoxyphene (Norpropoxyphene) Not Detected Not Detected, Indeterminate    Buprenorphine Not Detected Not Detected, Indeterminate

## 2023-01-16 NOTE — PATIENT INSTRUCTIONS
Atorvastatin protects the heart by lowering cholesterol.  Please try to take it every day.  I sent a refill                01/17/23   [facility]    [address] [place] [address], [address]  [facility], [address]  Phone: [phone]  Fax: [phone]      Faxed referral, demographics office notes and labs. They will contact patient to schedule.     Calista Shane

## 2023-01-16 NOTE — NURSING NOTE
Clinic Administered Medication Documentation    Administrations This Visit     cyanocobalamin injection 1,000 mcg     Admin Date  01/16/2023 Action  Given Dose  1,000 mcg Route  Intramuscular Site  Left Deltoid Administered By  Viridiana Loaiza CMA    Ordering Provider: Jose Angel Meyer MD    NDC: 13503-756-46    Lot#: [id number]    : [employer] [facility]    Patient Supplied?: No

## 2023-01-17 ASSESSMENT — ENCOUNTER SYMPTOMS
ARTHRALGIAS: 1
HEADACHES: 1
JOINT SWELLING: 1
WEAKNESS: 1
NERVOUS/ANXIOUS: 1
DIARRHEA: 0
ABDOMINAL PAIN: 0
DIZZINESS: 1
HEMATOCHEZIA: 1
CHILLS: 1
FREQUENCY: 1
DYSURIA: 0
EYE PAIN: 0
PARESTHESIAS: 0
HEMATURIA: 0
SHORTNESS OF BREATH: 1
CONSTIPATION: 0
NAUSEA: 0
PALPITATIONS: 1
FEVER: 0
BREAST MASS: 0
COUGH: 0
HEARTBURN: 0
MYALGIAS: 1
SORE THROAT: 0

## 2023-01-17 ASSESSMENT — ACTIVITIES OF DAILY LIVING (ADL): CURRENT_FUNCTION: PREPARING MEALS REQUIRES ASSISTANCE

## 2023-02-13 ENCOUNTER — TELEPHONE (OUTPATIENT)
Dept: FAMILY MEDICINE | Facility: CLINIC | Age: 58
End: 2023-02-13
Payer: COMMERCIAL

## 2023-02-13 DIAGNOSIS — E11.42 DIABETIC POLYNEUROPATHY ASSOCIATED WITH TYPE 2 DIABETES MELLITUS (H): ICD-10-CM

## 2023-02-14 RX ORDER — GABAPENTIN 250 MG/5ML
SOLUTION ORAL
Qty: 1360 ML | Refills: 3 | Status: SHIPPED | OUTPATIENT
Start: 2023-02-14 | End: 2023-05-04

## 2023-02-14 NOTE — TELEPHONE ENCOUNTER
[facility] Answering Service Pager Note    I received an answering service page at 8:15p regarding needing refill of gabapentin.    I called Ms. Álvarez and we spoke on the phone. She was wanting a refill of her gabapentin. I see a refill request was made, I notified patient that it should be addressed tomorrow or Wednesday.     Ms. Álvarez stated understanding and had no further questions.    Dominick Cardona DO, PGY-3  [facility]

## 2023-02-16 ENCOUNTER — OFFICE VISIT (OUTPATIENT)
Dept: FAMILY MEDICINE | Facility: CLINIC | Age: 58
End: 2023-02-16
Payer: COMMERCIAL

## 2023-02-16 VITALS
RESPIRATION RATE: 16 BRPM | TEMPERATURE: 98.9 F | HEART RATE: 109 BPM | OXYGEN SATURATION: 97 % | SYSTOLIC BLOOD PRESSURE: 150 MMHG | DIASTOLIC BLOOD PRESSURE: 87 MMHG

## 2023-02-16 DIAGNOSIS — Z79.4 TYPE 2 DIABETES MELLITUS WITH DIABETIC POLYNEUROPATHY, WITH LONG-TERM CURRENT USE OF INSULIN (H): ICD-10-CM

## 2023-02-16 DIAGNOSIS — G89.4 CHRONIC PAIN SYNDROME: Primary | ICD-10-CM

## 2023-02-16 DIAGNOSIS — M19.012 ARTHRITIS OF BOTH ACROMIOCLAVICULAR JOINTS: ICD-10-CM

## 2023-02-16 DIAGNOSIS — M19.011 ARTHRITIS OF BOTH ACROMIOCLAVICULAR JOINTS: ICD-10-CM

## 2023-02-16 DIAGNOSIS — E11.42 TYPE 2 DIABETES MELLITUS WITH DIABETIC POLYNEUROPATHY, WITH LONG-TERM CURRENT USE OF INSULIN (H): ICD-10-CM

## 2023-02-16 DIAGNOSIS — M17.0 PRIMARY OSTEOARTHRITIS OF BOTH KNEES: ICD-10-CM

## 2023-02-16 DIAGNOSIS — R80.1 PERSISTENT PROTEINURIA: ICD-10-CM

## 2023-02-16 DIAGNOSIS — G89.4 CHRONIC PAIN DISORDER: ICD-10-CM

## 2023-02-16 PROCEDURE — 80306 DRUG TEST PRSMV INSTRMNT: CPT | Performed by: FAMILY MEDICINE

## 2023-02-16 PROCEDURE — 96372 THER/PROPH/DIAG INJ SC/IM: CPT | Performed by: FAMILY MEDICINE

## 2023-02-16 PROCEDURE — 99214 OFFICE O/P EST MOD 30 MIN: CPT | Mod: 25 | Performed by: FAMILY MEDICINE

## 2023-02-16 RX ORDER — OXYCODONE AND ACETAMINOPHEN 10; 325 MG/1; MG/1
TABLET ORAL
Qty: 120 TABLET | Refills: 0 | Status: SHIPPED | OUTPATIENT
Start: 2023-02-16 | End: 2023-03-09

## 2023-02-16 RX ORDER — INSULIN GLARGINE 100 [IU]/ML
26 INJECTION, SOLUTION SUBCUTANEOUS AT BEDTIME
Qty: 0.1 ML | Refills: 3 | COMMUNITY
Start: 2023-02-16 | End: 2023-04-12

## 2023-02-16 RX ORDER — DAPAGLIFLOZIN 10 MG/1
10 TABLET, FILM COATED ORAL DAILY
Qty: 30 TABLET | Refills: 11 | Status: SHIPPED | OUTPATIENT
Start: 2023-02-16 | End: 2024-02-22

## 2023-02-16 RX ADMIN — CYANOCOBALAMIN 1000 MCG: 1000 INJECTION, SOLUTION INTRAMUSCULAR; SUBCUTANEOUS at 12:12

## 2023-02-16 NOTE — PROGRESS NOTES
ASSESSMENT AND PLAN:     1.  Chronic pain syndrome.  UDS is pending.  PDMP is unremarkable.  She is comfortable with her pain regimen which includes oxycodone and gabapentin.  She has adequate refills of gabapentin for the next 4 months and she recently picked them up.  Oxycodone refilled today, pending urine drug screen.  2.  Hypertension.  She is on maximum-dose ACE inhibitor and on diltiazem.  She has clinical proteinuria.  She has not been taking an SGLT2 inhibitor and I restarted that today, Farxiga 10 mg daily.  She will need a BMP when she returns next time.  3.  Regarding her diabetes, she is on Trulicity, Lantus 36 units daily and now adding Farxiga given that her A1c was 5.6 last time.  She is at risk for hypoglycemia and so I will have her cut back her Lantus insulin from 36 to 26 units a day.  Check a BMP when she returns next time.        Genesis Bliss is a 57 year old, presenting for the following health issues:  Pain Management (CPM)      HPI   Needs refills of pain meds.  Needs refills of gabapentin, too.    Would like CGM.  Sisters have freestyle bárbara 2.  They have same insurance.  She has DM on insulin (lantus 36 units daily) and trulicity 3 mg weekly  Has htn on dilt 120 and lisin 40 mg daily with CKD3 and protienruia.    Didn't get rx for farziga last visit. With chronic protienuria.    Has narcan at home.    Pain History:  When did you first notice your pain? - Chronic Pain   Have you seen this provider for your pain in the past?   Yes   Where in your body do you have pain? Knees, back, shoulder:  All over  Are you seeing anyone else for your pain? No        PEG Score 11/18/2022 12/15/2022 2/16/2023   PEG Total Score 9 9 8.67     Bedside opioid-overdose calculator (RIOSORD)      In the past six months, has your patient had an outpatient,          Points for  inpatient or ED visit for any of the following:           'yes' answer    Substance use disorder (addiction) of any kind,      25   including alcohol and cannabis?  Bipolar disorder or schizophrenia?       10  Stroke or other cerebrovascular disease?          9  Signifi cant chronic kidney disease?         8  Heart failure?           7  Nonmalignant pancreatic disease?         7  Chronic pulmonary disease?          5  Chronic headache?           5  Does your patient take:  Fentanyl (transdermal or transmucosal)?      13  Morphine?          11  Methadone?          10  Hydromorphone?           7  Extended-release or long-acting (ER/LA) opioid?          5  Benzodiazepine?           9  Antidepressant?           8   >100 mg MME/day?                      7  TOTAL   16    Average probability of overdose or serious opioid-induced respiratory depression in the next six months   Points    Risk   0-4     2%   5-7     5%   8-9     7%   10-17     15%   18-25     30%   26-41     55%   42     83%        Chronic Pain Follow Up:    Location of pain: all over  Analgesia/pain control:    - Recent changes:  no    - Overall control: Comfortably manageable    - Current treatments: doing PT.  [place] go [place].  Would like to go, but bills not paid through [facility].     Adherence:     - Do you ever take more pain medicine than prescribed? Yes: sometimes.  It's rare, though, because she doesn't want to run out!    - When did you take your last dose of pain medicine?  This morning   Adverse effects: No Review of PDMP today shows no red flags or unexpected prescriptions  Last CSA Agreement:   CSA -- Patient Level:     [Media Unavailable] Controlled Substance Agreement - Opioid - Scan on 12/14/2021  8:56 AM       Last UDS: 1/16/2023    Has narcan.    Review of Systems         Objective    BP (!) 142/97 (BP Location: Left arm, Patient Position: Sitting, Cuff Size: Adult Regular)   Pulse 109   Temp 98.9  F (37.2  C) (Oral)   Resp 16   LMP  (LMP Unknown)   SpO2 97%   There is no height or weight on file to calculate  BMI.  Physical Exam   GENERAL: healthy, alert and no distress  RESP: lungs clear to auscultation - no rales, rhonchi or wheezes  CV: regular rate and rhythm, normal S1 S2, no S3 or S4, no murmur, click or rub, no peripheral edema and peripheral pulses strong  ABDOMEN: soft, nontender, no hepatosplenomegaly, no masses and bowel sounds normal    MS: no gross musculoskeletal defects noted, tr edema

## 2023-02-16 NOTE — PATIENT INSTRUCTIONS
Look to see if you have fluocinolone liquid at home.  We use this for eczema or itchy rashes.    Start farziga 10 mg daily    Decrease dose of lantus from 36 to 26 units daily.    Watch carefully for low blood sugars:  under 80.

## 2023-02-16 NOTE — PROGRESS NOTES
Bedside opioid-overdose calculator (RIOSORD)      In the past six months, has your patient had an outpatient,          Points for  inpatient or ED visit for any of the following:           'yes' answer    Substance use disorder (addiction) of any kind,     25   including alcohol and cannabis?  Bipolar disorder or schizophrenia?       10  Stroke or other cerebrovascular disease?          9  Signifi cant chronic kidney disease?         8  Heart failure?           7  Nonmalignant pancreatic disease?         7  Chronic pulmonary disease?          5  Chronic headache?           5  Does your patient take:  Fentanyl (transdermal or transmucosal)?      13  Morphine?          11  Methadone?          10  Hydromorphone?           7  Extended-release or long-acting (ER/LA) opioid?          5  Benzodiazepine?           9  Antidepressant?           8   >100 mg MME/day?                      7  TOTAL   ***    Average probability of overdose or serious opioid-induced respiratory depression in the next six months   Points    Risk   0-4     2%   5-7     5%   8-9     7%   10-17     15%   18-25     30%   26-41     55%   42     83%

## 2023-02-16 NOTE — NURSING NOTE
Clinic Administered Medication Documentation    Administrations This Visit     cyanocobalamin injection 1,000 mcg     Admin Date  02/16/2023 Action  Given Dose  1,000 mcg Route  Intramuscular Site  Left Deltoid Administered By  Viridiana Loaiza CMA    Ordering Provider: Jose Angel Meyer MD    NDC: 62794-427-92    Lot#: [id number]    : [employer] [facility]    Patient Supplied?: No

## 2023-02-21 ENCOUNTER — TELEPHONE (OUTPATIENT)
Dept: FAMILY MEDICINE | Facility: CLINIC | Age: 58
End: 2023-02-21
Payer: COMMERCIAL

## 2023-02-21 NOTE — TELEPHONE ENCOUNTER
[facility] appt scheduled on 03/16/2023 @ 2pm in [place] ([address]  [address]. [place]  [address]). Pt is aware and notified of appt location.     Calista Shane

## 2023-02-23 NOTE — RESULT ENCOUNTER NOTE
Please call:  Her diabetes test is much improved--her AIC came down from 14.4 to 8.8 and should be better next time we check.   Her kidney function is stable, but not normal due to diabetic kidney damage.   I want to be clear if she has tolerated lisinopril in the past.  It's not on her list now and should help protect her kidneys going forward, if she can tolerate it.    I'd prefer that her next visit be in person, so that we can recheck a urine test and check her BP carefully.    Thanks  CF   80 yo male w/ pmhx of CKD, DM, HTN presented to the ED [facility] 2/2 due to fall.        Plan: Right hip femoral fracture due to fall. Had right hip surgery. Stable in bed.  CT chest no pneumonia.  Stop all ABX.       COVID- 19 :  No evidence of Hypoxia, no treatment needed.     Elevated Troponin; continue to trend, unable to assess if any chest pain, patient is currently confused. TTE decreased EF 40 %. EKG notes inferior and septal MI. CHF, euvolemic.      DM: stable, hold all meds. SSC.  Consider low dose Lantus.  Acceptable today around 195 off Lantus.      Essential HTN: resume home medication lopressor, lipitor and Imdur.  Hydralazine resumed 50 mg tid.         Renal: Creatinine 4.87. Renal consult was called, Dr. Cook will see. Agree bicarb infusion for a while.   Creatinine one year ago was 4.10.     CKD V appears chronic. Has outside nephrologist. Creatinine here is 4.47.      Stable on tele, PT eval.  No infection.

## 2023-02-26 DIAGNOSIS — I10 BENIGN ESSENTIAL HYPERTENSION: ICD-10-CM

## 2023-02-27 RX ORDER — ATORVASTATIN CALCIUM 10 MG/1
TABLET, FILM COATED ORAL
Qty: 90 TABLET | Refills: 3 | Status: SHIPPED | OUTPATIENT
Start: 2023-02-27 | End: 2024-03-21

## 2023-03-09 ENCOUNTER — OFFICE VISIT (OUTPATIENT)
Dept: FAMILY MEDICINE | Facility: CLINIC | Age: 58
End: 2023-03-09
Payer: COMMERCIAL

## 2023-03-09 VITALS
WEIGHT: 208 LBS | DIASTOLIC BLOOD PRESSURE: 75 MMHG | HEART RATE: 108 BPM | OXYGEN SATURATION: 95 % | TEMPERATURE: 97.7 F | RESPIRATION RATE: 16 BRPM | SYSTOLIC BLOOD PRESSURE: 136 MMHG | BODY MASS INDEX: 41.31 KG/M2

## 2023-03-09 DIAGNOSIS — G89.4 CHRONIC PAIN SYNDROME: Primary | ICD-10-CM

## 2023-03-09 DIAGNOSIS — M17.0 PRIMARY OSTEOARTHRITIS OF BOTH KNEES: ICD-10-CM

## 2023-03-09 DIAGNOSIS — M19.011 ARTHRITIS OF BOTH ACROMIOCLAVICULAR JOINTS: ICD-10-CM

## 2023-03-09 DIAGNOSIS — M19.012 ARTHRITIS OF BOTH ACROMIOCLAVICULAR JOINTS: ICD-10-CM

## 2023-03-09 DIAGNOSIS — N89.8 VAGINAL IRRITATION: ICD-10-CM

## 2023-03-09 PROCEDURE — 80306 DRUG TEST PRSMV INSTRMNT: CPT | Performed by: FAMILY MEDICINE

## 2023-03-09 PROCEDURE — 96372 THER/PROPH/DIAG INJ SC/IM: CPT | Performed by: FAMILY MEDICINE

## 2023-03-09 PROCEDURE — 99214 OFFICE O/P EST MOD 30 MIN: CPT | Mod: 25 | Performed by: FAMILY MEDICINE

## 2023-03-09 RX ORDER — FLUCONAZOLE 150 MG/1
150 TABLET ORAL ONCE
Qty: 1 TABLET | Refills: 0 | Status: SHIPPED | OUTPATIENT
Start: 2023-03-09 | End: 2023-03-09

## 2023-03-09 RX ORDER — OXYCODONE AND ACETAMINOPHEN 10; 325 MG/1; MG/1
TABLET ORAL
Qty: 120 TABLET | Refills: 0 | Status: SHIPPED | OUTPATIENT
Start: 2023-03-09 | End: 2023-04-13

## 2023-03-09 RX ADMIN — CYANOCOBALAMIN 1000 MCG: 1000 INJECTION, SOLUTION INTRAMUSCULAR; SUBCUTANEOUS at 15:26

## 2023-03-09 NOTE — PATIENT INSTRUCTIONS
Try taking symbicort nightly to see if it helps prevent choking at night    Read about buprenorphine patch (called butrans) or buprenorphine film (belbuca) for chronic pain

## 2023-03-09 NOTE — PROGRESS NOTES
Subjective   Ruthy is a 57 year old, presenting for the following health issues:  Pain Management (cpm)      HPI   30 day average  183  180/175/175/203.  tit 51%  >target 49%  No lows      Pain History:  When did you first notice your pain? - Chronic Pain   Have you seen this provider for your pain in the past?   Yes   Where in your body do you have pain? See dictation  Are you seeing anyone else for your pain? No        PEG Score 12/15/2022 2/16/2023 3/9/2023   PEG Total Score 9 8.67 9      SUBJECTIVE:  Ruthy Álvarez is here for followup of her chronic pain.  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  She has actually proof of this in her shoulders and her knees.  She is very satisfied with her current pain regimen.  She has been on stable doses for quite some time and is not requesting an increase today.  She tolerates them well without any adverse effects and is very careful in her dosing.  In looking through her chart, she has an ORT of 5.  She has a RIOSORD score of 16, which is associated with 15% risk of overdose in 6 months.    We also discussed her diabetes.  Her 30-day average according to her CGM is 183.  Overnight, her average is 180; in the morning is 175, afternoon 175, evening 203.  She is in target 51% of the time and the target was defined as between 80 and 180.  She is above target 49% of the time and has no lows.  She has cut back on her dose of her insulin to 26 units of Lantus daily.  She is on dapagliflozin 10 mg daily and Trulicity 3 mg weekly as well.  She has thankfully had no symptomatic hypoglycemia.    Her moods are better because she is working and finds it quite rewarding.    She needs a new dose of her B12 today.      Reviewed her PEG score; it is 9.  That reflects her pain without medication.  PDMP was reviewed today and there are no red flags.    OBJECTIVE:  Blood pressure is 147/74.  The patient is very pleasant, alert, no acute distress.  Exam  otherwise not done today.    ASSESSMENT:    1.  Chronic pain syndrome.  Database reviewed.  No red flags.  Urine drug screen is pending.  She has never had any aberrant behavior.  I refilled her oxycodone today.  I had a lengthy discussion with her about the potential to switch her, based on her RIOSORD determined risk to 2 buprenorphine again and she will do some reading about the sublingual film and the patch that I gave her today and we will talk about that in the future.  2.  Regarding her hypertension, she is mildly elevated today.  No changes made.  3.  Regarding her diabetes, continue current therapy.  She is due for a BMP and an A1c in April when she returns.    Needs pain agreement signed next week.  Last UDS: 2/16/2023          Review of Systems         Objective    BP (!) 147/74 (BP Location: Left arm, Patient Position: Sitting, Cuff Size: Adult Regular)   Pulse 108   Temp 97.7  F (36.5  C) (Oral)   Resp 16   Wt 94.3 kg (208 lb)   LMP  (LMP Unknown)   SpO2 95%   BMI 41.31 kg/m    Body mass index is 41.31 kg/m .  Physical Exam

## 2023-03-09 NOTE — NURSING NOTE
Clinic Administered Medication Documentation    Administrations This Visit     cyanocobalamin injection 1,000 mcg     Admin Date  03/09/2023 Action  $Given Dose  1,000 mcg Route  Intramuscular Site  Left Deltoid Administered By  Viridiana Loaiza CMA    Ordering Provider: Jose Angel Meyer MD    NDC: 04115-381-83    Lot#: [id number]    : [employer]    Patient Supplied?: No

## 2023-03-23 ENCOUNTER — TELEPHONE (OUTPATIENT)
Dept: FAMILY MEDICINE | Facility: CLINIC | Age: 58
End: 2023-03-23

## 2023-03-23 NOTE — TELEPHONE ENCOUNTER
[facility] phone call message- patient requesting a refill:    Full Medication Name:     Diabetic Supplies.    [place] and     Pharmacy confirmed as   [facility]       : Yes    Additional Comments:      Reference#  [id number]    They'll be faxing orders to be signed.         OK to leave a message on voice mail? Yes    Primary language: English      needed? No    Call taken on March 23, 2023 at 1:03 PM by Megan Quezada

## 2023-03-28 ENCOUNTER — MEDICAL CORRESPONDENCE (OUTPATIENT)
Dept: HEALTH INFORMATION MANAGEMENT | Facility: CLINIC | Age: 58
End: 2023-03-28
Payer: COMMERCIAL

## 2023-03-31 ENCOUNTER — TELEPHONE (OUTPATIENT)
Dept: FAMILY MEDICINE | Facility: CLINIC | Age: 58
End: 2023-03-31

## 2023-04-06 ENCOUNTER — MEDICAL CORRESPONDENCE (OUTPATIENT)
Dept: HEALTH INFORMATION MANAGEMENT | Facility: CLINIC | Age: 58
End: 2023-04-06
Payer: COMMERCIAL

## 2023-04-11 DIAGNOSIS — Z79.4 TYPE 2 DIABETES MELLITUS WITH DIABETIC POLYNEUROPATHY, WITH LONG-TERM CURRENT USE OF INSULIN (H): ICD-10-CM

## 2023-04-11 DIAGNOSIS — E11.42 TYPE 2 DIABETES MELLITUS WITH DIABETIC POLYNEUROPATHY, WITH LONG-TERM CURRENT USE OF INSULIN (H): ICD-10-CM

## 2023-04-11 NOTE — TELEPHONE ENCOUNTER
[facility] phone call message- general phone call:    Reason for call:     Patient would like Dr. Meyer or Viridiana to give her a call for her medication refill. Patient cannot remember the name of the medication since she is out but it's for night time.     Return call needed: Yes    OK to leave a message on voice mail? Yes    Primary language: English      needed? No    Call taken on April 11, 2023 at 11:51 AM by Karen Cardona

## 2023-04-12 RX ORDER — DULAGLUTIDE 3 MG/.5ML
3 INJECTION, SOLUTION SUBCUTANEOUS
Qty: 2 ML | Refills: 11 | Status: SHIPPED | OUTPATIENT
Start: 2023-04-12 | End: 2023-11-27

## 2023-04-12 RX ORDER — INSULIN GLARGINE 100 [IU]/ML
26 INJECTION, SOLUTION SUBCUTANEOUS AT BEDTIME
Qty: 30 ML | Refills: 11 | Status: SHIPPED | OUTPATIENT
Start: 2023-04-12 | End: 2023-07-06

## 2023-04-13 ENCOUNTER — OFFICE VISIT (OUTPATIENT)
Dept: FAMILY MEDICINE | Facility: CLINIC | Age: 58
End: 2023-04-13
Payer: COMMERCIAL

## 2023-04-13 VITALS
OXYGEN SATURATION: 97 % | SYSTOLIC BLOOD PRESSURE: 150 MMHG | TEMPERATURE: 97.8 F | HEART RATE: 118 BPM | DIASTOLIC BLOOD PRESSURE: 78 MMHG | RESPIRATION RATE: 16 BRPM

## 2023-04-13 DIAGNOSIS — Z79.4 TYPE 2 DIABETES MELLITUS WITH HYPERGLYCEMIA, WITH LONG-TERM CURRENT USE OF INSULIN (H): ICD-10-CM

## 2023-04-13 DIAGNOSIS — R06.02 SHORTNESS OF BREATH: ICD-10-CM

## 2023-04-13 DIAGNOSIS — M17.0 PRIMARY OSTEOARTHRITIS OF BOTH KNEES: ICD-10-CM

## 2023-04-13 DIAGNOSIS — R60.9 EDEMA, UNSPECIFIED TYPE: ICD-10-CM

## 2023-04-13 DIAGNOSIS — R00.0 SINUS TACHYCARDIA: ICD-10-CM

## 2023-04-13 DIAGNOSIS — M19.011 ARTHRITIS OF BOTH ACROMIOCLAVICULAR JOINTS: ICD-10-CM

## 2023-04-13 DIAGNOSIS — G89.4 CHRONIC PAIN SYNDROME: Primary | ICD-10-CM

## 2023-04-13 DIAGNOSIS — E87.20 METABOLIC ACIDOSIS: ICD-10-CM

## 2023-04-13 DIAGNOSIS — M19.012 ARTHRITIS OF BOTH ACROMIOCLAVICULAR JOINTS: ICD-10-CM

## 2023-04-13 DIAGNOSIS — J45.40 MODERATE PERSISTENT ASTHMA WITHOUT COMPLICATION: ICD-10-CM

## 2023-04-13 DIAGNOSIS — E11.65 TYPE 2 DIABETES MELLITUS WITH HYPERGLYCEMIA, WITH LONG-TERM CURRENT USE OF INSULIN (H): ICD-10-CM

## 2023-04-13 LAB
AMPHETAMINES UR QL: DETECTED
BARBITURATES UR QL SCN: NOT DETECTED
BENZODIAZ UR QL SCN: NOT DETECTED
BUPRENORPHINE UR QL: NOT DETECTED
CANNABINOIDS UR QL: NOT DETECTED
COCAINE UR QL SCN: NOT DETECTED
D-METHAMPHET UR QL: NOT DETECTED
ERYTHROCYTE [DISTWIDTH] IN BLOOD BY AUTOMATED COUNT: 14 % (ref 10–15)
HCT VFR BLD AUTO: 30.4 % (ref 35–47)
HGB BLD-MCNC: 9.7 G/DL (ref 11.7–15.7)
MCH RBC QN AUTO: 32.1 PG (ref 26.5–33)
MCHC RBC AUTO-ENTMCNC: 31.9 G/DL (ref 31.5–36.5)
MCV RBC AUTO: 101 FL (ref 78–100)
METHADONE UR QL SCN: NOT DETECTED
OPIATES UR QL SCN: NOT DETECTED
OXYCODONE UR QL SCN: DETECTED
PCP UR QL SCN: NOT DETECTED
PLATELET # BLD AUTO: 324 10E3/UL (ref 150–450)
PROPOXYPH UR QL: NOT DETECTED
RBC # BLD AUTO: 3.02 10E6/UL (ref 3.8–5.2)
TRICYCLICS UR QL SCN: NOT DETECTED
WBC # BLD AUTO: 11.7 10E3/UL (ref 4–11)

## 2023-04-13 PROCEDURE — 96372 THER/PROPH/DIAG INJ SC/IM: CPT | Performed by: FAMILY MEDICINE

## 2023-04-13 PROCEDURE — 36415 COLL VENOUS BLD VENIPUNCTURE: CPT | Performed by: FAMILY MEDICINE

## 2023-04-13 PROCEDURE — 85027 COMPLETE CBC AUTOMATED: CPT | Performed by: FAMILY MEDICINE

## 2023-04-13 PROCEDURE — 80306 DRUG TEST PRSMV INSTRMNT: CPT | Performed by: FAMILY MEDICINE

## 2023-04-13 PROCEDURE — 83880 ASSAY OF NATRIURETIC PEPTIDE: CPT | Performed by: FAMILY MEDICINE

## 2023-04-13 PROCEDURE — 84443 ASSAY THYROID STIM HORMONE: CPT | Performed by: FAMILY MEDICINE

## 2023-04-13 PROCEDURE — 99214 OFFICE O/P EST MOD 30 MIN: CPT | Mod: 25 | Performed by: FAMILY MEDICINE

## 2023-04-13 RX ORDER — OXYCODONE AND ACETAMINOPHEN 10; 325 MG/1; MG/1
TABLET ORAL
Qty: 120 TABLET | Refills: 0 | Status: SHIPPED | OUTPATIENT
Start: 2023-04-13 | End: 2023-05-04

## 2023-04-13 RX ORDER — BUDESONIDE AND FORMOTEROL FUMARATE DIHYDRATE 80; 4.5 UG/1; UG/1
AEROSOL RESPIRATORY (INHALATION)
Qty: 20.4 G | Refills: 11 | Status: SHIPPED | OUTPATIENT
Start: 2023-04-13 | End: 2024-04-18

## 2023-04-13 RX ORDER — CITRIC ACID/SODIUM CITRATE 334-500MG
10 SOLUTION, ORAL ORAL 2 TIMES DAILY WITH MEALS
Qty: 473 ML | Refills: 11 | Status: SHIPPED | OUTPATIENT
Start: 2023-04-13 | End: 2023-08-31

## 2023-04-13 RX ORDER — TORSEMIDE 10 MG/1
10 TABLET ORAL DAILY
Qty: 30 TABLET | Refills: 1 | Status: SHIPPED | OUTPATIENT
Start: 2023-04-13 | End: 2023-04-14

## 2023-04-13 RX ADMIN — CYANOCOBALAMIN 1000 MCG: 1000 INJECTION, SOLUTION INTRAMUSCULAR; SUBCUTANEOUS at 11:32

## 2023-04-13 NOTE — PROGRESS NOTES
Patient Active Problem List    Diagnosis Date Noted     Stage 3a chronic kidney disease (H) 12/31/2020     Priority: High     Persistent protienuria       Copper deficiency 04/05/2019     Priority: High     Identified in Aug, 2018--see discharge summary from [facility] in [place].       Chronic pain disorder 10/07/2016     Priority: High     Chronic Pain Diagnosis:  neuropathy  DIRE Total Score(s):14     ORT: 5    4 - 7 =  Moderate Risk   of future problems with Opioids  FAQ5: 50 November 20, 2020  Behavioral Health Consultation: 5/12/17 with Dr. Daisy Quezada  Personal Care Plan for Chronic Pain: 5/12/17  Opioid medication:oxycodone   Dose:10mg  Number of pills per month:60  Patient is being prescribed 10mg of oxycodone IR (Percocet) per day this is 15 mg Morphine Equivalents  Benzodiazepines Prescribed? No  Naloxone prescribed? YES February 16, 2023  RIOSORD 16:  15% risk      Clinic visit frequency required: Q 1 month Next visit due:   Controlled Substance/Opioid Treatment agreement on file (dated <12 months ago)?:    Date(s): pending  Last [place] verification: last visit  Items in red to be updated at each visit  Patient is followed by Data Unavailable for ongoing prescription of pain medication.  All refills should be approved by this provider, or covering partner.Chronic Pain     Chronic Pain Diagnosis:  neuropathy  DIRE Total Score(s):14     ORT: 5    4 - 7 =  Moderate Risk   of future problems with Opioids  FAQ5: 45/100 on 9/9/16, 40/100 on 11/15/16  55 October 29, 2020    Behavioral Health Consultation: 5/12/17 with Dr. Daisy Quezada  Personal Care Plan for Chronic Pain: 5/12/17  Opioid medication:oxycodone   Dose:10mg  Number of pills per month:90  Patient is being prescribed 45 MME  Benzodiazepines Prescribed? No  Naloxone prescribed? yes           Microalbuminuria 10/12/2015     Priority: High     Severely increased albuminuria       Type 2 diabetes mellitus with diabetic polyneuropathy (H) 10/12/2015      Priority: High     Essential hypertension 08/26/2013     Priority: High     Acidosis 08/12/2022     Priority: Medium     Acute chest pain 08/12/2022     Priority: Medium     Arthritis of both acromioclavicular joints 04/08/2021     Priority: Medium     xray proven        Morbid obesity (H) 11/27/2020     Priority: Medium     Date of Surgery: 2006 [facility] Dr. Harper  Initial Weight: 360#  Initial BMI: 70.30       Hyperglycemia due to type 2 diabetes mellitus (H) 06/02/2020     Priority: Medium     Neck mass 10/11/2019     Priority: Medium     Added automatically from request for surgery [id number]       Metabolic acidosis 07/26/2019     Priority: Medium     reolved w/ discontinuation of ASA       Obesity 01/03/2019     Priority: Medium     Hernia of anterior abdominal wall 01/28/2017     Priority: Medium     Osteoarthritis of both knees 10/31/2016     Priority: Medium     Polyneuropathy due to secondary diabetes mellitus (H) 10/31/2016     Priority: Medium     Compression neuropathy of left lower extremity 02/19/2016     Priority: Medium     Bilateral ankle joint pain 01/15/2016     Priority: Medium     Esophageal reflux 04/07/2015     Priority: Medium     Urinary incontinence 02/19/2015     Priority: Medium     Asthma 08/04/2013     Priority: Medium     History of cholecystectomy 08/04/2013     Priority: Medium     H/O necrotizing fasciitis 08/04/2013     Priority: Medium     Hyperlipidemia 06/21/2013     Priority: Medium     Status post bariatric surgery 06/21/2013     Priority: Medium     Kim-en-Y in 2006       Moderate major depression (H) 01/24/2013     Priority: Medium     Moderate persistent asthma without complication 11/15/2012     Priority: Medium     Persons encountering health services in other specified circumstances 11/15/2012     Priority: Medium     Tier 3    Status: Accept  Care Coordination Start Date: 02/27/12  State Tier Level: Level 3  Language/Barrier to Learning: No  Significant Mental Health  Issues: Yes  Date Care Coordination Discontinued: 05/28/15    DX V65.8 REPLACED WITH [id number] [facility]ALTH CARE HOME (04/08/2013)       Abnormal Pap smear of cervix 04/22/2019     Priority: Low     3-12-15: Pap/HPV neg.  Plan: Given hx (mildly abn pap, hyster), recheck in 5 yrs.  10-11-11: Pap/HPV neg.  Plan: Given ASUS pap in 2010, recommend repeating pap in 1 year.  8/3/2010 ASCUS Pap with +BV- pt treated with Metrogel.  Pt needs repeat Pap in 1yr.  8/4/2008 Pt had total hyst for menorrhagia, pelvic pain, scar tissue, and fibroids.    3-12-15: Pap/HPV neg.  Plan: Given hx (mildly abn pap, hyster), recheck in 5 yrs.  10-11-11: Pap/HPV neg.  Plan: Given ASUS pap in 2010, recommend repeating pap in 1 year.  8/3/2010 ASCUS Pap with +BV- pt treated with Metrogel.  Pt needs repeat Pap in 1yr.  8/4/2008 Pt had total hyst for menorrhagia, pelvic pain, scar tissue, and fibroids.       Hernia 01/27/2017     Priority: Low     S/P hysterectomy 06/21/2013     Priority: Low     2008, For fibroids.  Cervix removed, but still has ovaries.       History of hysterectomy for benign disease 06/21/2013     Priority: Low     2008, For fibroids.  Cervix removed, but still has ovaries.       Carpal tunnel syndrome 11/15/2012     Priority: Low     Health Care Home 11/15/2012     Priority: Low     Tier 3    Status: Accept  Care Coordination Start Date: 02/27/12  State Tier Level: Level 3  Language/Barrier to Learning: No  Significant Mental Health Issues: Yes  Date Care Coordination Discontinued: 05/28/15    DX V65.8 REPLACED WITH [id number] HEALTH CARE HOME (04/08/2013)         Chronic pain of both shoulders 12/23/2005     Priority: Low     There are no exam notes on file for this visit.  Chief Complaint   Patient presents with     Pain Management     CPM     Blood pressure (!) 150/78, pulse 118, temperature 97.8  F (36.6  C), temperature source Oral, resp. rate 16, SpO2 97 %, not currently breastfeeding.   21 is here primarily for follow-up of her  chronic pain.  She has osteoarthritis in multiple joints.  She has had Kim-en-Y gastric bypass and has contraindications to NSAIDs.  She is months been on stable doses of opiates for several years without aberrant behavior.  PDMP reviewed and there is no red flags.    She had worsening edema and some shortness of breath.  No orthopnea or PND no chest pain.  This is new over the past few weeks.    She also needs refills of her insulin.  She needs refills of her inhalers she is satisfied with her asthma she is not reporting any wheezing with the shortness of breath.  Objective    Blood pressure and pulse noted.    HEENT: Negative    Chest: Clear to auscultation    Cardiac: Tachycardic but regular rhythm normal S1-S2 no murmur.    Extremities 1+ edema to the mid tibia which is new for her.    Assessment    1.  Chronic pain syndrome: Oxycodone refilled after reviewing PDMP and normal urine drug screen as below.    2.  Type 2 diabetes mellitus: Refilled Levemir    3.  Moderate asthma.  Continue Smart therapy    4.  CKD with metabolic acidosis check a BMP.  Given her volume overload on exam today.  Add torsemide 10 mg daily.  Follow-up in 2 weeks to check edema.  5.  Sinus tachycardia.  This may be contributing to her symptoms.  Stop appetite suppressant empirically.  Results for orders placed or performed in visit on 04/13/23   Urine Drugs of Abuse Screen Panel 13     Status: Abnormal   Result Value Ref Range    Cannabinoids (51-grw-5-carboxy-9-THC) Not Detected Not Detected, Indeterminate    Phencyclidine Not Detected Not Detected, Indeterminate    Cocaine (Benzoylecgonine) Not Detected Not Detected, Indeterminate    Methamphetamine (d-Methamphetamine) Not Detected Not Detected, Indeterminate    Opiates (Morphine) Not Detected Not Detected, Indeterminate    Amphetamine (d-Amphetamine) Detected (A) Not Detected, Indeterminate    Benzodiazepines (Nordiazepam) Not Detected Not Detected, Indeterminate    Tricyclic  Antidepressants (Desipramine) Not Detected Not Detected, Indeterminate    Methadone Not Detected Not Detected, Indeterminate    Barbiturates (Butalbital) Not Detected Not Detected, Indeterminate    Oxycodone Detected (A) Not Detected, Indeterminate    Propoxyphene (Norpropoxyphene) Not Detected Not Detected, Indeterminate    Buprenorphine Not Detected Not Detected, Indeterminate   CBC with platelets     Status: Abnormal   Result Value Ref Range    WBC Count 11.7 (H) 4.0 - 11.0 10e3/uL    RBC Count 3.02 (L) 3.80 - 5.20 10e6/uL    Hemoglobin 9.7 (L) 11.7 - 15.7 g/dL    Hematocrit 30.4 (L) 35.0 - 47.0 %     (H) 78 - 100 fL    MCH 32.1 26.5 - 33.0 pg    MCHC 31.9 31.5 - 36.5 g/dL    RDW 14.0 10.0 - 15.0 %    Platelet Count 324 150 - 450 10e3/uL   N terminal pro BNP outpatient     Status: Normal   Result Value Ref Range    N Terminal Pro BNP Outpatient 245 0 - 900 pg/mL   TSH     Status: Normal   Result Value Ref Range    TSH 3.73 0.30 - 4.20 uIU/mL

## 2023-04-13 NOTE — PATIENT INSTRUCTIONS
Start torsemide 10 mg daily    Stop phentermine    Follow-up in 1 week to check swelling.                    04/14/23   EYE REFERRAL   (Medical insurance not accepted [facility])    [facility]  Phone:[phone]  Fax:  [phone]    Faxed demographics and referral to [phone]. They will contact pt to schedule.    Calista Shane

## 2023-04-14 LAB
NT-PROBNP SERPL-MCNC: 245 PG/ML (ref 0–900)
TSH SERPL DL<=0.005 MIU/L-ACNC: 3.73 UIU/ML (ref 0.3–4.2)

## 2023-04-14 RX ORDER — TORSEMIDE 10 MG/1
TABLET ORAL
Qty: 90 TABLET | Refills: 0 | Status: SHIPPED | OUTPATIENT
Start: 2023-04-14 | End: 2023-10-26

## 2023-04-20 ENCOUNTER — OFFICE VISIT (OUTPATIENT)
Dept: FAMILY MEDICINE | Facility: CLINIC | Age: 58
End: 2023-04-20
Payer: COMMERCIAL

## 2023-04-20 VITALS
TEMPERATURE: 98 F | SYSTOLIC BLOOD PRESSURE: 163 MMHG | WEIGHT: 213 LBS | HEART RATE: 94 BPM | OXYGEN SATURATION: 98 % | RESPIRATION RATE: 16 BRPM | DIASTOLIC BLOOD PRESSURE: 88 MMHG | BODY MASS INDEX: 42.3 KG/M2

## 2023-04-20 DIAGNOSIS — R60.9 EDEMA, UNSPECIFIED TYPE: ICD-10-CM

## 2023-04-20 DIAGNOSIS — R00.2 PALPITATIONS: Primary | ICD-10-CM

## 2023-04-20 LAB
ANION GAP SERPL CALCULATED.3IONS-SCNC: 10 MMOL/L (ref 7–15)
BUN SERPL-MCNC: 28.9 MG/DL (ref 6–20)
CALCIUM SERPL-MCNC: 8.3 MG/DL (ref 8.6–10)
CHLORIDE SERPL-SCNC: 111 MMOL/L (ref 98–107)
CREAT SERPL-MCNC: 1.29 MG/DL (ref 0.51–0.95)
DEPRECATED HCO3 PLAS-SCNC: 21 MMOL/L (ref 22–29)
GFR SERPL CREATININE-BSD FRML MDRD: 48 ML/MIN/1.73M2
GLUCOSE SERPL-MCNC: 58 MG/DL (ref 70–99)
POTASSIUM SERPL-SCNC: 4.3 MMOL/L (ref 3.4–5.3)
SODIUM SERPL-SCNC: 142 MMOL/L (ref 136–145)

## 2023-04-20 PROCEDURE — 36415 COLL VENOUS BLD VENIPUNCTURE: CPT | Performed by: FAMILY MEDICINE

## 2023-04-20 PROCEDURE — 99214 OFFICE O/P EST MOD 30 MIN: CPT | Performed by: FAMILY MEDICINE

## 2023-04-20 PROCEDURE — 80048 BASIC METABOLIC PNL TOTAL CA: CPT | Performed by: FAMILY MEDICINE

## 2023-04-20 RX ORDER — LIDOCAINE 40 MG/G
CREAM TOPICAL
Qty: 45 G | Refills: 11 | Status: SHIPPED | OUTPATIENT
Start: 2023-04-20 | End: 2024-09-16

## 2023-04-20 NOTE — PATIENT INSTRUCTIONS
Stop lidocaine cream  Continue torsemide 10 mg daily  Stay of phentermine.    48 hour heart monitor.  They should call you to set up today or tomorrow.     Nsaids Counseling: NSAID Counseling: I discussed with the patient that NSAIDs should be taken with food. Prolonged use of NSAIDs can result in the development of stomach ulcers.  Patient advised to stop taking NSAIDs if abdominal pain occurs.  The patient verbalized understanding of the proper use and possible adverse effects of NSAIDs.  All of the patient's questions and concerns were addressed.

## 2023-04-20 NOTE — LETTER
April 24, 2023      Ruthy Álvarez  [address]    [address] [facility] [address]        Dear ,    We are writing to inform you of your test results.    Humberto   Your kidney tests have some mild abnormalities that we need to keep an eye on, but no changes are needed now.   See you in May!   C Betsy     Resulted Orders   Basic metabolic panel   Result Value Ref Range    Sodium 142 136 - 145 mmol/L    Potassium 4.3 3.4 - 5.3 mmol/L    Chloride 111 (H) 98 - 107 mmol/L    Carbon Dioxide (CO2) 21 (L) 22 - 29 mmol/L    Anion Gap 10 7 - 15 mmol/L    Urea Nitrogen 28.9 (H) 6.0 - 20.0 mg/dL    Creatinine 1.29 (H) 0.51 - 0.95 mg/dL    Calcium 8.3 (L) 8.6 - 10.0 mg/dL    Glucose 58 (L) 70 - 99 mg/dL    GFR Estimate 48 (L) >60 mL/min/1.73m2      Comment:      eGFR calculated using 2021 CKD-EPI equation.       If you have any questions or concerns, please call the clinic at the number listed above.       Sincerely,      Jose Angel Meyer MD

## 2023-04-20 NOTE — PROGRESS NOTES
Patient Active Problem List    Diagnosis Date Noted     Stage 3a chronic kidney disease (H) 12/31/2020     Priority: High     Persistent protienuria       Copper deficiency 04/05/2019     Priority: High     Identified in Aug, 2018--see discharge summary from [facility] in [place].       Chronic pain disorder 10/07/2016     Priority: High     Chronic Pain Diagnosis:  neuropathy  DIRE Total Score(s):14     ORT: 5    4 - 7 =  Moderate Risk   of future problems with Opioids  FAQ5: 50 November 20, 2020  Behavioral Health Consultation: 5/12/17 with Dr. Daisy Quezada  Personal Care Plan for Chronic Pain: 5/12/17  Opioid medication:oxycodone   Dose:10mg  Number of pills per month:60  Patient is being prescribed 10mg of oxycodone IR (Percocet) per day this is 15 mg Morphine Equivalents  Benzodiazepines Prescribed? No  Naloxone prescribed? YES February 16, 2023  RIOSORD 16:  15% risk      Clinic visit frequency required: Q 1 month Next visit due:   Controlled Substance/Opioid Treatment agreement on file (dated <12 months ago)?:    Date(s): pending  Last [place] verification: last visit  Items in red to be updated at each visit  Patient is followed by Data Unavailable for ongoing prescription of pain medication.  All refills should be approved by this provider, or covering partner.Chronic Pain     Chronic Pain Diagnosis:  neuropathy  DIRE Total Score(s):14     ORT: 5    4 - 7 =  Moderate Risk   of future problems with Opioids  FAQ5: 45/100 on 9/9/16, 40/100 on 11/15/16  55 October 29, 2020    Behavioral Health Consultation: 5/12/17 with Dr. Daiys Quezada  Personal Care Plan for Chronic Pain: 5/12/17  Opioid medication:oxycodone   Dose:10mg  Number of pills per month:90  Patient is being prescribed 45 MME  Benzodiazepines Prescribed? No  Naloxone prescribed? yes           Microalbuminuria 10/12/2015     Priority: High     Severely increased albuminuria       Type 2 diabetes mellitus with diabetic polyneuropathy (H) 10/12/2015      Priority: High     Essential hypertension 08/26/2013     Priority: High     Acidosis 08/12/2022     Priority: Medium     Acute chest pain 08/12/2022     Priority: Medium     Arthritis of both acromioclavicular joints 04/08/2021     Priority: Medium     xray proven        Morbid obesity (H) 11/27/2020     Priority: Medium     Date of Surgery: 2006 RYGB Dr. Harper  Initial Weight: 360#  Initial BMI: 70.30       Hyperglycemia due to type 2 diabetes mellitus (H) 06/02/2020     Priority: Medium     Neck mass 10/11/2019     Priority: Medium     Added automatically from request for surgery [id number]       Metabolic acidosis 07/26/2019     Priority: Medium     reolved w/ discontinuation of ASA       Obesity 01/03/2019     Priority: Medium     Hernia of anterior abdominal wall 01/28/2017     Priority: Medium     Osteoarthritis of both knees 10/31/2016     Priority: Medium     Polyneuropathy due to secondary diabetes mellitus (H) 10/31/2016     Priority: Medium     Compression neuropathy of left lower extremity 02/19/2016     Priority: Medium     Bilateral ankle joint pain 01/15/2016     Priority: Medium     Esophageal reflux 04/07/2015     Priority: Medium     Urinary incontinence 02/19/2015     Priority: Medium     Asthma 08/04/2013     Priority: Medium     History of cholecystectomy 08/04/2013     Priority: Medium     H/O necrotizing fasciitis 08/04/2013     Priority: Medium     Hyperlipidemia 06/21/2013     Priority: Medium     Status post bariatric surgery 06/21/2013     Priority: Medium     Kim-en-Y in 2006       Moderate major depression (H) 01/24/2013     Priority: Medium     Moderate persistent asthma without complication 11/15/2012     Priority: Medium     Persons encountering health services in other specified circumstances 11/15/2012     Priority: Medium     Tier 3    Status: Accept  Care Coordination Start Date: 02/27/12  State Tier Level: Level 3  Language/Barrier to Learning: No  Significant Mental Health  Issues: Yes  Date Care Coordination Discontinued: 05/28/15    DX V65.8 REPLACED WITH [id number] (04/08/2013)       Abnormal Pap smear of cervix 04/22/2019     Priority: Low     3-12-15: Pap/HPV neg.  Plan: Given hx (mildly abn pap, hyster), recheck in 5 yrs.  10-11-11: Pap/HPV neg.  Plan: Given ASUS pap in 2010, recommend repeating pap in 1 year.  8/3/2010 ASCUS Pap with +BV- pt treated with Metrogel.  Pt needs repeat Pap in 1yr.  8/4/2008 Pt had total hyst for menorrhagia, pelvic pain, scar tissue, and fibroids.    3-12-15: Pap/HPV neg.  Plan: Given hx (mildly abn pap, hyster), recheck in 5 yrs.  10-11-11: Pap/HPV neg.  Plan: Given ASUS pap in 2010, recommend repeating pap in 1 year.  8/3/2010 ASCUS Pap with +BV- pt treated with Metrogel.  Pt needs repeat Pap in 1yr.  8/4/2008 Pt had total hyst for menorrhagia, pelvic pain, scar tissue, and fibroids.       Hernia 01/27/2017     Priority: Low     S/P hysterectomy 06/21/2013     Priority: Low     2008, For fibroids.  Cervix removed, but still has ovaries.       History of hysterectomy for benign disease 06/21/2013     Priority: Low     2008, For fibroids.  Cervix removed, but still has ovaries.       Carpal tunnel syndrome 11/15/2012     Priority: Low     Health Care Home 11/15/2012     Priority: Low     Tier 3    Status: Accept  Care Coordination Start Date: 02/27/12  State Tier Level: Level 3  Language/Barrier to Learning: No  Significant Mental Health Issues: Yes  Date Care Coordination Discontinued: 05/28/15    DX V65.8 REPLACED WITH [id number] (04/08/2013)         Chronic pain of both shoulders 12/23/2005     Priority: Low     There are no exam notes on file for this visit.  Chief Complaint   Patient presents with     Other     Check heart still has SOB      Blood pressure (!) 163/88, pulse 94, temperature 98  F (36.7  C), temperature source Oral, resp. rate 16, weight 96.6 kg (213 lb), SpO2 98 %, not currently breastfeeding.  No results  found for any visits on 04/20/23.   Heart started racing, particularly after applying topical lido to arms.    Uses lidocaine cream.  When using it, heart races.  Will last for a few hours.  Tries to go to sleep to make it go away.  Put cream on 4 AM, and felt it then, but it seems to be wearing off.   Breathing is ok.  Edema better.  Is short of breath when laying flat.  Also concerned that she has gained wt and is more short of breath, too.    Objective: Mild blood pressure elevation noted.    Patient is alert pleasant no acute distress appropriately groomed.    Chest clear without crackles or wheezes.    Cardiac: Regular rate and rhythm no murmur.    Extremities: Trace ankle edema only.  Much improved compared to last visit.    1.  Edema: Improved.  BMP is pending given that she is now on a loop diuretic.  Call her with results.  Continue torsemide 10 mg daily.    2.  Palpitations: 48-hour Holter monitor ordered.  Stop lidocaine for now.  Stay off phentermine.  Follow-up 3 weeks.      Office Visit on 04/13/2023   Component Date Value Ref Range Status     Cannabinoids (25-qln-0-carboxy-9-T* 04/13/2023 Not Detected  Not Detected, Indeterminate Final    Cutoff for a negative cannabinoid is 50 ng/mL or less.     Phencyclidine 04/13/2023 Not Detected  Not Detected, Indeterminate Final    Cutoff for a negative PCP is 25 ng/mL or less.     Cocaine (Benzoylecgonine) 04/13/2023 Not Detected  Not Detected, Indeterminate Final    Cutoff for a negative cocaine is 150 ng/ml or less.     Methamphetamine (d-Methamphetamine) 04/13/2023 Not Detected  Not Detected, Indeterminate Final    Cutoff for a negative methamphetamine is 500 ng/ml or less.     Opiates (Morphine) 04/13/2023 Not Detected  Not Detected, Indeterminate Final    Cutoff for a negative opiate is 100 ng/ml or less.     Amphetamine (d-Amphetamine) 04/13/2023 Detected (A)  Not Detected, Indeterminate Final    Cutoff for a positive amphetamine is greater than 500  ng/ml.  This is an unconfirmed screening result to be used for medical purposes only.      Benzodiazepines (Nordiazepam) 04/13/2023 Not Detected  Not Detected, Indeterminate Final    Cutoff for a negative benzodiazepine is 150 ng/ml or less.     Tricyclic Antidepressants (Desipra* 04/13/2023 Not Detected  Not Detected, Indeterminate Final    Cutoff for a negative tricyclic antidepressant is 300 ng/ml or less.     Methadone 04/13/2023 Not Detected  Not Detected, Indeterminate Final    Cutoff for a negative methadone is 200 ng/ml or less.     Barbiturates (Butalbital) 04/13/2023 Not Detected  Not Detected, Indeterminate Final    Cutoff for a negative barbituate is 200 ng/ml or less.     Oxycodone 04/13/2023 Detected (A)  Not Detected, Indeterminate Final    Cutoff for a positive oxycodone is greater than 100 ng/ml.  This is an unconfirmed screening result to be used for medical purposes only.      Propoxyphene (Norpropoxyphene) 04/13/2023 Not Detected  Not Detected, Indeterminate Final    Cutoff for a negative propoxyphene is 300 ng/ml or less.     Buprenorphine 04/13/2023 Not Detected  Not Detected, Indeterminate Final    Cutoff for a negative buprenorphine is 10 ng/ml or less.     WBC Count 04/13/2023 11.7 (H)  4.0 - 11.0 10e3/uL Final     RBC Count 04/13/2023 3.02 (L)  3.80 - 5.20 10e6/uL Final     Hemoglobin 04/13/2023 9.7 (L)  11.7 - 15.7 g/dL Final     Hematocrit 04/13/2023 30.4 (L)  35.0 - 47.0 % Final     MCV 04/13/2023 101 (H)  78 - 100 fL Final     MCH 04/13/2023 32.1  26.5 - 33.0 pg Final     MCHC 04/13/2023 31.9  31.5 - 36.5 g/dL Final     RDW 04/13/2023 14.0  10.0 - 15.0 % Final     Platelet Count 04/13/2023 324  150 - 450 10e3/uL Final     N Terminal Pro BNP Outpatient 04/13/2023 245  0 - 900 pg/mL Final    Reference range shown and results flagged as abnormal are for the outpatient, non acute settings. Establishing a baseline value for each individual patient is useful for follow-up.    Suggested  inpatient cut points for confirming diagnosis of CHF in an acute setting are:  >450 pg/mL (age 18 to less than 50)  >900 pg/mL (age 50 to less than 75)  >1800 pg/mL (75 yrs and older)    An inpatient or emergency department NT-proPBNP <300 pg/mL effectively rules out acute CHF, with 99% negative predictive value.         TSH 04/13/2023 3.73  0.30 - 4.20 uIU/mL Final

## 2023-04-21 NOTE — RESULT ENCOUNTER NOTE
Please mail labs to patient with this message.      Twaudalyn  Your kidney tests have some mild abnormalities that we need to keep an eye on, but no changes are needed now.  See you in May!  JIGNESH Meyer

## 2023-05-04 ENCOUNTER — OFFICE VISIT (OUTPATIENT)
Dept: FAMILY MEDICINE | Facility: CLINIC | Age: 58
End: 2023-05-04
Payer: COMMERCIAL

## 2023-05-04 VITALS
SYSTOLIC BLOOD PRESSURE: 174 MMHG | HEART RATE: 101 BPM | RESPIRATION RATE: 16 BRPM | TEMPERATURE: 97.9 F | DIASTOLIC BLOOD PRESSURE: 99 MMHG | OXYGEN SATURATION: 99 %

## 2023-05-04 DIAGNOSIS — M19.011 ARTHRITIS OF BOTH ACROMIOCLAVICULAR JOINTS: ICD-10-CM

## 2023-05-04 DIAGNOSIS — G89.4 CHRONIC PAIN SYNDROME: Primary | ICD-10-CM

## 2023-05-04 DIAGNOSIS — E11.42 DIABETIC POLYNEUROPATHY ASSOCIATED WITH TYPE 2 DIABETES MELLITUS (H): ICD-10-CM

## 2023-05-04 DIAGNOSIS — M17.0 PRIMARY OSTEOARTHRITIS OF BOTH KNEES: ICD-10-CM

## 2023-05-04 DIAGNOSIS — M19.012 ARTHRITIS OF BOTH ACROMIOCLAVICULAR JOINTS: ICD-10-CM

## 2023-05-04 PROCEDURE — 80306 DRUG TEST PRSMV INSTRMNT: CPT | Performed by: FAMILY MEDICINE

## 2023-05-04 PROCEDURE — 96372 THER/PROPH/DIAG INJ SC/IM: CPT | Performed by: FAMILY MEDICINE

## 2023-05-04 PROCEDURE — 99214 OFFICE O/P EST MOD 30 MIN: CPT | Mod: 25 | Performed by: FAMILY MEDICINE

## 2023-05-04 RX ORDER — OXYCODONE AND ACETAMINOPHEN 10; 325 MG/1; MG/1
TABLET ORAL
Qty: 120 TABLET | Refills: 0 | Status: SHIPPED | OUTPATIENT
Start: 2023-05-12 | End: 2023-06-09

## 2023-05-04 RX ORDER — GABAPENTIN 250 MG/5ML
SOLUTION ORAL
Qty: 0.1 ML | Refills: 0 | COMMUNITY
Start: 2023-05-04 | End: 2023-07-06

## 2023-05-04 RX ADMIN — CYANOCOBALAMIN 1000 MCG: 1000 INJECTION, SOLUTION INTRAMUSCULAR; SUBCUTANEOUS at 11:02

## 2023-05-04 NOTE — PROGRESS NOTES
Genesis Bliss is a 57 year old, presenting for the following health issues:  Pain Management (CPM/Baby toes has been hurting with black marks /Burning around left eye after showering )        10/27/2022    12:01 PM   Additional Questions   Roomed by sravanthi brian   Accompanied by self     HPI       21 is a complicated patient with type 2 diabetes, hypertension, obesity treated with Kim-en-Y gastric bypass, peripheral neuropathy, CKD.    She is here for general follow-up.  Her edema is a little bit worse but she is not taking her torsemide regularly.    Her neuropathic pain is about the same on reduced doses of gabapentin she is taking 10 mL twice daily right now.  We will try to cut back because it may be contributing to her edema.    She is noticing a little bit of darkness around her left lower eyelid and upper cheek.  No trauma.    In general her chronic pain is stable and she tolerates her oxycodone well.  She had no history of aberrant behavior.  Denies any other drug use.  She has contraindications to NSAIDs in that she had Kim-en-Y gastric bypass and has severe hypertension.  She is otherwise adherent to her medications with the exception of the torsemide which she has been skipping because she does not want to go to the bathroom when she is out and out and about.    Objective: Vital signs reviewed.    Chest: Clear to auscultation.  Heart, regular rate and rhythm without murmur.    Extremities 2+ edema to the mid tibia.  There is a callus on the right second toe that appears benign.    She mentioned to me a desire to get rid of her cane.  She is attempting to walk longer distances.  She like to pool therapy at 1 time but has not been able to afford it.  She did like to try traditional therapy.    Assessment: Plan:    1 chronic pain syndrome: Attempting to wean off gabapentin to help with edema.  She will cut back to 1 teaspoon twice daily.  Refilled oxycodone as PDMP and urine drug screen were  unremarkable.  This will PEB predated to fill on May 12.  She can follow-up in early June.    For her edema, cutting back on gabapentin should help.  Consistent use of torsemide should help also and she plans to take it at night because she tolerates this better.    Regarding her knee osteoarthritis referred for PT.  She like strength and balance training.

## 2023-05-04 NOTE — PROGRESS NOTES
Patient Active Problem List    Diagnosis Date Noted     Stage 3a chronic kidney disease (H) 12/31/2020     Priority: High     Persistent protienuria       Copper deficiency 04/05/2019     Priority: High     Identified in Aug, 2018--see discharge summary from [facility] in [place].       Chronic pain disorder 10/07/2016     Priority: High     Chronic Pain Diagnosis:  neuropathy  DIRE Total Score(s):14     ORT: 5    4 - 7 =  Moderate Risk   of future problems with Opioids  FAQ5: 50 November 20, 2020  Behavioral Health Consultation: 5/12/17 with Dr. Daisy Quezada  Personal Care Plan for Chronic Pain: 5/12/17  Opioid medication:oxycodone   Dose:10mg  Number of pills per month:60  Patient is being prescribed 10mg of oxycodone IR (Percocet) per day this is 15 mg Morphine Equivalents  Benzodiazepines Prescribed? No  Naloxone prescribed? YES February 16, 2023  RIOSORD 16:  15% risk      Clinic visit frequency required: Q 1 month Next visit due:   Controlled Substance/Opioid Treatment agreement on file (dated <12 months ago)?:    Date(s): pending  Last MNPMP verification: last visit  Items in red to be updated at each visit  Patient is followed by Data Unavailable for ongoing prescription of pain medication.  All refills should be approved by this provider, or covering partner.Chronic Pain     Chronic Pain Diagnosis:  neuropathy  DIRE Total Score(s):14     ORT: 5    4 - 7 =  Moderate Risk   of future problems with Opioids  FAQ5: 45/100 on 9/9/16, 40/100 on 11/15/16  55 October 29, 2020    Behavioral Health Consultation: 5/12/17 with Dr. Daisy Quezada  Personal Care Plan for Chronic Pain: 5/12/17  Opioid medication:oxycodone   Dose:10mg  Number of pills per month:90  Patient is being prescribed 45 MME  Benzodiazepines Prescribed? No  Naloxone prescribed? yes           Microalbuminuria 10/12/2015     Priority: High     Severely increased albuminuria       Type 2 diabetes mellitus with diabetic polyneuropathy (H) 10/12/2015      Priority: High     Essential hypertension 08/26/2013     Priority: High     Acidosis 08/12/2022     Priority: Medium     Acute chest pain 08/12/2022     Priority: Medium     Arthritis of both acromioclavicular joints 04/08/2021     Priority: Medium     xray proven        Morbid obesity (H) 11/27/2020     Priority: Medium     Date of Surgery: 2006 RY[facility] [address] Harper  Initial Weight: 360#  Initial BMI: 70.30       Hyperglycemia due to type 2 diabetes mellitus (H) 06/02/2020     Priority: Medium     Neck mass 10/11/2019     Priority: Medium     Added automatically from request for surgery [id number]       Metabolic acidosis 07/26/2019     Priority: Medium     reolved w/ discontinuation of ASA       Obesity 01/03/2019     Priority: Medium     Hernia of anterior abdominal wall 01/28/2017     Priority: Medium     Osteoarthritis of both knees 10/31/2016     Priority: Medium     Polyneuropathy due to secondary diabetes mellitus (H) 10/31/2016     Priority: Medium     Compression neuropathy of left lower extremity 02/19/2016     Priority: Medium     Bilateral ankle joint pain 01/15/2016     Priority: Medium     Esophageal reflux 04/07/2015     Priority: Medium     Urinary incontinence 02/19/2015     Priority: Medium     Asthma 08/04/2013     Priority: Medium     History of cholecystectomy 08/04/2013     Priority: Medium     H/O necrotizing fasciitis 08/04/2013     Priority: Medium     Hyperlipidemia 06/21/2013     Priority: Medium     Status post bariatric surgery 06/21/2013     Priority: Medium     Kim-en-Y in 2006       Moderate major depression (H) 01/24/2013     Priority: Medium     Moderate persistent asthma without complication 11/15/2012     Priority: Medium     Persons encountering health services in other specified circumstances 11/15/2012     Priority: Medium     Tier 3    Status: Accept  Care Coordination Start Date: 02/27/12  State Tier Level: Level 3  Language/Barrier to Learning: No  Significant Mental Health  Issues: Yes  Date Care Coordination Discontinued: 05/28/15    DX V65.8 REPLACED WITH [id number] (04/08/2013)       Abnormal Pap smear of cervix 04/22/2019     Priority: Low     3-12-15: Pap/HPV neg.  Plan: Given hx (mildly abn pap, hyster), recheck in 5 yrs.  10-11-11: Pap/HPV neg.  Plan: Given ASUS pap in 2010, recommend repeating pap in 1 year.  8/3/2010 ASCUS Pap with +BV- pt treated with Metrogel.  Pt needs repeat Pap in 1yr.  8/4/2008 Pt had total hyst for menorrhagia, pelvic pain, scar tissue, and fibroids.    3-12-15: Pap/HPV neg.  Plan: Given hx (mildly abn pap, hyster), recheck in 5 yrs.  10-11-11: Pap/HPV neg.  Plan: Given ASUS pap in 2010, recommend repeating pap in 1 year.  8/3/2010 ASCUS Pap with +BV- pt treated with Metrogel.  Pt needs repeat Pap in 1yr.  8/4/2008 Pt had total hyst for menorrhagia, pelvic pain, scar tissue, and fibroids.       Hernia 01/27/2017     Priority: Low     S/P hysterectomy 06/21/2013     Priority: Low     2008, For fibroids.  Cervix removed, but still has ovaries.       History of hysterectomy for benign disease 06/21/2013     Priority: Low     2008, For fibroids.  Cervix removed, but still has ovaries.       Carpal tunnel syndrome 11/15/2012     Priority: Low     Health Care Home 11/15/2012     Priority: Low     Tier 3    Status: Accept  Care Coordination Start Date: 02/27/12  State Tier Level: Level 3  Language/Barrier to Learning: No  Significant Mental Health Issues: Yes  Date Care Coordination Discontinued: 05/28/15    DX V65.8 REPLACED WITH [id number] (04/08/2013)         Chronic pain of both shoulders 12/23/2005     Priority: Low     There are no exam notes on file for this visit.  Chief Complaint   Patient presents with     Pain Management     CPM  Baby toes has been hurting with black marks   Burning around left eye after showering      Blood pressure (!) 174/99, pulse 101, temperature 97.9  F (36.6  C), temperature source Oral, resp. rate 16,  SpO2 99 %, not currently breastfeeding.  No results found for any visits on 05/04/23.     Working on walking w/o cane    Didn't get holter.  Stopped lido and it went away.    Hasn't restarted phentermine.    Having colonoscopy on 5/8.    Blood sugars are ok.    Lets are still puffy.    Right 5th toe is very sensitive, too.    Gabapentin 2 tsp BID--working ok.  Maybe a little more, but manageable.  Last filled oxycodone 4/13.

## 2023-05-04 NOTE — PATIENT INSTRUCTIONS
Cont oxycodone as before    Take torsemide (water pill) 10 mg at night    Cut back on gabapentin to 5 ml (1 tsp) AM and PM    No need for holter monitor.    Glad about PT--you should hear from them within a week to set up an appt.    Stay off phentermine, for now, due to BP being high.      Will check AIC and kidneys next time.

## 2023-05-10 ENCOUNTER — VIRTUAL VISIT (OUTPATIENT)
Dept: SURGERY | Facility: CLINIC | Age: 58
End: 2023-05-10
Payer: COMMERCIAL

## 2023-05-10 VITALS — BODY MASS INDEX: 41.03 KG/M2 | HEIGHT: 60 IN | WEIGHT: 209 LBS

## 2023-05-10 DIAGNOSIS — Z79.4 TYPE 2 DIABETES MELLITUS WITH DIABETIC POLYNEUROPATHY, WITH LONG-TERM CURRENT USE OF INSULIN (H): ICD-10-CM

## 2023-05-10 DIAGNOSIS — E11.42 TYPE 2 DIABETES MELLITUS WITH DIABETIC POLYNEUROPATHY, WITH LONG-TERM CURRENT USE OF INSULIN (H): ICD-10-CM

## 2023-05-10 DIAGNOSIS — K91.2 POSTOPERATIVE MALABSORPTION: Primary | ICD-10-CM

## 2023-05-10 PROCEDURE — 99214 OFFICE O/P EST MOD 30 MIN: CPT | Mod: 93 | Performed by: FAMILY MEDICINE

## 2023-05-10 RX ORDER — ERGOCALCIFEROL 1.25 MG/1
50000 CAPSULE, LIQUID FILLED ORAL WEEKLY
Qty: 13 CAPSULE | Refills: 3 | Status: SHIPPED | OUTPATIENT
Start: 2023-05-10 | End: 2024-08-16

## 2023-05-10 NOTE — PROGRESS NOTES
"  The patient has been notified of following:     \"This telephone visit will be conducted via a call between you and your physician/provider. We have found that certain health care needs can be provided without the need for a physical exam.  This service lets us provide the care you need with a short phone conversation.  If a prescription is necessary we can send it directly to your pharmacy.  If lab work is needed we can place an order for that and you can then stop by our lab to have the test done at a later time.    Telephone visits are billed at different rates depending on your insurance coverage. During this emergency period, for some insurers they may be billed the same as an in-person visit.  Please reach out to your insurance provider with any questions.    If during the course of the call the physician/provider feels a telephone visit is not appropriate, you will not be charged for this service.\"    Patient has given verbal consent to a Telephone visit? Yes    What phone number would you like to be contacted at? [phone]    Patient would like to receive their AVS by [employer]    Are there any specific questions or needs that you would like addressed at your visit today? Patient has questions regarding her Trulicity dose. States pharmacy has wrong dosage. Patient needs refill on Phentermine.         Distant Location (provider location):  On-site      Bariatric Follow Up Visit with a History of Previous Bariatric Surgery     Date of visit: 5/10/2023  Physician: Vy Carson MD, MD  Primary Care Provider:  Jose Angel Meyer   57 year old  female    Date of Surgery: 2006  Initial Weight: 360  Initial BMI:   Today's Weight:   Wt Readings from Last 1 Encounters:   05/10/23 94.8 kg (209 lb)     Body mass index is 41.51 kg/m .      Assessment and Plan     Assessment: Ruthy is a 57 year old year old female who is 17 yrs s/p  [place] en [place] Gastric Bypass with Dr." Meredith Álvarez feels as if she has achieved the goals she hoped to accomplish through bariatric surgery and weight loss.    Encounter Diagnoses   Name Primary?     Postoperative malabsorption Yes     Type 2 diabetes mellitus with diabetic polyneuropathy, with long-term current use of insulin (H)          Current Outpatient Medications:      acetaminophen (TYLENOL) 500 MG tablet, Take 1 tablet (500 mg) by mouth 2 times daily as needed for mild pain, Disp: 30 tablet, Rfl: 0     Alcohol Swabs (PRO COMFORT ALCOHOL) 70 % PADS, 1 pad 4 times daily as needed (blood sugar testing) Use QID for blood sugar monitoring, Disp: 400 each, Rfl: 11     atorvastatin (LIPITOR) 10 MG tablet, TAKE 1 TABLET BY MOUTH EVERY DAY, Disp: 90 tablet, Rfl: 3     blood glucose (NO BRAND SPECIFIED) lancets standard, Check fasting glucose 4 times daily.  For Accu check monitor, Disp: 360 each, Rfl: 11     blood glucose calibration (ONETOUCH ULTRA CONTROL) solution, See Admin Instructions, Disp: , Rfl:      blood glucose monitoring (ONE TOUCH ULTRA 2) meter device kit, See Admin Instructions, Disp: , Rfl:      budesonide-formoterol (SYMBICORT) 80-4.5 MCG/ACT Inhaler, Inhale 2 puffs once daily as needed, and may repeat every hour, maximum 12 puffs in 24 hours, Disp: 20.4 g, Rfl: 11     capsaicin (ZOSTRIX) 0.025 % external cream, Apply 3-4 times daily to knees, Disp: 118 mL, Rfl: 11     Continuous Blood Gluc Sensor (FREESTYLE ANIKET 14 DAY SENSOR) MISC, 1 each every 14 days Use 1 Sensor every 14 days. Use to read blood sugars per 's instructions., Disp: 2 each, Rfl: 5     Continuous Blood Gluc Sensor (FREESTYLE ANIKET 2 SENSOR) MISC, 1 each every 14 days Use 1 sensor every 14 days. Use to read blood sugars per 's instructions., Disp: 2 each, Rfl: 5     CONTOUR NEXT TEST test strip, USE TO TEST BLOOD GLUCOSE FOUR TIMES DAILY, Disp: 400 strip, Rfl: 11     cyanocobalamin (CYANOCOBALAMIN) 1000 MCG/ML injection, Inject 1 mL  (1,000 mcg) into the muscle every 30 days, Disp: 1 mL, Rfl: 11     dapagliflozin (FARXIGA) 10 MG TABS tablet, Take 1 tablet (10 mg) by mouth daily, Disp: 30 tablet, Rfl: 11     diclofenac (VOLTAREN) 1 % topical gel, Apply 4 g topically 3 times daily To top of both shoulders in area of pain, Disp: 350 g, Rfl: 2     diltiazem ER (DILT-XR) 120 MG 24 hr capsule, TAKE 1 CAPSULE(120 MG) BY MOUTH AT BEDTIME, Disp: 90 capsule, Rfl: 4     Dulaglutide 4.5 MG/0.5ML SOPN, Inject 4.5 mg Subcutaneous once a week, Disp: 6 mL, Rfl: 3     EASY COMFORT PEN NEEDLES 31G X 5 MM miscellaneous, USE TO INJECT INSULIN ONCE DAILY, Disp: , Rfl:      ferrous sulfate (IRON) 325 (65 FE) MG tablet, Take 1 tablet (325 mg) by mouth daily (with breakfast), Disp: 30 tablet, Rfl: 2     fluocinolone (SYNALAR) 0.01 % solution, APPLY 1-2 ML TOPICALLY TO AFFECTED AREA 1-2 TIMES PER DAY., Disp: , Rfl:      gabapentin (NEURONTIN) 250 MG/5ML solution, 5 ml po bid, Disp: 0.1 mL, Rfl: 0     insulin glargine (LANTUS SOLOSTAR) 100 UNIT/ML pen, Inject 26 Units Subcutaneous At Bedtime New dose, Disp: 30 mL, Rfl: 11     insulin pen needle (BD ULTRA-FINE) 29G X 12.7MM miscellaneous, Use 1 time daily, Disp: 90 each, Rfl: 3     Lancet Devices (EASY MINI EJECT LANCING DEVICE) MISC, See Admin Instructions, Disp: , Rfl:      Lancets 30G MISC, USE TO TEST BLOOD GLUCOSE FOUR TIMES DAILY, Disp: 400 each, Rfl: 11     latanoprost (XALATAN) 0.005 % ophthalmic solution, INSTILL 1 DROP IN BOTH EYES DAILY AT BEDTIME, Disp: , Rfl:      lidocaine (LMX4) 4 % external cream, Apply topically once as needed for mild pain (left knee), Disp: 45 g, Rfl: 11     lisinopril (ZESTRIL) 40 MG tablet, Take 1 tablet (40 mg) by mouth At Bedtime, Disp: 90 tablet, Rfl: 3     naloxone (NARCAN) 4 MG/0.1ML nasal spray, Spray 1 spray (4 mg) into one nostril alternating nostrils once as needed for opioid reversal Every 2-3 minutes until patient responsive or EMS arrives, Disp: 0.2 mL, Rfl: 0      ondansetron (ZOFRAN ODT) 4 MG ODT tab, Take 4 mg by mouth as needed, Disp: , Rfl:      order for DME, Equipment being ordered: wheeled walker with seat, Disp: 1 Device, Rfl: 0     [START ON 5/12/2023] oxyCODONE-acetaminophen (PERCOCET)  MG per tablet, Take 2 tablets by mouth At Bedtime. May also take 1 tablet every 6 hours as needed for severe pain., Disp: 120 tablet, Rfl: 0     phentermine (ADIPEX-P) 37.5 MG tablet, Take 0.5-1 tablets (18.75-37.5 mg) by mouth every morning, Disp: 90 tablet, Rfl: 1     risperiDONE (RISPERDAL) 0.25 MG tablet, Take 1 tablet (0.25 mg) by mouth At Bedtime, Disp: , Rfl:      senna-docusate (SENOKOT-S/PERICOLACE) 8.6-50 MG tablet, Take 1 tablet by mouth 2 times daily, Disp: 60 tablet, Rfl: 11     Simethicone 180 MG CAPS, Use 1 softgel as needed for gas, Disp: 60 capsule, Rfl: 11     sodium citrate-citric acid (BICITRA) 500-334 MG/5ML solution, Take 10 mLs by mouth 2 times daily (with meals), Disp: 473 mL, Rfl: 11     tolterodine ER (DETROL LA) 2 MG 24 hr capsule, Take 1 capsule (2 mg) by mouth daily, Disp: 90 capsule, Rfl: 3     torsemide (DEMADEX) 10 MG tablet, TAKE 1 TABLET(10 MG) BY MOUTH DAILY, Disp: 90 tablet, Rfl: 0     TRULICITY 3 MG/0.5ML SOPN, Inject 3 mg Subcutaneous every 7 days, Disp: 2 mL, Rfl: 11     vitamin D2 (ERGOCALCIFEROL) 17191 units (1250 mcg) capsule, Take 1 capsule (50,000 Units) by mouth once a week, Disp: 13 capsule, Rfl: 3     SUMAtriptan (IMITREX) 25 MG tablet, One tab at onset of HA.  May repeat dose in 2 hours.  Do not exceed 200 mg in 24 hours (Patient not taking: Reported on 5/10/2023), Disp: 24 tablet, Rfl: 1    Current Facility-Administered Medications:      cyanocobalamin injection 1,000 mcg, 1,000 mcg, Intramuscular, Q30 Days, Jose Angel Meyer MD, 1,000 mcg at 05/04/23 1102     cyanocobalamin injection 1,000 mcg, 1,000 mcg, Intramuscular, Q30 Days, Jose Angel Meyer MD, 1,000 mcg at 02/16/23 1212    Plan: Increase Trulicity to 4.5mg.  Limiting simple sugars and salt can significantly reduce arthritic pain. Reducing non-dairy animal protein and more plant based protein foods can also have an impact on reducing arthritic pain. Will arrange medical nutrition therapy with Myranda JONES Disccussed PT for balance/falling-apparently it was no longer covered by her insurance so therapy was discontinued. Working with Dr. Meyer on finding services to address this. Restart vitamin D RX sent.    Return in about 3 months (around 8/10/2023).    Bariatric Surgery Review     Interim History/LifeChanges: Mobility has been an issue. RA pain. Some weight gain. Working with Dr. Meyer on BP control and finding PT for balance issues    Patient Concerns: pain  Appetite (1-10): up  GERD: no    Medication changes: no    Vitamin Intake:   B-12   injection   MVI  2   Vitamin D  out   Calcium        Other                Habits    Eating sweets  Movement is down      ROS  Weight regain  Balance issues  Decreased mobility  Sugar cravings are up  Tolerating Trulicity well    Social History     Social History     Socioeconomic History     Marital status: Single     Spouse name: Not on file     Number of children: Not on file     Years of education: Not on file     Highest education level: Not on file   Occupational History     Not on file   Tobacco Use     Smoking status: Never     Smokeless tobacco: Never   Vaping Use     Vaping status: Not on file   Substance and Sexual Activity     Alcohol use: No     Drug use: No     Sexual activity: Never   Other Topics Concern     Parent/sibling w/ CABG, MI or angioplasty before 65F 55M? No   Social History Narrative    Lives alone. [place], other daughter lives here 2 sons live in the [facility]     Social Determinants of Health     Financial Resource Strain: Not on file   Food Insecurity: Not on file   Transportation Needs: Not on file   Physical Activity: Not on file   Stress: Not on file   Social Connections: Not on  file   Intimate Partner Violence: Not on file   Housing Stability: Not on file       Past Medical History     Past Medical History:   Diagnosis Date     Abnormal cervical Papanicolaou smear with positive human papillomavirus deoxyribonucleic acid test      Ankle joint pain     bilateral     Anxiety      Arthritis      Arthritis     rheumatoid arthritis     Asthma      Carpal tunnel syndrome     bilateral     Compression neuropathy      Depression      Depressive disorder      Diabetes (H)      Diabetes (H)     type II     Diabetic nephropathy (H)      DVT of lower extremity, bilateral (H) 2011    right     Esophageal reflux      Gastroenteritis      History of blood clots      History of blood transfusion      Hyperlipidemia      Hypertension      Hypertension      Infectious fasciitis 2011    abdominal wall      Insomnia      Neck pain      Necrotizing fasciitis (H) 11/15/2012     Necrotizing fasciitis (H)      Neuropathy      Obesity      Osteoarthritis of both knees 10/31/2016     Pulmonary embolism and infarction (H)      SOB (shortness of breath)     with exertion     Tarsal tunnel syndrome      Uncomplicated asthma      Urinary incontinence      Problem List     Patient Active Problem List   Diagnosis     Chronic pain of both shoulders     Abnormal Pap smear of cervix     Moderate persistent asthma without complication     Carpal tunnel syndrome     Health Care Home     Moderate major depression (H)     S/P hysterectomy     Hyperlipidemia     Status post bariatric surgery     Essential hypertension     Urinary incontinence     Esophageal reflux     Microalbuminuria     Type 2 diabetes mellitus with diabetic polyneuropathy (H)     Bilateral ankle joint pain     Chronic pain disorder     Asthma     Compression neuropathy of left lower extremity     Hernia     Hernia of anterior abdominal wall     Obesity     Osteoarthritis of both knees     Polyneuropathy due to secondary diabetes mellitus (H)     Copper  deficiency     Metabolic acidosis     Neck mass     Persons encountering health services in other specified circumstances     Hyperglycemia due to type 2 diabetes mellitus (H)     History of cholecystectomy     History of hysterectomy for benign disease     Morbid obesity (H)     Stage 3a chronic kidney disease (H)     Arthritis of both acromioclavicular joints     H/O necrotizing fasciitis     Acidosis     Acute chest pain     Medications       Current Outpatient Medications:      acetaminophen (TYLENOL) 500 MG tablet, Take 1 tablet (500 mg) by mouth 2 times daily as needed for mild pain, Disp: 30 tablet, Rfl: 0     Alcohol Swabs (PRO COMFORT ALCOHOL) 70 % PADS, 1 pad 4 times daily as needed (blood sugar testing) Use QID for blood sugar monitoring, Disp: 400 each, Rfl: 11     atorvastatin (LIPITOR) 10 MG tablet, TAKE 1 TABLET BY MOUTH EVERY DAY, Disp: 90 tablet, Rfl: 3     blood glucose (NO BRAND SPECIFIED) lancets standard, Check fasting glucose 4 times daily.  For Accu check monitor, Disp: 360 each, Rfl: 11     blood glucose calibration (ONETOUCH ULTRA CONTROL) solution, See Admin Instructions, Disp: , Rfl:      blood glucose monitoring (ONE TOUCH ULTRA 2) meter device kit, See Admin Instructions, Disp: , Rfl:      budesonide-formoterol (SYMBICORT) 80-4.5 MCG/ACT Inhaler, Inhale 2 puffs once daily as needed, and may repeat every hour, maximum 12 puffs in 24 hours, Disp: 20.4 g, Rfl: 11     capsaicin (ZOSTRIX) 0.025 % external cream, Apply 3-4 times daily to knees, Disp: 118 mL, Rfl: 11     Continuous Blood Gluc Sensor (FREESTYLE ANIKET 14 DAY SENSOR) MISC, 1 each every 14 days Use 1 Sensor every 14 days. Use to read blood sugars per 's instructions., Disp: 2 each, Rfl: 5     Continuous Blood Gluc Sensor (FREESTYLE ANIKET 2 SENSOR) MISC, 1 each every 14 days Use 1 sensor every 14 days. Use to read blood sugars per 's instructions., Disp: 2 each, Rfl: 5     CONTOUR NEXT TEST test strip, USE TO  TEST BLOOD GLUCOSE FOUR TIMES DAILY, Disp: 400 strip, Rfl: 11     cyanocobalamin (CYANOCOBALAMIN) 1000 MCG/ML injection, Inject 1 mL (1,000 mcg) into the muscle every 30 days, Disp: 1 mL, Rfl: 11     dapagliflozin (FARXIGA) 10 MG TABS tablet, Take 1 tablet (10 mg) by mouth daily, Disp: 30 tablet, Rfl: 11     diclofenac (VOLTAREN) 1 % topical gel, Apply 4 g topically 3 times daily To top of both shoulders in area of pain, Disp: 350 g, Rfl: 2     diltiazem ER (DILT-XR) 120 MG 24 hr capsule, TAKE 1 CAPSULE(120 MG) BY MOUTH AT BEDTIME, Disp: 90 capsule, Rfl: 4     Dulaglutide 4.5 MG/0.5ML SOPN, Inject 4.5 mg Subcutaneous once a week, Disp: 6 mL, Rfl: 3     EASY COMFORT PEN NEEDLES 31G X 5 MM miscellaneous, USE TO INJECT INSULIN ONCE DAILY, Disp: , Rfl:      ferrous sulfate (IRON) 325 (65 FE) MG tablet, Take 1 tablet (325 mg) by mouth daily (with breakfast), Disp: 30 tablet, Rfl: 2     fluocinolone (SYNALAR) 0.01 % solution, APPLY 1-2 ML TOPICALLY TO AFFECTED AREA 1-2 TIMES PER DAY., Disp: , Rfl:      gabapentin (NEURONTIN) 250 MG/5ML solution, 5 ml po bid, Disp: 0.1 mL, Rfl: 0     insulin glargine (LANTUS SOLOSTAR) 100 UNIT/ML pen, Inject 26 Units Subcutaneous At Bedtime New dose, Disp: 30 mL, Rfl: 11     insulin pen needle (BD ULTRA-FINE) 29G X 12.7MM miscellaneous, Use 1 time daily, Disp: 90 each, Rfl: 3     Lancet Devices (EASY MINI EJECT LANCING DEVICE) MISC, See Admin Instructions, Disp: , Rfl:      Lancets 30G MISC, USE TO TEST BLOOD GLUCOSE FOUR TIMES DAILY, Disp: 400 each, Rfl: 11     latanoprost (XALATAN) 0.005 % ophthalmic solution, INSTILL 1 DROP IN BOTH EYES DAILY AT BEDTIME, Disp: , Rfl:      lidocaine (LMX4) 4 % external cream, Apply topically once as needed for mild pain (left knee), Disp: 45 g, Rfl: 11     lisinopril (ZESTRIL) 40 MG tablet, Take 1 tablet (40 mg) by mouth At Bedtime, Disp: 90 tablet, Rfl: 3     naloxone (NARCAN) 4 MG/0.1ML nasal spray, Spray 1 spray (4 mg) into one nostril alternating  nostrils once as needed for opioid reversal Every 2-3 minutes until patient responsive or EMS arrives, Disp: 0.2 mL, Rfl: 0     ondansetron (ZOFRAN ODT) 4 MG ODT tab, Take 4 mg by mouth as needed, Disp: , Rfl:      order for DME, Equipment being ordered: wheeled walker with seat, Disp: 1 Device, Rfl: 0     [START ON 5/12/2023] oxyCODONE-acetaminophen (PERCOCET)  MG per tablet, Take 2 tablets by mouth At Bedtime. May also take 1 tablet every 6 hours as needed for severe pain., Disp: 120 tablet, Rfl: 0     phentermine (ADIPEX-P) 37.5 MG tablet, Take 0.5-1 tablets (18.75-37.5 mg) by mouth every morning, Disp: 90 tablet, Rfl: 1     risperiDONE (RISPERDAL) 0.25 MG tablet, Take 1 tablet (0.25 mg) by mouth At Bedtime, Disp: , Rfl:      senna-docusate (SENOKOT-S/PERICOLACE) 8.6-50 MG tablet, Take 1 tablet by mouth 2 times daily, Disp: 60 tablet, Rfl: 11     Simethicone 180 MG CAPS, Use 1 softgel as needed for gas, Disp: 60 capsule, Rfl: 11     sodium citrate-citric acid (BICITRA) 500-334 MG/5ML solution, Take 10 mLs by mouth 2 times daily (with meals), Disp: 473 mL, Rfl: 11     tolterodine ER (DETROL LA) 2 MG 24 hr capsule, Take 1 capsule (2 mg) by mouth daily, Disp: 90 capsule, Rfl: 3     torsemide (DEMADEX) 10 MG tablet, TAKE 1 TABLET(10 MG) BY MOUTH DAILY, Disp: 90 tablet, Rfl: 0     TRULICITY 3 MG/0.5ML SOPN, Inject 3 mg Subcutaneous every 7 days, Disp: 2 mL, Rfl: 11     vitamin D2 (ERGOCALCIFEROL) 50942 units (1250 mcg) capsule, Take 1 capsule (50,000 Units) by mouth once a week, Disp: 13 capsule, Rfl: 3     SUMAtriptan (IMITREX) 25 MG tablet, One tab at onset of HA.  May repeat dose in 2 hours.  Do not exceed 200 mg in 24 hours (Patient not taking: Reported on 5/10/2023), Disp: 24 tablet, Rfl: 1    Current Facility-Administered Medications:      cyanocobalamin injection 1,000 mcg, 1,000 mcg, Intramuscular, Q30 Days, Jose Angel Meyer MD, 1,000 mcg at 05/04/23 1102     cyanocobalamin injection 1,000 mcg,  "1,000 mcg, Intramuscular, Q30 Days, Jose Angel Meyer MD, 1,000 mcg at 23 1212   Surgical History     Past Surgical History  She has a past surgical history that includes gastric bypass (); Hysterectomy; gastric bypass ();  Section; Uterine Fibroid Surgery (); Abdominal Wall Surgery; REVISE MEDIAN N/CARPAL TUNNEL SURG (Right, 10/24/2014); Hysterectomy; Uterine Fibroid Surgery; knee surgery (Right); Colonoscopy (N/A, 3/15/2017); Colonoscopy (N/A, 3/16/2017); and Midline Insertion - Double Lumen (2021).    Objective-Exam     Constitutional:  Ht 1.511 m (4' 11.5\")   Wt 94.8 kg (209 lb)   LMP  (LMP Unknown)   BMI 41.51 kg/m    General:  Pleasant and in no acute distress     Psychiatric: alert and oriented X3, mood and affect normal    Counseling     We reviewed the important post op bariatric recommendations:  -eating 3 meals daily  -eating protein first, getting >60gm protein daily  -eating slowly, chewing food well  -avoiding/limiting calorie containing beverages  -drinking water 15-30 minutes before or after meals  -choosing wheat, not white with breads, crackers, pastas, aleisha, bagels, tortillas, rice  -limiting restaurant or cafeteria eating to twice a week or less    We discussed the importance of restorative sleep and stress management in maintaining a healthy weight.  We discussed the National Weight Control Registry healthy weight maintenance strategies and ways to optimize metabolism.  We discussed the importance of physical activity including cardiovascular and strength training in maintaining a healthier weight.    We discussed the importance of life-long vitamin supplementation and life-long  follow-up.    Ruthy was reminded that, to avoid marginal ulcers she should avoid tobacco at all, alcohol in excess, caffeine in excess, and NSAIDS (unless indicated for cardioprotection or othewise and opposed by a PPI).    Vy Carson MD, MD, FAAFP  [facility].  5/10/2023  9:09 AM  Total time spent on the date of this encounter doing: chart review, review of test results, patient visit, physical exam, education, counseling, developing plan of care, and documenting = 30 minutes.    Phone call duration: 30 minutes

## 2023-05-10 NOTE — PATIENT INSTRUCTIONS
Avoid simple sugars cookies, cakes, candy, bakery goods, sweetened beverages.  Avoid foods that come in a box, can, package, and restaurant. Use herbs instead of salt flavorings and choose low salt alternatives when available.    This can reduce your arthritic pain    Also choosing more plant based proteins and less beef and pork can reduce arthritic pain Beans, Nuts, quinoa, and more. Yogurt, cheese, and cottage cheese are not plant based but are better than the pork and beef. I will send a note to your dietitian to help you explore healthier proteins that you like.

## 2023-05-10 NOTE — LETTER
"    5/10/2023         RE: Ruthy TJ Henri  [address]  [address]  [address] [facility] [place] [address]        Dear Colleague,    Thank you for referring your patient, Ruthy Álvarez, to the [facility] [place]. Please see a copy of my visit note below.      The patient has been notified of following:     \"This telephone visit will be conducted via a call between you and your physician/provider. We have found that certain health care needs can be provided without the need for a physical exam.  This service lets us provide the care you need with a short phone conversation.  If a prescription is necessary we can send it directly to your pharmacy.  If lab work is needed we can place an order for that and you can then stop by our lab to have the test done at a later time.    Telephone visits are billed at different rates depending on your insurance coverage. During this emergency period, for some insurers they may be billed the same as an in-person visit.  Please reach out to your insurance provider with any questions.    If during the course of the call the physician/provider feels a telephone visit is not appropriate, you will not be charged for this service.\"    Patient has given verbal consent to a Telephone visit? Yes    What phone number would you like to be contacted at? [phone]    Patient would like to receive their AVS by [employer]    Are there any specific questions or needs that you would like addressed at your visit today? Patient has questions regarding her Trulicity dose. States pharmacy has wrong dosage. Patient needs refill on Phentermine.         Distant Location (provider location):  On-site      Bariatric Follow Up Visit with a History of Previous Bariatric Surgery     Date of visit: 5/10/2023  Physician: Vy Carson MD, MD  Primary Care Provider:  Jose Angel Meyer   57 year old  female    Date of Surgery: 2006  Initial " Weight: 360  Initial BMI:   Today's Weight:   Wt Readings from Last 1 Encounters:   05/10/23 94.8 kg (209 lb)     Body mass index is 41.51 kg/m .      Assessment and Plan     Assessment: Ruthy is a 57 year old year old female who is 17 yrs s/p  Kim en Y Gastric Bypass with Dr. Meredith Álvarez feels as if she has achieved the goals she hoped to accomplish through bariatric surgery and weight loss.    Encounter Diagnoses   Name Primary?     Postoperative malabsorption Yes     Type 2 diabetes mellitus with diabetic polyneuropathy, with long-term current use of insulin (H)          Current Outpatient Medications:      acetaminophen (TYLENOL) 500 MG tablet, Take 1 tablet (500 mg) by mouth 2 times daily as needed for mild pain, Disp: 30 tablet, Rfl: 0     Alcohol Swabs (PRO COMFORT ALCOHOL) 70 % PADS, 1 pad 4 times daily as needed (blood sugar testing) Use QID for blood sugar monitoring, Disp: 400 each, Rfl: 11     atorvastatin (LIPITOR) 10 MG tablet, TAKE 1 TABLET BY MOUTH EVERY DAY, Disp: 90 tablet, Rfl: 3     blood glucose (NO BRAND SPECIFIED) lancets standard, Check fasting glucose 4 times daily.  For Accu check monitor, Disp: 360 each, Rfl: 11     blood glucose calibration (ONETOUCH ULTRA CONTROL) solution, See Admin Instructions, Disp: , Rfl:      blood glucose monitoring (ONE TOUCH ULTRA 2) meter device kit, See Admin Instructions, Disp: , Rfl:      budesonide-formoterol (SYMBICORT) 80-4.5 MCG/ACT Inhaler, Inhale 2 puffs once daily as needed, and may repeat every hour, maximum 12 puffs in 24 hours, Disp: 20.4 g, Rfl: 11     capsaicin (ZOSTRIX) 0.025 % external cream, Apply 3-4 times daily to knees, Disp: 118 mL, Rfl: 11     Continuous Blood Gluc Sensor (FREESTYLE ANIKET 14 DAY SENSOR) MISC, 1 each every 14 days Use 1 Sensor every 14 days. Use to read blood sugars per 's instructions., Disp: 2 each, Rfl: 5     Continuous Blood Gluc Sensor (FREESTYLE ANIKET 2 SENSOR) MISC, 1 each every 14  days Use 1 sensor every 14 days. Use to read blood sugars per 's instructions., Disp: 2 each, Rfl: 5     CONTOUR NEXT TEST test strip, USE TO TEST BLOOD GLUCOSE FOUR TIMES DAILY, Disp: 400 strip, Rfl: 11     cyanocobalamin (CYANOCOBALAMIN) 1000 MCG/ML injection, Inject 1 mL (1,000 mcg) into the muscle every 30 days, Disp: 1 mL, Rfl: 11     dapagliflozin (FARXIGA) 10 MG TABS tablet, Take 1 tablet (10 mg) by mouth daily, Disp: 30 tablet, Rfl: 11     diclofenac (VOLTAREN) 1 % topical gel, Apply 4 g topically 3 times daily To top of both shoulders in area of pain, Disp: 350 g, Rfl: 2     diltiazem ER (DILT-XR) 120 MG 24 hr capsule, TAKE 1 CAPSULE(120 MG) BY MOUTH AT BEDTIME, Disp: 90 capsule, Rfl: 4     Dulaglutide 4.5 MG/0.5ML SOPN, Inject 4.5 mg Subcutaneous once a week, Disp: 6 mL, Rfl: 3     EASY COMFORT PEN NEEDLES 31G X 5 MM miscellaneous, USE TO INJECT INSULIN ONCE DAILY, Disp: , Rfl:      ferrous sulfate (IRON) 325 (65 FE) MG tablet, Take 1 tablet (325 mg) by mouth daily (with breakfast), Disp: 30 tablet, Rfl: 2     fluocinolone (SYNALAR) 0.01 % solution, APPLY 1-2 ML TOPICALLY TO AFFECTED AREA 1-2 TIMES PER DAY., Disp: , Rfl:      gabapentin (NEURONTIN) 250 MG/5ML solution, 5 ml po bid, Disp: 0.1 mL, Rfl: 0     insulin glargine (LANTUS SOLOSTAR) 100 UNIT/ML pen, Inject 26 Units Subcutaneous At Bedtime New dose, Disp: 30 mL, Rfl: 11     insulin pen needle (BD ULTRA-FINE) 29G X 12.7MM miscellaneous, Use 1 time daily, Disp: 90 each, Rfl: 3     Lancet Devices (EASY MINI EJECT LANCING DEVICE) MISC, See Admin Instructions, Disp: , Rfl:      Lancets 30G MISC, USE TO TEST BLOOD GLUCOSE FOUR TIMES DAILY, Disp: 400 each, Rfl: 11     latanoprost (XALATAN) 0.005 % ophthalmic solution, INSTILL 1 DROP IN BOTH EYES DAILY AT BEDTIME, Disp: , Rfl:      lidocaine (LMX4) 4 % external cream, Apply topically once as needed for mild pain (left knee), Disp: 45 g, Rfl: 11     lisinopril (ZESTRIL) 40 MG tablet, Take 1  tablet (40 mg) by mouth At Bedtime, Disp: 90 tablet, Rfl: 3     naloxone (NARCAN) 4 MG/0.1ML nasal spray, Spray 1 spray (4 mg) into one nostril alternating nostrils once as needed for opioid reversal Every 2-3 minutes until patient responsive or EMS arrives, Disp: 0.2 mL, Rfl: 0     ondansetron (ZOFRAN ODT) 4 MG ODT tab, Take 4 mg by mouth as needed, Disp: , Rfl:      order for DME, Equipment being ordered: wheeled walker with seat, Disp: 1 Device, Rfl: 0     [START ON 5/12/2023] oxyCODONE-acetaminophen (PERCOCET)  MG per tablet, Take 2 tablets by mouth At Bedtime. May also take 1 tablet every 6 hours as needed for severe pain., Disp: 120 tablet, Rfl: 0     phentermine (ADIPEX-P) 37.5 MG tablet, Take 0.5-1 tablets (18.75-37.5 mg) by mouth every morning, Disp: 90 tablet, Rfl: 1     risperiDONE (RISPERDAL) 0.25 MG tablet, Take 1 tablet (0.25 mg) by mouth At Bedtime, Disp: , Rfl:      senna-docusate (SENOKOT-S/PERICOLACE) 8.6-50 MG tablet, Take 1 tablet by mouth 2 times daily, Disp: 60 tablet, Rfl: 11     Simethicone 180 MG CAPS, Use 1 softgel as needed for gas, Disp: 60 capsule, Rfl: 11     sodium citrate-citric acid (BICITRA) 500-334 MG/5ML solution, Take 10 mLs by mouth 2 times daily (with meals), Disp: 473 mL, Rfl: 11     tolterodine ER (DETROL LA) 2 MG 24 hr capsule, Take 1 capsule (2 mg) by mouth daily, Disp: 90 capsule, Rfl: 3     torsemide (DEMADEX) 10 MG tablet, TAKE 1 TABLET(10 MG) BY MOUTH DAILY, Disp: 90 tablet, Rfl: 0     TRULICITY 3 MG/0.5ML SOPN, Inject 3 mg Subcutaneous every 7 days, Disp: 2 mL, Rfl: 11     vitamin D2 (ERGOCALCIFEROL) 43266 units (1250 mcg) capsule, Take 1 capsule (50,000 Units) by mouth once a week, Disp: 13 capsule, Rfl: 3     SUMAtriptan (IMITREX) 25 MG tablet, One tab at onset of HA.  May repeat dose in 2 hours.  Do not exceed 200 mg in 24 hours (Patient not taking: Reported on 5/10/2023), Disp: 24 tablet, Rfl: 1    Current Facility-Administered Medications:       cyanocobalamin injection 1,000 mcg, 1,000 mcg, Intramuscular, Q30 Days, Jose Angel Meyer MD, 1,000 mcg at 05/04/23 1102     cyanocobalamin injection 1,000 mcg, 1,000 mcg, Intramuscular, Q30 Days, Jose Angel Meyer MD, 1,000 mcg at 02/16/23 1212    Plan: Increase Trulicity to 4.5mg. Limiting simple sugars and salt can significantly reduce arthritic pain. Reducing non-dairy animal protein and more plant based protein foods can also have an impact on reducing arthritic pain. Will arrange medical nutrition therapy with Myranda JONES Disccussed PT for balance/falling-apparently it was no longer covered by her insurance so therapy was discontinued. Working with Dr. Meyer on finding services to address this. Restart vitamin D RX sent.    Return in about 3 months (around 8/10/2023).    Bariatric Surgery Review     Interim History/LifeChanges: Mobility has been an issue. RA pain. Some weight gain. Working with Dr. Meyer on BP control and finding PT for balance issues    Patient Concerns: pain  Appetite (1-10): up  GERD: no    Medication changes: no    Vitamin Intake:   B-12   injection   MVI  2   Vitamin D  out   Calcium        Other                Habits    Eating sweets  Movement is down      ROS  Weight regain  Balance issues  Decreased mobility  Sugar cravings are up  Tolerating Trulicity well    Social History     Social History     Socioeconomic History     Marital status: Single     Spouse name: Not on file     Number of children: Not on file     Years of education: Not on file     Highest education level: Not on file   Occupational History     Not on file   Tobacco Use     Smoking status: Never     Smokeless tobacco: Never   Vaping Use     Vaping status: Not on file   Substance and Sexual Activity     Alcohol use: No     Drug use: No     Sexual activity: Never   Other Topics Concern     Parent/sibling w/ CABG, MI or angioplasty before 65F 55M? No   Social History Narrative    Lives alone. [place], other daughter lives here 2 sons live in the [facility]     Social Determinants of Health     Financial Resource Strain: Not on file   Food Insecurity: Not on file   Transportation Needs: Not on file   Physical Activity: Not on file   Stress: Not on file   Social Connections: Not on file   Intimate Partner Violence: Not on file   Housing Stability: Not on file       Past Medical History     Past Medical History:   Diagnosis Date     Abnormal cervical Papanicolaou smear with positive human papillomavirus deoxyribonucleic acid test      Ankle joint pain     bilateral     Anxiety      Arthritis      Arthritis     rheumatoid arthritis     Asthma      Carpal tunnel syndrome     bilateral     Compression neuropathy      Depression      Depressive disorder      Diabetes (H)      Diabetes (H)     type II     Diabetic nephropathy (H)      DVT of lower extremity, bilateral (H) 2011    right     Esophageal reflux      Gastroenteritis      History of blood clots      History of blood transfusion      Hyperlipidemia      Hypertension      Hypertension      Infectious fasciitis 2011    abdominal wall      Insomnia      Neck pain      Necrotizing fasciitis (H) 11/15/2012     Necrotizing fasciitis (H)      Neuropathy      Obesity      Osteoarthritis of both knees 10/31/2016     Pulmonary embolism and infarction (H)      SOB (shortness of breath)     with exertion     Tarsal tunnel syndrome      Uncomplicated asthma      Urinary incontinence      Problem List     Patient Active Problem List   Diagnosis     Chronic pain of both shoulders     Abnormal Pap smear of cervix     Moderate persistent asthma without complication     Carpal tunnel syndrome     Health Care Home     Moderate major depression (H)     S/P hysterectomy     Hyperlipidemia     Status post bariatric surgery     Essential hypertension     Urinary incontinence     Esophageal reflux     Microalbuminuria     Type 2 diabetes mellitus with diabetic polyneuropathy  (H)     Bilateral ankle joint pain     Chronic pain disorder     Asthma     Compression neuropathy of left lower extremity     Hernia     Hernia of anterior abdominal wall     Obesity     Osteoarthritis of both knees     Polyneuropathy due to secondary diabetes mellitus (H)     Copper deficiency     Metabolic acidosis     Neck mass     Persons encountering health services in other specified circumstances     Hyperglycemia due to type 2 diabetes mellitus (H)     History of cholecystectomy     History of hysterectomy for benign disease     Morbid obesity (H)     Stage 3a chronic kidney disease (H)     Arthritis of both acromioclavicular joints     H/O necrotizing fasciitis     Acidosis     Acute chest pain     Medications       Current Outpatient Medications:      acetaminophen (TYLENOL) 500 MG tablet, Take 1 tablet (500 mg) by mouth 2 times daily as needed for mild pain, Disp: 30 tablet, Rfl: 0     Alcohol Swabs (PRO COMFORT ALCOHOL) 70 % PADS, 1 pad 4 times daily as needed (blood sugar testing) Use QID for blood sugar monitoring, Disp: 400 each, Rfl: 11     atorvastatin (LIPITOR) 10 MG tablet, TAKE 1 TABLET BY MOUTH EVERY DAY, Disp: 90 tablet, Rfl: 3     blood glucose (NO BRAND SPECIFIED) lancets standard, Check fasting glucose 4 times daily.  For Accu check monitor, Disp: 360 each, Rfl: 11     blood glucose calibration (ONETOUCH ULTRA CONTROL) solution, See Admin Instructions, Disp: , Rfl:      blood glucose monitoring (ONE TOUCH ULTRA 2) meter device kit, See Admin Instructions, Disp: , Rfl:      budesonide-formoterol (SYMBICORT) 80-4.5 MCG/ACT Inhaler, Inhale 2 puffs once daily as needed, and may repeat every hour, maximum 12 puffs in 24 hours, Disp: 20.4 g, Rfl: 11     capsaicin (ZOSTRIX) 0.025 % external cream, Apply 3-4 times daily to knees, Disp: 118 mL, Rfl: 11     Continuous Blood Gluc Sensor (FREESTYLE ANIKET 14 DAY SENSOR) MISC, 1 each every 14 days Use 1 Sensor every 14 days. Use to read blood sugars per  's instructions., Disp: 2 each, Rfl: 5     Continuous Blood Gluc Sensor (FREESTYLE ANIKET 2 SENSOR) MISC, 1 each every 14 days Use 1 sensor every 14 days. Use to read blood sugars per 's instructions., Disp: 2 each, Rfl: 5     CONTOUR NEXT TEST test strip, USE TO TEST BLOOD GLUCOSE FOUR TIMES DAILY, Disp: 400 strip, Rfl: 11     cyanocobalamin (CYANOCOBALAMIN) 1000 MCG/ML injection, Inject 1 mL (1,000 mcg) into the muscle every 30 days, Disp: 1 mL, Rfl: 11     dapagliflozin (FARXIGA) 10 MG TABS tablet, Take 1 tablet (10 mg) by mouth daily, Disp: 30 tablet, Rfl: 11     diclofenac (VOLTAREN) 1 % topical gel, Apply 4 g topically 3 times daily To top of both shoulders in area of pain, Disp: 350 g, Rfl: 2     diltiazem ER (DILT-XR) 120 MG 24 hr capsule, TAKE 1 CAPSULE(120 MG) BY MOUTH AT BEDTIME, Disp: 90 capsule, Rfl: 4     Dulaglutide 4.5 MG/0.5ML SOPN, Inject 4.5 mg Subcutaneous once a week, Disp: 6 mL, Rfl: 3     EASY COMFORT PEN NEEDLES 31G X 5 MM miscellaneous, USE TO INJECT INSULIN ONCE DAILY, Disp: , Rfl:      ferrous sulfate (IRON) 325 (65 FE) MG tablet, Take 1 tablet (325 mg) by mouth daily (with breakfast), Disp: 30 tablet, Rfl: 2     fluocinolone (SYNALAR) 0.01 % solution, APPLY 1-2 ML TOPICALLY TO AFFECTED AREA 1-2 TIMES PER DAY., Disp: , Rfl:      gabapentin (NEURONTIN) 250 MG/5ML solution, 5 ml po bid, Disp: 0.1 mL, Rfl: 0     insulin glargine (LANTUS SOLOSTAR) 100 UNIT/ML pen, Inject 26 Units Subcutaneous At Bedtime New dose, Disp: 30 mL, Rfl: 11     insulin pen needle (BD ULTRA-FINE) 29G X 12.7MM miscellaneous, Use 1 time daily, Disp: 90 each, Rfl: 3     Lancet Devices (EASY MINI EJECT LANCING DEVICE) MISC, See Admin Instructions, Disp: , Rfl:      Lancets 30G MISC, USE TO TEST BLOOD GLUCOSE FOUR TIMES DAILY, Disp: 400 each, Rfl: 11     latanoprost (XALATAN) 0.005 % ophthalmic solution, INSTILL 1 DROP IN BOTH EYES DAILY AT BEDTIME, Disp: , Rfl:      lidocaine (LMX4) 4 % external  cream, Apply topically once as needed for mild pain (left knee), Disp: 45 g, Rfl: 11     lisinopril (ZESTRIL) 40 MG tablet, Take 1 tablet (40 mg) by mouth At Bedtime, Disp: 90 tablet, Rfl: 3     naloxone (NARCAN) 4 MG/0.1ML nasal spray, Spray 1 spray (4 mg) into one nostril alternating nostrils once as needed for opioid reversal Every 2-3 minutes until patient responsive or EMS arrives, Disp: 0.2 mL, Rfl: 0     ondansetron (ZOFRAN ODT) 4 MG ODT tab, Take 4 mg by mouth as needed, Disp: , Rfl:      order for DME, Equipment being ordered: wheeled walker with seat, Disp: 1 Device, Rfl: 0     [START ON 5/12/2023] oxyCODONE-acetaminophen (PERCOCET)  MG per tablet, Take 2 tablets by mouth At Bedtime. May also take 1 tablet every 6 hours as needed for severe pain., Disp: 120 tablet, Rfl: 0     phentermine (ADIPEX-P) 37.5 MG tablet, Take 0.5-1 tablets (18.75-37.5 mg) by mouth every morning, Disp: 90 tablet, Rfl: 1     risperiDONE (RISPERDAL) 0.25 MG tablet, Take 1 tablet (0.25 mg) by mouth At Bedtime, Disp: , Rfl:      senna-docusate (SENOKOT-S/PERICOLACE) 8.6-50 MG tablet, Take 1 tablet by mouth 2 times daily, Disp: 60 tablet, Rfl: 11     Simethicone 180 MG CAPS, Use 1 softgel as needed for gas, Disp: 60 capsule, Rfl: 11     sodium citrate-citric acid (BICITRA) 500-334 MG/5ML solution, Take 10 mLs by mouth 2 times daily (with meals), Disp: 473 mL, Rfl: 11     tolterodine ER (DETROL LA) 2 MG 24 hr capsule, Take 1 capsule (2 mg) by mouth daily, Disp: 90 capsule, Rfl: 3     torsemide (DEMADEX) 10 MG tablet, TAKE 1 TABLET(10 MG) BY MOUTH DAILY, Disp: 90 tablet, Rfl: 0     TRULICITY 3 MG/0.5ML SOPN, Inject 3 mg Subcutaneous every 7 days, Disp: 2 mL, Rfl: 11     vitamin D2 (ERGOCALCIFEROL) 65342 units (1250 mcg) capsule, Take 1 capsule (50,000 Units) by mouth once a week, Disp: 13 capsule, Rfl: 3     SUMAtriptan (IMITREX) 25 MG tablet, One tab at onset of HA.  May repeat dose in 2 hours.  Do not exceed 200 mg in 24 hours  "(Patient not taking: Reported on 5/10/2023), Disp: 24 tablet, Rfl: 1    Current Facility-Administered Medications:      cyanocobalamin injection 1,000 mcg, 1,000 mcg, Intramuscular, Q30 Days, Jose Angel Meyer MD, 1,000 mcg at 23 1102     cyanocobalamin injection 1,000 mcg, 1,000 mcg, Intramuscular, Q30 Days, Jose Angel Meyer MD, 1,000 mcg at 23 1212   Surgical History     Past Surgical History  She has a past surgical history that includes gastric bypass (); Hysterectomy; gastric bypass ();  Section; Uterine Fibroid Surgery (); Abdominal Wall Surgery; REVISE MEDIAN N/CARPAL TUNNEL SURG (Right, 10/24/2014); Hysterectomy; Uterine Fibroid Surgery; knee surgery (Right); Colonoscopy (N/A, 3/15/2017); Colonoscopy (N/A, 3/16/2017); and Midline Insertion - Double Lumen (2021).    Objective-Exam     Constitutional:  Ht 1.511 m (4' 11.5\")   Wt 94.8 kg (209 lb)   LMP  (LMP Unknown)   BMI 41.51 kg/m    General:  Pleasant and in no acute distress     Psychiatric: alert and oriented X3, mood and affect normal    Counseling     We reviewed the important post op bariatric recommendations:  -eating 3 meals daily  -eating protein first, getting >60gm protein daily  -eating slowly, chewing food well  -avoiding/limiting calorie containing beverages  -drinking water 15-30 minutes before or after meals  -choosing wheat, not white with breads, crackers, pastas, aleisha, bagels, tortillas, rice  -limiting restaurant or cafeteria eating to twice a week or less    We discussed the importance of restorative sleep and stress management in maintaining a healthy weight.  We discussed the National Weight Control Registry healthy weight maintenance strategies and ways to optimize metabolism.  We discussed the importance of physical activity including cardiovascular and strength training in maintaining a healthier weight.    We discussed the importance of life-long vitamin supplementation and life-long "  follow-up.    Ruthy was reminded that, to avoid marginal ulcers she should avoid tobacco at all, alcohol in excess, caffeine in excess, and NSAIDS (unless indicated for cardioprotection or othewise and opposed by a PPI).    Vy Carson MD, MD, [facility].  5/10/2023  9:09 AM  Total time spent on the date of this encounter doing: chart review, review of test results, patient visit, physical exam, education, counseling, developing plan of care, and documenting = 30 minutes.    Phone call duration: 30 minutes          Again, thank you for allowing me to participate in the care of your patient.        Sincerely,        Vy Carson MD

## 2023-05-23 ENCOUNTER — TRANSFERRED RECORDS (OUTPATIENT)
Dept: HEALTH INFORMATION MANAGEMENT | Facility: CLINIC | Age: 58
End: 2023-05-23
Payer: COMMERCIAL

## 2023-05-31 DIAGNOSIS — E66.09 OBESITY DUE TO EXCESS CALORIES WITH SERIOUS COMORBIDITY, UNSPECIFIED CLASSIFICATION: ICD-10-CM

## 2023-06-01 RX ORDER — PHENTERMINE HYDROCHLORIDE 37.5 MG/1
TABLET ORAL
Qty: 90 TABLET | Refills: 1 | Status: SHIPPED | OUTPATIENT
Start: 2023-06-01 | End: 2023-10-26

## 2023-06-09 ENCOUNTER — TELEPHONE (OUTPATIENT)
Dept: FAMILY MEDICINE | Facility: CLINIC | Age: 58
End: 2023-06-09

## 2023-06-09 ENCOUNTER — OFFICE VISIT (OUTPATIENT)
Dept: FAMILY MEDICINE | Facility: CLINIC | Age: 58
End: 2023-06-09
Payer: COMMERCIAL

## 2023-06-09 VITALS
BODY MASS INDEX: 41.27 KG/M2 | WEIGHT: 207.8 LBS | TEMPERATURE: 97.9 F | DIASTOLIC BLOOD PRESSURE: 86 MMHG | SYSTOLIC BLOOD PRESSURE: 139 MMHG | OXYGEN SATURATION: 96 % | HEART RATE: 101 BPM | RESPIRATION RATE: 16 BRPM

## 2023-06-09 DIAGNOSIS — Z79.4 TYPE 2 DIABETES MELLITUS WITH HYPERGLYCEMIA, WITH LONG-TERM CURRENT USE OF INSULIN (H): ICD-10-CM

## 2023-06-09 DIAGNOSIS — D64.9 ANEMIA, UNSPECIFIED TYPE: ICD-10-CM

## 2023-06-09 DIAGNOSIS — E11.65 TYPE 2 DIABETES MELLITUS WITH HYPERGLYCEMIA, WITH LONG-TERM CURRENT USE OF INSULIN (H): ICD-10-CM

## 2023-06-09 DIAGNOSIS — R55 BLACKOUT SPELL: ICD-10-CM

## 2023-06-09 DIAGNOSIS — G89.4 CHRONIC PAIN SYNDROME: Primary | ICD-10-CM

## 2023-06-09 DIAGNOSIS — N18.31 STAGE 3A CHRONIC KIDNEY DISEASE (H): ICD-10-CM

## 2023-06-09 LAB
AMPHETAMINES UR QL: DETECTED
ANION GAP SERPL CALCULATED.3IONS-SCNC: 13 MMOL/L (ref 7–15)
BARBITURATES UR QL SCN: NOT DETECTED
BENZODIAZ UR QL SCN: NOT DETECTED
BUN SERPL-MCNC: 25.3 MG/DL (ref 6–20)
BUPRENORPHINE UR QL: NOT DETECTED
CALCIUM SERPL-MCNC: 8.1 MG/DL (ref 8.6–10)
CANNABINOIDS UR QL: NOT DETECTED
CHLORIDE SERPL-SCNC: 109 MMOL/L (ref 98–107)
COCAINE UR QL SCN: NOT DETECTED
CREAT SERPL-MCNC: 1.17 MG/DL (ref 0.51–0.95)
D-METHAMPHET UR QL: NOT DETECTED
DEPRECATED HCO3 PLAS-SCNC: 22 MMOL/L (ref 22–29)
ERYTHROCYTE [DISTWIDTH] IN BLOOD BY AUTOMATED COUNT: 14.2 % (ref 10–15)
FERRITIN SERPL-MCNC: 60 NG/ML (ref 11–328)
FOLATE SERPL-MCNC: 16.7 NG/ML (ref 4.6–34.8)
GFR SERPL CREATININE-BSD FRML MDRD: 54 ML/MIN/1.73M2
GLUCOSE SERPL-MCNC: 142 MG/DL (ref 70–99)
HBA1C MFR BLD: 5.6 % (ref 0–5.6)
HCT VFR BLD AUTO: 31.6 % (ref 35–47)
HGB BLD-MCNC: 10 G/DL (ref 11.7–15.7)
IRON BINDING CAPACITY (ROCHE): 315 UG/DL (ref 240–430)
IRON SATN MFR SERPL: 19 % (ref 15–46)
IRON SERPL-MCNC: 60 UG/DL (ref 37–145)
MCH RBC QN AUTO: 31.6 PG (ref 26.5–33)
MCHC RBC AUTO-ENTMCNC: 31.6 G/DL (ref 31.5–36.5)
MCV RBC AUTO: 100 FL (ref 78–100)
METHADONE UR QL SCN: NOT DETECTED
OPIATES UR QL SCN: NOT DETECTED
OXYCODONE UR QL SCN: DETECTED
PCP UR QL SCN: NOT DETECTED
PLATELET # BLD AUTO: 348 10E3/UL (ref 150–450)
POTASSIUM SERPL-SCNC: 3.6 MMOL/L (ref 3.4–5.3)
PROPOXYPH UR QL: NOT DETECTED
RBC # BLD AUTO: 3.16 10E6/UL (ref 3.8–5.2)
SODIUM SERPL-SCNC: 144 MMOL/L (ref 136–145)
TRICYCLICS UR QL SCN: NOT DETECTED
VIT B12 SERPL-MCNC: >4000 PG/ML (ref 232–1245)
WBC # BLD AUTO: 9.7 10E3/UL (ref 4–11)

## 2023-06-09 PROCEDURE — 83540 ASSAY OF IRON: CPT | Performed by: FAMILY MEDICINE

## 2023-06-09 PROCEDURE — 85027 COMPLETE CBC AUTOMATED: CPT | Performed by: FAMILY MEDICINE

## 2023-06-09 PROCEDURE — 83550 IRON BINDING TEST: CPT | Performed by: FAMILY MEDICINE

## 2023-06-09 PROCEDURE — 82607 VITAMIN B-12: CPT | Performed by: FAMILY MEDICINE

## 2023-06-09 PROCEDURE — 80306 DRUG TEST PRSMV INSTRMNT: CPT | Performed by: FAMILY MEDICINE

## 2023-06-09 PROCEDURE — 80048 BASIC METABOLIC PNL TOTAL CA: CPT | Performed by: FAMILY MEDICINE

## 2023-06-09 PROCEDURE — 83036 HEMOGLOBIN GLYCOSYLATED A1C: CPT | Performed by: FAMILY MEDICINE

## 2023-06-09 PROCEDURE — 36415 COLL VENOUS BLD VENIPUNCTURE: CPT | Performed by: FAMILY MEDICINE

## 2023-06-09 PROCEDURE — 82728 ASSAY OF FERRITIN: CPT | Performed by: FAMILY MEDICINE

## 2023-06-09 PROCEDURE — 82746 ASSAY OF FOLIC ACID SERUM: CPT | Performed by: FAMILY MEDICINE

## 2023-06-09 PROCEDURE — 96372 THER/PROPH/DIAG INJ SC/IM: CPT | Performed by: FAMILY MEDICINE

## 2023-06-09 PROCEDURE — 99214 OFFICE O/P EST MOD 30 MIN: CPT | Mod: 25 | Performed by: FAMILY MEDICINE

## 2023-06-09 RX ORDER — FLASH GLUCOSE SENSOR
1 KIT MISCELLANEOUS
Qty: 2 EACH | Refills: 11 | Status: SHIPPED | OUTPATIENT
Start: 2023-06-09 | End: 2024-07-17

## 2023-06-09 RX ORDER — OXYCODONE AND ACETAMINOPHEN 10; 325 MG/1; MG/1
TABLET ORAL
Qty: 120 TABLET | Refills: 0 | Status: SHIPPED | OUTPATIENT
Start: 2023-06-09 | End: 2023-07-06

## 2023-06-09 RX ADMIN — CYANOCOBALAMIN 1000 MCG: 1000 INJECTION, SOLUTION INTRAMUSCULAR; SUBCUTANEOUS at 10:15

## 2023-06-09 NOTE — PROGRESS NOTES
Assessment/plan:    1.  Chronic pain syndrome stable.  UDS appropriate and PDMP appropriate.  Medications refilled.    2.  Anemia.  Her hemoglobin was slightly surprisingly low last time.  She is on B12.  We will repeat iron studies and get a folate level to make sure that she is adequately replaced.    CKD 3.  Hypertension at goal.  Edema not optimally managed.  It is probably factorial.  If creatinine is stable will increase her torsemide from 10 to 20 mg daily.    4.  Check A1c.  Refilled freestyle bárbara sensors.    5.  Because of the blackout spell is not clear.  It is possible that anticholinergic adverse effects may have been a factor and so we will stop her Detrol since its not clearly effective for her.  Observe carefully.  Watch for hypoglycemia.      Genesis Bliss is a 57 year old, presenting for the following health issues:  3 weeks ago, was sitting outside of door and didn't know how she got there.  Got up and came in. Sensor did not show hypoglycemia  Another episode occurred sitting on ottomon, got up, and forgot what you were doing.    Blood sugars have generally been ok and no hypo with either spell.  No new meds.  No new neuro sx or HA   Has a CGM, but needs sensor.      Cut back on gabapentin--same effect.    Taking torsemide--edema is better but would like better control.  No orthopnea or pnd    Pain control is fine--needs refills.  Not taking other pain meds.        10/27/2022    12:01 PM   Additional Questions   Roomed by sravanthi brian   Accompanied by self     HPI     Pain History:  When did you first notice your pain? chronic   Have you seen this provider for your pain in the past?   Yes   Where in your body do you have pain? Knees/legs, but also shoulders  Are you seeing anyone else for your pain? No            4/13/2023    11:17 AM 5/4/2023    10:17 AM 6/9/2023     9:59 AM   PEG Score   PEG Total Score 10 8 7           Chronic Pain Follow Up:    Location of pain: see  This note was copied from the mother's chart.  Follow up visit.  Infants are working on feeding.  At 10 and 9% weight loss.  Breast feeding and then supplementing after.  Advised that babies start to bottle supplement and they did well this morning.  Zunilda felt good about feedings today.  Encouraged her to continue pumping after feedings.  Reinforced feeding expectations and progression with late  infants.  Reviewed phasing out supplementing over time as babies breast feed more efficiently and gain weight.  She declined using nipple shield when breast feeding.  Encouraged her to make an outpatient lactation follow up appointment next week and reviewed resources.  She had no further questions for lactation today.   Tasia Zhang  RN, IBCLC     above  Analgesia/pain control:    - Recent changes:  no    - Overall control: Tolerable with discomfort    - Current treatments: oxycodone, a total of 4 daily and 120/month, and gabapentin    Adherence:     - Do you ever take more pain medicine than prescribed? No    - When did you take your last dose of pain medicine?  today   Adverse effects: No   PDMP Review       Value Time User    State PDMP site checked  Yes 5/4/2023  4:42 PM Jose Angel Meyer MD        Last CSA Agreement:   CSA -- Patient Level:     [Media Unavailable] Controlled Substance Agreement - Opioid - Scan on 12/14/2021  8:56 AM       Last UDS: 5/4/2023  Results for orders placed or performed in visit on 06/09/23   Urine Drugs of Abuse Screen Panel 13     Status: Abnormal   Result Value Ref Range    Cannabinoids (29-xbk-0-carboxy-9-THC) Not Detected Not Detected, Indeterminate    Phencyclidine Not Detected Not Detected, Indeterminate    Cocaine (Benzoylecgonine) Not Detected Not Detected, Indeterminate    Methamphetamine (d-Methamphetamine) Not Detected Not Detected, Indeterminate    Opiates (Morphine) Not Detected Not Detected, Indeterminate    Amphetamine (d-Amphetamine) Detected (A) Not Detected, Indeterminate    Benzodiazepines (Nordiazepam) Not Detected Not Detected, Indeterminate    Tricyclic Antidepressants (Desipramine) Not Detected Not Detected, Indeterminate    Methadone Not Detected Not Detected, Indeterminate    Barbiturates (Butalbital) Not Detected Not Detected, Indeterminate    Oxycodone Detected (A) Not Detected, Indeterminate    Propoxyphene (Norpropoxyphene) Not Detected Not Detected, Indeterminate    Buprenorphine Not Detected Not Detected, Indeterminate   HEMOGLOBIN A1C     Status: Normal   Result Value Ref Range    Hemoglobin A1C 5.6 0.0 - 5.6 %   CBC with platelets     Status: Abnormal   Result Value Ref Range    WBC Count 9.7 4.0 - 11.0 10e3/uL    RBC Count 3.16 (L) 3.80 - 5.20 10e6/uL    Hemoglobin 10.0 (L) 11.7 - 15.7  g/dL    Hematocrit 31.6 (L) 35.0 - 47.0 %     78 - 100 fL    MCH 31.6 26.5 - 33.0 pg    MCHC 31.6 31.5 - 36.5 g/dL    RDW 14.2 10.0 - 15.0 %    Platelet Count 348 150 - 450 10e3/uL     PDMP Review       Value Time User    State PDMP site checked  Yes 6/9/2023 10:48 AM Jose Angel Meyer MD                      Review of Systems   See above      Objective    /86 (BP Location: Left arm, Patient Position: Sitting, Cuff Size: Adult Regular)   Pulse 101   Temp 97.9  F (36.6  C) (Oral)   Resp 16   Wt 94.3 kg (207 lb 12.8 oz)   LMP  (LMP Unknown)   SpO2 96%   BMI 41.27 kg/m    Body mass index is 41.27 kg/m .  Physical Exam   GENERAL: healthy, alert and no distress  NECK: no adenopathy, no asymmetry, masses, or scars and thyroid normal to palpation  RESP: lungs clear to auscultation - no rales, rhonchi or wheezes  CV: regular rate and rhythm, normal S1 S2, no S3 or S4, no murmur, click or rub, no peripheral edema and peripheral pulses strong  MS: 2 edema to mid tibia

## 2023-06-09 NOTE — PATIENT INSTRUCTIONS
Stop detrol (tolteridine)    If kidneys are stable, will increase torsemide from 10 to 20 mg daily.

## 2023-06-09 NOTE — TELEPHONE ENCOUNTER
[facility] phone call message- patient requesting a refill:    Full Medication Name: glucose sensor     Dose:     Pharmacy confirmed as   [id number] [address] & [address] [address]  Phone: [phone] Fax: [phone]      : Yes    Additional Comments:      OK to leave a message on voice mail? Yes    Primary language: English      needed? No    Call taken on June 9, 2023 at 1:08 PM by Earl Murphy

## 2023-06-19 ENCOUNTER — TELEPHONE (OUTPATIENT)
Dept: FAMILY MEDICINE | Facility: CLINIC | Age: 58
End: 2023-06-19
Payer: COMMERCIAL

## 2023-06-23 ENCOUNTER — TELEPHONE (OUTPATIENT)
Dept: FAMILY MEDICINE | Facility: CLINIC | Age: 58
End: 2023-06-23
Payer: COMMERCIAL

## 2023-06-23 DIAGNOSIS — Z79.4 TYPE 2 DIABETES MELLITUS WITH HYPERGLYCEMIA, WITH LONG-TERM CURRENT USE OF INSULIN (H): Primary | ICD-10-CM

## 2023-06-23 DIAGNOSIS — E11.65 TYPE 2 DIABETES MELLITUS WITH HYPERGLYCEMIA, WITH LONG-TERM CURRENT USE OF INSULIN (H): Primary | ICD-10-CM

## 2023-06-23 NOTE — TELEPHONE ENCOUNTER
[facility] phone call message- general phone call:    Reason for call:     [facility] received one of the prescription for the pen needle and surange but need clarification on how to use in a day.     Return call needed: Yes    OK to leave a message on voice mail? Yes    Primary language: English      needed? No    Call taken on June 23, 2023 at 2:58 PM by Karen Cardona

## 2023-06-27 RX ORDER — PEN NEEDLE, DIABETIC 29 G X1/2"
NEEDLE, DISPOSABLE MISCELLANEOUS
Qty: 100 EACH | Refills: 11 | Status: SHIPPED | OUTPATIENT
Start: 2023-06-27 | End: 2023-07-20

## 2023-07-06 ENCOUNTER — OFFICE VISIT (OUTPATIENT)
Dept: FAMILY MEDICINE | Facility: CLINIC | Age: 58
End: 2023-07-06
Payer: COMMERCIAL

## 2023-07-06 VITALS — HEART RATE: 90 BPM | DIASTOLIC BLOOD PRESSURE: 81 MMHG | SYSTOLIC BLOOD PRESSURE: 136 MMHG | OXYGEN SATURATION: 92 %

## 2023-07-06 DIAGNOSIS — I10 BENIGN ESSENTIAL HYPERTENSION: ICD-10-CM

## 2023-07-06 DIAGNOSIS — G47.51 CONFUSIONAL AROUSALS: ICD-10-CM

## 2023-07-06 DIAGNOSIS — E11.42 TYPE 2 DIABETES MELLITUS WITH DIABETIC POLYNEUROPATHY, WITH LONG-TERM CURRENT USE OF INSULIN (H): ICD-10-CM

## 2023-07-06 DIAGNOSIS — Z79.4 TYPE 2 DIABETES MELLITUS WITH DIABETIC POLYNEUROPATHY, WITH LONG-TERM CURRENT USE OF INSULIN (H): ICD-10-CM

## 2023-07-06 DIAGNOSIS — G89.4 CHRONIC PAIN SYNDROME: Primary | ICD-10-CM

## 2023-07-06 PROCEDURE — 80306 DRUG TEST PRSMV INSTRMNT: CPT | Performed by: FAMILY MEDICINE

## 2023-07-06 PROCEDURE — 99214 OFFICE O/P EST MOD 30 MIN: CPT | Mod: 25 | Performed by: FAMILY MEDICINE

## 2023-07-06 PROCEDURE — 96372 THER/PROPH/DIAG INJ SC/IM: CPT | Performed by: FAMILY MEDICINE

## 2023-07-06 RX ORDER — OXYCODONE AND ACETAMINOPHEN 10; 325 MG/1; MG/1
TABLET ORAL
Qty: 120 TABLET | Refills: 0 | Status: SHIPPED | OUTPATIENT
Start: 2023-07-06 | End: 2023-07-07

## 2023-07-06 RX ORDER — INSULIN GLARGINE 100 [IU]/ML
26 INJECTION, SOLUTION SUBCUTANEOUS AT BEDTIME
Qty: 30 ML | Refills: 11 | Status: SHIPPED | OUTPATIENT
Start: 2023-07-06 | End: 2023-12-19

## 2023-07-06 RX ORDER — GABAPENTIN 250 MG/5ML
SOLUTION ORAL
Qty: 470 ML | Refills: 11 | Status: SHIPPED | OUTPATIENT
Start: 2023-07-06 | End: 2023-08-09

## 2023-07-06 RX ORDER — RISPERIDONE 0.25 MG/1
0.25 TABLET ORAL AT BEDTIME
Qty: 30 TABLET | Refills: 1 | Status: SHIPPED | OUTPATIENT
Start: 2023-07-06 | End: 2024-03-21

## 2023-07-06 RX ADMIN — CYANOCOBALAMIN 1000 MCG: 1000 INJECTION, SOLUTION INTRAMUSCULAR; SUBCUTANEOUS at 16:52

## 2023-07-06 NOTE — PROGRESS NOTES
"  {PROVIDER CHARTING PREFERENCE:288603}    Genesis Bilss is a 57 year old, presenting for the following health issues:  Pain Management        7/6/2023     4:02 PM   Additional Questions   Roomed by ngf   Accompanied by self         7/6/2023     4:02 PM   Patient Reported Additional Medications   Patient reports taking the following new medications none     HPI     Pain History:  When did you first notice your pain? Many years ago   Have you seen this provider for your pain in the past?   Yes   Where in your body do you have pain? Hips to bottom of feet and wrist to shoulders   Are you seeing anyone else for your pain? No    {Rooming staff  Please complete PHQ assessment :308268}  {Rooming staff  Please complete GAD7 assessment :768329}      5/4/2023    10:17 AM 6/9/2023     9:59 AM 7/6/2023     4:07 PM   PEG Score   PEG Total Score 8 7 7.33       {After completing assessments, pull in flowsheet scores (Optional) :319437}    Chronic Pain Follow Up:    Location of pain: ***  Analgesia/pain control:    - Recent changes:  ***    - Overall control: {Pain control level :933023::\"Tolerable with discomfort\"}    - Current treatments: ***   Adherence:     - Do you ever take more pain medicine than prescribed? {Yes ***/No :321396}    - When did you take your last dose of pain medicine?  ***   Adverse effects: {Yes ***/No :469419}   PDMP Review       Value Time User    State PDMP site checked  Yes 6/9/2023 10:48 AM Jose Angel Meyer MD        Last CSA Agreement:   CSA -- Patient Level:     [Media Unavailable] Controlled Substance Agreement - Opioid - Scan on 12/14/2021  8:56 AM       Last UDS: 6/9/2023  {If newly prescribing or considering opioid therapy, the following assessments must be completed :627249}  {Pull in ORT  RIOSORD results (Optional) :668971}    {Provider  Link to Pain Management SmartSet  Includes non-opioid pharmacological medications and referrals  :561995}    How many servings of fruits " and vegetables do you eat daily?  { :531723}    On average, how many sweetened beverages do you drink each day (Examples: soda, juice, sweet tea, etc.  Do NOT count diet or artificially sweetened beverages)?   { 1-11:063248}    How many days per week do you exercise enough to make your heart beat faster? { :672188}    How many minutes a day do you exercise enough to make your heart beat faster? { :349060}    How many days per week do you miss taking your medication? {0-7 :281291}    {additonal problems for provider to add (Optional):024657}      Review of Systems   {ROS COMP (Optional):676703}      Objective    /81 (BP Location: Left arm, Patient Position: Sitting, Cuff Size: Adult Large)   Pulse 90   LMP  (LMP Unknown)   SpO2 92%   Breastfeeding No   There is no height or weight on file to calculate BMI.  Physical Exam   {Exam List (Optional):912360}    {Diagnostic Test Results (Optional):210671}    {AMBULATORY ATTESTATION (Optional):269144}

## 2023-07-07 ENCOUNTER — TELEPHONE (OUTPATIENT)
Dept: FAMILY MEDICINE | Facility: CLINIC | Age: 58
End: 2023-07-07
Payer: COMMERCIAL

## 2023-07-07 DIAGNOSIS — G89.4 CHRONIC PAIN SYNDROME: ICD-10-CM

## 2023-07-07 RX ORDER — OXYCODONE AND ACETAMINOPHEN 10; 325 MG/1; MG/1
TABLET ORAL
Qty: 120 TABLET | Refills: 0 | Status: SHIPPED | OUTPATIENT
Start: 2023-07-07 | End: 2023-08-08

## 2023-07-07 NOTE — PROGRESS NOTES
Patient Active Problem List    Diagnosis Date Noted     Stage 3a chronic kidney disease (H) 12/31/2020     Priority: High     Persistent protienuria       Copper deficiency 04/05/2019     Priority: High     Identified in Aug, 2018--see discharge summary from [facility] in [place].       Chronic pain disorder 10/07/2016     Priority: High     Chronic Pain Diagnosis:  neuropathy  DIRE Total Score(s):14     ORT: 5    4 - 7 =  Moderate Risk   of future problems with Opioids  FAQ5: 50 November 20, 2020  Behavioral Health Consultation: 5/12/17 with Dr. Daisy Quezada  Personal Care Plan for Chronic Pain: 5/12/17  Opioid medication:oxycodone   Dose:10mg  Number of pills per month:60  Patient is being prescribed 10mg of oxycodone IR (Percocet) per day this is 15 mg Morphine Equivalents  Benzodiazepines Prescribed? No  Naloxone prescribed? YES February 16, 2023  RIOSORD 16:  15% risk      Clinic visit frequency required: Q 1 month Next visit due:   Controlled Substance/Opioid Treatment agreement on file (dated <12 months ago)?:    Date(s): pending  Last [place] verification: last visit  Items in red to be updated at each visit  Patient is followed by Data Unavailable for ongoing prescription of pain medication.  All refills should be approved by this provider, or covering partner.Chronic Pain     Chronic Pain Diagnosis:  neuropathy  DIRE Total Score(s):14     ORT: 5    4 - 7 =  Moderate Risk   of future problems with Opioids  FAQ5: 45/100 on 9/9/16, 40/100 on 11/15/16  55 October 29, 2020    Behavioral Health Consultation: 5/12/17 with Dr. Daisy Quezada  Personal Care Plan for Chronic Pain: 5/12/17  Opioid medication:oxycodone   Dose:10mg  Number of pills per month:90  Patient is being prescribed 45 MME  Benzodiazepines Prescribed? No  Naloxone prescribed? yes           Microalbuminuria 10/12/2015     Priority: High     Severely increased albuminuria       Type 2 diabetes mellitus with diabetic polyneuropathy (H) 10/12/2015      Priority: High     Essential hypertension 08/26/2013     Priority: High     Acidosis 08/12/2022     Priority: Medium     Acute chest pain 08/12/2022     Priority: Medium     Arthritis of both acromioclavicular joints 04/08/2021     Priority: Medium     xray proven        Morbid obesity (H) 11/27/2020     Priority: Medium     Date of Surgery: 2006 [facility] Dr. Harper  Initial Weight: 360#  Initial BMI: 70.30       Hyperglycemia due to type 2 diabetes mellitus (H) 06/02/2020     Priority: Medium     Neck mass 10/11/2019     Priority: Medium     Added automatically from request for surgery [id number]       Metabolic acidosis 07/26/2019     Priority: Medium     reolved w/ discontinuation of ASA       Obesity 01/03/2019     Priority: Medium     Hernia of anterior abdominal wall 01/28/2017     Priority: Medium     Osteoarthritis of both knees 10/31/2016     Priority: Medium     Polyneuropathy due to secondary diabetes mellitus (H) 10/31/2016     Priority: Medium     Compression neuropathy of left lower extremity 02/19/2016     Priority: Medium     Bilateral ankle joint pain 01/15/2016     Priority: Medium     Esophageal reflux 04/07/2015     Priority: Medium     Urinary incontinence 02/19/2015     Priority: Medium     Asthma 08/04/2013     Priority: Medium     History of cholecystectomy 08/04/2013     Priority: Medium     H/O necrotizing fasciitis 08/04/2013     Priority: Medium     Hyperlipidemia 06/21/2013     Priority: Medium     Status post bariatric surgery 06/21/2013     Priority: Medium     Kim-en-Y in 2006       Moderate major depression (H) 01/24/2013     Priority: Medium     Moderate persistent asthma without complication 11/15/2012     Priority: Medium     Persons encountering health services in other specified circumstances 11/15/2012     Priority: Medium     Tier 3    Status: Accept  Care Coordination Start Date: 02/27/12  State Tier Level: Level 3  Language/Barrier to Learning: No  Significant Mental Health  Issues: Yes  Date Care Coordination Discontinued: 05/28/15    DX V65.8 REPLACED WITH [id number] (04/08/2013)       Abnormal Pap smear of cervix 04/22/2019     Priority: Low     3-12-15: Pap/HPV neg.  Plan: Given hx (mildly abn pap, hyster), recheck in 5 yrs.  10-11-11: Pap/HPV neg.  Plan: Given ASUS pap in 2010, recommend repeating pap in 1 year.  8/3/2010 ASCUS Pap with +BV- pt treated with Metrogel.  Pt needs repeat Pap in 1yr.  8/4/2008 Pt had total hyst for menorrhagia, pelvic pain, scar tissue, and fibroids.    3-12-15: Pap/HPV neg.  Plan: Given hx (mildly abn pap, hyster), recheck in 5 yrs.  10-11-11: Pap/HPV neg.  Plan: Given ASUS pap in 2010, recommend repeating pap in 1 year.  8/3/2010 ASCUS Pap with +BV- pt treated with Metrogel.  Pt needs repeat Pap in 1yr.  8/4/2008 Pt had total hyst for menorrhagia, pelvic pain, scar tissue, and fibroids.       Hernia 01/27/2017     Priority: Low     S/P hysterectomy 06/21/2013     Priority: Low     2008, For fibroids.  Cervix removed, but still has ovaries.       History of hysterectomy for benign disease 06/21/2013     Priority: Low     2008, For fibroids.  Cervix removed, but still has ovaries.       Carpal tunnel syndrome 11/15/2012     Priority: Low     Health Care Home 11/15/2012     Priority: Low     Tier 3    Status: Accept  Care Coordination Start Date: 02/27/12  State Tier Level: Level 3  Language/Barrier to Learning: No  Significant Mental Health Issues: Yes  Date Care Coordination Discontinued: 05/28/15    DX V65.8 REPLACED WITH [id number] [facility] (04/08/2013)         Chronic pain of both shoulders 12/23/2005     Priority: Low     There are no exam notes on file for this visit.  Chief Complaint   Patient presents with     Pain Management     Blood pressure 136/81, pulse 90, SpO2 92 %, not currently breastfeeding.   pt is here for follow-up.  She arrived late today, unfortunately.    Her she needs refills of her chronic pain medications.  Her  doses have been stable she had no aberrant behavior her urine drug screen is appropriate PDMP is unremarkable, and her pain is well controlled.    Second she needs it her monthly B12 shot she tolerates this well.  Third, she needs refills of gabapentin and her basal insulin.  Both are well-tolerated.  She is having no episodes of hypoglycemia.    She has occasional, but troubling spells of waking up in different parts of her apartment occasionally outside of her apartment, where she does not recall going there.  These are not associated with hypoglycemia according to her CGM.  She wakes up and seems oriented.  He had not been witnessed.  There is no obvious bowel or bladder incontinence.  No recent headaches.  Last time I backed off her anticholinergic medication to see if that might help to some degree.  It caused more urinary frequency but not improvement in the spells which have occurred at least twice since then.  She admits that she is under a lot more stress and that her relationship with her daughter is now very stressful for her.  She also admits to having occasional visual hallucinations.  She had those in the past and he seemed to respond to Risperdal.  She is wondering whether the spells of disorientation I related to her sleep disorder of some kind.  Her sleep is otherwise not problematic.    Objective vitals are normal. chest clear .heart regular rate and rhythm. extremities no edema.  She is alert and oriented.      Assessment:    1.  Chronic pain syndrome.  This is due to polyarticular osteoarthritis in the setting of Kim-en-Y gastric bypass.  She has been on stable doses of opiates for years without aberrant behavior.  Her oxycodone was refilled.    2.  Hypertension well-controlled    3.  Visual hallucinations and anxiety in the setting of history of depression likely bipolar depression.  The exact diagnosis is not clear.  However in the past she has responded well to tiny doses of Risperdal so we  will reinitiate that while we are working up her other spells.  We started Risperdal 0.25 mg nightly.    4.  Spells.  We will arrange for a sleep consult.  Follow-up sooner if symptoms worsen.    Results for orders placed or performed in visit on 07/06/23   Urine Drugs of Abuse Screen Panel 13     Status: Abnormal   Result Value Ref Range    Cannabinoids (47-zzy-9-carboxy-9-THC) Not Detected Not Detected, Indeterminate    Phencyclidine Not Detected Not Detected, Indeterminate    Cocaine (Benzoylecgonine) Not Detected Not Detected, Indeterminate    Methamphetamine (d-Methamphetamine) Not Detected Not Detected, Indeterminate    Opiates (Morphine) Not Detected Not Detected, Indeterminate    Amphetamine (d-Amphetamine) Not Detected Not Detected, Indeterminate    Benzodiazepines (Nordiazepam) Not Detected Not Detected, Indeterminate    Tricyclic Antidepressants (Desipramine) Not Detected Not Detected, Indeterminate    Methadone Not Detected Not Detected, Indeterminate    Barbiturates (Butalbital) Not Detected Not Detected, Indeterminate    Oxycodone Detected (A) Not Detected, Indeterminate    Propoxyphene (Norpropoxyphene) Not Detected Not Detected, Indeterminate    Buprenorphine Not Detected Not Detected, Indeterminate

## 2023-07-07 NOTE — TELEPHONE ENCOUNTER
[facility] phone call message- general phone call:    Reason for call: She needs a call back re getting her percocet  sent to another rx sent to [facility]) they one it was originally sent to does not have them to give her     Action desired: call back if questions.    Return call needed: Yes    OK to leave a message on voice mail? Yes    Advised patient to response may take up to 2 business days: Yes    Primary language: English      needed? No    Call taken on July 7, 2023 at 8:10 AM by Ginger Ventura

## 2023-07-19 DIAGNOSIS — Z79.4 TYPE 2 DIABETES MELLITUS WITH HYPERGLYCEMIA, WITH LONG-TERM CURRENT USE OF INSULIN (H): ICD-10-CM

## 2023-07-19 DIAGNOSIS — E11.65 TYPE 2 DIABETES MELLITUS WITH HYPERGLYCEMIA, WITH LONG-TERM CURRENT USE OF INSULIN (H): ICD-10-CM

## 2023-07-19 NOTE — TELEPHONE ENCOUNTER
[facility] phone call message- patient requesting a refill:    Full Medication Name:     insulin pen needle (BD ULTRA-FINE) 29G X 12.7MM   Sig: Use 1 time daily      Pharmacy confirmed as   [address], [place] [address]  : Yes    Additional Comments:     Please call patient to confirm with pharmacy.     OK to leave a message on voice mail? Yes    Primary language: English      needed? No    Call taken on July 19, 2023 at 2:41 PM by Karen Cardona

## 2023-07-20 RX ORDER — PEN NEEDLE, DIABETIC 29 G X1/2"
NEEDLE, DISPOSABLE MISCELLANEOUS
Qty: 100 EACH | Refills: 11 | Status: SHIPPED | OUTPATIENT
Start: 2023-07-20 | End: 2024-03-21

## 2023-07-31 ENCOUNTER — TRANSFERRED RECORDS (OUTPATIENT)
Dept: HEALTH INFORMATION MANAGEMENT | Facility: CLINIC | Age: 58
End: 2023-07-31
Payer: COMMERCIAL

## 2023-07-31 LAB — RETINOPATHY: POSITIVE

## 2023-08-08 DIAGNOSIS — G89.4 CHRONIC PAIN SYNDROME: ICD-10-CM

## 2023-08-08 NOTE — TELEPHONE ENCOUNTER
Pt is calling back.  She said she told Dr Meyer this was going to happen.  She has been out of medication since the 4th.  Please respond asap.

## 2023-08-08 NOTE — TELEPHONE ENCOUNTER
Pt states she is supposed to get refills today to get her through until her next appointment on 8/10/2023

## 2023-08-09 RX ORDER — OXYCODONE AND ACETAMINOPHEN 10; 325 MG/1; MG/1
TABLET ORAL
Qty: 120 TABLET | Refills: 0 | Status: SHIPPED | OUTPATIENT
Start: 2023-08-09 | End: 2023-08-09

## 2023-08-09 NOTE — TELEPHONE ENCOUNTER
Pt called and requested to speak with a Supervisor regarding medication refill.     Supervisor talked to pt and she stated she has been out since 8/4 and is needing more. Pt stated that she was told Dr. Meyer was in clinic yesterday 8/8 and did not fill her medication. Let Pt know that Dr. Meyer may have not received her message and medication refill can take up to 72hrs. Supervisor offered to send an urgent messageto Dr. Meyer's team and she accepted. Pt has an appt on Thursday 8/10 with Dr. Meyer @ 12:50P.     Let patient know that if Supervisor receives a response from Dr. Meyer, Supervisor will call her back to let her know, but it not guaranteed that the response will be today or before her appointment tomorrow. Pt voiced understanding and request that medication be sent to [facility] on [address] in [place].     She is also requesting to her gabapentin.     Dr. Meyer - Please let me know and I can return call to patient.     Thank you.     Megan Quezada on 8/9/2023 at 1:55 PM

## 2023-08-10 ENCOUNTER — OFFICE VISIT (OUTPATIENT)
Dept: FAMILY MEDICINE | Facility: CLINIC | Age: 58
End: 2023-08-10
Payer: COMMERCIAL

## 2023-08-10 VITALS
TEMPERATURE: 98.3 F | OXYGEN SATURATION: 97 % | RESPIRATION RATE: 16 BRPM | SYSTOLIC BLOOD PRESSURE: 174 MMHG | HEART RATE: 107 BPM | DIASTOLIC BLOOD PRESSURE: 125 MMHG

## 2023-08-10 DIAGNOSIS — F33.3 SEVERE EPISODE OF RECURRENT MAJOR DEPRESSIVE DISORDER, WITH PSYCHOTIC FEATURES (H): ICD-10-CM

## 2023-08-10 DIAGNOSIS — G89.4 CHRONIC PAIN SYNDROME: Primary | ICD-10-CM

## 2023-08-10 DIAGNOSIS — G43.819 OTHER MIGRAINE WITHOUT STATUS MIGRAINOSUS, INTRACTABLE: ICD-10-CM

## 2023-08-10 DIAGNOSIS — N18.31 STAGE 3A CHRONIC KIDNEY DISEASE (H): ICD-10-CM

## 2023-08-10 DIAGNOSIS — F11.90 CHRONIC, CONTINUOUS USE OF OPIOIDS: ICD-10-CM

## 2023-08-10 DIAGNOSIS — Z23 NEED FOR PROPHYLACTIC VACCINATION AGAINST HEPATITIS B VIRUS: ICD-10-CM

## 2023-08-10 LAB
AMPHETAMINES UR QL: NOT DETECTED
BARBITURATES UR QL SCN: NOT DETECTED
BENZODIAZ UR QL SCN: NOT DETECTED
BUPRENORPHINE UR QL: NOT DETECTED
CANNABINOIDS UR QL: NOT DETECTED
COCAINE UR QL SCN: NOT DETECTED
D-METHAMPHET UR QL: NOT DETECTED
METHADONE UR QL SCN: NOT DETECTED
OPIATES UR QL SCN: NOT DETECTED
OXYCODONE UR QL SCN: NOT DETECTED
PCP UR QL SCN: NOT DETECTED
PROPOXYPH UR QL: NOT DETECTED
TRICYCLICS UR QL SCN: NOT DETECTED

## 2023-08-10 PROCEDURE — 99214 OFFICE O/P EST MOD 30 MIN: CPT | Mod: 25 | Performed by: FAMILY MEDICINE

## 2023-08-10 PROCEDURE — 80306 DRUG TEST PRSMV INSTRMNT: CPT | Performed by: FAMILY MEDICINE

## 2023-08-10 PROCEDURE — 96372 THER/PROPH/DIAG INJ SC/IM: CPT | Performed by: FAMILY MEDICINE

## 2023-08-10 RX ORDER — SUMATRIPTAN 6 MG/.5ML
6 INJECTION, SOLUTION SUBCUTANEOUS ONCE
Qty: 6 ML | Refills: 1 | Status: SHIPPED | OUTPATIENT
Start: 2023-08-10 | End: 2023-08-10

## 2023-08-10 RX ORDER — GABAPENTIN 250 MG/5ML
SOLUTION ORAL
Qty: 470 ML | Refills: 11 | Status: SHIPPED | OUTPATIENT
Start: 2023-08-10 | End: 2023-08-31

## 2023-08-10 RX ORDER — OXYCODONE AND ACETAMINOPHEN 10; 325 MG/1; MG/1
TABLET ORAL
Qty: 120 TABLET | Refills: 0 | Status: SHIPPED | OUTPATIENT
Start: 2023-08-10 | End: 2023-08-31

## 2023-08-10 RX ADMIN — CYANOCOBALAMIN 1000 MCG: 1000 INJECTION, SOLUTION INTRAMUSCULAR; SUBCUTANEOUS at 13:19
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
## 2023-08-11 ENCOUNTER — VIRTUAL VISIT (OUTPATIENT)
Dept: SURGERY | Facility: CLINIC | Age: 58
End: 2023-08-11
Payer: COMMERCIAL

## 2023-08-11 VITALS — BODY MASS INDEX: 40.72 KG/M2 | HEIGHT: 59 IN | WEIGHT: 202 LBS

## 2023-08-11 DIAGNOSIS — E66.01 MORBID OBESITY (H): ICD-10-CM

## 2023-08-11 DIAGNOSIS — K91.2 POSTOPERATIVE MALABSORPTION: Primary | ICD-10-CM

## 2023-08-11 PROCEDURE — 99214 OFFICE O/P EST MOD 30 MIN: CPT | Mod: 95 | Performed by: FAMILY MEDICINE
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
## 2023-08-14 ENCOUNTER — TELEPHONE (OUTPATIENT)
Dept: FAMILY MEDICINE | Facility: CLINIC | Age: 58
End: 2023-08-14
Payer: COMMERCIAL

## 2023-08-14 NOTE — TELEPHONE ENCOUNTER
Patient not presently at home states her readings have still been high she will call back with the readings    Cynthia Mathew RN on 8/14/2023 at 2:55 PM

## 2023-08-14 NOTE — TELEPHONE ENCOUNTER
----- Message from Jose Angel Meyer MD sent at 8/10/2023  5:11 PM CDT -----  Regarding: elevated BPs  Pt's BP was markedly elevated 8/10, and she was going to check at home.  Please call her to see how readings are doing at home.  CF

## 2023-08-17 NOTE — TELEPHONE ENCOUNTER
Patient not at home and is unable to give b/p readings at this time  Patient reports continuing headaches  Patient will call with readings when able    Note routed to Dr Betsy Mathew RN on 8/17/2023 at 10:29 AM

## 2023-08-31 ENCOUNTER — OFFICE VISIT (OUTPATIENT)
Dept: FAMILY MEDICINE | Facility: CLINIC | Age: 58
End: 2023-08-31
Payer: COMMERCIAL

## 2023-08-31 VITALS
HEART RATE: 107 BPM | WEIGHT: 205.4 LBS | OXYGEN SATURATION: 92 % | BODY MASS INDEX: 41.49 KG/M2 | RESPIRATION RATE: 16 BRPM | SYSTOLIC BLOOD PRESSURE: 163 MMHG | TEMPERATURE: 98.1 F | DIASTOLIC BLOOD PRESSURE: 95 MMHG

## 2023-08-31 DIAGNOSIS — N18.31 STAGE 3A CHRONIC KIDNEY DISEASE (H): Primary | ICD-10-CM

## 2023-08-31 DIAGNOSIS — G89.4 CHRONIC PAIN SYNDROME: ICD-10-CM

## 2023-08-31 DIAGNOSIS — E87.20 METABOLIC ACIDOSIS: ICD-10-CM

## 2023-08-31 DIAGNOSIS — G43.809 OTHER MIGRAINE WITHOUT STATUS MIGRAINOSUS, NOT INTRACTABLE: ICD-10-CM

## 2023-08-31 DIAGNOSIS — I10 ESSENTIAL HYPERTENSION: ICD-10-CM

## 2023-08-31 LAB
AMPHETAMINES UR QL: DETECTED
BARBITURATES UR QL SCN: NOT DETECTED
BENZODIAZ UR QL SCN: NOT DETECTED
BUPRENORPHINE UR QL: NOT DETECTED
CANNABINOIDS UR QL: NOT DETECTED
COCAINE UR QL SCN: NOT DETECTED
CREAT UR-MCNC: 63.1 MG/DL
D-METHAMPHET UR QL: NOT DETECTED
METHADONE UR QL SCN: NOT DETECTED
MICROALBUMIN UR-MCNC: 2727 MG/L
MICROALBUMIN/CREAT UR: 4321.71 MG/G CR (ref 0–25)
OPIATES UR QL SCN: NOT DETECTED
OXYCODONE UR QL SCN: DETECTED
PCP UR QL SCN: NOT DETECTED
PROPOXYPH UR QL: NOT DETECTED
TRICYCLICS UR QL SCN: NOT DETECTED

## 2023-08-31 PROCEDURE — 96372 THER/PROPH/DIAG INJ SC/IM: CPT | Performed by: FAMILY MEDICINE

## 2023-08-31 PROCEDURE — 99214 OFFICE O/P EST MOD 30 MIN: CPT | Mod: 25 | Performed by: FAMILY MEDICINE

## 2023-08-31 PROCEDURE — 80306 DRUG TEST PRSMV INSTRMNT: CPT | Performed by: FAMILY MEDICINE

## 2023-08-31 PROCEDURE — 82570 ASSAY OF URINE CREATININE: CPT | Mod: 59 | Performed by: FAMILY MEDICINE

## 2023-08-31 PROCEDURE — 82043 UR ALBUMIN QUANTITATIVE: CPT | Performed by: FAMILY MEDICINE

## 2023-08-31 RX ORDER — OXYCODONE AND ACETAMINOPHEN 10; 325 MG/1; MG/1
TABLET ORAL
Qty: 120 TABLET | Refills: 0 | Status: SHIPPED | OUTPATIENT
Start: 2023-08-31 | End: 2023-08-31 | Stop reason: ALTCHOICE

## 2023-08-31 RX ORDER — OXYCODONE AND ACETAMINOPHEN 10; 325 MG/1; MG/1
TABLET ORAL
Qty: 120 TABLET | Refills: 0 | Status: SHIPPED | OUTPATIENT
Start: 2023-08-31 | End: 2023-09-22

## 2023-08-31 RX ORDER — DILTIAZEM HYDROCHLORIDE 240 MG/1
240 CAPSULE, EXTENDED RELEASE ORAL DAILY
Qty: 30 CAPSULE | Refills: 11 | Status: SHIPPED | OUTPATIENT
Start: 2023-08-31 | End: 2024-04-18

## 2023-08-31 RX ORDER — CITRIC ACID/SODIUM CITRATE 334-500MG
10 SOLUTION, ORAL ORAL 2 TIMES DAILY WITH MEALS
Qty: 473 ML | Refills: 11 | Status: SHIPPED | OUTPATIENT
Start: 2023-08-31 | End: 2024-04-18

## 2023-08-31 RX ORDER — GABAPENTIN 250 MG/5ML
SOLUTION ORAL
Qty: 470 ML | Refills: 11 | Status: SHIPPED | OUTPATIENT
Start: 2023-08-31 | End: 2023-09-22

## 2023-08-31 RX ORDER — SUMATRIPTAN 25 MG/1
TABLET, FILM COATED ORAL
Qty: 24 TABLET | Refills: 1 | Status: SHIPPED | OUTPATIENT
Start: 2023-08-31 | End: 2023-10-26

## 2023-08-31 RX ADMIN — CYANOCOBALAMIN 1000 MCG: 1000 INJECTION, SOLUTION INTRAMUSCULAR; SUBCUTANEOUS at 15:23

## 2023-08-31 NOTE — PROGRESS NOTES
"  Assessment & Plan     Chronic pain syndrome  Stable.  Increased gabapentin from twice daily to 3 times daily.  Refilled oxycodone.  She can pick this up in about a week.  - Urine Drugs of Abuse Screen Panel 13  - gabapentin (NEURONTIN) 250 MG/5ML solution; 5 ml po tid  - oxyCODONE-acetaminophen (PERCOCET)  MG per tablet; Take 2 tablets by mouth At Bedtime. May also take 1 tablet every 6 hours as needed for severe pain.    Stage 3a chronic kidney disease (H)  Due for urine microalbumin today.  So collected.  - Albumin Random Urine Quantitative with Creat Ratio; Future  - Albumin Random Urine Quantitative with Creat Ratio    Essential hypertension  Continue lisinopril 40 but increase diltiazem to 240/day.  We will consider clonidine 0.1 mg twice daily next time.  - diltiazem ER (DILT-XR) 240 MG 24 hr ER beaded capsule; Take 1 capsule (240 mg) by mouth daily New dose    Metabolic acidosis  Refilled Bicitra  - sodium citrate-citric acid (BICITRA) 500-334 MG/5ML solution; Take 10 mLs by mouth 2 times daily (with meals)    Other migraine without status migrainosus, not intractable  Refilled Imitrex  - SUMAtriptan (IMITREX) 25 MG tablet; One tab at onset of HA.  May repeat dose in 2 hours.  Do not exceed 200 mg in 24 hours  BMI:   Estimated body mass index is 41.49 kg/m  as calculated from the following:    Height as of 8/11/23: 1.499 m (4' 11\").    Weight as of this encounter: 93.2 kg (205 lb 6.4 oz).   Weight management plan: I did not discuss weight specifically with her today.  She is undergone bariatric surgery in the past and today's weight is actually quite remarkable though her BMI is still high.  No recommendations today        No follow-ups on file.    Jose Angel Meyer MD  [facility]    Genesis Bliss is a 57 year old, presenting for the following health issues:  Pain Management (CPM/Right eye pain (feels swollen) )      HPI   Needs BP checked, as well as pain " meds...  Pain History:  When did you first notice your pain? chronic   Have you seen this provider for your pain in the past? Yes   Where in your body do you have pain? All over  Are you seeing anyone else for your pain? No            7/6/2023     4:07 PM 8/10/2023    12:58 PM 8/31/2023     2:32 PM   PEG Score   PEG Total Score 7.33 7 10           Chronic Pain Follow Up:    Location of pain: multiple joints  Analgesia/pain control:    - Recent changes:  nerve pain in legs a little wose--would like more gabapentin--TID seems most helpful.      - Overall control:     - Current treatments: see med list   Adherence:     - Do you ever take more pain medicine than prescribed? No    - When did you take your last dose of pain medicine?  Not asked   Adverse effects: No   PDMP Review         Value Time User    State PDMP site checked  Yes 8/9/2023  2:49 PM Jose Angel Meyer MD          Last CSA Agreement:   CSA -- Patient Level:     [Media Unavailable] Controlled Substance Agreement - Opioid - Scan on 12/14/2021  8:56 AM       Last UDS: 8/10/2023        has hypertension.  Taking both diltiazem and lisinopril.  She uses torsemide primarily for edema.  Her edema is about the same as normal seems to be somewhat positional.  She has compression stockings at home and wears them at night sometimes.  She keeps her feet up at night as well and this helps.      Review of Systems         Objective    BP (!) 163/95 (BP Location: Left arm, Patient Position: Sitting, Cuff Size: Adult Regular)   Pulse 107   Temp 98.1  F (36.7  C) (Oral)   Resp 16   Wt 93.2 kg (205 lb 6.4 oz)   LMP  (LMP Unknown)   SpO2 92%   BMI 41.49 kg/m    Body mass index is 41.49 kg/m .  Physical Exam   GENERAL: healthy, alert and no distress  NECK: 1 cm mobile, rubbery node over the right SCM.  Has been there ~ 1 month.    RESP: lungs clear to auscultation - no rales, rhonchi or wheezes  CV: regular rate and rhythm, normal S1 S2, no S3 or S4, no murmur,  click or rub, no peripheral edema and peripheral pulses strong    MS: no gross musculoskeletal defects noted, 1+ edema    Results for orders placed or performed in visit on 08/31/23   Urine Drugs of Abuse Screen Panel 13     Status: Abnormal   Result Value Ref Range    Cannabinoids (77-vqw-5-carboxy-9-THC) Not Detected Not Detected, Indeterminate    Phencyclidine Not Detected Not Detected, Indeterminate    Cocaine (Benzoylecgonine) Not Detected Not Detected, Indeterminate    Methamphetamine (d-Methamphetamine) Not Detected Not Detected, Indeterminate    Opiates (Morphine) Not Detected Not Detected, Indeterminate    Amphetamine (d-Amphetamine) Detected (A) Not Detected, Indeterminate    Benzodiazepines (Nordiazepam) Not Detected Not Detected, Indeterminate    Tricyclic Antidepressants (Desipramine) Not Detected Not Detected, Indeterminate    Methadone Not Detected Not Detected, Indeterminate    Barbiturates (Butalbital) Not Detected Not Detected, Indeterminate    Oxycodone Detected (A) Not Detected, Indeterminate    Propoxyphene (Norpropoxyphene) Not Detected Not Detected, Indeterminate    Buprenorphine Not Detected Not Detected, Indeterminate

## 2023-08-31 NOTE — PATIENT INSTRUCTIONS
Increase diltiazem from 120 to 240 mg daily.  New pill size sent to your pharmacy.  You can use up the current tabs by taking 2 of them daily..    Increase gabapentin to 5 ml 3 times daily    Use a warm washcloth on your right eye for 10-15 minutes every 2-3 hours.      Refilled the oxycodone

## 2023-09-22 ENCOUNTER — OFFICE VISIT (OUTPATIENT)
Dept: FAMILY MEDICINE | Facility: CLINIC | Age: 58
End: 2023-09-22
Payer: COMMERCIAL

## 2023-09-22 VITALS
SYSTOLIC BLOOD PRESSURE: 212 MMHG | HEART RATE: 105 BPM | DIASTOLIC BLOOD PRESSURE: 110 MMHG | OXYGEN SATURATION: 100 % | TEMPERATURE: 98.3 F | WEIGHT: 205 LBS | BODY MASS INDEX: 41.4 KG/M2 | RESPIRATION RATE: 16 BRPM

## 2023-09-22 DIAGNOSIS — I10 ESSENTIAL HYPERTENSION: ICD-10-CM

## 2023-09-22 DIAGNOSIS — G89.4 CHRONIC PAIN SYNDROME: Primary | ICD-10-CM

## 2023-09-22 LAB
AMPHETAMINES UR QL: NOT DETECTED
BARBITURATES UR QL SCN: NOT DETECTED
BENZODIAZ UR QL SCN: NOT DETECTED
BUPRENORPHINE UR QL: NOT DETECTED
CANNABINOIDS UR QL: NOT DETECTED
COCAINE UR QL SCN: NOT DETECTED
D-METHAMPHET UR QL: NOT DETECTED
METHADONE UR QL SCN: NOT DETECTED
OPIATES UR QL SCN: NOT DETECTED
OXYCODONE UR QL SCN: DETECTED
PCP UR QL SCN: NOT DETECTED
TRICYCLICS UR QL SCN: NOT DETECTED

## 2023-09-22 PROCEDURE — 99213 OFFICE O/P EST LOW 20 MIN: CPT | Mod: 25 | Performed by: FAMILY MEDICINE

## 2023-09-22 PROCEDURE — 96372 THER/PROPH/DIAG INJ SC/IM: CPT | Performed by: FAMILY MEDICINE

## 2023-09-22 PROCEDURE — 80306 DRUG TEST PRSMV INSTRMNT: CPT | Performed by: FAMILY MEDICINE

## 2023-09-22 RX ORDER — GABAPENTIN 250 MG/5ML
SOLUTION ORAL
Qty: 470 ML | Refills: 11 | Status: SHIPPED | OUTPATIENT
Start: 2023-09-22 | End: 2023-11-27

## 2023-09-22 RX ORDER — OXYCODONE AND ACETAMINOPHEN 10; 325 MG/1; MG/1
TABLET ORAL
Qty: 120 TABLET | Refills: 0 | Status: SHIPPED | OUTPATIENT
Start: 2023-09-22 | End: 2023-10-26

## 2023-09-22 RX ADMIN — CYANOCOBALAMIN 1000 MCG: 1000 INJECTION, SOLUTION INTRAMUSCULAR; SUBCUTANEOUS at 16:16

## 2023-09-25 ENCOUNTER — TELEPHONE (OUTPATIENT)
Dept: FAMILY MEDICINE | Facility: CLINIC | Age: 58
End: 2023-09-25
Payer: COMMERCIAL

## 2023-09-25 NOTE — PROGRESS NOTES
Patient Active Problem List    Diagnosis Date Noted    Stage 3a chronic kidney disease (H) 12/31/2020     Priority: High     Persistent protienuria      Copper deficiency 04/05/2019     Priority: High     Identified in Aug, 2018--see discharge summary from [facility] in [place].      Chronic pain disorder 10/07/2016     Priority: High     Chronic Pain Diagnosis:  neuropathy  DIRE Total Score(s):14     ORT: 5    4 - 7 =  Moderate Risk   of future problems with Opioids  FAQ5: 50 November 20, 2020  Behavioral Health Consultation: 5/12/17 with Dr. Daisy Quezada  Personal Care Plan for Chronic Pain: 5/12/17  Opioid medication:oxycodone   Dose:10mg  Number of pills per month:60  Patient is being prescribed 10mg of oxycodone IR (Percocet) per day this is 15 mg Morphine Equivalents  Benzodiazepines Prescribed? No  Naloxone prescribed? YES February 16, 2023  RIOSORD 16:  15% risk      Clinic visit frequency required: Q 1 month Next visit due:   Controlled Substance/Opioid Treatment agreement on file (dated <12 months ago)?:    Date(s): pending  Last [place] last visit  Items in red to be updated at each visit  Patient is followed by Data Unavailable for ongoing prescription of pain medication.  All refills should be approved by this provider, or covering partner.Chronic Pain     Chronic Pain Diagnosis:  neuropathy  DIRE Total Score(s):14     ORT: 5    4 - 7 =  Moderate Risk   of future problems with Opioids  FAQ5: 45/100 on 9/9/16, 40/100 on 11/15/16  55 October 29, 2020    Behavioral Health Consultation: 5/12/17 with Dr. Daisy Quezada  Personal Care Plan for Chronic Pain: 5/12/17  Opioid medication:oxycodone   Dose:10mg  Number of pills per month:90  Patient is being prescribed 45 MME  Benzodiazepines Prescribed? No  Naloxone prescribed? yes          Microalbuminuria 10/12/2015     Priority: High     Severely increased albuminuria      Type 2 diabetes mellitus with diabetic polyneuropathy (H) 10/12/2015      Priority: High    Essential hypertension 08/26/2013     Priority: High    F11.9 - Chronic, continuous use of opioids 08/10/2023     Priority: Medium    Acidosis 08/12/2022     Priority: Medium    Acute chest pain 08/12/2022     Priority: Medium    Arthritis of both acromioclavicular joints 04/08/2021     Priority: Medium     xray proven       Morbid obesity (H) 11/27/2020     Priority: Medium     Date of Surgery: 2006 RY[address] Dr. Harper  Initial Weight: 360#  Initial BMI: 70.30      Hyperglycemia due to type 2 diabetes mellitus (H) 06/02/2020     Priority: Medium    Neck mass 10/11/2019     Priority: Medium     Added automatically from request for surgery [id number]      Metabolic acidosis 07/26/2019     Priority: Medium     reolved w/ discontinuation of ASA      Obesity 01/03/2019     Priority: Medium    Hernia of anterior abdominal wall 01/28/2017     Priority: Medium    Osteoarthritis of both knees 10/31/2016     Priority: Medium    Polyneuropathy due to secondary diabetes mellitus (H) 10/31/2016     Priority: Medium    Compression neuropathy of left lower extremity 02/19/2016     Priority: Medium    Bilateral ankle joint pain 01/15/2016     Priority: Medium    Esophageal reflux 04/07/2015     Priority: Medium    Urinary incontinence 02/19/2015     Priority: Medium    Asthma 08/04/2013     Priority: Medium    History of cholecystectomy 08/04/2013     Priority: Medium    H/O necrotizing fasciitis 08/04/2013     Priority: Medium    Hyperlipidemia 06/21/2013     Priority: Medium    Status post bariatric surgery 06/21/2013     Priority: Medium     Kim-en-Y in 2006      Severe episode of recurrent major depressive disorder, with psychotic features (H) 01/24/2013     Priority: Medium    Moderate persistent asthma without complication 11/15/2012     Priority: Medium    Persons encountering health services in other specified circumstances 11/15/2012     Priority: Medium     Tier 3    Status: Accept  Care Coordination Start Date:  02/27/12  State Tier Level: Level 3  Language/Barrier to Learning: No  Significant Mental Health Issues: Yes  Date Care Coordination Discontinued: 05/28/15    DX V65.8 REPLACED WITH 92005 [facility] (04/08/2013)      Abnormal Pap smear of cervix 04/22/2019     Priority: Low     3-12-15: Pap/HPV neg.  Plan: Given hx (mildly abn pap, hyster), recheck in 5 yrs.  10-11-11: Pap/HPV neg.  Plan: Given ASUS pap in 2010, recommend repeating pap in 1 year.  8/3/2010 ASCUS Pap with +BV- pt treated with Metrogel.  Pt needs repeat Pap in 1yr.  8/4/2008 Pt had total hyst for menorrhagia, pelvic pain, scar tissue, and fibroids.    3-12-15: Pap/HPV neg.  Plan: Given hx (mildly abn pap, hyster), recheck in 5 yrs.  10-11-11: Pap/HPV neg.  Plan: Given ASUS pap in 2010, recommend repeating pap in 1 year.  8/3/2010 ASCUS Pap with +BV- pt treated with Metrogel.  Pt needs repeat Pap in 1yr.  8/4/2008 Pt had total hyst for menorrhagia, pelvic pain, scar tissue, and fibroids.      Hernia 01/27/2017     Priority: Low    S/P hysterectomy 06/21/2013     Priority: Low     2008, For fibroids.  Cervix removed, but still has ovaries.      History of hysterectomy for benign disease 06/21/2013     Priority: Low     2008, For fibroids.  Cervix removed, but still has ovaries.      Carpal tunnel syndrome 11/15/2012     Priority: Low    Health Care Home 11/15/2012     Priority: Low     Tier 3    Status: Accept  Care Coordination Start Date: 02/27/12  State Tier Level: Level 3  Language/Barrier to Learning: No  Significant Mental Health Issues: Yes  Date Care Coordination Discontinued: 05/28/15    DX V65.8 REPLACED WITH [id number] [facility] (04/08/2013)        Chronic pain of both shoulders 12/23/2005     Priority: Low     There are no exam notes on file for this visit.  Chief Complaint   Patient presents with    Pain Management     CPM     Blood pressure (!) 212/110, pulse 105, temperature 98.3  F (36.8  C), temperature source Oral, resp. rate  16, weight 93 kg (205 lb), SpO2 100 %, not currently breastfeeding.  Patient is here for refill of her oxycodone and gabapentin.  She states her sister is critically ill and she is try to get on an airplane this afternoon and needs refills as is possible.  She feels that the gabapentin and oxycodone are effective and necessary for refills.        We addressed her hypertension today.  She has no headache or neurologic symptoms.  She attributes it to her substantial stress.    Blood pressure noted.  Exam deferred because of patient's urgent need to get to the pharmacy.    Assessment plan: 1.  Chronic pain syndrome due to multilevel osteoarthritis.  Refilled both gabapentin and oxycodone.  Recheck in 1 month.  2.  Severe hypertension.  Social stressors likely a factor, but she has CKD.  We will arrange follow-up as soon as feasible.  We will call her and emphasized the need to temporarily discontinue phentermine.    Results for orders placed or performed in visit on 09/22/23   Urine Drugs of Abuse Screen Clinic Toxicology Panel     Status: Abnormal   Result Value Ref Range    Cannabinoids (72-jub-8-carboxy-9-THC) Not Detected Not Detected, Indeterminate    Phencyclidine Not Detected Not Detected, Indeterminate    Cocaine (Benzoylecgonine) Not Detected Not Detected, Indeterminate    Methamphetamine (d-Methamphetamine) Not Detected Not Detected, Indeterminate    Opiates (Morphine) Not Detected Not Detected, Indeterminate    Amphetamine (d-Amphetamine) Not Detected Not Detected, Indeterminate    Benzodiazepines (Nordiazepam) Not Detected Not Detected, Indeterminate    Tricyclic Antidepressants (Desipramine) Not Detected Not Detected, Indeterminate    Methadone Not Detected Not Detected, Indeterminate    Barbiturates (Butalbital) Not Detected Not Detected, Indeterminate    Oxycodone Detected (A) Not Detected, Indeterminate    Buprenorphine Not Detected Not Detected, Indeterminate

## 2023-09-25 NOTE — TELEPHONE ENCOUNTER
----- Message from Jose Angel Meyer MD sent at 9/25/2023  6:23 AM CDT -----  Regarding: hypertension  Yoly  I saw Ruthy on Friday and she had to urgently travel to [place] because her sister is ill.  She was stressed and her blood pressure was really high.  We could not address it because she was urgently leaving.  I want her to stop phentermine as this can increase her blood pressure, and I like to arrange follow-up of her hypertension here as soon as she returns.    Can you call her with that message?    Thanks--CF

## 2023-09-25 NOTE — TELEPHONE ENCOUNTER
Called pt and gave her info.  She states that she has an appt to see Dr Meyer on 10/26/23 and soonest available with Dr Meyer is 10/23/23 so pt will keep 10/26/23 appt.  She states that she will likely come back to [place] on 9/27/23 and will check her BP when she comes home.  Told her to call with her BP readings and pt verbalized understanding.  Routed note to Dr Meyer./Yoly Vasquez RN BSN

## 2023-10-10 ENCOUNTER — VIRTUAL VISIT (OUTPATIENT)
Dept: SURGERY | Facility: CLINIC | Age: 58
End: 2023-10-10
Payer: COMMERCIAL

## 2023-10-10 DIAGNOSIS — K91.2 POSTOPERATIVE MALABSORPTION: ICD-10-CM

## 2023-10-10 DIAGNOSIS — Z98.84 S/P BARIATRIC SURGERY: Primary | ICD-10-CM

## 2023-10-10 DIAGNOSIS — Z71.3 NUTRITIONAL COUNSELING: ICD-10-CM

## 2023-10-10 DIAGNOSIS — E11.42 TYPE 2 DIABETES MELLITUS WITH DIABETIC POLYNEUROPATHY, WITH LONG-TERM CURRENT USE OF INSULIN (H): ICD-10-CM

## 2023-10-10 DIAGNOSIS — Z79.4 TYPE 2 DIABETES MELLITUS WITH DIABETIC POLYNEUROPATHY, WITH LONG-TERM CURRENT USE OF INSULIN (H): ICD-10-CM

## 2023-10-10 PROCEDURE — 97803 MED NUTRITION INDIV SUBSEQ: CPT | Mod: 95 | Performed by: DIETITIAN, REGISTERED

## 2023-10-10 NOTE — LETTER
10/10/2023         RE: Ruthy Álvarez  [address]  [address]  [place] [address]        Dear Colleague,    Thank you for referring your patient, Ruthy Álvarez, to the [facility] [place]. Please see a copy of my visit note below.    Ruthy Álvarez is a 58 year old who is being evaluated via a billable telephone visit.      What phone number would you like to be contacted at? [phone]  How would you like to obtain your AVS? Mail a copy      Post-op Surgical Weight Loss Diet Evaluation     Assessment:  Pt presents for  17 years  post-op RD visit, s/p RNY GB with Dr. Harper. Today we reviewed current eating habits and level of physical activity, and instructed on the changes that are required for successful bariatric outcomes.    Patient Progress: has experienced a little weight gain more recently, noting that she had even started the Phentermine, noting that she has had more stressors recently with her family    Initial weight: 360 lbs   Current weight: 201 lbs     There is no height or weight on file to calculate BMI.    Patient Active Problem List   Diagnosis     Chronic pain of both shoulders     Abnormal Pap smear of cervix     Moderate persistent asthma without complication     Carpal tunnel syndrome     Health Care Home     Severe episode of recurrent major depressive disorder, with psychotic features (H)     S/P hysterectomy     Hyperlipidemia     Status post bariatric surgery     Essential hypertension     Urinary incontinence     Esophageal reflux     Microalbuminuria     Type 2 diabetes mellitus with diabetic polyneuropathy (H)     Bilateral ankle joint pain     Chronic pain disorder     Asthma     Compression neuropathy of left lower extremity     Hernia     Hernia of anterior abdominal wall     Obesity     Osteoarthritis of both knees     Polyneuropathy due to secondary diabetes mellitus (H)     Copper deficiency     Metabolic acidosis      "Neck mass     Persons encountering health services in other specified circumstances     Hyperglycemia due to type 2 diabetes mellitus (H)     History of cholecystectomy     History of hysterectomy for benign disease     Morbid obesity (H)     Stage 3a chronic kidney disease (H)     Arthritis of both acromioclavicular joints     H/O necrotizing fasciitis     Acidosis     Acute chest pain     F11.9 - Chronic, continuous use of opioids       Diabetes: Yes    Vitamins   Multi Vit with Iron: yes  Calcium Citrate: yes  B12: yes-injection   D3: yes      Diet Recall/Time:   Breakfast: cottage cheese or boiled egg or toaster strudel or deli meat, cheese slices (without the bread)   Lunch: sandwich (deli meat)  Dinner: skipped or spaghetti or ribs, vegetable    Typical Snacks: deli meat (in the middle of the night, on occasion) or fruit cup or apple or watermelon or peach mixed cottage cheese       Estimated protein intake: 45-60 grams      Meal Duration:20-30 minutes    Fluid-meal separation:  Fluids are  30min before and 30 minutes after meals.    Fluid Intake  Water: at least 64 oz/day     Exercise: walking at work -twice around the building       PES statement:      (NI-5.7.1) Inadequate protein intake related to Gastric bypass causing increased nutrient needs due to malabsorption/ Decreased ability to consume sufficient protein as evidenced by Estimated intake of protein insufficient to meet requirements    Intervention    Discussion    Recommended to consume 15-20gm protein at 3 meals daily, along with protein supplement/\"planned protein containing snack\" of 15-30gm protein, to reach goal of 60-80 gm protein daily.  Educated on post-op vitamin regimen: Multi Vit + iron 2x/day, calcium citrate 400-600 mg 2x/day, 6677-7841 mcg of Sublingual B-12 daily, and 5000 IU Vitamin D3 daily (MVI and calcium can be taken at the same time BID)  Reviewed lean protein sources  Bariatric Plate Method-  including lean/low fat " "protein at each meal, including a vegetable/fruit, and limiting carbohydrate intake to less than 25% of plate volume.     Instructions  Include 15-20gm protein at each meal, along with protein supplement/\"planned protein containing snack\" of 15-30gm protein, to reach goal of 60-80 gm protein daily.  Increase fluid intake to 64oz daily: choose plain or calorie/carbonation-free beverages.  Incorporate daily structured activity, 30-60 minutes most days of the week  Recommended pt to start taking: Multi Vit + iron 2x/day, calcium citrate 400-600 mg 2x/day, 8287-8183 mcg of Sublingual B-12 daily, and 5000 IU Vitamin D3 daily. (MVI and calcium can be taken at the same time)  Read food labels more consistently: keeping total fat grams <10, total sugar grams <10, fiber >3gm per serving.  Increase vegetable/fruit intake, by having a vegetable or fruit with each meal daily.  Practice plate method: 1/2 plate lean/low fat protein source, vegetable/fruit, <25% of plate complex carbohydrates.  Separate fluids 30 minutes before/after meal times.  Practice eating off of smaller plates/bowls, chewing to applesauce consistency, taking 20-30 minutes to eat in a calm/relaxed environment without distractions of tv/email/cell phone.    Handouts provided:  None     Assessment/Plan:    Pt to follow up in 2 months with Bariatrician      Phone call duration: 20 minutes      Originating Location (pt. Location): Home      Distant Location (provider location):  Off-site        Matilde Norman RD            Again, thank you for allowing me to participate in the care of your patient.        Sincerely,        Matilde Norman RD  "

## 2023-10-10 NOTE — PROGRESS NOTES
Ruthy Álvarez is a 58 year old who is being evaluated via a billable telephone visit.      What phone number would you like to be contacted at? [phone]  How would you like to obtain your AVS? Mail a copy      Post-op Surgical Weight Loss Diet Evaluation     Assessment:  Pt presents for  17 years  post-op RD visit, s/p RNY GB with Dr. Harper. Today we reviewed current eating habits and level of physical activity, and instructed on the changes that are required for successful bariatric outcomes.    Patient Progress: has experienced a little weight gain more recently, noting that she had even started the Phentermine, noting that she has had more stressors recently with her family    Initial weight: 360 lbs   Current weight: 201 lbs     There is no height or weight on file to calculate BMI.    Patient Active Problem List   Diagnosis    Chronic pain of both shoulders    Abnormal Pap smear of cervix    Moderate persistent asthma without complication    Carpal tunnel syndrome    Health Care Home    Severe episode of recurrent major depressive disorder, with psychotic features (H)    S/P hysterectomy    Hyperlipidemia    Status post bariatric surgery    Essential hypertension    Urinary incontinence    Esophageal reflux    Microalbuminuria    Type 2 diabetes mellitus with diabetic polyneuropathy (H)    Bilateral ankle joint pain    Chronic pain disorder    Asthma    Compression neuropathy of left lower extremity    Hernia    Hernia of anterior abdominal wall    Obesity    Osteoarthritis of both knees    Polyneuropathy due to secondary diabetes mellitus (H)    Copper deficiency    Metabolic acidosis    Neck mass    Persons encountering health services in other specified circumstances    Hyperglycemia due to type 2 diabetes mellitus (H)    History of cholecystectomy    History of hysterectomy for benign disease    Morbid obesity (H)    Stage 3a chronic kidney disease (H)    Arthritis of both acromioclavicular joints  "   H/O necrotizing fasciitis    Acidosis    Acute chest pain    F11.9 - Chronic, continuous use of opioids       Diabetes: Yes    Vitamins   Multi Vit with Iron: yes  Calcium Citrate: yes  B12: yes-injection   D3: yes      Diet Recall/Time:   Breakfast: cottage cheese or boiled egg or toaster strudel or deli meat, cheese slices (without the bread)   Lunch: sandwich (deli meat)  Dinner: skipped or spaghetti or ribs, vegetable    Typical Snacks: deli meat (in the middle of the night, on occasion) or fruit cup or apple or watermelon or peach mixed cottage cheese       Estimated protein intake: 45-60 grams      Meal Duration:20-30 minutes    Fluid-meal separation:  Fluids are  30min before and 30 minutes after meals.    Fluid Intake  Water: at least 64 oz/day     Exercise: walking at work -twice around the building       PES statement:      (NI-5.7.1) Inadequate protein intake related to Gastric bypass causing increased nutrient needs due to malabsorption/ Decreased ability to consume sufficient protein as evidenced by Estimated intake of protein insufficient to meet requirements    Intervention    Discussion    Recommended to consume 15-20gm protein at 3 meals daily, along with protein supplement/\"planned protein containing snack\" of 15-30gm protein, to reach goal of 60-80 gm protein daily.  Educated on post-op vitamin regimen: Multi Vit + iron 2x/day, calcium citrate 400-600 mg 2x/day, 7490-3666 mcg of Sublingual B-12 daily, and 5000 IU Vitamin D3 daily (MVI and calcium can be taken at the same time BID)  Reviewed lean protein sources  Bariatric Plate Method-  including lean/low fat protein at each meal, including a vegetable/fruit, and limiting carbohydrate intake to less than 25% of plate volume.     Instructions  Include 15-20gm protein at each meal, along with protein supplement/\"planned protein containing snack\" of 15-30gm protein, to reach goal of 60-80 gm protein daily.  Increase fluid intake to 64oz " daily: choose plain or calorie/carbonation-free beverages.  Incorporate daily structured activity, 30-60 minutes most days of the week  Recommended pt to start taking: Multi Vit + iron 2x/day, calcium citrate 400-600 mg 2x/day, 6448-4665 mcg of Sublingual B-12 daily, and 5000 IU Vitamin D3 daily. (MVI and calcium can be taken at the same time)  Read food labels more consistently: keeping total fat grams <10, total sugar grams <10, fiber >3gm per serving.  Increase vegetable/fruit intake, by having a vegetable or fruit with each meal daily.  Practice plate method: 1/2 plate lean/low fat protein source, vegetable/fruit, <25% of plate complex carbohydrates.  Separate fluids 30 minutes before/after meal times.  Practice eating off of smaller plates/bowls, chewing to applesauce consistency, taking 20-30 minutes to eat in a calm/relaxed environment without distractions of tv/email/cell phone.    Handouts provided:  None     Assessment/Plan:    Pt to follow up in 2 months with Bariatrician      Phone call duration: 20 minutes      Originating Location (pt. Location): Home      Distant Location (provider location):  Off-site        Matilde Norman RD

## 2023-10-13 ENCOUNTER — TELEPHONE (OUTPATIENT)
Dept: FAMILY MEDICINE | Facility: CLINIC | Age: 58
End: 2023-10-13

## 2023-10-13 ENCOUNTER — DOCUMENTATION ONLY (OUTPATIENT)
Dept: FAMILY MEDICINE | Facility: CLINIC | Age: 58
End: 2023-10-13
Payer: COMMERCIAL

## 2023-10-13 NOTE — TELEPHONE ENCOUNTER
REFERENCE NUMBER [id number]  They need to talk to someone re the DM testing supplies pain meds topical cream, and the supplement for the omega 3

## 2023-10-13 NOTE — PROGRESS NOTES
Diagnosis   Code Notes Priority Class Noted Share w/ Pt Change Dx Resolved Visit Updated                     High         Essential hypertension   I10 Create Notes       High  8/26/2013 []    3/3/2021 12:03:41 PM    Jose Angel Meyer MD             Microalbuminuria   R80.9 Edit Notes       High  10/12/2015 []    7/30/2021 4:40:44 PM    Jose Angel Meyer MD       Overview  Written:Jose Angel Meyer MD7/30/2021 4:40 PM     Severely increased albuminuria         Type 2 diabetes mellitus with diabetic polyneuropathy (H)   E11.42 Create Notes       High  10/12/2015 []    9/9/2016 4:20:38 PM    Jose Angel Meyer MD             Chronic pain disorder   G89.4 Edit Notes       High  10/7/2016 []    2/16/2023 12:00:40 PM    Jose Angel Meyer MD       Overview  Edited:Jose Angel Meyer MD2/16/2023 12:00 PM     Chronic Pain Diagnosis:  neuropathy  DIRE Total Score(s):14     ORT: 5    4 - 7 =  Moderate Risk   of future problems with Opioids  FAQ5: 50 November 20, 2020  Behavioral Health Consultation: 5/12/17 with Dr. Daisy Quezada  Personal Care Plan for Chronic Pain: 5/12/17  Opioid medication:oxycodone   Dose:10mg  Number of pills per month:60  Patient is being prescribed 10mg of oxycodone IR (Percocet) per day this is 15 mg Morphine Equivalents  Benzodiazepines Prescribed? No  Naloxone prescribed? YES February 16, 2023  [facility] 16:  15% risk       Clinic visit frequency required: Q 1 month Next visit due:   Controlled Substance/Opioid Treatment agreement on file (dated <12 months ago)?:    Date(s): pending  Last [facility] verification: last visit  Items in red to be updated at each visit  Patient is followed by Data Unavailable for ongoing prescription of pain medication.  All refills should be approved by this provider, or covering partner.Chronic Pain      Chronic Pain Diagnosis:  neuropathy  DIRE Total Score(s):14     ORT: 5    4 - 7 =  Moderate Risk   of future problems with  Opioids  FAQ5: 45/100 on 9/9/16, 40/100 on 11/15/16  55 October 29, 2020     Behavioral Health Consultation: 5/12/17 with Dr. Daisy Quezada  Personal Care Plan for Chronic Pain: 5/12/17  Opioid medication:oxycodone   Dose:10mg  Number of pills per month:90  Patient is being prescribed 45 MME  Benzodiazepines Prescribed? No  Naloxone prescribed? yes               Copper deficiency   E61.0 Edit Notes       High  4/5/2019 []    3/3/2021 12:03:57 PM    Jose Angel Meyer MD       Overview  Written:Jose Angel Meyer MD4/5/2019 8:54 AM     Identified in Aug, 2018--see discharge summary from [facility] in [place].         Stage 3a chronic kidney disease (H)   N18.31 Edit Notes       High  12/31/2020 []    3/3/2021 12:06:27 PM    Jose Angel Meyer MD       Overview  Written:Jose Angel Meyer MD3/3/2021 12:06 PM     Persistent protienuria        Medium         Moderate persistent asthma without complication   J45.40 Create Notes       Medium  11/15/2012 []    12/14/2018 4:16:37 PM    Jose Angel Meyer MD             Severe episode of recurrent major depressive disorder, with psychotic features (H)   F33.3 Create Notes       Medium  1/24/2013 []    7/7/2023 7:33:57 AM    Jose Angel Meyer MD             Hyperlipidemia   E78.5 Create Notes       Medium  6/21/2013 []    9/9/2020 1:45:53 PM    Gee Mishra, EMT             Status post bariatric surgery   Z98.84 Edit Notes       Medium  6/21/2013 []    9/9/2020 1:45:53 PM    Gee Mishra, EMT       Overview  Edited:Gee Mishra, EMT9/9/2020 1:45 PM     Kim-en-Y in 2006         Urinary incontinence   R32 Create Notes       Medium  2/19/2015 []    2/19/2015 9:33:13 AM    Jerri Patel MD             Esophageal reflux   K21.9 Create Notes       Medium  4/7/2015 []    4/7/2015 11:30:57 AM    Jerri Patel MD             Bilateral ankle joint pain   M25.571, M25.572 Create Notes       Medium  1/15/2016 []    1/15/2016 11:07:00 AM    Ellie,  MD Yudy             Asthma   J45.909 Create Notes       Medium  8/4/2013 []    1/3/2019 2:52:52 PM    Bijan Brewer CMA             Compression neuropathy of left lower extremity   G57.92 Create Notes       Medium  2/19/2016 []    1/3/2019 2:52:52 PM    Bijan Brewer, CMA             Hernia of anterior abdominal wall   K43.9 Create Notes       Medium  1/28/2017 []    1/3/2019 2:52:52 PM    Bijan Brewer, TANIA             Obesity   E66.9 Create Notes       Medium  1/3/2019 []    1/3/2019 2:52:52 PM    Bijan Brewer, TANIA             Osteoarthritis of both knees   M17.0 Create Notes       Medium  10/31/2016 []    1/3/2019 2:52:52 PM    Bijan Brewer CMA             Polyneuropathy due to secondary diabetes mellitus (H)   E13.42 Create Notes       Medium  10/31/2016 []    1/3/2019 2:52:52 PM    Bijan Brewer CMA             Metabolic acidosis   E87.20 Edit Notes       Medium  7/26/2019 []    7/30/2021 4:36:23 PM    Jose Angel Meyer MD       Overview  Written:Jose Angel Meyer MD7/26/2019 2:05 PM     reolved w/ discontinuation of ASA         Neck mass   R22.1 Edit Notes       Medium  10/11/2019 []    9/9/2020 1:45:53 PM    Gee Mishra EMT       Overview  Written:Gee Mishra EMT9/9/2020 1:45 PM     Added automatically from request for surgery [id number]         Persons encountering health services in other specified circumstances   Z76.89 Edit Notes       Medium  11/15/2012 []    9/9/2020 1:45:53 PM    eGe Mishra EMT       Overview  Written:Gee Mishra EMT9/9/2020 1:45 PM     Tier 3     Status: Accept  Care Coordination Start Date: 02/27/12  State Tier Level: Level 3  Language/Barrier to Learning: No  Significant Mental Health Issues: Yes  Date Care Coordination Discontinued: 05/28/15     DX V65.8 REPLACED WITH [id number] (04/08/2013)         Hyperglycemia due to type 2 diabetes mellitus (H)   E11.65 Create Notes       Medium   6/2/2020 []    9/9/2020 1:45:53 PM    Gee Mishra EMT             History of cholecystectomy   Z90.49 Create Notes       Medium  8/4/2013 []    9/9/2020 1:45:53 PM    Gee Mishra, EMT             Morbid obesity (H)   E66.01 Edit Notes       Medium  11/27/2020 []    11/9/2022 6:38:05 PM    Jose Angel Meyer MD       Overview  Written:Jose Angel Meyer MD11/9/2022 6:38 PM     Date of Surgery: 2006 RY[address] Dr. Harper  Initial Weight: 360#  Initial BMI: 70.30         Arthritis of both acromioclavicular joints   M19.011, M19.012 Edit Notes       Medium  4/8/2021 []    4/8/2021 6:26:07 PM    Jose Angel Meyer MD       Overview  Written:Jose Angel Meyer MD4/8/2021 6:26 PM     xray proven          H/O necrotizing fasciitis   Z87.39 Create Notes       Medium  8/4/2013 []    7/27/2021 1:32:30 PM    Enoch Read, LPN             Acidosis   E87.20 Create Notes       Medium  8/12/2022 []    8/12/2022 10:34:01 PM    Lizzette Muñiz MD             Acute chest pain   R07.9 Create Notes       Medium  8/12/2022 []    8/12/2022 10:34:01 PM    Lizzette Muñiz MD             F11.9 - Chronic, continuous use of opioids   F11.90 Create Notes       Medium  8/10/2023 []    8/10/2023 5:03:07 PM    Jose Angel Meyer MD            Low         Chronic pain of both shoulders   M25.511, G89.29, M25.512 Create Notes       Low  12/23/2005 []    3/3/2021 12:00:51 PM    Jose Angel Meyer MD       Details     Create Overview    Create Current Assessment & Plan Note    SmartForms    Chronic Pain Quality Measure Exclusion        Related Goals    View Past Values  Search for goal    Add                Associate Goals     Show:[]Unrelated Goals           No goals related to this problem                 Relevant Medications      Search for new orders    New Order                 Medications from outside sources need attention.  Go Reconcile           Review open orders        Edit the patient's instructions for  taking insulin or programming an insulin pump.  Insulin Instructions                   Name Dose, Frequency          Outpatient and Clinic-Administered Medications         diclofenac (VOLTAREN) 1 % topical gel 4 g, 3 TIMES DAILY                               No pharmacy selectedSelect pharmacy        Associate Signed Orders  Patient Estimate  Providers  Current Interactions                          Abnormal Pap smear of cervix   R87.619 Edit Notes       Low  4/22/2019 []    9/9/2020 1:45:53 PM    Gee Mishra, EMT       Overview  Edited:Gee Mishra, EMT9/9/2020 1:45 PM     3-12-15: Pap/HPV neg.  Plan: Given hx (mildly abn pap, hyster), recheck in 5 yrs.  10-11-11: Pap/HPV neg.  Plan: Given ASUS pap in 2010, recommend repeating pap in 1 year.  8/3/2010 ASCUS Pap with +BV- pt treated with Metrogel.  Pt needs repeat Pap in 1yr.  8/4/2008 Pt had total hyst for menorrhagia, pelvic pain, scar tissue, and fibroids.     3-12-15: Pap/HPV neg.  Plan: Given hx (mildly abn pap, hyster), recheck in 5 yrs.  10-11-11: Pap/HPV neg.  Plan: Given ASUS pap in 2010, recommend repeating pap in 1 year.  8/3/2010 ASCUS Pap with +BV- pt treated with Metrogel.  Pt needs repeat Pap in 1yr.  8/4/2008 Pt had total hyst for menorrhagia, pelvic pain, scar tissue, and fibroids.         Carpal tunnel syndrome   G56.00 Create Notes       Low  11/15/2012 []    9/9/2016 4:19:54 PM    Jose Angel Meyer MD             [facility]   Z76.89 Edit Notes       Low  11/15/2012 []    7/26/2019 2:03:24 PM    Jose Angel Meyer MD       Overview  Edited:Ashlee Cantu LSW5/28/2015 3:56 PM     Tier 3     Status: Accept  Care Coordination Start Date: 02/27/12  State Tier Level: Level 3  Language/Barrier to Learning: No  Significant Mental Health Issues: Yes  Date Care Coordination Discontinued: 05/28/15     DX V65.8 REPLACED WITH [id number] [facility] (04/08/2013)            S/P hysterectomy   Z90.710 Edit Notes       Low  6/21/2013 []     9/9/2016 4:19:27 PM    Jose Angel Meyer MD       Overview  Edited:Jerri Patel MD6/9/2014 8:44 PM     2008, For fibroids.  Cervix removed, but still has ovaries.         Hernia   K46.9 Create Notes       Low  1/27/2017 []    7/30/2021 4:36:30 PM    Jose Angel Meyer MD             History of hysterectomy for benign disease   Z90.710 Edit Notes       Low  6/21/2013 []    7/30/2021 4:37:31 PM    Jose Angel Meyer MD       Overview  Written:Gee Mishra, EMT9/9/2020 1:45 PM     2008, For fibroids.  Cervix removed, but still has ovaries.              documenting problem list for [facility]

## 2023-10-26 ENCOUNTER — OFFICE VISIT (OUTPATIENT)
Dept: FAMILY MEDICINE | Facility: CLINIC | Age: 58
End: 2023-10-26
Payer: COMMERCIAL

## 2023-10-26 VITALS
DIASTOLIC BLOOD PRESSURE: 106 MMHG | HEART RATE: 107 BPM | RESPIRATION RATE: 16 BRPM | OXYGEN SATURATION: 100 % | SYSTOLIC BLOOD PRESSURE: 170 MMHG | WEIGHT: 207.6 LBS | TEMPERATURE: 98.1 F | BODY MASS INDEX: 41.93 KG/M2

## 2023-10-26 DIAGNOSIS — R29.6 RECURRENT FALLS: ICD-10-CM

## 2023-10-26 DIAGNOSIS — Z23 NEED FOR PROPHYLACTIC VACCINATION AGAINST HEPATITIS B VIRUS: ICD-10-CM

## 2023-10-26 DIAGNOSIS — G89.4 CHRONIC PAIN DISORDER: Primary | ICD-10-CM

## 2023-10-26 DIAGNOSIS — E11.42 TYPE 2 DIABETES MELLITUS WITH DIABETIC POLYNEUROPATHY (H): ICD-10-CM

## 2023-10-26 DIAGNOSIS — G89.4 CHRONIC PAIN SYNDROME: Primary | ICD-10-CM

## 2023-10-26 DIAGNOSIS — G89.4 CHRONIC PAIN SYNDROME: ICD-10-CM

## 2023-10-26 DIAGNOSIS — R60.9 EDEMA, UNSPECIFIED TYPE: ICD-10-CM

## 2023-10-26 DIAGNOSIS — I10 BENIGN ESSENTIAL HYPERTENSION: ICD-10-CM

## 2023-10-26 DIAGNOSIS — Z23 ENCOUNTER FOR IMMUNIZATION: ICD-10-CM

## 2023-10-26 LAB
AMPHETAMINES UR QL: DETECTED
BARBITURATES UR QL SCN: NOT DETECTED
BENZODIAZ UR QL SCN: NOT DETECTED
BUPRENORPHINE UR QL: NOT DETECTED
CANNABINOIDS UR QL: NOT DETECTED
COCAINE UR QL SCN: NOT DETECTED
D-METHAMPHET UR QL: NOT DETECTED
METHADONE UR QL SCN: NOT DETECTED
OPIATES UR QL SCN: NOT DETECTED
OXYCODONE UR QL SCN: DETECTED
PCP UR QL SCN: NOT DETECTED
TRICYCLICS UR QL SCN: NOT DETECTED

## 2023-10-26 PROCEDURE — 96372 THER/PROPH/DIAG INJ SC/IM: CPT | Performed by: FAMILY MEDICINE

## 2023-10-26 PROCEDURE — G0010 ADMIN HEPATITIS B VACCINE: HCPCS | Performed by: FAMILY MEDICINE

## 2023-10-26 PROCEDURE — 90480 ADMN SARSCOV2 VAC 1/ONLY CMP: CPT | Performed by: FAMILY MEDICINE

## 2023-10-26 PROCEDURE — 99214 OFFICE O/P EST MOD 30 MIN: CPT | Mod: 25 | Performed by: FAMILY MEDICINE

## 2023-10-26 PROCEDURE — 90686 IIV4 VACC NO PRSV 0.5 ML IM: CPT | Performed by: FAMILY MEDICINE

## 2023-10-26 PROCEDURE — 90746 HEPB VACCINE 3 DOSE ADULT IM: CPT | Performed by: FAMILY MEDICINE

## 2023-10-26 PROCEDURE — G0008 ADMIN INFLUENZA VIRUS VAC: HCPCS | Performed by: FAMILY MEDICINE

## 2023-10-26 PROCEDURE — 80306 DRUG TEST PRSMV INSTRMNT: CPT | Performed by: FAMILY MEDICINE

## 2023-10-26 PROCEDURE — 91320 SARSCV2 VAC 30MCG TRS-SUC IM: CPT | Performed by: FAMILY MEDICINE

## 2023-10-26 RX ORDER — TORSEMIDE 10 MG/1
10 TABLET ORAL DAILY
Qty: 90 TABLET | Refills: 0 | Status: SHIPPED | OUTPATIENT
Start: 2023-10-26 | End: 2024-04-18

## 2023-10-26 RX ORDER — LABETALOL 100 MG/1
100 TABLET, FILM COATED ORAL 2 TIMES DAILY
Qty: 60 TABLET | Refills: 1 | Status: SHIPPED | OUTPATIENT
Start: 2023-10-26 | End: 2023-11-27

## 2023-10-26 RX ORDER — OXYCODONE AND ACETAMINOPHEN 10; 325 MG/1; MG/1
TABLET ORAL
Qty: 120 TABLET | Refills: 0 | Status: SHIPPED | OUTPATIENT
Start: 2023-10-26 | End: 2023-11-27

## 2023-10-26 RX ADMIN — CYANOCOBALAMIN 1000 MCG: 1000 INJECTION, SOLUTION INTRAMUSCULAR; SUBCUTANEOUS at 17:55

## 2023-10-26 ASSESSMENT — ANXIETY QUESTIONNAIRES
GAD7 TOTAL SCORE: 12
3. WORRYING TOO MUCH ABOUT DIFFERENT THINGS: NEARLY EVERY DAY
7. FEELING AFRAID AS IF SOMETHING AWFUL MIGHT HAPPEN: SEVERAL DAYS
GAD7 TOTAL SCORE: 12
5. BEING SO RESTLESS THAT IT IS HARD TO SIT STILL: SEVERAL DAYS
1. FEELING NERVOUS, ANXIOUS, OR ON EDGE: NOT AT ALL
6. BECOMING EASILY ANNOYED OR IRRITABLE: NEARLY EVERY DAY
2. NOT BEING ABLE TO STOP OR CONTROL WORRYING: SEVERAL DAYS

## 2023-10-26 ASSESSMENT — PATIENT HEALTH QUESTIONNAIRE - PHQ9
5. POOR APPETITE OR OVEREATING: NEARLY EVERY DAY
SUM OF ALL RESPONSES TO PHQ QUESTIONS 1-9: 17

## 2023-10-26 NOTE — PROGRESS NOTES
Assessment & Plan     Chronic pain disorder  Refilled oxycodone.  Consider pain clinic in future.  Discussed the possibility of using medical marijuana in the future but it is often limited by efficacy and cost.  - Miscellaneous Order for DME - ONLY FOR DME  - Miscellaneous Order for DME - ONLY FOR DME    Recurrent falls  DME order given for walker and bath chair.  - Miscellaneous Order for DME - ONLY FOR DME  - Miscellaneous Order for DME - ONLY FOR DME    Benign essential hypertension  Continue lisinopril, diltiazem, and torsemide for edema.  Add labetalol as below  - labetalol (NORMODYNE) 100 MG tablet; Take 1 tablet (100 mg) by mouth 2 times daily    Need for prophylactic vaccination against hepatitis B virus  Second of 3 hep B vaccines given today    Edema, unspecified type    - labetalol (NORMODYNE) 100 MG tablet; Take 1 tablet (100 mg) by mouth 2 times daily  - torsemide (DEMADEX) 10 MG tablet; Take 1 tablet (10 mg) by mouth daily    Chronic pain syndrome  Refilled  - oxyCODONE-acetaminophen (PERCOCET)  MG per tablet; Take 2 tablets by mouth at bedtime. May also take 1 tablet every 6 hours as needed for severe pain.    Encounter for immunization  COVID and influenza given today  Jose Angel Meeyr MD  [facility] DEDE Bliss is a 58 year old, presenting for the following health issues:  Pain Management (CPM) and Immunization      HPI    is here for many issues.  First issue is chronic pain.  She has multi joint osteoarthritis in the setting of prior Kim-en-Y bypass with pain refractory to acetaminophen and gabapentin and multiple injections.  She has been on opiates for years tolerating them well with moderate effect in her ability to go about her ADLs.  She is having more more difficulty with balance and has had several falls.  She has an old walker that she likes to not use if she does not need to but she like it replaced is less well as a bathtub.  She does  not have orthostatic lightheadedness.    She has hypertension that is difficult to manage.  She is under tremendous stressors.  She had's sister in poor health last time she got into a bit of disagreement with her grandson recently they are involved in calling the police.  She is very worried about him.    She asked both about chronic pain clinic and the possibility of medical marijuana.  I suggested both are not inappropriate suggestions but there is limited effectiveness with medical marijuana.    She is due for her second hepatitis B vaccine, COVID booster, and influenza.  Review of Systems         Objective    BP (!) 170/106 (BP Location: Left arm, Patient Position: Sitting, Cuff Size: Adult Regular)   Pulse 107   Temp 98.1  F (36.7  C) (Oral)   Resp 16   Wt 94.2 kg (207 lb 9.6 oz)   LMP  (LMP Unknown)   SpO2 100%   BMI 41.93 kg/m    Body mass index is 41.93 kg/m .  Physical Exam   Patient is alert pleasant no acute distress.  She is able to stand slowly and walk very slowly with a narrow gait across the room.  Chest clear.  Cardiac regular rate and rhythm.

## 2023-10-26 NOTE — PROGRESS NOTES
Prior to immunization administration, verified patients identity using patient s name and date of birth. Please see Immunization Activity for additional information.     Screening Questionnaire for Adult Immunization    Are you sick today?   No   Do you have allergies to medications, food, a vaccine component or latex?   No   Have you ever had a serious reaction after receiving a vaccination?   No   Do you have a long-term health problem with heart, lung, kidney, or metabolic disease (e.g., diabetes), asthma, a blood disorder, no spleen, complement component deficiency, a cochlear implant, or a spinal fluid leak?  Are you on long-term aspirin therapy?   No   Do you have cancer, leukemia, HIV/AIDS, or any other immune system problem?   No   Do you have a parent, brother, or sister with an immune system problem?   No   In the past 3 months, have you taken medications that affect  your immune system, such as prednisone, other steroids, or anticancer drugs; drugs for the treatment of rheumatoid arthritis, Crohn s disease, or psoriasis; or have you had radiation treatments?   No   Have you had a seizure, or a brain or other nervous system problem?   No   During the past year, have you received a transfusion of blood or blood    products, or been given immune (gamma) globulin or antiviral drug?   No   For women: Are you pregnant or is there a chance you could become       pregnant during the next month?   No   Have you received any vaccinations in the past 4 weeks?   No     Immunization questionnaire answers were all negative.      Patient instructed to remain in clinic for 15 minutes afterwards, and to report any adverse reactions.     Screening performed by Viridiana Loaiza CMA on 10/26/2023 at 5:42 PM.

## 2023-11-14 NOTE — TELEPHONE ENCOUNTER
[facility] is calling back to check on the status of this request.  Please fax this back to [phone] or call and ask for Bora. They have been trying to get this since September.  Please respond asap.

## 2023-11-14 NOTE — TELEPHONE ENCOUNTER
Called pt and Pt states she doesn't want to use this pharmacy. I will call again to explain it to the pharmacy.

## 2023-11-17 ENCOUNTER — TELEPHONE (OUTPATIENT)
Dept: FAMILY MEDICINE | Facility: CLINIC | Age: 58
End: 2023-11-17
Payer: COMMERCIAL

## 2023-11-17 ENCOUNTER — TELEPHONE (OUTPATIENT)
Dept: FAMILY MEDICINE | Facility: CLINIC | Age: 58
End: 2023-11-17

## 2023-11-17 NOTE — TELEPHONE ENCOUNTER
Spoke with pt regarding symptoms. Pt states she has been experiencing a non-productive cough, sore throat and abdominal pain that is dull since yesterday. Pt states her abdominal pain wraps around to her back. Pt had one episode of vomiting last night.    Pt Denies:  Fever  Diarrhea/ constipation  Pain while urinating  Blood in urine  Nausea  SOB  Chest pain    Instructed pt to be seen at urgent care to test for flu/Covid. ED precautions reviewed if SOB/ chest pain occurs. Instructed pt to wear mask while symptoms are present. Pt verbalizes understanding and has no further concerns at this time.  Routed to Dr Meyer.  Nicholas, RN, BSN

## 2023-11-17 NOTE — TELEPHONE ENCOUNTER
[facility] phone call message-patient reporting a symptom:     Symptom: cough sore throat abdominal pain     Same Day Visit Offered: would like to talk to the nurse       Additional comments:     OK to leave message on voice mail? Yes    Primary language: English      needed? No    Call taken on November 17, 2023 at 3:42 PM by Earl Murphy

## 2023-11-27 ENCOUNTER — OFFICE VISIT (OUTPATIENT)
Dept: FAMILY MEDICINE | Facility: CLINIC | Age: 58
End: 2023-11-27
Payer: COMMERCIAL

## 2023-11-27 ENCOUNTER — TELEPHONE (OUTPATIENT)
Dept: FAMILY MEDICINE | Facility: CLINIC | Age: 58
End: 2023-11-27

## 2023-11-27 VITALS
HEART RATE: 102 BPM | SYSTOLIC BLOOD PRESSURE: 146 MMHG | DIASTOLIC BLOOD PRESSURE: 87 MMHG | OXYGEN SATURATION: 98 % | RESPIRATION RATE: 16 BRPM | TEMPERATURE: 98 F

## 2023-11-27 DIAGNOSIS — R60.9 EDEMA, UNSPECIFIED TYPE: ICD-10-CM

## 2023-11-27 DIAGNOSIS — E11.42 TYPE 2 DIABETES MELLITUS WITH DIABETIC POLYNEUROPATHY (H): Primary | ICD-10-CM

## 2023-11-27 DIAGNOSIS — I10 BENIGN ESSENTIAL HYPERTENSION: ICD-10-CM

## 2023-11-27 DIAGNOSIS — G89.4 CHRONIC PAIN SYNDROME: Primary | ICD-10-CM

## 2023-11-27 DIAGNOSIS — E11.42 TYPE 2 DIABETES MELLITUS WITH DIABETIC POLYNEUROPATHY, WITH LONG-TERM CURRENT USE OF INSULIN (H): ICD-10-CM

## 2023-11-27 DIAGNOSIS — G89.4 CHRONIC PAIN SYNDROME: ICD-10-CM

## 2023-11-27 DIAGNOSIS — K91.2 POSTOPERATIVE MALABSORPTION: ICD-10-CM

## 2023-11-27 DIAGNOSIS — Z79.4 TYPE 2 DIABETES MELLITUS WITH DIABETIC POLYNEUROPATHY, WITH LONG-TERM CURRENT USE OF INSULIN (H): ICD-10-CM

## 2023-11-27 DIAGNOSIS — J18.9 COMMUNITY ACQUIRED PNEUMONIA, UNSPECIFIED LATERALITY: ICD-10-CM

## 2023-11-27 LAB
AMPHETAMINES UR QL: NOT DETECTED
ANION GAP SERPL CALCULATED.3IONS-SCNC: 9 MMOL/L (ref 7–15)
BARBITURATES UR QL SCN: NOT DETECTED
BENZODIAZ UR QL SCN: NOT DETECTED
BUN SERPL-MCNC: 21.2 MG/DL (ref 6–20)
BUPRENORPHINE UR QL: NOT DETECTED
CALCIUM SERPL-MCNC: 8.2 MG/DL (ref 8.6–10)
CANNABINOIDS UR QL: NOT DETECTED
CHLORIDE SERPL-SCNC: 110 MMOL/L (ref 98–107)
CHOLEST SERPL-MCNC: 250 MG/DL
COCAINE UR QL SCN: NOT DETECTED
CREAT SERPL-MCNC: 1.19 MG/DL (ref 0.51–0.95)
D-METHAMPHET UR QL: NOT DETECTED
DEPRECATED HCO3 PLAS-SCNC: 24 MMOL/L (ref 22–29)
EGFRCR SERPLBLD CKD-EPI 2021: 53 ML/MIN/1.73M2
GLUCOSE SERPL-MCNC: 122 MG/DL (ref 70–99)
HBA1C MFR BLD: 4.8 % (ref 0–5.6)
HDLC SERPL-MCNC: 66 MG/DL
LDLC SERPL CALC-MCNC: 152 MG/DL
METHADONE UR QL SCN: NOT DETECTED
NONHDLC SERPL-MCNC: 184 MG/DL
OPIATES UR QL SCN: NOT DETECTED
OXYCODONE UR QL SCN: DETECTED
PCP UR QL SCN: NOT DETECTED
POTASSIUM SERPL-SCNC: 4 MMOL/L (ref 3.4–5.3)
SODIUM SERPL-SCNC: 143 MMOL/L (ref 135–145)
TRICYCLICS UR QL SCN: NOT DETECTED
TRIGL SERPL-MCNC: 160 MG/DL

## 2023-11-27 PROCEDURE — 96372 THER/PROPH/DIAG INJ SC/IM: CPT | Performed by: FAMILY MEDICINE

## 2023-11-27 PROCEDURE — 36415 COLL VENOUS BLD VENIPUNCTURE: CPT | Performed by: FAMILY MEDICINE

## 2023-11-27 PROCEDURE — 80061 LIPID PANEL: CPT | Performed by: FAMILY MEDICINE

## 2023-11-27 PROCEDURE — 80306 DRUG TEST PRSMV INSTRMNT: CPT | Performed by: FAMILY MEDICINE

## 2023-11-27 PROCEDURE — 84590 ASSAY OF VITAMIN A: CPT | Mod: 90 | Performed by: FAMILY MEDICINE

## 2023-11-27 PROCEDURE — 83036 HEMOGLOBIN GLYCOSYLATED A1C: CPT | Performed by: FAMILY MEDICINE

## 2023-11-27 PROCEDURE — 99214 OFFICE O/P EST MOD 30 MIN: CPT | Mod: 25 | Performed by: FAMILY MEDICINE

## 2023-11-27 PROCEDURE — 80048 BASIC METABOLIC PNL TOTAL CA: CPT | Performed by: FAMILY MEDICINE

## 2023-11-27 PROCEDURE — 84425 ASSAY OF VITAMIN B-1: CPT | Mod: 90 | Performed by: FAMILY MEDICINE

## 2023-11-27 PROCEDURE — 99000 SPECIMEN HANDLING OFFICE-LAB: CPT | Performed by: FAMILY MEDICINE

## 2023-11-27 RX ORDER — GABAPENTIN 250 MG/5ML
375 SOLUTION ORAL 3 TIMES DAILY
Qty: 940 ML | Refills: 1 | Status: SHIPPED | OUTPATIENT
Start: 2023-11-27 | End: 2023-11-27

## 2023-11-27 RX ORDER — LABETALOL 100 MG/1
100 TABLET, FILM COATED ORAL 2 TIMES DAILY
Qty: 60 TABLET | Refills: 2 | Status: SHIPPED | OUTPATIENT
Start: 2023-11-27 | End: 2023-11-28

## 2023-11-27 RX ORDER — OXYCODONE AND ACETAMINOPHEN 10; 325 MG/1; MG/1
TABLET ORAL
Qty: 120 TABLET | Refills: 0 | Status: SHIPPED | OUTPATIENT
Start: 2023-11-27 | End: 2023-11-27

## 2023-11-27 RX ORDER — OXYCODONE AND ACETAMINOPHEN 10; 325 MG/1; MG/1
TABLET ORAL
Qty: 120 TABLET | Refills: 0 | Status: SHIPPED | OUTPATIENT
Start: 2023-11-27 | End: 2023-11-28

## 2023-11-27 RX ORDER — LABETALOL 100 MG/1
100 TABLET, FILM COATED ORAL 2 TIMES DAILY
Qty: 60 TABLET | Refills: 2 | Status: SHIPPED | OUTPATIENT
Start: 2023-11-27 | End: 2023-11-27

## 2023-11-27 RX ORDER — GABAPENTIN 250 MG/5ML
375 SOLUTION ORAL 3 TIMES DAILY
Qty: 940 ML | Refills: 1 | Status: SHIPPED | OUTPATIENT
Start: 2023-11-27 | End: 2024-02-22

## 2023-11-27 RX ORDER — OXYCODONE AND ACETAMINOPHEN 10; 325 MG/1; MG/1
TABLET ORAL
Qty: 120 TABLET | Refills: 0 | Status: CANCELLED | OUTPATIENT
Start: 2023-11-27

## 2023-11-27 RX ADMIN — CYANOCOBALAMIN 1000 MCG: 1000 INJECTION, SOLUTION INTRAMUSCULAR; SUBCUTANEOUS at 09:44

## 2023-11-27 ASSESSMENT — ASTHMA QUESTIONNAIRES: ACT_TOTALSCORE: 10

## 2023-11-27 NOTE — PROGRESS NOTES
Assessment & Plan   (G89.4) Chronic pain syndrome  (primary encounter diagnosis)  Comment: Stable.  Refilled oxycodone but increased her gabapentin from 5 mL 3 times daily to 7.5 mL 3 times daily.  This is essentially 375 mg 3 times daily which could still be increased in the future if needed  Plan: [facility], gabapentin         (NEURONTIN) 250 MG/5ML solution,         oxyCODONE-acetaminophen (PERCOCET)  MG         per tablet, DISCONTINUED:         oxyCODONE-acetaminophen (PERCOCET)  MG         per tablet            (R60.9) Edema, unspecified type  Comment: Hypokalemia noted on BMP from [facility].  Repeat today edema is better symptomatically  Plan: labetalol (NORMODYNE) 100 MG tablet            (I10) Benign essential hypertension  Comment: Not at goal restart labetalol 100 mg twice daily recheck next time  Plan: labetalol (NORMODYNE) 100 MG tablet            (E11.42,  Z79.4) Type 2 diabetes mellitus with diabetic polyneuropathy, with long-term current use of insulin (H)  Comment: I do not have CGM data but her A1c is well below goals.  I would ask her to cut back substantially on her insulin glargine.  She is currently taking 30 units and I think she can easily cut back to 20 units.  I like her to send me CGM data within 7-10 days.  Continue Trulicity and Farxiga      (J18.9) Community acquired pneumonia, unspecified laterality  Comment: Clinically improved.  Finish antibiotics.  No need for further imaging      Jose Angel Meyer MD  [facility]    Genesis Bliss is a 58 year old, presenting for the following health issues:  Other (Pneumonia /Fell in bathroom last week went to [facility])      HPI   T is here for several issues.  Regarding her diabetes she wears a CGM and does not provide any data for us today.  She states in general she goes low in the morning often in the 40s 50s or 60s almost every day for the past several weeks this occurred after  she increased her glargine on her own from 26 to 30 units.  She is able to correct it with pineapple juice.  She has some readings in the 200s as well.  Her diabetes regimen is insulin glargine 30 units daily, dulaglutide 4.5 mg weekly and Farxiga 10 mg daily.    Regarding her hypertension she takes diltiazem 240 mg daily, lisinopril 40 mg daily, and torsemide 10 mg milligrams daily to manage edema.  Overall, her edema is tolerable and she tolerates her current medications.  I prescribed labetalol last time and she did not pick it up because it was sent to the wrong pharmacy.    With respect to her chronic pain, it is under reasonable control but her neuropathic pain is a little worse since we cut back on her gabapentin to 250 mg 3 times daily she tolerates it well and would like to go up on it a little bit.  She currently is not complaining of any dizziness.    She was hospitalized briefly last week for pneumonia.  Please see the CT scan report of the bottom of the chart.  I reviewed other labs which showed some mild hypokalemia but overall normal renal function she was discharged after an overnight observation.  I do not have a discharge summary available            5/6/2021    11:44 AM 10/21/2021     3:47 PM 10/26/2023     5:54 PM   PHQ-9 SCORE   PHQ-9 Total Score 18 4 17           5/6/2021    11:44 AM 5/6/2021    11:47 AM 10/26/2023     5:54 PM   JUAN-7 SCORE   Total Score 16 16 12           8/31/2023     2:32 PM 10/26/2023     4:26 PM 11/27/2023     8:56 AM   PEG Score   PEG Total Score 10 9.67 9.33           5/6/2021    11:44 AM 10/21/2021     3:47 PM 10/26/2023     5:54 PM   PHQ-9 SCORE   PHQ-9 Total Score 18 4 17           5/6/2021    11:44 AM 5/6/2021    11:47 AM 10/26/2023     5:54 PM   JUAN-7 SCORE   Total Score 16 16 12           8/31/2023     2:32 PM 10/26/2023     4:26 PM 11/27/2023     8:56 AM   PEG Score   PEG Total Score 10 9.67 9.33       Chronic Pain Follow Up:  Pain location is multifactorial she  has joint pain from osteoarthritis and neuropathic pain from peripheral neuropathy overall her control is reasonably manageable but she like to try increasing her gabapentin.  She also takes oxycodone 3 times daily tolerates it well without constipation.  Her last dose was then within the last 24 hours she does not take more medications than prescribed.  PDMP was reviewed and is unremarkable today    PDMP Review         Value Time User    State PDMP site checked  Yes 10/26/2023  5:29 PM Jose Angel Meyer MD          Last CSA Agreement:   CSA -- Patient Level:     [Media Unavailable] Controlled Substance Agreement - Opioid - Scan on 12/14/2021  8:56 AM       Last UDS: 10/26/2023            Review of Systems   Mild residual cough but no ongoing shortness of breath      Objective    BP (!) 146/87 (BP Location: Left arm, Patient Position: Sitting, Cuff Size: Adult Large)   Pulse 102   Temp 98  F (36.7  C) (Oral)   Resp 16   LMP  (LMP Unknown)   SpO2 98%   There is no height or weight on file to calculate BMI.  Physical Exam   GENERAL: healthy, alert and no distress  RESP: lungs clear to auscultation - no rales, rhonchi or wheezes  CV: regular rate and rhythm, normal S1 S2, no S3 or S4, no murmur, click or rub, no peripheral edema and peripheral pulses strong    Results for orders placed or performed in visit on 11/27/23 (from the past 24 hour(s))   Hemoglobin A1c   Result Value Ref Range    Hemoglobin A1C 4.8 0.0 - 5.6 %   U[facility] Drug Screen [facility]   Result Value Ref Range    Cannabinoids (92-quk-8-carboxy-9-THC) Not Detected Not Detected, Indeterminate    Phencyclidine Not Detected Not Detected, Indeterminate    Cocaine (Benzoylecgonine) Not Detected Not Detected, Indeterminate    Methamphetamine (d-Methamphetamine) Not Detected Not Detected, Indeterminate    Opiates (Morphine) Not Detected Not Detected, Indeterminate    Amphetamine (d-Amphetamine) Not Detected Not Detected, Indeterminate    Benzodiazepines  (Nordiazepam) Not Detected Not Detected, Indeterminate    Tricyclic Antidepressants (Desipramine) Not Detected Not Detected, Indeterminate    Methadone Not Detected Not Detected, Indeterminate    Barbiturates (Butalbital) Not Detected Not Detected, Indeterminate    Oxycodone Detected (A) Not Detected, Indeterminate    Buprenorphine Not Detected Not Detected, Indeterminate     *Note: Due to a large number of results and/or encounters for the requested time period, some results have not been displayed. A complete set of results can be found in Results Review.               EXAM: CT ANGIO CHEST W IV CONT PE STUDY   LOCATION: [facility]   DATE: 11/21/2023     INDICATION: Pleuritic chest pain, elevated D-dimer   COMPARISON: 11/21/2023, 08/12/2022.   TECHNIQUE: CT chest pulmonary angiogram during arterial phase injection of IV contrast. Multiplanar reformats and MIP reconstructions were performed. Dose reduction techniques were used.   CONTRAST: IOHEXOL 350 MG/ML IV SOLN 100 mL     FINDINGS:   ANGIOGRAM CHEST: Pulmonary arteries are normal caliber and negative for pulmonary emboli. Thoracic aorta is negative for dissection. No CT evidence of right heart strain.     LUNGS AND PLEURA: Patchy airspace infiltrates in the right lower lobe with bronchial wall thickening in both lower lobes. Bandlike atelectasis in the left upper lobe. No pneumothorax or pleural effusion.     MEDIASTINUM/AXILLAE: Heart size at upper limits normal. Trace pericardial fluid.     CORONARY ARTERY CALCIFICATION: Mild.     UPPER ABDOMEN: Absent gallbladder. Prior gastric surgery.     MUSCULOSKELETAL: Normal.     IMPRESSION:   1. Negative for pulmonary embolism.     2.  Right lower lobe bronchial wall thickening and pneumonia.

## 2023-11-27 NOTE — TELEPHONE ENCOUNTER
Pt wants a hard copy of her oxycodone because the [facility] in [place] doesn't have anymore

## 2023-11-27 NOTE — TELEPHONE ENCOUNTER
Pt states that the pharmacy ran out of oxy and but was told the [facility] on [address] has them. Pt wants the 3 meds sent over to [facility] on [address]

## 2023-11-28 RX ORDER — OXYCODONE AND ACETAMINOPHEN 10; 325 MG/1; MG/1
TABLET ORAL
Qty: 120 TABLET | Refills: 0 | Status: SHIPPED | OUTPATIENT
Start: 2023-11-28 | End: 2023-12-21

## 2023-11-28 RX ORDER — LABETALOL 100 MG/1
100 TABLET, FILM COATED ORAL 2 TIMES DAILY
Qty: 180 TABLET | Refills: 3 | Status: SHIPPED | OUTPATIENT
Start: 2023-11-28 | End: 2024-04-18

## 2023-12-01 ENCOUNTER — TELEPHONE (OUTPATIENT)
Dept: SURGERY | Facility: CLINIC | Age: 58
End: 2023-12-01

## 2023-12-01 ENCOUNTER — VIRTUAL VISIT (OUTPATIENT)
Dept: SURGERY | Facility: CLINIC | Age: 58
End: 2023-12-01
Payer: COMMERCIAL

## 2023-12-01 VITALS — HEIGHT: 59 IN | BODY MASS INDEX: 38.3 KG/M2 | WEIGHT: 190 LBS

## 2023-12-01 DIAGNOSIS — G43.809 OTHER MIGRAINE WITHOUT STATUS MIGRAINOSUS, NOT INTRACTABLE: ICD-10-CM

## 2023-12-01 DIAGNOSIS — K91.2 POSTOPERATIVE MALABSORPTION: Primary | ICD-10-CM

## 2023-12-01 DIAGNOSIS — K90.9 INTESTINAL MALABSORPTION, UNSPECIFIED TYPE: ICD-10-CM

## 2023-12-01 DIAGNOSIS — R63.2 HYPERPHAGIA: ICD-10-CM

## 2023-12-01 PROCEDURE — 99214 OFFICE O/P EST MOD 30 MIN: CPT | Mod: 95 | Performed by: FAMILY MEDICINE

## 2023-12-01 RX ORDER — CYANOCOBALAMIN 1000 UG/ML
1 INJECTION, SOLUTION INTRAMUSCULAR; SUBCUTANEOUS
Start: 2023-12-01 | End: 2024-03-21

## 2023-12-01 RX ORDER — PEN NEEDLE, DIABETIC 32GX 5/32"
NEEDLE, DISPOSABLE MISCELLANEOUS
Qty: 100 EACH | Refills: 3 | Status: SHIPPED | OUTPATIENT
Start: 2023-12-01 | End: 2024-03-21

## 2023-12-01 RX ORDER — SUMATRIPTAN 6 MG/.5ML
6 INJECTION, SOLUTION SUBCUTANEOUS ONCE
Qty: 2 ML | Refills: 4 | Status: SHIPPED | OUTPATIENT
Start: 2023-12-01 | End: 2024-02-22

## 2023-12-01 RX ORDER — PHENTERMINE HYDROCHLORIDE 37.5 MG/1
18.75-37.5 TABLET ORAL
Qty: 90 TABLET | Refills: 1 | Status: SHIPPED | OUTPATIENT
Start: 2023-12-01 | End: 2024-04-18

## 2023-12-01 ASSESSMENT — PAIN SCALES - GENERAL: PAINLEVEL: EXTREME PAIN (9)

## 2023-12-01 NOTE — PROGRESS NOTES
Virtual Visit Details    Type of service:  Telephone Visit   Phone call duration: 30 minutes       Bariatric Follow Up Visit with a History of Previous Bariatric Surgery     Date of visit: 12/1/2023  Physician: Vy Carson MD, MD  Primary Care Provider:  Jose Angel Meyer   58 year old  female    Date of Surgery: 2006  Initial Weight: 360#  Initial BMI: 78.76  Today's Weight:   Wt Readings from Last 1 Encounters:   12/01/23 86.2 kg (190 lb)     Body mass index is 38.38 kg/m .      Assessment and Plan     Assessment: Ruthy is a 58 year old year old female who is 17 yrs s/p  Kim en Y Gastric Bypass with  Dr. Meredith Álvarez feels as if she had achieved the goals she hoped to accomplish through bariatric surgery and weight loss.    Encounter Diagnoses   Name Primary?    Postoperative malabsorption Yes    Other migraine without status migrainosus, not intractable     Hyperphagia          Current Outpatient Medications:     cyanocobalamin (CYANOCOBALAMIN) 1000 MCG/ML injection, Inject 1 mL (1,000 mcg) Subcutaneous every 14 days, Disp: , Rfl:     insulin pen needle (BD EMILE U/F) 32G X 4 MM miscellaneous, Use 1 pen needles daily or as directed., Disp: 100 each, Rfl: 3    phentermine (ADIPEX-P) 37.5 MG tablet, Take 0.5-1 tablets (18.75-37.5 mg) by mouth every morning (before breakfast) for 180 days, Disp: 90 tablet, Rfl: 1    SUMAtriptan (IMITREX) 6 MG/0.5ML injection, Inject 0.5 mLs (6 mg) Subcutaneous once for 1 dose, Disp: 2 mL, Rfl: 4    acetaminophen (TYLENOL) 500 MG tablet, Take 1 tablet (500 mg) by mouth 2 times daily as needed for mild pain, Disp: 30 tablet, Rfl: 0    Alcohol Swabs (PRO COMFORT ALCOHOL) 70 % PADS, 1 pad 4 times daily as needed (blood sugar testing) Use QID for blood sugar monitoring, Disp: 400 each, Rfl: 11    atorvastatin (LIPITOR) 10 MG tablet, TAKE 1 TABLET BY MOUTH EVERY DAY, Disp: 90 tablet, Rfl: 3    blood glucose (NO BRAND SPECIFIED)  lancets standard, Check fasting glucose 4 times daily.  For Accu check monitor, Disp: 360 each, Rfl: 11    blood glucose calibration (ONETOUCH ULTRA CONTROL) solution, See Admin Instructions, Disp: , Rfl:     blood glucose monitoring (ONE TOUCH ULTRA 2) meter device kit, See Admin Instructions, Disp: , Rfl:     budesonide-formoterol (SYMBICORT) 80-4.5 MCG/ACT Inhaler, Inhale 2 puffs once daily as needed, and may repeat every hour, maximum 12 puffs in 24 hours, Disp: 20.4 g, Rfl: 11    capsaicin (ZOSTRIX) 0.025 % external cream, Apply 3-4 times daily to knees, Disp: 118 mL, Rfl: 11    Continuous Blood Gluc Sensor (FREESTYLE ANIKET 14 DAY SENSOR) MISC, 1 each every 14 days Use 1 Sensor every 14 days. Use to read blood sugars per 's instructions., Disp: 2 each, Rfl: 11    Continuous Blood Gluc Sensor (FREESTYLE ANIKET 2 SENSOR) MISC, 1 each every 14 days Use 1 sensor every 14 days. Use to read blood sugars per 's instructions., Disp: 2 each, Rfl: 5    CONTOUR NEXT TEST test strip, USE TO TEST BLOOD GLUCOSE FOUR TIMES DAILY, Disp: 400 strip, Rfl: 11    dapagliflozin (FARXIGA) 10 MG TABS tablet, Take 1 tablet (10 mg) by mouth daily, Disp: 30 tablet, Rfl: 11    diclofenac (VOLTAREN) 1 % topical gel, Apply 4 g topically 3 times daily To top of both shoulders in area of pain, Disp: 350 g, Rfl: 2    diltiazem ER (DILT-XR) 240 MG 24 hr ER beaded capsule, Take 1 capsule (240 mg) by mouth daily New dose, Disp: 30 capsule, Rfl: 11    Dulaglutide 4.5 MG/0.5ML SOPN, Inject 4.5 mg Subcutaneous once a week, Disp: 6 mL, Rfl: 3    ferrous sulfate (IRON) 325 (65 FE) MG tablet, Take 1 tablet (325 mg) by mouth daily (with breakfast), Disp: 30 tablet, Rfl: 2    fluocinolone (SYNALAR) 0.01 % solution, APPLY 1-2 ML TOPICALLY TO AFFECTED AREA 1-2 TIMES PER DAY., Disp: , Rfl:     gabapentin (NEURONTIN) 250 MG/5ML solution, Take 7.5 mLs (375 mg) by mouth 3 times daily 7.5 ml po tid, Disp: 940 mL, Rfl: 1    insulin  glargine (LANTUS SOLOSTAR) 100 UNIT/ML pen, Inject 26 Units Subcutaneous At Bedtime New dose, Disp: 30 mL, Rfl: 11    insulin pen needle (BD ULTRA-FINE) 29G X 12.7MM miscellaneous, Use 1 time daily, Disp: 100 each, Rfl: 11    labetalol (NORMODYNE) 100 MG tablet, TAKE 1 TABLET(100 MG) BY MOUTH TWICE DAILY, Disp: 180 tablet, Rfl: 3    Lancet Devices (EASY MINI EJECT LANCING DEVICE) MISC, See Admin Instructions, Disp: , Rfl:     Lancets 30G MISC, USE TO TEST BLOOD GLUCOSE FOUR TIMES DAILY, Disp: 400 each, Rfl: 11    latanoprost (XALATAN) 0.005 % ophthalmic solution, INSTILL 1 DROP IN BOTH EYES DAILY AT BEDTIME, Disp: , Rfl:     lidocaine (LMX4) 4 % external cream, Apply topically once as needed for mild pain (left knee), Disp: 45 g, Rfl: 11    lisinopril (ZESTRIL) 40 MG tablet, Take 1 tablet (40 mg) by mouth At Bedtime, Disp: 90 tablet, Rfl: 3    naloxone (NARCAN) 4 MG/0.1ML nasal spray, Spray 1 spray (4 mg) into one nostril alternating nostrils once as needed for opioid reversal Every 2-3 minutes until patient responsive or EMS arrives, Disp: 0.2 mL, Rfl: 0    ondansetron (ZOFRAN ODT) 4 MG ODT tab, Take 4 mg by mouth as needed, Disp: , Rfl:     order for DME, Equipment being ordered: wheeled walker with seat, Disp: 1 Device, Rfl: 0    oxyCODONE-acetaminophen (PERCOCET)  MG per tablet, Take 2 tablets by mouth at bedtime. May also take 1 tablet every 6 hours as needed for severe pain., Disp: 120 tablet, Rfl: 0    risperiDONE (RISPERDAL) 0.25 MG tablet, Take 1 tablet (0.25 mg) by mouth At Bedtime, Disp: 30 tablet, Rfl: 1    senna-docusate (SENOKOT-S/PERICOLACE) 8.6-50 MG tablet, Take 1 tablet by mouth 2 times daily, Disp: 60 tablet, Rfl: 11    Simethicone 180 MG CAPS, Use 1 softgel as needed for gas, Disp: 60 capsule, Rfl: 11    sodium citrate-citric acid (BICITRA) 500-334 MG/5ML solution, Take 10 mLs by mouth 2 times daily (with meals), Disp: 473 mL, Rfl: 11    torsemide (DEMADEX) 10 MG tablet, Take 1 tablet (10  "mg) by mouth daily, Disp: 90 tablet, Rfl: 0    vitamin D2 (ERGOCALCIFEROL) 71577 units (1250 mcg) capsule, Take 1 capsule (50,000 Units) by mouth once a week, Disp: 13 capsule, Rfl: 3    Current Facility-Administered Medications:     cyanocobalamin injection 1,000 mcg, 1,000 mcg, Intramuscular, Q30 Days, Jose Angel Meyer MD, 1,000 mcg at 11/27/23 0944    Plan: Increase B-12 to twice monthly. Continue Trulicity 4.5mg Refill migraine imitrex vial and syringes (was called in for her but for some reason it was not available to her) If lows again then decrease insulin by 1/2 (50%). Refill phentermine.    Return in about 6 months (around 6/1/2024).    Bariatric Surgery Review     Interim History/LifeChanges: Has had pneumonia 3 weeks. On antibiotics. Lost 11# with pneumonia. Taking Trulicity.No diarrhea. No nausea. Bowels moving. Drinking water all day long. Fell to the floor with her pneumonia and ambulance brought her to the hospital. Had been tired and SOB. Didn't feel well for days. Taking her vitamins consistently. Sleep is \"really poor.\" Pain in her feet and lets burns bad, waking her up. Taking B-12 once a month. Maintaining a 170# weight loss.Sugars were going low on 26U insulin so reduced to 20U. Dr. Meyer is going to call to day to follow that up.    Patient Concerns: fatigue, sleepless  Appetite (1-10): OK  GERD: no    Medication changes: no    Vitamin Intake:   B-12   monthly   MVI     Vitamin D  weekly   Calcium        Other  Vitron C              LABS: ordered    Nausea no  Vomiting no  Constipation managed  Diarrhea no  Rashes no  Hair Loss no  Calf tenderness no  Breathing difficulty no  Reactive Hypoglycemia avoids  Light Headedness no   Moods euthymic    12 point ROS as above and otherwise negative      Habits:  Alcohol: no  Tobacco: no  Caffeine no  NSAIDS aspirin for headaches  Exercise Routine: ADLs  3 meals/day eggs, grits, mar, biscuit this am, typically egg, toast WW, grape fruit juice SF " L: ham sandwich and grapefruit juice, D:baked chicken at her daughter's house, cheese and crackers (unsalted salting)  Protein first some times  60grams/day  Water Separate from meals yes  Calorie Containing Beverages grapefruit juice,   Restaurant eating/wk <2  Sleeping poorly  Stress moderate  CPAP:   Contraception: PM  DEXA:not indicated    Social History     Social History     Socioeconomic History    Marital status: Single     Spouse name: Not on file    Number of children: Not on file    Years of education: Not on file    Highest education level: Not on file   Occupational History    Not on file   Tobacco Use    Smoking status: Never    Smokeless tobacco: Never   Vaping Use    Vaping Use: Never used   Substance and Sexual Activity    Alcohol use: No    Drug use: No    Sexual activity: Never   Other Topics Concern    Parent/sibling w/ CABG, MI or angioplasty before 65F 55M? No   Social History Narrative    Lives alone. [place], other daughter lives here 2 sons live in the [facility]     Social Determinants of Health     Financial Resource Strain: Low Risk  (9/22/2023)    Financial Resource Strain     Within the past 12 months, have you or your family members you live with been unable to get utilities (heat, electricity) when it was really needed?: No   Food Insecurity: Low Risk  (9/22/2023)    Food Insecurity     Within the past 12 months, did you worry that your food would run out before you got money to buy more?: No     Within the past 12 months, did the food you bought just not last and you didn t have money to get more?: No   Transportation Needs: Low Risk  (9/22/2023)    Transportation Needs     Within the past 12 months, has lack of transportation kept you from medical appointments, getting your medicines, non-medical meetings or appointments, work, or from getting things that you need?: No   Physical Activity: Not on file   Stress: Not on file   Social Connections: Not on file    Interpersonal Safety: Low Risk  (10/26/2023)    Interpersonal Safety     Do you feel physically and emotionally safe where you currently live?: Yes     Within the past 12 months, have you been hit, slapped, kicked or otherwise physically hurt by someone?: No     Within the past 12 months, have you been humiliated or emotionally abused in other ways by your partner or ex-partner?: No   Housing Stability: Low Risk  (9/22/2023)    Housing Stability     Do you have housing? : Yes     Are you worried about losing your housing?: No       Past Medical History     Past Medical History:   Diagnosis Date    Abnormal cervical Papanicolaou smear with positive human papillomavirus deoxyribonucleic acid test     Ankle joint pain     bilateral    Anxiety     Arthritis     Arthritis     rheumatoid arthritis    Asthma     Carpal tunnel syndrome     bilateral    Compression neuropathy     Depression     Depressive disorder     Diabetes (H)     Diabetes (H)     type II    Diabetic nephropathy (H)     DVT of lower extremity, bilateral (H) 2011    right    Esophageal reflux     Gastroenteritis     History of blood clots     History of blood transfusion     Hyperlipidemia     Hypertension     Hypertension     Infectious fasciitis 2011    abdominal wall     Insomnia     Neck pain     Necrotizing fasciitis (H) 11/15/2012    Necrotizing fasciitis (H)     Neuropathy     Obesity     Osteoarthritis of both knees 10/31/2016    Pulmonary embolism and infarction (H)     SOB (shortness of breath)     with exertion    Tarsal tunnel syndrome     Uncomplicated asthma     Urinary incontinence      Problem List     Patient Active Problem List   Diagnosis    Chronic pain of both shoulders    Abnormal Pap smear of cervix    Moderate persistent asthma without complication    Carpal tunnel syndrome    Health Care Home    Severe episode of recurrent major depressive disorder, with psychotic features (H)    S/P hysterectomy    Hyperlipidemia    Status post  bariatric surgery    Essential hypertension    Urinary incontinence    Esophageal reflux    Microalbuminuria    Type 2 diabetes mellitus with diabetic polyneuropathy (H)    Bilateral ankle joint pain    Chronic pain disorder    Asthma    Compression neuropathy of left lower extremity    Hernia    Hernia of anterior abdominal wall    Obesity    Osteoarthritis of both knees    Polyneuropathy due to secondary diabetes mellitus (H)    Copper deficiency    Metabolic acidosis    Neck mass    Persons encountering health services in other specified circumstances    Hyperglycemia due to type 2 diabetes mellitus (H)    History of cholecystectomy    History of hysterectomy for benign disease    Morbid obesity (H)    Stage 3a chronic kidney disease (H)    Arthritis of both acromioclavicular joints    H/O necrotizing fasciitis    Acidosis    Acute chest pain    F11.9 - Chronic, continuous use of opioids     Medications       Current Outpatient Medications:     cyanocobalamin (CYANOCOBALAMIN) 1000 MCG/ML injection, Inject 1 mL (1,000 mcg) Subcutaneous every 14 days, Disp: , Rfl:     insulin pen needle (BD EMILE U/F) 32G X 4 MM miscellaneous, Use 1 pen needles daily or as directed., Disp: 100 each, Rfl: 3    phentermine (ADIPEX-P) 37.5 MG tablet, Take 0.5-1 tablets (18.75-37.5 mg) by mouth every morning (before breakfast) for 180 days, Disp: 90 tablet, Rfl: 1    SUMAtriptan (IMITREX) 6 MG/0.5ML injection, Inject 0.5 mLs (6 mg) Subcutaneous once for 1 dose, Disp: 2 mL, Rfl: 4    acetaminophen (TYLENOL) 500 MG tablet, Take 1 tablet (500 mg) by mouth 2 times daily as needed for mild pain, Disp: 30 tablet, Rfl: 0    Alcohol Swabs (PRO COMFORT ALCOHOL) 70 % PADS, 1 pad 4 times daily as needed (blood sugar testing) Use QID for blood sugar monitoring, Disp: 400 each, Rfl: 11    atorvastatin (LIPITOR) 10 MG tablet, TAKE 1 TABLET BY MOUTH EVERY DAY, Disp: 90 tablet, Rfl: 3    blood glucose (NO BRAND SPECIFIED) lancets standard, Check  fasting glucose 4 times daily.  For Accu check monitor, Disp: 360 each, Rfl: 11    blood glucose calibration (ONETOUCH ULTRA CONTROL) solution, See Admin Instructions, Disp: , Rfl:     blood glucose monitoring (ONE TOUCH ULTRA 2) meter device kit, See Admin Instructions, Disp: , Rfl:     budesonide-formoterol (SYMBICORT) 80-4.5 MCG/ACT Inhaler, Inhale 2 puffs once daily as needed, and may repeat every hour, maximum 12 puffs in 24 hours, Disp: 20.4 g, Rfl: 11    capsaicin (ZOSTRIX) 0.025 % external cream, Apply 3-4 times daily to knees, Disp: 118 mL, Rfl: 11    Continuous Blood Gluc Sensor (FREESTYLE ANIKET 14 DAY SENSOR) MISC, 1 each every 14 days Use 1 Sensor every 14 days. Use to read blood sugars per 's instructions., Disp: 2 each, Rfl: 11    Continuous Blood Gluc Sensor (FREESTYLE ANIKET 2 SENSOR) MISC, 1 each every 14 days Use 1 sensor every 14 days. Use to read blood sugars per 's instructions., Disp: 2 each, Rfl: 5    CONTOUR NEXT TEST test strip, USE TO TEST BLOOD GLUCOSE FOUR TIMES DAILY, Disp: 400 strip, Rfl: 11    dapagliflozin (FARXIGA) 10 MG TABS tablet, Take 1 tablet (10 mg) by mouth daily, Disp: 30 tablet, Rfl: 11    diclofenac (VOLTAREN) 1 % topical gel, Apply 4 g topically 3 times daily To top of both shoulders in area of pain, Disp: 350 g, Rfl: 2    diltiazem ER (DILT-XR) 240 MG 24 hr ER beaded capsule, Take 1 capsule (240 mg) by mouth daily New dose, Disp: 30 capsule, Rfl: 11    Dulaglutide 4.5 MG/0.5ML SOPN, Inject 4.5 mg Subcutaneous once a week, Disp: 6 mL, Rfl: 3    ferrous sulfate (IRON) 325 (65 FE) MG tablet, Take 1 tablet (325 mg) by mouth daily (with breakfast), Disp: 30 tablet, Rfl: 2    fluocinolone (SYNALAR) 0.01 % solution, APPLY 1-2 ML TOPICALLY TO AFFECTED AREA 1-2 TIMES PER DAY., Disp: , Rfl:     gabapentin (NEURONTIN) 250 MG/5ML solution, Take 7.5 mLs (375 mg) by mouth 3 times daily 7.5 ml po tid, Disp: 940 mL, Rfl: 1    insulin glargine (LANTUS SOLOSTAR) 100  UNIT/ML pen, Inject 26 Units Subcutaneous At Bedtime New dose, Disp: 30 mL, Rfl: 11    insulin pen needle (BD ULTRA-FINE) 29G X 12.7MM miscellaneous, Use 1 time daily, Disp: 100 each, Rfl: 11    labetalol (NORMODYNE) 100 MG tablet, TAKE 1 TABLET(100 MG) BY MOUTH TWICE DAILY, Disp: 180 tablet, Rfl: 3    Lancet Devices (EASY MINI EJECT LANCING DEVICE) MISC, See Admin Instructions, Disp: , Rfl:     Lancets 30G MISC, USE TO TEST BLOOD GLUCOSE FOUR TIMES DAILY, Disp: 400 each, Rfl: 11    latanoprost (XALATAN) 0.005 % ophthalmic solution, INSTILL 1 DROP IN BOTH EYES DAILY AT BEDTIME, Disp: , Rfl:     lidocaine (LMX4) 4 % external cream, Apply topically once as needed for mild pain (left knee), Disp: 45 g, Rfl: 11    lisinopril (ZESTRIL) 40 MG tablet, Take 1 tablet (40 mg) by mouth At Bedtime, Disp: 90 tablet, Rfl: 3    naloxone (NARCAN) 4 MG/0.1ML nasal spray, Spray 1 spray (4 mg) into one nostril alternating nostrils once as needed for opioid reversal Every 2-3 minutes until patient responsive or EMS arrives, Disp: 0.2 mL, Rfl: 0    ondansetron (ZOFRAN ODT) 4 MG ODT tab, Take 4 mg by mouth as needed, Disp: , Rfl:     order for DME, Equipment being ordered: wheeled walker with seat, Disp: 1 Device, Rfl: 0    oxyCODONE-acetaminophen (PERCOCET)  MG per tablet, Take 2 tablets by mouth at bedtime. May also take 1 tablet every 6 hours as needed for severe pain., Disp: 120 tablet, Rfl: 0    risperiDONE (RISPERDAL) 0.25 MG tablet, Take 1 tablet (0.25 mg) by mouth At Bedtime, Disp: 30 tablet, Rfl: 1    senna-docusate (SENOKOT-S/PERICOLACE) 8.6-50 MG tablet, Take 1 tablet by mouth 2 times daily, Disp: 60 tablet, Rfl: 11    Simethicone 180 MG CAPS, Use 1 softgel as needed for gas, Disp: 60 capsule, Rfl: 11    sodium citrate-citric acid (BICITRA) 500-334 MG/5ML solution, Take 10 mLs by mouth 2 times daily (with meals), Disp: 473 mL, Rfl: 11    torsemide (DEMADEX) 10 MG tablet, Take 1 tablet (10 mg) by mouth daily, Disp: 90  "tablet, Rfl: 0    vitamin D2 (ERGOCALCIFEROL) 86533 units (1250 mcg) capsule, Take 1 capsule (50,000 Units) by mouth once a week, Disp: 13 capsule, Rfl: 3    Current Facility-Administered Medications:     cyanocobalamin injection 1,000 mcg, 1,000 mcg, Intramuscular, Q30 Days, Jose Angel Meyer MD, 1,000 mcg at 23 0944   Surgical History     Past Surgical History  She has a past surgical history that includes gastric bypass (); Hysterectomy; gastric bypass ();  Section; Uterine Fibroid Surgery (); Abdominal Wall Surgery; REVISE MEDIAN N/CARPAL TUNNEL SURG (Right, 10/24/2014); Hysterectomy; Uterine Fibroid Surgery; knee surgery (Right); Colonoscopy (N/A, 3/15/2017); Colonoscopy (N/A, 3/16/2017); and Midline Insertion - Double Lumen (2021).    Objective-Exam     Constitutional:  Ht 1.499 m (4' 11\")   Wt 86.2 kg (190 lb)   LMP  (LMP Unknown)   BMI 38.38 kg/m    General:  Pleasant and in no acute distress     Psychiatric: alert and oriented X3, mood and affect normal    Counseling     We reviewed the important post op bariatric recommendations:  -eating 3 meals daily  -eating protein first, getting >60gm protein daily  -eating slowly, chewing food well  -avoiding/limiting calorie containing beverages  -drinking water 15-30 minutes before or after meals  -choosing wheat, not white with breads, crackers, pastas, aleisha, bagels, tortillas, rice  -limiting restaurant or cafeteria eating to twice a week or less    We discussed the importance of restorative sleep and stress management in maintaining a healthy weight.  We discussed the National Weight Control Registry healthy weight maintenance strategies and ways to optimize metabolism.  We discussed the importance of physical activity including cardiovascular and strength training in maintaining a healthier weight.    We discussed the importance of life-long vitamin supplementation and life-long  follow-up.    Ruthy was reminded that, " to avoid marginal ulcers she should avoid tobacco at all, alcohol in excess, caffeine in excess, and NSAIDS (unless indicated for cardioprotection or othewise and opposed by a PPI).    Vy Carson MD, MD, [facility].  12/1/2023  10:42 AM  Total time spent on the date of this encounter doing: chart review, review of test results, patient visit, physical exam, education, counseling, developing plan of care, and documenting = 30 minutes.

## 2023-12-01 NOTE — NURSING NOTE
Is the patient currently in the state of [place]? YES    Visit mode:TELEPHONE    If the visit is dropped, the patient can be reconnected by: TELEPHONE VISIT: Phone number:   Telephone Information:   Mobile [phone]       Will anyone else be joining the visit? NO  (If patient encounters technical issues they should call [phone])    How would you like to obtain your AVS? Mail a copy    Are changes needed to the allergy or medication list? No, Pt stated no changes to allergies, and Pt stated no med changes    Reason for visit: JHONNY LEA    Pt stated she passed out 1 week ago, was brought to the ED, stated she has pnemonia

## 2023-12-01 NOTE — LETTER
12/1/2023         RE: Ruthy Álvarez  [address]  [address]  [address] [facility] [address]        Dear Colleague,    Thank you for referring your patient, Ruthy Álvarez, to the [facility] [place]. Please see a copy of my visit note below.    Virtual Visit Details    Type of service:  Telephone Visit   Phone call duration: 30 minutes       Bariatric Follow Up Visit with a History of Previous Bariatric Surgery     Date of visit: 12/1/2023  Physician: Vy Carson MD, MD  Primary Care Provider:  Jose Angel Meyer   58 year old  female    Date of Surgery: 2006  Initial Weight: 360#  Initial BMI: 78.76  Today's Weight:   Wt Readings from Last 1 Encounters:   12/01/23 86.2 kg (190 lb)     Body mass index is 38.38 kg/m .      Assessment and Plan     Assessment: Ruthy is a 58 year old year old female who is 17 yrs s/p  Kim en Y Gastric Bypass with  Dr. Meredith Bliss TJ Álvarez feels as if she had achieved the goals she hoped to accomplish through bariatric surgery and weight loss.    Encounter Diagnoses   Name Primary?     Postoperative malabsorption Yes     Other migraine without status migrainosus, not intractable      Hyperphagia          Current Outpatient Medications:      cyanocobalamin (CYANOCOBALAMIN) 1000 MCG/ML injection, Inject 1 mL (1,000 mcg) Subcutaneous every 14 days, Disp: , Rfl:      insulin pen needle (BD EMILE U/F) 32G X 4 MM miscellaneous, Use 1 pen needles daily or as directed., Disp: 100 each, Rfl: 3     phentermine (ADIPEX-P) 37.5 MG tablet, Take 0.5-1 tablets (18.75-37.5 mg) by mouth every morning (before breakfast) for 180 days, Disp: 90 tablet, Rfl: 1     SUMAtriptan (IMITREX) 6 MG/0.5ML injection, Inject 0.5 mLs (6 mg) Subcutaneous once for 1 dose, Disp: 2 mL, Rfl: 4     acetaminophen (TYLENOL) 500 MG tablet, Take 1 tablet (500 mg) by mouth 2 times daily as needed for mild pain, Disp: 30 tablet, Rfl: 0      Alcohol Swabs (PRO COMFORT ALCOHOL) 70 % PADS, 1 pad 4 times daily as needed (blood sugar testing) Use QID for blood sugar monitoring, Disp: 400 each, Rfl: 11     atorvastatin (LIPITOR) 10 MG tablet, TAKE 1 TABLET BY MOUTH EVERY DAY, Disp: 90 tablet, Rfl: 3     blood glucose (NO BRAND SPECIFIED) lancets standard, Check fasting glucose 4 times daily.  For Accu check monitor, Disp: 360 each, Rfl: 11     blood glucose calibration (ONETOUCH ULTRA CONTROL) solution, See Admin Instructions, Disp: , Rfl:      blood glucose monitoring (ONE TOUCH ULTRA 2) meter device kit, See Admin Instructions, Disp: , Rfl:      budesonide-formoterol (SYMBICORT) 80-4.5 MCG/ACT Inhaler, Inhale 2 puffs once daily as needed, and may repeat every hour, maximum 12 puffs in 24 hours, Disp: 20.4 g, Rfl: 11     capsaicin (ZOSTRIX) 0.025 % external cream, Apply 3-4 times daily to knees, Disp: 118 mL, Rfl: 11     Continuous Blood Gluc Sensor (FREESTYLE ANIKET 14 DAY SENSOR) MISC, 1 each every 14 days Use 1 Sensor every 14 days. Use to read blood sugars per 's instructions., Disp: 2 each, Rfl: 11     Continuous Blood Gluc Sensor (FREESTYLE ANIKET 2 SENSOR) MISC, 1 each every 14 days Use 1 sensor every 14 days. Use to read blood sugars per 's instructions., Disp: 2 each, Rfl: 5     CONTOUR NEXT TEST test strip, USE TO TEST BLOOD GLUCOSE FOUR TIMES DAILY, Disp: 400 strip, Rfl: 11     dapagliflozin (FARXIGA) 10 MG TABS tablet, Take 1 tablet (10 mg) by mouth daily, Disp: 30 tablet, Rfl: 11     diclofenac (VOLTAREN) 1 % topical gel, Apply 4 g topically 3 times daily To top of both shoulders in area of pain, Disp: 350 g, Rfl: 2     diltiazem ER (DILT-XR) 240 MG 24 hr ER beaded capsule, Take 1 capsule (240 mg) by mouth daily New dose, Disp: 30 capsule, Rfl: 11     Dulaglutide 4.5 MG/0.5ML SOPN, Inject 4.5 mg Subcutaneous once a week, Disp: 6 mL, Rfl: 3     ferrous sulfate (IRON) 325 (65 FE) MG tablet, Take 1 tablet (325 mg) by mouth  daily (with breakfast), Disp: 30 tablet, Rfl: 2     fluocinolone (SYNALAR) 0.01 % solution, APPLY 1-2 ML TOPICALLY TO AFFECTED AREA 1-2 TIMES PER DAY., Disp: , Rfl:      gabapentin (NEURONTIN) 250 MG/5ML solution, Take 7.5 mLs (375 mg) by mouth 3 times daily 7.5 ml po tid, Disp: 940 mL, Rfl: 1     insulin glargine (LANTUS SOLOSTAR) 100 UNIT/ML pen, Inject 26 Units Subcutaneous At Bedtime New dose, Disp: 30 mL, Rfl: 11     insulin pen needle (BD ULTRA-FINE) 29G X 12.7MM miscellaneous, Use 1 time daily, Disp: 100 each, Rfl: 11     labetalol (NORMODYNE) 100 MG tablet, TAKE 1 TABLET(100 MG) BY MOUTH TWICE DAILY, Disp: 180 tablet, Rfl: 3     Lancet Devices (EASY MINI EJECT LANCING DEVICE) MISC, See Admin Instructions, Disp: , Rfl:      Lancets 30G MISC, USE TO TEST BLOOD GLUCOSE FOUR TIMES DAILY, Disp: 400 each, Rfl: 11     latanoprost (XALATAN) 0.005 % ophthalmic solution, INSTILL 1 DROP IN BOTH EYES DAILY AT BEDTIME, Disp: , Rfl:      lidocaine (LMX4) 4 % external cream, Apply topically once as needed for mild pain (left knee), Disp: 45 g, Rfl: 11     lisinopril (ZESTRIL) 40 MG tablet, Take 1 tablet (40 mg) by mouth At Bedtime, Disp: 90 tablet, Rfl: 3     naloxone (NARCAN) 4 MG/0.1ML nasal spray, Spray 1 spray (4 mg) into one nostril alternating nostrils once as needed for opioid reversal Every 2-3 minutes until patient responsive or EMS arrives, Disp: 0.2 mL, Rfl: 0     ondansetron (ZOFRAN ODT) 4 MG ODT tab, Take 4 mg by mouth as needed, Disp: , Rfl:      order for DME, Equipment being ordered: wheeled walker with seat, Disp: 1 Device, Rfl: 0     oxyCODONE-acetaminophen (PERCOCET)  MG per tablet, Take 2 tablets by mouth at bedtime. May also take 1 tablet every 6 hours as needed for severe pain., Disp: 120 tablet, Rfl: 0     risperiDONE (RISPERDAL) 0.25 MG tablet, Take 1 tablet (0.25 mg) by mouth At Bedtime, Disp: 30 tablet, Rfl: 1     senna-docusate (SENOKOT-S/PERICOLACE) 8.6-50 MG tablet, Take 1 tablet by mouth  "2 times daily, Disp: 60 tablet, Rfl: 11     Simethicone 180 MG CAPS, Use 1 softgel as needed for gas, Disp: 60 capsule, Rfl: 11     sodium citrate-citric acid (BICITRA) 500-334 MG/5ML solution, Take 10 mLs by mouth 2 times daily (with meals), Disp: 473 mL, Rfl: 11     torsemide (DEMADEX) 10 MG tablet, Take 1 tablet (10 mg) by mouth daily, Disp: 90 tablet, Rfl: 0     vitamin D2 (ERGOCALCIFEROL) 67813 units (1250 mcg) capsule, Take 1 capsule (50,000 Units) by mouth once a week, Disp: 13 capsule, Rfl: 3    Current Facility-Administered Medications:      cyanocobalamin injection 1,000 mcg, 1,000 mcg, Intramuscular, Q30 Days, Jose Angel Meyer MD, 1,000 mcg at 11/27/23 0944    Plan: Increase B-12 to twice monthly. Continue Trulicity 4.5mg Refill migraine imitrex vial and syringes (was called in for her but for some reason it was not available to her) If lows again then decrease insulin by 1/2 (50%). Refill phentermine.    Return in about 6 months (around 6/1/2024).    Bariatric Surgery Review     Interim History/LifeChanges: Has had pneumonia 3 weeks. On antibiotics. Lost 11# with pneumonia. Taking Trulicity.No diarrhea. No nausea. Bowels moving. Drinking water all day long. Fell to the floor with her pneumonia and ambulance brought her to the hospital. Had been tired and SOB. Didn't feel well for days. Taking her vitamins consistently. Sleep is \"really poor.\" Pain in her feet and lets burns bad, waking her up. Taking B-12 once a month. Maintaining a 170# weight loss.Sugars were going low on 26U insulin so reduced to 20U. Dr. Meyer is going to call to day to follow that up.    Patient Concerns: fatigue, sleepless  Appetite (1-10): OK  GERD: no    Medication changes: no    Vitamin Intake:   B-12   monthly   MVI     Vitamin D  weekly   Calcium        Other  Vitron C              LABS: ordered    Nausea no  Vomiting no  Constipation managed  Diarrhea no  Rashes no  Hair Loss no  Calf tenderness no  Breathing " difficulty no  Reactive Hypoglycemia avoids  Light Headedness no   Moods euthymic    12 point ROS as above and otherwise negative      Habits:  Alcohol: no  Tobacco: no  Caffeine no  NSAIDS aspirin for headaches  Exercise Routine: ADLs  3 meals/day eggs, grits, mar, biscuit this am, typically egg, toast WW, grape fruit juice SF L: ham sandwich and grapefruit juice, D:baked chicken at her daughter's house, cheese and crackers (unsalted salting)  Protein first some times  60grams/day  Water Separate from meals yes  Calorie Containing Beverages grapefruit juice,   Restaurant eating/wk <2  Sleeping poorly  Stress moderate  CPAP:   Contraception: PM  DEXA:not indicated    Social History     Social History     Socioeconomic History     Marital status: Single     Spouse name: Not on file     Number of children: Not on file     Years of education: Not on file     Highest education level: Not on file   Occupational History     Not on file   Tobacco Use     Smoking status: Never     Smokeless tobacco: Never   Vaping Use     Vaping Use: Never used   Substance and Sexual Activity     Alcohol use: No     Drug use: No     Sexual activity: Never   Other Topics Concern     Parent/sibling w/ CABG, MI or angioplasty before 65F 55M? No   Social History Narrative    Lives alone. [place], other daughter lives here 2 sons live in the [facility]     Social Determinants of Health     Financial Resource Strain: Low Risk  (9/22/2023)    Financial Resource Strain      Within the past 12 months, have you or your family members you live with been unable to get utilities (heat, electricity) when it was really needed?: No   Food Insecurity: Low Risk  (9/22/2023)    Food Insecurity      Within the past 12 months, did you worry that your food would run out before you got money to buy more?: No      Within the past 12 months, did the food you bought just not last and you didn t have money to get more?: No   Transportation Needs:  Low Risk  (9/22/2023)    Transportation Needs      Within the past 12 months, has lack of transportation kept you from medical appointments, getting your medicines, non-medical meetings or appointments, work, or from getting things that you need?: No   Physical Activity: Not on file   Stress: Not on file   Social Connections: Not on file   Interpersonal Safety: Low Risk  (10/26/2023)    Interpersonal Safety      Do you feel physically and emotionally safe where you currently live?: Yes      Within the past 12 months, have you been hit, slapped, kicked or otherwise physically hurt by someone?: No      Within the past 12 months, have you been humiliated or emotionally abused in other ways by your partner or ex-partner?: No   Housing Stability: Low Risk  (9/22/2023)    Housing Stability      Do you have housing? : Yes      Are you worried about losing your housing?: No       Past Medical History     Past Medical History:   Diagnosis Date     Abnormal cervical Papanicolaou smear with positive human papillomavirus deoxyribonucleic acid test      Ankle joint pain     bilateral     Anxiety      Arthritis      Arthritis     rheumatoid arthritis     Asthma      Carpal tunnel syndrome     bilateral     Compression neuropathy      Depression      Depressive disorder      Diabetes (H)      Diabetes (H)     type II     Diabetic nephropathy (H)      DVT of lower extremity, bilateral (H) 2011    right     Esophageal reflux      Gastroenteritis      History of blood clots      History of blood transfusion      Hyperlipidemia      Hypertension      Hypertension      Infectious fasciitis 2011    abdominal wall      Insomnia      Neck pain      Necrotizing fasciitis (H) 11/15/2012     Necrotizing fasciitis (H)      Neuropathy      Obesity      Osteoarthritis of both knees 10/31/2016     Pulmonary embolism and infarction (H)      SOB (shortness of breath)     with exertion     Tarsal tunnel syndrome      Uncomplicated asthma       Urinary incontinence      Problem List     Patient Active Problem List   Diagnosis     Chronic pain of both shoulders     Abnormal Pap smear of cervix     Moderate persistent asthma without complication     Carpal tunnel syndrome     Health Care Home     Severe episode of recurrent major depressive disorder, with psychotic features (H)     S/P hysterectomy     Hyperlipidemia     Status post bariatric surgery     Essential hypertension     Urinary incontinence     Esophageal reflux     Microalbuminuria     Type 2 diabetes mellitus with diabetic polyneuropathy (H)     Bilateral ankle joint pain     Chronic pain disorder     Asthma     Compression neuropathy of left lower extremity     Hernia     Hernia of anterior abdominal wall     Obesity     Osteoarthritis of both knees     Polyneuropathy due to secondary diabetes mellitus (H)     Copper deficiency     Metabolic acidosis     Neck mass     Persons encountering health services in other specified circumstances     Hyperglycemia due to type 2 diabetes mellitus (H)     History of cholecystectomy     History of hysterectomy for benign disease     Morbid obesity (H)     Stage 3a chronic kidney disease (H)     Arthritis of both acromioclavicular joints     H/O necrotizing fasciitis     Acidosis     Acute chest pain     F11.9 - Chronic, continuous use of opioids     Medications       Current Outpatient Medications:      cyanocobalamin (CYANOCOBALAMIN) 1000 MCG/ML injection, Inject 1 mL (1,000 mcg) Subcutaneous every 14 days, Disp: , Rfl:      insulin pen needle (BD EMILE U/F) 32G X 4 MM miscellaneous, Use 1 pen needles daily or as directed., Disp: 100 each, Rfl: 3     phentermine (ADIPEX-P) 37.5 MG tablet, Take 0.5-1 tablets (18.75-37.5 mg) by mouth every morning (before breakfast) for 180 days, Disp: 90 tablet, Rfl: 1     SUMAtriptan (IMITREX) 6 MG/0.5ML injection, Inject 0.5 mLs (6 mg) Subcutaneous once for 1 dose, Disp: 2 mL, Rfl: 4     acetaminophen (TYLENOL) 500 MG  tablet, Take 1 tablet (500 mg) by mouth 2 times daily as needed for mild pain, Disp: 30 tablet, Rfl: 0     Alcohol Swabs (PRO COMFORT ALCOHOL) 70 % PADS, 1 pad 4 times daily as needed (blood sugar testing) Use QID for blood sugar monitoring, Disp: 400 each, Rfl: 11     atorvastatin (LIPITOR) 10 MG tablet, TAKE 1 TABLET BY MOUTH EVERY DAY, Disp: 90 tablet, Rfl: 3     blood glucose (NO BRAND SPECIFIED) lancets standard, Check fasting glucose 4 times daily.  For Accu check monitor, Disp: 360 each, Rfl: 11     blood glucose calibration (ONETOUCH ULTRA CONTROL) solution, See Admin Instructions, Disp: , Rfl:      blood glucose monitoring (ONE TOUCH ULTRA 2) meter device kit, See Admin Instructions, Disp: , Rfl:      budesonide-formoterol (SYMBICORT) 80-4.5 MCG/ACT Inhaler, Inhale 2 puffs once daily as needed, and may repeat every hour, maximum 12 puffs in 24 hours, Disp: 20.4 g, Rfl: 11     capsaicin (ZOSTRIX) 0.025 % external cream, Apply 3-4 times daily to knees, Disp: 118 mL, Rfl: 11     Continuous Blood Gluc Sensor (FREESTYLE ANIKET 14 DAY SENSOR) MISC, 1 each every 14 days Use 1 Sensor every 14 days. Use to read blood sugars per 's instructions., Disp: 2 each, Rfl: 11     Continuous Blood Gluc Sensor (FREESTYLE ANIKET 2 SENSOR) MISC, 1 each every 14 days Use 1 sensor every 14 days. Use to read blood sugars per 's instructions., Disp: 2 each, Rfl: 5     CONTOUR NEXT TEST test strip, USE TO TEST BLOOD GLUCOSE FOUR TIMES DAILY, Disp: 400 strip, Rfl: 11     dapagliflozin (FARXIGA) 10 MG TABS tablet, Take 1 tablet (10 mg) by mouth daily, Disp: 30 tablet, Rfl: 11     diclofenac (VOLTAREN) 1 % topical gel, Apply 4 g topically 3 times daily To top of both shoulders in area of pain, Disp: 350 g, Rfl: 2     diltiazem ER (DILT-XR) 240 MG 24 hr ER beaded capsule, Take 1 capsule (240 mg) by mouth daily New dose, Disp: 30 capsule, Rfl: 11     Dulaglutide 4.5 MG/0.5ML SOPN, Inject 4.5 mg Subcutaneous once a  week, Disp: 6 mL, Rfl: 3     ferrous sulfate (IRON) 325 (65 FE) MG tablet, Take 1 tablet (325 mg) by mouth daily (with breakfast), Disp: 30 tablet, Rfl: 2     fluocinolone (SYNALAR) 0.01 % solution, APPLY 1-2 ML TOPICALLY TO AFFECTED AREA 1-2 TIMES PER DAY., Disp: , Rfl:      gabapentin (NEURONTIN) 250 MG/5ML solution, Take 7.5 mLs (375 mg) by mouth 3 times daily 7.5 ml po tid, Disp: 940 mL, Rfl: 1     insulin glargine (LANTUS SOLOSTAR) 100 UNIT/ML pen, Inject 26 Units Subcutaneous At Bedtime New dose, Disp: 30 mL, Rfl: 11     insulin pen needle (BD ULTRA-FINE) 29G X 12.7MM miscellaneous, Use 1 time daily, Disp: 100 each, Rfl: 11     labetalol (NORMODYNE) 100 MG tablet, TAKE 1 TABLET(100 MG) BY MOUTH TWICE DAILY, Disp: 180 tablet, Rfl: 3     Lancet Devices (EASY MINI EJECT LANCING DEVICE) MISC, See Admin Instructions, Disp: , Rfl:      Lancets 30G MISC, USE TO TEST BLOOD GLUCOSE FOUR TIMES DAILY, Disp: 400 each, Rfl: 11     latanoprost (XALATAN) 0.005 % ophthalmic solution, INSTILL 1 DROP IN BOTH EYES DAILY AT BEDTIME, Disp: , Rfl:      lidocaine (LMX4) 4 % external cream, Apply topically once as needed for mild pain (left knee), Disp: 45 g, Rfl: 11     lisinopril (ZESTRIL) 40 MG tablet, Take 1 tablet (40 mg) by mouth At Bedtime, Disp: 90 tablet, Rfl: 3     naloxone (NARCAN) 4 MG/0.1ML nasal spray, Spray 1 spray (4 mg) into one nostril alternating nostrils once as needed for opioid reversal Every 2-3 minutes until patient responsive or EMS arrives, Disp: 0.2 mL, Rfl: 0     ondansetron (ZOFRAN ODT) 4 MG ODT tab, Take 4 mg by mouth as needed, Disp: , Rfl:      order for DME, Equipment being ordered: wheeled walker with seat, Disp: 1 Device, Rfl: 0     oxyCODONE-acetaminophen (PERCOCET)  MG per tablet, Take 2 tablets by mouth at bedtime. May also take 1 tablet every 6 hours as needed for severe pain., Disp: 120 tablet, Rfl: 0     risperiDONE (RISPERDAL) 0.25 MG tablet, Take 1 tablet (0.25 mg) by mouth At Bedtime,  "Disp: 30 tablet, Rfl: 1     senna-docusate (SENOKOT-S/PERICOLACE) 8.6-50 MG tablet, Take 1 tablet by mouth 2 times daily, Disp: 60 tablet, Rfl: 11     Simethicone 180 MG CAPS, Use 1 softgel as needed for gas, Disp: 60 capsule, Rfl: 11     sodium citrate-citric acid (BICITRA) 500-334 MG/5ML solution, Take 10 mLs by mouth 2 times daily (with meals), Disp: 473 mL, Rfl: 11     torsemide (DEMADEX) 10 MG tablet, Take 1 tablet (10 mg) by mouth daily, Disp: 90 tablet, Rfl: 0     vitamin D2 (ERGOCALCIFEROL) 67279 units (1250 mcg) capsule, Take 1 capsule (50,000 Units) by mouth once a week, Disp: 13 capsule, Rfl: 3    Current Facility-Administered Medications:      cyanocobalamin injection 1,000 mcg, 1,000 mcg, Intramuscular, Q30 Days, Jose Angel Meyer MD, 1,000 mcg at 23 0944   Surgical History     Past Surgical History  She has a past surgical history that includes gastric bypass (); Hysterectomy; gastric bypass ();  Section; Uterine Fibroid Surgery (); Abdominal Wall Surgery; REVISE MEDIAN N/CARPAL TUNNEL SURG (Right, 10/24/2014); Hysterectomy; Uterine Fibroid Surgery; knee surgery (Right); Colonoscopy (N/A, 3/15/2017); Colonoscopy (N/A, 3/16/2017); and Midline Insertion - Double Lumen (2021).    Objective-Exam     Constitutional:  Ht 1.499 m (4' 11\")   Wt 86.2 kg (190 lb)   LMP  (LMP Unknown)   BMI 38.38 kg/m    General:  Pleasant and in no acute distress     Psychiatric: alert and oriented X3, mood and affect normal    Counseling     We reviewed the important post op bariatric recommendations:  -eating 3 meals daily  -eating protein first, getting >60gm protein daily  -eating slowly, chewing food well  -avoiding/limiting calorie containing beverages  -drinking water 15-30 minutes before or after meals  -choosing wheat, not white with breads, crackers, pastas, aleisha, bagels, tortillas, rice  -limiting restaurant or cafeteria eating to twice a week or less    We discussed the " importance of restorative sleep and stress management in maintaining a healthy weight.  We discussed the National Weight Control Registry healthy weight maintenance strategies and ways to optimize metabolism.  We discussed the importance of physical activity including cardiovascular and strength training in maintaining a healthier weight.    We discussed the importance of life-long vitamin supplementation and life-long  follow-up.    Ruthy was reminded that, to avoid marginal ulcers she should avoid tobacco at all, alcohol in excess, caffeine in excess, and NSAIDS (unless indicated for cardioprotection or othewise and opposed by a PPI).    Vy Carson MD, MD, [facility].  12/1/2023  10:42 AM  Total time spent on the date of this encounter doing: chart review, review of test results, patient visit, physical exam, education, counseling, developing plan of care, and documenting = 30 minutes.      Again, thank you for allowing me to participate in the care of your patient.        Sincerely,        Vy Carson MD

## 2023-12-04 LAB
ANNOTATION COMMENT IMP: NORMAL
RETINYL PALMITATE SERPL-MCNC: <0.02 MG/L
VIT A SERPL-MCNC: 0.64 MG/L

## 2023-12-05 NOTE — ED NOTES
Provider came to see her. She still is leaving. He feels it is okay for her to go but he want to consult his supervising provider. The patient will not wait. She was walking out the door with her IV when I caught her. She allowed me to remove the IV. Then she left the building. She was not interested in waiting for her discharge paperwork.   

## 2023-12-06 LAB — VIT B1 PYROPHOSHATE BLD-SCNC: 108 NMOL/L

## 2023-12-19 DIAGNOSIS — E11.42 TYPE 2 DIABETES MELLITUS WITH DIABETIC POLYNEUROPATHY, WITH LONG-TERM CURRENT USE OF INSULIN (H): ICD-10-CM

## 2023-12-19 DIAGNOSIS — Z79.4 TYPE 2 DIABETES MELLITUS WITH DIABETIC POLYNEUROPATHY, WITH LONG-TERM CURRENT USE OF INSULIN (H): ICD-10-CM

## 2023-12-19 RX ORDER — INSULIN GLARGINE 100 [IU]/ML
16 INJECTION, SOLUTION SUBCUTANEOUS AT BEDTIME
COMMUNITY
Start: 2023-12-19 | End: 2024-01-19

## 2023-12-19 NOTE — RESULT ENCOUNTER NOTE
Please call:  I wanted to follow-up on her visit on 11/27.  At that time, we decreased her dose of lantus to 20 units daily, because she was experiencing lows.   I want to make sure that new dose has been effective.  Is she still having frequent readings under 70?    Also, her cholesterol was suprisingly high.  Can you ask if she is taking the atorvastatin every day?  Some people miss doses, which could be why her cholesterol is up.  If she's taking it every day, we should increase the dose.  Thanks  CF

## 2023-12-21 ENCOUNTER — OFFICE VISIT (OUTPATIENT)
Dept: FAMILY MEDICINE | Facility: CLINIC | Age: 58
End: 2023-12-21
Payer: COMMERCIAL

## 2023-12-21 VITALS
HEART RATE: 108 BPM | SYSTOLIC BLOOD PRESSURE: 176 MMHG | DIASTOLIC BLOOD PRESSURE: 85 MMHG | TEMPERATURE: 98 F | RESPIRATION RATE: 16 BRPM | OXYGEN SATURATION: 97 %

## 2023-12-21 DIAGNOSIS — I10 ESSENTIAL HYPERTENSION: ICD-10-CM

## 2023-12-21 DIAGNOSIS — E11.42 TYPE 2 DIABETES MELLITUS WITH DIABETIC POLYNEUROPATHY, WITH LONG-TERM CURRENT USE OF INSULIN (H): ICD-10-CM

## 2023-12-21 DIAGNOSIS — Z79.4 TYPE 2 DIABETES MELLITUS WITH DIABETIC POLYNEUROPATHY, WITH LONG-TERM CURRENT USE OF INSULIN (H): ICD-10-CM

## 2023-12-21 DIAGNOSIS — G89.4 CHRONIC PAIN SYNDROME: Primary | ICD-10-CM

## 2023-12-21 PROCEDURE — 80306 DRUG TEST PRSMV INSTRMNT: CPT | Performed by: FAMILY MEDICINE

## 2023-12-21 PROCEDURE — 99214 OFFICE O/P EST MOD 30 MIN: CPT | Mod: 25 | Performed by: FAMILY MEDICINE

## 2023-12-21 PROCEDURE — 96372 THER/PROPH/DIAG INJ SC/IM: CPT | Performed by: FAMILY MEDICINE

## 2023-12-21 RX ORDER — OXYCODONE AND ACETAMINOPHEN 10; 325 MG/1; MG/1
TABLET ORAL
Qty: 120 TABLET | Refills: 0 | Status: SHIPPED | OUTPATIENT
Start: 2023-12-21 | End: 2023-12-22

## 2023-12-21 RX ADMIN — CYANOCOBALAMIN 1000 MCG: 1000 INJECTION, SOLUTION INTRAMUSCULAR; SUBCUTANEOUS at 11:00

## 2023-12-21 NOTE — PROGRESS NOTES
Patient Active Problem List    Diagnosis Date Noted    Stage 3a chronic kidney disease (H) 12/31/2020     Priority: High     Persistent protienuria      Copper deficiency 04/05/2019     Priority: High     Identified in Aug, 2018--see discharge summary from [facility] in [place].      Chronic pain disorder 10/07/2016     Priority: High     Chronic Pain Diagnosis:  neuropathy  DIRE Total Score(s):14     ORT: 5    4 - 7 =  Moderate Risk   of future problems with Opioids  FAQ5: 50 November 20, 2020  Behavioral Health Consultation: 5/12/17 with Dr. Daisy Quezada  Personal Care Plan for Chronic Pain: 5/12/17  Opioid medication:oxycodone   Dose:10mg  Number of pills per month:60  Patient is being prescribed 10mg of oxycodone IR (Percocet) per day this is 15 mg Morphine Equivalents  Benzodiazepines Prescribed? No  Naloxone prescribed? YES February 16, 2023  RIOSORD 16:  15% risk      Clinic visit frequency required: Q 1 month Next visit due:   Controlled Substance/Opioid Treatment agreement on file (dated <12 months ago)?:    Date(s): pending  Last [place] verification: last visit  Items in red to be updated at each visit  Patient is followed by [facility] for ongoing prescription of pain medication.  All refills should be approved by this provider, or covering partner.Chronic Pain     Chronic Pain Diagnosis:  neuropathy  DIRE Total Score(s):14     ORT: 5    4 - 7 =  Moderate Risk   of future problems with Opioids  FAQ5: 45/100 on 9/9/16, 40/100 on 11/15/16  55 October 29, 2020    Behavioral Health Consultation: 5/12/17 with Dr. Daisy Quezada  Personal Care Plan for Chronic Pain: 5/12/17  Opioid medication:oxycodone   Dose:10mg  Number of pills per month:90  Patient is being prescribed 45 MME  Benzodiazepines Prescribed? No  Naloxone prescribed? yes          Microalbuminuria 10/12/2015     Priority: High     Severely increased albuminuria      Type 2 diabetes mellitus with diabetic polyneuropathy (H) 10/12/2015      Priority: High    Essential hypertension 08/26/2013     Priority: High    F11.9 - Chronic, continuous use of opioids 08/10/2023     Priority: Medium    Acidosis 08/12/2022     Priority: Medium    Acute chest pain 08/12/2022     Priority: Medium    Arthritis of both acromioclavicular joints 04/08/2021     Priority: Medium     xray proven       Morbid obesity (H) 11/27/2020     Priority: Medium     Date of Surgery: 2006 RY[address] Dr. Harper  Initial Weight: 360#  Initial BMI: 70.30      Hyperglycemia due to type 2 diabetes mellitus (H) 06/02/2020     Priority: Medium    Neck mass 10/11/2019     Priority: Medium     Added automatically from request for surgery [id number]      Metabolic acidosis 07/26/2019     Priority: Medium     reolved w/ discontinuation of ASA      Obesity 01/03/2019     Priority: Medium    Hernia of anterior abdominal wall 01/28/2017     Priority: Medium    Osteoarthritis of both knees 10/31/2016     Priority: Medium    Polyneuropathy due to secondary diabetes mellitus (H) 10/31/2016     Priority: Medium    Compression neuropathy of left lower extremity 02/19/2016     Priority: Medium    Bilateral ankle joint pain 01/15/2016     Priority: Medium    Esophageal reflux 04/07/2015     Priority: Medium    Urinary incontinence 02/19/2015     Priority: Medium    Asthma 08/04/2013     Priority: Medium    History of cholecystectomy 08/04/2013     Priority: Medium    H/O necrotizing fasciitis 08/04/2013     Priority: Medium    Hyperlipidemia 06/21/2013     Priority: Medium    Status post bariatric surgery 06/21/2013     Priority: Medium     Kim-en-Y in 2006      Severe episode of recurrent major depressive disorder, with psychotic features (H) 01/24/2013     Priority: Medium    Moderate persistent asthma without complication 11/15/2012     Priority: Medium    Persons encountering health services in other specified circumstances 11/15/2012     Priority: Medium     Tier 3    Status: Accept  Care Coordination Start Date:  02/27/12  State Tier Level: Level 3  Language/Barrier to Learning: No  Significant Mental Health Issues: Yes  Date Care Coordination Discontinued: 05/28/15    DX V65.8 REPLACED WITH [id number] (04/08/2013)      Abnormal Pap smear of cervix 04/22/2019     Priority: Low     3-12-15: Pap/HPV neg.  Plan: Given hx (mildly abn pap, hyster), recheck in 5 yrs.  10-11-11: Pap/HPV neg.  Plan: Given ASUS pap in 2010, recommend repeating pap in 1 year.  8/3/2010 ASCUS Pap with +BV- pt treated with Metrogel.  Pt needs repeat Pap in 1yr.  8/4/2008 Pt had total hyst for menorrhagia, pelvic pain, scar tissue, and fibroids.    3-12-15: Pap/HPV neg.  Plan: Given hx (mildly abn pap, hyster), recheck in 5 yrs.  10-11-11: Pap/HPV neg.  Plan: Given ASUS pap in 2010, recommend repeating pap in 1 year.  8/3/2010 ASCUS Pap with +BV- pt treated with Metrogel.  Pt needs repeat Pap in 1yr.  8/4/2008 Pt had total hyst for menorrhagia, pelvic pain, scar tissue, and fibroids.      Hernia 01/27/2017     Priority: Low    S/P hysterectomy 06/21/2013     Priority: Low     2008, For fibroids.  Cervix removed, but still has ovaries.      History of hysterectomy for benign disease 06/21/2013     Priority: Low     2008, For fibroids.  Cervix removed, but still has ovaries.      Carpal tunnel syndrome 11/15/2012     Priority: Low    Health Care Home 11/15/2012     Priority: Low     Tier 3    Status: Accept  Care Coordination Start Date: 02/27/12  State Tier Level: Level 3  Language/Barrier to Learning: No  Significant Mental Health Issues: Yes  Date Care Coordination Discontinued: 05/28/15    DX V65.8 REPLACED WITH [id number] [facility] (04/08/2013)        Chronic pain of both shoulders 12/23/2005     Priority: Low     There are no exam notes on file for this visit.  Chief Complaint   Patient presents with    Pain Management     CPM     Blood pressure (!) 176/85, pulse 108, temperature 98  F (36.7  C), temperature source Oral, resp. rate 16,  SpO2 97%, not currently breastfeeding.  No results found for any visits on 12/21/23.    Patient here for follow-up of chronic pain, hypertension, and diabetes.  For chronic pain, she has stable symptoms on oxycodone.  She did not increase the dose of gabapentin last time but is going to  the prescription today.  She has had no aberrant behavior.  She has not increased the medications on her own.  PDMP reviewed today and is unremarkable.  She has never had any overdoses she takes her medications as prescribed does not use any other sedating medications.  She has Narcan at home.  She has shown some degree of tolerance over the years.      Regarding her hypertension she seems to be taking lisinopril 40 mg daily, diltiazem 360 mg daily but has not picked up the labetalol I gave her last time.  She uses torsemide to manage edema and she feels like this is effective.    For her diabetes her CGM is not on her today.  She feels like her hypoglycemia is still occurring every morning but is easily corrected.  Interestingly she finds that pickles have a hypoglycemic effect for her and she uses this therapeutically.  She takes a GLP-1 and Lantus insulin 20 units daily.  She is lost weight since she had pneumonia.      Objective vital signs are as listed chest clear heart regular rate and rhythm    Urine drug screens appropriate PDMP is unremarkable.  Assessment/plan     chronic pain syndrome: Refilled oxycodone that was printed for her she has had a hard time finding pharmacies to fill it.  She has never had any aberrant behavior.  I discussed the possibility of switching to buprenorphine, specifically a buccal preparation.  She is interested but not at this time.  Will consider it in the future.  I discussed that it has safety advantages and less tolerance and overall would be worth considering.  Regarding her risk of overdose she has Narcan.    For hypertension she needs to get on the labetalol I emphasized that today  and she agrees to take it at 100 mg twice daily will recheck next time    For her diabetes her glycemic control is likely from her weight loss.  Her last A1c was under 5.  Interestingly she had Kim-en-Y gastric bypass at around age 40 and her weight was in the low 300s at that time, so her weight today is quite remarkable in the 190s.  She follows with the bariatric clinic and is still on phentermine.  We do get better blood pressure control which is why I added the labetalol last time.  Will continue to wean her Lantus insulin down to 16 units daily.  She will bring in her clinic her glucometer next time.  I I do get that infection

## 2023-12-22 ENCOUNTER — TELEPHONE (OUTPATIENT)
Dept: FAMILY MEDICINE | Facility: CLINIC | Age: 58
End: 2023-12-22

## 2023-12-22 ENCOUNTER — TELEPHONE (OUTPATIENT)
Dept: FAMILY MEDICINE | Facility: CLINIC | Age: 58
End: 2023-12-22
Payer: COMMERCIAL

## 2023-12-22 DIAGNOSIS — G89.4 CHRONIC PAIN SYNDROME: ICD-10-CM

## 2023-12-22 RX ORDER — OXYCODONE AND ACETAMINOPHEN 10; 325 MG/1; MG/1
TABLET ORAL
Qty: 120 TABLET | Refills: 0 | Status: SHIPPED | OUTPATIENT
Start: 2023-12-22 | End: 2024-01-19

## 2023-12-22 RX ORDER — OXYCODONE AND ACETAMINOPHEN 10; 325 MG/1; MG/1
TABLET ORAL
Qty: 120 TABLET | Refills: 0 | Status: SHIPPED | OUTPATIENT
Start: 2023-12-22 | End: 2023-12-22

## 2023-12-22 NOTE — TELEPHONE ENCOUNTER
Spoke to pharmacy x2 today. The first time call was placed in order to approve an early  for recent oxycodone RX per verbal order of Dr. Meyer. They stated they did not have any electronic record of the rx. Due to this, Dr. Meyer resent rx to [facility] electronically. Call placed back to them and they state they cannot fill it due to low stock of medication. Routed high priority to Dr. Meyer as RX will not have to be sent to an alternative pharmacy. NICOLETTE Aguirre

## 2023-12-22 NOTE — TELEPHONE ENCOUNTER
She needs her percocet filled sent to the [facility] she took the paper RX in and the pharmacy  told her they are not going to fill it for her please give pharmacy a call

## 2024-01-19 ENCOUNTER — OFFICE VISIT (OUTPATIENT)
Dept: FAMILY MEDICINE | Facility: CLINIC | Age: 59
End: 2024-01-19
Payer: COMMERCIAL

## 2024-01-19 VITALS
OXYGEN SATURATION: 98 % | TEMPERATURE: 97.9 F | SYSTOLIC BLOOD PRESSURE: 169 MMHG | HEART RATE: 104 BPM | DIASTOLIC BLOOD PRESSURE: 94 MMHG | RESPIRATION RATE: 16 BRPM

## 2024-01-19 DIAGNOSIS — G89.4 CHRONIC PAIN SYNDROME: ICD-10-CM

## 2024-01-19 DIAGNOSIS — E11.42 TYPE 2 DIABETES MELLITUS WITH DIABETIC POLYNEUROPATHY, WITH LONG-TERM CURRENT USE OF INSULIN (H): ICD-10-CM

## 2024-01-19 DIAGNOSIS — N89.8 VAGINAL ODOR: ICD-10-CM

## 2024-01-19 DIAGNOSIS — R60.0 LEG EDEMA, LEFT: ICD-10-CM

## 2024-01-19 DIAGNOSIS — Z79.4 TYPE 2 DIABETES MELLITUS WITH DIABETIC POLYNEUROPATHY, WITH LONG-TERM CURRENT USE OF INSULIN (H): ICD-10-CM

## 2024-01-19 DIAGNOSIS — R80.9 PROTEINURIA, UNSPECIFIED TYPE: Primary | ICD-10-CM

## 2024-01-19 DIAGNOSIS — I10 ESSENTIAL HYPERTENSION: ICD-10-CM

## 2024-01-19 LAB
AMPHETAMINES UR QL: NOT DETECTED
BARBITURATES UR QL SCN: NOT DETECTED
BENZODIAZ UR QL SCN: NOT DETECTED
BUPRENORPHINE UR QL: NOT DETECTED
CANNABINOIDS UR QL: NOT DETECTED
CLUE CELLS: NORMAL
COCAINE UR QL SCN: NOT DETECTED
D-METHAMPHET UR QL: NOT DETECTED
METHADONE UR QL SCN: NOT DETECTED
OPIATES UR QL SCN: NOT DETECTED
OXYCODONE UR QL SCN: DETECTED
PCP UR QL SCN: NOT DETECTED
TRICHOMONAS, WET PREP: NORMAL
TRICYCLICS UR QL SCN: NOT DETECTED
WBC'S/HIGH POWER FIELD, WET PREP: NORMAL
YEAST, WET PREP: NORMAL

## 2024-01-19 PROCEDURE — 80306 DRUG TEST PRSMV INSTRMNT: CPT | Performed by: FAMILY MEDICINE

## 2024-01-19 PROCEDURE — 99214 OFFICE O/P EST MOD 30 MIN: CPT | Mod: 25 | Performed by: FAMILY MEDICINE

## 2024-01-19 PROCEDURE — 96372 THER/PROPH/DIAG INJ SC/IM: CPT | Performed by: FAMILY MEDICINE

## 2024-01-19 PROCEDURE — 87210 SMEAR WET MOUNT SALINE/INK: CPT | Performed by: FAMILY MEDICINE

## 2024-01-19 RX ORDER — INSULIN GLARGINE 100 [IU]/ML
10 INJECTION, SOLUTION SUBCUTANEOUS AT BEDTIME
COMMUNITY
Start: 2024-01-19 | End: 2024-02-22

## 2024-01-19 RX ORDER — OXYCODONE AND ACETAMINOPHEN 10; 325 MG/1; MG/1
TABLET ORAL
Qty: 120 TABLET | Refills: 0 | Status: SHIPPED | OUTPATIENT
Start: 2024-01-19 | End: 2024-02-22

## 2024-01-19 RX ADMIN — CYANOCOBALAMIN 1000 MCG: 1000 INJECTION, SOLUTION INTRAMUSCULAR; SUBCUTANEOUS at 11:14

## 2024-01-19 NOTE — PROGRESS NOTES
Patient Active Problem List    Diagnosis Date Noted    Stage 3a chronic kidney disease (H) 12/31/2020     Priority: High     Persistent protienuria      Copper deficiency 04/05/2019     Priority: High     Identified in Aug, 2018--see discharge summary from [facility] in [place].      Chronic pain disorder 10/07/2016     Priority: High     Chronic Pain Diagnosis:  neuropathy  DIRE Total Score(s):14     ORT: 5    4 - 7 =  Moderate Risk   of future problems with Opioids  FAQ5: 50 November 20, 2020  Behavioral Health Consultation: 5/12/17 with Dr. Daisy Quezada  Personal Care Plan for Chronic Pain: 5/12/17  Opioid medication:oxycodone   Dose:10mg  Number of pills per month:60  Patient is being prescribed 10mg of oxycodone IR (Percocet) per day this is 15 mg Morphine Equivalents  Benzodiazepines Prescribed? No  Naloxone prescribed? YES February 16, 2023  RIOSORD 16:  15% risk      Clinic visit frequency required: Q 1 month Next visit due:   Controlled Substance/Opioid Treatment agreement on file (dated <12 months ago)?:    Date(s): pending  Last MNPMP verification: last visit  Items in red to be updated at each visit  Patient is followed by [facility] for ongoing prescription of pain medication.  All refills should be approved by this provider, or covering partner.Chronic Pain     Chronic Pain Diagnosis:  neuropathy  DIRE Total Score(s):14     ORT: 5    4 - 7 =  Moderate Risk   of future problems with Opioids  FAQ5: 45/100 on 9/9/16, 40/100 on 11/15/16  55 October 29, 2020    Behavioral Health Consultation: 5/12/17 with Dr. Daisy Quezada  Personal Care Plan for Chronic Pain: 5/12/17  Opioid medication:oxycodone   Dose:10mg  Number of pills per month:90  Patient is being prescribed 45 MME  Benzodiazepines Prescribed? No  Naloxone prescribed? yes          Microalbuminuria 10/12/2015     Priority: High     Severely increased albuminuria      Type 2 diabetes mellitus with diabetic polyneuropathy (H) 10/12/2015      Priority: High    Essential hypertension 08/26/2013     Priority: High    F11.9 - Chronic, continuous use of opioids 08/10/2023     Priority: Medium    Acidosis 08/12/2022     Priority: Medium    Acute chest pain 08/12/2022     Priority: Medium    Arthritis of both acromioclavicular joints 04/08/2021     Priority: Medium     xray proven       Morbid obesity (H) 11/27/2020     Priority: Medium     Date of Surgery: 2006 RY[facility] Dr. Harper  Initial Weight: 360#  Initial BMI: 70.30      Hyperglycemia due to type 2 diabetes mellitus (H) 06/02/2020     Priority: Medium    Neck mass 10/11/2019     Priority: Medium     Added automatically from request for surgery [id number]      Metabolic acidosis 07/26/2019     Priority: Medium     reolved w/ discontinuation of ASA      Obesity 01/03/2019     Priority: Medium    Hernia of anterior abdominal wall 01/28/2017     Priority: Medium    Osteoarthritis of both knees 10/31/2016     Priority: Medium    Polyneuropathy due to secondary diabetes mellitus (H) 10/31/2016     Priority: Medium    Compression neuropathy of left lower extremity 02/19/2016     Priority: Medium    Bilateral ankle joint pain 01/15/2016     Priority: Medium    Esophageal reflux 04/07/2015     Priority: Medium    Urinary incontinence 02/19/2015     Priority: Medium    Asthma 08/04/2013     Priority: Medium    History of cholecystectomy 08/04/2013     Priority: Medium    H/O necrotizing fasciitis 08/04/2013     Priority: Medium    Hyperlipidemia 06/21/2013     Priority: Medium    Status post bariatric surgery 06/21/2013     Priority: Medium     Kim-en-Y in 2006      Severe episode of recurrent major depressive disorder, with psychotic features (H) 01/24/2013     Priority: Medium    Moderate persistent asthma without complication 11/15/2012     Priority: Medium    Persons encountering health services in other specified circumstances 11/15/2012     Priority: Medium     Tier 3    Status: Accept  Care Coordination Start Date:  02/27/12  State Tier Level: Level 3  Language/Barrier to Learning: No  Significant Mental Health Issues: Yes  Date Care Coordination Discontinued: 05/28/15    DX V65.8 REPLACED WITH [id number] [facility] (04/08/2013)      Abnormal Pap smear of cervix 04/22/2019     Priority: Low     3-12-15: Pap/HPV neg.  Plan: Given hx (mildly abn pap, hyster), recheck in 5 yrs.  10-11-11: Pap/HPV neg.  Plan: Given ASUS pap in 2010, recommend repeating pap in 1 year.  8/3/2010 ASCUS Pap with +BV- pt treated with Metrogel.  Pt needs repeat Pap in 1yr.  8/4/2008 Pt had total hyst for menorrhagia, pelvic pain, scar tissue, and fibroids.    3-12-15: Pap/HPV neg.  Plan: Given hx (mildly abn pap, hyster), recheck in 5 yrs.  10-11-11: Pap/HPV neg.  Plan: Given ASUS pap in 2010, recommend repeating pap in 1 year.  8/3/2010 ASCUS Pap with +BV- pt treated with Metrogel.  Pt needs repeat Pap in 1yr.  8/4/2008 Pt had total hyst for menorrhagia, pelvic pain, scar tissue, and fibroids.      Hernia 01/27/2017     Priority: Low    S/P hysterectomy 06/21/2013     Priority: Low     2008, For fibroids.  Cervix removed, but still has ovaries.      History of hysterectomy for benign disease 06/21/2013     Priority: Low     2008, For fibroids.  Cervix removed, but still has ovaries.      Carpal tunnel syndrome 11/15/2012     Priority: Low    Health Care Home 11/15/2012     Priority: Low     Tier 3    Status: Accept  Care Coordination Start Date: 02/27/12  State Tier Level: Level 3  Language/Barrier to Learning: No  Significant Mental Health Issues: Yes  Date Care Coordination Discontinued: 05/28/15    DX V65.8 REPLACED WITH [id number] [facility] (04/08/2013)        Chronic pain of both shoulders 12/23/2005     Priority: Low     There are no exam notes on file for this visit.  Chief Complaint   Patient presents with    Pain Management     CPM  BP   On the low side for similar issues  For she has chronic pain from osteoarthritis and peripheral  neuropathy.  She is on gabapentin and oxycodone.  Gabapentin does seem to help her burning in her toes.  We increased the dose last time and she seems to tolerate that well although she is complaining of more edema in her left leg.    She prefers a printout for her oxycodone.  She denies any other drug use.  She tolerates this well and has been on it for years with stable dosing.  PDMP was reviewed today and is unremarkable.    Regarding her hypertension she is taking her labetalol but did not take it this morning.  She has no headache chest pain or shortness of breath.    She has had 2 episodes of gross hematuria without burning frequency or urgency.  They resolved and now.  She is not on anticoagulants.  She has no flank pain.  She is a non-smoker.    Her left leg swelling is concerning to her she had DVTs in the past she is having no pleuritic chest pain.    She is having vaginal odor but no discharge.    Objective blood pressure elevated in the 160s over 90s, but again she did not take her labetalol today.  Chest clear.  Heart regular rhythm.  Extremities she does have asymmetric edema more prominent on the left foot and ankle than on the right.  Wet prep is unremarkable.  Urine drug screen is normal.    Regarding her diabetes-CGM reviewed today time in range is 96% she still complains of frequent hypoglycemia in the morning sometimes as low as the 30s but often in the 60s we have decreased her insulin glargine to 16 units/day.  She is on a GLP-1 and an SGLT2 inhibitor  Her last A1c was around 5.    Assessment and plan:    1.  Chronic pain syndrome.  Refilled oxycodone.  Follow-up monthly.  PDMP negative.  Renew pain agreement next visit.    2. Hypertension-poor control and blood adherence is uncertain.  Recommend she take her blood pressure medication as she normally does next time when she comes in.  Could increase her labetalol to 200 mg twice daily.  She is on maximum asymptomatic admission.    3.  For gross  hematuria-obtain UA and UC today.  Depending on results she will need cystoscopy in the and perhaps upper tract imaging.    4.  For diabetes cut back on insulin glargine from 16 to 10 units daily.  5.  For vaginal odor wet prep negative  6.  For leg swelling her risk of DVT is high because she has had in the past.  Ultrasound ordered.  Will treat with DOAC if DVT found.        Results for orders placed or performed in visit on 01/19/24   Urine Drug Screen Clinic     Status: Abnormal   Result Value Ref Range    Cannabinoids (40-ioo-4-carboxy-9-THC) Not Detected Not Detected, Indeterminate    Phencyclidine Not Detected Not Detected, Indeterminate    Cocaine (Benzoylecgonine) Not Detected Not Detected, Indeterminate    Methamphetamine (d-Methamphetamine) Not Detected Not Detected, Indeterminate    Opiates (Morphine) Not Detected Not Detected, Indeterminate    Amphetamine (d-Amphetamine) Not Detected Not Detected, Indeterminate    Benzodiazepines (Nordiazepam) Not Detected Not Detected, Indeterminate    Tricyclic Antidepressants (Desipramine) Not Detected Not Detected, Indeterminate    Methadone Not Detected Not Detected, Indeterminate    Barbiturates (Butalbital) Not Detected Not Detected, Indeterminate    Oxycodone Detected (A) Not Detected, Indeterminate    Buprenorphine Not Detected Not Detected, Indeterminate   Wet preparation     Status: Normal    Specimen: Vagina; Swab   Result Value Ref Range    Trichomonas Absent Absent    Yeast Absent Absent    Clue Cells Absent Absent    WBCs/high power field None None

## 2024-01-20 NOTE — RESULT ENCOUNTER NOTE
Please call:  1.  Her wet prep did not show an infection.  2.  Please remind her to take her BP meds as usual before she comes in.  3.  I connected with Dr Carson about the vitamin B12.  There are already orders in your chart for it, so you can just make a nurse visit every 2 weeks to get them done.  We communicated about the reason to get B12 every 2 weeks:  it's primarily because many people simply feel better when dosed every 2 weeks compared to every 4 weeks, and not because you were deficient by taking it every 4 weeks.  So, let's see if it helps you to get the shots more frequently!  Thanks  JIGNESH Meyer

## 2024-02-02 ENCOUNTER — ALLIED HEALTH/NURSE VISIT (OUTPATIENT)
Dept: FAMILY MEDICINE | Facility: CLINIC | Age: 59
End: 2024-02-02
Payer: COMMERCIAL

## 2024-02-02 VITALS
RESPIRATION RATE: 16 BRPM | HEIGHT: 60 IN | BODY MASS INDEX: 40.37 KG/M2 | HEART RATE: 100 BPM | TEMPERATURE: 98.3 F | OXYGEN SATURATION: 97 % | DIASTOLIC BLOOD PRESSURE: 77 MMHG | SYSTOLIC BLOOD PRESSURE: 151 MMHG | WEIGHT: 205.6 LBS

## 2024-02-02 DIAGNOSIS — N18.31 STAGE 3A CHRONIC KIDNEY DISEASE (H): Primary | ICD-10-CM

## 2024-02-02 PROCEDURE — 96372 THER/PROPH/DIAG INJ SC/IM: CPT | Performed by: FAMILY MEDICINE

## 2024-02-02 RX ADMIN — CYANOCOBALAMIN 1000 MCG: 1000 INJECTION, SOLUTION INTRAMUSCULAR; SUBCUTANEOUS at 10:37

## 2024-02-02 NOTE — PROGRESS NOTES
"Reviewing the Dr. Meyer's note on 2024 Regarding the Vitamin B12 shot patient was instructed to receive the Shot every 2 weeks compared to every 4 weeks.     \"I connected with Dr Carson about the vitamin B12.  There are already orders in your chart for it, so you can just make a nurse visit every 2 weeks to get them done.  We communicated about the reason to get B12 every 2 weeks:  it's primarily because many people simply feel better when dosed every 2 weeks compared to every 4 weeks, and not because you were deficient by taking it every 4 weeks.  So, let's see if it helps you to get the shots more frequently!  Thanks  JIGNESH Meyer\"    Please refer to this appointment note as well.     Sandi Hinton MA      Clinic Administered Medication Documentation      Injectable Medication Documentation    Is there an active order (written within the past 365 days, with administrations remaining, not ) in the chart? Yes.     Patient was given Cyanocobalamin (B-12). Prior to medication administration, verified patient's identity using patient s name and date of birth. Please see MAR and medication order for additional information. Patient instructed to remain in clinic for 15 minutes and report any adverse reaction to staff immediately.    Vial/Syringe: Single dose vial. Was entire vial of medication used? Yes  Was this medication supplied by the patient? No  Is this a medication the patient will need to receive again? Yes. Verified that the patient has refills remaining in their prescription.    "

## 2024-02-16 ENCOUNTER — MEDICAL CORRESPONDENCE (OUTPATIENT)
Dept: HEALTH INFORMATION MANAGEMENT | Facility: CLINIC | Age: 59
End: 2024-02-16

## 2024-02-22 ENCOUNTER — OFFICE VISIT (OUTPATIENT)
Dept: FAMILY MEDICINE | Facility: CLINIC | Age: 59
End: 2024-02-22
Payer: COMMERCIAL

## 2024-02-22 VITALS
TEMPERATURE: 97.8 F | BODY MASS INDEX: 39.48 KG/M2 | RESPIRATION RATE: 16 BRPM | OXYGEN SATURATION: 97 % | HEART RATE: 114 BPM | SYSTOLIC BLOOD PRESSURE: 136 MMHG | DIASTOLIC BLOOD PRESSURE: 71 MMHG | WEIGHT: 198.8 LBS

## 2024-02-22 DIAGNOSIS — Z79.4 TYPE 2 DIABETES MELLITUS WITH DIABETIC POLYNEUROPATHY, WITH LONG-TERM CURRENT USE OF INSULIN (H): ICD-10-CM

## 2024-02-22 DIAGNOSIS — G43.819 OTHER MIGRAINE WITHOUT STATUS MIGRAINOSUS, INTRACTABLE: ICD-10-CM

## 2024-02-22 DIAGNOSIS — R80.1 PERSISTENT PROTEINURIA: ICD-10-CM

## 2024-02-22 DIAGNOSIS — E11.42 TYPE 2 DIABETES MELLITUS WITH DIABETIC POLYNEUROPATHY, WITH LONG-TERM CURRENT USE OF INSULIN (H): ICD-10-CM

## 2024-02-22 DIAGNOSIS — G43.809 OTHER MIGRAINE WITHOUT STATUS MIGRAINOSUS, NOT INTRACTABLE: Primary | ICD-10-CM

## 2024-02-22 DIAGNOSIS — N18.31 STAGE 3A CHRONIC KIDNEY DISEASE (H): ICD-10-CM

## 2024-02-22 DIAGNOSIS — I10 ESSENTIAL HYPERTENSION: ICD-10-CM

## 2024-02-22 DIAGNOSIS — G89.4 CHRONIC PAIN SYNDROME: Primary | ICD-10-CM

## 2024-02-22 LAB
AMPHETAMINES UR QL: NOT DETECTED
ANION GAP SERPL CALCULATED.3IONS-SCNC: 12 MMOL/L (ref 7–15)
BARBITURATES UR QL SCN: NOT DETECTED
BENZODIAZ UR QL SCN: NOT DETECTED
BUN SERPL-MCNC: 31 MG/DL (ref 6–20)
BUPRENORPHINE UR QL: NOT DETECTED
CALCIUM SERPL-MCNC: 7.8 MG/DL (ref 8.6–10)
CANNABINOIDS UR QL: NOT DETECTED
CHLORIDE SERPL-SCNC: 112 MMOL/L (ref 98–107)
COCAINE UR QL SCN: NOT DETECTED
CREAT SERPL-MCNC: 1.31 MG/DL (ref 0.51–0.95)
D-METHAMPHET UR QL: NOT DETECTED
DEPRECATED HCO3 PLAS-SCNC: 17 MMOL/L (ref 22–29)
EGFRCR SERPLBLD CKD-EPI 2021: 47 ML/MIN/1.73M2
GLUCOSE SERPL-MCNC: 200 MG/DL (ref 70–99)
HBA1C MFR BLD: 5.6 % (ref 0–5.6)
METHADONE UR QL SCN: NOT DETECTED
OPIATES UR QL SCN: NOT DETECTED
OXYCODONE UR QL SCN: DETECTED
PCP UR QL SCN: NOT DETECTED
POTASSIUM SERPL-SCNC: 4.1 MMOL/L (ref 3.4–5.3)
SODIUM SERPL-SCNC: 141 MMOL/L (ref 135–145)
TRICYCLICS UR QL SCN: NOT DETECTED

## 2024-02-22 PROCEDURE — 83036 HEMOGLOBIN GLYCOSYLATED A1C: CPT | Performed by: FAMILY MEDICINE

## 2024-02-22 PROCEDURE — 36415 COLL VENOUS BLD VENIPUNCTURE: CPT | Performed by: FAMILY MEDICINE

## 2024-02-22 PROCEDURE — 80048 BASIC METABOLIC PNL TOTAL CA: CPT | Performed by: FAMILY MEDICINE

## 2024-02-22 PROCEDURE — 80306 DRUG TEST PRSMV INSTRMNT: CPT | Performed by: FAMILY MEDICINE

## 2024-02-22 PROCEDURE — 99214 OFFICE O/P EST MOD 30 MIN: CPT | Mod: 25 | Performed by: FAMILY MEDICINE

## 2024-02-22 PROCEDURE — 96372 THER/PROPH/DIAG INJ SC/IM: CPT | Performed by: FAMILY MEDICINE

## 2024-02-22 RX ORDER — GABAPENTIN 250 MG/5ML
500 SOLUTION ORAL 3 TIMES DAILY
Qty: 940 ML | Refills: 11 | Status: SHIPPED | OUTPATIENT
Start: 2024-02-22 | End: 2024-03-21 | Stop reason: SINTOL

## 2024-02-22 RX ORDER — INSULIN GLARGINE 100 [IU]/ML
6 INJECTION, SOLUTION SUBCUTANEOUS AT BEDTIME
COMMUNITY
Start: 2024-02-22 | End: 2024-02-22

## 2024-02-22 RX ORDER — OXYCODONE AND ACETAMINOPHEN 10; 325 MG/1; MG/1
TABLET ORAL
Qty: 120 TABLET | Refills: 0 | Status: SHIPPED | OUTPATIENT
Start: 2024-02-22 | End: 2024-03-21

## 2024-02-22 RX ORDER — SUMATRIPTAN 6 MG/.5ML
6 INJECTION, SOLUTION SUBCUTANEOUS ONCE
Qty: 6 ML | Refills: 2 | Status: SHIPPED | OUTPATIENT
Start: 2024-02-22 | End: 2024-02-22

## 2024-02-22 RX ORDER — DAPAGLIFLOZIN 10 MG/1
10 TABLET, FILM COATED ORAL DAILY
Qty: 30 TABLET | Refills: 11 | Status: SHIPPED | OUTPATIENT
Start: 2024-02-22 | End: 2024-04-18

## 2024-02-22 RX ORDER — CEFUROXIME AXETIL 250 MG/1
6 TABLET ORAL
Qty: 1 KIT | Refills: 0 | Status: SHIPPED | OUTPATIENT
Start: 2024-02-22 | End: 2024-02-22

## 2024-02-22 RX ADMIN — CYANOCOBALAMIN 1000 MCG: 1000 INJECTION, SOLUTION INTRAMUSCULAR; SUBCUTANEOUS at 10:41

## 2024-02-22 ASSESSMENT — ASTHMA QUESTIONNAIRES
QUESTION_5 LAST FOUR WEEKS HOW WOULD YOU RATE YOUR ASTHMA CONTROL: SOMEWHAT CONTROLLED
ACT_TOTALSCORE: 16
QUESTION_1 LAST FOUR WEEKS HOW MUCH OF THE TIME DID YOUR ASTHMA KEEP YOU FROM GETTING AS MUCH DONE AT WORK, SCHOOL OR AT HOME: A LITTLE OF THE TIME
QUESTION_3 LAST FOUR WEEKS HOW OFTEN DID YOUR ASTHMA SYMPTOMS (WHEEZING, COUGHING, SHORTNESS OF BREATH, CHEST TIGHTNESS OR PAIN) WAKE YOU UP AT NIGHT OR EARLIER THAN USUAL IN THE MORNING: ONCE OR TWICE
QUESTION_4 LAST FOUR WEEKS HOW OFTEN HAVE YOU USED YOUR RESCUE INHALER OR NEBULIZER MEDICATION (SUCH AS ALBUTEROL): ONCE A WEEK OR LESS
QUESTION_2 LAST FOUR WEEKS HOW OFTEN HAVE YOU HAD SHORTNESS OF BREATH: MORE THAN ONCE A DAY
ACT_TOTALSCORE: 16

## 2024-02-22 ASSESSMENT — PATIENT HEALTH QUESTIONNAIRE - PHQ9
SUM OF ALL RESPONSES TO PHQ QUESTIONS 1-9: 19
10. IF YOU CHECKED OFF ANY PROBLEMS, HOW DIFFICULT HAVE THESE PROBLEMS MADE IT FOR YOU TO DO YOUR WORK, TAKE CARE OF THINGS AT HOME, OR GET ALONG WITH OTHER PEOPLE: SOMEWHAT DIFFICULT
SUM OF ALL RESPONSES TO PHQ QUESTIONS 1-9: 19

## 2024-02-22 NOTE — PROGRESS NOTES
"  Assessment & Plan   1.  Chronic pain: Stable.  Refilled oxycodone and gabapentin.  PDMP and urine drug screen expected.    2.  Migraine.  Refilled Imitrex 6 mg subcu may repeat in 1 to 2 hours as needed.  If for some reason this formulation is not covered we will need to speak to the pharmacist.  3.  Type 2 diabetes mellitus with her weight loss  Her glycemic control has been fantastic.  I would like to stop her insulin and see how she does without it.  Will make this suggestion and have her follow-up in a month continue Trulicity and  Farxiga.  4.  Hypertension controlled on complex regimen including lisinopril 40, diltiazem, and labetalol.  Torsemide helps manage edema.  No changes today.  5.  CKD 3A.  Check BMP today.  Estimated body mass index is 39.48 kg/m  as calculated from the following:    Height as of 2/2/24: 1.511 m (4' 11.5\").    Weight as of this encounter: 90.2 kg (198 lb 12.8 oz).   Weight management plan: Patient referred to endocrine and/or weight management specialty  already sees them          No follow-ups on file.    Genesis Bliss is a 58 year old, presenting for the following health issues:  She has chronic pain from osteoarthritis in multiple locations.  She takes oxycodone on a scheduled basis.  No prior aberrant behavior.  She has not recently run out of medications early.  She simply needs a refill.  She takes gabapentin for neuropathic pain as well 2 teaspoons, that is 500 mg 3 times daily, manages her symptoms well.  No dizziness or fatigue.  She like a refill of that  She needs Imitrex refilled.  She is on injections in her previous meds required a syringe.  She like to get the cartridge and needs a new referral refill for that.  Regarding her hypertension she is now taking her labetalol.  She has CKD and is due for a BMP.    Regarding her type 2 diabetes she is on basal insulin just 6 units daily.  She takes Trulicity 4.5 mg weekly.  Hypoglycemia seems to be less.            " 5/6/2021    11:44 AM 5/6/2021    11:47 AM 10/26/2023     5:54 PM   JUAN-7 SCORE   Total Score 16 16 12           11/27/2023     8:56 AM 12/21/2023    10:23 AM 2/22/2024    10:33 AM   PEG Score   PEG Total Score 9.33 7 8.33          PDMP Review         Value Time User    State PDMP site checked  Yes 12/22/2023  5:13 PM Jose Angel Meyer MD          Last CSA Agreement:   CSA -- Patient Level:     [Media Unavailable] Controlled Substance Agreement - Opioid - Scan on 12/14/2021  8:56 AM       Last UDS: 1/19/2024  Review of PDMP today shows no red flags or unexpected prescriptions     Objective    /71   Pulse 114   Temp 97.8  F (36.6  C) (Oral)   Resp 16   Wt 90.2 kg (198 lb 12.8 oz)   LMP  (LMP Unknown)   SpO2 97%   BMI 39.48 kg/m    Body mass index is 39.48 kg/m .  Physical Exam   GENERAL: alert and no distress  RESP: lungs clear to auscultation - no rales, rhonchi or wheezes  CV: regular rate and rhythm, normal S1 S2, no S3 or S4, no murmur, click or rub, no peripheral edema    Results for orders placed or performed in visit on 02/22/24   Urine Drug Screen Clinic     Status: Abnormal   Result Value Ref Range    Cannabinoids (14-dco-1-carboxy-9-THC) Not Detected Not Detected, Indeterminate    Phencyclidine Not Detected Not Detected, Indeterminate    Cocaine (Benzoylecgonine) Not Detected Not Detected, Indeterminate    Methamphetamine (d-Methamphetamine) Not Detected Not Detected, Indeterminate    Opiates (Morphine) Not Detected Not Detected, Indeterminate    Amphetamine (d-Amphetamine) Not Detected Not Detected, Indeterminate    Benzodiazepines (Nordiazepam) Not Detected Not Detected, Indeterminate    Tricyclic Antidepressants (Desipramine) Not Detected Not Detected, Indeterminate    Methadone Not Detected Not Detected, Indeterminate    Barbiturates (Butalbital) Not Detected Not Detected, Indeterminate    Oxycodone Detected (A) Not Detected, Indeterminate    Buprenorphine Not Detected Not Detected,  Indeterminate   Hemoglobin A1c     Status: Normal   Result Value Ref Range    Hemoglobin A1C 5.6 0.0 - 5.6 %             Signed Electronically by: Jose Angel Meyer MD    Answers submitted by the patient for this visit:  Patient Health Questionnaire (Submitted on 2/22/2024)  If you checked off any problems, how difficult have these problems made it for you to do your work, take care of things at home, or get along with other people?: Somewhat difficult  PHQ9 TOTAL SCORE: 19

## 2024-02-24 NOTE — RESULT ENCOUNTER NOTE
Please call on Monday  Her lab tests show stable, but mildly reduced kidney function.  Her urine test was fine.  I want to make 100% sure she is taking the medications that I think she is taking, and that there is now confusion (happens very easily when taking many medications!), so please ask her to bring her meds with her next time so that we can do a medical reconciliation with her.  Can we set up her next visit to be with pharm D for med rec, too?  Thanks--  CF

## 2024-03-15 NOTE — PROGRESS NOTES
Medication Therapy Management (MTM) Encounter    ASSESSMENT:                            Medication Adherence/Access:  Declines setting alarms today. Reinforced importance of daily adherence to chronic medications as prescribed without missing doses. Updated the medication list to the best of my ability. Called [facility] and appears she has been missing some medications for many months, unclear for what reason as other chronic medications are filled regularly. Will reassess additional disease states at next visit with pillbox.    Diabetes:  A1c at goal <7%, continue current medications as prescribed, though would recommend further assessment of adherence at next visit at appears [facility] may have had gaps in adherence. Recommend patient  refill of Farxiga and resume use.      Hypertension:  Blood pressure not at goal <130/80 mmHg today, recommend refilling lisinopril and resuming use. Appears she has been missing doses of labetalol and diltiazem as she has tablets remaining today.     Hyperlipidemia:   Appropriately prescribed moderate intensity statin, recommend refilling and resuming use.     Class II Obesity (BMI 35 to 39.9): Continue as directed by Dr Manish Carson.     Asthma:   Assess symptoms and need for refill at next visit.     Migraines  Continue sumatriptan as needed as directed    Supplements /metabolic acidosis  With history of RYGB, unclear if patient should be taking iron supplementation daily. Appears patient also needs to resume use of Vitamin D weekly as directed. Receiving Vitamin B12 injections monthly in clinic.     Constipation/gas:  Stable    Chronic pain :   Continue as directed by Dr Meyer.     Hallucinations:  Removed risperidone from medication list today, plan as directed by Dr. Meyer.     PLAN:                            Dr. Meyer to send refills of atorvastatin, lisinopril- resume 1 tablet daily   refills of Farxiga and resume taking  daily  Chronic pain management as directed by Dr. Meyer  Bring your pillbox to next visit to further assess medication adherence and resolve any barriers to care    Medication issues to be addressed at a future visit:    Assess asthma- needs Symbicort refill?  Assess/reconcile meds for glaucoma  Reconcile topical medications     Follow-up: Return in about 4 weeks (around 4/18/2024) for with PharmD.    SUBJECTIVE/OBJECTIVE:                          Ruthy Álvarez is a 58 year old female seen for a medication reconciliation. She was referred to me from Dr Meyer. Patient is seeing provider after our visit today.     Reason for visit: Medication review, unclear what medications patient is taking    Allergies/ADRs: Reviewed in chart  Past Medical History: Reviewed in chart  Tobacco: She reports that she has never smoked. She has never used smokeless tobacco.  Alcohol: none    Medication Adherence/Access: Patient uses pill box(es) set up by PCA, granddaughter Anamaria. She doesn't really know what is in there. Patient takes medications 2 time(s) per day. Per patient, misses medication 2-3 times per week because she is in a rush to leave the house and forgets. She says that [facility] automatically refills her meds every month but will  from [facility] if [facility] is out of medications     Did not bring all her medicines- only brought the ones on her dresser, says she has other medicines in cabinet that she forgot to bring. Does NOT bring Farxiga, lisinopril, atorvastatin, iron, Symbicort, risperidone, see below for detailed fill histories    Diabetes   Trulicity 4.5 mg once weekly on Mondays- LF 10/17/23 84 days  Farxiga 10 mg daily LF 3-25-23 30 days  Patient is not experiencing side effects. Feels her appetite is fine.   Blood sugar monitoring: Continuous Glucose Monitor - Freestyle Kika 2 though forgot reader today  Current diabetes symptoms: none  Diet/Exercise: Not able to address today     Eye exam is up to  date  Foot exam: due  Urine Albumin:   Lab Results   Component Value Date    UMALCR 4,321.71 (H) 08/31/2023      Lab Results   Component Value Date    A1C 5.6 02/22/2024       Hypertension   Diltiazem 240 mg daily-  LF 1/5 #30/30 - bottle has ~20 caps today  Lisinopril 40 mg daily- LF in 2021  Torsemide 10 mg daily LF 1/5 #90/90, brings older empty vial today   Labetalol 100 mg twice daily LF 11/28/23 #180/90 ~100 tabs remain  Patient reports no current medication side effects, has swelling today- unclear if patient is taking torsemide regularly   Patient self monitors blood pressure.  Home BP monitoring 92/85 .       BP Readings from Last 3 Encounters:   03/21/24 (!) 168/92   02/22/24 136/71   02/02/24 (!) 151/77     Pulse Readings from Last 3 Encounters:   03/21/24 109   02/22/24 114   02/02/24 100     Hyperlipidemia   Atorvastatin 10 mg daily- not filled since 2/27/23 90 days according to pharmacy, appears [facility] said it was filled 7/11/23 for 90 days  Patient reports no significant myalgias or other side effects.  The 10-year ASCVD risk score (Tala ROONEY, et al., 2019) is: 31.5%    Values used to calculate the score:      Age: 58 years      Sex: Female      Is Non- : Yes      Diabetic: Yes      Tobacco smoker: No      Systolic Blood Pressure: 168 mmHg      Is BP treated: Yes      HDL Cholesterol: 66 mg/dL      Total Cholesterol: 250 mg/dL     Recent Labs   Lab Test 11/27/23  0949 12/15/22  1256 07/29/21  1409 01/30/20  1021 03/07/19  1402   CHOL 250* 253*   < > 153.6 172.1   HDL 66 71   < > 44.3 47.2   * 151*   < > 81 86   TRIG 160* 155*   < > 142.4 194.2*   CHOLHDLRATIO  --   --   --  3.5 3.6    < > = values in this interval not displayed.     Obesity   Class II Obesity (BMI 35 to 39.9):   Phentermine 18.75-37.5 mg once daily- LF 1/5 #30/30 at [facility] ~10 tabs remain, does not know if she is taking whole tablet or half tablet  Patient reports no current medication side  "effects.  Patient has history of RYGB     Wt Readings from Last 4 Encounters:   02/22/24 198 lb 12.8 oz (90.2 kg)   02/02/24 205 lb 9.6 oz (93.3 kg)   12/01/23 190 lb (86.2 kg)   10/26/23 207 lb 9.6 oz (94.2 kg)     Estimated body mass index is 39.48 kg/m  as calculated from the following:    Height as of 2/2/24: 4' 11.5\" (1.511 m).    Weight as of 2/22/24: 198 lb 12.8 oz (90.2 kg).    Asthma  Symbicort 2 puffs once daily and 1 puff as needed   Last filled August 2022. Does not bring with her today.     Migraines  Sumatriptan auto injector 6 mg as needed for migraine  Took Imitrex last night- says it worked well, unable to discuss further.     Supplements /metabolic acidosis  Sodium citrate-citric acid 10 mL twice daily   Vitamin D 43689 units once weekly - not filled since 8/11/23 for 91 days  Vitamin B12 shots twice monthly in clinic  Ferrous sulfate 325 mg daily- not filled in last year  Follows with bariatric surgery Dr Manish Carson, history of RYGB in 2006.   Lab Results   Component Value Date    VITDT 15 (L) 09/22/2021   No reported issues at this time.      Constipation/gas  Senna-S 8.6-50 daily 1 tablet daily - brings bottle with #3 tablets remaining, not sure how often she takes it  Simethicone 180 mg caps 1 tablet as needed - says she is taking something for gas    Chronic Pain:   Acetaminophen 500 mg every 12 hours as needed  Diclofenac gel 1% 4 g three times daily as needed   Gabapentin 500 mg three times daily   Oxycodone-acetaminophen  mg 1 tablet every 6 hours and 2 tablets at bedtime  Has CSA with Dr. Meyer     Hallucinations:  Risperidone 0.25 mg tablet-  sent in July 2023, never filled  Per chart review, appears she previously took in in 2022    Today's Vitals:  BP Readings from Last 1 Encounters:   03/21/24 (!) 168/92     Pulse Readings from Last 1 Encounters:   03/21/24 109     Wt Readings from Last 1 Encounters:   02/22/24 198 lb 12.8 oz (90.2 kg)     Ht Readings from Last 1 Encounters: " "  02/02/24 4' 11.5\" (1.511 m)     Estimated body mass index is 39.48 kg/m  as calculated from the following:    Height as of 2/2/24: 4' 11.5\" (1.511 m).    Weight as of 2/22/24: 198 lb 12.8 oz (90.2 kg).    Temp Readings from Last 1 Encounters:   03/21/24 97.4  F (36.3  C) (Oral)     Due to time constraints, I was only able to assess the above with the patient today. Will follow-up on other disease states in future encounters    ----------------  I spent 40 minutes with this patient today. I offer these suggestions with the understanding that I don't fully understand Ruthy's past medical history and the complexity of her health conditions. Ruthy should make no changes without the approval of her physician. A copy of the visit note was provided to the patient's provider(s).    A summary of these recommendations was given to the patient.    Danielle Dyson, Pharm.D.  Medication Therapy Management Pharmacy Resident     Preceptor cosignature: Patient was seen independently by Dr. Dyson. I have reviewed the assessment and plan. Matilde Medina, PharmD, [facility]       Medication Therapy Recommendations  Essential hypertension    Current Medication: lisinopril (ZESTRIL) 40 MG tablet   Rationale: Does not understand instructions - Adherence - Adherence   Recommendation: Provide Education   Status: Patient Agreed - Adherence/Education            "

## 2024-03-21 ENCOUNTER — OFFICE VISIT (OUTPATIENT)
Dept: FAMILY MEDICINE | Facility: CLINIC | Age: 59
End: 2024-03-21
Payer: COMMERCIAL

## 2024-03-21 ENCOUNTER — OFFICE VISIT (OUTPATIENT)
Dept: PHARMACY | Facility: CLINIC | Age: 59
End: 2024-03-21
Payer: COMMERCIAL

## 2024-03-21 VITALS
SYSTOLIC BLOOD PRESSURE: 168 MMHG | DIASTOLIC BLOOD PRESSURE: 92 MMHG | TEMPERATURE: 97.4 F | RESPIRATION RATE: 16 BRPM | OXYGEN SATURATION: 98 % | HEART RATE: 109 BPM

## 2024-03-21 DIAGNOSIS — E87.20 METABOLIC ACIDOSIS: ICD-10-CM

## 2024-03-21 DIAGNOSIS — J45.50 SEVERE PERSISTENT ASTHMA WITHOUT COMPLICATION (H): Primary | ICD-10-CM

## 2024-03-21 DIAGNOSIS — G89.29 CHRONIC PAIN OF BOTH SHOULDERS: ICD-10-CM

## 2024-03-21 DIAGNOSIS — I10 ESSENTIAL HYPERTENSION: ICD-10-CM

## 2024-03-21 DIAGNOSIS — E11.42 TYPE 2 DIABETES MELLITUS WITH DIABETIC POLYNEUROPATHY, WITH LONG-TERM CURRENT USE OF INSULIN (H): ICD-10-CM

## 2024-03-21 DIAGNOSIS — R44.3 HALLUCINATIONS: ICD-10-CM

## 2024-03-21 DIAGNOSIS — R80.9 MICROALBUMINURIA: ICD-10-CM

## 2024-03-21 DIAGNOSIS — R14.1 FLATULENCE, ERUCTATION AND GAS PAIN: ICD-10-CM

## 2024-03-21 DIAGNOSIS — Z79.4 TYPE 2 DIABETES MELLITUS WITH DIABETIC POLYNEUROPATHY, WITH LONG-TERM CURRENT USE OF INSULIN (H): ICD-10-CM

## 2024-03-21 DIAGNOSIS — M25.512 CHRONIC PAIN OF BOTH SHOULDERS: ICD-10-CM

## 2024-03-21 DIAGNOSIS — R14.3 FLATULENCE, ERUCTATION AND GAS PAIN: ICD-10-CM

## 2024-03-21 DIAGNOSIS — I10 BENIGN ESSENTIAL HYPERTENSION: ICD-10-CM

## 2024-03-21 DIAGNOSIS — G89.4 CHRONIC PAIN SYNDROME: ICD-10-CM

## 2024-03-21 DIAGNOSIS — J45.40 MODERATE PERSISTENT ASTHMA WITHOUT COMPLICATION: ICD-10-CM

## 2024-03-21 DIAGNOSIS — E78.5 HYPERLIPIDEMIA LDL GOAL <100: ICD-10-CM

## 2024-03-21 DIAGNOSIS — N18.31 STAGE 3A CHRONIC KIDNEY DISEASE (H): Primary | ICD-10-CM

## 2024-03-21 DIAGNOSIS — E66.01 MORBID OBESITY (H): ICD-10-CM

## 2024-03-21 DIAGNOSIS — M25.511 CHRONIC PAIN OF BOTH SHOULDERS: ICD-10-CM

## 2024-03-21 DIAGNOSIS — G43.011 INTRACTABLE MIGRAINE WITHOUT AURA AND WITH STATUS MIGRAINOSUS: ICD-10-CM

## 2024-03-21 DIAGNOSIS — Z78.9 TAKES DIETARY SUPPLEMENTS: ICD-10-CM

## 2024-03-21 DIAGNOSIS — K59.00 CONSTIPATION, UNSPECIFIED CONSTIPATION TYPE: ICD-10-CM

## 2024-03-21 DIAGNOSIS — R14.2 FLATULENCE, ERUCTATION AND GAS PAIN: ICD-10-CM

## 2024-03-21 PROCEDURE — 99214 OFFICE O/P EST MOD 30 MIN: CPT | Mod: 25 | Performed by: FAMILY MEDICINE

## 2024-03-21 PROCEDURE — 96372 THER/PROPH/DIAG INJ SC/IM: CPT | Performed by: FAMILY MEDICINE

## 2024-03-21 PROCEDURE — 99207 PR NO CHARGE LOS: CPT | Performed by: PHARMACIST

## 2024-03-21 RX ORDER — LISINOPRIL 40 MG/1
40 TABLET ORAL AT BEDTIME
Qty: 90 TABLET | Refills: 3 | Status: SHIPPED | OUTPATIENT
Start: 2024-03-21 | End: 2024-04-18

## 2024-03-21 RX ORDER — OXYCODONE AND ACETAMINOPHEN 10; 325 MG/1; MG/1
TABLET ORAL
Qty: 120 TABLET | Refills: 0 | Status: SHIPPED | OUTPATIENT
Start: 2024-03-21 | End: 2024-04-18

## 2024-03-21 RX ORDER — ATORVASTATIN CALCIUM 10 MG/1
10 TABLET, FILM COATED ORAL DAILY
Qty: 90 TABLET | Refills: 3 | Status: SHIPPED | OUTPATIENT
Start: 2024-03-21 | End: 2024-04-18

## 2024-03-21 RX ORDER — CEFUROXIME AXETIL 250 MG/1
6 TABLET ORAL
COMMUNITY

## 2024-03-21 RX ORDER — PREGABALIN 50 MG/1
50 CAPSULE ORAL 3 TIMES DAILY
Qty: 60 CAPSULE | Refills: 1 | Status: SHIPPED | OUTPATIENT
Start: 2024-03-21 | End: 2024-04-18

## 2024-03-21 RX ORDER — OXYCODONE AND ACETAMINOPHEN 10; 325 MG/1; MG/1
TABLET ORAL
Qty: 120 TABLET | Refills: 0 | Status: SHIPPED | OUTPATIENT
Start: 2024-03-21 | End: 2024-03-21

## 2024-03-21 RX ADMIN — CYANOCOBALAMIN 1000 MCG: 1000 INJECTION, SOLUTION INTRAMUSCULAR; SUBCUTANEOUS at 12:18

## 2024-03-21 NOTE — Clinical Note
Fyi only: pharmacy requires I route this note to you- discussed patient in clinic yesterday-I tried to document all the fill history I was able to get access to. I will have a reminder call before her next visit with you go out to say bring your medications and your pillbox.   Danielle

## 2024-03-21 NOTE — LETTER

## 2024-03-21 NOTE — PROGRESS NOTES
Clinic Administered Medication Documentation      Injectable Medication Documentation    Is there an active order (written within the past 365 days, with administrations remaining, not ) in the chart? Yes.     Patient was given Cyanocobalamin (B-12). Prior to medication administration, verified patient's identity using patient s name and date of birth. Please see MAR and medication order for additional information. Patient instructed to remain in clinic for 15 minutes and report any adverse reaction to staff immediately.    Vial/Syringe: Single dose vial. Was entire vial of medication used? Yes  Was this medication supplied by the patient? No  Is this a medication the patient will need to receive again? Yes. Verified that the patient has refills remaining in their prescription.    Name of provider who requested the medication administration: Dr. Meyer  Name of provider on site (faculty or community preceptor) at the time of performing the medication administration: Dr. Meyer    Date of next administration: N/A  Date of next office visit with provider to renew medication plan (must be seen annually): N/A

## 2024-03-21 NOTE — PROGRESS NOTES
Assessment/plan  1.  Chronic pain.  Refilled oxycodone.  PDMP is stable.  Urine drug screen pending at the time of this diagnosis.  Follow-up monthly.  2.  Edema.  This is likely from gabapentin.  Will try switching to pregabalin 50 mg twice daily and stopping the gabapentin.  Reassess next time.  Could consider increasing it, that is the pregabalin, if needed.  3.  Hypertension poorly controlled.  It seems that she has not been taking her lisinopril.  Will review this with our pharmacy team and make sure she has adequate refills.  Given her proteinuria, it is clearly indicated.  Will be started at 40 mg daily.  Will need a BMP definitely in 1 month when she returns.  Her antihypertensive regimen should be lisinopril 40 mg daily, diltiazem  mg daily and labetalol 100 mg twice daily  4.  History of hyperlipidemia.  Not adherent to statin.  Refilled atorvastatin.  10 mg daily is dose.    Genesis Bliss is a 58 year old, presenting for the following health issues:    Wagner Conrad is PCA and sets up meds.  There 6 hours/day.    Patient is here for follow-up of her chronic pain but also needs follow-up of her hypertension and med rec in general.  Her granddaughter sets up her medications.  The patient does not know her medications all that well.  She keeps them in different places.  When she has her medications, her daughter sets them up in a pillbox for her.    She has noticed more edema over the past several weeks since we have increased the gabapentin it.  Her neuropathic pain is under good control but her edema is more troublesome.  She is not having orthopnea or PND.    Overall she is pleased with the effect of the oxycodone.  She tolerates it well.  No adverse effects.  No constipation.        10/21/2021     3:47 PM 10/26/2023     5:54 PM 2/22/2024    10:27 AM   PHQ-9 SCORE   PHQ-9 Total Score MyChart   19 (Moderately severe depression)   PHQ-9 Total Score 4 17 19           5/6/2021    11:44  AM 5/6/2021    11:47 AM 10/26/2023     5:54 PM   JUAN-7 SCORE   Total Score 16 16 12           12/21/2023    10:23 AM 2/22/2024    10:33 AM 3/21/2024    10:14 AM   PEG Score   PEG Total Score 7 8.33 6       Review of PDMP today shows no red flags or unexpected prescriptions     PDMP Review         Value Time User    State PDMP site checked  Yes 2/22/2024  1:04 PM Jose Angel Meyer MD          Last CSA Agreement:   CSA -- Patient Level:     [Media Unavailable] Controlled Substance Agreement - Opioid - Scan on 12/14/2021  8:56 AM       Last UDS: 2/22/2024  No results found for any visits on 03/21/24.                  Objective    BP (!) 168/92   Pulse 109   Temp 97.4  F (36.3  C) (Oral)   Resp 16   LMP  (LMP Unknown)   SpO2 98%   There is no height or weight on file to calculate BMI.  Physical Exam   Blood pressure noted.  Chest clear.  Heart tachycardic.  Extremities 2+ edema to the mid knee.    BMP unremarkable.    Signed Electronically by: Jose Angel Meyer MD

## 2024-04-18 ENCOUNTER — OFFICE VISIT (OUTPATIENT)
Dept: FAMILY MEDICINE | Facility: CLINIC | Age: 59
End: 2024-04-18
Payer: COMMERCIAL

## 2024-04-18 VITALS
OXYGEN SATURATION: 96 % | DIASTOLIC BLOOD PRESSURE: 95 MMHG | TEMPERATURE: 98.3 F | SYSTOLIC BLOOD PRESSURE: 172 MMHG | HEART RATE: 113 BPM | RESPIRATION RATE: 16 BRPM

## 2024-04-18 DIAGNOSIS — R60.9 EDEMA, UNSPECIFIED TYPE: ICD-10-CM

## 2024-04-18 DIAGNOSIS — R80.9 MICROALBUMINURIA: ICD-10-CM

## 2024-04-18 DIAGNOSIS — G89.4 CHRONIC PAIN SYNDROME: ICD-10-CM

## 2024-04-18 DIAGNOSIS — R14.3 FLATULENCE, ERUCTATION AND GAS PAIN: ICD-10-CM

## 2024-04-18 DIAGNOSIS — E11.42 TYPE 2 DIABETES MELLITUS WITH DIABETIC POLYNEUROPATHY, WITH LONG-TERM CURRENT USE OF INSULIN (H): ICD-10-CM

## 2024-04-18 DIAGNOSIS — I10 ESSENTIAL HYPERTENSION: ICD-10-CM

## 2024-04-18 DIAGNOSIS — Z79.4 TYPE 2 DIABETES MELLITUS WITH DIABETIC POLYNEUROPATHY, WITH LONG-TERM CURRENT USE OF INSULIN (H): ICD-10-CM

## 2024-04-18 DIAGNOSIS — E87.20 METABOLIC ACIDOSIS: ICD-10-CM

## 2024-04-18 DIAGNOSIS — R14.1 FLATULENCE, ERUCTATION AND GAS PAIN: ICD-10-CM

## 2024-04-18 DIAGNOSIS — J45.40 MODERATE PERSISTENT ASTHMA WITHOUT COMPLICATION: ICD-10-CM

## 2024-04-18 DIAGNOSIS — I10 BENIGN ESSENTIAL HYPERTENSION: ICD-10-CM

## 2024-04-18 DIAGNOSIS — N18.31 CKD STAGE G3A/A3, GFR 45-59 AND ALBUMIN CREATININE RATIO >300 MG/G (H): Primary | ICD-10-CM

## 2024-04-18 DIAGNOSIS — R80.1 PERSISTENT PROTEINURIA: ICD-10-CM

## 2024-04-18 DIAGNOSIS — R14.2 FLATULENCE, ERUCTATION AND GAS PAIN: ICD-10-CM

## 2024-04-18 DIAGNOSIS — R63.2 HYPERPHAGIA: ICD-10-CM

## 2024-04-18 PROCEDURE — 96372 THER/PROPH/DIAG INJ SC/IM: CPT | Performed by: FAMILY MEDICINE

## 2024-04-18 PROCEDURE — 80306 DRUG TEST PRSMV INSTRMNT: CPT | Performed by: FAMILY MEDICINE

## 2024-04-18 PROCEDURE — 99214 OFFICE O/P EST MOD 30 MIN: CPT | Mod: 25 | Performed by: FAMILY MEDICINE

## 2024-04-18 RX ORDER — DAPAGLIFLOZIN 10 MG/1
10 TABLET, FILM COATED ORAL DAILY
Qty: 90 TABLET | Refills: 3 | Status: SHIPPED | OUTPATIENT
Start: 2024-04-18 | End: 2024-08-16

## 2024-04-18 RX ORDER — PHENTERMINE HYDROCHLORIDE 37.5 MG/1
37.5 TABLET ORAL
Qty: 90 TABLET | Refills: 0 | Status: SHIPPED | OUTPATIENT
Start: 2024-04-18

## 2024-04-18 RX ORDER — LISINOPRIL 40 MG/1
40 TABLET ORAL AT BEDTIME
Qty: 90 TABLET | Refills: 3 | Status: SHIPPED | OUTPATIENT
Start: 2024-04-18

## 2024-04-18 RX ORDER — BUDESONIDE AND FORMOTEROL FUMARATE DIHYDRATE 80; 4.5 UG/1; UG/1
AEROSOL RESPIRATORY (INHALATION)
Qty: 20.4 G | Refills: 11 | Status: SHIPPED | OUTPATIENT
Start: 2024-04-18

## 2024-04-18 RX ORDER — AMOXICILLIN 250 MG
1 CAPSULE ORAL 2 TIMES DAILY
Qty: 60 TABLET | Refills: 11 | Status: SHIPPED | OUTPATIENT
Start: 2024-04-18

## 2024-04-18 RX ORDER — ONDANSETRON 4 MG/1
4 TABLET, ORALLY DISINTEGRATING ORAL EVERY 6 HOURS PRN
Qty: 10 TABLET | Refills: 2 | COMMUNITY
Start: 2024-04-18 | End: 2024-06-07

## 2024-04-18 RX ORDER — TORSEMIDE 10 MG/1
10 TABLET ORAL DAILY
Qty: 90 TABLET | Refills: 3 | Status: SHIPPED | OUTPATIENT
Start: 2024-04-18 | End: 2024-07-15

## 2024-04-18 RX ORDER — OXYCODONE AND ACETAMINOPHEN 10; 325 MG/1; MG/1
TABLET ORAL
Qty: 120 TABLET | Refills: 0 | Status: SHIPPED | OUTPATIENT
Start: 2024-04-18 | End: 2024-05-16

## 2024-04-18 RX ORDER — PREGABALIN 50 MG/1
50 CAPSULE ORAL 3 TIMES DAILY
Qty: 60 CAPSULE | Refills: 2 | Status: SHIPPED | OUTPATIENT
Start: 2024-04-18 | End: 2024-06-20

## 2024-04-18 RX ORDER — DILTIAZEM HYDROCHLORIDE 240 MG/1
240 CAPSULE, EXTENDED RELEASE ORAL DAILY
Qty: 30 CAPSULE | Refills: 11 | Status: SHIPPED | OUTPATIENT
Start: 2024-04-18

## 2024-04-18 RX ORDER — CITRIC ACID/SODIUM CITRATE 334-500MG
10 SOLUTION, ORAL ORAL 2 TIMES DAILY WITH MEALS
Qty: 473 ML | Refills: 11 | Status: SHIPPED | OUTPATIENT
Start: 2024-04-18 | End: 2024-05-16

## 2024-04-18 RX ORDER — SIMETHICONE 180 MG
CAPSULE ORAL
Qty: 60 CAPSULE | Refills: 11 | Status: SHIPPED | OUTPATIENT
Start: 2024-04-18 | End: 2024-09-16

## 2024-04-18 RX ORDER — LABETALOL 100 MG/1
100 TABLET, FILM COATED ORAL 2 TIMES DAILY
Qty: 180 TABLET | Refills: 3 | Status: SHIPPED | OUTPATIENT
Start: 2024-04-18

## 2024-04-18 RX ORDER — ATORVASTATIN CALCIUM 10 MG/1
10 TABLET, FILM COATED ORAL DAILY
Qty: 90 TABLET | Refills: 3 | Status: SHIPPED | OUTPATIENT
Start: 2024-04-18 | End: 2024-08-15

## 2024-04-18 RX ADMIN — CYANOCOBALAMIN 1000 MCG: 1000 INJECTION, SOLUTION INTRAMUSCULAR; SUBCUTANEOUS at 13:57

## 2024-04-18 NOTE — PROGRESS NOTES
Patient Active Problem List    Diagnosis Date Noted    Stage 3a chronic kidney disease (H) 12/31/2020     Priority: High     Persistent protienuria      Copper deficiency 04/05/2019     Priority: High     Identified in Aug, 2018--see discharge summary from [facility] in [place].      Chronic pain disorder 10/07/2016     Priority: High     Chronic Pain Diagnosis:  neuropathy  DIRE Total Score(s):14     ORT: 5    4 - 7 =  Moderate Risk   of future problems with Opioids  FAQ5: 50 November 20, 2020  Behavioral Health Consultation: 5/12/17 with Dr. Daisy Quezada  Personal Care Plan for Chronic Pain: 5/12/17  Opioid medication:oxycodone   Dose:10mg  Number of pills per month:60  Patient is being prescribed 10mg of oxycodone IR (Percocet) per day this is 15 mg Morphine Equivalents  Benzodiazepines Prescribed? No  Naloxone prescribed? YES February 16, 2023  RIOSORD 16:  15% risk      Clinic visit frequency required: Q 1 month Next visit due:   Controlled Substance/Opioid Treatment agreement on file (dated <12 months ago)?:    Date(s): pending  Last [place] verification: last visit  Items in red to be updated at each visit  Patient is followed by Data Unavailable for ongoing prescription of pain medication.  All refills should be approved by this provider, or covering partner.Chronic Pain     Chronic Pain Diagnosis:  neuropathy  DIRE Total Score(s):14     ORT: 5    4 - 7 =  Moderate Risk   of future problems with Opioids  FAQ5: 45/100 on 9/9/16, 40/100 on 11/15/16  55 October 29, 2020    Behavioral Health Consultation: 5/12/17 with Dr. Daisy Quezada  Personal Care Plan for Chronic Pain: 5/12/17  Opioid medication:oxycodone   Dose:10mg  Number of pills per month:90  Patient is being prescribed 45 MME  Benzodiazepines Prescribed? No  Naloxone prescribed? yes          Microalbuminuria 10/12/2015     Priority: High     Severely increased albuminuria      Type 2 diabetes mellitus with diabetic polyneuropathy (H) 10/12/2015      Priority: High    Essential hypertension 08/26/2013     Priority: High    F11.9 - Chronic, continuous use of opioids 08/10/2023     Priority: Medium    Acidosis 08/12/2022     Priority: Medium    Acute chest pain 08/12/2022     Priority: Medium    Arthritis of both acromioclavicular joints 04/08/2021     Priority: Medium     xray proven       Morbid obesity (H) 11/27/2020     Priority: Medium     Date of Surgery: 2006 RYGB Dr. Harper  Initial Weight: 360#  Initial BMI: 70.30      Hyperglycemia due to type 2 diabetes mellitus (H) 06/02/2020     Priority: Medium    Neck mass 10/11/2019     Priority: Medium     Added automatically from request for surgery [id number]      Metabolic acidosis 07/26/2019     Priority: Medium     reolved w/ discontinuation of ASA      Obesity 01/03/2019     Priority: Medium    Hernia of anterior abdominal wall 01/28/2017     Priority: Medium    Osteoarthritis of both knees 10/31/2016     Priority: Medium    Polyneuropathy due to secondary diabetes mellitus (H) 10/31/2016     Priority: Medium    Compression neuropathy of left lower extremity 02/19/2016     Priority: Medium    Bilateral ankle joint pain 01/15/2016     Priority: Medium    Esophageal reflux 04/07/2015     Priority: Medium    Urinary incontinence 02/19/2015     Priority: Medium    Asthma 08/04/2013     Priority: Medium    History of cholecystectomy 08/04/2013     Priority: Medium    H/O necrotizing fasciitis 08/04/2013     Priority: Medium    Hyperlipidemia 06/21/2013     Priority: Medium    Status post bariatric surgery 06/21/2013     Priority: Medium     Kim-en-Y in 2006      Severe episode of recurrent major depressive disorder, with psychotic features (H) 01/24/2013     Priority: Medium    Moderate persistent asthma without complication 11/15/2012     Priority: Medium    Persons encountering health services in other specified circumstances 11/15/2012     Priority: Medium     Tier 3    Status: Accept  Care Coordination Start Date:  02/27/12  State Tier Level: Level 3  Language/Barrier to Learning: No  Significant Mental Health Issues: Yes  Date Care Coordination Discontinued: 05/28/15    DX V65.8 REPLACED WITH [id number] (04/08/2013)      Abnormal Pap smear of cervix 04/22/2019     Priority: Low     3-12-15: Pap/HPV neg.  Plan: Given hx (mildly abn pap, hyster), recheck in 5 yrs.  10-11-11: Pap/HPV neg.  Plan: Given ASUS pap in 2010, recommend repeating pap in 1 year.  8/3/2010 ASCUS Pap with +BV- pt treated with Metrogel.  Pt needs repeat Pap in 1yr.  8/4/2008 Pt had total hyst for menorrhagia, pelvic pain, scar tissue, and fibroids.    3-12-15: Pap/HPV neg.  Plan: Given hx (mildly abn pap, hyster), recheck in 5 yrs.  10-11-11: Pap/HPV neg.  Plan: Given ASUS pap in 2010, recommend repeating pap in 1 year.  8/3/2010 ASCUS Pap with +BV- pt treated with Metrogel.  Pt needs repeat Pap in 1yr.  8/4/2008 Pt had total hyst for menorrhagia, pelvic pain, scar tissue, and fibroids.      Hernia 01/27/2017     Priority: Low    S/P hysterectomy 06/21/2013     Priority: Low     2008, For fibroids.  Cervix removed, but still has ovaries.      History of hysterectomy for benign disease 06/21/2013     Priority: Low     2008, For fibroids.  Cervix removed, but still has ovaries.      Carpal tunnel syndrome 11/15/2012     Priority: Low    Health Care Home 11/15/2012     Priority: Low     Tier 3    Status: Accept  Care Coordination Start Date: 02/27/12  State Tier Level: Level 3  Language/Barrier to Learning: No  Significant Mental Health Issues: Yes  Date Care Coordination Discontinued: 05/28/15    DX V65.8 REPLACED WITH [id number] (04/08/2013)        Chronic pain of both shoulders 12/23/2005     Priority: Low     There are no exam notes on file for this visit.  Chief Complaint   Patient presents with    Pain Management     CPM     Blood pressure (!) 172/95, pulse 113, temperature 98.3  F (36.8  C), temperature source Oral, resp. rate  16, SpO2 96%, not currently breastfeeding.  Patient is here for follow-up of chronic pain primarily.  Her pain control is satisfactory on the combination of pregabalin and oxycodone.  Functionally she is able to get about and do her ADLs.  She has had no aberrant behavior.  Her urine drug screens appropriate.  PDMP reviewed and is unremarkable.  She also states that she needs refills of her all all of her medications.  For her CKD and hypertension she takes lisinopril  , She is on Bicitra for metabolic acidosis, and she is on an SGLT2 inhibitor Farxiga 10 mg daily.  For her diabetes she is on Trulicity and Farxiga.  She tolerates them both.  For edema she should be taking torsemide 10 mg daily but is not currently.  She takes simethicone for gas and this works well for her.  She has obesity that is managed with the combination of GLP-1 and phentermine to suppress appetite.    She denies headache or worsening shortness of breath.  She says her edema is a little bit worse as result of not having the diuretic.    Objective blood pressure and pulse noted.  Chest clear.  Heart tachycardic but normal rhythm.  Extremities is difficult to appreciate her edema because of further compression stockings she has.  Her weight has been stable ranging from 1 95-2 05 over the past 4 years.  It is important to note that her weight prior to Kim-en-Y bypass in 2006 was 360 pounds and she had a BMI of 70.3 at that time    Assessment and plan:  1.  Chronic pain syndrome stable.  PDMP reviewed1.  Chronic Pain Syndrome Stable.  PDMP reviewed.  Oxycodone and pregabalin refilled.  2.  Hypertension not controlled.  She should be taking lisinopril 40, diltiazem 240 mg daily.  Labetalol 100 mg daily.  Recheck next time.  3.  CKD with proteinuria--she is technically CKD stage G3 A/A3 due to her proteinuria.  She should be on optimal therapy.  Will get a BMP and UA next visit.    4.  Type 2 diabetes.  Has been under very nice control with the  addition of a GLP-1.  A1c next visit.    Hemoglobin A1C   Date Value Ref Range Status   02/22/2024 5.6 0.0 - 5.6 % Final     Comment:     Normal <5.7%   Prediabetes 5.7-6.4%    Diabetes 6.5% or higher     Note: Adopted from ADA consensus guidelines.   11/27/2023 4.8 0.0 - 5.6 % Final     Comment:     Normal <5.7%   Prediabetes 5.7-6.4%    Diabetes 6.5% or higher     Note: Adopted from ADA consensus guidelines.   06/09/2023 5.6 0.0 - 5.6 % Final     Comment:     Normal <5.7%   Prediabetes 5.7-6.4%    Diabetes 6.5% or higher     Note: Adopted from ADA consensus guidelines.   11/20/2020 6.9 (H) 4.1 - 5.7 % Final   06/26/2020 8.8 (H) 4.1 - 5.7 % Final   06/02/2020 14.5 (H) 4.1 - 5.7 % Final      Results for orders placed or performed in visit on 04/18/24   Urine Drug Screen Clinic     Status: Abnormal   Result Value Ref Range    Cannabinoids (97-mfo-4-carboxy-9-THC) Not Detected Not Detected, Indeterminate    Phencyclidine Not Detected Not Detected, Indeterminate    Cocaine (Benzoylecgonine) Not Detected Not Detected, Indeterminate    Methamphetamine (d-Methamphetamine) Not Detected Not Detected, Indeterminate    Opiates (Morphine) Not Detected Not Detected, Indeterminate    Amphetamine (d-Amphetamine) Not Detected Not Detected, Indeterminate    Benzodiazepines (Nordiazepam) Not Detected Not Detected, Indeterminate    Tricyclic Antidepressants (Desipramine) Not Detected Not Detected, Indeterminate    Methadone Not Detected Not Detected, Indeterminate    Barbiturates (Butalbital) Not Detected Not Detected, Indeterminate    Oxycodone Detected (A) Not Detected, Indeterminate    Buprenorphine Not Detected Not Detected, Indeterminate

## 2024-04-18 NOTE — PROGRESS NOTES
Clinic Administered Medication Documentation      Injectable Medication Documentation    Is there an active order (written within the past 365 days, with administrations remaining, not ) in the chart? Yes.     Patient was given Cyanocobalamin (B-12). Prior to medication administration, verified patient's identity using patient s name and date of birth. Please see MAR and medication order for additional information. Patient instructed to remain in clinic for 15 minutes and report any adverse reaction to staff immediately.    Vial/Syringe: Single dose vial. Was entire vial of medication used? Yes  Was this medication supplied by the patient? No  Is this a medication the patient will need to receive again? Yes. Verified that the patient has refills remaining in their prescription.    Name of provider who requested the medication administration: Dr. Meyer  Name of provider on site (faculty or community preceptor) at the time of performing the medication administration: Dr. Meyer    Date of next administration: N/A  Date of next office visit with provider to renew medication plan (must be seen annually): N.A

## 2024-04-18 NOTE — PROGRESS NOTES
{PROVIDER CHARTING PREFERENCE:468938}    Genesis Bliss is a 58 year old, presenting for the following health issues:  Pain Management (CPM)  {(!) Visit Details have not yet been documented.  Please enter Visit Details and then use this list to pull in documentation. (Optional):783682}  HPI     {MA/LPN/RN Pre-Provider Visit Orders- hCG/UA/Strep (Optional):279991}  Pain History:  When did you first notice your pain? ***   Have you seen this provider for your pain in the past? Yes   Where in your body do you have pain? ***  Are you seeing anyone else for your pain? { :790474}        10/21/2021     3:47 PM 10/26/2023     5:54 PM 2/22/2024    10:27 AM   PHQ-9 SCORE   PHQ-9 Total Score MyChart   19 (Moderately severe depression)   PHQ-9 Total Score 4 17 19           5/6/2021    11:44 AM 5/6/2021    11:47 AM 10/26/2023     5:54 PM   JUAN-7 SCORE   Total Score 16 16 12           2/22/2024    10:33 AM 3/21/2024    10:14 AM 4/18/2024    12:51 PM   PEG Score   PEG Total Score 8.33 6 8.67           10/21/2021     3:47 PM 10/26/2023     5:54 PM 2/22/2024    10:27 AM   PHQ-9 SCORE   PHQ-9 Total Score MyChart   19 (Moderately severe depression)   PHQ-9 Total Score 4 17 19           5/6/2021    11:44 AM 5/6/2021    11:47 AM 10/26/2023     5:54 PM   JUAN-7 SCORE   Total Score 16 16 12           2/22/2024    10:33 AM 3/21/2024    10:14 AM 4/18/2024    12:51 PM   PEG Score   PEG Total Score 8.33 6 8.67       Chronic Pain Follow Up:    Location of pain: ***  Analgesia/pain control:    - Recent changes:  ***    - Overall control: {Pain control level :517882}    - Current treatments: ***   Adherence:     - Do you ever take more pain medicine than prescribed? {Yes ***/No :441495}    - When did you take your last dose of pain medicine?  ***   Adverse effects: {Yes ***/No :668169}   PDMP Review         Value Time User    State PDMP site checked  Yes 2/22/2024  1:04 PM Jose Angel Meyer MD          Last CSA Agreement:   CSA --  Patient Level:     [Media Unavailable] Controlled Substance Agreement - Opioid - Scan on 3/22/2024 10:15 AM   [Media Unavailable] Controlled Substance Agreement - Opioid - Scan on 12/14/2021  8:56 AM       Last UDS: 2/22/2024  {If newly prescribing or considering opioid therapy, the Opioid Risk Tool must be completed :878403}  {Pull in ORT results (Optional):898949}  {RIOSORD needs to be completed annually :259001}  {Pull in RIOSORD results (Optional):556189}    {Provider  Link to Pain Management SmartSet  Includes non-opioid pharmacological medications and referrals  :401056}  {additonal problems for provider to add (Optional):176633}    {ROS Picklists (Optional):517353}      Objective    BP (!) 174/99   Pulse 113   Temp 98.3  F (36.8  C) (Oral)   Resp 16   LMP  (LMP Unknown)   SpO2 96%   There is no height or weight on file to calculate BMI.  Physical Exam   {Exam List (Optional):922688}    {Diagnostic Test Results (Optional):350458}        Signed Electronically by: Jose Angel Meyer MD  {Email feedback regarding this note to [email or web address]   :935581}

## 2024-04-19 ENCOUNTER — TELEPHONE (OUTPATIENT)
Dept: FAMILY MEDICINE | Facility: CLINIC | Age: 59
End: 2024-04-19
Payer: COMMERCIAL

## 2024-04-19 DIAGNOSIS — E11.42 TYPE 2 DIABETES MELLITUS WITH DIABETIC POLYNEUROPATHY, WITH LONG-TERM CURRENT USE OF INSULIN (H): ICD-10-CM

## 2024-04-19 DIAGNOSIS — Z79.4 TYPE 2 DIABETES MELLITUS WITH DIABETIC POLYNEUROPATHY, WITH LONG-TERM CURRENT USE OF INSULIN (H): ICD-10-CM

## 2024-04-19 NOTE — TELEPHONE ENCOUNTER
-Dulaglutide 4.5 MG/0.5ML SOPN is out of stock pharmacy is wondering if you would like to prescribe a stronger strength or lower strength because they have those in stock   - need liquid medication for her kidneys

## 2024-04-30 ENCOUNTER — TELEPHONE (OUTPATIENT)
Dept: FAMILY MEDICINE | Facility: CLINIC | Age: 59
End: 2024-04-30
Payer: COMMERCIAL

## 2024-05-01 ENCOUNTER — TELEPHONE (OUTPATIENT)
Dept: FAMILY MEDICINE | Facility: CLINIC | Age: 59
End: 2024-05-01
Payer: COMMERCIAL

## 2024-05-01 DIAGNOSIS — E11.42 TYPE 2 DIABETES MELLITUS WITH DIABETIC POLYNEUROPATHY, WITH LONG-TERM CURRENT USE OF INSULIN (H): ICD-10-CM

## 2024-05-01 DIAGNOSIS — Z79.4 TYPE 2 DIABETES MELLITUS WITH DIABETIC POLYNEUROPATHY, WITH LONG-TERM CURRENT USE OF INSULIN (H): ICD-10-CM

## 2024-05-01 NOTE — TELEPHONE ENCOUNTER
Please call:  I sent in the 3 mg weekly dose for her to use.  I put 2 refills on it.   I would prefer to go back to the 4.5 mg dose when it's available

## 2024-05-01 NOTE — TELEPHONE ENCOUNTER
[facility] phone call message- medication clarification/question:    Full Medication Name: Trulicity    Dose: ?    Question: The pharmacy is 0.75 is the only one they have what should she do and the and they are telling her kidney medication is not covered by her insurance she doesn't know the name of it      Pharmacy confirmed as    [id number] [address] AT [facility]: Yes    OK to leave a message on voice mail? Yes    Primary language: English      needed? No    Call taken on May 1, 2024 at 4:38 PM by Ginger Ventura

## 2024-05-09 ENCOUNTER — TELEPHONE (OUTPATIENT)
Dept: FAMILY MEDICINE | Facility: CLINIC | Age: 59
End: 2024-05-09
Payer: COMMERCIAL

## 2024-05-09 NOTE — TELEPHONE ENCOUNTER
Spoke to [facility] Pharmacist regarding pt's Trulicity 3mg- he says that 2 boxes just came in today, so he will get it ready and pt can .     Patient notifed/ mvy

## 2024-05-10 ENCOUNTER — TRANSFERRED RECORDS (OUTPATIENT)
Dept: HEALTH INFORMATION MANAGEMENT | Facility: CLINIC | Age: 59
End: 2024-05-10
Payer: COMMERCIAL

## 2024-05-10 NOTE — TELEPHONE ENCOUNTER
Called pt back and she confirmed that she finally got her Trulicity 3mg/0.5ml fill yesterday from the pharmacy yesterday. Due to them being out of stock ever since 4/30/24.        BARBARA Dial  on 5/10/2024 at 3:45 PM

## 2024-05-10 NOTE — LETTER
June 7, 2024      Ruthy Álvarez  [address]    [place] [facility] [address]        Dear ,    We are writing to inform you of your test results.    These results are within the normal range for you.  Please follow up in the clinic as directed.     No results found from the In Basket message.    If you have any questions or concerns, please call the clinic at the number listed above.       Sincerely,      Provider Outside

## 2024-05-10 NOTE — TELEPHONE ENCOUNTER
Called pt back and she confirmed that she finally got her Trulicity fill yesterday from the pharmacy yesterday. Due to them being out of stock ever since 4/30/24.    Routed to Dr. Meyer for FYI. No action needed.     BARBARA Dial  on 5/10/2024 at 3:45 PM

## 2024-05-10 NOTE — TELEPHONE ENCOUNTER
Called pt back and she confirmed that she finally got her Trulicity 3mg/0.5ml fill yesterday from the pharmacy yesterday. Due to them being out of stock ever since 4/30/24.        BARBARA Dial  on 5/10/2024 at 3:45 PM         Patient notified.  He has not received the polio vaccine. Pt will utilize the health department to obtain vaccination series.

## 2024-05-16 ENCOUNTER — OFFICE VISIT (OUTPATIENT)
Dept: FAMILY MEDICINE | Facility: CLINIC | Age: 59
End: 2024-05-16
Payer: COMMERCIAL

## 2024-05-16 VITALS
RESPIRATION RATE: 16 BRPM | TEMPERATURE: 98.2 F | OXYGEN SATURATION: 97 % | DIASTOLIC BLOOD PRESSURE: 88 MMHG | WEIGHT: 199 LBS | SYSTOLIC BLOOD PRESSURE: 129 MMHG | HEART RATE: 104 BPM | BODY MASS INDEX: 39.52 KG/M2

## 2024-05-16 DIAGNOSIS — N18.31 CKD STAGE G3A/A3, GFR 45-59 AND ALBUMIN CREATININE RATIO >300 MG/G (H): ICD-10-CM

## 2024-05-16 DIAGNOSIS — Z79.4 TYPE 2 DIABETES MELLITUS WITH DIABETIC POLYNEUROPATHY, WITH LONG-TERM CURRENT USE OF INSULIN (H): ICD-10-CM

## 2024-05-16 DIAGNOSIS — E87.20 METABOLIC ACIDOSIS: ICD-10-CM

## 2024-05-16 DIAGNOSIS — E11.42 TYPE 2 DIABETES MELLITUS WITH DIABETIC POLYNEUROPATHY, WITH LONG-TERM CURRENT USE OF INSULIN (H): ICD-10-CM

## 2024-05-16 DIAGNOSIS — R60.9 EDEMA, UNSPECIFIED TYPE: ICD-10-CM

## 2024-05-16 DIAGNOSIS — R06.01 ORTHOPNEA: ICD-10-CM

## 2024-05-16 DIAGNOSIS — G89.4 CHRONIC PAIN SYNDROME: Primary | ICD-10-CM

## 2024-05-16 PROCEDURE — 36415 COLL VENOUS BLD VENIPUNCTURE: CPT | Performed by: FAMILY MEDICINE

## 2024-05-16 PROCEDURE — 80306 DRUG TEST PRSMV INSTRMNT: CPT | Performed by: FAMILY MEDICINE

## 2024-05-16 PROCEDURE — 99214 OFFICE O/P EST MOD 30 MIN: CPT | Mod: 25 | Performed by: FAMILY MEDICINE

## 2024-05-16 PROCEDURE — 80048 BASIC METABOLIC PNL TOTAL CA: CPT | Performed by: FAMILY MEDICINE

## 2024-05-16 PROCEDURE — 96372 THER/PROPH/DIAG INJ SC/IM: CPT | Performed by: FAMILY MEDICINE

## 2024-05-16 PROCEDURE — 83880 ASSAY OF NATRIURETIC PEPTIDE: CPT | Performed by: FAMILY MEDICINE

## 2024-05-16 RX ORDER — OXYCODONE AND ACETAMINOPHEN 10; 325 MG/1; MG/1
TABLET ORAL
Qty: 120 TABLET | Refills: 0 | Status: SHIPPED | OUTPATIENT
Start: 2024-05-16 | End: 2024-06-18

## 2024-05-16 RX ORDER — CITRIC ACID/SODIUM CITRATE 334-500MG
10 SOLUTION, ORAL ORAL 2 TIMES DAILY WITH MEALS
Qty: 473 ML | Refills: 11 | Status: SHIPPED | OUTPATIENT
Start: 2024-05-16

## 2024-05-16 RX ADMIN — CYANOCOBALAMIN 1000 MCG: 1000 INJECTION, SOLUTION INTRAMUSCULAR; SUBCUTANEOUS at 11:41

## 2024-05-16 NOTE — LETTER
May 24, 2024      Ruthy Álvarez  [address]    [place] [facility] [address]        Dear ,    We are writing to inform you of your test results.    Kidney tests are mildly abnormal but stable.   No changes are needed for now.   F/up as planned in 1 month.   C Fallert     Resulted Orders   Urine Drug Screen Clinic   Result Value Ref Range    Cannabinoids (71-edc-2-carboxy-9-THC) Not Detected Not Detected, Indeterminate      Comment:      Cutoff for a negative cannabinoid is 50 ng/mL or less.    Phencyclidine Not Detected Not Detected, Indeterminate      Comment:      Cutoff for a negative PCP is 25 ng/mL or less.    Cocaine (Benzoylecgonine) Not Detected Not Detected, Indeterminate      Comment:      Cutoff for a negative cocaine is 150 ng/ml or less.    Methamphetamine (d-Methamphetamine) Not Detected Not Detected, Indeterminate      Comment:      Cutoff for a negative methamphetamine is 500 ng/ml or less.    Opiates (Morphine) Not Detected Not Detected, Indeterminate      Comment:      Cutoff for a negative opiate is 100 ng/ml or less.    Amphetamine (d-Amphetamine) Not Detected Not Detected, Indeterminate      Comment:      Cutoff for a negative amphetamine is 500 ng/mL or less.    Benzodiazepines (Nordiazepam) Not Detected Not Detected, Indeterminate      Comment:      Cutoff for a negative benzodiazepine is 150 ng/ml or less.    Tricyclic Antidepressants (Desipramine) Not Detected Not Detected, Indeterminate      Comment:      Cutoff for a negative tricyclic antidepressant is 300 ng/ml or less.    Methadone Not Detected Not Detected, Indeterminate      Comment:      Cutoff for a negative methadone is 200 ng/ml or less.    Barbiturates (Butalbital) Not Detected Not Detected, Indeterminate      Comment:      Cutoff for a negative barbituate is 200 ng/ml or less.    Oxycodone Detected (A) Not Detected, Indeterminate      Comment:      Cutoff for a positive oxycodone is greater than 100 ng/ml.  This  is an unconfirmed screening result to be used for medical purposes only.     Buprenorphine Not Detected Not Detected, Indeterminate      Comment:      Cutoff for a negative buprenorphine is 10 ng/ml or less.   Basic metabolic panel   Result Value Ref Range    Sodium 139 135 - 145 mmol/L      Comment:      Reference intervals for this test were updated on 09/26/2023 to more accurately reflect our healthy population. There may be differences in the flagging of prior results with similar values performed with this method. Interpretation of those prior results can be made in the context of the updated reference intervals.     Potassium 4.0 3.4 - 5.3 mmol/L    Chloride 106 98 - 107 mmol/L    Carbon Dioxide (CO2) 21 (L) 22 - 29 mmol/L    Anion Gap 12 7 - 15 mmol/L    Urea Nitrogen 25.9 (H) 6.0 - 20.0 mg/dL    Creatinine 1.23 (H) 0.51 - 0.95 mg/dL    GFR Estimate 51 (L) >60 mL/min/1.73m2    Calcium 7.4 (L) 8.6 - 10.0 mg/dL    Glucose 315 (H) 70 - 99 mg/dL   N terminal pro BNP outpatient   Result Value Ref Range    N Terminal Pro BNP Outpatient 599 0 - 900 pg/mL      Comment:      Reference range shown and results flagged as abnormal are for the outpatient, non acute settings. Establishing a baseline value for each individual patient is useful for follow-up.    Suggested inpatient cut points for confirming diagnosis of CHF in an acute setting are:  >450 pg/mL (age 18 to less than 50)  >900 pg/mL (age 50 to less than 75)  >1800 pg/mL (75 yrs and older)    An inpatient or emergency department NT-proPBNP <300 pg/mL effectively rules out acute CHF, with 99% negative predictive value.           If you have any questions or concerns, please call the clinic at the number listed above.       Sincerely,      Jose Angel Meyer MD

## 2024-05-16 NOTE — PROGRESS NOTES
Assessment & Plan     Chronic pain syndrome  Stable.  Pregabalin is helping her neuropathic pain and oxycodone is helping her polyarticular osteoarthritis.  No aberrant behavior.  PDMP unremarkable.  Follow-up in 6 weeks.  I will be out of town in early June and will send myself a message to predated prescription to be filled about 1 month from today.  - U[facility]  - oxyCODONE-acetaminophen (PERCOCET)  MG per tablet; Take 2 tablets by mouth at bedtime. May also take 1 tablet every 6 hours as needed for severe pain.    CKD stage G3a/A3, GFR 45-59 and albumin creatinine ratio >300 mg/g (H)  Stable, but with diuretic we should add a BMP today.  She has been out of her Bicitra and I refilled this today.  - Basic metabolic panel; Future  - Basic metabolic panel    Metabolic acidosis    - sodium citrate-citric acid (BICITRA) 500-334 MG/5ML solution; Take 10 mLs by mouth 2 times daily (with meals)  - Basic metabolic panel; Future  - Basic metabolic panel    Edema, unspecified type  polyfactorial  - Compression Sleeve/Stocking Order for DME - ONLY FOR DME  - Basic metabolic panel; Future  - Basic metabolic panel    Orthopnea    - N terminal pro BNP outpatient; Future  - N terminal pro BNP outpatient                No follow-ups on file.    Genesis Bliss is a 58 year old, presenting for the following health issues:  Pain Management (CPM)    HPI   Patient is here for follow-up chronic pain.  She is on oxycodone scheduled.  She has osteoarthritis in multiple joints.  Her neuropathic pain in her feet is a burning stabbing pain.  It seems to be somewhat better on pregabalin as opposed to gabapentin, but the edema that I was concerned about possibly being from gabapentin has not resolved.  She is respecters for developing edema including her increased BMI, diltiazem, and pregabalin use.  She does not have orthopnea or PND.      Pain History:  When did you first notice your pain?  Pain is  chronic  Have you seen this provider for your pain in the past? Yes   Where in your body do you have pain?  Legs primarily  Are you seeing anyone else for your pain?  No        10/21/2021     3:47 PM 10/26/2023     5:54 PM 2/22/2024    10:27 AM   PHQ-9 SCORE   PHQ-9 Total Score MyChart   19 (Moderately severe depression)   PHQ-9 Total Score 4 17 19           5/6/2021    11:44 AM 5/6/2021    11:47 AM 10/26/2023     5:54 PM   JUAN-7 SCORE   Total Score 16 16 12           3/21/2024    10:14 AM 4/18/2024    12:51 PM 5/16/2024    10:48 AM   PEG Score   PEG Total Score 6 8.67 8           10/21/2021     3:47 PM 10/26/2023     5:54 PM 2/22/2024    10:27 AM   PHQ-9 SCORE   PHQ-9 Total Score MyChart   19 (Moderately severe depression)   PHQ-9 Total Score 4 17 19           5/6/2021    11:44 AM 5/6/2021    11:47 AM 10/26/2023     5:54 PM   JUAN-7 SCORE   Total Score 16 16 12           3/21/2024    10:14 AM 4/18/2024    12:51 PM 5/16/2024    10:48 AM   PEG Score   PEG Total Score 6 8.67 8         PDMP Review         Value Time User    State PDMP site checked  Yes 2/22/2024  1:04 PM Jose Angel Meyer MD          Last CSA Agreement:   CSA -- Patient Level:     [Media Unavailable] Controlled Substance Agreement - Opioid - Scan on 3/22/2024 10:15 AM   [Media Unavailable] Controlled Substance Agreement - Opioid - Scan on 12/14/2021  8:56 AM                   Objective    BP (!) 162/90   Pulse 104   Temp 98.2  F (36.8  C) (Oral)   Resp 16   Wt 90.3 kg (199 lb)   LMP  (LMP Unknown)   SpO2 97%   BMI 39.52 kg/m    Blood pressure 129/88, pulse 104, temperature 98.2  F (36.8  C), temperature source Oral, resp. rate 16, weight 90.3 kg (199 lb), SpO2 97%, not currently breastfeeding.      Body mass index is 39.52 kg/m .  Physical Exam   Chest clear   heart regular rate and rhythm.    Extremities 2+ edema to the mid tibia    Signed Electronically by: Jose Angel Meyer MD

## 2024-05-17 LAB
ANION GAP SERPL CALCULATED.3IONS-SCNC: 12 MMOL/L (ref 7–15)
BUN SERPL-MCNC: 25.9 MG/DL (ref 6–20)
CALCIUM SERPL-MCNC: 7.4 MG/DL (ref 8.6–10)
CHLORIDE SERPL-SCNC: 106 MMOL/L (ref 98–107)
CREAT SERPL-MCNC: 1.23 MG/DL (ref 0.51–0.95)
DEPRECATED HCO3 PLAS-SCNC: 21 MMOL/L (ref 22–29)
EGFRCR SERPLBLD CKD-EPI 2021: 51 ML/MIN/1.73M2
GLUCOSE SERPL-MCNC: 315 MG/DL (ref 70–99)
NT-PROBNP SERPL-MCNC: 599 PG/ML (ref 0–900)
POTASSIUM SERPL-SCNC: 4 MMOL/L (ref 3.4–5.3)
SODIUM SERPL-SCNC: 139 MMOL/L (ref 135–145)

## 2024-05-24 NOTE — RESULT ENCOUNTER NOTE
Please call:  Kidney tests are mildly abnormal but stable.  No changes are needed for now.  F/up as planned in 1 month.  JIGNESH Meyer

## 2024-06-07 ENCOUNTER — VIRTUAL VISIT (OUTPATIENT)
Dept: SURGERY | Facility: CLINIC | Age: 59
End: 2024-06-07
Payer: COMMERCIAL

## 2024-06-07 VITALS — WEIGHT: 180 LBS | HEIGHT: 59 IN | BODY MASS INDEX: 36.29 KG/M2

## 2024-06-07 DIAGNOSIS — R11.0 NAUSEA: ICD-10-CM

## 2024-06-07 DIAGNOSIS — E11.42 TYPE 2 DIABETES MELLITUS WITH DIABETIC POLYNEUROPATHY, WITH LONG-TERM CURRENT USE OF INSULIN (H): ICD-10-CM

## 2024-06-07 DIAGNOSIS — N95.0 POST-MENOPAUSAL BLEEDING: ICD-10-CM

## 2024-06-07 DIAGNOSIS — Z79.4 TYPE 2 DIABETES MELLITUS WITH DIABETIC POLYNEUROPATHY, WITH LONG-TERM CURRENT USE OF INSULIN (H): ICD-10-CM

## 2024-06-07 DIAGNOSIS — K91.2 POSTOPERATIVE MALABSORPTION: Primary | ICD-10-CM

## 2024-06-07 PROCEDURE — 99443 PR PHYSICIAN TELEPHONE EVALUATION 21-30 MIN: CPT | Mod: 93 | Performed by: FAMILY MEDICINE

## 2024-06-07 RX ORDER — ONDANSETRON 4 MG/1
4 TABLET, ORALLY DISINTEGRATING ORAL EVERY 6 HOURS PRN
Qty: 30 TABLET | Refills: 2 | Status: SHIPPED | OUTPATIENT
Start: 2024-06-07 | End: 2024-09-05

## 2024-06-07 ASSESSMENT — PAIN SCALES - GENERAL: PAINLEVEL: EXTREME PAIN (8)

## 2024-06-07 NOTE — LETTER
6/7/2024      Ruthy Álvarez  [address]  [place] [facility] [address]      Dear Colleague,    Thank you for referring your patient, Ruthy Álvarez, to the [facility] [place]. Please see a copy of my visit note below.      Bariatric Follow Up Visit with a History of Previous Bariatric Surgery     Date of visit: 6/7/2024  Physician: Vy Carson MD, MD  Primary Care Provider:  Jose Angel Meyer   58 year old  female    Date of Surgery: 2006  Initial Weight: 360#  Initial BMI: 78.76  Today's Weight:   Wt Readings from Last 1 Encounters:   06/07/24 81.6 kg (180 lb)     Body mass index is 36.34 kg/m .      Assessment and Plan     Assessment: Ruthy is a 58 year old year old female who is 18 yrs s/p  Kim en Y Gastric Bypass with  Dr. Meredith Bliss TJ Álvarez feels as if she has achieved the goals she hoped to accomplish through bariatric surgery and weight loss.    Encounter Diagnoses   Name Primary?     Postoperative malabsorption Yes     Nausea      Type 2 diabetes mellitus with diabetic polyneuropathy, with long-term current use of insulin (H)          Current Outpatient Medications:      acetaminophen (TYLENOL) 500 MG tablet, Take 1 tablet (500 mg) by mouth 2 times daily as needed for mild pain, Disp: 30 tablet, Rfl: 0     Alcohol Swabs (PRO COMFORT ALCOHOL) 70 % PADS, 1 pad 4 times daily as needed (blood sugar testing) Use QID for blood sugar monitoring, Disp: 400 each, Rfl: 11     ondansetron (ZOFRAN ODT) 4 MG ODT tab, Take 1 tablet (4 mg) by mouth every 6 hours as needed for nausea, Disp: 30 tablet, Rfl: 2     Semaglutide, 1 MG/DOSE, (OZEMPIC) 4 MG/3ML pen, Inject 1 mg Subcutaneous once a week for 84 days, Disp: 9 mL, Rfl: 0     Semaglutide, 2 MG/DOSE, (OZEMPIC) 8 MG/3ML pen, Inject 2 mg Subcutaneous once a week, Disp: 9 mL, Rfl: 3     atorvastatin (LIPITOR) 10 MG tablet, Take 1 tablet (10 mg) by mouth daily, Disp:  90 tablet, Rfl: 3     blood glucose (NO BRAND SPECIFIED) lancets standard, Check fasting glucose 4 times daily.  For Accu check monitor, Disp: 360 each, Rfl: 11     blood glucose calibration (ONETOUCH ULTRA CONTROL) solution, See Admin Instructions, Disp: , Rfl:      blood glucose monitoring (ONE TOUCH ULTRA 2) meter device kit, See Admin Instructions, Disp: , Rfl:      budesonide-formoterol (SYMBICORT) 80-4.5 MCG/ACT Inhaler, Inhale 2 puffs once daily as needed, and may repeat every hour, maximum 12 puffs in 24 hours, Disp: 20.4 g, Rfl: 11     capsaicin (ZOSTRIX) 0.025 % external cream, Apply 3-4 times daily to knees, Disp: 118 mL, Rfl: 11     Continuous Blood Gluc Sensor (FREESTYLE ANIKET 14 DAY SENSOR) MISC, 1 each every 14 days Use 1 Sensor every 14 days. Use to read blood sugars per 's instructions., Disp: 2 each, Rfl: 11     Continuous Blood Gluc Sensor (FREESTYLE ANIKET 2 SENSOR) MISC, 1 each every 14 days Use 1 sensor every 14 days. Use to read blood sugars per 's instructions., Disp: 2 each, Rfl: 5     CONTOUR NEXT TEST test strip, USE TO TEST BLOOD GLUCOSE FOUR TIMES DAILY, Disp: 400 strip, Rfl: 11     dapagliflozin (FARXIGA) 10 MG TABS tablet, Take 1 tablet (10 mg) by mouth daily, Disp: 90 tablet, Rfl: 3     diclofenac (VOLTAREN) 1 % topical gel, Apply 4 g topically 3 times daily To top of both shoulders in area of pain, Disp: 350 g, Rfl: 2     diltiazem ER (DILT-XR) 240 MG 24 hr ER beaded capsule, Take 1 capsule (240 mg) by mouth daily New dose, Disp: 30 capsule, Rfl: 11     Dulaglutide 3 MG/0.5ML SOPN, Inject 3 mg Subcutaneous once a week, Disp: 2 mL, Rfl: 2     Dulaglutide 4.5 MG/0.5ML SOPN, Inject 4.5 mg Subcutaneous once a week, Disp: 2 mL, Rfl: 11     ferrous sulfate (IRON) 325 (65 FE) MG tablet, Take 1 tablet (325 mg) by mouth daily (with breakfast), Disp: 30 tablet, Rfl: 2     fluocinolone (SYNALAR) 0.01 % solution, APPLY 1-2 ML TOPICALLY TO AFFECTED AREA 1-2 TIMES PER DAY.,  Disp: , Rfl:      labetalol (NORMODYNE) 100 MG tablet, Take 1 tablet (100 mg) by mouth 2 times daily, Disp: 180 tablet, Rfl: 3     Lancet Devices (EASY MINI EJECT LANCING DEVICE) MISC, See Admin Instructions, Disp: , Rfl:      Lancets 30G MISC, USE TO TEST BLOOD GLUCOSE FOUR TIMES DAILY, Disp: 400 each, Rfl: 11     latanoprost (XALATAN) 0.005 % ophthalmic solution, INSTILL 1 DROP IN BOTH EYES DAILY AT BEDTIME, Disp: , Rfl:      lidocaine (LMX4) 4 % external cream, Apply topically once as needed for mild pain (left knee), Disp: 45 g, Rfl: 11     lisinopril (ZESTRIL) 40 MG tablet, Take 1 tablet (40 mg) by mouth at bedtime, Disp: 90 tablet, Rfl: 3     naloxone (NARCAN) 4 MG/0.1ML nasal spray, Spray 1 spray (4 mg) into one nostril alternating nostrils once as needed for opioid reversal Every 2-3 minutes until patient responsive or EMS arrives, Disp: 0.2 mL, Rfl: 0     order for DME, Equipment being ordered: wheeled walker with seat, Disp: 1 Device, Rfl: 0     oxyCODONE-acetaminophen (PERCOCET)  MG per tablet, Take 2 tablets by mouth at bedtime. May also take 1 tablet every 6 hours as needed for severe pain., Disp: 120 tablet, Rfl: 0     phentermine (ADIPEX-P) 37.5 MG tablet, Take 1 tablet (37.5 mg) by mouth every morning (before breakfast), Disp: 90 tablet, Rfl: 0     pregabalin (LYRICA) 50 MG capsule, Take 1 capsule (50 mg) by mouth 3 times daily, Disp: 60 capsule, Rfl: 2     senna-docusate (SENOKOT-S/PERICOLACE) 8.6-50 MG tablet, Take 1 tablet by mouth 2 times daily, Disp: 60 tablet, Rfl: 11     Simethicone 180 MG CAPS, Use 1 softgel as needed for gas, Disp: 60 capsule, Rfl: 11     sodium citrate-citric acid (BICITRA) 500-334 MG/5ML solution, Take 10 mLs by mouth 2 times daily (with meals), Disp: 473 mL, Rfl: 11     SUMAtriptan (IMITREX STATDOSE) 6 MG/0.5ML pen injector kit, Inject 6 mg Subcutaneous at onset of headache for migraine, Disp: , Rfl:      torsemide (DEMADEX) 10 MG tablet, Take 1 tablet (10 mg) by  "mouth daily, Disp: 90 tablet, Rfl: 3    Current Facility-Administered Medications:      cyanocobalamin injection 1,000 mcg, 1,000 mcg, Intramuscular, Q30 Days, Jose Angel Meyer MD, 1,000 mcg at 05/16/24 1141    Plan: Unable to get Trulicity 4.5mg. Change to Ozempic 1.0mg. she would like a 3 month supply then will increase to 2.0mg 3 months with 3 refills. Zofran prn nausea. Labs were ordered for annual labs but not drawn. For monthly bleeding [facility] referral.    Return in about 3 months (around 9/7/2024).    Bariatric Surgery Review     Interim History/LifeChanges: Blood sugars have been high. Has not been able to get Trulicity d/t shortage. Last injection 4.5mg 2 weeks ago and pharmacy states they are out. Has had swelling in her legs. Demadex has helped some. Getting [email or web address] 22/wk. B: eggs and pancakes L: chicken bryan or hot dog D: macaroni or steak and potatoes, veggies. Breathing OK. Taking Farxiga. Has been bleeding monthly though she had a hysterectomy for the past 9 months.    Patient Concerns: out of Trulicity  Appetite (1-10): down  GERD: no    Medication changes: diuretic increased    Vitamin Intake:   B-12   Injections at the clinic monthly   MVI  one   Vitamin D  gel   Calcium        Other                LABS: ordered    Nausea yes  Vomiting no  Constipation no  Diarrhea yes  Rashes yes  Hair Loss yes  Calf tenderness no  Breathing difficulty no  Reactive Hypoglycemia avoids  Light Headedness no   Moods stable and \"good\"    12 point ROS as above and otherwise negative      Habits:  Alcohol: no  Tobacco: no  Caffeine no  NSAIDS no  Exercise Routine: walking  3 meals/day yes  Protein first yes ?grams/day  Water Separate from meals yes  Calorie Containing Beverages apple juice and orange juice that come with meals  Restaurant eating/wk <1  Sleeping not well maybe 45 min spurts  Stress moderate to high  CPAP: NA  Contraception: hyst  DEXA:    Social History     Social History "     Socioeconomic History     Marital status: Single     Spouse name: Not on file     Number of children: Not on file     Years of education: Not on file     Highest education level: Not on file   Occupational History     Not on file   Tobacco Use     Smoking status: Never     Smokeless tobacco: Never   Vaping Use     Vaping status: Never Used   Substance and Sexual Activity     Alcohol use: No     Drug use: No     Sexual activity: Never   Other Topics Concern     Parent/sibling w/ CABG, MI or angioplasty before 65F 55M? No   Social History Narrative    Lives alone. [place], other daughter lives here 2 sons live in the [facility]     Social Determinants of Health     Financial Resource Strain: Low Risk  (9/22/2023)    Financial Resource Strain      Within the past 12 months, have you or your family members you live with been unable to get utilities (heat, electricity) when it was really needed?: No   Food Insecurity: Low Risk  (9/22/2023)    Food Insecurity      Within the past 12 months, did you worry that your food would run out before you got money to buy more?: No      Within the past 12 months, did the food you bought just not last and you didn t have money to get more?: No   Transportation Needs: Low Risk  (9/22/2023)    Transportation Needs      Within the past 12 months, has lack of transportation kept you from medical appointments, getting your medicines, non-medical meetings or appointments, work, or from getting things that you need?: No   Physical Activity: Not on file   Stress: Not on file   Social Connections: Unknown (4/28/2023)    Received from [facility], [facility]    Social Connections      Frequency of Communication with Friends and Family: Not on file   Interpersonal Safety: Low Risk  (5/16/2024)    Interpersonal Safety      Do you feel physically and emotionally safe where you currently live?: Yes      Within  the past 12 months, have you been hit, slapped, kicked or otherwise physically hurt by someone?: No      Within the past 12 months, have you been humiliated or emotionally abused in other ways by your partner or ex-partner?: No   Housing Stability: Low Risk  (9/22/2023)    Housing Stability      Do you have housing? : Yes      Are you worried about losing your housing?: No       Past Medical History     Past Medical History:   Diagnosis Date     Abnormal cervical Papanicolaou smear with positive human papillomavirus deoxyribonucleic acid test      Ankle joint pain     bilateral     Anxiety      Arthritis      Arthritis     rheumatoid arthritis     Asthma      Carpal tunnel syndrome     bilateral     Compression neuropathy      Depression      Depressive disorder      Diabetes (H)      Diabetes (H)     type II     Diabetic nephropathy (H)      DVT of lower extremity, bilateral (H) 01/01/2011    right     Esophageal reflux      Gastroenteritis      Hernia 01/27/2017     History of blood clots      History of blood transfusion      Hyperlipidemia      Hypertension      Hypertension      Infectious fasciitis 01/01/2011    abdominal wall      Insomnia      Neck pain      Necrotizing fasciitis (H) 11/15/2012     Necrotizing fasciitis (H)      Neuropathy      Obesity      Osteoarthritis of both knees 10/31/2016     Pulmonary embolism and infarction (H)      SOB (shortness of breath)     with exertion     Tarsal tunnel syndrome      Uncomplicated asthma      Urinary incontinence      Problem List     Patient Active Problem List   Diagnosis     Chronic pain of both shoulders     Abnormal Pap smear of cervix     Moderate persistent asthma without complication     Carpal tunnel syndrome     Health Care Home     Severe episode of recurrent major depressive disorder, with psychotic features (H)     S/P hysterectomy     Hyperlipidemia     Status post bariatric surgery     Essential hypertension     Urinary incontinence      Esophageal reflux     Microalbuminuria     Type 2 diabetes mellitus with diabetic polyneuropathy (H)     Bilateral ankle joint pain     Chronic pain disorder     Asthma     Compression neuropathy of left lower extremity     Hernia     Hernia of anterior abdominal wall     Obesity     Osteoarthritis of both knees     Polyneuropathy due to secondary diabetes mellitus (H)     Copper deficiency     Metabolic acidosis     Neck mass     Persons encountering health services in other specified circumstances     Hyperglycemia due to type 2 diabetes mellitus (H)     History of cholecystectomy     History of hysterectomy for benign disease     Morbid obesity (H)     CKD stage G3a/A3, GFR 45-59 and albumin creatinine ratio >300 mg/g (H)     Arthritis of both acromioclavicular joints     H/O necrotizing fasciitis     Acidosis     Acute chest pain     F11.9 - Chronic, continuous use of opioids     Medications       Current Outpatient Medications:      acetaminophen (TYLENOL) 500 MG tablet, Take 1 tablet (500 mg) by mouth 2 times daily as needed for mild pain, Disp: 30 tablet, Rfl: 0     Alcohol Swabs (PRO COMFORT ALCOHOL) 70 % PADS, 1 pad 4 times daily as needed (blood sugar testing) Use QID for blood sugar monitoring, Disp: 400 each, Rfl: 11     ondansetron (ZOFRAN ODT) 4 MG ODT tab, Take 1 tablet (4 mg) by mouth every 6 hours as needed for nausea, Disp: 30 tablet, Rfl: 2     Semaglutide, 1 MG/DOSE, (OZEMPIC) 4 MG/3ML pen, Inject 1 mg Subcutaneous once a week for 84 days, Disp: 9 mL, Rfl: 0     Semaglutide, 2 MG/DOSE, (OZEMPIC) 8 MG/3ML pen, Inject 2 mg Subcutaneous once a week, Disp: 9 mL, Rfl: 3     atorvastatin (LIPITOR) 10 MG tablet, Take 1 tablet (10 mg) by mouth daily, Disp: 90 tablet, Rfl: 3     blood glucose (NO BRAND SPECIFIED) lancets standard, Check fasting glucose 4 times daily.  For Accu check monitor, Disp: 360 each, Rfl: 11     blood glucose calibration (ONETOUCH ULTRA CONTROL) solution, See Admin Instructions,  Disp: , Rfl:      blood glucose monitoring (ONE TOUCH ULTRA 2) meter device kit, See Admin Instructions, Disp: , Rfl:      budesonide-formoterol (SYMBICORT) 80-4.5 MCG/ACT Inhaler, Inhale 2 puffs once daily as needed, and may repeat every hour, maximum 12 puffs in 24 hours, Disp: 20.4 g, Rfl: 11     capsaicin (ZOSTRIX) 0.025 % external cream, Apply 3-4 times daily to knees, Disp: 118 mL, Rfl: 11     Continuous Blood Gluc Sensor (FREESTYLE ANIKET 14 DAY SENSOR) MISC, 1 each every 14 days Use 1 Sensor every 14 days. Use to read blood sugars per 's instructions., Disp: 2 each, Rfl: 11     Continuous Blood Gluc Sensor (FREESTYLE ANIKET 2 SENSOR) MISC, 1 each every 14 days Use 1 sensor every 14 days. Use to read blood sugars per 's instructions., Disp: 2 each, Rfl: 5     CONTOUR NEXT TEST test strip, USE TO TEST BLOOD GLUCOSE FOUR TIMES DAILY, Disp: 400 strip, Rfl: 11     dapagliflozin (FARXIGA) 10 MG TABS tablet, Take 1 tablet (10 mg) by mouth daily, Disp: 90 tablet, Rfl: 3     diclofenac (VOLTAREN) 1 % topical gel, Apply 4 g topically 3 times daily To top of both shoulders in area of pain, Disp: 350 g, Rfl: 2     diltiazem ER (DILT-XR) 240 MG 24 hr ER beaded capsule, Take 1 capsule (240 mg) by mouth daily New dose, Disp: 30 capsule, Rfl: 11     Dulaglutide 3 MG/0.5ML SOPN, Inject 3 mg Subcutaneous once a week, Disp: 2 mL, Rfl: 2     Dulaglutide 4.5 MG/0.5ML SOPN, Inject 4.5 mg Subcutaneous once a week, Disp: 2 mL, Rfl: 11     ferrous sulfate (IRON) 325 (65 FE) MG tablet, Take 1 tablet (325 mg) by mouth daily (with breakfast), Disp: 30 tablet, Rfl: 2     fluocinolone (SYNALAR) 0.01 % solution, APPLY 1-2 ML TOPICALLY TO AFFECTED AREA 1-2 TIMES PER DAY., Disp: , Rfl:      labetalol (NORMODYNE) 100 MG tablet, Take 1 tablet (100 mg) by mouth 2 times daily, Disp: 180 tablet, Rfl: 3     Lancet Devices (EASY MINI EJECT LANCING DEVICE) MISC, See Admin Instructions, Disp: , Rfl:      Lancets 30G MISC, USE  TO TEST BLOOD GLUCOSE FOUR TIMES DAILY, Disp: 400 each, Rfl: 11     latanoprost (XALATAN) 0.005 % ophthalmic solution, INSTILL 1 DROP IN BOTH EYES DAILY AT BEDTIME, Disp: , Rfl:      lidocaine (LMX4) 4 % external cream, Apply topically once as needed for mild pain (left knee), Disp: 45 g, Rfl: 11     lisinopril (ZESTRIL) 40 MG tablet, Take 1 tablet (40 mg) by mouth at bedtime, Disp: 90 tablet, Rfl: 3     naloxone (NARCAN) 4 MG/0.1ML nasal spray, Spray 1 spray (4 mg) into one nostril alternating nostrils once as needed for opioid reversal Every 2-3 minutes until patient responsive or EMS arrives, Disp: 0.2 mL, Rfl: 0     order for DME, Equipment being ordered: wheeled walker with seat, Disp: 1 Device, Rfl: 0     oxyCODONE-acetaminophen (PERCOCET)  MG per tablet, Take 2 tablets by mouth at bedtime. May also take 1 tablet every 6 hours as needed for severe pain., Disp: 120 tablet, Rfl: 0     phentermine (ADIPEX-P) 37.5 MG tablet, Take 1 tablet (37.5 mg) by mouth every morning (before breakfast), Disp: 90 tablet, Rfl: 0     pregabalin (LYRICA) 50 MG capsule, Take 1 capsule (50 mg) by mouth 3 times daily, Disp: 60 capsule, Rfl: 2     senna-docusate (SENOKOT-S/PERICOLACE) 8.6-50 MG tablet, Take 1 tablet by mouth 2 times daily, Disp: 60 tablet, Rfl: 11     Simethicone 180 MG CAPS, Use 1 softgel as needed for gas, Disp: 60 capsule, Rfl: 11     sodium citrate-citric acid (BICITRA) 500-334 MG/5ML solution, Take 10 mLs by mouth 2 times daily (with meals), Disp: 473 mL, Rfl: 11     SUMAtriptan (IMITREX STATDOSE) 6 MG/0.5ML pen injector kit, Inject 6 mg Subcutaneous at onset of headache for migraine, Disp: , Rfl:      torsemide (DEMADEX) 10 MG tablet, Take 1 tablet (10 mg) by mouth daily, Disp: 90 tablet, Rfl: 3    Current Facility-Administered Medications:      cyanocobalamin injection 1,000 mcg, 1,000 mcg, Intramuscular, Q30 Days, Jose Angel Meyer MD, 1,000 mcg at 05/16/24 1141   Surgical History     Past Surgical  "History  She has a past surgical history that includes gastric bypass (); Hysterectomy; gastric bypass ();  Section; Uterine Fibroid Surgery (); Abdominal Wall Surgery; REVISE MEDIAN N/CARPAL TUNNEL SURG (Right, 10/24/2014); Hysterectomy; Uterine Fibroid Surgery; knee surgery (Right); Colonoscopy (N/A, 3/15/2017); Colonoscopy (N/A, 3/16/2017); and Midline Insertion - Double Lumen (2021).    Objective-Exam     Constitutional:  Ht 1.499 m (4' 11.02\")   Wt 81.6 kg (180 lb)   LMP  (LMP Unknown)   BMI 36.34 kg/m    General:  Pleasant and in no acute distress     Psychiatric: alert and oriented X3, mood and affect normal    Counseling     We reviewed the important post op bariatric recommendations:  -eating 3 meals daily  -eating protein first, getting >60gm protein daily  -eating slowly, chewing food well  -avoiding/limiting calorie containing beverages  -drinking water 15-30 minutes before or after meals  -choosing wheat, not white with breads, crackers, pastas, aleisha, bagels, tortillas, rice  -limiting restaurant or cafeteria eating to twice a week or less    We discussed the importance of restorative sleep and stress management in maintaining a healthy weight.  We discussed the National Weight Control Registry healthy weight maintenance strategies and ways to optimize metabolism.  We discussed the importance of physical activity including cardiovascular and strength training in maintaining a healthier weight.    We discussed the importance of life-long vitamin supplementation and life-long  follow-up.    Ruthy was reminded that, to avoid marginal ulcers she should avoid tobacco at all, alcohol in excess, caffeine in excess, and NSAIDS (unless indicated for cardioprotection or othewise and opposed by a PPI).    Vy Carson MD, MD, FAAFP  [facility].  2024  11:08 AM  Total time spent on the date of this encounter doing: chart review, review of test " results, patient visit, physical exam, education, counseling, developing plan of care, and documenting = 30 minutes.  Virtual Visit Details    Type of service:  Telephone Visit   Phone call duration: 30 minutes   Originating Location (pt. Location): Home    Distant Location (provider location):  On-site        Again, thank you for allowing me to participate in the care of your patient.        Sincerely,        Vy Carson MD

## 2024-06-07 NOTE — PROGRESS NOTES
Bariatric Follow Up Visit with a History of Previous Bariatric Surgery     Date of visit: 6/7/2024  Physician: Vy Carson MD, MD  Primary Care Provider:  Jose Angel Meyer   58 year old  female    Date of Surgery: 2006  Initial Weight: 360#  Initial BMI: 78.76  Today's Weight:   Wt Readings from Last 1 Encounters:   06/07/24 81.6 kg (180 lb)     Body mass index is 36.34 kg/m .      Assessment and Plan     Assessment: Ruthy is a 58 year old year old female who is 18 yrs s/p  Kim en Y Gastric Bypass with  Dr. Meredith Álvarez feels as if she has achieved the goals she hoped to accomplish through bariatric surgery and weight loss.    Encounter Diagnoses   Name Primary?    Postoperative malabsorption Yes    Nausea     Type 2 diabetes mellitus with diabetic polyneuropathy, with long-term current use of insulin (H)          Current Outpatient Medications:     acetaminophen (TYLENOL) 500 MG tablet, Take 1 tablet (500 mg) by mouth 2 times daily as needed for mild pain, Disp: 30 tablet, Rfl: 0    Alcohol Swabs (PRO COMFORT ALCOHOL) 70 % PADS, 1 pad 4 times daily as needed (blood sugar testing) Use QID for blood sugar monitoring, Disp: 400 each, Rfl: 11    ondansetron (ZOFRAN ODT) 4 MG ODT tab, Take 1 tablet (4 mg) by mouth every 6 hours as needed for nausea, Disp: 30 tablet, Rfl: 2    Semaglutide, 1 MG/DOSE, (OZEMPIC) 4 MG/3ML pen, Inject 1 mg Subcutaneous once a week for 84 days, Disp: 9 mL, Rfl: 0    Semaglutide, 2 MG/DOSE, (OZEMPIC) 8 MG/3ML pen, Inject 2 mg Subcutaneous once a week, Disp: 9 mL, Rfl: 3    atorvastatin (LIPITOR) 10 MG tablet, Take 1 tablet (10 mg) by mouth daily, Disp: 90 tablet, Rfl: 3    blood glucose (NO BRAND SPECIFIED) lancets standard, Check fasting glucose 4 times daily.  For Accu check monitor, Disp: 360 each, Rfl: 11    blood glucose calibration (ONETOUCH ULTRA CONTROL) solution, See Admin Instructions, Disp: , Rfl:     blood glucose  monitoring (ONE TOUCH ULTRA 2) meter device kit, See Admin Instructions, Disp: , Rfl:     budesonide-formoterol (SYMBICORT) 80-4.5 MCG/ACT Inhaler, Inhale 2 puffs once daily as needed, and may repeat every hour, maximum 12 puffs in 24 hours, Disp: 20.4 g, Rfl: 11    capsaicin (ZOSTRIX) 0.025 % external cream, Apply 3-4 times daily to knees, Disp: 118 mL, Rfl: 11    Continuous Blood Gluc Sensor (FREESTYLE ANIKET 14 DAY SENSOR) MISC, 1 each every 14 days Use 1 Sensor every 14 days. Use to read blood sugars per 's instructions., Disp: 2 each, Rfl: 11    Continuous Blood Gluc Sensor (FREESTYLE ANIKET 2 SENSOR) MISC, 1 each every 14 days Use 1 sensor every 14 days. Use to read blood sugars per 's instructions., Disp: 2 each, Rfl: 5    CONTOUR NEXT TEST test strip, USE TO TEST BLOOD GLUCOSE FOUR TIMES DAILY, Disp: 400 strip, Rfl: 11    dapagliflozin (FARXIGA) 10 MG TABS tablet, Take 1 tablet (10 mg) by mouth daily, Disp: 90 tablet, Rfl: 3    diclofenac (VOLTAREN) 1 % topical gel, Apply 4 g topically 3 times daily To top of both shoulders in area of pain, Disp: 350 g, Rfl: 2    diltiazem ER (DILT-XR) 240 MG 24 hr ER beaded capsule, Take 1 capsule (240 mg) by mouth daily New dose, Disp: 30 capsule, Rfl: 11    Dulaglutide 3 MG/0.5ML SOPN, Inject 3 mg Subcutaneous once a week, Disp: 2 mL, Rfl: 2    Dulaglutide 4.5 MG/0.5ML SOPN, Inject 4.5 mg Subcutaneous once a week, Disp: 2 mL, Rfl: 11    ferrous sulfate (IRON) 325 (65 FE) MG tablet, Take 1 tablet (325 mg) by mouth daily (with breakfast), Disp: 30 tablet, Rfl: 2    fluocinolone (SYNALAR) 0.01 % solution, APPLY 1-2 ML TOPICALLY TO AFFECTED AREA 1-2 TIMES PER DAY., Disp: , Rfl:     labetalol (NORMODYNE) 100 MG tablet, Take 1 tablet (100 mg) by mouth 2 times daily, Disp: 180 tablet, Rfl: 3    Lancet Devices (EASY MINI EJECT LANCING DEVICE) MISC, See Admin Instructions, Disp: , Rfl:     Lancets 30G MISC, USE TO TEST BLOOD GLUCOSE FOUR TIMES DAILY, Disp:  400 each, Rfl: 11    latanoprost (XALATAN) 0.005 % ophthalmic solution, INSTILL 1 DROP IN BOTH EYES DAILY AT BEDTIME, Disp: , Rfl:     lidocaine (LMX4) 4 % external cream, Apply topically once as needed for mild pain (left knee), Disp: 45 g, Rfl: 11    lisinopril (ZESTRIL) 40 MG tablet, Take 1 tablet (40 mg) by mouth at bedtime, Disp: 90 tablet, Rfl: 3    naloxone (NARCAN) 4 MG/0.1ML nasal spray, Spray 1 spray (4 mg) into one nostril alternating nostrils once as needed for opioid reversal Every 2-3 minutes until patient responsive or EMS arrives, Disp: 0.2 mL, Rfl: 0    order for DME, Equipment being ordered: wheeled walker with seat, Disp: 1 Device, Rfl: 0    oxyCODONE-acetaminophen (PERCOCET)  MG per tablet, Take 2 tablets by mouth at bedtime. May also take 1 tablet every 6 hours as needed for severe pain., Disp: 120 tablet, Rfl: 0    phentermine (ADIPEX-P) 37.5 MG tablet, Take 1 tablet (37.5 mg) by mouth every morning (before breakfast), Disp: 90 tablet, Rfl: 0    pregabalin (LYRICA) 50 MG capsule, Take 1 capsule (50 mg) by mouth 3 times daily, Disp: 60 capsule, Rfl: 2    senna-docusate (SENOKOT-S/PERICOLACE) 8.6-50 MG tablet, Take 1 tablet by mouth 2 times daily, Disp: 60 tablet, Rfl: 11    Simethicone 180 MG CAPS, Use 1 softgel as needed for gas, Disp: 60 capsule, Rfl: 11    sodium citrate-citric acid (BICITRA) 500-334 MG/5ML solution, Take 10 mLs by mouth 2 times daily (with meals), Disp: 473 mL, Rfl: 11    SUMAtriptan (IMITREX STATDOSE) 6 MG/0.5ML pen injector kit, Inject 6 mg Subcutaneous at onset of headache for migraine, Disp: , Rfl:     torsemide (DEMADEX) 10 MG tablet, Take 1 tablet (10 mg) by mouth daily, Disp: 90 tablet, Rfl: 3    Current Facility-Administered Medications:     cyanocobalamin injection 1,000 mcg, 1,000 mcg, Intramuscular, Q30 Days, Jose Angel Meyer MD, 1,000 mcg at 05/16/24 1141    Plan: Unable to get Trulicity 4.5mg. Change to Ozempic 1.0mg. she would like a 3 month supply  "then will increase to 2.0mg 3 months with 3 refills. Zofran prn nausea. Labs were ordered for annual labs but not drawn. For monthly bleeding [facility] referral.    Return in about 3 months (around 9/7/2024).    Bariatric Surgery Review     Interim History/LifeChanges: Blood sugars have been high. Has not been able to get Trulicity d/t shortage. Last injection 4.5mg 2 weeks ago and pharmacy states they are out. Has had swelling in her legs. Demadex has helped some. Getting [place] 22/wk. B: eggs and pancakes L: chicken bryan or hot dog D: macaroni or steak and potatoes, veggies. Breathing OK. Taking Farxiga. Has been bleeding monthly though she had a hysterectomy for the past 9 months.    Patient Concerns: out of Trulicity  Appetite (1-10): down  GERD: no    Medication changes: diuretic increased    Vitamin Intake:   B-12   Injections at the clinic monthly   MVI  one   Vitamin D  gel   Calcium        Other                LABS: ordered    Nausea yes  Vomiting no  Constipation no  Diarrhea yes  Rashes yes  Hair Loss yes  Calf tenderness no  Breathing difficulty no  Reactive Hypoglycemia avoids  Light Headedness no   Moods stable and \"good\"    12 point ROS as above and otherwise negative      Habits:  Alcohol: no  Tobacco: no  Caffeine no  NSAIDS no  Exercise Routine: walking  3 meals/day yes  Protein first yes ?grams/day  Water Separate from meals yes  Calorie Containing Beverages apple juice and orange juice that come with meals  Restaurant eating/wk <1  Sleeping not well maybe 45 min spurts  Stress moderate to high  CPAP: NA  Contraception: hyst  DEXA:    Social History     Social History     Socioeconomic History    Marital status: Single     Spouse name: Not on file    Number of children: Not on file    Years of education: Not on file    Highest education level: Not on file   Occupational History    Not on file   Tobacco Use    Smoking status: Never    Smokeless tobacco: Never   Vaping Use    Vaping " status: Never Used   Substance and Sexual Activity    Alcohol use: No    Drug use: No    Sexual activity: Never   Other Topics Concern    Parent/sibling w/ CABG, MI or angioplasty before 65F 55M? No   Social History Narrative    Lives alone. [place], other daughter lives here 2 sons live in the [facility]     Social Determinants of Health     Financial Resource Strain: Low Risk  (9/22/2023)    Financial Resource Strain     Within the past 12 months, have you or your family members you live with been unable to get utilities (heat, electricity) when it was really needed?: No   Food Insecurity: Low Risk  (9/22/2023)    Food Insecurity     Within the past 12 months, did you worry that your food would run out before you got money to buy more?: No     Within the past 12 months, did the food you bought just not last and you didn t have money to get more?: No   Transportation Needs: Low Risk  (9/22/2023)    Transportation Needs     Within the past 12 months, has lack of transportation kept you from medical appointments, getting your medicines, non-medical meetings or appointments, work, or from getting things that you need?: No   Physical Activity: Not on file   Stress: Not on file   Social Connections: Unknown (4/28/2023)    [facility]    Social Connections     Frequency of Communication with Friends and Family: Not on file   Interpersonal Safety: Low Risk  (5/16/2024)    Interpersonal Safety     Do you feel physically and emotionally safe where you currently live?: Yes     Within the past 12 months, have you been hit, slapped, kicked or otherwise physically hurt by someone?: No     Within the past 12 months, have you been humiliated or emotionally abused in other ways by your partner or ex-partner?: No   Housing Stability: Low Risk  (9/22/2023)    Housing Stability     Do you have housing? : Yes     Are you  worried about losing your housing?: No       Past Medical History     Past Medical History:   Diagnosis Date    Abnormal cervical Papanicolaou smear with positive human papillomavirus deoxyribonucleic acid test     Ankle joint pain     bilateral    Anxiety     Arthritis     Arthritis     rheumatoid arthritis    Asthma     Carpal tunnel syndrome     bilateral    Compression neuropathy     Depression     Depressive disorder     Diabetes (H)     Diabetes (H)     type II    Diabetic nephropathy (H)     DVT of lower extremity, bilateral (H) 01/01/2011    right    Esophageal reflux     Gastroenteritis     Hernia 01/27/2017    History of blood clots     History of blood transfusion     Hyperlipidemia     Hypertension     Hypertension     Infectious fasciitis 01/01/2011    abdominal wall     Insomnia     Neck pain     Necrotizing fasciitis (H) 11/15/2012    Necrotizing fasciitis (H)     Neuropathy     Obesity     Osteoarthritis of both knees 10/31/2016    Pulmonary embolism and infarction (H)     SOB (shortness of breath)     with exertion    Tarsal tunnel syndrome     Uncomplicated asthma     Urinary incontinence      Problem List     Patient Active Problem List   Diagnosis    Chronic pain of both shoulders    Abnormal Pap smear of cervix    Moderate persistent asthma without complication    Carpal tunnel syndrome    Health Care Home    Severe episode of recurrent major depressive disorder, with psychotic features (H)    S/P hysterectomy    Hyperlipidemia    Status post bariatric surgery    Essential hypertension    Urinary incontinence    Esophageal reflux    Microalbuminuria    Type 2 diabetes mellitus with diabetic polyneuropathy (H)    Bilateral ankle joint pain    Chronic pain disorder    Asthma    Compression neuropathy of left lower extremity    Hernia    Hernia of anterior abdominal wall    Obesity    Osteoarthritis of both knees    Polyneuropathy due to secondary diabetes mellitus (H)    Copper deficiency     Metabolic acidosis    Neck mass    Persons encountering health services in other specified circumstances    Hyperglycemia due to type 2 diabetes mellitus (H)    History of cholecystectomy    History of hysterectomy for benign disease    Morbid obesity (H)    CKD stage G3a/A3, GFR 45-59 and albumin creatinine ratio >300 mg/g (H)    Arthritis of both acromioclavicular joints    H/O necrotizing fasciitis    Acidosis    Acute chest pain    F11.9 - Chronic, continuous use of opioids     Medications       Current Outpatient Medications:     acetaminophen (TYLENOL) 500 MG tablet, Take 1 tablet (500 mg) by mouth 2 times daily as needed for mild pain, Disp: 30 tablet, Rfl: 0    Alcohol Swabs (PRO COMFORT ALCOHOL) 70 % PADS, 1 pad 4 times daily as needed (blood sugar testing) Use QID for blood sugar monitoring, Disp: 400 each, Rfl: 11    ondansetron (ZOFRAN ODT) 4 MG ODT tab, Take 1 tablet (4 mg) by mouth every 6 hours as needed for nausea, Disp: 30 tablet, Rfl: 2    Semaglutide, 1 MG/DOSE, (OZEMPIC) 4 MG/3ML pen, Inject 1 mg Subcutaneous once a week for 84 days, Disp: 9 mL, Rfl: 0    Semaglutide, 2 MG/DOSE, (OZEMPIC) 8 MG/3ML pen, Inject 2 mg Subcutaneous once a week, Disp: 9 mL, Rfl: 3    atorvastatin (LIPITOR) 10 MG tablet, Take 1 tablet (10 mg) by mouth daily, Disp: 90 tablet, Rfl: 3    blood glucose (NO BRAND SPECIFIED) lancets standard, Check fasting glucose 4 times daily.  For Accu check monitor, Disp: 360 each, Rfl: 11    blood glucose calibration (ONETOUCH ULTRA CONTROL) solution, See Admin Instructions, Disp: , Rfl:     blood glucose monitoring (ONE TOUCH ULTRA 2) meter device kit, See Admin Instructions, Disp: , Rfl:     budesonide-formoterol (SYMBICORT) 80-4.5 MCG/ACT Inhaler, Inhale 2 puffs once daily as needed, and may repeat every hour, maximum 12 puffs in 24 hours, Disp: 20.4 g, Rfl: 11    capsaicin (ZOSTRIX) 0.025 % external cream, Apply 3-4 times daily to knees, Disp: 118 mL, Rfl: 11    Continuous Blood Gluc  Sensor (FREESTYLE ANIKET 14 DAY SENSOR) [facility] 1 each every 14 days Use 1 Sensor every 14 days. Use to read blood sugars per 's instructions., Disp: 2 each, Rfl: 11    Continuous Blood Gluc Sensor (FREESTYLE ANIKET 2 SENSOR) [facility] 1 each every 14 days Use 1 sensor every 14 days. Use to read blood sugars per 's instructions., Disp: 2 each, Rfl: 5    CONTOUR NEXT TEST test strip, USE TO TEST BLOOD GLUCOSE FOUR TIMES DAILY, Disp: 400 strip, Rfl: 11    dapagliflozin (FARXIGA) 10 MG TABS tablet, Take 1 tablet (10 mg) by mouth daily, Disp: 90 tablet, Rfl: 3    diclofenac (VOLTAREN) 1 % topical gel, Apply 4 g topically 3 times daily To top of both shoulders in area of pain, Disp: 350 g, Rfl: 2    diltiazem ER (DILT-XR) 240 MG 24 hr ER beaded capsule, Take 1 capsule (240 mg) by mouth daily New dose, Disp: 30 capsule, Rfl: 11    Dulaglutide 3 MG/0.5ML SOPN, Inject 3 mg Subcutaneous once a week, Disp: 2 mL, Rfl: 2    Dulaglutide 4.5 MG/0.5ML SOPN, Inject 4.5 mg Subcutaneous once a week, Disp: 2 mL, Rfl: 11    ferrous sulfate (IRON) 325 (65 FE) MG tablet, Take 1 tablet (325 mg) by mouth daily (with breakfast), Disp: 30 tablet, Rfl: 2    fluocinolone (SYNALAR) 0.01 % solution, APPLY 1-2 ML TOPICALLY TO AFFECTED AREA 1-2 TIMES PER DAY., Disp: , Rfl:     labetalol (NORMODYNE) 100 MG tablet, Take 1 tablet (100 mg) by mouth 2 times daily, Disp: 180 tablet, Rfl: 3    Lancet Devices (EASY MINI EJECT LANCING DEVICE) MISC, See Admin Instructions, Disp: , Rfl:     Lancets 30G MISC, USE TO TEST BLOOD GLUCOSE FOUR TIMES DAILY, Disp: 400 each, Rfl: 11    latanoprost (XALATAN) 0.005 % ophthalmic solution, INSTILL 1 DROP IN BOTH EYES DAILY AT BEDTIME, Disp: , Rfl:     lidocaine (LMX4) 4 % external cream, Apply topically once as needed for mild pain (left knee), Disp: 45 g, Rfl: 11    lisinopril (ZESTRIL) 40 MG tablet, Take 1 tablet (40 mg) by mouth at bedtime, Disp: 90 tablet, Rfl: 3    naloxone (NARCAN) 4 MG/0.1ML nasal  spray, Spray 1 spray (4 mg) into one nostril alternating nostrils once as needed for opioid reversal Every 2-3 minutes until patient responsive or EMS arrives, Disp: 0.2 mL, Rfl: 0    order for DME, Equipment being ordered: wheeled walker with seat, Disp: 1 Device, Rfl: 0    oxyCODONE-acetaminophen (PERCOCET)  MG per tablet, Take 2 tablets by mouth at bedtime. May also take 1 tablet every 6 hours as needed for severe pain., Disp: 120 tablet, Rfl: 0    phentermine (ADIPEX-P) 37.5 MG tablet, Take 1 tablet (37.5 mg) by mouth every morning (before breakfast), Disp: 90 tablet, Rfl: 0    pregabalin (LYRICA) 50 MG capsule, Take 1 capsule (50 mg) by mouth 3 times daily, Disp: 60 capsule, Rfl: 2    senna-docusate (SENOKOT-S/PERICOLACE) 8.6-50 MG tablet, Take 1 tablet by mouth 2 times daily, Disp: 60 tablet, Rfl: 11    Simethicone 180 MG CAPS, Use 1 softgel as needed for gas, Disp: 60 capsule, Rfl: 11    sodium citrate-citric acid (BICITRA) 500-334 MG/5ML solution, Take 10 mLs by mouth 2 times daily (with meals), Disp: 473 mL, Rfl: 11    SUMAtriptan (IMITREX STATDOSE) 6 MG/0.5ML pen injector kit, Inject 6 mg Subcutaneous at onset of headache for migraine, Disp: , Rfl:     torsemide (DEMADEX) 10 MG tablet, Take 1 tablet (10 mg) by mouth daily, Disp: 90 tablet, Rfl: 3    Current Facility-Administered Medications:     cyanocobalamin injection 1,000 mcg, 1,000 mcg, Intramuscular, Q30 Days, Jose Angel Meyer MD, 1,000 mcg at 24 1141   Surgical History     Past Surgical History  She has a past surgical history that includes gastric bypass (); Hysterectomy; gastric bypass ();  Section; Uterine Fibroid Surgery (); Abdominal Wall Surgery; REVISE MEDIAN N/CARPAL TUNNEL SURG (Right, 10/24/2014); Hysterectomy; Uterine Fibroid Surgery; knee surgery (Right); Colonoscopy (N/A, 3/15/2017); Colonoscopy (N/A, 3/16/2017); and Midline Insertion - Double Lumen (2021).    Objective-Exam  "    Constitutional:  Ht 1.499 m (4' 11.02\")   Wt 81.6 kg (180 lb)   LMP  (LMP Unknown)   BMI 36.34 kg/m    General:  Pleasant and in no acute distress     Psychiatric: alert and oriented X3, mood and affect normal    Counseling     We reviewed the important post op bariatric recommendations:  -eating 3 meals daily  -eating protein first, getting >60gm protein daily  -eating slowly, chewing food well  -avoiding/limiting calorie containing beverages  -drinking water 15-30 minutes before or after meals  -choosing wheat, not white with breads, crackers, pastas, aleisha, bagels, tortillas, rice  -limiting restaurant or cafeteria eating to twice a week or less    We discussed the importance of restorative sleep and stress management in maintaining a healthy weight.  We discussed the National Weight Control Registry healthy weight maintenance strategies and ways to optimize metabolism.  We discussed the importance of physical activity including cardiovascular and strength training in maintaining a healthier weight.    We discussed the importance of life-long vitamin supplementation and life-long  follow-up.    Ruthy was reminded that, to avoid marginal ulcers she should avoid tobacco at all, alcohol in excess, caffeine in excess, and NSAIDS (unless indicated for cardioprotection or othewise and opposed by a PPI).    Vy Carson MD, MD, [facility].  6/7/2024  11:08 AM  Total time spent on the date of this encounter doing: chart review, review of test results, patient visit, physical exam, education, counseling, developing plan of care, and documenting = 30 minutes.  Virtual Visit Details    Type of service:  Telephone Visit   Phone call duration: 30 minutes   Originating Location (pt. Location): Home    Distant Location (provider location):  On-site      "

## 2024-06-07 NOTE — NURSING NOTE
Is the patient currently in the state of [place]? YES    Visit mode:TELEPHONE    If the visit is dropped, the patient can be reconnected by: TELEPHONE VISIT: Phone number: [phone]    Will anyone else be joining the visit? NO  (If patient encounters technical issues they should call [phone] :[phone])    How would you like to obtain your AVS? MyChart    Are changes needed to the allergy or medication list? Pt stated no changes to allergies and Pt stated no med changes    Reason for visit: Follow Up    Nenita Martell VVF    Pt don't have menstrual cycle and pt do not know why pt is bleeding, Concerns about High Blood pressure.

## 2024-06-14 ENCOUNTER — TELEPHONE (OUTPATIENT)
Dept: FAMILY MEDICINE | Facility: CLINIC | Age: 59
End: 2024-06-14
Payer: COMMERCIAL

## 2024-06-14 DIAGNOSIS — G89.4 CHRONIC PAIN SYNDROME: ICD-10-CM

## 2024-06-14 NOTE — TELEPHONE ENCOUNTER
General Call    Contacts         Type Contact Phone/Fax    06/14/2024 01:07 PM CDT Phone (Incoming) Henri Ruthy SPENCER (Self) [phone] (M)          Reason for Call:  the pt called to ask for víctor to call her she wants to know if she needs to have an appointment     What are your questions or concerns:      Date of last appointment with provider: 5/16/2024    Okay to leave a detailed message?: Yes at Cell number on file:    Telephone Information:   Mobile [phone]       Does patient or caller know when to expect a call? Yes, PCS will return call within 24 business hours.       Earl Murphy on 6/14/2024 at 1:08 PM

## 2024-06-18 RX ORDER — OXYCODONE AND ACETAMINOPHEN 10; 325 MG/1; MG/1
TABLET ORAL
Qty: 120 TABLET | Refills: 0 | Status: SHIPPED | OUTPATIENT
Start: 2024-06-18 | End: 2024-07-15

## 2024-06-20 ENCOUNTER — OFFICE VISIT (OUTPATIENT)
Dept: FAMILY MEDICINE | Facility: CLINIC | Age: 59
End: 2024-06-20
Payer: COMMERCIAL

## 2024-06-20 VITALS
SYSTOLIC BLOOD PRESSURE: 135 MMHG | DIASTOLIC BLOOD PRESSURE: 85 MMHG | RESPIRATION RATE: 18 BRPM | OXYGEN SATURATION: 99 % | BODY MASS INDEX: 39.31 KG/M2 | WEIGHT: 195 LBS | TEMPERATURE: 98.7 F | HEART RATE: 110 BPM | HEIGHT: 59 IN

## 2024-06-20 DIAGNOSIS — F33.3 SEVERE EPISODE OF RECURRENT MAJOR DEPRESSIVE DISORDER, WITH PSYCHOTIC FEATURES (H): ICD-10-CM

## 2024-06-20 DIAGNOSIS — N18.31 CKD STAGE G3A/A3, GFR 45-59 AND ALBUMIN CREATININE RATIO >300 MG/G (H): ICD-10-CM

## 2024-06-20 DIAGNOSIS — G89.4 CHRONIC PAIN DISORDER: ICD-10-CM

## 2024-06-20 DIAGNOSIS — G89.4 CHRONIC PAIN SYNDROME: Primary | ICD-10-CM

## 2024-06-20 DIAGNOSIS — I10 ESSENTIAL HYPERTENSION: ICD-10-CM

## 2024-06-20 PROCEDURE — 99214 OFFICE O/P EST MOD 30 MIN: CPT | Mod: 25 | Performed by: FAMILY MEDICINE

## 2024-06-20 PROCEDURE — 80306 DRUG TEST PRSMV INSTRMNT: CPT | Performed by: FAMILY MEDICINE

## 2024-06-20 PROCEDURE — 96372 THER/PROPH/DIAG INJ SC/IM: CPT | Performed by: FAMILY MEDICINE

## 2024-06-20 RX ORDER — PREGABALIN 20 MG/ML
50 SOLUTION ORAL 3 TIMES DAILY
Qty: 473 ML | Refills: 5 | Status: ON HOLD | OUTPATIENT
Start: 2024-06-20 | End: 2024-06-29

## 2024-06-20 RX ADMIN — CYANOCOBALAMIN 1000 MCG: 1000 INJECTION, SOLUTION INTRAMUSCULAR; SUBCUTANEOUS at 14:06

## 2024-06-20 ASSESSMENT — ANXIETY QUESTIONNAIRES
6. BECOMING EASILY ANNOYED OR IRRITABLE: MORE THAN HALF THE DAYS
2. NOT BEING ABLE TO STOP OR CONTROL WORRYING: MORE THAN HALF THE DAYS
7. FEELING AFRAID AS IF SOMETHING AWFUL MIGHT HAPPEN: NOT AT ALL
5. BEING SO RESTLESS THAT IT IS HARD TO SIT STILL: NEARLY EVERY DAY
1. FEELING NERVOUS, ANXIOUS, OR ON EDGE: MORE THAN HALF THE DAYS

## 2024-06-20 ASSESSMENT — PATIENT HEALTH QUESTIONNAIRE - PHQ9
5. POOR APPETITE OR OVEREATING: NEARLY EVERY DAY
SUM OF ALL RESPONSES TO PHQ QUESTIONS 1-9: 17

## 2024-06-20 ASSESSMENT — ASTHMA QUESTIONNAIRES: ACT_TOTALSCORE: 19

## 2024-06-20 NOTE — PATIENT INSTRUCTIONS
Ale:  for fitting of stockings.  Phone  [phone]    Social  Address  [address], [address]   [place] [id number]

## 2024-06-20 NOTE — PROGRESS NOTES
Assessment & Plan     1.  Chronic pain syndrome.  Oxycodone was refilled earlier this week.  Will switch him to pregabalin liquid at equivalent dose of 50 mg 3 times daily.  She follows up monthly.  Results of RIOSORD test reviewed.  Narcan reordered.   2.  Major depression.  Discussed possibility of adding an SSRI and she declines for now  3.  Hypertension.  She should be on diltiazem 240 daily, labetalol 1 mg twice daily, and lisinopril 40 mg daily.  She takes torsemide to manage edema.  Blood pressure good today.  eGFR 51 in May with a normal potassium.  She had clinical proteinuria on a UA in August 2023.    4.  With respect to her CKD, she is on max dose ACE inhibitor and Farxiga 10 mg daily.  Dr. Tomlin, who manages her obesity, has switched her to semaglutide which is very appropriate and may improve renal outcomes.  Will get another urine microalbumin in August and consider finerenone at that time.  I do have some concerns about her adherence to all of her antihypertensives, so we will need to confirm this next time.  Her last A1c was 5.6 which is tremendous.  SHould have an AIC, BMP, next visit    See Patient Instructions    Return in about 1 month (around 7/20/2024).    Genesis Bliss is a 58 year old, presenting for the following health issues:  Pain (CPM ) and Imm/Inj (B12 shot )      6/20/2024     1:13 PM   Additional Questions   Roomed by ML   Accompanied by self         6/20/2024    Information    services provided? No        HPI   Left sided, sharp hip pain, and sometimes dull, too.  Hard to stand up straight at times.  Will spasm. Mild rash in groin, but no rash there.  Worse when standing--will hold   Rubbing makes it worse.  Increased for past week.  In past would happen 1-2 times /month.    More stress:  :  less work, children stressful.      I inquired about social support today, and its relatively minimal.  She has 2 sisters in town but does interact with them a  great deal.  She goes to [employer], and some volunteering there.  She prefers shopping and sometimes going to the beach.      Pain History:  When did you first notice your pain?  Chronic  Have you seen this provider for your pain in the past? Yes   Where in your body do you have pain?  Multiple joints  Are you seeing anyone else for your pain?  None at this time        10/21/2021     3:47 PM 10/26/2023     5:54 PM 2/22/2024    10:27 AM   PHQ-9 SCORE   PHQ-9 Total Score MyChart   19 (Moderately severe depression)   PHQ-9 Total Score 4 17 19           5/6/2021    11:44 AM 5/6/2021    11:47 AM 10/26/2023     5:54 PM   JUAN-7 SCORE   Total Score 16 16 12           4/18/2024    12:51 PM 5/16/2024    10:48 AM 6/20/2024     1:12 PM   PEG Score   PEG Total Score 8.67 8 9           10/21/2021     3:47 PM 10/26/2023     5:54 PM 2/22/2024    10:27 AM   PHQ-9 SCORE   PHQ-9 Total Score MyChart   19 (Moderately severe depression)   PHQ-9 Total Score 4 17 19           5/6/2021    11:44 AM 5/6/2021    11:47 AM 10/26/2023     5:54 PM   JUAN-7 SCORE   Total Score 16 16 12           4/18/2024    12:51 PM 5/16/2024    10:48 AM 6/20/2024     1:12 PM   PEG Score   PEG Total Score 8.67 8 9       Chronic Pain Follow Up:    Location of pain: Joints  Analgesia/pain control:    - Recent changes: None   - Overall control: Tolerable with discomfort    - Current treatments: Oxycodone-up to four  10mg tablets per day  Adherence:     - Do you ever take more pain medicine than prescribed? No    - When did you take your last dose of pain medicine?  Today  Adverse effects: No   PDMP Review         Value Time User    State PDMP site checked  Yes 2/22/2024  1:04 PM Jose Angel Meyer MD          Last CSA Agreement:   CSA -- Patient Level:     [Media Unavailable] Controlled Substance Agreement - Opioid - Scan on 3/22/2024 10:15 AM   [Media Unavailable] Controlled Substance Agreement - Opioid - Scan on 12/14/2021  8:56 AM       Last  "UDS: 5/16/2024 6/24/2024     7:49 AM   Opioid Overdose Calculator (RIOSORD)   Substance use disorder or any kind, including alcohol or cannabis 0   Bipolar Disorder or Schizophrenia?  0   Stroke or other cerebrovascular disease?  0   Significant Chronic Kidney Disease? 8   Heart Failure?  0   Nonmalignant pancreatic disease?  0   Chronic Pulmonary Disease? 0   Chronic headache?  0   Does your patient take Fentanyl (transdermal or transmucosal)? 0   Does your patient take Morphone?  0   Does your patient take Methadone? 0   Does your patient take Hydromorphone? 0   Does your patient take extended release (ER) or long acting (LA) opioid? 0   Does your patient take a Benzodiazepine?  0   Does your patient take an antidepressant?  0   Is the patient's MME >100mg/day? 0   RIOSORD Total Score 8     Average probability of overdose or serious opioid-induced respiratory depression in the next six months:   Points    Risk   0-4    2%   5-7    5%   8-9    7%   10-17    15%   18-25    30%   26-41    55%   42    83%      Discussed her depression.  Her PHQ-9 is in the teens.  There is no SI.  She is not currently interested in adding any pharmacotherapy as she feels like she takes a lot of medications already.  We discussed duloxetine specifically, in light of her peripheral neuropathy, and she declines this.    Regarding her neuropathic pain she takes pregabalin pills but would like a liquid preparation better as she had a hard time swallowing pills          Objective    /85 (BP Location: Left arm, Patient Position: Sitting, Cuff Size: Adult Regular)   Pulse 110   Temp 98.7  F (37.1  C) (Oral)   Resp 18   Ht 1.499 m (4' 11\")   Wt 88.5 kg (195 lb)   LMP  (LMP Unknown)   SpO2 99%   BMI 39.39 kg/m    Body mass index is 39.39 kg/m .  Physical Exam   GENERAL: alert and no distress  RESP: lungs clear to auscultation - no rales, rhonchi or wheezes  CV: regular rate and rhythm, normal S1 S2, no S3 or S4, no " murmur, click or rub, no peripheral edema    Results for orders placed or performed in visit on 06/20/24   Urine Drug Screen Clinic     Status: Abnormal   Result Value Ref Range    Cannabinoids (14-kay-6-carboxy-9-THC) Not Detected Not Detected, Indeterminate    Phencyclidine Not Detected Not Detected, Indeterminate    Cocaine (Benzoylecgonine) Not Detected Not Detected, Indeterminate    Methamphetamine (d-Methamphetamine) Not Detected Not Detected, Indeterminate    Opiates (Morphine) Not Detected Not Detected, Indeterminate    Amphetamine (d-Amphetamine) Not Detected Not Detected, Indeterminate    Benzodiazepines (Nordiazepam) Not Detected Not Detected, Indeterminate    Tricyclic Antidepressants (Desipramine) Not Detected Not Detected, Indeterminate    Methadone Not Detected Not Detected, Indeterminate    Barbiturates (Butalbital) Not Detected Not Detected, Indeterminate    Oxycodone Detected (A) Not Detected, Indeterminate    Buprenorphine Not Detected Not Detected, Indeterminate           Signed Electronically by: Jose Angel Meyer MD

## 2024-06-20 NOTE — PROGRESS NOTES
Clinic Administered Medication Documentation        Patient was given b-12 1,000 mcg per 1 ml. Prior to medication administration, verified patient's identity using patient s name and date of birth. Please see MAR and medication order for additional information. Patient instructed to remain in clinic for 15 minutes and report any adverse reaction to staff immediately.    Vial/Syringe: Single dose vial. Was entire vial of medication used? Yes    Name of provider who requested the medication administration: Dr. Meyer  Name of provider on site (faculty or community preceptor) at the time of performing the medication administration: N/A    Date of next administration: n/a  Date of next office visit with provider to renew medication plan (must be seen annually): n/a

## 2024-06-21 PROBLEM — E11.65 HYPERGLYCEMIA DUE TO TYPE 2 DIABETES MELLITUS (H): Status: RESOLVED | Noted: 2020-06-02 | Resolved: 2024-06-21

## 2024-06-21 PROBLEM — R07.9 ACUTE CHEST PAIN: Status: RESOLVED | Noted: 2022-08-12 | Resolved: 2024-06-21

## 2024-06-27 ENCOUNTER — HOSPITAL ENCOUNTER (OUTPATIENT)
Facility: HOSPITAL | Age: 59
Setting detail: OBSERVATION
Discharge: HOME | End: 2024-06-29
Attending: EMERGENCY MEDICINE | Admitting: EMERGENCY MEDICINE
Payer: COMMERCIAL

## 2024-06-27 ENCOUNTER — APPOINTMENT (OUTPATIENT)
Dept: CT IMAGING | Facility: HOSPITAL | Age: 59
End: 2024-06-27
Attending: EMERGENCY MEDICINE
Payer: COMMERCIAL

## 2024-06-27 DIAGNOSIS — G89.4 CHRONIC PAIN DISORDER: ICD-10-CM

## 2024-06-27 DIAGNOSIS — R41.82 ALTERED MENTAL STATUS, UNSPECIFIED ALTERED MENTAL STATUS TYPE: ICD-10-CM

## 2024-06-27 LAB
ALBUMIN SERPL BCG-MCNC: 2.5 G/DL (ref 3.5–5.2)
ALBUMIN UR-MCNC: 300 MG/DL
ALP SERPL-CCNC: 205 U/L (ref 40–150)
ALT SERPL W P-5'-P-CCNC: 27 U/L (ref 0–50)
AMPHETAMINES UR QL SCN: NORMAL
ANION GAP SERPL CALCULATED.3IONS-SCNC: 12 MMOL/L (ref 7–15)
APPEARANCE UR: CLEAR
AST SERPL W P-5'-P-CCNC: 23 U/L (ref 0–45)
B-OH-BUTYR SERPL-SCNC: <0.18 MMOL/L
BARBITURATES UR QL SCN: NORMAL
BASE EXCESS BLDV CALC-SCNC: -4.2 MMOL/L (ref -3–3)
BASOPHILS # BLD AUTO: 0 10E3/UL (ref 0–0.2)
BASOPHILS NFR BLD AUTO: 0 %
BENZODIAZ UR QL SCN: NORMAL
BILIRUB SERPL-MCNC: 0.3 MG/DL
BILIRUB UR QL STRIP: NEGATIVE
BUN SERPL-MCNC: 37.4 MG/DL (ref 6–20)
BZE UR QL SCN: NORMAL
CALCIUM SERPL-MCNC: 6.9 MG/DL (ref 8.6–10)
CANNABINOIDS UR QL SCN: NORMAL
CHLORIDE SERPL-SCNC: 112 MMOL/L (ref 98–107)
COLOR UR AUTO: COLORLESS
CREAT SERPL-MCNC: 1.49 MG/DL (ref 0.51–0.95)
DEPRECATED HCO3 PLAS-SCNC: 20 MMOL/L (ref 22–29)
EGFRCR SERPLBLD CKD-EPI 2021: 40 ML/MIN/1.73M2
EOSINOPHIL # BLD AUTO: 0.1 10E3/UL (ref 0–0.7)
EOSINOPHIL NFR BLD AUTO: 1 %
ERYTHROCYTE [DISTWIDTH] IN BLOOD BY AUTOMATED COUNT: 12.5 % (ref 10–15)
ETHANOL SERPL-MCNC: <0.01 G/DL
FENTANYL UR QL: NORMAL
FLUAV RNA SPEC QL NAA+PROBE: NEGATIVE
FLUBV RNA RESP QL NAA+PROBE: NEGATIVE
GLUCOSE BLDC GLUCOMTR-MCNC: 374 MG/DL (ref 70–99)
GLUCOSE SERPL-MCNC: 392 MG/DL (ref 70–99)
GLUCOSE UR STRIP-MCNC: >1000 MG/DL
GRANULAR CAST: 1 /LPF
HCO3 BLDV-SCNC: 22 MMOL/L (ref 21–28)
HCT VFR BLD AUTO: 30.4 % (ref 35–47)
HGB BLD-MCNC: 9.7 G/DL (ref 11.7–15.7)
HGB UR QL STRIP: ABNORMAL
HOLD SPECIMEN: NORMAL
HOLD SPECIMEN: NORMAL
HYALINE CASTS: 3 /LPF
IMM GRANULOCYTES # BLD: 0.1 10E3/UL
IMM GRANULOCYTES NFR BLD: 1 %
KETONES UR STRIP-MCNC: NEGATIVE MG/DL
LACTATE SERPL-SCNC: 1.5 MMOL/L (ref 0.7–2)
LEUKOCYTE ESTERASE UR QL STRIP: NEGATIVE
LIPASE SERPL-CCNC: 21 U/L (ref 13–60)
LYMPHOCYTES # BLD AUTO: 2.8 10E3/UL (ref 0.8–5.3)
LYMPHOCYTES NFR BLD AUTO: 29 %
MAGNESIUM SERPL-MCNC: 1.5 MG/DL (ref 1.7–2.3)
MCH RBC QN AUTO: 33.1 PG (ref 26.5–33)
MCHC RBC AUTO-ENTMCNC: 31.9 G/DL (ref 31.5–36.5)
MCV RBC AUTO: 104 FL (ref 78–100)
MONOCYTES # BLD AUTO: 0.8 10E3/UL (ref 0–1.3)
MONOCYTES NFR BLD AUTO: 8 %
NEUTROPHILS # BLD AUTO: 6.1 10E3/UL (ref 1.6–8.3)
NEUTROPHILS NFR BLD AUTO: 61 %
NITRATE UR QL: NEGATIVE
NRBC # BLD AUTO: 0 10E3/UL
NRBC BLD AUTO-RTO: 0 /100
NT-PROBNP SERPL-MCNC: 338 PG/ML (ref 0–900)
O2/TOTAL GAS SETTING VFR VENT: 21 %
OPIATES UR QL SCN: NORMAL
OXYHGB MFR BLDV: 84 % (ref 70–75)
PCO2 BLDV: 45 MM HG (ref 40–50)
PCP QUAL URINE (ROCHE): NORMAL
PH BLDV: 7.3 [PH] (ref 7.32–7.43)
PH UR STRIP: 6 [PH] (ref 5–7)
PLATELET # BLD AUTO: 244 10E3/UL (ref 150–450)
PO2 BLDV: 57 MM HG (ref 25–47)
POTASSIUM SERPL-SCNC: 4.2 MMOL/L (ref 3.4–5.3)
PROT SERPL-MCNC: 4.9 G/DL (ref 6.4–8.3)
RBC # BLD AUTO: 2.93 10E6/UL (ref 3.8–5.2)
RBC URINE: 1 /HPF
RSV RNA SPEC NAA+PROBE: NEGATIVE
SAO2 % BLDV: 85.9 % (ref 70–75)
SARS-COV-2 RNA RESP QL NAA+PROBE: NEGATIVE
SODIUM SERPL-SCNC: 144 MMOL/L (ref 135–145)
SP GR UR STRIP: 1.02 (ref 1–1.03)
SQUAMOUS EPITHELIAL: 1 /HPF
TROPONIN T SERPL HS-MCNC: 39 NG/L
TSH SERPL DL<=0.005 MIU/L-ACNC: 0.98 UIU/ML (ref 0.3–4.2)
UROBILINOGEN UR STRIP-MCNC: <2 MG/DL
WBC # BLD AUTO: 9.9 10E3/UL (ref 4–11)
WBC URINE: 1 /HPF

## 2024-06-27 PROCEDURE — 83690 ASSAY OF LIPASE: CPT | Performed by: EMERGENCY MEDICINE

## 2024-06-27 PROCEDURE — 80053 COMPREHEN METABOLIC PANEL: CPT | Performed by: EMERGENCY MEDICINE

## 2024-06-27 PROCEDURE — 82805 BLOOD GASES W/O2 SATURATION: CPT | Performed by: EMERGENCY MEDICINE

## 2024-06-27 PROCEDURE — 82010 KETONE BODYS QUAN: CPT | Performed by: EMERGENCY MEDICINE

## 2024-06-27 PROCEDURE — 999N000104 CT THORACIC SPINE RECONSTRUCTED

## 2024-06-27 PROCEDURE — 84443 ASSAY THYROID STIM HORMONE: CPT | Performed by: EMERGENCY MEDICINE

## 2024-06-27 PROCEDURE — 93005 ELECTROCARDIOGRAM TRACING: CPT | Performed by: EMERGENCY MEDICINE

## 2024-06-27 PROCEDURE — 80307 DRUG TEST PRSMV CHEM ANLYZR: CPT

## 2024-06-27 PROCEDURE — 93005 ELECTROCARDIOGRAM TRACING: CPT | Performed by: STUDENT IN AN ORGANIZED HEALTH CARE EDUCATION/TRAINING PROGRAM

## 2024-06-27 PROCEDURE — 80307 DRUG TEST PRSMV CHEM ANLYZR: CPT | Performed by: EMERGENCY MEDICINE

## 2024-06-27 PROCEDURE — 83036 HEMOGLOBIN GLYCOSYLATED A1C: CPT

## 2024-06-27 PROCEDURE — 87637 SARSCOV2&INF A&B&RSV AMP PRB: CPT | Performed by: EMERGENCY MEDICINE

## 2024-06-27 PROCEDURE — 85025 COMPLETE CBC W/AUTO DIFF WBC: CPT | Performed by: EMERGENCY MEDICINE

## 2024-06-27 PROCEDURE — 72125 CT NECK SPINE W/O DYE: CPT

## 2024-06-27 PROCEDURE — 99285 EMERGENCY DEPT VISIT HI MDM: CPT | Mod: 25

## 2024-06-27 PROCEDURE — 70450 CT HEAD/BRAIN W/O DYE: CPT

## 2024-06-27 PROCEDURE — 83735 ASSAY OF MAGNESIUM: CPT | Performed by: EMERGENCY MEDICINE

## 2024-06-27 PROCEDURE — 83880 ASSAY OF NATRIURETIC PEPTIDE: CPT | Performed by: EMERGENCY MEDICINE

## 2024-06-27 PROCEDURE — 83605 ASSAY OF LACTIC ACID: CPT | Performed by: EMERGENCY MEDICINE

## 2024-06-27 PROCEDURE — 84156 ASSAY OF PROTEIN URINE: CPT

## 2024-06-27 PROCEDURE — 36415 COLL VENOUS BLD VENIPUNCTURE: CPT | Performed by: EMERGENCY MEDICINE

## 2024-06-27 PROCEDURE — 74177 CT ABD & PELVIS W/CONTRAST: CPT

## 2024-06-27 PROCEDURE — 81001 URINALYSIS AUTO W/SCOPE: CPT | Mod: XU | Performed by: EMERGENCY MEDICINE

## 2024-06-27 PROCEDURE — 82077 ASSAY SPEC XCP UR&BREATH IA: CPT | Performed by: EMERGENCY MEDICINE

## 2024-06-27 PROCEDURE — 84484 ASSAY OF TROPONIN QUANT: CPT | Performed by: EMERGENCY MEDICINE

## 2024-06-27 PROCEDURE — 258N000003 HC RX IP 258 OP 636: Performed by: EMERGENCY MEDICINE

## 2024-06-27 PROCEDURE — 71275 CT ANGIOGRAPHY CHEST: CPT

## 2024-06-27 PROCEDURE — 82962 GLUCOSE BLOOD TEST: CPT

## 2024-06-27 PROCEDURE — 96361 HYDRATE IV INFUSION ADD-ON: CPT

## 2024-06-27 PROCEDURE — 999N000104 CT LUMBAR SPINE RECONSTRUCTED

## 2024-06-27 RX ADMIN — SODIUM CHLORIDE 500 ML: 9 INJECTION, SOLUTION INTRAVENOUS at 22:43

## 2024-06-27 ASSESSMENT — COLUMBIA-SUICIDE SEVERITY RATING SCALE - C-SSRS
2. HAVE YOU ACTUALLY HAD ANY THOUGHTS OF KILLING YOURSELF IN THE PAST MONTH?: NO
1. IN THE PAST MONTH, HAVE YOU WISHED YOU WERE DEAD OR WISHED YOU COULD GO TO SLEEP AND NOT WAKE UP?: NO
6. HAVE YOU EVER DONE ANYTHING, STARTED TO DO ANYTHING, OR PREPARED TO DO ANYTHING TO END YOUR LIFE?: NO

## 2024-06-27 ASSESSMENT — ACTIVITIES OF DAILY LIVING (ADL)
ADLS_ACUITY_SCORE: 35
ADLS_ACUITY_SCORE: 33

## 2024-06-28 ENCOUNTER — APPOINTMENT (OUTPATIENT)
Dept: PHYSICAL THERAPY | Facility: HOSPITAL | Age: 59
End: 2024-06-28
Payer: COMMERCIAL

## 2024-06-28 ENCOUNTER — APPOINTMENT (OUTPATIENT)
Dept: OCCUPATIONAL THERAPY | Facility: HOSPITAL | Age: 59
End: 2024-06-28
Payer: COMMERCIAL

## 2024-06-28 PROBLEM — R41.82 ALTERED MENTAL STATUS, UNSPECIFIED ALTERED MENTAL STATUS TYPE: Status: ACTIVE | Noted: 2024-06-28

## 2024-06-28 LAB
AMMONIA PLAS-SCNC: 22 UMOL/L (ref 11–51)
ANION GAP SERPL CALCULATED.3IONS-SCNC: 10 MMOL/L (ref 7–15)
ATRIAL RATE - MUSE: 107 BPM
BASOPHILS # BLD AUTO: 0.1 10E3/UL (ref 0–0.2)
BASOPHILS NFR BLD AUTO: 0 %
BUN SERPL-MCNC: 34.2 MG/DL (ref 6–20)
CALCIUM SERPL-MCNC: 7.1 MG/DL (ref 8.6–10)
CHLORIDE SERPL-SCNC: 114 MMOL/L (ref 98–107)
CREAT SERPL-MCNC: 1.29 MG/DL (ref 0.51–0.95)
DEPRECATED HCO3 PLAS-SCNC: 20 MMOL/L (ref 22–29)
DIASTOLIC BLOOD PRESSURE - MUSE: NORMAL MMHG
EGFRCR SERPLBLD CKD-EPI 2021: 48 ML/MIN/1.73M2
EOSINOPHIL # BLD AUTO: 0.1 10E3/UL (ref 0–0.7)
EOSINOPHIL NFR BLD AUTO: 1 %
ERYTHROCYTE [DISTWIDTH] IN BLOOD BY AUTOMATED COUNT: 12.8 % (ref 10–15)
GLUCOSE BLDC GLUCOMTR-MCNC: 184 MG/DL (ref 70–99)
GLUCOSE BLDC GLUCOMTR-MCNC: 207 MG/DL (ref 70–99)
GLUCOSE BLDC GLUCOMTR-MCNC: 229 MG/DL (ref 70–99)
GLUCOSE BLDC GLUCOMTR-MCNC: 236 MG/DL (ref 70–99)
GLUCOSE BLDC GLUCOMTR-MCNC: 295 MG/DL (ref 70–99)
GLUCOSE SERPL-MCNC: 325 MG/DL (ref 70–99)
HBA1C MFR BLD: 8.6 %
HCT VFR BLD AUTO: 34.4 % (ref 35–47)
HGB BLD-MCNC: 11 G/DL (ref 11.7–15.7)
IMM GRANULOCYTES # BLD: 0.2 10E3/UL
IMM GRANULOCYTES NFR BLD: 1 %
INTERPRETATION ECG - MUSE: NORMAL
LYMPHOCYTES # BLD AUTO: 2.7 10E3/UL (ref 0.8–5.3)
LYMPHOCYTES NFR BLD AUTO: 22 %
MCH RBC QN AUTO: 32.9 PG (ref 26.5–33)
MCHC RBC AUTO-ENTMCNC: 32 G/DL (ref 31.5–36.5)
MCV RBC AUTO: 103 FL (ref 78–100)
MONOCYTES # BLD AUTO: 0.6 10E3/UL (ref 0–1.3)
MONOCYTES NFR BLD AUTO: 5 %
NEUTROPHILS # BLD AUTO: 8.7 10E3/UL (ref 1.6–8.3)
NEUTROPHILS NFR BLD AUTO: 71 %
NRBC # BLD AUTO: 0 10E3/UL
NRBC BLD AUTO-RTO: 0 /100
OXYCODONE UR QL: ABNORMAL
P AXIS - MUSE: 52 DEGREES
PLATELET # BLD AUTO: 246 10E3/UL (ref 150–450)
POTASSIUM SERPL-SCNC: 4.6 MMOL/L (ref 3.4–5.3)
PR INTERVAL - MUSE: 142 MS
QRS DURATION - MUSE: 72 MS
QT - MUSE: 354 MS
QTC - MUSE: 472 MS
R AXIS - MUSE: -10 DEGREES
RBC # BLD AUTO: 3.34 10E6/UL (ref 3.8–5.2)
SODIUM SERPL-SCNC: 144 MMOL/L (ref 135–145)
SYSTOLIC BLOOD PRESSURE - MUSE: NORMAL MMHG
T AXIS - MUSE: 71 DEGREES
TROPONIN T SERPL HS-MCNC: 35 NG/L
TROPONIN T SERPL HS-MCNC: 41 NG/L
VENTRICULAR RATE- MUSE: 107 BPM
VIT B12 SERPL-MCNC: 1487 PG/ML (ref 232–1245)
WBC # BLD AUTO: 12.3 10E3/UL (ref 4–11)

## 2024-06-28 PROCEDURE — 84484 ASSAY OF TROPONIN QUANT: CPT | Performed by: EMERGENCY MEDICINE

## 2024-06-28 PROCEDURE — G0378 HOSPITAL OBSERVATION PER HR: HCPCS

## 2024-06-28 PROCEDURE — 96361 HYDRATE IV INFUSION ADD-ON: CPT

## 2024-06-28 PROCEDURE — 250N000012 HC RX MED GY IP 250 OP 636 PS 637

## 2024-06-28 PROCEDURE — 82374 ASSAY BLOOD CARBON DIOXIDE: CPT

## 2024-06-28 PROCEDURE — 85025 COMPLETE CBC W/AUTO DIFF WBC: CPT

## 2024-06-28 PROCEDURE — 96372 THER/PROPH/DIAG INJ SC/IM: CPT | Mod: 59

## 2024-06-28 PROCEDURE — 82607 VITAMIN B-12: CPT

## 2024-06-28 PROCEDURE — 82962 GLUCOSE BLOOD TEST: CPT

## 2024-06-28 PROCEDURE — 97116 GAIT TRAINING THERAPY: CPT | Mod: GP

## 2024-06-28 PROCEDURE — 84484 ASSAY OF TROPONIN QUANT: CPT

## 2024-06-28 PROCEDURE — 36416 COLLJ CAPILLARY BLOOD SPEC: CPT

## 2024-06-28 PROCEDURE — 82746 ASSAY OF FOLIC ACID SERUM: CPT

## 2024-06-28 PROCEDURE — 258N000003 HC RX IP 258 OP 636

## 2024-06-28 PROCEDURE — 96374 THER/PROPH/DIAG INJ IV PUSH: CPT

## 2024-06-28 PROCEDURE — 97165 OT EVAL LOW COMPLEX 30 MIN: CPT | Mod: GO

## 2024-06-28 PROCEDURE — 36415 COLL VENOUS BLD VENIPUNCTURE: CPT

## 2024-06-28 PROCEDURE — 82565 ASSAY OF CREATININE: CPT

## 2024-06-28 PROCEDURE — 250N000011 HC RX IP 250 OP 636: Performed by: EMERGENCY MEDICINE

## 2024-06-28 PROCEDURE — 99223 1ST HOSP IP/OBS HIGH 75: CPT | Mod: GC

## 2024-06-28 PROCEDURE — 250N000013 HC RX MED GY IP 250 OP 250 PS 637

## 2024-06-28 PROCEDURE — 97161 PT EVAL LOW COMPLEX 20 MIN: CPT | Mod: GP

## 2024-06-28 PROCEDURE — 96372 THER/PROPH/DIAG INJ SC/IM: CPT

## 2024-06-28 PROCEDURE — 250N000011 HC RX IP 250 OP 636

## 2024-06-28 PROCEDURE — 250N000013 HC RX MED GY IP 250 OP 250 PS 637: Performed by: STUDENT IN AN ORGANIZED HEALTH CARE EDUCATION/TRAINING PROGRAM

## 2024-06-28 PROCEDURE — 36415 COLL VENOUS BLD VENIPUNCTURE: CPT | Performed by: EMERGENCY MEDICINE

## 2024-06-28 PROCEDURE — 82140 ASSAY OF AMMONIA: CPT | Performed by: EMERGENCY MEDICINE

## 2024-06-28 RX ORDER — SUMATRIPTAN 6 MG/.5ML
6 INJECTION, SOLUTION SUBCUTANEOUS
Status: DISCONTINUED | OUTPATIENT
Start: 2024-06-28 | End: 2024-06-29 | Stop reason: HOSPADM

## 2024-06-28 RX ORDER — OXYCODONE HYDROCHLORIDE 5 MG/1
10 TABLET ORAL EVERY 6 HOURS PRN
Status: DISCONTINUED | OUTPATIENT
Start: 2024-06-28 | End: 2024-06-29 | Stop reason: HOSPADM

## 2024-06-28 RX ORDER — CAPSAICIN 0.025 %
CREAM (GRAM) TOPICAL 4 TIMES DAILY
Status: DISCONTINUED | OUTPATIENT
Start: 2024-06-28 | End: 2024-06-29 | Stop reason: HOSPADM

## 2024-06-28 RX ORDER — DILTIAZEM HYDROCHLORIDE 120 MG/1
240 CAPSULE, EXTENDED RELEASE ORAL DAILY
Status: DISCONTINUED | OUTPATIENT
Start: 2024-06-28 | End: 2024-06-29 | Stop reason: HOSPADM

## 2024-06-28 RX ORDER — OXYCODONE HYDROCHLORIDE 5 MG/1
5 TABLET ORAL EVERY 4 HOURS PRN
Status: DISCONTINUED | OUTPATIENT
Start: 2024-06-28 | End: 2024-06-29 | Stop reason: HOSPADM

## 2024-06-28 RX ORDER — IOPAMIDOL 755 MG/ML
90 INJECTION, SOLUTION INTRAVASCULAR ONCE
Status: COMPLETED | OUTPATIENT
Start: 2024-06-28 | End: 2024-06-28

## 2024-06-28 RX ORDER — ACETAMINOPHEN 325 MG/1
975 TABLET ORAL 3 TIMES DAILY PRN
Status: DISCONTINUED | OUTPATIENT
Start: 2024-06-28 | End: 2024-06-29 | Stop reason: HOSPADM

## 2024-06-28 RX ORDER — LISINOPRIL 20 MG/1
40 TABLET ORAL AT BEDTIME
Status: DISCONTINUED | OUTPATIENT
Start: 2024-06-28 | End: 2024-06-29 | Stop reason: HOSPADM

## 2024-06-28 RX ORDER — ATORVASTATIN CALCIUM 10 MG/1
10 TABLET, FILM COATED ORAL DAILY
Status: DISCONTINUED | OUTPATIENT
Start: 2024-06-28 | End: 2024-06-29 | Stop reason: HOSPADM

## 2024-06-28 RX ORDER — LABETALOL 100 MG/1
100 TABLET, FILM COATED ORAL 2 TIMES DAILY
Status: DISCONTINUED | OUTPATIENT
Start: 2024-06-28 | End: 2024-06-29 | Stop reason: HOSPADM

## 2024-06-28 RX ORDER — PROCHLORPERAZINE 25 MG
25 SUPPOSITORY, RECTAL RECTAL EVERY 12 HOURS PRN
Status: DISCONTINUED | OUTPATIENT
Start: 2024-06-28 | End: 2024-06-29 | Stop reason: HOSPADM

## 2024-06-28 RX ORDER — AMOXICILLIN 250 MG
2 CAPSULE ORAL 2 TIMES DAILY PRN
Status: DISCONTINUED | OUTPATIENT
Start: 2024-06-28 | End: 2024-06-29 | Stop reason: HOSPADM

## 2024-06-28 RX ORDER — HYDRALAZINE HYDROCHLORIDE 20 MG/ML
5 INJECTION INTRAMUSCULAR; INTRAVENOUS ONCE
Status: COMPLETED | OUTPATIENT
Start: 2024-06-28 | End: 2024-06-28

## 2024-06-28 RX ORDER — OXYCODONE AND ACETAMINOPHEN 10; 325 MG/1; MG/1
1 TABLET ORAL EVERY 6 HOURS PRN
Status: DISCONTINUED | OUTPATIENT
Start: 2024-06-28 | End: 2024-06-29 | Stop reason: HOSPADM

## 2024-06-28 RX ORDER — DAPAGLIFLOZIN 5 MG/1
10 TABLET, FILM COATED ORAL DAILY
Status: DISCONTINUED | OUTPATIENT
Start: 2024-06-28 | End: 2024-06-29 | Stop reason: HOSPADM

## 2024-06-28 RX ORDER — PROCHLORPERAZINE MALEATE 10 MG
10 TABLET ORAL EVERY 6 HOURS PRN
Status: DISCONTINUED | OUTPATIENT
Start: 2024-06-28 | End: 2024-06-29 | Stop reason: HOSPADM

## 2024-06-28 RX ORDER — NALOXONE HYDROCHLORIDE 0.4 MG/ML
0.4 INJECTION, SOLUTION INTRAMUSCULAR; INTRAVENOUS; SUBCUTANEOUS
Status: DISCONTINUED | OUTPATIENT
Start: 2024-06-28 | End: 2024-06-29 | Stop reason: HOSPADM

## 2024-06-28 RX ORDER — DEXTROSE MONOHYDRATE 25 G/50ML
25-50 INJECTION, SOLUTION INTRAVENOUS
Status: DISCONTINUED | OUTPATIENT
Start: 2024-06-28 | End: 2024-06-29 | Stop reason: HOSPADM

## 2024-06-28 RX ORDER — FERROUS SULFATE 325(65) MG
325 TABLET ORAL
Status: DISCONTINUED | OUTPATIENT
Start: 2024-06-29 | End: 2024-06-29 | Stop reason: HOSPADM

## 2024-06-28 RX ORDER — NALOXONE HYDROCHLORIDE 0.4 MG/ML
0.2 INJECTION, SOLUTION INTRAMUSCULAR; INTRAVENOUS; SUBCUTANEOUS
Status: DISCONTINUED | OUTPATIENT
Start: 2024-06-28 | End: 2024-06-29 | Stop reason: HOSPADM

## 2024-06-28 RX ORDER — CITRIC ACID/SODIUM CITRATE 334-500MG
10 SOLUTION, ORAL ORAL 2 TIMES DAILY WITH MEALS
Status: DISCONTINUED | OUTPATIENT
Start: 2024-06-28 | End: 2024-06-29 | Stop reason: HOSPADM

## 2024-06-28 RX ORDER — ENOXAPARIN SODIUM 100 MG/ML
40 INJECTION SUBCUTANEOUS EVERY 12 HOURS
Status: DISCONTINUED | OUTPATIENT
Start: 2024-06-28 | End: 2024-06-29 | Stop reason: HOSPADM

## 2024-06-28 RX ORDER — AMOXICILLIN 250 MG
1 CAPSULE ORAL 2 TIMES DAILY
Status: DISCONTINUED | OUTPATIENT
Start: 2024-06-28 | End: 2024-06-29 | Stop reason: HOSPADM

## 2024-06-28 RX ORDER — ONDANSETRON 4 MG/1
4 TABLET, ORALLY DISINTEGRATING ORAL EVERY 6 HOURS PRN
Status: DISCONTINUED | OUTPATIENT
Start: 2024-06-28 | End: 2024-06-29 | Stop reason: HOSPADM

## 2024-06-28 RX ORDER — AMOXICILLIN 250 MG
1 CAPSULE ORAL 2 TIMES DAILY PRN
Status: DISCONTINUED | OUTPATIENT
Start: 2024-06-28 | End: 2024-06-29 | Stop reason: HOSPADM

## 2024-06-28 RX ORDER — NICOTINE POLACRILEX 4 MG
15-30 LOZENGE BUCCAL
Status: DISCONTINUED | OUTPATIENT
Start: 2024-06-28 | End: 2024-06-29 | Stop reason: HOSPADM

## 2024-06-28 RX ORDER — DILTIAZEM HYDROCHLORIDE 120 MG/1
240 CAPSULE, EXTENDED RELEASE ORAL DAILY
Status: DISCONTINUED | OUTPATIENT
Start: 2024-06-28 | End: 2024-06-28

## 2024-06-28 RX ORDER — ENOXAPARIN SODIUM 100 MG/ML
40 INJECTION SUBCUTANEOUS EVERY 24 HOURS
Status: DISCONTINUED | OUTPATIENT
Start: 2024-06-28 | End: 2024-06-28 | Stop reason: DRUGHIGH

## 2024-06-28 RX ORDER — ONDANSETRON 2 MG/ML
4 INJECTION INTRAMUSCULAR; INTRAVENOUS EVERY 6 HOURS PRN
Status: DISCONTINUED | OUTPATIENT
Start: 2024-06-28 | End: 2024-06-29 | Stop reason: HOSPADM

## 2024-06-28 RX ORDER — TORSEMIDE 5 MG/1
10 TABLET ORAL DAILY
Status: DISCONTINUED | OUTPATIENT
Start: 2024-06-28 | End: 2024-06-29 | Stop reason: HOSPADM

## 2024-06-28 RX ORDER — PREGABALIN 20 MG/ML
25 SOLUTION ORAL 3 TIMES DAILY
Status: DISCONTINUED | OUTPATIENT
Start: 2024-06-28 | End: 2024-06-29 | Stop reason: HOSPADM

## 2024-06-28 RX ORDER — OXYCODONE AND ACETAMINOPHEN 10; 325 MG/1; MG/1
2 TABLET ORAL AT BEDTIME
Status: DISCONTINUED | OUTPATIENT
Start: 2024-06-28 | End: 2024-06-29 | Stop reason: HOSPADM

## 2024-06-28 RX ORDER — POLYETHYLENE GLYCOL 3350 17 G/17G
17 POWDER, FOR SOLUTION ORAL 2 TIMES DAILY PRN
Status: DISCONTINUED | OUTPATIENT
Start: 2024-06-28 | End: 2024-06-29 | Stop reason: HOSPADM

## 2024-06-28 RX ADMIN — LABETALOL HYDROCHLORIDE 100 MG: 100 TABLET, FILM COATED ORAL at 11:16

## 2024-06-28 RX ADMIN — IOPAMIDOL 90 ML: 755 INJECTION, SOLUTION INTRAVENOUS at 00:00

## 2024-06-28 RX ADMIN — SUMATRIPTAN 6 MG: 6 INJECTION SUBCUTANEOUS at 19:46

## 2024-06-28 RX ADMIN — INSULIN GLARGINE 5 UNITS: 100 INJECTION, SOLUTION SUBCUTANEOUS at 22:53

## 2024-06-28 RX ADMIN — DICLOFENAC 4 G: 10 GEL TOPICAL at 22:14

## 2024-06-28 RX ADMIN — DILTIAZEM HYDROCHLORIDE 240 MG: 120 CAPSULE, EXTENDED RELEASE ORAL at 14:08

## 2024-06-28 RX ADMIN — LABETALOL HYDROCHLORIDE 100 MG: 100 TABLET, FILM COATED ORAL at 22:05

## 2024-06-28 RX ADMIN — ENOXAPARIN SODIUM 40 MG: 40 INJECTION SUBCUTANEOUS at 08:29

## 2024-06-28 RX ADMIN — OXYCODONE HYDROCHLORIDE AND ACETAMINOPHEN 1 TABLET: 10; 325 TABLET ORAL at 11:16

## 2024-06-28 RX ADMIN — TORSEMIDE 10 MG: 5 TABLET ORAL at 11:22

## 2024-06-28 RX ADMIN — SODIUM CITRATE AND CITRIC ACID MONOHYDRATE 10 ML: 500; 334 SOLUTION ORAL at 11:24

## 2024-06-28 RX ADMIN — DICLOFENAC 4 G: 10 GEL TOPICAL at 14:09

## 2024-06-28 RX ADMIN — CAPSAICIN: 0.25 CREAM TOPICAL at 22:16

## 2024-06-28 RX ADMIN — INSULIN ASPART 2 UNITS: 100 INJECTION, SOLUTION INTRAVENOUS; SUBCUTANEOUS at 08:28

## 2024-06-28 RX ADMIN — ACETAMINOPHEN 975 MG: 325 TABLET ORAL at 07:49

## 2024-06-28 RX ADMIN — CAPSAICIN: 0.25 CREAM TOPICAL at 16:39

## 2024-06-28 RX ADMIN — ACETAMINOPHEN 975 MG: 325 TABLET ORAL at 16:45

## 2024-06-28 RX ADMIN — DAPAGLIFLOZIN 10 MG: 5 TABLET, FILM COATED ORAL at 11:22

## 2024-06-28 RX ADMIN — OXYCODONE HYDROCHLORIDE AND ACETAMINOPHEN 2 TABLET: 10; 325 TABLET ORAL at 22:06

## 2024-06-28 RX ADMIN — PREGABALIN 25 MG: 20 SOLUTION ORAL at 14:08

## 2024-06-28 RX ADMIN — INSULIN ASPART 4 UNITS: 100 INJECTION, SOLUTION INTRAVENOUS; SUBCUTANEOUS at 12:47

## 2024-06-28 RX ADMIN — ATORVASTATIN CALCIUM 10 MG: 10 TABLET, FILM COATED ORAL at 11:16

## 2024-06-28 RX ADMIN — HYDRALAZINE HYDROCHLORIDE 5 MG: 20 INJECTION INTRAMUSCULAR; INTRAVENOUS at 06:27

## 2024-06-28 RX ADMIN — INSULIN ASPART 2 UNITS: 100 INJECTION, SOLUTION INTRAVENOUS; SUBCUTANEOUS at 17:48

## 2024-06-28 RX ADMIN — SODIUM CHLORIDE 250 ML: 9 INJECTION, SOLUTION INTRAVENOUS at 06:27

## 2024-06-28 RX ADMIN — SODIUM CITRATE AND CITRIC ACID MONOHYDRATE 10 ML: 500; 334 SOLUTION ORAL at 17:54

## 2024-06-28 RX ADMIN — CAPSAICIN: 0.25 CREAM TOPICAL at 11:23

## 2024-06-28 RX ADMIN — PREGABALIN 25 MG: 20 SOLUTION ORAL at 22:06

## 2024-06-28 ASSESSMENT — ACTIVITIES OF DAILY LIVING (ADL)
ADLS_ACUITY_SCORE: 39
ADLS_ACUITY_SCORE: 41
ADLS_ACUITY_SCORE: 39
ADLS_ACUITY_SCORE: 40
ADLS_ACUITY_SCORE: 40
ADLS_ACUITY_SCORE: 39
ADLS_ACUITY_SCORE: 39
DEPENDENT_IADLS:: CLEANING;COOKING;LAUNDRY;SHOPPING;TRANSPORTATION
ADLS_ACUITY_SCORE: 41
ADLS_ACUITY_SCORE: 35
ADLS_ACUITY_SCORE: 37
ADLS_ACUITY_SCORE: 40
ADLS_ACUITY_SCORE: 40
ADLS_ACUITY_SCORE: 41
ADLS_ACUITY_SCORE: 40
ADLS_ACUITY_SCORE: 39
ADLS_ACUITY_SCORE: 39
ADLS_ACUITY_SCORE: 35
ADLS_ACUITY_SCORE: 41

## 2024-06-28 NOTE — PLAN OF CARE
BP elevated, tachycardic low 100's, 9/10 pain, throbbing headache and body aches, PRN Tylenol given, awaiting effectiveness.  One time dose of hydralazine ordered by provider, BP continues to fluctuate.  Continues to be drowsy, wakes to voice and/or light touch, oriented x4.    Goal Outcome Evaluation:  Problem: Comorbidity Management  Goal: Blood Glucose Levels Within Targeted Range  Outcome: Progressing     Problem: Comorbidity Management  Goal: Blood Pressure in Desired Range  Intervention: Maintain Blood Pressure Management  Recent Flowsheet Documentation  Taken 6/28/2024 0335 by Marie Hi, RN  Medication Review/Management: medications reviewed

## 2024-06-28 NOTE — PROGRESS NOTES
"PRIMARY DIAGNOSIS: \"GENERIC\" NURSING  OUTPATIENT/OBSERVATION GOALS TO BE MET BEFORE DISCHARGE:  ADLs back to baseline: No    Activity and level of assistance: Up with standby assistance.    Pain status: No improvement noted. Consider adjustment in pain regimen.    Return to near baseline physical activity: No     Discharge Planner Nurse   Safe discharge environment identified: No  Barriers to discharge: Yes, continues to be drowsy and lethargic, generalized +2 edema, pain not controlled, MD aware.       Entered by: Marie Hi RN 06/28/2024 5:12 AM     Please review provider order for any additional goals.   Nurse to notify provider when observation goals have been met and patient is ready for discharge.  "

## 2024-06-28 NOTE — H&P
[facility]    History and Physical - Hospitalist Service       Date of Admission:  6/27/2024    Assessment & Plan    Ruthy Álvarez is a 58 year old female admitted on 6/27/2024. She has a history of type 2 diabetes, hypertension, HLD asthma, CKD and chronic pain on opioids and is admitted for chest pain, recent fall, dizziness and excessive somnolence concern for acute encephalopathy.     Med rec not done at the time of this note     Recent fall  Excessive somnolence  Hx of Chronic pain on opioids   Acute encephalopathy  Patient resents with fatigue, body aches has had a fall recently few days ago.  She also notes dizziness and drowsiness worse one day  before admission.  Otherwise denies GI symptoms or urinary symptoms. difficult to elicit history from patient as she is very drowsy and unable to finish her sentences without falling asleep.  Patient is able to respond to verbal stimuli, able to protect her airways and vitally stable.  Exam is benign with normal heart and lung sounds and benign abdominal exam.  Her workup is largely unremarkable except for UDS positive for oxycodone, otherwise normal ammonia level, slightly elevated creatinine, normal lactic and  alcohol level.  CT head is negative for acute intracranial processes but shows brain atrophy and chronic microvascular ischemic changes. CT cervical/lumbar/thoracic with no acute findings unsure how much patient's chronic pain and opioid medication is playing a role in her AMS vs a chronic condition such early signs/presentation of cognitive impairment related to microvascular ischemic changes in the brain or other types of dementia although less likely at this point. Of note patient had similar presentation in 2022 and at that time her AMS was attributed to polypharmacy and some of her med her medication were stopped like risperidone.   -Fall precaution  -Hold opioid medications overnight  -Encourage p.o. intake  -Received  NS bolus x2  -PT/OT  - SW/CM to help with discharge planning    Chest pain-resolved  Patient reported chest pain on admission which she reported has resolved spontaneously.  EKG reassuring and troponin flat. She also notes shortness of breath prior to admission.  However, she remained on RA and lungs sound clear.  Sob could be related to asthma given negative CXR will restart PTA inhalers.  -Repeat troponin pending  -Repeat EKG if patient c/o chest pain    MICHELLE on CKD III  Creatinine elevated at 1.49 on admission.  Baseline creatinine around 1.1-1.2.  Could be worsened by low p.o. intake and some aspect of dehydration will give a small bolus of IV fluid.  -BMP daily  -Avoid nephrotoxic meds  -Encourage p.o. intake once patient is more alert    TIIDM   Hyperglycemia  Blood sugar elevated at presentation 392.  Patient's last A1c 5.9, 4 months ago.  Patient is on PTA Ozempic and Farxiga per chart review.   Normal anion gap and normal ketone levels on admission. Will continue to monitor.   -Hold PTA Ozempic and Farxiga  -MSSI  -Check A1c    Hypertension  Headache  Blood Pressure significantly elevated on presentation 199/95 -180/132 med rec not done will restart PTA BP medications  once med rec is complete.  -restart PTA lisinopril 40 mg  -restart PTA labetalol 100 mg twice daily  -5 mg hydralazine x1  -Tylenol as needed  -Oxycodone q4-6 as needed(hold overnight) given excessive drowsiness    Elevated ALP  ALP elevated with normal AST and ALT.  CT abdomen pelvis showed cholecystectomy and mild biliary prominence but otherwise unremarkable. of note patient had an elevated alkaline phosphatase 192-215 in the past.  Could consider outpatient workup for different causes including liver disease vs bone vs kidney disorders. Outpatient work up.  -Consider checking GGT and ultrasound to get a better picture of the biliary anatomy.    Macrocytic anemia  S/p bariatric surgery 2006  Hemoglobin 9.7 on admission.  Baseline appears to  "be between 9-11.  No signs of active bleeding.  Could be related to malabsorption s/p bariatric bariatric surgery patient is on PTA folate and vitamin B12.  Consider checking levels here versus outpatient.  Unsure if levels are low and it could be contributing to cognitive impairment/confusion.  Last B12 was elevated 1 year ago >4000.  Folate was normal one year ago.  -Vitamin B12 and folate    Chronic pain  Reports generalized bodyaches at baseline.  Consider restarting PTA pain meds when patient is more alert. She reports taking Percocet 4x/day.      Chronic conditions  Migraine:  PTA sumatriptan  Hx ?CHF: PTA torsemide  Asthma: PTA Symbicort twice daily and as needed  HLD: PTA Lipitor 10 mg  S/p bariatric surgery: PTA supplements/vitamins         Observation Goals: -diagnostic tests and consults completed and resulted, -returns to baseline functional status, Nurse to notify provider when observation goals have been met and patient is ready for discharge.  Diet: Advance Diet as Tolerated: Clear Liquid Diet  DVT Prophylaxis: Enoxaparin (Lovenox) SQ  [place] Catheter: Not present  Fluids: NS bolus 250 ml  Lines: None     Cardiac Monitoring: None  Code Status: Full Code    Clinically Significant Risk Factors Present on Admission            # Hypomagnesemia: Lowest Mg = 1.5 mg/dL in last 2 days, will replace as needed   # Hypoalbuminemia: Lowest albumin = 2.5 g/dL at 6/27/2024 10:30 PM, will monitor as appropriate     # Hypertension: Noted on problem list      # Anemia: based on hgb <11           # Severe Obesity: Estimated body mass index is 40.94 kg/m  as calculated from the following:    Height as of 6/20/24: 1.499 m (4' 11\").    Weight as of this encounter: 91.9 kg (202 lb 11.2 oz).       # Asthma: noted on problem list        Disposition Plan      Expected Discharge Date: 06/29/2024                The patient's care was discussed with the  Day Team attendings Dr. Pichardo and Mic.    Kimber Al MD  Hospitalist " [facility]  Securely message with [employer] (more info)  Text page via [employer] Paging/Directory   ______________________________________________________________________    Chief Complaint   Recent fall  Lightheadedness  Chest pain  Excessive drowsiness    History is obtained from the patient (limited due to patient's drowsiness)    History of Present Illness   Ruthy Álvarez is a 58 year old female who has a history of hypertension, type 2 diabetes, chronic pain on chronic opioids, status post bariatric surgery in 2006 and is admitted for fatigue, generalized bodyaches, chest pain and a recent fall.    Patient is very drowsy and has difficulty completing her sentences without falling asleep. per chart review and limited history from patient it seems like patient had few days of fatigue and generalized bodyaches and had a fall one day prior to admission. She notes that she tripped outside of her apartment door and fell down she denies hitting her head.  She denies loss of consciousness.  She is not on blood thinners.  Patient also notes headache since admission.  Denies GI or  symptoms.  She notes shortness of breath and chest pain at home that resolved since admission.     Patient reports that she lives alone in an apartment complex and independent at baseline.  She notes having limited mobility and uses usually a walker or a cane.  She reports working as a  but cannot give me any more details.  Patient denies history of alcohol or drug use.  Patient denies history of smoking.  She cannot recall when asked if there are is any family member that she would like us to update.        Past Medical History    Past Medical History:   Diagnosis Date    Abnormal cervical Papanicolaou smear with positive human papillomavirus deoxyribonucleic acid test     Ankle joint pain     bilateral    Anxiety     Arthritis     Arthritis     rheumatoid arthritis    Asthma     Carpal tunnel  syndrome     bilateral    Compression neuropathy     Depression     Depressive disorder     Diabetes (H)     Diabetes (H)     type II    Diabetic nephropathy (H)     DVT of lower extremity, bilateral (H) 2011    right    Esophageal reflux     Gastroenteritis     Hernia 2017    History of blood clots     History of blood transfusion     Hyperlipidemia     Hypertension     Hypertension     Infectious fasciitis 2011    abdominal wall     Insomnia     Neck pain     Necrotizing fasciitis (H) 11/15/2012    Necrotizing fasciitis (H)     Neuropathy     Obesity     Osteoarthritis of both knees 10/31/2016    Pulmonary embolism and infarction (H)     SOB (shortness of breath)     with exertion    Tarsal tunnel syndrome     Uncomplicated asthma     Urinary incontinence        Past Surgical History   Past Surgical History:   Procedure Laterality Date    ABDOMINAL WALL SURGERY      for necrotizing fasciitis     SECTION      COLONOSCOPY N/A 3/15/2017    Procedure: INCOMPLETE COLONOSCOPY;  Surgeon: Topher Street MD;  [place] [facility] [place]:     COLONOSCOPY N/A 3/16/2017    Procedure: COLONOSCOPY;  Surgeon: Topher Street MD;  [place] [facility] [place]:     GASTRIC BYPASS  2006    Kim'n'Y.  wt then was in low 300lb range    GASTRIC BYPASS  2006    Kim-en-Y    HC REVISE MEDIAN N/CARPAL TUNNEL SURG Right 10/24/2014    Procedure: RIGHT REVISION CARPAL TUNNEL RELEASE WITH HYPOTHENAR FAT PAD;  Surgeon: Aaron Oreilly MD;  Location: [facility];  Service: Orthopedics    HYSTERECTOMY      HYSTERECTOMY      KNEE SURGERY Right     MIDLINE INSERTION - DOUBLE LUMEN  2021         UTERINE FIBROID SURGERY      hysterectomy    UTERINE FIBROID SURGERY         Prior to Admission Medications   Prior to Admission Medications   Prescriptions Last Dose Informant Patient Reported? Taking?   Alcohol Swabs (PRO COMFORT ALCOHOL) 70 % PADS   No No   Si pad 4 times daily as  needed (blood sugar testing) Use QID for blood sugar monitoring   CONTOUR NEXT TEST test strip   No No   Sig: USE TO TEST BLOOD GLUCOSE FOUR TIMES DAILY   Continuous Blood Gluc Sensor (FREESTYLE ANIKET 14 DAY SENSOR) [facility] [place]   No No   Si each every 14 days Use 1 Sensor every 14 days. Use to read blood sugars per 's instructions.   Continuous Blood Gluc Sensor (FREESTYLE ANIKET 2 SENSOR) [facility] [place]   No No   Si each every 14 days Use 1 sensor every 14 days. Use to read blood sugars per 's instructions.   Lancet Devices (EASY MINI EJECT LANCING DEVICE) MISC   Yes No   Sig: See Admin Instructions   Lancets 30G MISC   No No   Sig: USE TO TEST BLOOD GLUCOSE FOUR TIMES DAILY   SUMAtriptan (IMITREX STATDOSE) 6 MG/0.5ML pen injector kit   Yes No   Sig: Inject 6 mg Subcutaneous at onset of headache for migraine   Semaglutide, 1 MG/DOSE, (OZEMPIC) 4 MG/3ML pen   No No   Sig: Inject 1 mg Subcutaneous once a week for 84 days   Semaglutide, 2 MG/DOSE, (OZEMPIC) 8 MG/3ML pen   No No   Sig: Inject 2 mg Subcutaneous once a week   Simethicone 180 MG CAPS   No No   Sig: Use 1 softgel as needed for gas   acetaminophen (TYLENOL) 500 MG tablet   No No   Sig: Take 1 tablet (500 mg) by mouth 2 times daily as needed for mild pain   atorvastatin (LIPITOR) 10 MG tablet   No No   Sig: Take 1 tablet (10 mg) by mouth daily   blood glucose (NO BRAND SPECIFIED) lancets standard   No No   Sig: Check fasting glucose 4 times daily.  For Accu check monitor   blood glucose calibration (ONETOUCH ULTRA CONTROL) solution   Yes No   Sig: See Admin Instructions   blood glucose monitoring (ONE TOUCH ULTRA 2) meter device kit   Yes No   Sig: See Admin Instructions   budesonide-formoterol (SYMBICORT) 80-4.5 MCG/ACT Inhaler   No No   Sig: Inhale 2 puffs once daily as needed, and may repeat every hour, maximum 12 puffs in 24 hours   capsaicin (ZOSTRIX) 0.025 % external cream   No No   Sig: Apply 3-4 times daily to knees   dapagliflozin  (FARXIGA) 10 MG TABS tablet   No No   Sig: Take 1 tablet (10 mg) by mouth daily   diclofenac (VOLTAREN) 1 % topical gel   No No   Sig: Apply 4 g topically 3 times daily To top of both shoulders in area of pain   diltiazem ER (DILT-XR) 240 MG 24 hr ER beaded capsule   No No   Sig: Take 1 capsule (240 mg) by mouth daily New dose   ferrous sulfate (IRON) 325 (65 FE) MG tablet   Yes No   Sig: Take 1 tablet (325 mg) by mouth daily (with breakfast)   fluocinolone (SYNALAR) 0.01 % solution   Yes No   Sig: APPLY 1-2 ML TOPICALLY TO AFFECTED AREA 1-2 TIMES PER DAY.   labetalol (NORMODYNE) 100 MG tablet   No No   Sig: Take 1 tablet (100 mg) by mouth 2 times daily   latanoprost (XALATAN) 0.005 % ophthalmic solution   Yes No   Sig: INSTILL 1 DROP IN BOTH EYES DAILY AT BEDTIME   lidocaine (LMX4) 4 % external cream   No No   Sig: Apply topically once as needed for mild pain (left knee)   lisinopril (ZESTRIL) 40 MG tablet   No No   Sig: Take 1 tablet (40 mg) by mouth at bedtime   naloxone (NARCAN) 4 MG/0.1ML nasal spray   No No   Sig: Spray 1 spray (4 mg) into one nostril alternating nostrils once as needed for opioid reversal Every 2-3 minutes until patient responsive or EMS arrives   ondansetron (ZOFRAN ODT) 4 MG ODT tab   No No   Sig: Take 1 tablet (4 mg) by mouth every 6 hours as needed for nausea   order for DME   No No   Sig: Equipment being ordered: wheeled walker with seat   oxyCODONE-acetaminophen (PERCOCET)  MG per tablet   No No   Sig: Take 2 tablets by mouth at bedtime. May also take 1 tablet every 6 hours as needed for severe pain.   phentermine (ADIPEX-P) 37.5 MG tablet   No No   Sig: Take 1 tablet (37.5 mg) by mouth every morning (before breakfast)   pregabalin (LYRICA) 20 MG/ML solution   No No   Sig: Take 2.5 mLs (50 mg) by mouth 3 times daily   senna-docusate (SENOKOT-S/PERICOLACE) 8.6-50 MG tablet   No No   Sig: Take 1 tablet by mouth 2 times daily   sodium citrate-citric acid (BICITRA) 500-334 MG/5ML  solution   No No   Sig: Take 10 mLs by mouth 2 times daily (with meals)   torsemide (DEMADEX) 10 MG tablet   No No   Sig: Take 1 tablet (10 mg) by mouth daily      Facility-Administered Medications Last Administration Doses Remaining   cyanocobalamin injection 1,000 mcg 2024  2:06 PM            Review of Systems    Review of systems not obtained due to patient factors - sedation    Social History   I have reviewed this patient's social history and updated it with pertinent information if needed.  Social History     Tobacco Use    Smoking status: Never    Smokeless tobacco: Never   Vaping Use    Vaping status: Never Used   Substance Use Topics    Alcohol use: No    Drug use: No         Family History   I have reviewed this patient's family history and updated it with pertinent information if needed.  Family History   Problem Relation Age of Onset    Diabetes Mother     Coronary Artery Disease Mother          of MI at age of 51 years    Diabetes Brother     Diabetes Sister     No Known Problems Father     No Known Problems Maternal Grandmother     No Known Problems Maternal Grandfather     No Known Problems Paternal Grandmother     No Known Problems Paternal Grandfather     No Known Problems Son     No Known Problems Daughter     No Known Problems Maternal Half-Brother     No Known Problems Maternal Half-Sister     No Known Problems Paternal Half-Brother     No Known Problems Paternal Half-Sister     No Known Problems Niece     No Known Problems Nephew     No Known Problems Cousin     No Known Problems Other     Cancer Brother     Heart Disease No family hx of     Hypertension No family hx of     Hyperlipidemia No family hx of     Kidney Disease No family hx of     Cerebrovascular Disease No family hx of     Obesity No family hx of     Thrombosis No family hx of     Asthma No family hx of     Arthritis No family hx of     Thyroid Disease No family hx of     Depression No family hx of     Mental Illness No  family hx of     Substance Abuse No family hx of     Cystic Fibrosis No family hx of     Early Death No family hx of     Coronary Artery Disease Early Onset No family hx of     Heart Failure No family hx of     Bleeding Diathesis No family hx of     Dementia No family hx of     Breast Cancer No family hx of     Ovarian Cancer No family hx of     Uterine Cancer No family hx of     Prostate Cancer No family hx of     Colorectal Cancer No family hx of     Pancreatic Cancer No family hx of     Lung Cancer No family hx of     Melanoma No family hx of     Autoimmune Disease No family hx of     Unknown/Adopted No family hx of     Genetic Disorder No family hx of          Allergies   Allergies   Allergen Reactions    Sulfamethoxazole-Trimethoprim Other (See Comments) and Nephrotoxicity     Other reaction(s): Renal Failure  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, 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, 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, 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, 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  Other reaction(s): Renal Failure  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, 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, 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        Physical Exam   Vital Signs: Temp: 98.1  F (36.7  C) Temp src: Oral BP: (!) 164/80 Pulse: 101   Resp: 17 SpO2: 100 % O2 Device: None (Room air)    Weight: 202 lbs 11.2 oz    Constitutional: Lying in bed appears fatigued, somnolent, and cooperative and able to follow commands  Eyes: lids and lashes normal, pupils equal, slightly constricted. round and reactive to light and conjunctiva normal  Respiratory: No increased work of breathing, good air exchange, clear to auscultation bilaterally, no crackles or wheezing  Cardiovascular:  regular rate and rhythm, normal S1 and S2,  and no murmur noted  GI: No scars, normal bowel sounds, soft, non-distended, non-tender, no masses palpated, no hepatosplenomegally  Skin: normal skin color, texture,  turgor  Musculoskeletal: no lower extremity pitting edema present  Neuropsychiatric: General: calm  Level of consciousness: drowsy  Orientation: oriented to self, place, time and situation  Memory and insight: memory impaired   Medical Decision Making           Data     I have personally reviewed the following data over the past 24 hrs:    9.9  \   9.7 (L)   / 244     144 112 (H) 37.4 (H) /  207 (H)   4.2 20 (L) 1.49 (H) \     ALT: 27 AST: 23 AP: 205 (H) TBILI: 0.3   ALB: 2.5 (L) TOT PROTEIN: 4.9 (L) LIPASE: 21     Trop: 41 (H) BNP: 338     TSH: 0.98 T4: N/A A1C: 8.6 (H)     Procal: N/A CRP: N/A Lactic Acid: 1.5         Imaging results reviewed over the past 24 hrs:   Recent Results (from the past 24 hour(s))   Head CT w/o contrast    Narrative    EXAM: CT HEAD W/O CONTRAST, CT CERVICAL SPINE W/O CONTRAST  LOCATION: [facility]  DATE: 6/27/2024    INDICATION: AMS, fall  COMPARISON: 9/8/2019.  TECHNIQUE:   1) Routine CT Head without IV contrast. Multiplanar reformats. Dose reduction techniques were used.  2) Routine CT Cervical Spine without IV contrast. Multiplanar reformats. Dose reduction techniques were used.    FINDINGS:   HEAD CT:   INTRACRANIAL CONTENTS: No intracranial hemorrhage, extraaxial collection, or mass effect.  No CT evidence of acute infarct. Chronic right cerebellar lacunar type infarction. Mild presumed chronic small vessel ischemic changes. Mild generalized volume   loss. No hydrocephalus.     VISUALIZED ORBITS/SINUSES/MASTOIDS: No intraorbital abnormality. No paranasal sinus mucosal disease. No middle ear or mastoid effusion.    BONES/SOFT TISSUES: No calvarial fracture. Midline posterior scalp contusion.    CERVICAL SPINE CT:   VERTEBRA: Straightening of the normal cervical lordosis. No fracture or posttraumatic subluxation.     CANAL/FORAMINA: Mild multilevel degenerative changes. No high-grade central spinal canal stenosis. Disc osteophyte complexes/bulge at C6-C7  causes up to minimal canal stenosis. No significant neural foraminal stenosis.    PARASPINAL: No extraspinal abnormality. Visualized lung fields are clear.      Impression    IMPRESSION:  HEAD CT:  1.  No CT evidence for acute intracranial process.  2.  Brain atrophy and presumed chronic microvascular ischemic changes as above.    CERVICAL SPINE CT:  1.  No CT evidence for acute fracture or post traumatic subluxation.   CT Cervical Spine w/o Contrast    Narrative    EXAM: CT HEAD W/O CONTRAST, CT CERVICAL SPINE W/O CONTRAST  LOCATION: [facility]  DATE: 6/27/2024    INDICATION: AMS, fall  COMPARISON: 9/8/2019.  TECHNIQUE:   1) Routine CT Head without IV contrast. Multiplanar reformats. Dose reduction techniques were used.  2) Routine CT Cervical Spine without IV contrast. Multiplanar reformats. Dose reduction techniques were used.    FINDINGS:   HEAD CT:   INTRACRANIAL CONTENTS: No intracranial hemorrhage, extraaxial collection, or mass effect.  No CT evidence of acute infarct. Chronic right cerebellar lacunar type infarction. Mild presumed chronic small vessel ischemic changes. Mild generalized volume   loss. No hydrocephalus.     VISUALIZED ORBITS/SINUSES/MASTOIDS: No intraorbital abnormality. No paranasal sinus mucosal disease. No middle ear or mastoid effusion.    BONES/SOFT TISSUES: No calvarial fracture. Midline posterior scalp contusion.    CERVICAL SPINE CT:   VERTEBRA: Straightening of the normal cervical lordosis. No fracture or posttraumatic subluxation.     CANAL/FORAMINA: Mild multilevel degenerative changes. No high-grade central spinal canal stenosis. Disc osteophyte complexes/bulge at C6-C7 causes up to minimal canal stenosis. No significant neural foraminal stenosis.    PARASPINAL: No extraspinal abnormality. Visualized lung fields are clear.      Impression    IMPRESSION:  HEAD CT:  1.  No CT evidence for acute intracranial process.  2.  Brain atrophy and presumed chronic  microvascular ischemic changes as above.    CERVICAL SPINE CT:  1.  No CT evidence for acute fracture or post traumatic subluxation.   CT Chest Pulmonary Embolism w Contrast    Narrative    EXAM: CT CHEST PULMONARY EMBOLISM W CONTRAST  LOCATION: [facility]  DATE: 6/28/2024    INDICATION: chest pain, hx of PE, AMS, fall, eval for PE, injury, etc  COMPARISON: 11/21/2023  TECHNIQUE: CT chest pulmonary angiogram during arterial phase injection of IV contrast. Multiplanar reformats and MIP reconstructions were performed. Dose reduction techniques were used.   CONTRAST: Isovue 370 90ML    FINDINGS:  ANGIOGRAM CHEST: The pulmonary arteries are somewhat limited in detail given marginal bolus of contrast, however I do not appreciate any obvious PE, changes of right heart strain, or enlargement of the central pulmonary arteries. Thoracic aorta is   negative for dissection.    LUNGS AND PLEURA: 2 tiny benign calcified granulomas are seen in the right lung. Minimal atelectasis in both lungs with no central airway obstruction.    MEDIASTINUM/AXILLAE: Normal.    CORONARY ARTERY CALCIFICATION: Mild.    UPPER ABDOMEN: The gallbladder is surgically absent. There is a small fatty umbilical hernia involving the mid abdominal wall anteriorly at the midline with no associated inflammation or bowel.    MUSCULOSKELETAL: Mild scattered hypertrophic changes in the spine.      Impression    IMPRESSION:  1.  No acute finding such as PE, dissection, or aneurysm. No evidence for acute trauma.    2.  Benign calcified granulomas in the right lung.       CT Abdomen Pelvis w Contrast    Narrative    EXAM: CT ABDOMEN PELVIS W CONTRAST  LOCATION: [facility]  DATE: 6/28/2024    INDICATION: AMS, falls, eval for injury  COMPARISON: None.  TECHNIQUE: CT scan of the abdomen and pelvis was performed following injection of IV contrast. Multiplanar reformats were obtained. Dose reduction techniques were  used.  CONTRAST: Isovue 370 90ML    FINDINGS:   LOWER CHEST: Basilar atelectasis.    HEPATOBILIARY: Cholecystectomy. Mild biliary prominence.    PANCREAS: Upper normal pancreatic duct.    SPLEEN: Normal.    ADRENAL GLANDS: Mild thickening of the adrenal glands.    KIDNEYS/BLADDER: Normal.    BOWEL: Gastric bypass. Small bowel normal caliber. Moderate amount of colonic stool.    LYMPH NODES: Normal.    VASCULATURE: Atherosclerotic vascular calcification.    PELVIC ORGANS: Absent uterus.    MUSCULOSKELETAL: Fat-containing ventral and umbilical hernia. Degenerative change osseous structures.      Impression    IMPRESSION:   1.  No evidence of intra-abdominal injury.   CT Thoracic Spine Reconstructed    Narrative    EXAM: CT THORACIC SPINE RECONSTRUCTED, CT LUMBAR SPINE RECONSTRUCTED  LOCATION: [facility]  DATE: 6/28/2024    INDICATION: Fall. Pain all over.  COMPARISON: CT chest 11/21/2023, CT abdomen and pelvis 4/16/2021.  TECHNIQUE:  1) Routine CT Thoracic Spine without IV contrast. Multiplanar reformats. Dose reduction techniques were used.   2) Routine CT Lumbar Spine without IV contrast. Multiplanar reformats. Dose reduction techniques were used.     FINDINGS:    THORACIC SPINE CT:  VERTEBRA: Normal vertebral body heights and alignment. No fracture or posttraumatic subluxation.     CANAL/FORAMINA: No canal or neural foraminal stenosis.    PARASPINAL: Please refer to the report of the CT chest, abdomen and pelvis.    LUMBAR SPINE CT:  VERTEBRA: Lumbar vertebra are normal in height. 2 mm of anterolisthesis of L4 likely relates to facet arthropathy. No fracture or posttraumatic subluxation.     CANAL/FORAMINA: No canal or neural foraminal stenosis.    PARASPINAL: Please refer to the report of the CT chest abdomen pelvis.      Impression    IMPRESSION:  THORACIC SPINE CT:  1.  No fracture or posttraumatic subluxation.  2.  No high-grade spinal canal or neural foraminal stenosis.    LUMBAR SPINE  CT:  1.  No fracture or posttraumatic subluxation.  2.  No high-grade spinal canal or neural foraminal stenosis.     CT Lumbar Spine Reconstructed    Narrative    EXAM: CT THORACIC SPINE RECONSTRUCTED, CT LUMBAR SPINE RECONSTRUCTED  LOCATION: [facility]  DATE: 6/28/2024    INDICATION: Fall. Pain all over.  COMPARISON: CT chest 11/21/2023, CT abdomen and pelvis 4/16/2021.  TECHNIQUE:  1) Routine CT Thoracic Spine without IV contrast. Multiplanar reformats. Dose reduction techniques were used.   2) Routine CT Lumbar Spine without IV contrast. Multiplanar reformats. Dose reduction techniques were used.     FINDINGS:    THORACIC SPINE CT:  VERTEBRA: Normal vertebral body heights and alignment. No fracture or posttraumatic subluxation.     CANAL/FORAMINA: No canal or neural foraminal stenosis.    PARASPINAL: Please refer to the report of the CT chest, abdomen and pelvis.    LUMBAR SPINE CT:  VERTEBRA: Lumbar vertebra are normal in height. 2 mm of anterolisthesis of L4 likely relates to facet arthropathy. No fracture or posttraumatic subluxation.     CANAL/FORAMINA: No canal or neural foraminal stenosis.    PARASPINAL: Please refer to the report of the CT chest abdomen pelvis.      Impression    IMPRESSION:  THORACIC SPINE CT:  1.  No fracture or posttraumatic subluxation.  2.  No high-grade spinal canal or neural foraminal stenosis.    LUMBAR SPINE CT:  1.  No fracture or posttraumatic subluxation.  2.  No high-grade spinal canal or neural foraminal stenosis.

## 2024-06-28 NOTE — CONSULTS
Care Management Initial Consult    General Information  Assessment completed with: Patient,    Type of CM/SW Visit: Initial Assessment    Primary Care Provider verified and updated as needed: Yes   Readmission within the last 30 days: no previous admission in last 30 days      Reason for Consult: discharge planning  Advance Care Planning:            Communication Assessment  Patient's communication style: spoken language (English or Bilingual)             Cognitive  Cognitive/Neuro/Behavioral: .WDL except, level of consciousness, arousability  Level of Consciousness: lethargic  Arousal Level: arouses to voice  Orientation: oriented x 4     Best Language: 0 - No aphasia  Speech: spontaneous    Living Environment:   People in home: alone     Current living Arrangements: apartment      Able to return to prior arrangements: yes       Family/Social Support:  Care provided by: self, other (see comments) (PCA)  Provides care for: no one, unable/limited ability to care for self  Marital Status: Single  PCA, Sibling(s)          Description of Support System: Supportive, Involved    Support Assessment: Adequate family and caregiver support    Current Resources:   Patient receiving home care services: No     Community Resources: County Programs, County Worker, PCA  Equipment currently used at home: cane, straight, walker, standard  Supplies currently used at home: None    Employment/Financial:  Employment Status:          Financial Concerns: none           Does the patient's insurance plan have a 3 day qualifying hospital stay waiver?  Yes     Which insurance plan 3 day waiver is available? Alternative insurance waiver    Will the waiver be used for post-acute placement? Undetermined at this time    Lifestyle & Psychosocial Needs:  Social Determinants of Health     Food Insecurity: Low Risk  (9/22/2023)    Food Insecurity     Within the past 12 months, did you worry that your food would run out before you got money to buy  more?: No     Within the past 12 months, did the food you bought just not last and you didn t have money to get more?: No   Depression: At risk (6/20/2024)    PHQ-2     PHQ-2 Score: 3   Housing Stability: Low Risk  (9/22/2023)    Housing Stability     Do you have housing? : Yes     Are you worried about losing your housing?: No   Tobacco Use: Low Risk  (6/20/2024)    Patient History     Smoking Tobacco Use: Never     Smokeless Tobacco Use: Never     Passive Exposure: Not on file   Financial Resource Strain: Low Risk  (9/22/2023)    Financial Resource Strain     Within the past 12 months, have you or your family members you live with been unable to get utilities (heat, electricity) when it was really needed?: No   Alcohol Use: Not on file   Transportation Needs: Low Risk  (9/22/2023)    Transportation Needs     Within the past 12 months, has lack of transportation kept you from medical appointments, getting your medicines, non-medical meetings or appointments, work, or from getting things that you need?: No   Physical Activity: Not on file   Interpersonal Safety: Low Risk  (6/20/2024)    Interpersonal Safety     Do you feel physically and emotionally safe where you currently live?: Yes     Within the past 12 months, have you been hit, slapped, kicked or otherwise physically hurt by someone?: No     Within the past 12 months, have you been humiliated or emotionally abused in other ways by your partner or ex-partner?: No   Stress: Not on file   Social Connections: Unknown (4/28/2023)    [facility]    Social Connections     Frequency of Communication with Friends and Family: Not on file   Health Literacy: Not on file       Functional Status:  Prior to admission patient needed assistance:   Dependent ADLs:: Ambulation-cane, Ambulation-walker, Bathing, Dressing  Dependent IADLs:: Cleaning, Cooking, Laundry, Shopping,  Transportation       Mental Health Status:  Mental Health Status: No Current Concerns       Chemical Dependency Status:  Chemical Dependency Status: No Current Concerns             Values/Beliefs:  Spiritual, Cultural Beliefs, Nondenominational Practices, Values that affect care: no               Additional Information:  CM met with pt in room to discuss discharge planning and complete initial assessment.     Pt lives in an apt alone. Pt uses cane or walker for ambulation.  Family members are PCA for 6.5 hrs/day and assist with all ADL/IADLs . Therapy consults pending,  Unknown discharge needs. Family to transport.    WOOD discussed and placed on chart. Copy given to pt.     CM will continue to monitor progression of care, review team recommendations and provide discharge planning assist as needed.       ZAID CastleW

## 2024-06-28 NOTE — PROGRESS NOTES
"[facility]    Progress Note - Hospitalist Service       Date of Admission:  6/27/2024    Assessment & Plan   Ruthy Álvarez is a 58 year old female with a history of type 2 diabetes, CKD and chronic pain on opioids and is admitted for chest pain, recent fall, dizziness and excessive somnolence with concern for acute encephalopathy.      Excessive somnolence, possibly due to pregabalin  Hx of chronic pain on opioids   Acute encephalopathy, improving  Patient resents with fatigue, body aches has had a fall recently few days ago.  She also notes dizziness and drowsiness worse one day before admission. Workup largely unremarkable: CT head is negative for acute intracranial processes but shows brain atrophy and chronic microvascular ischemic changes. Of note patient had similar presentation in 2022 and at that time her AMS was attributed to polypharmacy and some of her med her medication were stopped like risperidone. She was recently switched from tablets of pregabalin to a liquid formulation and possible that this is better absorbed with her history of gastric bypass, so will cut this dosage in half.   - pregabalin liquid 25 mg TID (PTA dose 50 mg)  - PTA percocet  mg q6h PRN (2 tabs at bedtime)   - interestingly, opioid UDS was negative, follow up oxycodone qualitative urine screen  - Fall precaution  - PT, OT, CM consults to help with discharge planning    Lower extremity swelling  Orthopnea  Concern for CHF  Patient reports feeling \"swollen all over\" - has happened a few times in the past. Has pitting edema in the shins on exam. New murmur. Does have torsemide on her medication list, per chart review this is for \"edema\". Normal BNP on admission argues against heart failure.  - echocardiogram ordered  - continue PTA torsemide 10 mg daily  - will avoid IV fluids for now    MICHELLE on CKD III  Creatinine elevated at 1.49 on admission.  Baseline creatinine around 1.1-1.2.  Could be " worsened by low p.o. intake and some aspect of dehydration, especially with GLP1-related nausea/vomiting in the past few weeks. With hypertension, could also consider nephrotic syndrome.   - quantify proteinuria  - BMP daily  - Avoid nephrotoxic meds  - Encourage p.o. intake      Chest pain-resolved  Patient reported chest pain on admission which she reported has resolved spontaneously.  EKG reassuring and troponin flat. She also notes shortness of breath prior to admission.  However, she remained on RA and lungs sound clear.  Sob could be related to asthma given negative CXR will restart PTA inhalers.  -Repeat troponin pending     TIIDM   Hyperglycemia  Blood sugar elevated at presentation 392.  Patient's last A1c 5.9, 4 months ago, now is 8.6.  Patient is on PTA Ozempic and Farxiga per chart review.   Normal anion gap and normal ketone levels on admission. Will continue to monitor.   -Hold PTA Ozempic and Farxiga  -MSSI  -start 5 units of long-acting insulin     Hypertension  Headache  Blood Pressure significantly elevated on presentation 199/95 -180/132 med rec not done will restart PTA BP medications  once med rec is complete.   -restart PTA lisinopril 40 mg  -restart PTA labetalol 100 mg twice daily     Elevated ALP  ALP elevated with normal AST and ALT.  CT abdomen pelvis showed cholecystectomy and mild biliary prominence but otherwise unremarkable. of note patient had an elevated alkaline phosphatase 192-215 in the past.  Could consider outpatient workup for different causes including liver disease vs bone vs kidney disorders. Outpatient work up.  -Consider checking GGT and ultrasound to get a better picture of the biliary anatomy.     Macrocytic anemia  S/p bariatric surgery 2006  Hemoglobin 9.7 on admission.  Baseline appears to be between 9-11.  No signs of active bleeding.  Could be related to malabsorption s/p bariatric bariatric surgery patient is on PTA folate and vitamin B12.  Consider checking levels  "here versus outpatient.  Unsure if levels are low and it could be contributing to cognitive impairment/confusion.  Last B12 was elevated 1 year ago >4000.  Folate was normal one year ago.  -Vitamin B12 and folate     Observation Goals: -diagnostic tests and consults completed and resulted, -returns to baseline functional status, Nurse to notify provider when observation goals have been met and patient is ready for discharge.  Diet: Regular Diet Adult    DVT Prophylaxis: Enoxaparin (Lovenox) SQ  [place] Catheter: Not present  Fluids: PO  Lines: None     Cardiac Monitoring: None  Code Status: Full Code      Clinically Significant Risk Factors Present on Admission            # Hypomagnesemia: Lowest Mg = 1.5 mg/dL in last 2 days, will replace as needed   # Hypoalbuminemia: Lowest albumin = 2.5 g/dL at 6/27/2024 10:30 PM, will monitor as appropriate     # Hypertension: Noted on problem list    # Anemia: based on hgb <11          # DMII: A1C = 8.6 % (Ref range: <5.7 %) within past 6 months    # Severe Obesity: Estimated body mass index is 41.32 kg/m  as calculated from the following:    Height as of this encounter: 1.499 m (4' 11\").    Weight as of this encounter: 92.8 kg (204 lb 9.4 oz).       # Financial/Environmental Concerns: none  # Asthma: noted on problem list        Disposition Plan      Expected Discharge Date: 06/29/2024                The patient's care was discussed with the Attending Physician, Dr. Haile .    Jie Vazquez MD  Hospitalist Service  [facility]  Securely message with STARFACE (more info)  Text page via [employer] Paging/Directory   ______________________________________________________________________    Interval History   Patient feeling much more awake this morning. She denies any illicit substance use, taking more than usual of her percocet. She has been vomiting after her ozempic for the past few weeks but notes drinking a lot of water. She does feel that her whole body " is swollen. She has had a headache for the past few days. She has been peeing normally. Getting around well. Does note some shortness of breath with lying flat.     Physical Exam   Vital Signs: Temp: 98.4  F (36.9  C) Temp src: Oral BP: (!) 170/100 Pulse: 92   Resp: 17 SpO2: 100 % O2 Device: None (Room air)    Weight: 204 lbs 9.39 oz  GEN: Patient sitting comfortably in no acute distress.  HEEN: Head is atraumatic, normocephalic, eyes anicteric, mucous membranes moist.  CV: Regular rate and rhythm without obvious murmurs.  PULM: Clear to auscultation bilaterally without wheezing or rales.  ABD: Soft, nontender, bowel sounds present.  NEURO: Alert and oriented x3.  No focal motor abnormalities.  Face symmetric.  PSYCH: Appropriate affect.  SKIN: No rashes, bruising, or other lesions.      Data     I have personally reviewed the following data over the past 24 hrs:    9.9  \   9.7 (L)   / 244     144 114 (H) 34.2 (H) /  325 (H)   4.6 20 (L) 1.29 (H) \     ALT: 27 AST: 23 AP: 205 (H) TBILI: 0.3   ALB: 2.5 (L) TOT PROTEIN: 4.9 (L) LIPASE: 21     Trop: 35 (H) BNP: 338     TSH: 0.98 T4: N/A A1C: 8.6 (H)     Procal: N/A CRP: N/A Lactic Acid: 1.5         Imaging results reviewed over the past 24 hrs:   Recent Results (from the past 24 hour(s))   Head CT w/o contrast    Narrative    EXAM: CT HEAD W/O CONTRAST, CT CERVICAL SPINE W/O CONTRAST  LOCATION: [facility]  DATE: 6/27/2024    INDICATION: AMS, fall  COMPARISON: 9/8/2019.  TECHNIQUE:   1) Routine CT Head without IV contrast. Multiplanar reformats. Dose reduction techniques were used.  2) Routine CT Cervical Spine without IV contrast. Multiplanar reformats. Dose reduction techniques were used.    FINDINGS:   HEAD CT:   INTRACRANIAL CONTENTS: No intracranial hemorrhage, extraaxial collection, or mass effect.  No CT evidence of acute infarct. Chronic right cerebellar lacunar type infarction. Mild presumed chronic small vessel ischemic changes. Mild  generalized volume   loss. No hydrocephalus.     VISUALIZED ORBITS/SINUSES/MASTOIDS: No intraorbital abnormality. No paranasal sinus mucosal disease. No middle ear or mastoid effusion.    BONES/SOFT TISSUES: No calvarial fracture. Midline posterior scalp contusion.    CERVICAL SPINE CT:   VERTEBRA: Straightening of the normal cervical lordosis. No fracture or posttraumatic subluxation.     CANAL/FORAMINA: Mild multilevel degenerative changes. No high-grade central spinal canal stenosis. Disc osteophyte complexes/bulge at C6-C7 causes up to minimal canal stenosis. No significant neural foraminal stenosis.    PARASPINAL: No extraspinal abnormality. Visualized lung fields are clear.      Impression    IMPRESSION:  HEAD CT:  1.  No CT evidence for acute intracranial process.  2.  Brain atrophy and presumed chronic microvascular ischemic changes as above.    CERVICAL SPINE CT:  1.  No CT evidence for acute fracture or post traumatic subluxation.   CT Cervical Spine w/o Contrast    Narrative    EXAM: CT HEAD W/O CONTRAST, CT CERVICAL SPINE W/O CONTRAST  LOCATION: [facility]  DATE: 6/27/2024    INDICATION: AMS, fall  COMPARISON: 9/8/2019.  TECHNIQUE:   1) Routine CT Head without IV contrast. Multiplanar reformats. Dose reduction techniques were used.  2) Routine CT Cervical Spine without IV contrast. Multiplanar reformats. Dose reduction techniques were used.    FINDINGS:   HEAD CT:   INTRACRANIAL CONTENTS: No intracranial hemorrhage, extraaxial collection, or mass effect.  No CT evidence of acute infarct. Chronic right cerebellar lacunar type infarction. Mild presumed chronic small vessel ischemic changes. Mild generalized volume   loss. No hydrocephalus.     VISUALIZED ORBITS/SINUSES/MASTOIDS: No intraorbital abnormality. No paranasal sinus mucosal disease. No middle ear or mastoid effusion.    BONES/SOFT TISSUES: No calvarial fracture. Midline posterior scalp contusion.    CERVICAL SPINE CT:    VERTEBRA: Straightening of the normal cervical lordosis. No fracture or posttraumatic subluxation.     CANAL/FORAMINA: Mild multilevel degenerative changes. No high-grade central spinal canal stenosis. Disc osteophyte complexes/bulge at C6-C7 causes up to minimal canal stenosis. No significant neural foraminal stenosis.    PARASPINAL: No extraspinal abnormality. Visualized lung fields are clear.      Impression    IMPRESSION:  HEAD CT:  1.  No CT evidence for acute intracranial process.  2.  Brain atrophy and presumed chronic microvascular ischemic changes as above.    CERVICAL SPINE CT:  1.  No CT evidence for acute fracture or post traumatic subluxation.   CT Chest Pulmonary Embolism w Contrast    Narrative    EXAM: CT CHEST PULMONARY EMBOLISM W CONTRAST  LOCATION: [facility]  DATE: 6/28/2024    INDICATION: chest pain, hx of PE, AMS, fall, eval for PE, injury, etc  COMPARISON: 11/21/2023  TECHNIQUE: CT chest pulmonary angiogram during arterial phase injection of IV contrast. Multiplanar reformats and MIP reconstructions were performed. Dose reduction techniques were used.   CONTRAST: Isovue 370 90ML    FINDINGS:  ANGIOGRAM CHEST: The pulmonary arteries are somewhat limited in detail given marginal bolus of contrast, however I do not appreciate any obvious PE, changes of right heart strain, or enlargement of the central pulmonary arteries. Thoracic aorta is   negative for dissection.    LUNGS AND PLEURA: 2 tiny benign calcified granulomas are seen in the right lung. Minimal atelectasis in both lungs with no central airway obstruction.    MEDIASTINUM/AXILLAE: Normal.    CORONARY ARTERY CALCIFICATION: Mild.    UPPER ABDOMEN: The gallbladder is surgically absent. There is a small fatty umbilical hernia involving the mid abdominal wall anteriorly at the midline with no associated inflammation or bowel.    MUSCULOSKELETAL: Mild scattered hypertrophic changes in the spine.      Impression     IMPRESSION:  1.  No acute finding such as PE, dissection, or aneurysm. No evidence for acute trauma.    2.  Benign calcified granulomas in the right lung.       CT Abdomen Pelvis w Contrast    Narrative    EXAM: CT ABDOMEN PELVIS W CONTRAST  LOCATION: [facility]  DATE: 6/28/2024    INDICATION: AMS, falls, eval for injury  COMPARISON: None.  TECHNIQUE: CT scan of the abdomen and pelvis was performed following injection of IV contrast. Multiplanar reformats were obtained. Dose reduction techniques were used.  CONTRAST: Isovue 370 90ML    FINDINGS:   LOWER CHEST: Basilar atelectasis.    HEPATOBILIARY: Cholecystectomy. Mild biliary prominence.    PANCREAS: Upper normal pancreatic duct.    SPLEEN: Normal.    ADRENAL GLANDS: Mild thickening of the adrenal glands.    KIDNEYS/BLADDER: Normal.    BOWEL: Gastric bypass. Small bowel normal caliber. Moderate amount of colonic stool.    LYMPH NODES: Normal.    VASCULATURE: Atherosclerotic vascular calcification.    PELVIC ORGANS: Absent uterus.    MUSCULOSKELETAL: Fat-containing ventral and umbilical hernia. Degenerative change osseous structures.      Impression    IMPRESSION:   1.  No evidence of intra-abdominal injury.   CT Thoracic Spine Reconstructed    Narrative    EXAM: CT THORACIC SPINE RECONSTRUCTED, CT LUMBAR SPINE RECONSTRUCTED  LOCATION: [facility]  DATE: 6/28/2024    INDICATION: Fall. Pain all over.  COMPARISON: CT chest 11/21/2023, CT abdomen and pelvis 4/16/2021.  TECHNIQUE:  1) Routine CT Thoracic Spine without IV contrast. Multiplanar reformats. Dose reduction techniques were used.   2) Routine CT Lumbar Spine without IV contrast. Multiplanar reformats. Dose reduction techniques were used.     FINDINGS:    THORACIC SPINE CT:  VERTEBRA: Normal vertebral body heights and alignment. No fracture or posttraumatic subluxation.     CANAL/FORAMINA: No canal or neural foraminal stenosis.    PARASPINAL: Please refer to the  report of the CT chest, abdomen and pelvis.    LUMBAR SPINE CT:  VERTEBRA: Lumbar vertebra are normal in height. 2 mm of anterolisthesis of L4 likely relates to facet arthropathy. No fracture or posttraumatic subluxation.     CANAL/FORAMINA: No canal or neural foraminal stenosis.    PARASPINAL: Please refer to the report of the CT chest abdomen pelvis.      Impression    IMPRESSION:  THORACIC SPINE CT:  1.  No fracture or posttraumatic subluxation.  2.  No high-grade spinal canal or neural foraminal stenosis.    LUMBAR SPINE CT:  1.  No fracture or posttraumatic subluxation.  2.  No high-grade spinal canal or neural foraminal stenosis.     CT Lumbar Spine Reconstructed    Narrative    EXAM: CT THORACIC SPINE RECONSTRUCTED, CT LUMBAR SPINE RECONSTRUCTED  LOCATION: [facility]  DATE: 6/28/2024    INDICATION: Fall. Pain all over.  COMPARISON: CT chest 11/21/2023, CT abdomen and pelvis 4/16/2021.  TECHNIQUE:  1) Routine CT Thoracic Spine without IV contrast. Multiplanar reformats. Dose reduction techniques were used.   2) Routine CT Lumbar Spine without IV contrast. Multiplanar reformats. Dose reduction techniques were used.     FINDINGS:    THORACIC SPINE CT:  VERTEBRA: Normal vertebral body heights and alignment. No fracture or posttraumatic subluxation.     CANAL/FORAMINA: No canal or neural foraminal stenosis.    PARASPINAL: Please refer to the report of the CT chest, abdomen and pelvis.    LUMBAR SPINE CT:  VERTEBRA: Lumbar vertebra are normal in height. 2 mm of anterolisthesis of L4 likely relates to facet arthropathy. No fracture or posttraumatic subluxation.     CANAL/FORAMINA: No canal or neural foraminal stenosis.    PARASPINAL: Please refer to the report of the CT chest abdomen pelvis.      Impression    IMPRESSION:  THORACIC SPINE CT:  1.  No fracture or posttraumatic subluxation.  2.  No high-grade spinal canal or neural foraminal stenosis.    LUMBAR SPINE CT:  1.  No fracture or  posttraumatic subluxation.  2.  No high-grade spinal canal or neural foraminal stenosis.

## 2024-06-28 NOTE — PLAN OF CARE
"PRIMARY DIAGNOSIS: \"GENERIC\" NURSING  OUTPATIENT/OBSERVATION GOALS TO BE MET BEFORE DISCHARGE:  ADLs back to baseline: No    Activity and level of assistance: Assist of one    Pain status: Improved-controlled with oral pain medications.    Return to near baseline physical activity: No     Discharge Planner Nurse          Entered by: Claire Bower RN 06/28/2024 4:55 PM     Please review provider order for any additional goals.   Nurse to notify provider when observation goals have been met and patient is ready for discharge.Goal Outcome Evaluation:       PRN tylenol administered for headache 3/10.                 "

## 2024-06-28 NOTE — PROGRESS NOTES
"   06/28/24 1530   Appointment Info   Signing Clinician's Name / Credentials (PT) Cora Juan PT, DPT   Quick Adds   Quick Adds Certification   Living Environment   People in Home alone   Current Living Arrangements apartment   Home Accessibility no concerns  (uses elevator. Reports sometimes the elevator(s) break down, but during those times she stays in her apartment and does not use the stairs.)   Self-Care   Current Activity Tolerance moderate   Equipment Currently Used at Home cane, straight;walker, rolling;shower chair  (4ww, tub shower)   Fall history within last six months yes   Number of times patient has fallen within last six months   (Reports falls weekly. \"many falls\")   Activity/Exercise/Self-Care Comment Pt has 2 PCA's that come daily in shifts - 1 in AM & 1 for PM.   General Information   Onset of Illness/Injury or Date of Surgery 06/27/24   Referring Physician Kimber Al MD   Patient/Family Therapy Goals Statement (PT) none stated.   Pertinent History of Current Problem (include personal factors and/or comorbidities that impact the POC) Per chart: \"58 year old female with a history of type 2 diabetes, CKD and chronic pain on opioids and is admitted for chest pain, recent fall, dizziness and excessive somnolence with concern for acute encephalopathy. \"   Existing Precautions/Restrictions fall   Cognition   Affect/Mental Status (Cognition) WNL   Orientation Status (Cognition) oriented x 4   Follows Commands (Cognition) WNL   Pain Assessment   Patient Currently in Pain Yes, see Vital Sign flowsheet  (Pt reports pain throughout body, states this is typical at baseline)   Range of Motion (ROM)   Range of Motion ROM is WNL   Strength (Manual Muscle Testing)   Strength (Manual Muscle Testing) Deficits observed during functional mobility   Bed Mobility   Bed Mobility supine-sit   Supine-Sit [place] (Bed Mobility) modified independence   Bed Mobility Limitations impaired ability to control trunk " for mobility;decreased ability to use arms for pushing/pulling;decreased ability to use legs for bridging/pushing   Impairments Contributing to Impaired Bed Mobility decreased strength;pain   Assistive Device (Bed Mobility) bed rails   Transfers   Transfers sit-stand transfer   Transfer Safety Concerns Noted losing balance backward   Impairments Contributing to Impaired Transfers impaired balance;decreased strength;pain   Sit-Stand Transfer   Sit-Stand [place] (Transfers) supervision   Assistive Device (Sit-Stand Transfers) walker, front-wheeled   Gait/Stairs (Locomotion)   [place] Level (Gait) supervision   Assistive Device (Gait) walker, front-wheeled   Distance in Feet (Gait) 10   Pattern (Gait) swing-through   Deviations/Abnormal Patterns (Gait) gait speed decreased;stride length decreased;weight shifting decreased   Comment, (Gait/Stairs) Deferred assessment of stairs per pt reporting never using her stairs, and high fatigue in session.   Balance   Balance other (describe)   Balance Comments uses fww/cane at baseline. SBA with these today.   Sensory Examination   Sensory Perception Comments reports numbness in BLEs at baseline   Clinical Impression   Criteria for Skilled Therapeutic Intervention Yes, treatment indicated   PT Diagnosis (PT) impaired functional mobility   Influenced by the following impairments impaired strength, balance; pain   Functional limitations due to impairments transfers, gait, endurance   Clinical Presentation (PT Evaluation Complexity) evolving   Clinical Presentation Rationale clinical judgment   Clinical Decision Making (Complexity) low complexity   Planned Therapy Interventions (PT) balance training;gait training;home exercise program;neuromuscular re-education;patient/family education;strengthening;transfer training;home program guidelines;progressive activity/exercise   Risk & Benefits of therapy have been explained evaluation/treatment results reviewed;care  plan/treatment goals reviewed;risks/benefits reviewed;participants included;patient   PT Total Evaluation Time   PT Eval, Low Complexity Minutes (61618) 7   Therapy Certification   Start of care date 06/28/24   Certification date from 06/28/24   Certification date to 07/05/24   Medical Diagnosis Altered mental status   Physical Therapy Goals   PT Frequency 6x/week   PT Predicted Duration/Target Date for Goal Attainment 07/05/24   PT Goals Transfers;Gait   PT: Transfers Modified independent;Sit to/from stand;Bed to/from chair;Assistive device   PT: Gait Modified independent;Assistive device;150 feet   Interventions   Interventions Quick Adds Gait Training;Therapeutic Activity   Therapeutic Activity   Therapeutic Activities: dynamic activities to improve functional performance Minutes (29626) 2   Treatment Detail/Skilled Intervention toilet transfer SBA cues for use of grab bars to decrease fall risk. SBA for hand hygiene at sink. Ended session pt sitting EOB, all needs in reach, with lab in room for blood draws.   Gait Training   Gait Training Minutes (77942) 9   Treatment Detail/Skilled Intervention Eval completed. tx initiated. gait with fww total dist 110 ft SBA, cues for safe positioning of fww. time taken to adjust fww to proper height for improved ergonomics. Pt pauses in standing for rest breaks per high fatigue, is motivated to continue gait. Educated pt on always using AD within hospital for all mobility. SBA for gait to and from toilet with FWW 2x5 ft.   Distance in Feet 110', 2x5'   [place] Level (Gait Training) stand-by assist   Physical Assistance Level (Gait Training) supervision;verbal cues;nonverbal cues (demo/gestures)   Assistive Device (Gait Training) rolling walker  (fww)   Pattern Analysis (Gait Training) swing-through gait   Gait Analysis Deviations decreased daniel;increased time in double stance;decreased weight-shifting ability   Impairments (Gait Analysis/Training) balance  impaired;pain;strength decreased   PT Discharge Planning   PT Plan Gait with SPC (in pt's room), standing/seated therex, prog transfers   PT Discharge Recommendation (DC Rec) home with assist   PT Rationale for DC Rec Pt has 2 PCA's at home that work in AM/PM shift daily. Recommend home with assist from her PCA's.   PT Brief overview of current status SBA transfers/gait FWW   Total Session Time   Timed Code Treatment Minutes 11   Total Session Time (sum of timed and untimed services) 18     [facility]  OUTPATIENT PHYSICAL THERAPY EVALUATION  PLAN OF TREATMENT FOR OUTPATIENT REHABILITATION  (COMPLETE FOR INITIAL CLAIMS ONLY)  Patient's Last Name, First Name, M.I.  YOB: 1965  Ruthy Álvarez                        Provider's Name  [facility] Medical Record No.  [id number]                             Onset Date:  06/27/24   Start of Care Date:  06/28/24   Type:     _X_PT   ___OT   ___SLP Medical Diagnosis:  Altered mental status              PT Diagnosis:  impaired functional mobility Visits from SOC:  1     See note for plan of treatment, functional goals and certification details    I CERTIFY THE NEED FOR THESE SERVICES FURNISHED UNDER        THIS PLAN OF TREATMENT AND WHILE UNDER MY CARE     (Physician co-signature of this document indicates review and certification of the therapy plan).

## 2024-06-28 NOTE — PHARMACY-ADMISSION MEDICATION HISTORY
"Pharmacist Admission Medication History    Admission medication history is complete. The information provided in this note is only as accurate as the sources available at the time of the update.    Information Source(s): Patient, Clinic records, and CareEverywhere/SureScripts via in-person  *See [facility] visit 3/21/24 for additional information regarding adherence, medication management    Pertinent Information: patient reports she has a PCA, but reported she would be able to complete medicaiton history with pharmacist. She was able to recognize many medications by name, but estimated last doses were ~2 days ago.  Diltiazem - patient told RN she was \"no longer taking\". RP re-interviewed patient who reported it was \"replaced by something else\". RP noted in fill history that diltiazem  mg coated bead capsules filled 1/5/24 and diltiazem XR capsules filled 4/19/24. Patient reported she would be able to recognize medication by capsule color and when informed this medication is white/brown, she confirmed taking it. Office visit 6/20/24 indicates patient should still be taking this medication.    Changes made to PTA medication list:  Added: None  Deleted: (patient reports not taking)  Tylenol PRN pain   Fluocinolone 0.1% solution - no recent fill hx  Latanoprost 0.05% ophthalmic solution - no recent fill hx, pt reports she \"should be taking eye drops\" but currently is not  Ozempic 2 mg subcutaneous once weekly   Changed: None    Allergies reviewed with patient and updates made in EHR: yes    Medication History Completed By: Nicollette McMann, RP 6/28/2024 10:11 AM    Current Facility-Administered Medications for the 6/27/24 encounter (Hospital Encounter)   Medication    cyanocobalamin injection 1,000 mcg     PTA Med List   Medication Sig Last Dose    atorvastatin (LIPITOR) 10 MG tablet Take 1 tablet (10 mg) by mouth daily Past Week    budesonide-formoterol (SYMBICORT) 80-4.5 MCG/ACT Inhaler Inhale 2 puffs once daily " as needed, and may repeat every hour, maximum 12 puffs in 24 hours  at PRN    capsaicin (ZOSTRIX) 0.025 % external cream Apply 3-4 times daily to knees Past Week    dapagliflozin (FARXIGA) 10 MG TABS tablet Take 1 tablet (10 mg) by mouth daily Past Week    diclofenac (VOLTAREN) 1 % topical gel Apply 4 g topically 3 times daily To top of both shoulders in area of pain Past Week    diltiazem ER (DILT-XR) 240 MG 24 hr ER beaded capsule Take 1 capsule (240 mg) by mouth daily New dose Past Week    ferrous sulfate (IRON) 325 (65 FE) MG tablet Take 1 tablet (325 mg) by mouth daily (with breakfast) Past Week    labetalol (NORMODYNE) 100 MG tablet Take 1 tablet (100 mg) by mouth 2 times daily Past Week    lidocaine (LMX4) 4 % external cream Apply topically once as needed for mild pain (left knee) Past Week    lisinopril (ZESTRIL) 40 MG tablet Take 1 tablet (40 mg) by mouth at bedtime Past Week    naloxone (NARCAN) 4 MG/0.1ML nasal spray Spray 1 spray (4 mg) into one nostril alternating nostrils once as needed for opioid reversal Every 2-3 minutes until patient responsive or EMS arrives  at PRN    ondansetron (ZOFRAN ODT) 4 MG ODT tab Take 1 tablet (4 mg) by mouth every 6 hours as needed for nausea Past Week    oxyCODONE-acetaminophen (PERCOCET)  MG per tablet Take 2 tablets by mouth at bedtime. May also take 1 tablet every 6 hours as needed for severe pain. Past Week    phentermine (ADIPEX-P) 37.5 MG tablet Take 1 tablet (37.5 mg) by mouth every morning (before breakfast) Past Week    pregabalin (LYRICA) 20 MG/ML solution Take 2.5 mLs (50 mg) by mouth 3 times daily Past Week    Semaglutide, 1 MG/DOSE, (OZEMPIC) 4 MG/3ML pen Inject 1 mg Subcutaneous once a week for 84 days 6/27/2024    senna-docusate (SENOKOT-S/PERICOLACE) 8.6-50 MG tablet Take 1 tablet by mouth 2 times daily Past Week    sodium citrate-citric acid (BICITRA) 500-334 MG/5ML solution Take 10 mLs by mouth 2 times daily (with meals) Past Week    torsemide  (DEMADEX) 10 MG tablet Take 1 tablet (10 mg) by mouth daily Past Week

## 2024-06-28 NOTE — PROGRESS NOTES
Multiple attempts to draw blood by ED staff, SWAT nurse. PICC came and was able to draw enough for green top so we were able to collect the BMP and Trop. Unable to collect CBC and B12/Folate. PICC team stated this pt is not a candidate for midline or PICC line d/t very small veins. Dr. Vazquez updated.

## 2024-06-28 NOTE — PROCEDURES
RN-Vascular Access Note:    S: Request for US guided PIV and labs  B: Multiple failed PIV and lab draws  A: RUE from upper arm to forearms - no viable targets for PIV/labs      LUE - no upper and forearm veins viable for PIV/labs     Left upper am medial vein - very small in caliber/diameter - successful access - can only draw about 2.8 ml blood - needed to pull out PIV - difficult to flush NaCl.  R: Contraindicated for RN-inserted PICC/ ML - no upper arm vein targets.    Grant Nichols, MSN, RN, VA-BC  Vascular Access - [facility]

## 2024-06-28 NOTE — PROVIDER NOTIFICATION
Updated Dr. Vazquez pt's elevated BP's. Will come down and talk with pt and start home medications. (Including BP meds)

## 2024-06-28 NOTE — ED NOTES
Bed: [place] [address]  Expected date:   Expected time:   Means of arrival:   Comments:  Room # 24 when boarder orders

## 2024-06-28 NOTE — ED TRIAGE NOTES
Pt reports chest pain, all over body pain, frequent falls (denies blood thinners), headache, fatigue, and dizziness since yesterday.      Triage Assessment (Adult)       Row Name 06/27/24 2026          Triage Assessment    Airway WDL WDL        Respiratory WDL    Respiratory WDL WDL        Skin Circulation/Temperature WDL    Skin Circulation/Temperature WDL WDL        Cardiac WDL    Cardiac WDL X;chest pain        Chest Pain Assessment    Chest Pain Location midsternal     Character aching        Peripheral/Neurovascular WDL    Peripheral Neurovascular WDL WDL        Cognitive/Neuro/Behavioral WDL    Cognitive/Neuro/Behavioral WDL WDL

## 2024-06-28 NOTE — PROGRESS NOTES
"   06/28/24 1405   Appointment Info   Signing Clinician's Name / Credentials (OT) Alma Reyes,OTR/L   Quick Adds   Quick Adds Certification   Living Environment   People in Home alone   Current Living Arrangements apartment   Home Accessibility   (elevator not working, lives on 4th floor)   Self-Care   Current Activity Tolerance moderate   Equipment Currently Used at Home cane, straight;walker, rolling;shower chair  (4WW, tub/shower)   Fall history within last six months yes   Number of times patient has fallen within last six months   (pt stated many falls no # given)   Instrumental Activities of Daily Living (IADL)   IADL Comments pt has PCAs 6 hrs/day, they assist w/ ADLs and IADLs, pt stated she cannot cook for herself d/t high risk of getting burns,is trying to work w/ /SW to get a new walker   General Information   Onset of Illness/Injury or Date of Surgery 06/27/24   Referring Physician Dr. Kimber Al   Patient/Family Therapy Goal Statement (OT) feel better   Additional Occupational Profile Info/Pertinent History of Current Problem Ruthy Álvarez is a 58 year old female admitted on 6/27/2024. She has a history of type 2 diabetes, hypertension, HLD asthma, CKD and chronic pain on opioids and is admitted for chest pain, recent fall, dizziness and excessive somnolence concern for acute encephalopathy.   Existing Precautions/Restrictions fall   Cognitive Status Examination   Orientation Status person;place   Cognitive Status Comments rec. screening, less \"hazy\" today, needed to look at whiteboard for date   Visual Perception   Visual Impairment/Limitations WFL   Range of Motion Comprehensive   General Range of Motion no range of motion deficits identified   Strength Comprehensive (MMT)   General Manual Muscle Testing (MMT) Assessment no strength deficits identified   Coordination   Upper Extremity Coordination No deficits were identified   Bed Mobility   Bed Mobility supine-sit;sit-supine "   Supine-Sit [place] (Bed Mobility) independent   Sit-Supine [place] (Bed Mobility) independent   Transfers   Transfers toilet transfer;bed-chair transfer   Transfer Skill: Bed to Chair/Chair to Bed   Bed-Chair [place] (Transfers) supervision   Toilet Transfer   Type (Toilet Transfer) sit-stand;stand-sit   [place] Level (Toilet Transfer) modified independence   Balance   Balance Assessment standing dynamic balance   Balance Comments fair balance w/ cane   Activities of Daily Living   BADL Assessment/Intervention grooming;toileting   Grooming Assessment/Training   Position (Grooming) unsupported standing   [place] Level (Grooming) supervision   Toileting   [place] Level (Toileting) modified independence   Clinical Impression   Criteria for Skilled Therapeutic Interventions Met (OT) Yes, treatment indicated  (next session)   OT Diagnosis decreased ADLs   OT Problem List-Impairments impacting ADL balance;cognition;mobility;pain   Assessment of Occupational Performance 3-5 Performance Deficits   Identified Performance Deficits trsfs, toileting, g/h, cognition for ADLs   Planned Therapy Interventions (OT) ADL retraining;cognition;transfer training   Clinical Decision Making Complexity (OT) problem focused assessment/low complexity   Risk & Benefits of therapy have been explained evaluation/treatment results reviewed;patient   OT Total Evaluation Time   OT Eval, Low Complexity Minutes (26215) 20   Therapy Certification   Medical Diagnosis AMS   Start of Care Date 06/28/24   Certification date from 06/28/24   Certification date to 07/05/24   OT Goals   Therapy Frequency (OT) 6 times/week   OT Predicted Duration/Target Date for Goal Attainment 07/05/24   OT Goals Hygiene/Grooming;Transfers;Cognition;Toilet Transfer/Toileting   OT: Hygiene/Grooming modified independent;while standing   OT: Transfer Modified independent;with assistive device   OT: Toilet Transfer/Toileting Modified independent    OT: Cognitive Patient/caregiver will verbalize understanding of cognitive assessment results/recommendations as needed for safe discharge planning   OT Discharge Planning   OT Plan see 1 more time?, SLUMS/ACL, toileting, trsfs w/ cane, g/h,   OT Discharge Recommendation (DC Rec) home with assist   OT Rationale for DC Rec Ax1 for trsfs and ADLs, h/o falls, anticipate d/c home w/ continued PCA assist pending progress   OT Brief overview of current status mod I toileting, c/o HA, nausea, SBA/mod I trsfs   Total Session Time   Total Session Time (sum of timed and untimed services) 20    [facility]  OUTPATIENT OCCUPATIONAL THERAPY  EVALUATION  PLAN OF TREATMENT FOR OUTPATIENT REHABILITATION  (COMPLETE FOR INITIAL CLAIMS ONLY)  Patient's Last Name, First Name, M.I.  YOB: 1965  Ruthy Álvarez                          Provider's Name  [facility] Medical Record No.  [id number]                             Onset Date:  06/27/24   Start of Care Date:  06/28/24   Type:     ___PT   _X_OT   ___SLP Medical Diagnosis:  AMS                    OT Diagnosis:  decreased ADLs Visits from SOC:  1     See note for plan of treatment, functional goals and certification details    I CERTIFY THE NEED FOR THESE SERVICES FURNISHED UNDER        THIS PLAN OF TREATMENT AND WHILE UNDER MY CARE     (Physician co-signature of this document indicates review and certification of the therapy plan).

## 2024-06-28 NOTE — PROGRESS NOTES
Pt to transfer to [facility]. Report called and given to NICOLETTE Joseph. Insulin Pen, Capsaicin cream, Voltaren Gel and Bicitra cup sent with patient.

## 2024-06-29 ENCOUNTER — APPOINTMENT (OUTPATIENT)
Dept: CARDIOLOGY | Facility: HOSPITAL | Age: 59
End: 2024-06-29
Payer: COMMERCIAL

## 2024-06-29 VITALS
BODY MASS INDEX: 40.71 KG/M2 | HEART RATE: 104 BPM | HEIGHT: 59 IN | SYSTOLIC BLOOD PRESSURE: 156 MMHG | WEIGHT: 201.94 LBS | OXYGEN SATURATION: 98 % | RESPIRATION RATE: 18 BRPM | TEMPERATURE: 98.4 F | DIASTOLIC BLOOD PRESSURE: 74 MMHG

## 2024-06-29 LAB
ALBUMIN MFR UR ELPH: 253 MG/DL
ANION GAP SERPL CALCULATED.3IONS-SCNC: 15 MMOL/L (ref 7–15)
BASOPHILS # BLD AUTO: 0 10E3/UL (ref 0–0.2)
BASOPHILS NFR BLD AUTO: 0 %
BUN SERPL-MCNC: 39 MG/DL (ref 6–20)
CALCIUM SERPL-MCNC: 7.3 MG/DL (ref 8.6–10)
CHLORIDE SERPL-SCNC: 109 MMOL/L (ref 98–107)
CREAT SERPL-MCNC: 1.47 MG/DL (ref 0.51–0.95)
CREAT UR-MCNC: 32.8 MG/DL
DEPRECATED HCO3 PLAS-SCNC: 17 MMOL/L (ref 22–29)
EGFRCR SERPLBLD CKD-EPI 2021: 41 ML/MIN/1.73M2
EOSINOPHIL # BLD AUTO: 0.1 10E3/UL (ref 0–0.7)
EOSINOPHIL NFR BLD AUTO: 1 %
ERYTHROCYTE [DISTWIDTH] IN BLOOD BY AUTOMATED COUNT: 12.8 % (ref 10–15)
FOLATE SERPL-MCNC: 19.3 NG/ML (ref 4.6–34.8)
GLUCOSE BLDC GLUCOMTR-MCNC: 188 MG/DL (ref 70–99)
GLUCOSE BLDC GLUCOMTR-MCNC: 217 MG/DL (ref 70–99)
GLUCOSE BLDC GLUCOMTR-MCNC: 292 MG/DL (ref 70–99)
GLUCOSE SERPL-MCNC: 276 MG/DL (ref 70–99)
HCT VFR BLD AUTO: 30.2 % (ref 35–47)
HGB BLD-MCNC: 9.6 G/DL (ref 11.7–15.7)
HOLD SPECIMEN: NORMAL
IMM GRANULOCYTES # BLD: 0.1 10E3/UL
IMM GRANULOCYTES NFR BLD: 1 %
LVEF ECHO: NORMAL
LYMPHOCYTES # BLD AUTO: 2.6 10E3/UL (ref 0.8–5.3)
LYMPHOCYTES NFR BLD AUTO: 24 %
MCH RBC QN AUTO: 32.9 PG (ref 26.5–33)
MCHC RBC AUTO-ENTMCNC: 31.8 G/DL (ref 31.5–36.5)
MCV RBC AUTO: 103 FL (ref 78–100)
MONOCYTES # BLD AUTO: 0.7 10E3/UL (ref 0–1.3)
MONOCYTES NFR BLD AUTO: 7 %
NEUTROPHILS # BLD AUTO: 7.4 10E3/UL (ref 1.6–8.3)
NEUTROPHILS NFR BLD AUTO: 68 %
NRBC # BLD AUTO: 0 10E3/UL
NRBC BLD AUTO-RTO: 0 /100
PLATELET # BLD AUTO: 220 10E3/UL (ref 150–450)
POTASSIUM SERPL-SCNC: 4.8 MMOL/L (ref 3.4–5.3)
PROT/CREAT 24H UR: 7.71 MG/MG CR (ref 0–0.2)
RBC # BLD AUTO: 2.92 10E6/UL (ref 3.8–5.2)
SODIUM SERPL-SCNC: 141 MMOL/L (ref 135–145)
WBC # BLD AUTO: 10.9 10E3/UL (ref 4–11)

## 2024-06-29 PROCEDURE — 82962 GLUCOSE BLOOD TEST: CPT

## 2024-06-29 PROCEDURE — 96372 THER/PROPH/DIAG INJ SC/IM: CPT

## 2024-06-29 PROCEDURE — 93306 TTE W/DOPPLER COMPLETE: CPT | Mod: 26 | Performed by: STUDENT IN AN ORGANIZED HEALTH CARE EDUCATION/TRAINING PROGRAM

## 2024-06-29 PROCEDURE — 250N000011 HC RX IP 250 OP 636

## 2024-06-29 PROCEDURE — 250N000012 HC RX MED GY IP 250 OP 636 PS 637

## 2024-06-29 PROCEDURE — 93306 TTE W/DOPPLER COMPLETE: CPT

## 2024-06-29 PROCEDURE — 80048 BASIC METABOLIC PNL TOTAL CA: CPT

## 2024-06-29 PROCEDURE — 250N000013 HC RX MED GY IP 250 OP 250 PS 637

## 2024-06-29 PROCEDURE — 36416 COLLJ CAPILLARY BLOOD SPEC: CPT

## 2024-06-29 PROCEDURE — 36415 COLL VENOUS BLD VENIPUNCTURE: CPT

## 2024-06-29 PROCEDURE — 99238 HOSP IP/OBS DSCHRG MGMT 30/<: CPT | Mod: GC

## 2024-06-29 PROCEDURE — 250N000013 HC RX MED GY IP 250 OP 250 PS 637: Performed by: STUDENT IN AN ORGANIZED HEALTH CARE EDUCATION/TRAINING PROGRAM

## 2024-06-29 PROCEDURE — 85025 COMPLETE CBC W/AUTO DIFF WBC: CPT

## 2024-06-29 PROCEDURE — G0378 HOSPITAL OBSERVATION PER HR: HCPCS

## 2024-06-29 RX ORDER — PREGABALIN 20 MG/ML
25 SOLUTION ORAL 3 TIMES DAILY
Qty: 473 ML | Refills: 0 | Status: SHIPPED | OUTPATIENT
Start: 2024-06-29 | End: 2024-08-29

## 2024-06-29 RX ADMIN — ATORVASTATIN CALCIUM 10 MG: 10 TABLET, FILM COATED ORAL at 08:01

## 2024-06-29 RX ADMIN — SODIUM CITRATE AND CITRIC ACID MONOHYDRATE 10 ML: 500; 334 SOLUTION ORAL at 08:01

## 2024-06-29 RX ADMIN — OXYCODONE HYDROCHLORIDE AND ACETAMINOPHEN 1 TABLET: 10; 325 TABLET ORAL at 08:00

## 2024-06-29 RX ADMIN — CAPSAICIN: 0.25 CREAM TOPICAL at 08:01

## 2024-06-29 RX ADMIN — DICLOFENAC 4 G: 10 GEL TOPICAL at 08:01

## 2024-06-29 RX ADMIN — SUMATRIPTAN 6 MG: 6 INJECTION SUBCUTANEOUS at 13:04

## 2024-06-29 RX ADMIN — INSULIN ASPART 1 UNITS: 100 INJECTION, SOLUTION INTRAVENOUS; SUBCUTANEOUS at 11:50

## 2024-06-29 RX ADMIN — ENOXAPARIN SODIUM 40 MG: 40 INJECTION SUBCUTANEOUS at 08:00

## 2024-06-29 RX ADMIN — ONDANSETRON 4 MG: 4 TABLET, ORALLY DISINTEGRATING ORAL at 10:56

## 2024-06-29 RX ADMIN — TORSEMIDE 10 MG: 5 TABLET ORAL at 08:00

## 2024-06-29 RX ADMIN — PREGABALIN 25 MG: 20 SOLUTION ORAL at 08:35

## 2024-06-29 RX ADMIN — DAPAGLIFLOZIN 10 MG: 5 TABLET, FILM COATED ORAL at 08:00

## 2024-06-29 RX ADMIN — DILTIAZEM HYDROCHLORIDE 240 MG: 120 CAPSULE, EXTENDED RELEASE ORAL at 08:00

## 2024-06-29 RX ADMIN — INSULIN ASPART 2 UNITS: 100 INJECTION, SOLUTION INTRAVENOUS; SUBCUTANEOUS at 08:08

## 2024-06-29 RX ADMIN — LABETALOL HYDROCHLORIDE 100 MG: 100 TABLET, FILM COATED ORAL at 08:00

## 2024-06-29 RX ADMIN — CAPSAICIN: 0.25 CREAM TOPICAL at 11:50

## 2024-06-29 RX ADMIN — FERROUS SULFATE TAB 325 MG (65 MG ELEMENTAL FE) 325 MG: 325 (65 FE) TAB at 08:01

## 2024-06-29 ASSESSMENT — ACTIVITIES OF DAILY LIVING (ADL)
ADLS_ACUITY_SCORE: 36

## 2024-06-29 NOTE — PROGRESS NOTES
Care Management Follow Up    Length of Stay (days): 0    Expected Discharge Date: 06/29/2024 or 06/30/2024    Anticipated Discharge Plan:  Home    Transportation: Confirmed Family/friend    PT Recommendations: home with assist  OT Recommendations:  home with assist     Barriers to Discharge: medical stability    Prior Living Situation:  Pt lives in an apt alone. Pt uses cane or walker for ambulation.  Family members are PCA for 6.5 hrs/day and assist with all ADL/IADLs . Family to transport.     Advanced Directive on File:  no HCD     Patient/Spokesperson Updated: No    Additional Information:  Patient with history of type 2 diabetes, hypertension, HLD asthma, CKD and chronic pain on opioids and is admitted for chest pain, recent fall, dizziness and excessive somnolence concern for acute encephalopathy.           6/29/24:  Patient to return home at discharge with resumption of PCA services.         Megan Morales RN

## 2024-06-29 NOTE — PLAN OF CARE
A/Ox4 on RA. Pt has chronic pain 8/10, controlled w/ percocet; denies chest pain. C/o migraines and received Imitrix this afternoon. Up w/ SBA and cane to shower.     Problem: Adult Inpatient Plan of Care  Goal: Optimal Comfort and Wellbeing  6/29/2024 1408 by Kelly Cardona RN  Outcome: Adequate for Care Transition  6/29/2024 0917 by Kelly Cardona RN  Outcome: Progressing  Intervention: Monitor Pain and Promote Comfort  Recent Flowsheet Documentation  Taken 6/29/2024 1154 by Kelly Cardona RN  Pain Management Interventions: (hips) heat applied  Taken 6/29/2024 0845 by Kelly Cardona RN  Pain Management Interventions: (to hips) heat applied  Taken 6/29/2024 0755 by Kelly Cardona, NICOLETTE  Pain Management Interventions: medication (see MAR)

## 2024-06-29 NOTE — PLAN OF CARE
Discharge summary reviewed w/ and provided to patient. Questions answered as appropriate. All belongings with patient.

## 2024-06-29 NOTE — PROVIDER NOTIFICATION
SS OBS RM 10 BT  Emily PLATT [phone]  Can you please sign in as attending for this pt? Thanks    Resident paged via [facility] at [phone]    Attending added

## 2024-06-29 NOTE — PLAN OF CARE
Physical Therapy Discharge Summary    Reason for therapy discharge:    Discharged to home.    Progress towards therapy goal(s). See goals on Care Plan in [facility] electronic health record for goal details.  Goals partially met.  Barriers to achieving goals:   discharge from facility.    Therapy recommendation(s):    No further therapy is recommended.      Sandi Westbrook, PT, DPT  6/29/2024

## 2024-06-29 NOTE — PROGRESS NOTES
"PRIMARY DIAGNOSIS: \"GENERIC\" NURSING  OUTPATIENT/OBSERVATION GOALS TO BE MET BEFORE DISCHARGE:  ADLs back to baseline: Yes    Activity and level of assistance: Up with standby assistance.    Pain status: Improved-controlled with oral pain medications.    Return to near baseline physical activity: Yes     Discharge Planner Nurse   Safe discharge environment identified: No  Barriers to discharge: Yes       Entered by: Kelly Cardona RN 06/29/2024 2:07 PM     Please review provider order for any additional goals.   Nurse to notify provider when observation goals have been met and patient is ready for discharge.  "

## 2024-06-29 NOTE — PROVIDER NOTIFICATION
LISEI  SS OBS RM 10 TB  Emily WILL [phone]  Pt BG level [phone]. Thanks    Text paged sent via [employer] at [phone]

## 2024-06-29 NOTE — PROGRESS NOTES
PRIMARY DIAGNOSIS:AMS  OUTPATIENT/OBSERVATION GOALS TO BE MET BEFORE DISCHARGE:  ADLs back to baseline: No- SBA with Cane    Activity and level of assistance: Up with standby assistance.    Pain status: Improved-controlled with oral pain medications.    Return to near baseline physical activity: Yes     Discharge Planner Nurse   Safe discharge environment identified: No  Barriers to discharge: Yes- ECHO needed.        Entered by: Yoly Valentin RN 06/28/2024 11:44 PM     Please review provider order for any additional goals.   Nurse to notify provider when observation goals have been met and patient is ready for discharge.

## 2024-06-29 NOTE — PROGRESS NOTES
"PRIMARY DIAGNOSIS: \"GENERIC\" NURSING  OUTPATIENT/OBSERVATION GOALS TO BE MET BEFORE DISCHARGE:  ADLs back to baseline:  SBA    Activity and level of assistance: Up with standby assistance.    Pain status: No improvement noted. Consider adjustment in pain regimen.    Return to near baseline physical activity:  SBA w/ cane     Discharge Planner Nurse   Safe discharge environment identified: No  Barriers to discharge: Yes       Entered by: Kelly Cardona RN 06/29/2024 9:17 AM  A/Ox4. Pt is SBA w/ cane. Reports intermittent dizziness when ambulating to bathroom. Chronic pain, rates it 8/10 and taking percocet. Utilizing Voltaren and Capsaicin creams for LE pain. Takes all medication crushed. Zofran given for nausea and Imitrex for migraine.   Please review provider order for any additional goals.   Nurse to notify provider when observation goals have been met and patient is ready for discharge.  "

## 2024-06-29 NOTE — PLAN OF CARE
Patient discharged to home at 1450 via Private Car.  Accompanied by granddaughter and staff.  Discharge instructions were reviewed with patient, opportunity offered to ask questions.    Access discontinued: Yes  Care plan and education discontinued: Yes  Belongings were sent home with patient/family:  Assistive/Medical Devices: cane, Cash/Checkbook/Credit Card: credit and debit cards, Cell phone/electronics: cellphone, Clothing: Shirt(s): shirt, Pants:  , and Underclothes:  , Jewelry: rings, Purse/Wallet: purse, and Shoes: shoes .

## 2024-06-29 NOTE — PROGRESS NOTES
PRIMARY DIAGNOSIS:AMS  OUTPATIENT/OBSERVATION GOALS TO BE MET BEFORE DISCHARGE:  ADLs back to baseline: No- SBA with Cane    Activity and level of assistance: Up with standby assistance.    Pain status: Improved-controlled with oral pain medications- Rating pain 7/10- declining any medication intervention at this time.     Return to near baseline physical activity: Yes- SBA with cane.      Discharge Planner Nurse   Safe discharge environment identified: No per PT- Plans for Home with HC.   Barriers to discharge: Yes- ECHO needed.        Entered by: Yoly Valentin RN 06/29/2024 3:12 AM     Please review provider order for any additional goals.   Nurse to notify provider when observation goals have been met and patient is ready for discharge.

## 2024-06-29 NOTE — DISCHARGE SUMMARY
"[facility]  Hospitalist Discharge Summary      Date of Admission:  6/27/2024  Date of Discharge:  6/29/2024  2:50 PM  Discharging Provider: Jose Manuel Pimentel MD  Discharge Service: Hospitalist Service    Discharge Diagnoses   Excessive somnolence, possibly due to pregabalin  Hx of chronic pain on opioids   Acute encephalopathy, improved  Lower extremity swelling, improved  Orthopnea, improved  Concern for CHF  MICHELLE on CKD III   Chest pain, resolved    Clinically Significant Risk Factors     # DMII: A1C = 8.6 % (Ref range: <5.7 %) within past 6 months  # Severe Obesity: Estimated body mass index is 40.77 kg/m  as calculated from the following:    Height as of this encounter: 1.499 m (4' 11.02\").    Weight as of this encounter: 91.6 kg (201 lb 15.1 oz).       Follow-ups Needed After Discharge   Follow-up Appointments     Follow-up and recommended labs and tests       Follow up with primary care provider, Jose Angel Meyer, within 7 days   to evaluate medication change and for hospital follow- up.         Recommending repeat BMP, Urine protein level    Unresulted Labs Ordered in the Past 30 Days of this Admission       No orders found from 5/28/2024 to 6/28/2024.        These results will be followed up by Jose Angel Meyer    Discharge Disposition   Discharged to home  Condition at discharge: Stable    Hospital Course   Ruthy Álvarez is a 58 year old female with a history of type 2 diabetes, CKD and chronic pain on opioids and is admitted for acute encephalopathy.      Excessive somnolence, improved  Hx of chronic pain on opioids   Acute encephalopathy, improved  Patient presents with fatigue, body aches has had a fall recently few days ago.  She also notes dizziness and drowsiness worse one day before admission. Workup largely unremarkable: CT head is negative for acute intracranial processes but shows brain atrophy and chronic microvascular ischemic changes. Of note patient had " "similar presentation in 2022 and at that time her AMS was attributed to polypharmacy and some of her med her medication were stopped like risperidone. She was recently switched from tablets of pregabalin to a liquid formulation and possible that this is better absorbed with her history of gastric bypass. Did have notable improvement in somnolence/AMS here after cutting this dose in half.   - Will discharge on lowered Lyrica dose, 25 mg TID liquid  - Recommend careful use of PTA narcotics, only as absolutely needed until you can get in with your primary    Lower extremity swelling, improved  Orthopnea, improved  Patient reports feeling \"swollen all over\" - has happened a few times in the past. Has pitting edema in the shins on exam on admission. Does have torsemide on her medication list, per chart review this is for \"edema\". Normal BNP on admission argues against heart failure. TTE performed prior to discharge which showed LVEF of 60-65%, moderate concentric L ventricular hypertrophy, no wall motion abnormalities. Unclear exactly what is causing pt's ongoing edema, does not seem c/w CHF, had had mild creat elevation here, see below. No obvious liver pathology. Not on a CCB. Seems to be improved at time of discharge and can be safely worked up in the outpatient setting.   - continue PTA torsemide 10 mg daily     MICHELLE on CKD III, improving  Creatinine elevated at 1.49 on admission.  Baseline creatinine around 1.1-1.2.  Could be worsened by low p.o. intake and some aspect of dehydration, especially with GLP1-related nausea/vomiting in the past few weeks. With hypertension, could also consider nephrotic syndrome.      Chest pain-resolved  Patient reported chest pain on admission which she reported has resolved spontaneously.  EKG reassuring and troponin flat. She also notes shortness of breath prior to admission.  However, she remained on RA and lungs sound clear.  Sob could be related to asthma given negative CXR will " restart PTA inhalers. No further issues w/ this during hospitalization.    Elevated ALP  ALP elevated with normal AST and ALT.  CT abdomen pelvis showed cholecystectomy and mild biliary prominence but otherwise unremarkable. Of note patient had an elevated alkaline phosphatase 192-215 in the past.  Could consider outpatient workup for different causes including liver disease vs bone vs kidney disorders. Outpatient work up.     Macrocytic anemia  S/p bariatric surgery 2006  Hemoglobin 9.7 on admission.  Baseline appears to be between 9-11.  No signs of active bleeding.  Could be related to malabsorption s/p bariatric bariatric surgery patient is on PTA folate and vitamin B12.  Consider checking levels here versus outpatient.  Unsure if levels are low and it could be contributing to cognitive impairment/confusion.  Last B12 was elevated 1 year ago >4000.  Folate was normal one year ago.    Consultations This Hospital Stay   CARE MANAGEMENT / SOCIAL WORK IP CONSULT  PHYSICAL THERAPY ADULT IP CONSULT  OCCUPATIONAL THERAPY ADULT IP CONSULT    Code Status   Full Code    Jose Manuel Pimentel MD  [facility] EXTENDED RECOVERY AND SHORT STAY  [address] [place] [id number]  Phone: [phone]  Fax: [phone]  ______________________________________________________________________    Physical Exam   Vital Signs: Temp: 98.4  F (36.9  C) Temp src: Oral BP: (!) 156/74 Pulse: 104   Resp: 18 SpO2: 98 % O2 Device: None (Room air)    Weight: 201 lbs 15.06 oz    GEN: Sitting up in bed watching videos on her phone, NAD  HEEN: Head is atraumatic, normocephalic, eyes anicteric, mucous membranes moist.  CV: Regular rate and rhythm without obvious murmurs.  PULM: Clear to auscultation bilaterally without wheezing or rales.  ABD: Soft, nontender, bowel sounds present.  NEURO: Alert and oriented x3.  No focal motor abnormalities.  Face symmetric.  PSYCH: Appropriate affect.  SKIN: No rashes, bruising, or  other lesions.       Primary Care Physician   Jose Angel Meyer    Discharge Orders      Reason for your hospital stay    Admitted for AMS     Follow-up and recommended labs and tests     Follow up with primary care provider, Jose Angel Meyer, within 7 days to evaluate medication change and for hospital follow- up.     Activity    Your activity upon discharge: activity as tolerated     Diet    Follow this diet upon discharge: Orders Placed This Encounter      Regular Diet Adult     Significant Results and Procedures   Most Recent 3 CBC's:  Recent Labs   Lab Test 06/29/24  1025 06/28/24  1437 06/27/24  2230   WBC 10.9 12.3* 9.9   HGB 9.6* 11.0* 9.7*   * 103* 104*    246 244     Most Recent 3 BMP's:  Recent Labs   Lab Test 06/29/24  1139 06/29/24  0810 06/29/24  0719 06/28/24  1243 06/28/24  1045 06/28/24  0353 06/27/24  2230   NA  --  141  --   --  144  --  144   POTASSIUM  --  4.8  --   --  4.6  --  4.2   CHLORIDE  --  109*  --   --  114*  --  112*   CO2  --  17*  --   --  20*  --  20*   BUN  --  39.0*  --   --  34.2*  --  37.4*   CR  --  1.47*  --   --  1.29*  --  1.49*   ANIONGAP  --  15  --   --  10  --  12   MIKAELA  --  7.3*  --   --  7.1*  --  6.9*   * 276* 217*   < > 325*   < > 392*    < > = values in this interval not displayed.     Most Recent 2 LFT's:  Recent Labs   Lab Test 06/27/24 2230 08/12/22  1813   AST 23 19   ALT 27 17   ALKPHOS 205* 186*   BILITOTAL 0.3 0.3     Most Recent 3 BNP's:  Recent Labs   Lab Test 06/27/24 2230 05/16/24  1150 04/13/23  1314   NTBNPI 338  --   --    NTBNP  --  599 245   ,   Results for orders placed or performed during the hospital encounter of 06/27/24   Head CT w/o contrast    Narrative    EXAM: CT HEAD W/O CONTRAST, CT CERVICAL SPINE W/O CONTRAST  LOCATION: [facility]  DATE: 6/27/2024    INDICATION: AMS, fall  COMPARISON: 9/8/2019.  TECHNIQUE:   1) Routine CT Head without IV contrast. Multiplanar reformats. Dose reduction  techniques were used.  2) Routine CT Cervical Spine without IV contrast. Multiplanar reformats. Dose reduction techniques were used.    FINDINGS:   HEAD CT:   INTRACRANIAL CONTENTS: No intracranial hemorrhage, extraaxial collection, or mass effect.  No CT evidence of acute infarct. Chronic right cerebellar lacunar type infarction. Mild presumed chronic small vessel ischemic changes. Mild generalized volume   loss. No hydrocephalus.     VISUALIZED ORBITS/SINUSES/MASTOIDS: No intraorbital abnormality. No paranasal sinus mucosal disease. No middle ear or mastoid effusion.    BONES/SOFT TISSUES: No calvarial fracture. Midline posterior scalp contusion.    CERVICAL SPINE CT:   VERTEBRA: Straightening of the normal cervical lordosis. No fracture or posttraumatic subluxation.     CANAL/FORAMINA: Mild multilevel degenerative changes. No high-grade central spinal canal stenosis. Disc osteophyte complexes/bulge at C6-C7 causes up to minimal canal stenosis. No significant neural foraminal stenosis.    PARASPINAL: No extraspinal abnormality. Visualized lung fields are clear.      Impression    IMPRESSION:  HEAD CT:  1.  No CT evidence for acute intracranial process.  2.  Brain atrophy and presumed chronic microvascular ischemic changes as above.    CERVICAL SPINE CT:  1.  No CT evidence for acute fracture or post traumatic subluxation.   CT Cervical Spine w/o Contrast    Narrative    EXAM: CT HEAD W/O CONTRAST, CT CERVICAL SPINE W/O CONTRAST  LOCATION: [facility]  DATE: 6/27/2024    INDICATION: AMS, fall  COMPARISON: 9/8/2019.  TECHNIQUE:   1) Routine CT Head without IV contrast. Multiplanar reformats. Dose reduction techniques were used.  2) Routine CT Cervical Spine without IV contrast. Multiplanar reformats. Dose reduction techniques were used.    FINDINGS:   HEAD CT:   INTRACRANIAL CONTENTS: No intracranial hemorrhage, extraaxial collection, or mass effect.  No CT evidence of acute infarct. Chronic  right cerebellar lacunar type infarction. Mild presumed chronic small vessel ischemic changes. Mild generalized volume   loss. No hydrocephalus.     VISUALIZED ORBITS/SINUSES/MASTOIDS: No intraorbital abnormality. No paranasal sinus mucosal disease. No middle ear or mastoid effusion.    BONES/SOFT TISSUES: No calvarial fracture. Midline posterior scalp contusion.    CERVICAL SPINE CT:   VERTEBRA: Straightening of the normal cervical lordosis. No fracture or posttraumatic subluxation.     CANAL/FORAMINA: Mild multilevel degenerative changes. No high-grade central spinal canal stenosis. Disc osteophyte complexes/bulge at C6-C7 causes up to minimal canal stenosis. No significant neural foraminal stenosis.    PARASPINAL: No extraspinal abnormality. Visualized lung fields are clear.      Impression    IMPRESSION:  HEAD CT:  1.  No CT evidence for acute intracranial process.  2.  Brain atrophy and presumed chronic microvascular ischemic changes as above.    CERVICAL SPINE CT:  1.  No CT evidence for acute fracture or post traumatic subluxation.   CT Chest Pulmonary Embolism w Contrast    Narrative    EXAM: CT CHEST PULMONARY EMBOLISM W CONTRAST  LOCATION: [facility]  DATE: 6/28/2024    INDICATION: chest pain, hx of PE, AMS, fall, eval for PE, injury, etc  COMPARISON: 11/21/2023  TECHNIQUE: CT chest pulmonary angiogram during arterial phase injection of IV contrast. Multiplanar reformats and MIP reconstructions were performed. Dose reduction techniques were used.   CONTRAST: Isovue 370 90ML    FINDINGS:  ANGIOGRAM CHEST: The pulmonary arteries are somewhat limited in detail given marginal bolus of contrast, however I do not appreciate any obvious PE, changes of right heart strain, or enlargement of the central pulmonary arteries. Thoracic aorta is   negative for dissection.    LUNGS AND PLEURA: 2 tiny benign calcified granulomas are seen in the right lung. Minimal atelectasis in both lungs with no  central airway obstruction.    MEDIASTINUM/AXILLAE: Normal.    CORONARY ARTERY CALCIFICATION: Mild.    UPPER ABDOMEN: The gallbladder is surgically absent. There is a small fatty umbilical hernia involving the mid abdominal wall anteriorly at the midline with no associated inflammation or bowel.    MUSCULOSKELETAL: Mild scattered hypertrophic changes in the spine.      Impression    IMPRESSION:  1.  No acute finding such as PE, dissection, or aneurysm. No evidence for acute trauma.    2.  Benign calcified granulomas in the right lung.       CT Abdomen Pelvis w Contrast    Narrative    EXAM: CT ABDOMEN PELVIS W CONTRAST  LOCATION: [facility]  DATE: 6/28/2024    INDICATION: AMS, falls, eval for injury  COMPARISON: None.  TECHNIQUE: CT scan of the abdomen and pelvis was performed following injection of IV contrast. Multiplanar reformats were obtained. Dose reduction techniques were used.  CONTRAST: Isovue 370 90ML    FINDINGS:   LOWER CHEST: Basilar atelectasis.    HEPATOBILIARY: Cholecystectomy. Mild biliary prominence.    PANCREAS: Upper normal pancreatic duct.    SPLEEN: Normal.    ADRENAL GLANDS: Mild thickening of the adrenal glands.    KIDNEYS/BLADDER: Normal.    BOWEL: Gastric bypass. Small bowel normal caliber. Moderate amount of colonic stool.    LYMPH NODES: Normal.    VASCULATURE: Atherosclerotic vascular calcification.    PELVIC ORGANS: Absent uterus.    MUSCULOSKELETAL: Fat-containing ventral and umbilical hernia. Degenerative change osseous structures.      Impression    IMPRESSION:   1.  No evidence of intra-abdominal injury.   CT Thoracic Spine Reconstructed    Narrative    EXAM: CT THORACIC SPINE RECONSTRUCTED, CT LUMBAR SPINE RECONSTRUCTED  LOCATION: [facility]  DATE: 6/28/2024    INDICATION: Fall. Pain all over.  COMPARISON: CT chest 11/21/2023, CT abdomen and pelvis 4/16/2021.  TECHNIQUE:  1) Routine CT Thoracic Spine without IV contrast. Multiplanar  reformats. Dose reduction techniques were used.   2) Routine CT Lumbar Spine without IV contrast. Multiplanar reformats. Dose reduction techniques were used.     FINDINGS:    THORACIC SPINE CT:  VERTEBRA: Normal vertebral body heights and alignment. No fracture or posttraumatic subluxation.     CANAL/FORAMINA: No canal or neural foraminal stenosis.    PARASPINAL: Please refer to the report of the CT chest, abdomen and pelvis.    LUMBAR SPINE CT:  VERTEBRA: Lumbar vertebra are normal in height. 2 mm of anterolisthesis of L4 likely relates to facet arthropathy. No fracture or posttraumatic subluxation.     CANAL/FORAMINA: No canal or neural foraminal stenosis.    PARASPINAL: Please refer to the report of the CT chest abdomen pelvis.      Impression    IMPRESSION:  THORACIC SPINE CT:  1.  No fracture or posttraumatic subluxation.  2.  No high-grade spinal canal or neural foraminal stenosis.    LUMBAR SPINE CT:  1.  No fracture or posttraumatic subluxation.  2.  No high-grade spinal canal or neural foraminal stenosis.     CT Lumbar Spine Reconstructed    Narrative    EXAM: CT THORACIC SPINE RECONSTRUCTED, CT LUMBAR SPINE RECONSTRUCTED  LOCATION: [facility]  DATE: 6/28/2024    INDICATION: Fall. Pain all over.  COMPARISON: CT chest 11/21/2023, CT abdomen and pelvis 4/16/2021.  TECHNIQUE:  1) Routine CT Thoracic Spine without IV contrast. Multiplanar reformats. Dose reduction techniques were used.   2) Routine CT Lumbar Spine without IV contrast. Multiplanar reformats. Dose reduction techniques were used.     FINDINGS:    THORACIC SPINE CT:  VERTEBRA: Normal vertebral body heights and alignment. No fracture or posttraumatic subluxation.     CANAL/FORAMINA: No canal or neural foraminal stenosis.    PARASPINAL: Please refer to the report of the CT chest, abdomen and pelvis.    LUMBAR SPINE CT:  VERTEBRA: Lumbar vertebra are normal in height. 2 mm of anterolisthesis of L4 likely relates to facet  arthropathy. No fracture or posttraumatic subluxation.     CANAL/FORAMINA: No canal or neural foraminal stenosis.    PARASPINAL: Please refer to the report of the CT chest abdomen pelvis.      Impression    IMPRESSION:  THORACIC SPINE CT:  1.  No fracture or posttraumatic subluxation.  2.  No high-grade spinal canal or neural foraminal stenosis.    LUMBAR SPINE CT:  1.  No fracture or posttraumatic subluxation.  2.  No high-grade spinal canal or neural foraminal stenosis.     Echocardiogram Complete     Value    LVEF  60-65%    [facility]    [id number]  [id number]  [id number]  [id number]^CARL[address]^[place] [address]     Name: SHERI MONREAL  MRN: [id number]  : 1965  Study Date: 2024 01:45 PM  Age: 58 yrs  Gender: Female  Patient Location: [facility]  Reason For Study: Cardiac Murmur  Ordering Physician: KELLY URBINA  Performed By: VINCE     BSA: 1.8 m2  Height: 59 in  Weight: 201 lb  HR: 97  ______________________________________________________________________________  Procedure  Complete Echo Adult. Adequate quality two-dimensional was performed and  interpreted. There is no comparison study available.  ______________________________________________________________________________  Interpretation Summary     The left ventricle is normal in size. There is moderate concentric left  ventricular hypertrophy. Proximal septal thickening is noted.  Left ventricular systolic function is normal. The visual ejection fraction is  60-65%. No regional wall motion abnormalities noted.  Grade I or early diastolic dysfunction.     The right ventricle is normal in size and function.  Normal left atrial size. Right atrial size is normal.  IVC diameter <2.1 cm collapsing >50% with sniff suggests a normal RA pressure  of 3 mmHg.  There is no comparison study available.  ______________________________________________________________________________  Left Ventricle  The left  ventricle is normal in size. There is moderate concentric left  ventricular hypertrophy. Proximal septal thickening is noted. Left ventricular  systolic function is normal. The visual ejection fraction is 60-65%. Grade I  or early diastolic dysfunction. No regional wall motion abnormalities noted.     Right Ventricle  The right ventricle is normal in size and function.     Atria  Normal left atrial size. Right atrial size is normal.     Mitral Valve  Mitral valve leaflets appear normal. There is trace mitral regurgitation.  There is no mitral valve stenosis.     Tricuspid Valve  Tricuspid valve leaflets appear normal. There is trace tricuspid  regurgitation. Right ventricular systolic pressure could not be approximated  due to inadequate tricuspid regurgitation.     Aortic Valve  The aortic valve is trileaflet. Aortic valve leaflets appear normal. No aortic  regurgitation is present. No aortic stenosis is present.     Pulmonic Valve  The pulmonic valve is not well visualized. There is trace pulmonic valvular  regurgitation.     Vessels  The aorta root is normal. IVC diameter <2.1 cm collapsing >50% with sniff  suggests a normal RA pressure of 3 mmHg.     Pericardium  There is no pericardial effusion.     Rhythm  Sinus rhythm was noted.  ______________________________________________________________________________  MMode/2D Measurements & Calculations  IVSd: 1.6 cm  LVIDd: 3.9 cm  LVIDs: 2.4 cm  LVPWd: 1.5 cm  FS: 38.9 %  LV mass(C)d: 236.1 grams  LV mass(C)dI: 127.8 grams/m2  Ao root diam: 2.6 cm  LA dimension: 3.8 cm  asc Aorta Diam: 3.4 cm  LA/Ao: 1.5  LVOT diam: 1.9 cm  LVOT area: 2.8 cm2  Ao root diam index Ht(cm/m): 1.7  Ao root diam index BSA (cm/m2): 1.4  Asc Ao diam index BSA (cm/m2): 1.8  Asc Ao diam index Ht(cm/m): 2.3  EF Biplane: 64.0 %     LA Volume Indexed (AL/bp): 24.6 ml/m2  RWT: 0.77  TAPSE: 2.2 cm     Time Measurements  MM HR: 100.0 BPM     Doppler Measurements & Calculations  MV E max aravind: 67.1  cm/sec  MV A max teddy: 116.0 cm/sec  MV E/A: 0.58  MV max P.4 mmHg  MV mean PG: 3.0 mmHg  MV V2 VTI: 18.8 cm  MVA(VTI): 3.2 cm2  MV dec slope: 521.0 cm/sec2  MV dec time: 0.13 sec  Ao V2 max: 151.0 cm/sec  Ao max P.0 mmHg  Ao V2 mean: 108.0 cm/sec  Ao mean P.0 mmHg  Ao V2 VTI: 26.5 cm  LINDA(I,D): 2.2 cm2  LINDA(V,D): 2.1 cm2  LV V1 max P.0 mmHg  LV V1 max: 112.0 cm/sec  LV V1 VTI: 21.0 cm     SV(LVOT): 59.5 ml  SI(LVOT): 32.2 ml/m2  PA acc time: 0.13 sec  AV Teddy Ratio (DI): 0.74  LINDA Index (cm2/m2): 1.2  E/E': 11.6  E/E' av.0  Lateral E/e': 12.3  Medial E/e': 11.6  Peak E' Teddy: 5.8 cm/sec  RV S Teddy: 20.3 cm/sec     ______________________________________________________________________________  Report approved by: Rosalina Paz 2024 03:35 PM           *Note: Due to a large number of results and/or encounters for the requested time period, some results have not been displayed. A complete set of results can be found in Results Review.     Discharge Medications   Discharge Medication List as of 2024  2:28 PM        CONTINUE these medications which have CHANGED    Details   pregabalin (LYRICA) 20 MG/ML solution Take 1.25 mLs (25 mg) by mouth 3 times daily, Disp-473 mL, R-0, Local Print           CONTINUE these medications which have NOT CHANGED    Details   Alcohol Swabs (PRO COMFORT ALCOHOL) 70 % PADS 1 pad 4 times daily as needed (blood sugar testing) Use QID for blood sugar monitoring, Disp-400 each, R-11, E-Prescribe      atorvastatin (LIPITOR) 10 MG tablet Take 1 tablet (10 mg) by mouth daily, Disp-90 tablet, R-3, E-Prescribe      blood glucose (NO BRAND SPECIFIED) lancets standard Check fasting glucose 4 times daily.  For Accu check monitorDisp-360 each, [id number]      blood glucose calibration (ONETOUCH ULTRA CONTROL) solution See Admin InstructionsHistorical      blood glucose monitoring (ONE TOUCH ULTRA 2) meter device kit See Admin InstructionsHistorical       budesonide-formoterol (SYMBICORT) 80-4.5 MCG/ACT Inhaler Inhale 2 puffs once daily as needed, and may repeat every hour, maximum 12 puffs in 24 hours, Disp-20.4 g, R-11, E-Prescribe      capsaicin (ZOSTRIX) 0.025 % external cream Apply 3-4 times daily to knees, Disp-118 mL, R-11, E-Prescribe      !! Continuous Blood Gluc Sensor (FREESTYLE ANIKET 14 DAY SENSOR) [facility] 1 each every 14 days Use 1 Sensor every 14 days. Use to read blood sugars per 's instructions., Disp-2 each, R-11, E-Prescribe      !! Continuous Blood Gluc Sensor (FREESTYLE ANIKET 2 SENSOR) [facility] 1 each every 14 days Use 1 sensor every 14 days. Use to read blood sugars per 's instructions., Disp-2 each, R-5, E-Prescribe      CONTOUR NEXT TEST test strip USE TO TEST BLOOD GLUCOSE FOUR TIMES DAILY, Disp-400 strip, R-11, E-Prescribe      dapagliflozin (FARXIGA) 10 MG TABS tablet Take 1 tablet (10 mg) by mouth daily, 10 mg, Oral, DAILY Starting u 4/18/2024, Disp-90 tablet, R-3, E-Prescribe      diclofenac (VOLTAREN) 1 % topical gel Apply 4 g topically 3 times daily To top of both shoulders in area of pain, Disp-350 g, R-2, E-Prescribe      diltiazem ER (DILT-XR) 240 MG 24 hr ER beaded capsule Take 1 capsule (240 mg) by mouth daily New dose, Disp-30 capsule, R-11, E-Prescribe      ferrous sulfate (IRON) 325 (65 FE) MG tablet Take 1 tablet (325 mg) by mouth daily (with breakfast), Disp-30 tablet, R-2, Historical      labetalol (NORMODYNE) 100 MG tablet Take 1 tablet (100 mg) by mouth 2 times daily, Disp-180 tablet, R-3, E-Prescribe      Lancet Devices (EASY MINI EJECT LANCING DEVICE) MISC See Admin Instructions, Historical      Lancets 30G MISC USE TO TEST BLOOD GLUCOSE FOUR TIMES DAILY, Disp-400 each, R-11, E-Prescribe      lidocaine (LMX4) 4 % external cream Apply topically once as needed for mild pain (left knee)Disp-45 g, [id number]      lisinopril (ZESTRIL) 40 MG tablet Take 1 tablet (40 mg) by mouth at bedtime, Disp-90  tablet, R-3, E-Prescribe      naloxone (NARCAN) 4 MG/0.1ML nasal spray Spray 1 spray (4 mg) into one nostril alternating nostrils once as needed for opioid reversal Every 2-3 minutes until patient responsive or EMS arrives, Disp-0.2 mL, R-0, E-Prescribe      ondansetron (ZOFRAN ODT) 4 MG ODT tab Take 1 tablet (4 mg) by mouth every 6 hours as needed for nausea, Disp-30 tablet, R-2, E-Prescribe      order for DME Equipment being ordered: wheeled walker with seatDisp-1 Device, R-0, Local Print      oxyCODONE-acetaminophen (PERCOCET)  MG per tablet Take 2 tablets by mouth at bedtime. May also take 1 tablet every 6 hours as needed for severe pain., Disp-120 tablet, R-0, E-Prescribe      phentermine (ADIPEX-P) 37.5 MG tablet Take 1 tablet (37.5 mg) by mouth every morning (before breakfast), Disp-90 tablet, R-0, E-Prescribe      Semaglutide, 1 MG/DOSE, (OZEMPIC) 4 MG/3ML pen Inject 1 mg Subcutaneous once a week for 84 days, Disp-9 mL, R-0, E-Prescribe      senna-docusate (SENOKOT-S/PERICOLACE) 8.6-50 MG tablet Take 1 tablet by mouth 2 times daily, Disp-60 tablet, R-11, E-Prescribe      Simethicone 180 MG CAPS Use 1 softgel as needed for gas, Disp-60 capsule, R-11, E-Prescribe      sodium citrate-citric acid (BICITRA) 500-334 MG/5ML solution Take 10 mLs by mouth 2 times daily (with meals), Disp-473 mL, R-11, E-Prescribe      SUMAtriptan (IMITREX STATDOSE) 6 MG/0.5ML pen injector kit Inject 6 mg Subcutaneous at onset of headache for migraine, Historical      torsemide (DEMADEX) 10 MG tablet Take 1 tablet (10 mg) by mouth daily, Disp-90 tablet, R-3, E-Prescribe       !! - Potential duplicate medications found. Please discuss with provider.        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

## 2024-06-29 NOTE — PLAN OF CARE
ASSUMED CARES: [id number]  STATUS: Pt admitted d/t AMS- Possibly d/t polypharmacy. DM2. Gastric Bypass (2006). Generalized edema.   NEURO: A/o x 4.   VS: VSS on RA  ACTIVITY: Up with SBA and cane  PAIN: C/o generalized pain- Pt declined medication intervention.   CARDIAC: Denies CP  RESP: Denies SOB  GI/: Voiding spontaneously. LBM 6/28 per pt. Pt C/o nausea this AM- Declined medication intervention- Diet reyna mist and saltines given.   DIET: Regular  SKIN: Generalized edema- No adjustments made. Pt showered this shift with minimal assistance.   LDA'S: L PIV SL.   LABS: . Creat 1.29. Hgb 11.0  POC: Cont with POC. Plan for ECHO today. PT/OT recommending Home with HC. Call light within reach.

## 2024-06-29 NOTE — PLAN OF CARE
Occupational Therapy Discharge Summary    Reason for therapy discharge:    Discharged to home.    Progress towards therapy goal(s). See goals on Care Plan in [facility] electronic health record for goal details.  Goals partially met.  Barriers to achieving goals:   discharge from facility.    Therapy recommendation(s):    No further therapy is recommended.    Sandi De La Cruz, OTR/L 6/29/24

## 2024-06-29 NOTE — PLAN OF CARE
Problem: Pain Chronic (Persistent)  Goal: Optimal Pain Control and Function  Outcome: Progressing  Intervention: Develop Pain Management Plan  Recent Flowsheet Documentation  Taken 6/28/2024 2206 by Claire Bower RN  Pain Management Interventions: medication (see MAR)  Taken 6/28/2024 1645 by Claire Bower RN  Pain Management Interventions: medication (see MAR)  Intervention: Manage Persistent Pain  Recent Flowsheet Documentation  Taken 6/28/2024 1953 by Claire Bower RN  Medication Review/Management: medications reviewed  Taken 6/28/2024 1517 by Claire Bower RN  Medication Review/Management: medications reviewed     Problem: Adult Inpatient Plan of Care  Goal: Optimal Comfort and Wellbeing  Intervention: Monitor Pain and Promote Comfort  Recent Flowsheet Documentation  Taken 6/28/2024 2206 by Claire Bower RN  Pain Management Interventions: medication (see MAR)  Taken 6/28/2024 1645 by Claire Bower RN  Pain Management Interventions: medication (see MAR)   Goal Outcome Evaluation:       Pt alert&O x4, PRN Tylenol, Sumatriptan and scheduled Percocet administered for pain. Pt refused scheduled Lovenox and Lisinopril, risk explained to Pt and cross cover updated.

## 2024-07-02 NOTE — ED PROVIDER NOTES
"EMERGENCY DEPARTMENT ENCOUNTER      NAME: Ruthy Álvarez  AGE: 58 year old female  YOB: 1965  MRN: [id number]  EVALUATION DATE & TIME: 6/27/2024  9:08 PM    PCP: Jose Angel Meyer    ED PROVIDER: Anaid Whelan MD      Chief Complaint   Patient presents with    Fall    Chest Pain    Headache    Dizziness         FINAL IMPRESSION:  1. Altered mental status, unspecified altered mental status type    2. Chronic pain disorder          ED COURSE & MEDICAL DECISION MAKING:    Pertinent Labs & Imaging studies reviewed. (See chart for details)    9:14 AM I introduced myself to the patient, obtained patient history, performed a physical exam, and discussed plan for ED workup including potential diagnostic laboratory/imaging studies and interventions.    58 year old female with a pertinent history of pulmonary embolism not currently anticoagulated per record review, hypertension, hysterectomy, chronic pain, type 2 diabetes, status post bariatric surgery, asthma, CKD, diabetic neuropathy, arthritis, who presents to this ED for evaluation of chest pain, frequent falls, fatigue, and \"all over body pain\" per triage note.  On exam, patient is significantly somnolent but does awaken to voice as well as stimulation and will answer questions appropriately but then quickly trail off back to sleep.  She does appear somewhat encephalopathic.  No obvious focal neurologic deficit.  She complains of generalized pain.  Reports she had a fall yesterday and possibly multiple falls but cannot elaborate on this.  She does not appear to be on anticoagulation.  She is on multiple medications that could be causing her somnolence including oxycodone.  However her pupils are equal round and reactive and normal respirations without obvious sign of an opioid toxidrome or overdose.  May be contributing to her symptoms however.  She denies any drug or alcohol use otherwise.  Differential is certainly broad including traumatic " injury, metabolic encephalopathy, DKA, dehydration, electrolyte abnormality, intracranial hemorrhage, without focal deficit felt that CVA was less likely, toxic encephalopathy, etc.  Did obtain large imaging workup for signs of traumatic injury with the fall and also obtain CT of the chest PE protocol with history of PE not anticoagulated.  Obtain CT of the head without contrast, CT of the cervical spine lumbar spine and thoracic spine, CT of the chest and abdomen and pelvis with IV contrast.  EKG was obtained which revealed sinus tachycardia without any sign of acute ischemia.  VBG obtained which revealed a pH of 7.3 with a pCO2 of 45 and a bicarb of 22.  Is not hypercarbic causing her encephalopathy.  Glucose is 392 with normal bicarb and normal anion gap thus without signs of DKA.  pH slightly low but not significantly so.  Lactic acid within normal limits.  Ammonia within normal limits.  Ketones less than 0.18 again without signs of DKA.  TSH within normal limits making myxedema coma unlikely.  .  Lipase within normal limits.  Magnesium was 1.5.  CMP reveals a creatinine of 1.49.  AST ALT bilirubin within normal limits.  COVID-19 influenza and RSV PCR is negative.  Urine drug screen negative.  Possible this did not catch the oxycodone that she admitted to taking.  Urinalysis unrevealing without signs of UTI.    White blood cell count normal at 9.9.  Hemoglobin 9.7 near recent baseline.  She has no meningismus to suggest meningitis.  She is afebrile here.  Troponin slightly elevated at 39 on repeat 41.  EKG without signs of acute ischemia however.  She was given a small bolus of 500 mL IV fluids.  CT of the abdomen pelvis unremarkable.  CT of the chest also unremarkable.  No PE dissection or aneurysm.  No evidence for acute trauma.  CT head and cervical spine unremarkable.  No sign of intracranial hemorrhage.  CT of thoracic and lumbar spine also unremarkable.  At this point do not know the exact cause of  her altered mentation.  However do feel she requires admission for further evaluation of this as she is not at her baseline reportedly and having falls at home.  Possible this may be polypharmacy.  Spoke with the hospitalist who accepted the patient.  She was admitted in stable condition.         At the conclusion of the encounter I discussed the results of all of the tests and the disposition. The questions were answered. The patient or family acknowledged understanding and was agreeable with the care plan.      Medical Decision Making  Obtained supplemental history:Supplemental history obtained?: Documented in chart  Reviewed external records: External records reviewed?: Documented in chart  Care impacted by chronic illness:Chronic Kidney Disease, Chronic Pain, Diabetes, Hyperlipidemia, and Hypertension  Care significantly affected by social determinants of health:Access to Medical Care  Did you consider but not order tests?: Work up considered but not performed and documented in chart, if applicable  Did you interpret images independently?: Independent interpretation of ECG and images noted in documentation, when applicable.  Consultation discussion with other provider:Did you involve another provider (consultant, , pharmacy, etc.)?: I discussed the care with another health care provider, see documentation for details.  Admit.      MEDICATIONS GIVEN IN THE EMERGENCY:  Medications   sodium chloride 0.9% BOLUS 500 mL (0 mLs Intravenous Stopped 6/27/24 2343)   iopamidol (ISOVUE-370) solution 90 mL (90 mLs Intravenous $Given 6/28/24 0000)   sodium chloride 0.9% BOLUS 250 mL (0 mLs Intravenous Stopped 6/28/24 0829)   hydrALAZINE (APRESOLINE) injection 5 mg (5 mg Intravenous $Given 6/28/24 0627)       NEW PRESCRIPTIONS STARTED AT TODAY'S ER VISIT  Discharge Medication List as of 6/29/2024  2:28 PM             =================================================================    HPI    Patient information was obtained  "from: Patient    History and review of systems is somewhat limited due to the patient's somnolence.    Ruthy Álvarez is a 58 year old female with a pertinent history of pulmonary embolism not currently anticoagulated per record review, hypertension, hysterectomy, chronic pain, type 2 diabetes, status post bariatric surgery, asthma, CKD, diabetic neuropathy, arthritis, who presents to this ED for evaluation of chest pain, frequent falls, fatigue, and \"all over body pain\" per triage note.  Patient is very limited historian as she is falling asleep during the interview.  She is able to be awoken and will answer appropriately but then trail off and fall asleep again.  She states that she does take oxycodone up to 4 times a day.  Reports she took this prior to arrival but could not tell me what time.  She denies taking any extra of her medications.  She denies any suicidal ideation.  She denies any drug or alcohol use.  She states she has had multiple falls over the last couple of days due to feeling lightheaded and generally weak.  Uncertain if she may have hit her head.  Uncertain if there was loss of consciousness.  Her current main complaint of pain is in her chest.  States she is also felt somewhat short of breath.  States that she feels \"swollen all over.\".  It does appear that she is on torsemide and she says she is taking it.  Denies focal weakness.  Denies numbness or tingling.  Denies abdominal pain.  Appears she walks with a cane.  Further history limited.      REVIEW OF SYSTEMS   Limited as above.      PAST MEDICAL HISTORY:  Past Medical History:   Diagnosis Date    Abnormal cervical Papanicolaou smear with positive human papillomavirus deoxyribonucleic acid test     Ankle joint pain     bilateral    Anxiety     Arthritis     Arthritis     rheumatoid arthritis    Asthma     Carpal tunnel syndrome     bilateral    Compression neuropathy     Depression     Depressive disorder     Diabetes (H)     Diabetes " (H)     type II    Diabetic nephropathy (H)     DVT of lower extremity, bilateral (H) 2011    right    Esophageal reflux     Gastroenteritis     Hernia 2017    History of blood clots     History of blood transfusion     Hyperlipidemia     Hypertension     Hypertension     Infectious fasciitis 2011    abdominal wall     Insomnia     Neck pain     Necrotizing fasciitis (H) 11/15/2012    Necrotizing fasciitis (H)     Neuropathy     Obesity     Osteoarthritis of both knees 10/31/2016    Pulmonary embolism and infarction (H)     SOB (shortness of breath)     with exertion    Tarsal tunnel syndrome     Uncomplicated asthma     Urinary incontinence        PAST SURGICAL HISTORY:  Past Surgical History:   Procedure Laterality Date    ABDOMINAL WALL SURGERY      for necrotizing fasciitis     SECTION      COLONOSCOPY N/A 3/15/2017    Procedure: INCOMPLETE COLONOSCOPY;  Surgeon: Topher Street MD;  [place] [facility] [place]  Service:     COLONOSCOPY N/A 3/16/2017    Procedure: COLONOSCOPY;  Surgeon: Topher Street MD;  [place] [facility] [place]  Service:     GASTRIC BYPASS  2006    Kim'n'Y.  wt then was in low 300lb range    GASTRIC BYPASS      Kim-en-Y    HC REVISE MEDIAN N/CARPAL TUNNEL SURG Right 10/24/2014    Procedure: RIGHT REVISION CARPAL TUNNEL RELEASE WITH HYPOTHENAR FAT PAD;  Surgeon: Aaron Oreilly MD;  Location: [facility];  Service: Orthopedics    HYSTERECTOMY      HYSTERECTOMY      KNEE SURGERY Right     MIDLINE INSERTION - DOUBLE LUMEN  2021         UTERINE FIBROID SURGERY  2008    hysterectomy    UTERINE FIBROID SURGERY             CURRENT MEDICATIONS:    atorvastatin (LIPITOR) 10 MG tablet  budesonide-formoterol (SYMBICORT) 80-4.5 MCG/ACT Inhaler  capsaicin (ZOSTRIX) 0.025 % external cream  dapagliflozin (FARXIGA) 10 MG TABS tablet  diclofenac (VOLTAREN) 1 % topical gel  diltiazem ER (DILT-XR) 240 MG 24 hr ER beaded capsule  ferrous sulfate (IRON) 325 (65  FE) MG tablet  labetalol (NORMODYNE) 100 MG tablet  lidocaine (LMX4) 4 % external cream  lisinopril (ZESTRIL) 40 MG tablet  naloxone (NARCAN) 4 MG/0.1ML nasal spray  ondansetron (ZOFRAN ODT) 4 MG ODT tab  oxyCODONE-acetaminophen (PERCOCET)  MG per tablet  phentermine (ADIPEX-P) 37.5 MG tablet  pregabalin (LYRICA) 20 MG/ML solution  Semaglutide, 1 MG/DOSE, (OZEMPIC) 4 MG/3ML pen  senna-docusate (SENOKOT-S/PERICOLACE) 8.6-50 MG tablet  sodium citrate-citric acid (BICITRA) 500-334 MG/5ML solution  torsemide (DEMADEX) 10 MG tablet  Alcohol Swabs (PRO COMFORT ALCOHOL) 70 % PADS  blood glucose (NO BRAND SPECIFIED) lancets standard  blood glucose calibration (ONETOUCH ULTRA CONTROL) solution  blood glucose monitoring (ONE TOUCH ULTRA 2) meter device kit  Continuous Blood Gluc Sensor (FREESTYLE ANIKET 14 DAY SENSOR) MISC  Continuous Blood Gluc Sensor (FREESTYLE ANIKET 2 SENSOR) [facility]  CONTOUR NEXT TEST test strip  Lancet Devices (EASY MINI EJECT LANCING DEVICE) MISC  Lancets 30G MISC  order for DME  Simethicone 180 MG CAPS  SUMAtriptan (IMITREX STATDOSE) 6 MG/0.5ML pen injector kit        ALLERGIES:  Allergies   Allergen Reactions    Sulfamethoxazole-Trimethoprim Other (See Comments) and Nephrotoxicity     Other reaction(s): Renal Failure  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, 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       FAMILY HISTORY:  Family History   Problem Relation Age of Onset    Diabetes Mother     Coronary Artery Disease Mother          of MI at age of 51 years    Diabetes Brother     Diabetes Sister     No Known Problems Father     No Known Problems Maternal Grandmother     No Known Problems Maternal Grandfather     No Known Problems Paternal Grandmother     No Known Problems Paternal Grandfather     No Known Problems Son     No Known Problems Daughter     No Known Problems Maternal Half-Brother     No Known Problems Maternal Half-Sister     No Known Problems Paternal Half-Brother     No Known Problems Paternal Half-Sister     No  Known Problems Niece     No Known Problems Nephew     No Known Problems Cousin     No Known Problems Other     Cancer Brother     Heart Disease No family hx of     Hypertension No family hx of     Hyperlipidemia No family hx of     Kidney Disease No family hx of     Cerebrovascular Disease No family hx of     Obesity No family hx of     Thrombosis No family hx of     Asthma No family hx of     Arthritis No family hx of     Thyroid Disease No family hx of     Depression No family hx of     Mental Illness No family hx of     Substance Abuse No family hx of     Cystic Fibrosis No family hx of     Early Death No family hx of     Coronary Artery Disease Early Onset No family hx of     Heart Failure No family hx of     Bleeding Diathesis No family hx of     Dementia No family hx of     Breast Cancer No family hx of     Ovarian Cancer No family hx of     Uterine Cancer No family hx of     Prostate Cancer No family hx of     Colorectal Cancer No family hx of     Pancreatic Cancer No family hx of     Lung Cancer No family hx of     Melanoma No family hx of     Autoimmune Disease No family hx of     Unknown/Adopted No family hx of     Genetic Disorder No family hx of        SOCIAL HISTORY:   Social History     Socioeconomic History    Marital status: Single   Tobacco Use    Smoking status: Never    Smokeless tobacco: Never   Vaping Use    Vaping status: Never Used   Substance and Sexual Activity    Alcohol use: No    Drug use: No    Sexual activity: Never   Other Topics Concern    Parent/sibling w/ CABG, MI or angioplasty before 65F 55M? No   Social History Narrative    Lives alone. [place], other daughter lives here 2 [place]    Goes to [employer].       Social Determinants of Health     Financial Resource Strain: Low Risk  (9/22/2023)    Financial Resource Strain     Within the past 12 months, have you or your family members you live with been unable to get utilities  "(heat, electricity) when it was really needed?: No   Food Insecurity: Low Risk  (9/22/2023)    Food Insecurity     Within the past 12 months, did you worry that your food would run out before you got money to buy more?: No     Within the past 12 months, did the food you bought just not last and you didn t have money to get more?: No   Transportation Needs: Low Risk  (9/22/2023)    Transportation Needs     Within the past 12 months, has lack of transportation kept you from medical appointments, getting your medicines, non-medical meetings or appointments, work, or from getting things that you need?: No    [facility], [facility]    Social Connections   Interpersonal Safety: Low Risk  (6/20/2024)    Interpersonal Safety     Do you feel physically and emotionally safe where you currently live?: Yes     Within the past 12 months, have you been hit, slapped, kicked or otherwise physically hurt by someone?: No     Within the past 12 months, have you been humiliated or emotionally abused in other ways by your partner or ex-partner?: No   Housing Stability: Low Risk  (9/22/2023)    Housing Stability     Do you have housing? : Yes     Are you worried about losing your housing?: No       VITALS:  BP (!) 156/74 (BP Location: Left arm)   Pulse 104   Temp 98.4  F (36.9  C) (Oral)   Resp 18   Ht 1.499 m (4' 11.02\")   Wt 91.6 kg (201 lb 15.1 oz)   LMP  (LMP Unknown)   SpO2 98%   BMI 40.77 kg/m      PHYSICAL EXAM    Physical Exam  Constitutional: Somnolent, is arousable and will answer questions appropriately but then trails off very quickly back to sleep, appears somewhat encephalopathic  HENT: Normocephalic, Atraumatic, Bilateral external ears normal, Oropharynx normal, mucous membranes moist, Nose normal. Neck-  Normal range of motion, No midline cervical spine tenderness, Supple, No stridor.    Eyes: PERRL, EOMI, Conjunctiva normal, No " discharge.   Respiratory: Normal breath sounds, No respiratory distress, No wheezing or crackles, Speaks in full sentences easily.    Cardiovascular: Normal heart rate, Regular rhythm,  No murmurs, No rubs, No gallops. 2+ radial pulses bilaterally  GI: Bowel sounds normal, Soft, No tenderness, No masses, No rebound or guarding.   Musculoskeletal: 2+ DP pulses.  Bilateral lower extremity edema noted.  No cyanosis, No clubbing.  Moves extremities spontaneously.  No specific bony tenderness of the upper or lower extremities.  Compartments are soft and compressible.  Pelvis is stable and nontender to compression.  Integument: Warm, Dry, No erythema, No rash. No petechiae.  Neurologic: Somnolent, does awaken to voice and stimulation with appropriate answers to questions but then will just trail off and fall right back to sleep.  Appears encephalopathic.  Moving all 4 extremities spontaneously.  Sensation appears intact to light touch.  No obvious focal deficit.  No facial asymmetry.   Psychiatric: Cooperative.      LAB:  All pertinent labs reviewed and interpreted.  Results for orders placed or performed during the hospital encounter of 06/27/24   Head CT w/o contrast    Impression    IMPRESSION:  HEAD CT:  1.  No CT evidence for acute intracranial process.  2.  Brain atrophy and presumed chronic microvascular ischemic changes as above.    CERVICAL SPINE CT:  1.  No CT evidence for acute fracture or post traumatic subluxation.   CT Cervical Spine w/o Contrast    Impression    IMPRESSION:  HEAD CT:  1.  No CT evidence for acute intracranial process.  2.  Brain atrophy and presumed chronic microvascular ischemic changes as above.    CERVICAL SPINE CT:  1.  No CT evidence for acute fracture or post traumatic subluxation.   CT Chest Pulmonary Embolism w Contrast    Impression    IMPRESSION:  1.  No acute finding such as PE, dissection, or aneurysm. No evidence for acute trauma.    2.  Benign calcified granulomas in the right  lung.       CT Abdomen Pelvis w Contrast    Impression    IMPRESSION:   1.  No evidence of intra-abdominal injury.   CT Thoracic Spine Reconstructed    Impression    IMPRESSION:  THORACIC SPINE CT:  1.  No fracture or posttraumatic subluxation.  2.  No high-grade spinal canal or neural foraminal stenosis.    LUMBAR SPINE CT:  1.  No fracture or posttraumatic subluxation.  2.  No high-grade spinal canal or neural foraminal stenosis.     CT Lumbar Spine Reconstructed    Impression    IMPRESSION:  THORACIC SPINE CT:  1.  No fracture or posttraumatic subluxation.  2.  No high-grade spinal canal or neural foraminal stenosis.    LUMBAR SPINE CT:  1.  No fracture or posttraumatic subluxation.  2.  No high-grade spinal canal or neural foraminal stenosis.     Symptomatic Influenza A/B, RSV, & SARS-CoV2 PCR (COVID-19) Nasopharyngeal    Specimen: Nasopharyngeal; Swab   Result Value Ref Range    Influenza A PCR Negative Negative    Influenza B PCR Negative Negative    RSV PCR Negative Negative    SARS CoV2 PCR Negative Negative   Result Value Ref Range    Troponin T, High Sensitivity 39 (H) <=14 ng/L   CBC with platelets and differential   Result Value Ref Range    WBC Count 9.9 4.0 - 11.0 10e3/uL    RBC Count 2.93 (L) 3.80 - 5.20 10e6/uL    Hemoglobin 9.7 (L) 11.7 - 15.7 g/dL    Hematocrit 30.4 (L) 35.0 - 47.0 %     (H) 78 - 100 fL    MCH 33.1 (H) 26.5 - 33.0 pg    MCHC 31.9 31.5 - 36.5 g/dL    RDW 12.5 10.0 - 15.0 %    Platelet Count 244 150 - 450 10e3/uL    % Neutrophils 61 %    % Lymphocytes 29 %    % Monocytes 8 %    % Eosinophils 1 %    % Basophils 0 %    % Immature Granulocytes 1 %    NRBCs per 100 WBC 0 <1 /100    Absolute Neutrophils 6.1 1.6 - 8.3 10e3/uL    Absolute Lymphocytes 2.8 0.8 - 5.3 10e3/uL    Absolute Monocytes 0.8 0.0 - 1.3 10e3/uL    Absolute Eosinophils 0.1 0.0 - 0.7 10e3/uL    Absolute Basophils 0.0 0.0 - 0.2 10e3/uL    Absolute Immature Granulocytes 0.1 <=0.4 10e3/uL    Absolute NRBCs 0.0 10e3/uL    Extra Blue Top Tube   Result Value Ref Range    Hold Specimen JI    Extra Red Top Tube   Result Value Ref Range    Hold Specimen JI    Comprehensive metabolic panel   Result Value Ref Range    Sodium 144 135 - 145 mmol/L    Potassium 4.2 3.4 - 5.3 mmol/L    Carbon Dioxide (CO2) 20 (L) 22 - 29 mmol/L    Anion Gap 12 7 - 15 mmol/L    Urea Nitrogen 37.4 (H) 6.0 - 20.0 mg/dL    Creatinine 1.49 (H) 0.51 - 0.95 mg/dL    GFR Estimate 40 (L) >60 mL/min/1.73m2    Calcium 6.9 (L) 8.6 - 10.0 mg/dL    Chloride 112 (H) 98 - 107 mmol/L    Glucose 392 (H) 70 - 99 mg/dL    Alkaline Phosphatase 205 (H) 40 - 150 U/L    AST 23 0 - 45 U/L    ALT 27 0 - 50 U/L    Protein Total 4.9 (L) 6.4 - 8.3 g/dL    Albumin 2.5 (L) 3.5 - 5.2 g/dL    Bilirubin Total 0.3 <=1.2 mg/dL   Result Value Ref Range    Lipase 21 13 - 60 U/L   Nt probnp inpatient (BNP)   Result Value Ref Range    N terminal Pro BNP Inpatient 338 0 - 900 pg/mL   Result Value Ref Range    Magnesium 1.5 (L) 1.7 - 2.3 mg/dL   TSH with free T4 reflex   Result Value Ref Range    TSH 0.98 0.30 - 4.20 uIU/mL   UA with Microscopic reflex to Culture    Specimen: Urine, Clean Catch   Result Value Ref Range    Color Urine Colorless Colorless, Straw, Light Yellow, Yellow    Appearance Urine Clear Clear    Glucose Urine >1000 (A) Negative mg/dL    Bilirubin Urine Negative Negative    Ketones Urine Negative Negative mg/dL    Specific Gravity Urine 1.016 1.001 - 1.030    Blood Urine 0.03 mg/dL (A) Negative    pH Urine 6.0 5.0 - 7.0    Protein Albumin Urine 300 (A) Negative mg/dL    Urobilinogen Urine <2.0 <2.0 mg/dL    Nitrite Urine Negative Negative    Leukocyte Esterase Urine Negative Negative    RBC Urine 1 <=2 /HPF    WBC Urine 1 <=5 /HPF    Squamous Epithelials Urine 1 <=1 /HPF    Hyaline Casts Urine 3 (H) <=2 /LPF    Granular Casts Urine 1 (H) None Seen /LPF   Blood gas venous   Result Value Ref Range    pH Venous 7.30 (L) 7.32 - 7.43    pCO2 Venous 45 40 - 50 mm Hg    pO2 Venous 57  (H) 25 - 47 mm Hg    Bicarbonate Venous 22 21 - 28 mmol/L    Base Excess/Deficit Venous -4.2 (L) -3.0 - 3.0 mmol/L    FIO2 21     Oxyhemoglobin Venous 84 (H) 70 - 75 %    O2 Sat, Venous 85.9 (H) 70.0 - 75.0 %   Ketone Beta-Hydroxybutyrate Quantitative   Result Value Ref Range    Ketone (Beta-Hydroxybutyrate) Quantitative <0.18 <=0.30 mmol/L   Glucose by meter   Result Value Ref Range    GLUCOSE BY METER POCT 374 (H) 70 - 99 mg/dL   Ethyl Alcohol Level   Result Value Ref Range    Alcohol ethyl <0.01 <=0.01 g/dL   Lactic acid whole blood   Result Value Ref Range    Lactic Acid 1.5 0.7 - 2.0 mmol/L   Urine Drug Screen Panel   Result Value Ref Range    Amphetamines Urine Screen Negative Screen Negative    Barbituates Urine Screen Negative Screen Negative    Benzodiazepine Urine Screen Negative Screen Negative    Cannabinoids Urine Screen Negative Screen Negative    Cocaine Urine Screen Negative Screen Negative    Fentanyl Qual Urine Screen Negative Screen Negative    Opiates Urine Screen Negative Screen Negative    PCP Urine Screen Negative Screen Negative   Result Value Ref Range    Troponin T, High Sensitivity 41 (H) <=14 ng/L   Ammonia (on ice)   Result Value Ref Range    Ammonia 22 11 - 51 umol/L   Glucose by meter   Result Value Ref Range    GLUCOSE BY METER POCT 207 (H) 70 - 99 mg/dL   Basic metabolic panel   Result Value Ref Range    Sodium 144 135 - 145 mmol/L    Potassium 4.6 3.4 - 5.3 mmol/L    Chloride 114 (H) 98 - 107 mmol/L    Carbon Dioxide (CO2) 20 (L) 22 - 29 mmol/L    Anion Gap 10 7 - 15 mmol/L    Urea Nitrogen 34.2 (H) 6.0 - 20.0 mg/dL    Creatinine 1.29 (H) 0.51 - 0.95 mg/dL    GFR Estimate 48 (L) >60 mL/min/1.73m2    Calcium 7.1 (L) 8.6 - 10.0 mg/dL    Glucose 325 (H) 70 - 99 mg/dL   Result Value Ref Range    Troponin T, High Sensitivity 35 (H) <=14 ng/L   Result Value Ref Range    Hemoglobin A1C 8.6 (H) <5.7 %   Result Value Ref Range    Vitamin B12 1,487 (H) 232 - 1,245 pg/mL   Result Value Ref  Range    Folic Acid 19.3 4.6 - 34.8 ng/mL   CBC with platelets and differential   Result Value Ref Range    WBC Count 12.3 (H) 4.0 - 11.0 10e3/uL    RBC Count 3.34 (L) 3.80 - 5.20 10e6/uL    Hemoglobin 11.0 (L) 11.7 - 15.7 g/dL    Hematocrit 34.4 (L) 35.0 - 47.0 %     (H) 78 - 100 fL    MCH 32.9 26.5 - 33.0 pg    MCHC 32.0 31.5 - 36.5 g/dL    RDW 12.8 10.0 - 15.0 %    Platelet Count 246 150 - 450 10e3/uL    % Neutrophils 71 %    % Lymphocytes 22 %    % Monocytes 5 %    % Eosinophils 1 %    % Basophils 0 %    % Immature Granulocytes 1 %    NRBCs per 100 WBC 0 <1 /100    Absolute Neutrophils 8.7 (H) 1.6 - 8.3 10e3/uL    Absolute Lymphocytes 2.7 0.8 - 5.3 10e3/uL    Absolute Monocytes 0.6 0.0 - 1.3 10e3/uL    Absolute Eosinophils 0.1 0.0 - 0.7 10e3/uL    Absolute Basophils 0.1 0.0 - 0.2 10e3/uL    Absolute Immature Granulocytes 0.2 <=0.4 10e3/uL    Absolute NRBCs 0.0 10e3/uL   Glucose by meter   Result Value Ref Range    GLUCOSE BY METER POCT 229 (H) 70 - 99 mg/dL   Result Value Ref Range    Oxycodone Urine Screen Positive (A) Screen Negative   Protein  random urine   Result Value Ref Range    Total Protein Urine mg/dL 253.0   mg/dL    Total Protein Urine mg/mg Creat 7.71 (H) 0.00 - 0.20 mg/mg Cr    Creatinine Urine mg/dL 32.8 mg/dL   Glucose by meter   Result Value Ref Range    GLUCOSE BY METER POCT 295 (H) 70 - 99 mg/dL   Glucose by meter   Result Value Ref Range    GLUCOSE BY METER POCT 236 (H) 70 - 99 mg/dL   Glucose by meter   Result Value Ref Range    GLUCOSE BY METER POCT 184 (H) 70 - 99 mg/dL   Basic metabolic panel   Result Value Ref Range    Sodium 141 135 - 145 mmol/L    Potassium 4.8 3.4 - 5.3 mmol/L    Chloride 109 (H) 98 - 107 mmol/L    Carbon Dioxide (CO2) 17 (L) 22 - 29 mmol/L    Anion Gap 15 7 - 15 mmol/L    Urea Nitrogen 39.0 (H) 6.0 - 20.0 mg/dL    Creatinine 1.47 (H) 0.51 - 0.95 mg/dL    GFR Estimate 41 (L) >60 mL/min/1.73m2    Calcium 7.3 (L) 8.6 - 10.0 mg/dL    Glucose 276 (H) 70 - 99 mg/dL    Glucose by meter   Result Value Ref Range    GLUCOSE BY METER POCT 292 (H) 70 - 99 mg/dL   Glucose by meter   Result Value Ref Range    GLUCOSE BY METER POCT 217 (H) 70 - 99 mg/dL   CBC with platelets and differential   Result Value Ref Range    WBC Count 10.9 4.0 - 11.0 10e3/uL    RBC Count 2.92 (L) 3.80 - 5.20 10e6/uL    Hemoglobin 9.6 (L) 11.7 - 15.7 g/dL    Hematocrit 30.2 (L) 35.0 - 47.0 %     (H) 78 - 100 fL    MCH 32.9 26.5 - 33.0 pg    MCHC 31.8 31.5 - 36.5 g/dL    RDW 12.8 10.0 - 15.0 %    Platelet Count 220 150 - 450 10e3/uL    % Neutrophils 68 %    % Lymphocytes 24 %    % Monocytes 7 %    % Eosinophils 1 %    % Basophils 0 %    % Immature Granulocytes 1 %    NRBCs per 100 WBC 0 <1 /100    Absolute Neutrophils 7.4 1.6 - 8.3 10e3/uL    Absolute Lymphocytes 2.6 0.8 - 5.3 10e3/uL    Absolute Monocytes 0.7 0.0 - 1.3 10e3/uL    Absolute Eosinophils 0.1 0.0 - 0.7 10e3/uL    Absolute Basophils 0.0 0.0 - 0.2 10e3/uL    Absolute Immature Granulocytes 0.1 <=0.4 10e3/uL    Absolute NRBCs 0.0 10e3/uL   Extra Green Top (Lithium Heparin) Tube   Result Value Ref Range    Hold Specimen JI    Glucose by meter   Result Value Ref Range    GLUCOSE BY METER POCT 188 (H) 70 - 99 mg/dL   ECG 12-LEAD WITH MUSE (LHE)   Result Value Ref Range    Systolic Blood Pressure  mmHg    Diastolic Blood Pressure  mmHg    Ventricular Rate 107 BPM    Atrial Rate 107 BPM    AK Interval 142 ms    QRS Duration 72 ms     ms    QTc 472 ms    P Axis 52 degrees    R AXIS -10 degrees    T Axis 71 degrees    Interpretation ECG       Sinus tachycardia  Otherwise normal ECG  When compared with ECG of 12-AUG-2022 17:26,  No significant change was found  Confirmed by SEE ED PROVIDER NOTE FOR, ECG INTERPRETATION ([id number]),  ERNESTINA ALCANTAR ([id number]) on 6/28/2024 2:03:41 AM     Echocardiogram Complete   Result Value Ref Range    LVEF  60-65%        RADIOLOGY:  Reviewed all pertinent imaging. Please see official radiology  report.  Echocardiogram Complete   Final Result      CT Lumbar Spine Reconstructed   Final Result   IMPRESSION:   THORACIC SPINE CT:   1.  No fracture or posttraumatic subluxation.   2.  No high-grade spinal canal or neural foraminal stenosis.      LUMBAR SPINE CT:   1.  No fracture or posttraumatic subluxation.   2.  No high-grade spinal canal or neural foraminal stenosis.         CT Thoracic Spine Reconstructed   Final Result   IMPRESSION:   THORACIC SPINE CT:   1.  No fracture or posttraumatic subluxation.   2.  No high-grade spinal canal or neural foraminal stenosis.      LUMBAR SPINE CT:   1.  No fracture or posttraumatic subluxation.   2.  No high-grade spinal canal or neural foraminal stenosis.         CT Abdomen Pelvis w Contrast   Final Result   IMPRESSION:    1.  No evidence of intra-abdominal injury.      CT Chest Pulmonary Embolism w Contrast   Final Result   IMPRESSION:   1.  No acute finding such as PE, dissection, or aneurysm. No evidence for acute trauma.      2.  Benign calcified granulomas in the right lung.            CT Cervical Spine w/o Contrast   Final Result   IMPRESSION:   HEAD CT:   1.  No CT evidence for acute intracranial process.   2.  Brain atrophy and presumed chronic microvascular ischemic changes as above.      CERVICAL SPINE CT:   1.  No CT evidence for acute fracture or post traumatic subluxation.      Head CT w/o contrast   Final Result   IMPRESSION:   HEAD CT:   1.  No CT evidence for acute intracranial process.   2.  Brain atrophy and presumed chronic microvascular ischemic changes as above.      CERVICAL SPINE CT:   1.  No CT evidence for acute fracture or post traumatic subluxation.          EKG:    Performed at: 8:33 PM    Impression: Sinus tachycardia.  No evidence of acute ischemia.  Unchanged from prior EKG.    I have independently reviewed and interpreted the EKG(s) documented above.    PROCEDURES:   None      [facility] Documentation:   CMS Diagnoses:              Anaid Whelan MD  [address] [address]  [phone]       Anaid Whelan MD  07/02/24 0932

## 2024-07-15 ENCOUNTER — OFFICE VISIT (OUTPATIENT)
Dept: FAMILY MEDICINE | Facility: CLINIC | Age: 59
End: 2024-07-15
Payer: COMMERCIAL

## 2024-07-15 VITALS
SYSTOLIC BLOOD PRESSURE: 140 MMHG | RESPIRATION RATE: 16 BRPM | BODY MASS INDEX: 40.49 KG/M2 | OXYGEN SATURATION: 97 % | DIASTOLIC BLOOD PRESSURE: 80 MMHG | TEMPERATURE: 98.6 F | WEIGHT: 200.6 LBS | HEART RATE: 116 BPM

## 2024-07-15 DIAGNOSIS — E11.42 TYPE 2 DIABETES MELLITUS WITH DIABETIC POLYNEUROPATHY, WITH LONG-TERM CURRENT USE OF INSULIN (H): Primary | ICD-10-CM

## 2024-07-15 DIAGNOSIS — G89.4 CHRONIC PAIN SYNDROME: Primary | ICD-10-CM

## 2024-07-15 DIAGNOSIS — Z79.4 TYPE 2 DIABETES MELLITUS WITH HYPERGLYCEMIA, WITH LONG-TERM CURRENT USE OF INSULIN (H): ICD-10-CM

## 2024-07-15 DIAGNOSIS — Z79.4 TYPE 2 DIABETES MELLITUS WITH DIABETIC POLYNEUROPATHY, WITH LONG-TERM CURRENT USE OF INSULIN (H): Primary | ICD-10-CM

## 2024-07-15 DIAGNOSIS — R60.9 EDEMA, UNSPECIFIED TYPE: ICD-10-CM

## 2024-07-15 DIAGNOSIS — E11.42 TYPE 2 DIABETES MELLITUS WITH DIABETIC POLYNEUROPATHY, WITHOUT LONG-TERM CURRENT USE OF INSULIN (H): ICD-10-CM

## 2024-07-15 DIAGNOSIS — E11.65 TYPE 2 DIABETES MELLITUS WITH HYPERGLYCEMIA, WITH LONG-TERM CURRENT USE OF INSULIN (H): ICD-10-CM

## 2024-07-15 PROCEDURE — 99214 OFFICE O/P EST MOD 30 MIN: CPT | Mod: 25 | Performed by: FAMILY MEDICINE

## 2024-07-15 PROCEDURE — 80306 DRUG TEST PRSMV INSTRMNT: CPT | Performed by: FAMILY MEDICINE

## 2024-07-15 PROCEDURE — 96372 THER/PROPH/DIAG INJ SC/IM: CPT | Performed by: FAMILY MEDICINE

## 2024-07-15 RX ORDER — OXYCODONE AND ACETAMINOPHEN 10; 325 MG/1; MG/1
TABLET ORAL
Qty: 120 TABLET | Refills: 0 | Status: SHIPPED | OUTPATIENT
Start: 2024-07-15 | End: 2024-08-15

## 2024-07-15 RX ORDER — TORSEMIDE 20 MG/1
20 TABLET ORAL DAILY
Qty: 90 TABLET | Refills: 3 | Status: SHIPPED | OUTPATIENT
Start: 2024-07-15 | End: 2024-09-16

## 2024-07-15 RX ADMIN — CYANOCOBALAMIN 1000 MCG: 1000 INJECTION, SOLUTION INTRAMUSCULAR; SUBCUTANEOUS at 12:14

## 2024-07-15 NOTE — PROGRESS NOTES
Patient Active Problem List    Diagnosis Date Noted    CKD stage G3a/A3, GFR 45-59 and albumin creatinine ratio >300 mg/g (H) 12/31/2020     Priority: High     Persistent protienuria      Copper deficiency 04/05/2019     Priority: High     Identified in Aug, 2018--see discharge summary from [facility] in [place].      Chronic pain disorder 10/07/2016     Priority: High     Chronic Pain Diagnosis:  neuropathy  DIRE Total Score(s):14     ORT: 5    4 - 7 =  Moderate Risk   of future problems with Opioids  FAQ5: 50 November 20, 2020  Behavioral Health Consultation: 5/12/17 with Dr. Daisy Quezada  Personal Care Plan for Chronic Pain: 5/12/17  Opioid medication:oxycodone   Dose:10mg  Number of pills per month:60  Patient is being prescribed 10mg of oxycodone IR (Percocet) per day this is 15 mg Morphine Equivalents  Benzodiazepines Prescribed? No  Naloxone prescribed? YES February 16, 2023  RIOSORD 16:  15% risk      Clinic visit frequency required: Q 1 month Next visit due:   Controlled Substance/Opioid Treatment agreement on file (dated <12 months ago)?:    Date(s): pending  Last [place] verification: last visit  Items in red to be updated at each visit  Patient is followed by Data Unavailable for ongoing prescription of pain medication.  All refills should be approved by this provider, or covering partner.Chronic Pain     Chronic Pain Diagnosis:  neuropathy  DIRE Total Score(s):14     ORT: 5    4 - 7 =  Moderate Risk   of future problems with Opioids  FAQ5: 45/100 on 9/9/16, 40/100 on 11/15/16  55 October 29, 2020    Behavioral Health Consultation: 5/12/17 with Dr. Daisy Quezada  Personal Care Plan for Chronic Pain: 5/12/17  Opioid medication:oxycodone   Dose:10mg  Number of pills per month:90  Patient is being prescribed 45 MME  Benzodiazepines Prescribed? No  Naloxone prescribed? yes          Microalbuminuria 10/12/2015     Priority: High     Severely increased albuminuria      Type 2 diabetes mellitus with diabetic  polyneuropathy (H) 10/12/2015     Priority: High    Essential hypertension 08/26/2013     Priority: High    Altered mental status, unspecified altered mental status type 06/28/2024     Priority: Medium    Arthritis of both acromioclavicular joints 04/08/2021     Priority: Medium     xray proven       Neck mass 10/11/2019     Priority: Medium     Added automatically from request for surgery [id number]      Metabolic acidosis 07/26/2019     Priority: Medium     reolved w/ discontinuation of ASA      Obesity 01/03/2019     Priority: Medium    Hernia of anterior abdominal wall 01/28/2017     Priority: Medium    Osteoarthritis of both knees 10/31/2016     Priority: Medium    Bilateral ankle joint pain 01/15/2016     Priority: Medium    Esophageal reflux 04/07/2015     Priority: Medium    Urinary incontinence 02/19/2015     Priority: Medium    History of cholecystectomy 08/04/2013     Priority: Medium    Hyperlipidemia 06/21/2013     Priority: Medium    Status post bariatric surgery 06/21/2013     Priority: Medium     Kim-en-Y in 2006      Severe episode of recurrent major depressive disorder, with psychotic features (H) 01/24/2013     Priority: Medium    Moderate persistent asthma without complication 11/15/2012     Priority: Medium    Abnormal Pap smear of cervix 04/22/2019     Priority: Low     3-12-15: Pap/HPV neg.  Plan: Given hx (mildly abn pap, hyster), recheck in 5 yrs.  10-11-11: Pap/HPV neg.  Plan: Given ASUS pap in 2010, recommend repeating pap in 1 year.  8/3/2010 ASCUS Pap with +BV- pt treated with Metrogel.  Pt needs repeat Pap in 1yr.  8/4/2008 Pt had total hyst for menorrhagia, pelvic pain, scar tissue, and fibroids.    3-12-15: Pap/HPV neg.  Plan: Given hx (mildly abn pap, hyster), recheck in 5 yrs.  10-11-11: Pap/HPV neg.  Plan: Given ASUS pap in 2010, recommend repeating pap in 1 year.  8/3/2010 ASCUS Pap with +BV- pt treated with Metrogel.  Pt needs repeat Pap in 1yr.  8/4/2008 Pt had total hyst for  menorrhagia, pelvic pain, scar tissue, and fibroids.      Hernia 01/27/2017     Priority: Low    H/O necrotizing fasciitis 08/04/2013     Priority: Low    S/P hysterectomy 06/21/2013     Priority: Low     2008, For fibroids.  Cervix removed, but still has ovaries.      History of hysterectomy for benign disease 06/21/2013     Priority: Low     2008, For fibroids.  Cervix removed, but still has ovaries.      Carpal tunnel syndrome 11/15/2012     Priority: Low    Chronic pain of both shoulders 12/23/2005     Priority: Low     There are no exam notes on file for this visit.  Chief Complaint   Patient presents with    Pain Management     CPM  Follow up [facility] visit (SOB)  Swollen legs      Blood pressure (!) 140/80, pulse 116, temperature 98.6  F (37  C), temperature source Oral, resp. rate 16, weight 91 kg (200 lb 9.6 oz), SpO2 97%, not currently breastfeeding.  Complicated patient with type 2 diabetes, hypertension, chronic pain from osteoarthritis and diabetic neuropathy, CKD stage IIIa/A3 here for general follow-up.  Her primary concern is to make sure that she has ongoing refills of her oxycodone.  She finds it effective for managing her pain.  She gets additional pain benefit from taking pregabalin to help with her burning in her feet.  Without it her function is suboptimal.  She was recently hospitalized for an acute encephalopathy.  This began after we initiated a new formulation of pregabalin, going from a pill to a liquid formulation.  When this medication was held at [facility] her encephalopathy improved and she was discharged on half of the typical dose.  The patient did not clearly understand that and she has been taking it normally although she did not take it yet today.    While in the hospital her A1c had increased to 8.6, and a BMP was stable.  Cr was 1.4 and co2 17    She has been working with the bariatric care center to transition from Trulicity to semaglutide.    She admits that she has been  drinking some more fruit juices such as grapefruit juice in the last few weeks.    Her edema has been much worse she does know orthopnea or PND.  She is taking torsemide 10 mg daily.  She has not been able to get compression stockings.    Objective blood pressure noted  Chest clear to auscultation.  The tachycardia noted upon vital signs was improved when I saw her.  Her heart rate was 100.  Extremities show edema just above the thighs I would rated 2+.    Assessment and plan    Chronic pain syndrome.  I refilled her oxycodone.  I reiterated the importance of the dosing of pregabalin which is now 1.25 mL twice daily.  Her mental status is completely clear today.  Will ask her to follow-up in 1 month.    Hypertension:  Reasonable control today.  No changes.  Her regimen should include diltiazem 240, labetalol 100 mg twice daily, and lisinopril 40 mg daily.      Edema: Likely probably factorial ,  Due to obesity, possible adverse event effects of medication such as pregabalin and diltiazem.  Could consider sleep apnea evaluation.  There is a component of lymphedema as well.  I previously prescribed compression stockings which she is not wearing.  She will get fit for them soon.  Increase her torsemide from 10 to 20 mg daily.  Will check a BMP in 1 month when she returns.    No signs of left sided CHF today.  N terminal Prob  (normal)  on 6/27.      Acute encephalopathy:    Hemoglobin A1C   Date Value Ref Range Status   06/27/2024 8.6 (H) <5.7 % Final     Comment:     Normal <5.7%   Prediabetes 5.7-6.4%    Diabetes 6.5% or higher     Note: Adopted from ADA consensus guidelines.   02/22/2024 5.6 0.0 - 5.6 % Final     Comment:     Normal <5.7%   Prediabetes 5.7-6.4%    Diabetes 6.5% or higher     Note: Adopted from ADA consensus guidelines.   11/27/2023 4.8 0.0 - 5.6 % Final     Comment:     Normal <5.7%   Prediabetes 5.7-6.4%    Diabetes 6.5% or higher     Note: Adopted from ADA consensus guidelines.   11/20/2020  6.9 (H) 4.1 - 5.7 % Final   06/26/2020 8.8 (H) 4.1 - 5.7 % Final   06/02/2020 14.5 (H) 4.1 - 5.7 % Final      Results for orders placed or performed in visit on 07/15/24   Urine Drug Screen Clinic     Status: Abnormal   Result Value Ref Range    Cannabinoids (76-zya-8-carboxy-9-THC) Not Detected Not Detected, Indeterminate    Phencyclidine Not Detected Not Detected, Indeterminate    Cocaine (Benzoylecgonine) Not Detected Not Detected, Indeterminate    Methamphetamine (d-Methamphetamine) Not Detected Not Detected, Indeterminate    Opiates (Morphine) Not Detected Not Detected, Indeterminate    Amphetamine (d-Amphetamine) Not Detected Not Detected, Indeterminate    Benzodiazepines (Nordiazepam) Not Detected Not Detected, Indeterminate    Tricyclic Antidepressants (Desipramine) Not Detected Not Detected, Indeterminate    Methadone Not Detected Not Detected, Indeterminate    Barbiturates (Butalbital) Not Detected Not Detected, Indeterminate    Oxycodone Detected (A) Not Detected, Indeterminate    Buprenorphine Not Detected Not Detected, Indeterminate

## 2024-07-15 NOTE — PROGRESS NOTES
Clinic Administered Medication Documentation        Patient was given B-12. Prior to medication administration, verified patient's identity using patient s name and date of birth. Please see MAR and medication order for additional information. Patient instructed to remain in clinic for 15 minutes and report any adverse reaction to staff immediately.    Vial/Syringe: Single dose vial. Was entire vial of medication used? Yes    Name of provider who requested the medication administration: Dr. Meyer  Name of provider on site (faculty or community preceptor) at the time of performing the medication administration: Dr. Meyer    Date of next administration: N/a  Date of next office visit with provider to renew medication plan (must be seen annually): N/a

## 2024-07-17 RX ORDER — FLASH GLUCOSE SENSOR
KIT MISCELLANEOUS
Qty: 2 EACH | Refills: 11 | Status: SHIPPED | OUTPATIENT
Start: 2024-07-17 | End: 2024-08-19 | Stop reason: ALTCHOICE

## 2024-08-14 DIAGNOSIS — E11.65 TYPE 2 DIABETES MELLITUS WITH HYPERGLYCEMIA, WITH LONG-TERM CURRENT USE OF INSULIN (H): ICD-10-CM

## 2024-08-14 DIAGNOSIS — I10 BENIGN ESSENTIAL HYPERTENSION: ICD-10-CM

## 2024-08-14 DIAGNOSIS — E11.42 TYPE 2 DIABETES MELLITUS WITH DIABETIC POLYNEUROPATHY, WITH LONG-TERM CURRENT USE OF INSULIN (H): ICD-10-CM

## 2024-08-14 DIAGNOSIS — R80.1 PERSISTENT PROTEINURIA: ICD-10-CM

## 2024-08-14 DIAGNOSIS — K91.2 POSTOPERATIVE MALABSORPTION: ICD-10-CM

## 2024-08-14 DIAGNOSIS — Z79.4 TYPE 2 DIABETES MELLITUS WITH HYPERGLYCEMIA, WITH LONG-TERM CURRENT USE OF INSULIN (H): ICD-10-CM

## 2024-08-14 DIAGNOSIS — Z79.4 TYPE 2 DIABETES MELLITUS WITH DIABETIC POLYNEUROPATHY, WITH LONG-TERM CURRENT USE OF INSULIN (H): ICD-10-CM

## 2024-08-15 ENCOUNTER — ANCILLARY PROCEDURE (OUTPATIENT)
Dept: GENERAL RADIOLOGY | Facility: CLINIC | Age: 59
End: 2024-08-15
Attending: FAMILY MEDICINE
Payer: COMMERCIAL

## 2024-08-15 ENCOUNTER — OFFICE VISIT (OUTPATIENT)
Dept: FAMILY MEDICINE | Facility: CLINIC | Age: 59
End: 2024-08-15
Payer: COMMERCIAL

## 2024-08-15 VITALS
HEIGHT: 59 IN | WEIGHT: 189 LBS | HEART RATE: 107 BPM | DIASTOLIC BLOOD PRESSURE: 62 MMHG | SYSTOLIC BLOOD PRESSURE: 116 MMHG | TEMPERATURE: 97.8 F | BODY MASS INDEX: 38.1 KG/M2 | OXYGEN SATURATION: 100 % | RESPIRATION RATE: 16 BRPM

## 2024-08-15 DIAGNOSIS — R11.2 NAUSEA AND VOMITING, UNSPECIFIED VOMITING TYPE: ICD-10-CM

## 2024-08-15 DIAGNOSIS — G89.4 CHRONIC PAIN SYNDROME: ICD-10-CM

## 2024-08-15 DIAGNOSIS — M25.551 HIP PAIN, RIGHT: ICD-10-CM

## 2024-08-15 DIAGNOSIS — Z79.4 TYPE 2 DIABETES MELLITUS WITH DIABETIC POLYNEUROPATHY, WITH LONG-TERM CURRENT USE OF INSULIN (H): Primary | ICD-10-CM

## 2024-08-15 DIAGNOSIS — N18.31 CKD STAGE G3A/A3, GFR 45-59 AND ALBUMIN CREATININE RATIO >300 MG/G (H): ICD-10-CM

## 2024-08-15 DIAGNOSIS — E11.42 TYPE 2 DIABETES MELLITUS WITH DIABETIC POLYNEUROPATHY, WITH LONG-TERM CURRENT USE OF INSULIN (H): Primary | ICD-10-CM

## 2024-08-15 LAB
CREAT UR-MCNC: 38.4 MG/DL
MICROALBUMIN UR-MCNC: 1587 MG/L
MICROALBUMIN/CREAT UR: 4132.81 MG/G CR (ref 0–25)

## 2024-08-15 PROCEDURE — 96372 THER/PROPH/DIAG INJ SC/IM: CPT | Performed by: FAMILY MEDICINE

## 2024-08-15 PROCEDURE — 99214 OFFICE O/P EST MOD 30 MIN: CPT | Mod: 25 | Performed by: FAMILY MEDICINE

## 2024-08-15 PROCEDURE — 73502 X-RAY EXAM HIP UNI 2-3 VIEWS: CPT | Mod: TC | Performed by: RADIOLOGY

## 2024-08-15 PROCEDURE — 36415 COLL VENOUS BLD VENIPUNCTURE: CPT | Performed by: FAMILY MEDICINE

## 2024-08-15 PROCEDURE — 80048 BASIC METABOLIC PNL TOTAL CA: CPT | Performed by: FAMILY MEDICINE

## 2024-08-15 PROCEDURE — 82570 ASSAY OF URINE CREATININE: CPT | Performed by: FAMILY MEDICINE

## 2024-08-15 PROCEDURE — 82043 UR ALBUMIN QUANTITATIVE: CPT | Performed by: FAMILY MEDICINE

## 2024-08-15 RX ORDER — OXYCODONE AND ACETAMINOPHEN 10; 325 MG/1; MG/1
TABLET ORAL
Qty: 120 TABLET | Refills: 0 | Status: SHIPPED | OUTPATIENT
Start: 2024-08-15 | End: 2024-09-16

## 2024-08-15 RX ORDER — ATORVASTATIN CALCIUM 10 MG/1
10 TABLET, FILM COATED ORAL DAILY
Qty: 90 TABLET | Refills: 3 | Status: SHIPPED | OUTPATIENT
Start: 2024-08-15

## 2024-08-15 RX ORDER — ONDANSETRON 4 MG/1
4 TABLET, ORALLY DISINTEGRATING ORAL EVERY 8 HOURS PRN
Qty: 10 TABLET | Refills: 0 | Status: SHIPPED | OUTPATIENT
Start: 2024-08-15

## 2024-08-15 RX ADMIN — CYANOCOBALAMIN 1000 MCG: 1000 INJECTION, SOLUTION INTRAMUSCULAR; SUBCUTANEOUS at 14:39

## 2024-08-15 ASSESSMENT — ASTHMA QUESTIONNAIRES
QUESTION_3 LAST FOUR WEEKS HOW OFTEN DID YOUR ASTHMA SYMPTOMS (WHEEZING, COUGHING, SHORTNESS OF BREATH, CHEST TIGHTNESS OR PAIN) WAKE YOU UP AT NIGHT OR EARLIER THAN USUAL IN THE MORNING: FOUR OR MORE NIGHTS A WEEK
QUESTION_5 LAST FOUR WEEKS HOW WOULD YOU RATE YOUR ASTHMA CONTROL: SOMEWHAT CONTROLLED
QUESTION_2 LAST FOUR WEEKS HOW OFTEN HAVE YOU HAD SHORTNESS OF BREATH: ONCE A DAY
QUESTION_1 LAST FOUR WEEKS HOW MUCH OF THE TIME DID YOUR ASTHMA KEEP YOU FROM GETTING AS MUCH DONE AT WORK, SCHOOL OR AT HOME: SOME OF THE TIME

## 2024-08-15 NOTE — TELEPHONE ENCOUNTER
Name from pharmacy: B-D PEN NDL [id number](1/2) RED          Will file in chart as: BD ULTRA-FINE 29G X 12.7MM insulin pen needle    The original prescription was discontinued on 7/20/2023 by Jose Angel Meyer MD for the following reason: Reorder (No AVS). Renewing this prescription may not be appropriate.    Sig: USE ONCE DAILY    Disp: Not specified (Pharmacy requested: 100 Units)    Refills: Not specified    Start: 8/14/2024    Class: E-Prescribe    Non-formulary For: Type 2 diabetes mellitus with hyperglycemia, with long-term current use of insulin (H)    Last ordered: 1 year ago (6/27/2023) by Jose Angel Meyer MD    Last refill: 6/27/2023    Rx #: [id number]    Diabetic Supplies Protocol [id number]/14/2024 05:05 PM   Protocol Details Medication is active on med list    Recent (12 month) or future (90 days) visit with authorizing provider s specialty    Medication indicated for associated diagnosis    Patient is 18 years of age or older       Name from pharmacy: ATORVASTATIN 10MG TABLETS         Will file in chart as: atorvastatin (LIPITOR) 10 MG tablet    Sig: TAKE 1 TABLET BY MOUTH EVERY DAY    Disp: 90 tablet    Refills: 3 (Pharmacy requested: Not specified)    Start: 8/14/2024    Class: E-Prescribe    Non-formulary For: Benign essential hypertension    Last ordered: 3 months ago (4/18/2024) by Jose Angel Meyer MD    Last refill: 2/27/2023    Rx #: [id number]    Antihyperlipidemic agents [id number]/14/2024 05:05 PM   Protocol Details LDL on file in the past 12 months    Medication is active on med list    Recent (12 mo) or future (90 days) visit within the authorizing provider's specialty    Patient is age 18 years or older    No active pregnancy on record    No positive pregnancy test in past 12 mos       Name from pharmacy: FREESTYLE ANIKET 2 SENSOR         Will file in chart as: Continuous Glucose Sensor (FREESTYLE ANIKET 2 SENSOR) MISC    Sig: USE AS DIRECTED    Disp: Not  specified (Pharmacy requested: 2 Units)    Refills: Not specified    Start: 8/14/2024    Class: E-Prescribe    Non-formulary For: Type 2 diabetes mellitus with diabetic polyneuropathy, with long-term current use of insulin (H)    Last ordered: 1 year ago (2/16/2023) by Jose Angel Meyer MD    Last refill: 10/27/2023    Rx #: [id number]        Name from pharmacy: FARXIGA 10MG TABLETS         Will file in chart as: FARXIGA 10 MG TABS tablet    Sig: TAKE 1 TABLET(10 MG) BY MOUTH DAILY    Disp: 30 tablet    Refills: Not specified    Start: 8/14/2024    Class: E-Prescribe    Non-formulary For: Type 2 diabetes mellitus with diabetic polyneuropathy, with long-term current use of insulin (H); Persistent proteinuria    Last ordered: 3 months ago (4/18/2024) by Jose Angel Meyer MD    Last refill: 3/16/2023    Rx #: [id number]    Sodium Glucose Co-Transport Inhibitor Agents [id number]/14/2024 05:05 PM   Protocol Details Has GFR on file in past 12 months and most recent value is normal        LDL Cholesterol Calculated   Date Value Ref Range Status   11/27/2023 152 (H) <=100 mg/dL Final   01/30/2020 81 0 - 129 mg/dL Final     Prescription approved per [facility] Refill Protocol.    Nicholas RN, BSN

## 2024-08-15 NOTE — PROGRESS NOTES
Patient Active Problem List    Diagnosis Date Noted    CKD stage G3a/A3, GFR 45-59 and albumin creatinine ratio >300 mg/g (H) 12/31/2020     Priority: High     Persistent protienuria      Copper deficiency 04/05/2019     Priority: High     Identified in Aug, 2018--see discharge summary from [facility] in [place].      Chronic pain disorder 10/07/2016     Priority: High     Chronic Pain Diagnosis:  neuropathy  DIRE Total Score(s):14     ORT: 5    4 - 7 =  Moderate Risk   of future problems with Opioids  FAQ5: 50 November 20, 2020  Behavioral Health Consultation: 5/12/17 with Dr. Daisy Quezada  Personal Care Plan for Chronic Pain: 5/12/17  Opioid medication:oxycodone   Dose:10mg  Number of pills per month:60  Patient is being prescribed 10mg of oxycodone IR (Percocet) per day this is 15 mg Morphine Equivalents  Benzodiazepines Prescribed? No  Naloxone prescribed? YES February 16, 2023  RIOSORD 16:  15% risk      Clinic visit frequency required: Q 1 month Next visit due:   Controlled Substance/Opioid Treatment agreement on file (dated <12 months ago)?:    Date(s): pending  Last [place] verification: last visit  Items in red to be updated at each visit  Patient is followed by Data Unavailable for ongoing prescription of pain medication.  All refills should be approved by this provider, or covering partner.Chronic Pain     Chronic Pain Diagnosis:  neuropathy  DIRE Total Score(s):14     ORT: 5    4 - 7 =  Moderate Risk   of future problems with Opioids  FAQ5: 45/100 on 9/9/16, 40/100 on 11/15/16  55 October 29, 2020    Behavioral Health Consultation: 5/12/17 with Dr. Daisy Quezada  Personal Care Plan for Chronic Pain: 5/12/17  Opioid medication:oxycodone   Dose:10mg  Number of pills per month:90  Patient is being prescribed 45 MME  Benzodiazepines Prescribed? No  Naloxone prescribed? yes          Microalbuminuria 10/12/2015     Priority: High     Severely increased albuminuria      Type 2 diabetes mellitus with diabetic  polyneuropathy (H) 10/12/2015     Priority: High    Essential hypertension 08/26/2013     Priority: High    Altered mental status, unspecified altered mental status type 06/28/2024     Priority: Medium    Arthritis of both acromioclavicular joints 04/08/2021     Priority: Medium     xray proven       Neck mass 10/11/2019     Priority: Medium     Added automatically from request for surgery [id number]      Metabolic acidosis 07/26/2019     Priority: Medium     reolved w/ discontinuation of ASA      Obesity 01/03/2019     Priority: Medium    Hernia of anterior abdominal wall 01/28/2017     Priority: Medium    Osteoarthritis of both knees 10/31/2016     Priority: Medium    Bilateral ankle joint pain 01/15/2016     Priority: Medium    Esophageal reflux 04/07/2015     Priority: Medium    Urinary incontinence 02/19/2015     Priority: Medium    History of cholecystectomy 08/04/2013     Priority: Medium    Hyperlipidemia 06/21/2013     Priority: Medium    Status post bariatric surgery 06/21/2013     Priority: Medium     Kim-en-Y in 2006      Severe episode of recurrent major depressive disorder, with psychotic features (H) 01/24/2013     Priority: Medium    Moderate persistent asthma without complication 11/15/2012     Priority: Medium    Abnormal Pap smear of cervix 04/22/2019     Priority: Low     3-12-15: Pap/HPV neg.  Plan: Given hx (mildly abn pap, hyster), recheck in 5 yrs.  10-11-11: Pap/HPV neg.  Plan: Given ASUS pap in 2010, recommend repeating pap in 1 year.  8/3/2010 ASCUS Pap with +BV- pt treated with Metrogel.  Pt needs repeat Pap in 1yr.  8/4/2008 Pt had total hyst for menorrhagia, pelvic pain, scar tissue, and fibroids.    3-12-15: Pap/HPV neg.  Plan: Given hx (mildly abn pap, hyster), recheck in 5 yrs.  10-11-11: Pap/HPV neg.  Plan: Given ASUS pap in 2010, recommend repeating pap in 1 year.  8/3/2010 ASCUS Pap with +BV- pt treated with Metrogel.  Pt needs repeat Pap in 1yr.  8/4/2008 Pt had total hyst for  "menorrhagia, pelvic pain, scar tissue, and fibroids.      Hernia 01/27/2017     Priority: Low    H/O necrotizing fasciitis 08/04/2013     Priority: Low    S/P hysterectomy 06/21/2013     Priority: Low     2008, For fibroids.  Cervix removed, but still has ovaries.      History of hysterectomy for benign disease 06/21/2013     Priority: Low     2008, For fibroids.  Cervix removed, but still has ovaries.      Carpal tunnel syndrome 11/15/2012     Priority: Low    Chronic pain of both shoulders 12/23/2005     Priority: Low     There are no exam notes on file for this visit.  Chief Complaint   Patient presents with    Pain Management    Leg Problem     Feels like her legs are giving out on her    Injections     Vit B 12     Blood pressure 116/62, pulse 107, temperature 97.8  F (36.6  C), temperature source Oral, resp. rate 16, height 1.499 m (4' 11\"), weight 85.7 kg (189 lb), SpO2 100%, not currently breastfeeding.  No results found for any visits on 08/15/24.  Woke up and felt NV--4 emeisis today.  No diarrhea.  No fever/chills  No dysuria or cough.    No exposers   Not passing columba.  Not feeling bloated.    Diuretic helps.    Feeling less overall swelling.  Compression stockings help edema    Right side/hip pain.  The pain is worse primarily if she tries to lay on her right side.  Not worse with standing or walking.  On the side and on the groin.  Hurts to lay on side.    Right hip pain for  ~ 1 months.      Has noted some overall decrease in her level of edema.    Patient appears uncomfortable today.  She had 1 emesis during timeframe of the visit.  Blood pressure and heart rate noted.  Mucous membranes are moist.  Neck no adenopathy.  Chest clear.  Heart sounds regular.  No new murmur.  Abdomen: Positive bowel sounds.  Soft.  Nontender.  Extremities: She still has extensive edema up to the proximal tibia it is improved.  Not in the lower extremities.  She has reasonable right hip internal/external rotation does not " reproduce her pain.  She has tenderness over the right greater trochanter.    Wt Readings from Last 4 Encounters:   08/15/24 85.7 kg (189 lb)   07/15/24 91 kg (200 lb 9.6 oz)   06/28/24 91.6 kg (201 lb 15.1 oz)   06/20/24 88.5 kg (195 lb)        Assessment plan  1.  Acute gastroenteritis.  No evidence of bowel obstruction.  Not clinically dehydrated.  Will prescribe Zofran to encourage her oral intake.  Monitor her BMP carefully.  Encourage fluid intake either water or Gatorade.  She's on a GLP-1, which may be a factor, as well,  unable to comprehensively review med list today.  Will ask pharmacy team for help.    2.  Edema this is likely from her chronic kidney disease.  She has had a response in her weight to increase dose of torsemide 20 mg daily.  Will continue to monitor BMP very carefully.  3.  Right hip pain.  X-ray today showed relatively normal joint space.  This is likely greater trochanteric bursitis.  Will contact her and ask her to return for a steroid injection in the near future.  4.  CKD 3.  This is likely the source of edema.  Monitor carefully.  Urine microalbumin today.  She is on a maximum ACE at lisinopril 40 mg to daily.  Should be on an SGLT-2 inhibitior.  I ok'd a refill request today.    F/up based on labs today.

## 2024-08-16 LAB
ANION GAP SERPL CALCULATED.3IONS-SCNC: 12 MMOL/L (ref 7–15)
BUN SERPL-MCNC: 43.7 MG/DL (ref 6–20)
CALCIUM SERPL-MCNC: 7.1 MG/DL (ref 8.8–10.4)
CHLORIDE SERPL-SCNC: 102 MMOL/L (ref 98–107)
CREAT SERPL-MCNC: 2.09 MG/DL (ref 0.51–0.95)
EGFRCR SERPLBLD CKD-EPI 2021: 27 ML/MIN/1.73M2
GLUCOSE SERPL-MCNC: 468 MG/DL (ref 70–99)
HCO3 SERPL-SCNC: 19 MMOL/L (ref 22–29)
POTASSIUM SERPL-SCNC: 4.1 MMOL/L (ref 3.4–5.3)
SODIUM SERPL-SCNC: 133 MMOL/L (ref 135–145)

## 2024-08-16 RX ORDER — PEN NEEDLE, DIABETIC 29 G X1/2"
NEEDLE, DISPOSABLE MISCELLANEOUS
Qty: 100 EACH | Refills: 4 | Status: SHIPPED | OUTPATIENT
Start: 2024-08-16

## 2024-08-16 RX ORDER — ERGOCALCIFEROL 1.25 MG/1
50000 CAPSULE, LIQUID FILLED ORAL WEEKLY
Qty: 13 CAPSULE | Refills: 0 | Status: SHIPPED | OUTPATIENT
Start: 2024-08-16

## 2024-08-16 RX ORDER — DAPAGLIFLOZIN 10 MG/1
10 TABLET, FILM COATED ORAL DAILY
Qty: 30 TABLET | Refills: 11 | Status: SHIPPED | OUTPATIENT
Start: 2024-08-16 | End: 2024-09-16

## 2024-08-19 DIAGNOSIS — Z79.4 TYPE 2 DIABETES MELLITUS WITH DIABETIC POLYNEUROPATHY, WITH LONG-TERM CURRENT USE OF INSULIN (H): ICD-10-CM

## 2024-08-19 DIAGNOSIS — E11.42 TYPE 2 DIABETES MELLITUS WITH DIABETIC POLYNEUROPATHY, WITH LONG-TERM CURRENT USE OF INSULIN (H): ICD-10-CM

## 2024-08-19 NOTE — PROGRESS NOTES
I called pt with test resutls  Not using oxzempic forr ~ 2 weeks--fear that i'ts causing bloating.  Nausea better.  Needs bárbara 2 sensors.  Got bárbara for some reason.      Plan:  needs f/p this week.

## 2024-08-19 NOTE — RESULT ENCOUNTER NOTE
Viridiana  Can you call TB back and arrange a f/up appt this Friday?  Please ask her to bring in her freestyle bárbara and all of her meds.  Thanks  CF

## 2024-08-21 ENCOUNTER — TELEPHONE (OUTPATIENT)
Dept: FAMILY MEDICINE | Facility: CLINIC | Age: 59
End: 2024-08-21
Payer: COMMERCIAL

## 2024-08-21 NOTE — TELEPHONE ENCOUNTER
Prior Authorization Retail Medication Request    Medication/Dose: dapagliflozin (FARXIGA) 10 MG TABS tablet  Diagnosis and ICD code (if different than what is on RX):    Type 2 diabetes mellitus with diabetic polyneuropathy, with long-term current use of insulin (H) [E11.42, Z79.4]      Persistent proteinuria [R80.1]        New/renewal/insurance change PA/secondary ins. PA:  Previously Tried and Failed:    Rationale:      Insurance   Primary: [facility] - [employer]ARE ADVANTAGE   Insurance ID:  [id number]      Secondary (if applicable):[facility]   Insurance ID:  [id number]      Pharmacy Information (if different than what is on RX)  Name:  [id number] [address]   Phone:  [phone]   Fax:[phone]

## 2024-08-23 ENCOUNTER — TELEPHONE (OUTPATIENT)
Dept: FAMILY MEDICINE | Facility: CLINIC | Age: 59
End: 2024-08-23

## 2024-08-23 NOTE — TELEPHONE ENCOUNTER
Prior Authorization Not Needed per Insurance    Medication: DAPAGLIFLOZIN PROPANEDIOL 10 MG PO TABS  Insurance Company: [employer] D - Phone [phone] Fax [phone]  Expected CoPay: $    Pharmacy Filling the Rx: [facility] #[id number] [address]  Pharmacy Notified: Yes-LVM for Brand Name  Patient Notified:

## 2024-08-23 NOTE — TELEPHONE ENCOUNTER
General Call      Reason for Call:  Call back    What are your questions or concerns:  she got a call from  earlier this week and he told her that she should come in today but she is not on his scheduled and he is full this after noon please give her a call back if she should be coming in or not.     Date of last appointment with provider: ?    Okay to leave a detailed message?: Yes at Home number on file [phone] (home)    Does patient or caller know when to expect a call? Yes, Nurses will return call within 2-3 business hours.       Ginger Ventura on 8/23/2024 at 8:49 AM

## 2024-08-23 NOTE — TELEPHONE ENCOUNTER
Spoke with pt regarding appt. Reviewing Dr Meyer's office note, it would appear that he would like pt to see liz LYLE today. I assisted with scheduling a 1:10 appt with Dr Martines. Pt instructed to bring reader and all medications to appt.     NICOLETTE Peterson, BSN

## 2024-08-29 ENCOUNTER — TELEPHONE (OUTPATIENT)
Dept: FAMILY MEDICINE | Facility: CLINIC | Age: 59
End: 2024-08-29

## 2024-08-29 DIAGNOSIS — G89.4 CHRONIC PAIN DISORDER: ICD-10-CM

## 2024-08-29 RX ORDER — PREGABALIN 20 MG/ML
25 SOLUTION ORAL 3 TIMES DAILY
Qty: 473 ML | Refills: 0 | Status: SHIPPED | OUTPATIENT
Start: 2024-08-29 | End: 2024-08-29 | Stop reason: ALTCHOICE

## 2024-08-29 NOTE — TELEPHONE ENCOUNTER
Pt would like a call back to understand why she needed to see the pharmacy again. I tried to explain it to her but I think she prefers if you call her instead. Some concerns are why you stopped her gabapentin? Why you stopped her pain meds? Why she is having to do pharm D visits. Let me know what you would like done.

## 2024-08-30 ENCOUNTER — TELEPHONE (OUTPATIENT)
Dept: FAMILY MEDICINE | Facility: CLINIC | Age: 59
End: 2024-08-30
Payer: COMMERCIAL

## 2024-08-30 DIAGNOSIS — G89.4 CHRONIC PAIN DISORDER: Primary | ICD-10-CM

## 2024-08-30 RX ORDER — PREGABALIN 20 MG/ML
25 SOLUTION ORAL 3 TIMES DAILY
Qty: 112.5 ML | Refills: 0 | Status: SHIPPED | OUTPATIENT
Start: 2024-08-30 | End: 2024-09-16

## 2024-08-30 NOTE — TELEPHONE ENCOUNTER
Pt  would like Pregabalin. Sates that it was helping her. She would like a refill until she sees Dr. Meyer on 9/16.

## 2024-08-30 NOTE — TELEPHONE ENCOUNTER
Pam  Please call Fri am:  I had discontinued her gabapentin in the spin due to edema, as this is a possible side effect.  We had switched her to pregabalin, and then had reduced the dose substantially in the hospital because she had confusion on higher doses.  I had not intentionally stopped pregabalin last visit--I just had not known she needed a refill, and she had other problems that we discussed last visit, so it simply didn't get refilled.  However,  I reviewed her chart and saw that she filled an older Rx for gabapentin on 8/15.  She should not take both gabapentin and pregabalin, so I did not send a new rx for pregabalin.    She filled the oxycodone on 8/15, so she doesn't need that.    I wanted her to see the pharmacist because she is taking multiple meds, and her kidney function was worse lat time, and I want to be 100% sure what she is taking, and I wanted to repeat her kidney tests.

## 2024-09-16 ENCOUNTER — OFFICE VISIT (OUTPATIENT)
Dept: PHARMACY | Facility: CLINIC | Age: 59
End: 2024-09-16
Payer: COMMERCIAL

## 2024-09-16 ENCOUNTER — OFFICE VISIT (OUTPATIENT)
Dept: FAMILY MEDICINE | Facility: CLINIC | Age: 59
End: 2024-09-16
Payer: COMMERCIAL

## 2024-09-16 VITALS
DIASTOLIC BLOOD PRESSURE: 101 MMHG | TEMPERATURE: 98.3 F | SYSTOLIC BLOOD PRESSURE: 166 MMHG | OXYGEN SATURATION: 100 % | WEIGHT: 197.8 LBS | HEART RATE: 98 BPM | BODY MASS INDEX: 39.95 KG/M2 | RESPIRATION RATE: 16 BRPM

## 2024-09-16 DIAGNOSIS — E66.01 CLASS 2 SEVERE OBESITY DUE TO EXCESS CALORIES WITH SERIOUS COMORBIDITY IN ADULT, UNSPECIFIED BMI (H): ICD-10-CM

## 2024-09-16 DIAGNOSIS — Z79.4 TYPE 2 DIABETES MELLITUS WITH DIABETIC POLYNEUROPATHY, WITH LONG-TERM CURRENT USE OF INSULIN (H): Primary | ICD-10-CM

## 2024-09-16 DIAGNOSIS — E78.5 HYPERLIPIDEMIA LDL GOAL <100: ICD-10-CM

## 2024-09-16 DIAGNOSIS — G89.4 CHRONIC PAIN SYNDROME: ICD-10-CM

## 2024-09-16 DIAGNOSIS — R60.9 EDEMA, UNSPECIFIED TYPE: ICD-10-CM

## 2024-09-16 DIAGNOSIS — E87.20 METABOLIC ACIDOSIS: ICD-10-CM

## 2024-09-16 DIAGNOSIS — M25.511 CHRONIC PAIN OF BOTH SHOULDERS: ICD-10-CM

## 2024-09-16 DIAGNOSIS — E11.42 TYPE 2 DIABETES MELLITUS WITH DIABETIC POLYNEUROPATHY, WITHOUT LONG-TERM CURRENT USE OF INSULIN (H): ICD-10-CM

## 2024-09-16 DIAGNOSIS — M25.512 CHRONIC PAIN OF BOTH SHOULDERS: ICD-10-CM

## 2024-09-16 DIAGNOSIS — I10 ESSENTIAL HYPERTENSION: ICD-10-CM

## 2024-09-16 DIAGNOSIS — Z78.9 TAKES DIETARY SUPPLEMENTS: ICD-10-CM

## 2024-09-16 DIAGNOSIS — G89.29 CHRONIC PAIN OF BOTH SHOULDERS: ICD-10-CM

## 2024-09-16 DIAGNOSIS — E66.812 CLASS 2 SEVERE OBESITY DUE TO EXCESS CALORIES WITH SERIOUS COMORBIDITY IN ADULT, UNSPECIFIED BMI (H): ICD-10-CM

## 2024-09-16 DIAGNOSIS — E11.42 TYPE 2 DIABETES MELLITUS WITH DIABETIC POLYNEUROPATHY, WITH LONG-TERM CURRENT USE OF INSULIN (H): Primary | ICD-10-CM

## 2024-09-16 DIAGNOSIS — G89.4 CHRONIC PAIN DISORDER: Primary | ICD-10-CM

## 2024-09-16 DIAGNOSIS — E66.01 MORBID OBESITY (H): ICD-10-CM

## 2024-09-16 DIAGNOSIS — K59.00 CONSTIPATION, UNSPECIFIED CONSTIPATION TYPE: ICD-10-CM

## 2024-09-16 DIAGNOSIS — J45.40 MODERATE PERSISTENT ASTHMA WITHOUT COMPLICATION: ICD-10-CM

## 2024-09-16 PROCEDURE — 96372 THER/PROPH/DIAG INJ SC/IM: CPT | Performed by: FAMILY MEDICINE

## 2024-09-16 PROCEDURE — 99214 OFFICE O/P EST MOD 30 MIN: CPT | Performed by: FAMILY MEDICINE

## 2024-09-16 PROCEDURE — 80306 DRUG TEST PRSMV INSTRMNT: CPT | Performed by: FAMILY MEDICINE

## 2024-09-16 PROCEDURE — 99207 PR NO CHARGE LOS: CPT | Performed by: PHARMACIST

## 2024-09-16 RX ORDER — PREGABALIN 20 MG/ML
25 SOLUTION ORAL 2 TIMES DAILY
Qty: 75 ML | Refills: 2 | Status: SHIPPED | OUTPATIENT
Start: 2024-09-16

## 2024-09-16 RX ORDER — OXYCODONE AND ACETAMINOPHEN 10; 325 MG/1; MG/1
TABLET ORAL
Qty: 120 TABLET | Refills: 0 | Status: SHIPPED | OUTPATIENT
Start: 2024-09-16

## 2024-09-16 RX ORDER — TORSEMIDE 20 MG/1
40 TABLET ORAL DAILY
Qty: 180 TABLET | Refills: 3 | Status: SHIPPED | OUTPATIENT
Start: 2024-09-16

## 2024-09-16 RX ORDER — LIDOCAINE 40 MG/G
CREAM TOPICAL
Qty: 45 G | Refills: 11 | Status: SHIPPED | OUTPATIENT
Start: 2024-09-16

## 2024-09-16 RX ADMIN — CYANOCOBALAMIN 1000 MCG: 1000 INJECTION, SOLUTION INTRAMUSCULAR; SUBCUTANEOUS at 11:41

## 2024-09-16 ASSESSMENT — ASTHMA QUESTIONNAIRES
QUESTION_1 LAST FOUR WEEKS HOW MUCH OF THE TIME DID YOUR ASTHMA KEEP YOU FROM GETTING AS MUCH DONE AT WORK, SCHOOL OR AT HOME: MOST OF THE TIME
QUESTION_5 LAST FOUR WEEKS HOW WOULD YOU RATE YOUR ASTHMA CONTROL: SOMEWHAT CONTROLLED
QUESTION_4 LAST FOUR WEEKS HOW OFTEN HAVE YOU USED YOUR RESCUE INHALER OR NEBULIZER MEDICATION (SUCH AS ALBUTEROL): TWO OR THREE TIMES PER WEEK
QUESTION_2 LAST FOUR WEEKS HOW OFTEN HAVE YOU HAD SHORTNESS OF BREATH: MORE THAN ONCE A DAY
ACT_TOTALSCORE: 11
ACT_TOTALSCORE: 11
QUESTION_3 LAST FOUR WEEKS HOW OFTEN DID YOUR ASTHMA SYMPTOMS (WHEEZING, COUGHING, SHORTNESS OF BREATH, CHEST TIGHTNESS OR PAIN) WAKE YOU UP AT NIGHT OR EARLIER THAN USUAL IN THE MORNING: TWO OR THREE NIGHTS A WEEK

## 2024-09-16 ASSESSMENT — PATIENT HEALTH QUESTIONNAIRE - PHQ9
SUM OF ALL RESPONSES TO PHQ QUESTIONS 1-9: 13
SUM OF ALL RESPONSES TO PHQ QUESTIONS 1-9: 13
10. IF YOU CHECKED OFF ANY PROBLEMS, HOW DIFFICULT HAVE THESE PROBLEMS MADE IT FOR YOU TO DO YOUR WORK, TAKE CARE OF THINGS AT HOME, OR GET ALONG WITH OTHER PEOPLE: NOT DIFFICULT AT ALL

## 2024-09-16 NOTE — Clinical Note
Nehemias, forwarding my note. Forgot to mention- she has not had a lipid profile since starting the atorvastatin so might want to grab one at your next visit. Leatha

## 2024-09-16 NOTE — PROGRESS NOTES
Medication Therapy Management (MTM) Encounter    ASSESSMENT:                            Medication Adherence/Access: She states she doesn't miss doses but her refill records show otherwise.    Diabetes:   Not meeting goal A1C of < 7%.  GLP-1 managed by Dr. Manish Carson. With her CKD, SGLT2i should be considered until dialysis, however, Farxiga only has glycemic efficacy at a GFR of 45 or greater, but Jardiance has efficacy down to GFR of 30, so switching to Jardiance may provide some glycemic efficacy. Recommended to Dr. Meyer  She is ok to continue Ozempic even with her nausea and vomiting; would not increase dose at this time due to the adverse effects.    Hypertension:   Elevated today. Dr. Meyer is addressing.    Hyperlipidemia:   Unable to assess; no recent lipid profile.    Asthma:  Controlled    Supplements:   Stable    Weight management:   Nausea and vomiting from Ozempic but she would like to continue. Managed by bariatric physician.    CKD:   Checked doses of all medications with regards to her current level of renal function.     Chronic Pain:   Max dose pregabalin with her renal function: 150 mg or less per day, divided into once or twice daily dosing. Informed her to not take that 3rd dose (three times daily) periodically like she does. Discussed if she wants to take a higher dose and keep it twice daily but she declined and stated she doesn't like to take medicines.    Migraines:  Stable    Constipation/nausea:   Stable, but does have nausea several times per week. She is ok with this and will continue Ozempic.        PLAN:                            Continue pregabalin twice daily  Change Farxiga to Jardiance.  Refill lidocaine cream    Follow-up: as needed with MTM.    Appointments in Next Year      Sep 18, 2024 3:00 PM  (Arrive by 2:45 PM)  Return Bariatric Visit with Vy Carson MD  [facility] [place] ([facility] [place]  ) [phone]     Oct 17, 2024 12:50 PM  Return Visit with Jose Angel Meyer MD  [facility] ([facility] [place] [phone]            Medication issues to be addressed at a future visit    Check lipid profile; adjust statin as needed      SUBJECTIVE/OBJECTIVE:                          Ruthy Álvarez is a 58 year old female seen for a follow-up visit. Today's visit is a co-visit with Dr Meyer.      Reason for visit: Follow-up Medication Therapy Management. Dr. Meyer wanted a full med rec and also to check that her meds are dosed appropriately for her renal function, which has been declining.    Allergies/ADRs: Reviewed in chart  Past Medical History: Reviewed in chart  Social History     Tobacco Use    Smoking status: Never    Smokeless tobacco: Never   Vaping Use    Vaping status: Never Used   Substance Use Topics    Alcohol use: No    Drug use: No     Tobacco: She reports that she has never smoked. She has never used smokeless tobacco.  ^Reviewed today    Medication Adherence/Access:  Patient uses pill box(es) set up by PCA, granddaughter Anamaria. She doesn't really know what is in there. Patient takes medications 2 time(s) per day. Per patient, she may sometimes miss medications because she is in a rush to leave the house and forgets but mostly takes it. She says that [facility] automatically refills her meds every month but will  from [facility] if [facility] is out of medications. Last [facility] visit 3/2024, Dr Dyson expressed some concerns with adherence due to dispense report.  She brought in all her meds today.  Many of the bottles are old, but she stated that she combines pills into one bottle and doesn't look at the date of the bottle. She said if the bottle is not here then she is not taking it.    Diabetes   Farxiga 10 mg daily   Was previously taking Trulicity but was switched to Ozempic 6/7/24 per bariatric care provider Dr. Manish Carson. Current dose: 0.5mg  weekly for the past few months. She does throw up right after using it but once weekly, and has nausea on and off, but she is ok with this and wishes to continue  Patient is experiencing side effects (see above)  Blood sugar monitoring: Continuous Glucose Monitor - Freestyle Kika 2. But not currently monitoring as they gave her Kika 14 day sensors instead.  Current diabetes symptoms: none  Diet/Exercise: Not able to address today       Hemoglobin A1C   Date Value Ref Range Status   06/27/2024 8.6 (H) <5.7 % Final     Comment:     Normal <5.7%   Prediabetes 5.7-6.4%    Diabetes 6.5% or higher     Note: Adopted from ADA consensus guidelines.   11/20/2020 6.9 (H) 4.1 - 5.7 % Final     GFR Estimate   Date Value Ref Range Status   08/15/2024 27 (L) >60 mL/min/1.73m2 Final     Comment:     eGFR calculated using 2021 CKD-EPI equation.   04/18/2021 >60 >60 mL/min/1.73m2 Final   11/20/2020 55.5 (L) >60.0 mL/min/1.7 m2 Final       Hypertension   Diltiazem  mg daily  Lisinopril 40 mg daily  Torsemide 20 mg daily- she takes 10mg twice daily and sometimes takes a 3rd tablet if her legs are swollen   Labetalol 100 mg twice daily   Patient reports no current medication side effects       BP Readings from Last 3 Encounters:   09/16/24 (!) 166/101   08/15/24 116/62   07/15/24 (!) 140/80       Hyperlipidemia   Atorvastatin 10 mg daily  Patient reports no significant myalgias or other side effects.  The 10-year ASCVD risk score (Tala ROONEY, et al., 2019) is: 31.5%    Values used to calculate the score:      Age: 58 years      Sex: Female      Is Non- : Yes      Diabetic: Yes      Tobacco smoker: No      Systolic Blood Pressure: 168 mmHg      Is BP treated: Yes      HDL Cholesterol: 66 mg/dL      Total Cholesterol: 250 mg/dL       Recent Labs   Lab Test 11/27/23  0949 12/15/22  1256 07/29/21  1409 01/30/20  1021 03/07/19  1402   CHOL 250* 253*   < > 153.6 172.1   HDL 66 71   < > 44.3 47.2   * 151*   " < > 81 86   TRIG 160* 155*   < > 142.4 194.2*   CHOLHDLRATIO  --   --   --  3.5 3.6    < > = values in this interval not displayed.       Asthma   Symbicort 80-4.5mcg 2 puffs as needed  Patient reports using Symbicort 2 puffs twice daily (uses as needed but this is how much she needs it).  This controls her symptoms.  No adverse effects.       Supplements   /metabolic acidosis  Sodium citrate-citric acid 10 mL twice daily   Vitamin D 29718 units once weekly  [Vitamin B12 shots twice monthly in clinic]  Follows with bariatric surgery Dr Manish Carson, history of RYGB in 2006.   No reported issues at this time.   No adverse effects.     Weight Management   Class II Obesity (BMI 35 to 39.9):   Phentermine 37.5 mg once daily  Ozempic weekly (using 0.5mg weekly- for 3 months- does have nausea and vomiting)  Patient reports medication side effect of nausea/vomiting from ozempic, which she is ok with.  Patient has history of RYGB       Wt Readings from Last 4 Encounters:   09/16/24 197 lb 12.8 oz (89.7 kg)   08/15/24 189 lb (85.7 kg)   07/15/24 200 lb 9.6 oz (91 kg)   06/28/24 201 lb 15.1 oz (91.6 kg)     Estimated body mass index is 39.95 kg/m  as calculated from the following:    Height as of 8/15/24: 4' 11\" (1.499 m).    Weight as of an earlier encounter on 9/16/24: 197 lb 12.8 oz (89.7 kg).    CKD:     GFR Estimate   Date Value Ref Range Status   08/15/2024 27 (L) >60 mL/min/1.73m2 Final     Comment:     eGFR calculated using 2021 CKD-EPI equation.   04/18/2021 >60 >60 mL/min/1.73m2 Final   11/20/2020 55.5 (L) >60.0 mL/min/1.7 m2 Final     Calculated non-indexed eGFR: 28 ml/min (8/15/24)  Calculated CrCl (using adjusted body weight): 28.1 ml/min    Chronic Pain:   Oxycodone-acetaminophen 10-325mg 2 tablets bedtime; may also take 1 tablet every 6 hours as needed for severe pain (taking one in the morning, one at noon, 2 at night, every day) [she does have Narcan nasal spray]  Pregabalin solution 20mg/ml (25mg three times " "daily is prescribed; she takes twice daily but takes three times daily once weekly)  Capsaicin cream- does use  Lidocaine cream- ran out; it did help; wants refill  Gabapentin solution 250mg (5ml) three times daily (filled 31 day supply 8/15/24)- SHE STATES THAT SHE DID NOT TAKE IT AND ONLY USES PREGABALIN  No adverse effects, except maybe constipation from the oxycodone-acetaminophen, for which she uses senna-doc once daily.    Migraines:  Sumatriptan subcutaneous injection (6mg) as needed (uses about twice weekly; it helps when she uses it)  No adverse effects    Constipation/nausea:   Ondansetron ODT 4mg as needed (uses 3 times per week in the past week, but usually uses less)  Senna-doc 8.6-50mg as needed (uses once daily every day)  No adverse effects      BP Readings from Last 1 Encounters:   09/16/24 (!) 166/101     Pulse Readings from Last 1 Encounters:   09/16/24 98     Wt Readings from Last 1 Encounters:   09/16/24 197 lb 12.8 oz (89.7 kg)     Ht Readings from Last 1 Encounters:   08/15/24 4' 11\" (1.499 m)     Estimated body mass index is 39.95 kg/m  as calculated from the following:    Height as of 8/15/24: 4' 11\" (1.499 m).    Weight as of an earlier encounter on 9/16/24: 197 lb 12.8 oz (89.7 kg).    Temp Readings from Last 1 Encounters:   09/16/24 98.3  F (36.8  C) (Oral)       ----------------      I spent 30 minutes with this patient today. All changes were made via collaborative practice agreement with Dr Meyer. A copy of the visit note was provided to the patient's provider(s).    A summary of these recommendations was given to the patient (see AVS from today's appointment with Dr Meyer).    Leatha Hernandez, Pharm.D.       Medication Therapy Recommendations  Type 2 diabetes mellitus with diabetic polyneuropathy (H)    Current Medication: dapagliflozin (FARXIGA) 10 MG TABS tablet (Discontinued)   Rationale: More effective medication available - Ineffective medication - Effectiveness   Recommendation: " Change Medication - Jardiance 10 MG Tabs   Status: Accepted per Provider   Note: More effective at reduced eGFR

## 2024-09-16 NOTE — PATIENT INSTRUCTIONS
New plan:  Try taking these pills at night  Lisinopril 40 mg every night.   Diltiazem 240 mg every night    Increase torsemide to 40 mg daily  Stop farxiga  Start jardiance 10 mg daily.

## 2024-09-16 NOTE — PROGRESS NOTES
Patient Active Problem List    Diagnosis Date Noted    CKD stage G3a/A3, GFR 45-59 and albumin creatinine ratio >300 mg/g (H) 12/31/2020     Priority: High     Persistent protienuria      Copper deficiency 04/05/2019     Priority: High     Identified in Aug, 2018--see discharge summary from [facility] in [place].      Chronic pain disorder 10/07/2016     Priority: High     Chronic Pain Diagnosis:  neuropathy  DIRE Total Score(s):14     ORT: 5    4 - 7 =  Moderate Risk   of future problems with Opioids  FAQ5: 50 November 20, 2020  Behavioral Health Consultation: 5/12/17 with Dr. Daisy Quezada  Personal Care Plan for Chronic Pain: 5/12/17  Opioid medication:oxycodone   Dose:10mg  Number of pills per month:60  Patient is being prescribed 10mg of oxycodone IR (Percocet) per day this is 15 mg Morphine Equivalents  Benzodiazepines Prescribed? No  Naloxone prescribed? YES February 16, 2023  RIOSORD 16:  15% risk      Clinic visit frequency required: Q 1 month Next visit due:   Controlled Substance/Opioid Treatment agreement on file (dated <12 months ago)?:    Date(s): pending  Last [place] verification: last visit  Items in red to be updated at each visit  Patient is followed by [facility] Unavailable for ongoing prescription of pain medication.  All refills should be approved by this provider, or covering partner.Chronic Pain     Chronic Pain Diagnosis:  neuropathy  DIRE Total Score(s):14     ORT: 5    4 - 7 =  Moderate Risk   of future problems with Opioids  FAQ5: 45/100 on 9/9/16, 40/100 on 11/15/16  55 October 29, 2020    Behavioral Health Consultation: 5/12/17 with Dr. Daisy Quezada  Personal Care Plan for Chronic Pain: 5/12/17  Opioid medication:oxycodone   Dose:10mg  Number of pills per month:90  Patient is being prescribed 45 MME  Benzodiazepines Prescribed? No  Naloxone prescribed? yes          Microalbuminuria 10/12/2015     Priority: High     Severely increased albuminuria      Type 2 diabetes mellitus with diabetic  polyneuropathy (H) 10/12/2015     Priority: High    Essential hypertension 08/26/2013     Priority: High    Class 2 severe obesity due to excess calories with serious comorbidity in adult (H) 09/16/2024     Priority: Medium    Altered mental status, unspecified altered mental status type 06/28/2024     Priority: Medium    Arthritis of both acromioclavicular joints 04/08/2021     Priority: Medium     xray proven       Neck mass 10/11/2019     Priority: Medium     Added automatically from request for surgery [id number]      Metabolic acidosis 07/26/2019     Priority: Medium     reolved w/ discontinuation of ASA      Obesity 01/03/2019     Priority: Medium    Hernia of anterior abdominal wall 01/28/2017     Priority: Medium    Osteoarthritis of both knees 10/31/2016     Priority: Medium    Bilateral ankle joint pain 01/15/2016     Priority: Medium    Esophageal reflux 04/07/2015     Priority: Medium    Urinary incontinence 02/19/2015     Priority: Medium    History of cholecystectomy 08/04/2013     Priority: Medium    Hyperlipidemia 06/21/2013     Priority: Medium    Status post bariatric surgery 06/21/2013     Priority: Medium     Kim-en-Y in 2006      Severe episode of recurrent major depressive disorder, with psychotic features (H) 01/24/2013     Priority: Medium    Moderate persistent asthma without complication 11/15/2012     Priority: Medium    Abnormal Pap smear of cervix 04/22/2019     Priority: Low     3-12-15: Pap/HPV neg.  Plan: Given hx (mildly abn pap, hyster), recheck in 5 yrs.  10-11-11: Pap/HPV neg.  Plan: Given ASUS pap in 2010, recommend repeating pap in 1 year.  8/3/2010 ASCUS Pap with +BV- pt treated with Metrogel.  Pt needs repeat Pap in 1yr.  8/4/2008 Pt had total hyst for menorrhagia, pelvic pain, scar tissue, and fibroids.    3-12-15: Pap/HPV neg.  Plan: Given hx (mildly abn pap, hyster), recheck in 5 yrs.  10-11-11: Pap/HPV neg.  Plan: Given ASUS pap in 2010, recommend repeating pap in 1  year.  8/3/2010 ASCUS Pap with +BV- pt treated with Metrogel.  Pt needs repeat Pap in 1yr.  8/4/2008 Pt had total hyst for menorrhagia, pelvic pain, scar tissue, and fibroids.      Hernia 01/27/2017     Priority: Low    H/O necrotizing fasciitis 08/04/2013     Priority: Low    S/P hysterectomy 06/21/2013     Priority: Low     2008, For fibroids.  Cervix removed, but still has ovaries.      History of hysterectomy for benign disease 06/21/2013     Priority: Low     2008, For fibroids.  Cervix removed, but still has ovaries.      Carpal tunnel syndrome 11/15/2012     Priority: Low    Chronic pain of both shoulders 12/23/2005     Priority: Low     There are no exam notes on file for this visit.  Chief Complaint   Patient presents with    Pain Management     CPM     Blood pressure (!) 166/101, pulse 98, temperature 98.3  F (36.8  C), temperature source Oral, resp. rate 16, weight 89.7 kg (197 lb 12.8 oz), SpO2 100%, not currently breastfeeding.  Patient is here for multiple issues.  Regarding chronic pain in the etiology is osteoarthritis in multiple joints and peripheral neuropathy.  For that she is taking pregabalin 20 mg/mL with a dose of 25 mg twice daily.  Generally she tolerates this and finds it effective although sometimes she needs to take an extra dose.  She has been having trouble getting refills for it for on no reasons.  No sedation.  She has edema that is likely probably factorial.    Regarding her hypertension, she did not take her medications this morning.  Her regimen should be diltiazem 240 mg daily, lisinopril 40 mg daily, and labetalol 100 mg twice daily.  She had 1 hand clearly states that she takes her medications as directed but on the other hand states that she did not take them this morning because she was in a rush.  She is taking torsemide a total of 20 mg daily to manage her edema she does experience a diuretic effect but has not clearly had a major impact on her leg edema which is  troublesome for her.  She has CKD 3 with proteinuria and is on maximum ACE inhibitor, Farxiga.  Her glycemic control has been fair.  She is working with Dr. Carson to increase her GLP-1.  She throws up about once a week taking it but does not clearly, from a tiny bursa perspective, associated with Ozempic itself.  She follows with Dr. Andino for this.  She has a freestyle bárbara system but does not have freestyle bárbara 2 sensors she would like that refilled as well.    Review of systems is negative for unexpected sedation, uncontrolled constipation, shortness of breath, orthopnea, PND, chest pain.  She has ongoing edema to the knees and is not clearly some dramatically better than it was a few months ago.    Her weight today is 197 which is increased compared to August but down 3 pounds compared to July.  She is pleasant in no acute distress.  Chest clear.  Heart regular rate and rhythm.  Extremities have 2+ edema to the knees.  UDS is as below.   was checked today and is unremarkable.    Overall she is satisfied with her pain control it helps her become more functional walk sleep and go about her ADLs.  She is never had any aberrant behavior on urine drug screens.    Assessment and plan  1.  Chronic pain syndrome.  Refilled pregabalin and oxycodone at current doses.  Follow-up monthly.  2.  Edema this is possibly an adverse effect of pregabalin although we have lowered the dose and it has not made a big impact.  This certainly could be due to her chronic kidney disease progression and a component of venous insufficiency.  She has compression stockings and is not wearing them now.  Will get a BMP today given her recent bump in creatinine but at the same time increase her torsemide from 20 to 40 mg daily.  She will need very close follow-up and another BMP and an A1c when she returns next time.  3.  Hypertension elevated today but not completely adherent and that she did not take her medications today.  Increasing her  torsemide may help.  Adherence will definitely help.  Will move the dose timing of her diltiazem and lisinopril to at bedtime but continue labetalol twice daily.  Will talk with Dr. Tomlin about stopping her phentermine and increasing her semaglutide.    Results for orders placed or performed in visit on 09/16/24   Urine Drug Screen Clinic     Status: Abnormal   Result Value Ref Range    Cannabinoids (25-ylv-7-carboxy-9-THC) Not Detected Not Detected, Indeterminate    Phencyclidine Not Detected Not Detected, Indeterminate    Cocaine (Benzoylecgonine) Not Detected Not Detected, Indeterminate    Methamphetamine (d-Methamphetamine) Not Detected Not Detected, Indeterminate    Opiates (Morphine) Not Detected Not Detected, Indeterminate    Amphetamine (d-Amphetamine) Not Detected Not Detected, Indeterminate    Benzodiazepines (Nordiazepam) Not Detected Not Detected, Indeterminate    Tricyclic Antidepressants (Desipramine) Not Detected Not Detected, Indeterminate    Methadone Not Detected Not Detected, Indeterminate    Barbiturates (Butalbital) Not Detected Not Detected, Indeterminate    Oxycodone Detected (A) Not Detected, Indeterminate    Buprenorphine Not Detected Not Detected, Indeterminate

## 2024-09-16 NOTE — PROGRESS NOTES
Clinic Administered Medication Documentation      Injectable Medication Documentation    Is there an active order (written within the past 365 days, with administrations remaining, not ) in the chart? Yes.     Patient was given Cyanocobalamin (B-12). Prior to medication administration, verified patient's identity using patient s name and date of birth. Please see MAR and medication order for additional information. Patient instructed to remain in clinic for 15 minutes and report any adverse reaction to staff immediately.    Vial/Syringe: Single dose vial. Was entire vial of medication used? Yes  Was this medication supplied by the patient? No  Is this a medication the patient will need to receive again? Yes. Verified that the patient has refills remaining in their prescription.    Name of provider who requested the medication administration: Dr. Meyer  Name of provider on site (faculty or community preceptor) at the time of performing the medication administration: Dr. Meyer    Date of next administration: Once  Date of next office visit with provider to renew medication plan (must be seen annually): Once        Answers submitted by the patient for this visit:  Patient Health Questionnaire (Submitted on 2024)  If you checked off any problems, how difficult have these problems made it for you to do your work, take care of things at home, or get along with other people?: Not difficult at all  PHQ9 TOTAL SCORE: 13

## 2024-09-26 NOTE — LETTER
Physical Therapy  Physical Therapy Orthopedic Evaluation    Patient Name: Jese Roman  MRN: [id number]  Today's Date: 9/26/2024  Time Calculation  Start Time: 1217  Stop Time: 1300  Time Calculation (min): 43 min  PT Evaluation Time Entry  PT Evaluation (Moderate) Time Entry: 26  PT Therapeutic Procedures Time Entry  Therapeutic Exercise Time Entry: 15       Insurance:  Payor: [facility] / Plan: [employer] / Product Type: *No Product type* /   Number of Treatments Authorized: 1/?  Certification Period Start Date: 09/26/24       Current Problem  Problem List Items Addressed This Visit             ICD-10-CM       Neuro    Lumbar radiculopathy, chronic - Primary M54.16    Relevant Orders    Follow Up In Physical Therapy        General:  General  Reason for Referral: L lumbar radiculopathy to L glute/posterior thigh  Referred By: Julieta Bernard DO  Past Medical History Relevant to Rehab: Chiari Malformation Type I, migraines/headaches, RA    Precautions:   Precautions  Precautions Comment: none    Medical History Form: Reviewed (scanned into chart)    Subjective:   Subjective   Chief Complaint: Patient reports she was standing on kitchen chair in March and fell down - hit chair and then to ground. Whole back was painful after that. More left sided glute pain has been persisting. Also got a new mattress at the time. Pinching/stabbing, tingling down leg and into toes/feet. Pain in back of knee. Difficulty rolling over in bed at night. Difficulty laying on belly. Does some KTC which relieves symptoms. Does piriformis stretch, which relieves. Walking is okay most of the time but then gets tired. Difficulty standing still.  Worsening varicose veins.   Chronic migraines from chiari malformation type I.  Under neuro care.  Pt does go to the chiropractor and went earlier today.     Pain:  Pain Assessment: 0-10  0-10 (Numeric) Pain Score: 7  Pain Type: Chronic pain  Pain Location: Back  Pain Orientation: Left  Pain  Letter by Mee Petersen RD at      Author: Mee Petersen RD Service: -- Author Type: --    Filed:  Encounter Date: 2019 Status: (Other)         May 20, 2019       Dear Ruthy Álvarez:    Thank you for your interest in [employer]. [employer] has a new electronic health record to help you manage your health information using your computer or the [employer] Reba.    With [employer] you can review instructions from your health care provider, send a secure message to your provider, view test results, manage your appointments and more.    How Do I Sign Up?  1. In your Internet browser, go to [email or web address]  2. Click on Sign Up Now   3. Enter your [employer] Activation Code exactly as it appears below. This code will  60 days after it is generated. You will not need to use this code after you have completed the sign-up process. If you do not sign up before the expiration date, you must request a new code.                                         [employer] Activation Code: Activation code not generated  Current [employer] Status: Active    4. Create a [employer] username. Think of one that is secure and easy to remember.  Your username must be between 6 and 20 characters.  5. Create a [employer] password. You can change your password at any time. Your password must be between 8 and 45 characters, contain at least two letters and one number, and contain both upper and lower case letters.  6. Choose a security question, enter your answer, and click Next. This can be used to access [employer] if you forget your password.   7. Enter a valid e-mail address to receive e-mail notifications when new information is available in [employer].  8. Click Sign In.     Additional Information  If you have questions, visit [email or web address], e-mail [email or web address] or call [phone] to talk to our [employer] staff. Remember, [employer] is NOT to be used for urgent needs. For medical emergencies, dial  911.          Radiating Towards: L glute to toes  Pain Descriptors: Aching, Dull, Numbness, Tingling, Sharp  Pain Frequency: Constant/continuous    Relevant Information (PMH & Previous Tests/Imaging): none- MRI brain d/t chiari    Prior Level of Function (PLOF)  Patient previously independent with all ADLs  Exercise/Physical Activity: used to do [employer]  Work/School:      Patients Living Environment: Reviewed and no concern    Primary Language: English    Patient's Goal(s) for Therapy: reduce or eliminate pain     Personal factors/comorbidities affecting outcomes: none    Red Flags: Do you have any of the following? No -other than N/T from radiating LBP  Fever/chills, unexplained weight changes, dizziness/fainting, unexplained change in bowel or bladder functions, unexplained malaise or muscle weakness, night pain/sweats, numbness or tingling    Objective:  Pt presents with slightly antalgic gait     AROM Lumbar in % of normal   Flexion WFL - painful upon rising (flexible per report)  Extension 33% painful   R lateral flexion 50% pinching  L lateral flexion 50% pulling     PROM Hip WFL    Hip MMT    R Hip flex 5/5     ER 5/5     IR 4+/5     Abd 4+/5     Add 5/5    L Hip flex 4/5     ER 4+/5     IR 4/5     Abd 4/5     Add 5/5     Dermatomes impaired to thigh and lower leg     WILMER: painful  RFIS: (-) pain, improved  Press ups: NT  DKTC: improved - SKTC improved  Hamstring length: minimal restriction L - relief       Outcome Measures:  Other Measures  Lower Extremity Funtional Score (LEFS): 18/80  Oswestry Disablity Index (SNOW): 60%     EDUCATION: Home exercise program, plan of care, activity modifications, pain management, and injury pathology  Outpatient Education  Individual(s) Educated: Patient  Education Provided: Home Exercise Program, Anatomy, POC  Patient/Caregiver Demonstrated Understanding: yes  Plan of Care Discussed and Agreed Upon: yes  Patient Response to Education: Patient/Caregiver Verbalized  Understanding of Information  Education Comment: Access Code: [email or web address]  URL: [email or web address]  Date: 09/26/2024  Prepared by: Jaylene Paris    Program Notes  Aracely Lumbar Roll    Exercises  - Seated Correct Posture  - 1 x daily - 7 x weekly - 1 sets - 1 reps  - Hooklying Single Knee to Chest  - 2 x daily - 7 x weekly - 1 sets - 10 reps - 5 hold  - Supine Double Knee to Chest  - 3 x daily - 7 x weekly - 1 sets - 10 reps - 5 sec hold  - Seated Lumbar Flexion Stretch  - 2 x daily - 7 x weekly - 1 sets - 5 reps - 10-15 sec  hold  - Supine Piriformis Stretch  - 2 x daily - 7 x weekly - 1 sets - 3 reps - 30-60 hold    Treatment Performed:  Therapeutic Exercise  Therapeutic Exercise Performed: Yes  Therapeutic Exercise Activity 1: HEP performed 1x through  Therapeutic Exercise Activity 2: Standing back ext - painful hold  Therapeutic Exercise Activity 3: Education on centralization    Assessment: Patient is 32 year old who presents to physical therapy with signs and symptoms consistent with L lumbar radiculopathy to L glute and posterolateral thigh/lower leg to toes. Patient has higher pain levels, decreased lumbar ROM with pain and strength limiting functional mobility and ADLs. Pt would benefit from skilled physical therapy in order to address the stated deficits and return to daily tasks with reduced pain and improved function.    Clinical Presentation: Evolving with changing characteristics  Personal Factors: Depression and Comorbidities    Plan:  Treatment/Interventions: Education/ Instruction, Electrical stimulation, Gait training, Hot pack, Manual therapy, Neuromuscular re-education, Self care/ home management, Taping techniques, Therapeutic activities, Therapeutic exercises  PT Plan: Skilled PT (response to flexion, NWB>WB strengthening as able)  PT Frequency: 1 time per week  Duration: 8-10 weeks, reducing frequency as goals are met  Onset Date: 03/01/24  Certification Period Start  Date: 09/26/24  Number of Treatments Authorized: 1/?  Rehab Potential: Good  Plan of Care Agreement: Patient       Screening  Frequency  Date Last Completed   Spiritual and Cultural Beliefs   Screening each visit or episode of care 9/26/2024   Falls Risk Screening every ambulatory visit [unfilled]   Pain Screening annually at primary care visit     Domestic Violence screening annually at primary care visit    Depression Screening annually in the primary care setting 9/26/2024   Suicide Risk Screening annually in the primary care setting    Nutrition and Food Insecurity   Screening at least annually at primary care visit     Key Learner annually in the primary care setting 9/26/2024   Drug Screen  5/23/2024  9:34 AM   Alcohol Screen  5/23/2024  9:34 AM   Advance Directive         Goals: Set and discussed today  Active       L lumbar radiculopathy       STG/LTG       Start:  09/26/24    Expected End:  12/05/24       STG  1) Patient will improve Oswestry Low Back Disability Questionnaire by 10% in order to indicate a measurable improvement in daily function and ability to complete daily tasks in 4 weeks.  2) Patient will be able to complete ADLs with pain less than 4/10 in lumbar region in 4 weeks.  3) Patient will have 75% of normal lumbar ROM in order to allow for proper bathing and dressing in 4 weeks.   4) Patient will be independent with HEP to allow for continued improvement in daily tasks at home and in the community in 3 visits.   LTG  1) Patient will have >/=4+/5 strength in lateral hip musculature to aid in stability with ambulation on varied surfaces in community in 8 weeks  2) Patient will be able to perform proper squatting technique and sitting postures in order to prevent increased pain with daily tasks in 8 weeks.  3) Patient will be able to perform >30 seconds of SLS on even ground in order to allow for safe ambulation and to demonstrate reduced fall risk within the community in 8 weeks.   4)  Patient will improve Oswestry Low Back Disability Questionnaire to </=15% in order to indicate a measurable improvement in daily function and ability to complete daily tasks in 8 weeks.  5) Pt will get back to strength regimen for wellness to improve QOL by discharge.                  Plan of care was developed with input and agreement by the patient      Jaylene Paris, PT

## 2024-10-02 ENCOUNTER — TELEPHONE (OUTPATIENT)
Dept: FAMILY MEDICINE | Facility: CLINIC | Age: 59
End: 2024-10-02
Payer: COMMERCIAL

## 2024-10-02 NOTE — TELEPHONE ENCOUNTER
Spoke with pt to assist with scheduling a lab appt. Pt states she will come in to clinic this week, but does not know exactly when, so she will call before coming in.    Nicholas RN, BSN

## 2024-10-02 NOTE — TELEPHONE ENCOUNTER
Please call:  Last visit she was supposed to stop in lab to get kidney tests done, which were quite important.  It looks like she didn't do that.  I strongly recommend that she come in this week for them.  Orders are in already--thank you!  CF

## 2024-10-03 ENCOUNTER — LAB (OUTPATIENT)
Dept: LAB | Facility: CLINIC | Age: 59
End: 2024-10-03
Payer: COMMERCIAL

## 2024-10-03 DIAGNOSIS — R60.9 EDEMA, UNSPECIFIED TYPE: ICD-10-CM

## 2024-10-03 PROCEDURE — 36415 COLL VENOUS BLD VENIPUNCTURE: CPT

## 2024-10-03 PROCEDURE — 80048 BASIC METABOLIC PNL TOTAL CA: CPT

## 2024-10-04 LAB
ANION GAP SERPL CALCULATED.3IONS-SCNC: 13 MMOL/L (ref 7–15)
BUN SERPL-MCNC: 46.7 MG/DL (ref 8–23)
CALCIUM SERPL-MCNC: 8.2 MG/DL (ref 8.8–10.4)
CHLORIDE SERPL-SCNC: 105 MMOL/L (ref 98–107)
CREAT SERPL-MCNC: 1.65 MG/DL (ref 0.51–0.95)
EGFRCR SERPLBLD CKD-EPI 2021: 35 ML/MIN/1.73M2
GLUCOSE SERPL-MCNC: 323 MG/DL (ref 70–99)
HCO3 SERPL-SCNC: 21 MMOL/L (ref 22–29)
POTASSIUM SERPL-SCNC: 4.9 MMOL/L (ref 3.4–5.3)
SODIUM SERPL-SCNC: 139 MMOL/L (ref 135–145)

## 2024-10-07 NOTE — RESULT ENCOUNTER NOTE
Please call pt:  Her kidney tests are better--back to her normal.  She is ok to increase the torsemide from 20 to 40 mg daily, as I did last time.  Additionally, I communicated w/ Dr Carson about medications for her wt loss.  We both think that taking a medication like ozempic, the once weekly injections, is much better for controlling weight than phentermine.  Ozempic is stronger for weight loss and for diabetes, and does not increase BP.  Pherntermine increases BP and pulse, and hers have both been up lately.  So, I recommend:  1.  Stop phentermine.  2.  Take ozempic weekly, and this has normally been dosed by Dr Carson.  If she is not available, we could DEFINITELY prescribe this at our clinic.  [place]  CF

## 2024-10-17 ENCOUNTER — OFFICE VISIT (OUTPATIENT)
Dept: FAMILY MEDICINE | Facility: CLINIC | Age: 59
End: 2024-10-17
Payer: COMMERCIAL

## 2024-10-17 VITALS
WEIGHT: 181 LBS | HEART RATE: 105 BPM | SYSTOLIC BLOOD PRESSURE: 151 MMHG | TEMPERATURE: 99 F | BODY MASS INDEX: 36.56 KG/M2 | OXYGEN SATURATION: 98 % | DIASTOLIC BLOOD PRESSURE: 86 MMHG | RESPIRATION RATE: 16 BRPM

## 2024-10-17 DIAGNOSIS — Z23 NEED FOR VACCINATION: ICD-10-CM

## 2024-10-17 DIAGNOSIS — G89.4 CHRONIC PAIN SYNDROME: ICD-10-CM

## 2024-10-17 DIAGNOSIS — G89.4 CHRONIC PAIN DISORDER: Primary | ICD-10-CM

## 2024-10-17 DIAGNOSIS — Z79.4 TYPE 2 DIABETES MELLITUS WITH DIABETIC POLYNEUROPATHY, WITH LONG-TERM CURRENT USE OF INSULIN (H): ICD-10-CM

## 2024-10-17 DIAGNOSIS — E11.42 TYPE 2 DIABETES MELLITUS WITH DIABETIC POLYNEUROPATHY, WITH LONG-TERM CURRENT USE OF INSULIN (H): ICD-10-CM

## 2024-10-17 LAB
AMPHETAMINES UR QL: NOT DETECTED
ANION GAP SERPL CALCULATED.3IONS-SCNC: 9 MMOL/L (ref 7–15)
BARBITURATES UR QL SCN: NOT DETECTED
BENZODIAZ UR QL SCN: NOT DETECTED
BUN SERPL-MCNC: 40.6 MG/DL (ref 8–23)
BUPRENORPHINE UR QL: NOT DETECTED
CALCIUM SERPL-MCNC: 8 MG/DL (ref 8.8–10.4)
CANNABINOIDS UR QL: NOT DETECTED
CHLORIDE SERPL-SCNC: 114 MMOL/L (ref 98–107)
COCAINE UR QL SCN: NOT DETECTED
CREAT SERPL-MCNC: 1.64 MG/DL (ref 0.51–0.95)
D-METHAMPHET UR QL: NOT DETECTED
EGFRCR SERPLBLD CKD-EPI 2021: 36 ML/MIN/1.73M2
EST. AVERAGE GLUCOSE BLD GHB EST-MCNC: 206 MG/DL
GLUCOSE SERPL-MCNC: 201 MG/DL (ref 70–99)
HBA1C MFR BLD: 8.8 % (ref 0–5.6)
HCO3 SERPL-SCNC: 19 MMOL/L (ref 22–29)
METHADONE UR QL SCN: NOT DETECTED
OPIATES UR QL SCN: NOT DETECTED
OXYCODONE UR QL SCN: DETECTED
PCP UR QL SCN: NOT DETECTED
POTASSIUM SERPL-SCNC: 5 MMOL/L (ref 3.4–5.3)
SODIUM SERPL-SCNC: 142 MMOL/L (ref 135–145)
TRICYCLICS UR QL SCN: NOT DETECTED

## 2024-10-17 PROCEDURE — 90656 IIV3 VACC NO PRSV 0.5 ML IM: CPT | Mod: GZ | Performed by: FAMILY MEDICINE

## 2024-10-17 PROCEDURE — 90480 ADMN SARSCOV2 VAC 1/ONLY CMP: CPT | Performed by: FAMILY MEDICINE

## 2024-10-17 PROCEDURE — 91320 SARSCV2 VAC 30MCG TRS-SUC IM: CPT | Performed by: FAMILY MEDICINE

## 2024-10-17 PROCEDURE — 80306 DRUG TEST PRSMV INSTRMNT: CPT | Performed by: FAMILY MEDICINE

## 2024-10-17 PROCEDURE — 36415 COLL VENOUS BLD VENIPUNCTURE: CPT | Performed by: FAMILY MEDICINE

## 2024-10-17 PROCEDURE — 80048 BASIC METABOLIC PNL TOTAL CA: CPT | Performed by: FAMILY MEDICINE

## 2024-10-17 PROCEDURE — 99214 OFFICE O/P EST MOD 30 MIN: CPT | Mod: 25 | Performed by: FAMILY MEDICINE

## 2024-10-17 PROCEDURE — 96372 THER/PROPH/DIAG INJ SC/IM: CPT | Performed by: FAMILY MEDICINE

## 2024-10-17 PROCEDURE — 83036 HEMOGLOBIN GLYCOSYLATED A1C: CPT | Performed by: FAMILY MEDICINE

## 2024-10-17 PROCEDURE — G0008 ADMIN INFLUENZA VIRUS VAC: HCPCS | Mod: GZ | Performed by: FAMILY MEDICINE

## 2024-10-17 RX ORDER — SEMAGLUTIDE 2.68 MG/ML
2 INJECTION, SOLUTION SUBCUTANEOUS
COMMUNITY
Start: 2024-10-08

## 2024-10-17 RX ORDER — OXYCODONE AND ACETAMINOPHEN 10; 325 MG/1; MG/1
TABLET ORAL
Qty: 120 TABLET | Refills: 0 | Status: SHIPPED | OUTPATIENT
Start: 2024-10-17

## 2024-10-17 RX ORDER — INSULIN GLARGINE 100 [IU]/ML
10 INJECTION, SOLUTION SUBCUTANEOUS AT BEDTIME
Qty: 15 ML | Refills: 11 | Status: SHIPPED | OUTPATIENT
Start: 2024-10-17

## 2024-10-17 RX ADMIN — CYANOCOBALAMIN 1000 MCG: 1000 INJECTION, SOLUTION INTRAMUSCULAR; SUBCUTANEOUS at 14:07

## 2024-10-17 NOTE — PATIENT INSTRUCTIONS
Restart lantus insulin 10 units every day at night.    Every 3 days, add 2 units if the morning blood sugar is above 140.      If under 140 before breakfast in AM, stay on your current dose.    Let me know if blood sugars <80 occur.

## 2024-10-17 NOTE — PROGRESS NOTES
Patient Active Problem List    Diagnosis Date Noted    CKD stage G3a/A3, GFR 45-59 and albumin creatinine ratio >300 mg/g (H) 12/31/2020     Priority: High     Persistent protienuria      Copper deficiency 04/05/2019     Priority: High     Identified in Aug, 2018--see discharge summary from [facility] in [place].      Chronic pain disorder 10/07/2016     Priority: High     Chronic Pain Diagnosis:  neuropathy  DIRE Total Score(s):14     ORT: 5    4 - 7 =  Moderate Risk   of future problems with Opioids  FAQ5: 50 November 20, 2020  Behavioral Health Consultation: 5/12/17 with Dr. Daisy Quezada  Personal Care Plan for Chronic Pain: 5/12/17  Opioid medication:oxycodone   Dose:10mg  Number of pills per month:60  Patient is being prescribed 10mg of oxycodone IR (Percocet) per day this is 15 mg Morphine Equivalents  Benzodiazepines Prescribed? No  Naloxone prescribed? YES February 16, 2023  RIOSORD 16:  15% risk      Clinic visit frequency required: Q 1 month Next visit due:   Controlled Substance/Opioid Treatment agreement on file (dated <12 months ago)?:    Date(s): pending  Last [place] verification: last visit  Items in red to be updated at each visit  Patient is followed by Data Unavailable for ongoing prescription of pain medication.  All refills should be approved by this provider, or covering partner.Chronic Pain     Chronic Pain Diagnosis:  neuropathy  DIRE Total Score(s):14     ORT: 5    4 - 7 =  Moderate Risk   of future problems with Opioids  FAQ5: 45/100 on 9/9/16, 40/100 on 11/15/16  55 October 29, 2020    Behavioral Health Consultation: 5/12/17 with Dr. Daisy Quezada  Personal Care Plan for Chronic Pain: 5/12/17  Opioid medication:oxycodone   Dose:10mg  Number of pills per month:90  Patient is being prescribed 45 MME  Benzodiazepines Prescribed? No  Naloxone prescribed? yes          Microalbuminuria 10/12/2015     Priority: High     Severely increased albuminuria      Type 2 diabetes mellitus with diabetic  polyneuropathy (H) 10/12/2015     Priority: High    Essential hypertension 08/26/2013     Priority: High    Class 2 severe obesity due to excess calories with serious comorbidity in adult (H) 09/16/2024     Priority: Medium    Altered mental status, unspecified altered mental status type 06/28/2024     Priority: Medium    Arthritis of both acromioclavicular joints 04/08/2021     Priority: Medium     xray proven       Neck mass 10/11/2019     Priority: Medium     Added automatically from request for surgery [id number]      Metabolic acidosis 07/26/2019     Priority: Medium     reolved w/ discontinuation of ASA      Obesity 01/03/2019     Priority: Medium    Hernia of anterior abdominal wall 01/28/2017     Priority: Medium    Osteoarthritis of both knees 10/31/2016     Priority: Medium    Bilateral ankle joint pain 01/15/2016     Priority: Medium    Esophageal reflux 04/07/2015     Priority: Medium    Urinary incontinence 02/19/2015     Priority: Medium    History of cholecystectomy 08/04/2013     Priority: Medium    Hyperlipidemia 06/21/2013     Priority: Medium    Status post bariatric surgery 06/21/2013     Priority: Medium     Kim-en-Y in 2006      Severe episode of recurrent major depressive disorder, with psychotic features (H) 01/24/2013     Priority: Medium    Moderate persistent asthma without complication 11/15/2012     Priority: Medium    Abnormal Pap smear of cervix 04/22/2019     Priority: Low     3-12-15: Pap/HPV neg.  Plan: Given hx (mildly abn pap, hyster), recheck in 5 yrs.  10-11-11: Pap/HPV neg.  Plan: Given ASUS pap in 2010, recommend repeating pap in 1 year.  8/3/2010 ASCUS Pap with +BV- pt treated with Metrogel.  Pt needs repeat Pap in 1yr.  8/4/2008 Pt had total hyst for menorrhagia, pelvic pain, scar tissue, and fibroids.    3-12-15: Pap/HPV neg.  Plan: Given hx (mildly abn pap, hyster), recheck in 5 yrs.  10-11-11: Pap/HPV neg.  Plan: Given ASUS pap in 2010, recommend repeating pap in 1  year.  8/3/2010 ASCUS Pap with +BV- pt treated with Metrogel.  Pt needs repeat Pap in 1yr.  8/4/2008 Pt had total hyst for menorrhagia, pelvic pain, scar tissue, and fibroids.      Hernia 01/27/2017     Priority: Low    H/O necrotizing fasciitis 08/04/2013     Priority: Low    S/P hysterectomy 06/21/2013     Priority: Low     2008, For fibroids.  Cervix removed, but still has ovaries.      History of hysterectomy for benign disease 06/21/2013     Priority: Low     2008, For fibroids.  Cervix removed, but still has ovaries.      Carpal tunnel syndrome 11/15/2012     Priority: Low    Chronic pain of both shoulders 12/23/2005     Priority: Low     There are no exam notes on file for this visit.  Chief Complaint   Patient presents with    Pain Management     CPM  Chest tightness   Brother passed away      Blood pressure (!) 151/86, pulse 105, temperature 99  F (37.2  C), temperature source Oral, resp. rate 16, weight 82.1 kg (181 lb), SpO2 98%, not currently breastfeeding.    Patient is here for several issues  Regarding her chronic pain it is probably factorial-a combination of peripheral neuropathy and osteoarthritis.  It is well-controlled with pregabalin and oxycodone.  No adverse effects.  Constipation and sedation are not a problem.  She has never had any aberrant behavior.  PDMP was reviewed today and is unremarkable.  Urine drug screen is pending at the time of this dictation.  Regarding her diabetes her only in her inventions right now are semaglutide and she recently increased the dose up from 1 to 2 mg weekly.  She has minimal nausea at this dose thankfully.  She is lost about 16 pounds in the last month but this may be due to water weight as we increased her diuretics as well and her leg edema is substantially better.  She has increased thirstiness and she states that her blood sugars on her CGM are continually high.  Her blood sugars have been quite labile of late without clear cause.  There is no dietary  changes.  She continues to take Jardiance 10 mg daily as well.    Regarding her edema it is better on torsemide 40 mg daily.  No adverse effects.  No orthopnea or PND.    She is now off of her phentermine for her obesity.  Objective blood pressure still mildly elevated but better.  Chest clear.  Heart regular rate and rhythm.  Extremities have 2+ edema to the mid tibia which is improved compared to the past.  Labs are as listed below    Assessment and plan  1.  Chronic pain syndrome.  Refilled oxycodone 2 tablets at bedtime and 1 tablet every 6 hours during the day as needed a quantity of 120 a month.  Continue Lyrica 25 mg twice daily.  2.  For type 2 diabetes she is on max dose of Ozempic but just got there.  She is on Jardiance dosed for her chronic kidney disease.  I am going to reinitiate insulin Lantus at 10 units nightly with titrating up every 3 days x 2 units when she is above 140.  Will reassess this in 1 month when she returns.  Refilled CGM monitor for a year today.  3.  Edema-better.  Weight loss has been substantial.  Exam is much improved she had edema up to her lower leg before.  She still probably is 10 to 15 pounds above her dry weight.  Continue torsemide 40 mg daily.  Check BMP today  4.  Hypertension currently treated with labetalol 100 mg twice daily, lisinopril 40 mg daily.  Diltiazem 240 mg daily and some assistance with torsemide as above.  No changes for now.  Consider increasing labetalol to 200 mg twice daily next time, if not at goal.    Results for orders placed or performed in visit on 10/17/24   Urine Drug Screen Clinic     Status: Abnormal   Result Value Ref Range    Cannabinoids (33-val-2-carboxy-9-THC) Not Detected Not Detected, Indeterminate    Phencyclidine Not Detected Not Detected, Indeterminate    Cocaine (Benzoylecgonine) Not Detected Not Detected, Indeterminate    Methamphetamine (d-Methamphetamine) Not Detected Not Detected, Indeterminate    Opiates (Morphine) Not Detected  Not Detected, Indeterminate    Amphetamine (d-Amphetamine) Not Detected Not Detected, Indeterminate    Benzodiazepines (Nordiazepam) Not Detected Not Detected, Indeterminate    Tricyclic Antidepressants (Desipramine) Not Detected Not Detected, Indeterminate    Methadone Not Detected Not Detected, Indeterminate    Barbiturates (Butalbital) Not Detected Not Detected, Indeterminate    Oxycodone Detected (A) Not Detected, Indeterminate    Buprenorphine Not Detected Not Detected, Indeterminate   Hemoglobin A1c     Status: Abnormal   Result Value Ref Range    Estimated Average Glucose 206 (H) <117 mg/dL    Hemoglobin A1C 8.8 (H) 0.0 - 5.6 %    Narrative    Results confirmed by repeat test.

## 2024-10-17 NOTE — PROGRESS NOTES
Clinic Administered Medication Documentation        Patient was given b-12. Prior to medication administration, verified patient's identity using patient s name and date of birth. Please see MAR and medication order for additional information. Patient instructed to remain in clinic for 15 minutes and report any adverse reaction to staff immediately.    Vial/Syringe: Single dose vial. Was entire vial of medication used? Yes

## 2024-10-18 NOTE — RESULT ENCOUNTER NOTE
Brady  Please call pt with this message:  Your kidney tests are abnormal, but actually a little better than 2 months ago which is good news.  It shows me that it was safe to increase the torsemide dose, as we did, and I would like you to continue that dose of 40 mg once daily.  Your diabetes test supports the decision to restart the Lantus insulin at 10 units a day, as we discussed yesterday.  I will want to look at your blood sugar control results the next time you come in    Let me know if she has questions--  CF

## 2024-11-05 ENCOUNTER — TELEPHONE (OUTPATIENT)
Dept: FAMILY MEDICINE | Facility: CLINIC | Age: 59
End: 2024-11-05
Payer: COMMERCIAL

## 2024-11-05 DIAGNOSIS — I10 ESSENTIAL HYPERTENSION: ICD-10-CM

## 2024-11-05 DIAGNOSIS — I10 BENIGN ESSENTIAL HYPERTENSION: ICD-10-CM

## 2024-11-05 DIAGNOSIS — R80.9 MICROALBUMINURIA: ICD-10-CM

## 2024-11-05 RX ORDER — ATORVASTATIN CALCIUM 10 MG/1
10 TABLET, FILM COATED ORAL DAILY
Qty: 100 TABLET | Refills: 2 | Status: SHIPPED | OUTPATIENT
Start: 2024-11-05

## 2024-11-05 RX ORDER — LISINOPRIL 40 MG/1
40 TABLET ORAL AT BEDTIME
Qty: 100 TABLET | Refills: 1 | Status: SHIPPED | OUTPATIENT
Start: 2024-11-05

## 2024-11-05 NOTE — TELEPHONE ENCOUNTER
Patient has [facility] coverage and is part of Medicare Part-D Low Income Subsidy program. With this program, the patient is eligible to get certain prescriptions as a 100-day supply at the 30-day prescription supply cost.      Prescriptions updated to 100-day supply per protocol: atorvastatin and lisinopril       Ana Hall, PharmD, [facility]  Population Health Pharmacist  [phone]

## 2024-11-14 ENCOUNTER — OFFICE VISIT (OUTPATIENT)
Dept: FAMILY MEDICINE | Facility: CLINIC | Age: 59
End: 2024-11-14
Payer: COMMERCIAL

## 2024-11-14 VITALS
SYSTOLIC BLOOD PRESSURE: 130 MMHG | WEIGHT: 181.6 LBS | OXYGEN SATURATION: 97 % | BODY MASS INDEX: 36.68 KG/M2 | DIASTOLIC BLOOD PRESSURE: 83 MMHG | TEMPERATURE: 99 F | HEART RATE: 114 BPM | RESPIRATION RATE: 20 BRPM

## 2024-11-14 DIAGNOSIS — G89.4 CHRONIC PAIN SYNDROME: ICD-10-CM

## 2024-11-14 DIAGNOSIS — F11.90 CHRONIC, CONTINUOUS USE OF OPIOIDS: ICD-10-CM

## 2024-11-14 DIAGNOSIS — Z79.4 TYPE 2 DIABETES MELLITUS WITH DIABETIC POLYNEUROPATHY, WITH LONG-TERM CURRENT USE OF INSULIN (H): Primary | ICD-10-CM

## 2024-11-14 DIAGNOSIS — R60.9 EDEMA, UNSPECIFIED TYPE: ICD-10-CM

## 2024-11-14 DIAGNOSIS — E11.42 TYPE 2 DIABETES MELLITUS WITH DIABETIC POLYNEUROPATHY, WITH LONG-TERM CURRENT USE OF INSULIN (H): Primary | ICD-10-CM

## 2024-11-14 DIAGNOSIS — E11.42 TYPE 2 DIABETES MELLITUS WITH DIABETIC POLYNEUROPATHY, WITHOUT LONG-TERM CURRENT USE OF INSULIN (H): ICD-10-CM

## 2024-11-14 DIAGNOSIS — N18.31 CKD STAGE G3A/A3, GFR 45-59 AND ALBUMIN CREATININE RATIO >300 MG/G (H): ICD-10-CM

## 2024-11-14 RX ORDER — OXYCODONE AND ACETAMINOPHEN 10; 325 MG/1; MG/1
TABLET ORAL
Qty: 120 TABLET | Refills: 0 | Status: SHIPPED | OUTPATIENT
Start: 2024-11-14

## 2024-11-14 RX ADMIN — CYANOCOBALAMIN 1000 MCG: 1000 INJECTION, SOLUTION INTRAMUSCULAR; SUBCUTANEOUS at 14:16

## 2024-11-14 NOTE — PROGRESS NOTES
Assessment & Plan   1.  Type 2 diabetes.  She is on appropriate pharmacologic options but were increasing her SGLT2 inhibitor to empagliflozin 25 mg daily in light of her proteinuria.  Check BMP next visit.  Continue Lantus 10, continue upward titration of semaglutide per Dr. Carson.  2.  Edema-improved.  It is probably factorial clearly BMP acceptable on last dose of torsemide.  No changes  3.  Chronic pain-refilled oxycodone in light of her PDMP review and urine drug screen.  CKD 3 A.  Stable.  Discussed the importance of ongoing blood pressure control and glycemic control.  Follow-up in 1 month.      Genesis Bliss is a 59 year old, presenting for the following health issues:  Pain Management (CPM)    HPI   T is here for chronic pain, edema, CKD, and diabetes.  Her only concern is that she has episodes where her heart races at times it will last up to 15 minutes and is not associated with dizziness or shortness of breath.  There is been no syncope.  Symptoms are not present now.  She will have a hot flash when it is happening.    Overall, her edema feels better should there is no new orthopnea or PND.  She is tolerating torsemide well.  Her renal function last time was stable her creatinine clearance over the past year has run between 30 and 40.    Her chronic pain is stable the source of her pain is multiple joints.  It is stable.  She has been on stable doses of oxycodone without adverse effects.  She needs a refill of Narcan.    For her diabetes, she has a CGM on but has not downloaded it to the .  She is on Ozempic 2 mg daily and tolerating it well.  She is on empagliflozin 10 mg daily.  She restarted insulin at 10 units daily as well.  She does not complain of polydipsia or polyuria.  Restarted insulin  at 10 uniits.  On 2 mg ozempic and tolerating it well.  Going up to 2.5 mg     For hypertension she is on max dose lisinopril, labetalol, and diltiazem.        2/22/2024    10:27 AM 6/20/2024      2:12 PM 9/16/2024    11:00 AM   PHQ-9 SCORE   PHQ-9 Total Score MyChart 19 (Moderately severe depression)  13 (Moderate depression)   PHQ-9 Total Score 19 17 13             7/15/2024    11:29 AM 9/16/2024    11:21 AM 11/14/2024     1:40 PM   PEG Score   PEG Total Score 10 7 9           2/22/2024    10:27 AM 6/20/2024     2:12 PM 9/16/2024    11:00 AM   PHQ-9 SCORE   PHQ-9 Total Score MyChart 19 (Moderately severe depression)  13 (Moderate depression)   PHQ-9 Total Score 19 17 13           5/6/2021    11:44 AM 5/6/2021    11:47 AM 10/26/2023     5:54 PM   JUAN-7 SCORE   Total Score 16 16 12           7/15/2024    11:29 AM 9/16/2024    11:21 AM 11/14/2024     1:40 PM   PEG Score   PEG Total Score 10 7 9        PDMP Review         Value Time User    State PDMP site checked  Yes 8/16/2024  6:15 AM Jose Angel Meyer MD          Last CSA Agreement:   CSA -- Patient Level:     [Media Unavailable] Controlled Substance Agreement - Opioid - Scan on 3/22/2024 10:15 AM   [Media Unavailable] Controlled Substance Agreement - Opioid - Scan on 12/14/2021  8:56 AM       Last UDS:   Results for orders placed or performed in visit on 11/14/24   Urine Drug Screen Clinic     Status: Abnormal   Result Value Ref Range    Cannabinoids (56-kvj-2-carboxy-9-THC) Not Detected Not Detected, Indeterminate    Phencyclidine Not Detected Not Detected, Indeterminate    Cocaine (Benzoylecgonine) Not Detected Not Detected, Indeterminate    Methamphetamine (d-Methamphetamine) Not Detected Not Detected, Indeterminate    Opiates (Morphine) Not Detected Not Detected, Indeterminate    Amphetamine (d-Amphetamine) Not Detected Not Detected, Indeterminate    Benzodiazepines (Nordiazepam) Not Detected Not Detected, Indeterminate    Tricyclic Antidepressants (Desipramine) Not Detected Not Detected, Indeterminate    Methadone Not Detected Not Detected, Indeterminate    Barbiturates (Butalbital) Not Detected Not Detected, Indeterminate    Oxycodone  Detected (A) Not Detected, Indeterminate    Buprenorphine Not Detected Not Detected, Indeterminate             Objective    /83   Pulse 114   Temp 99  F (37.2  C) (Oral)   Resp 20   Wt 82.4 kg (181 lb 9.6 oz)   LMP  (LMP Unknown)   SpO2 97%   BMI 36.68 kg/m    Body mass index is 36.68 kg/m .  Physical Exam   Chest clear to auscultation.  Heart regular rate and rhythm normal S1-S2 no murmur.  Abdomen soft nontender.  Extremities she has 1+ edema to the mid tibia which is improved.  Reviewed her weight it is stable over the past month but about 20 pounds down from before I started her current dose of torsemide.     Last Renal Panel:  Sodium   Date Value Ref Range Status   10/17/2024 142 135 - 145 mmol/L Final   11/20/2020 136.7 132.0 - 142.0 mmol/L Final     Potassium   Date Value Ref Range Status   10/17/2024 5.0 3.4 - 5.3 mmol/L Final   08/12/2022 4.6 3.5 - 5.0 mmol/L Final   11/20/2020 3.9 3.2 - 4.6 mmol/L Final     Chloride   Date Value Ref Range Status   10/17/2024 114 (H) 98 - 107 mmol/L Final   08/12/2022 114 (H) 98 - 107 mmol/L Final   11/20/2020 105.9 98.0 - 110.0 mmol/L Final     Carbon Dioxide   Date Value Ref Range Status   11/20/2020 21.1 20.0 - 32.0 mmol/L Final     Carbon Dioxide (CO2)   Date Value Ref Range Status   10/17/2024 19 (L) 22 - 29 mmol/L Final   08/12/2022 18 (L) 22 - 31 mmol/L Final     Anion Gap   Date Value Ref Range Status   10/17/2024 9 7 - 15 mmol/L Final   08/12/2022 8 5 - 18 mmol/L Final     Glucose   Date Value Ref Range Status   10/17/2024 201 (H) 70 - 99 mg/dL Final   08/12/2022 209 (H) 70 - 125 mg/dL Final   11/20/2020 248.8 (H) 70.0 - 99.0 mg'dL Final     GLUCOSE BY METER POCT   Date Value Ref Range Status   06/29/2024 188 (H) 70 - 99 mg/dL Final     Urea Nitrogen   Date Value Ref Range Status   10/17/2024 40.6 (H) 8.0 - 23.0 mg/dL Final   08/12/2022 29 (H) 8 - 22 mg/dL Final   11/20/2020 22.6 (H) 7.0 - 19.0 mg/dL Final     Creatinine   Date Value Ref Range Status    10/17/2024 1.64 (H) 0.51 - 0.95 mg/dL Final   11/20/2020 1.1 (H) 0.5 - 1.0 mg/dL Final     GFR Estimate   Date Value Ref Range Status   10/17/2024 36 (L) >60 mL/min/1.73m2 Final     Comment:     eGFR calculated using 2021 CKD-EPI equation.   04/18/2021 >60 >60 mL/min/1.73m2 Final   11/20/2020 55.5 (L) >60.0 mL/min/1.7 m2 Final     Calcium   Date Value Ref Range Status   10/17/2024 8.0 (L) 8.8 - 10.4 mg/dL Final     Comment:     Reference intervals for this test were updated on 7/16/2024 to reflect our healthy population more accurately. There may be differences in the flagging of prior results with similar values performed with this method. Those prior results can be interpreted in the context of the updated reference intervals.   11/20/2020 8.5 8.5 - 10.1 mg/dL Final     Phosphorus   Date Value Ref Range Status   08/05/2022 4.1 2.5 - 4.5 mg/dL Final     Albumin   Date Value Ref Range Status   06/27/2024 2.5 (L) 3.5 - 5.2 g/dL Final   08/12/2022 2.8 (L) 3.5 - 5.0 g/dL Final   03/29/2012 3.8 3.3 - 4.6 g/dl Final     Hemoglobin A1C   Date Value Ref Range Status   10/17/2024 8.8 (H) 0.0 - 5.6 % Final     Comment:     Normal <5.7%   Prediabetes 5.7-6.4%    Diabetes 6.5% or higher     Note: Adopted from ADA consensus guidelines.   06/27/2024 8.6 (H) <5.7 % Final     Comment:     Normal <5.7%   Prediabetes 5.7-6.4%    Diabetes 6.5% or higher     Note: Adopted from ADA consensus guidelines.   02/22/2024 5.6 0.0 - 5.6 % Final     Comment:     Normal <5.7%   Prediabetes 5.7-6.4%    Diabetes 6.5% or higher     Note: Adopted from ADA consensus guidelines.   11/20/2020 6.9 (H) 4.1 - 5.7 % Final   06/26/2020 8.8 (H) 4.1 - 5.7 % Final   06/02/2020 14.5 (H) 4.1 - 5.7 % Final

## 2024-11-14 NOTE — PATIENT INSTRUCTIONS
Increase the jardiance from 10 to 25 mg every day    Bring in your blood sugar readings next time.    Let me know if heart racing gets worse.    See you in a month.

## 2024-12-12 ENCOUNTER — OFFICE VISIT (OUTPATIENT)
Dept: FAMILY MEDICINE | Facility: CLINIC | Age: 59
End: 2024-12-12
Payer: COMMERCIAL

## 2024-12-12 VITALS
SYSTOLIC BLOOD PRESSURE: 182 MMHG | HEART RATE: 118 BPM | DIASTOLIC BLOOD PRESSURE: 78 MMHG | RESPIRATION RATE: 16 BRPM | OXYGEN SATURATION: 94 % | TEMPERATURE: 98.4 F

## 2024-12-12 DIAGNOSIS — E11.42 TYPE 2 DIABETES MELLITUS WITH DIABETIC POLYNEUROPATHY, WITH LONG-TERM CURRENT USE OF INSULIN (H): Primary | ICD-10-CM

## 2024-12-12 DIAGNOSIS — G89.4 CHRONIC PAIN DISORDER: ICD-10-CM

## 2024-12-12 DIAGNOSIS — I10 BENIGN ESSENTIAL HYPERTENSION: ICD-10-CM

## 2024-12-12 DIAGNOSIS — G89.4 CHRONIC PAIN SYNDROME: ICD-10-CM

## 2024-12-12 DIAGNOSIS — Z79.4 TYPE 2 DIABETES MELLITUS WITH DIABETIC POLYNEUROPATHY, WITH LONG-TERM CURRENT USE OF INSULIN (H): Primary | ICD-10-CM

## 2024-12-12 DIAGNOSIS — R60.9 EDEMA, UNSPECIFIED TYPE: ICD-10-CM

## 2024-12-12 RX ORDER — PREGABALIN 20 MG/ML
25 SOLUTION ORAL 2 TIMES DAILY
Qty: 75 ML | Refills: 2 | Status: SHIPPED | OUTPATIENT
Start: 2024-12-12

## 2024-12-12 RX ORDER — LABETALOL 200 MG/1
200 TABLET, FILM COATED ORAL 2 TIMES DAILY
Qty: 60 TABLET | Refills: 11 | Status: SHIPPED | OUTPATIENT
Start: 2024-12-12

## 2024-12-12 RX ORDER — OXYCODONE AND ACETAMINOPHEN 10; 325 MG/1; MG/1
TABLET ORAL
Qty: 120 TABLET | Refills: 0 | Status: SHIPPED | OUTPATIENT
Start: 2024-12-12

## 2024-12-12 RX ADMIN — CYANOCOBALAMIN 1000 MCG: 1000 INJECTION, SOLUTION INTRAMUSCULAR; SUBCUTANEOUS at 14:43

## 2024-12-12 NOTE — PATIENT INSTRUCTIONS
Today we  Refilled oxycodone and lyrica--no changes made    We increased labetelol from 100 to 200 mg AM and PM--start this right away.    F/up in 1 months    Due for labs then    Bring blood sugars in next time.

## 2024-12-12 NOTE — PROGRESS NOTES
Patient Active Problem List    Diagnosis Date Noted    CKD stage G3a/A3, GFR 45-59 and albumin creatinine ratio >300 mg/g (H) 12/31/2020     Priority: High     Persistent protienuria      Copper deficiency 04/05/2019     Priority: High     Identified in Aug, 2018--see discharge summary from [facility] in [place].      Chronic pain disorder 10/07/2016     Priority: High     Chronic Pain Diagnosis:  neuropathy  DIRE Total Score(s):14     ORT: 5    4 - 7 =  Moderate Risk   of future problems with Opioids  FAQ5: 50 November 20, 2020  Behavioral Health Consultation: 5/12/17 with Dr. Daisy Quezada  Personal Care Plan for Chronic Pain: 5/12/17  Opioid medication:oxycodone   Dose:10mg  Number of pills per month:60  Patient is being prescribed 10mg of oxycodone IR (Percocet) per day this is 15 mg Morphine Equivalents  Benzodiazepines Prescribed? No  Naloxone prescribed? YES February 16, 2023  RIOSORD 16:  15% risk      Clinic visit frequency required: Q 1 month Next visit due:   Controlled Substance/Opioid Treatment agreement on file (dated <12 months ago)?:    Date(s): pending  Last [place] verification: last visit  Items in red to be updated at each visit  Patient is followed by Data Unavailable for ongoing prescription of pain medication.  All refills should be approved by this provider, or covering partner.Chronic Pain     Chronic Pain Diagnosis:  neuropathy  DIRE Total Score(s):14     ORT: 5    4 - 7 =  Moderate Risk   of future problems with Opioids  FAQ5: 45/100 on 9/9/16, 40/100 on 11/15/16  55 October 29, 2020    Behavioral Health Consultation: 5/12/17 with Dr. Daisy Quezada  Personal Care Plan for Chronic Pain: 5/12/17  Opioid medication:oxycodone   Dose:10mg  Number of pills per month:90  Patient is being prescribed 45 MME  Benzodiazepines Prescribed? No  Naloxone prescribed? yes          Microalbuminuria 10/12/2015     Priority: High     Severely increased albuminuria      Type 2 diabetes mellitus with diabetic  polyneuropathy (H) 10/12/2015     Priority: High    Essential hypertension 08/26/2013     Priority: High    Chronic, continuous use of opioids 11/14/2024     Priority: Medium    Class 2 severe obesity due to excess calories with serious comorbidity in adult (H) 09/16/2024     Priority: Medium    Altered mental status, unspecified altered mental status type 06/28/2024     Priority: Medium    Arthritis of both acromioclavicular joints 04/08/2021     Priority: Medium     xray proven       Neck mass 10/11/2019     Priority: Medium     Added automatically from request for surgery [id number]      Metabolic acidosis 07/26/2019     Priority: Medium     reolved w/ discontinuation of ASA      Obesity 01/03/2019     Priority: Medium    Hernia of anterior abdominal wall 01/28/2017     Priority: Medium    Osteoarthritis of both knees 10/31/2016     Priority: Medium    Bilateral ankle joint pain 01/15/2016     Priority: Medium    Esophageal reflux 04/07/2015     Priority: Medium    Urinary incontinence 02/19/2015     Priority: Medium    History of cholecystectomy 08/04/2013     Priority: Medium    Hyperlipidemia 06/21/2013     Priority: Medium    Status post bariatric surgery 06/21/2013     Priority: Medium     Kim-en-Y in 2006      Severe episode of recurrent major depressive disorder, with psychotic features (H) 01/24/2013     Priority: Medium    Moderate persistent asthma without complication 11/15/2012     Priority: Medium    Abnormal Pap smear of cervix 04/22/2019     Priority: Low     3-12-15: Pap/HPV neg.  Plan: Given hx (mildly abn pap, hyster), recheck in 5 yrs.  10-11-11: Pap/HPV neg.  Plan: Given ASUS pap in 2010, recommend repeating pap in 1 year.  8/3/2010 ASCUS Pap with +BV- pt treated with Metrogel.  Pt needs repeat Pap in 1yr.  8/4/2008 Pt had total hyst for menorrhagia, pelvic pain, scar tissue, and fibroids.    3-12-15: Pap/HPV neg.  Plan: Given hx (mildly abn pap, hyster), recheck in 5 yrs.  10-11-11: Pap/HPV neg.   Plan: Given ASUS pap in 2010, recommend repeating pap in 1 year.  8/3/2010 ASCUS Pap with +BV- pt treated with Metrogel.  Pt needs repeat Pap in 1yr.  8/4/2008 Pt had total hyst for menorrhagia, pelvic pain, scar tissue, and fibroids.      Hernia 01/27/2017     Priority: Low    H/O necrotizing fasciitis 08/04/2013     Priority: Low    S/P hysterectomy 06/21/2013     Priority: Low     2008, For fibroids.  Cervix removed, but still has ovaries.      History of hysterectomy for benign disease 06/21/2013     Priority: Low     2008, For fibroids.  Cervix removed, but still has ovaries.      Carpal tunnel syndrome 11/15/2012     Priority: Low    Chronic pain of both shoulders 12/23/2005     Priority: Low     There are no exam notes on file for this visit.  Chief Complaint   Patient presents with    Pain Management     CPM     Blood pressure (!) 182/78, pulse 118, temperature 98.4  F (36.9  C), temperature source Oral, resp. rate 16, SpO2 94%, not currently breastfeeding.  No results found for any visits on 12/12/24.  Patient is here for several issues  First she has chronic pain from osteoarthritis and diabetic neuropathy.  Is managed with oxycodone and pregabalin.  She tolerates them both well.  No constipation.  No other drug use.  She is overall satisfied with her level of pain control.  PDMP reviewed today and is unremarkable.  Your urine drug screen is pending at the time of this dictation.  She has had no aberrant behavior.  She is due for refill and I see her monthly.  Regarding her obesity she has lost 20 pounds over the past 6 months on Ozempic.  She tolerates it well.  She is no longer taking any stimulant therapy as an appetite suppressant.    Regarding her hypertension she is asymptomatic states that she is taking all of her medications but at the same time is hypertensive and tachycardic today as noted.  She has no symptoms such as headache.  Her blood sugars are still running high in the 200s.  No GI  intolerance of Ozempic.    Her edema is better she does not have orthopnea or PND.    Objective pleasant female in no acute distress.  Chest clear.  Heart tachycardic but regular.  Extremities have 1+ edema.  The edema is a to the mid tibia.  It is less than in the past.    Results for orders placed or performed in visit on 12/12/24   Urine Drug Screen Clinic     Status: Abnormal   Result Value Ref Range    Cannabinoids (77-qab-8-carboxy-9-THC) Not Detected Not Detected, Indeterminate    Phencyclidine Not Detected Not Detected, Indeterminate    Cocaine (Benzoylecgonine) Not Detected Not Detected, Indeterminate    Methamphetamine (d-Methamphetamine) Not Detected Not Detected, Indeterminate    Opiates (Morphine) Not Detected Not Detected, Indeterminate    Amphetamine (d-Amphetamine) Not Detected Not Detected, Indeterminate    Benzodiazepines (Nordiazepam) Not Detected Not Detected, Indeterminate    Tricyclic Antidepressants (Desipramine) Not Detected Not Detected, Indeterminate    Methadone Not Detected Not Detected, Indeterminate    Barbiturates (Butalbital) Not Detected Not Detected, Indeterminate    Oxycodone Detected (A) Not Detected, Indeterminate    Buprenorphine Not Detected Not Detected, Indeterminate     Assessment plan  1.  Chronic pain syndrome.  PDMP  reviewed and there is no red flags.  Oxycodone and gabapentin refilled.  2.  Type 2 diabetes.  Due for an A1c in January.  BMP also due then.  Continue current therapy for now which involves an SGLT2 inhibitor, semaglutide, and glargine 10 units daily.  Review CGM next time as well.  Will do lab first and get an A1c before she is roomed.  3.  Hypertension not controlled.  Increase labetalol from 100 to 200 mg twice daily.  Continue lisinopril 40 mg daily.  Continue diltiazem 240 mg daily  4.  For her edema renal profile stable on torsemide 40 mg daily.  Recheck BMP next visit  5.  Continue monthly B12 injections.  Recheck CBC next visit.

## 2025-01-06 ENCOUNTER — MEDICAL CORRESPONDENCE (OUTPATIENT)
Dept: HEALTH INFORMATION MANAGEMENT | Facility: CLINIC | Age: 60
End: 2025-01-06
Payer: COMMERCIAL

## 2025-01-16 ENCOUNTER — OFFICE VISIT (OUTPATIENT)
Dept: FAMILY MEDICINE | Facility: CLINIC | Age: 60
End: 2025-01-16
Payer: COMMERCIAL

## 2025-01-16 ENCOUNTER — TELEPHONE (OUTPATIENT)
Dept: FAMILY MEDICINE | Facility: CLINIC | Age: 60
End: 2025-01-16

## 2025-01-16 VITALS
RESPIRATION RATE: 16 BRPM | WEIGHT: 181.4 LBS | OXYGEN SATURATION: 95 % | DIASTOLIC BLOOD PRESSURE: 117 MMHG | SYSTOLIC BLOOD PRESSURE: 171 MMHG | HEART RATE: 119 BPM | TEMPERATURE: 98.5 F | BODY MASS INDEX: 36.64 KG/M2

## 2025-01-16 DIAGNOSIS — E11.42 TYPE 2 DIABETES MELLITUS WITH DIABETIC POLYNEUROPATHY, UNSPECIFIED WHETHER LONG TERM INSULIN USE (H): Primary | ICD-10-CM

## 2025-01-16 DIAGNOSIS — Z79.4 TYPE 2 DIABETES MELLITUS WITH DIABETIC POLYNEUROPATHY, WITH LONG-TERM CURRENT USE OF INSULIN (H): Primary | ICD-10-CM

## 2025-01-16 DIAGNOSIS — I10 BENIGN ESSENTIAL HYPERTENSION: ICD-10-CM

## 2025-01-16 DIAGNOSIS — E11.42 TYPE 2 DIABETES MELLITUS WITH DIABETIC POLYNEUROPATHY, WITH LONG-TERM CURRENT USE OF INSULIN (H): Primary | ICD-10-CM

## 2025-01-16 DIAGNOSIS — R60.9 EDEMA, UNSPECIFIED TYPE: ICD-10-CM

## 2025-01-16 LAB
ANION GAP SERPL CALCULATED.3IONS-SCNC: 10 MMOL/L (ref 7–15)
BUN SERPL-MCNC: 35.1 MG/DL (ref 8–23)
CALCIUM SERPL-MCNC: 7.2 MG/DL (ref 8.8–10.4)
CHLORIDE SERPL-SCNC: 114 MMOL/L (ref 98–107)
CHOLEST SERPL-MCNC: 236 MG/DL
CREAT SERPL-MCNC: 1.45 MG/DL (ref 0.51–0.95)
EGFRCR SERPLBLD CKD-EPI 2021: 41 ML/MIN/1.73M2
GLUCOSE SERPL-MCNC: 217 MG/DL (ref 70–99)
HCO3 SERPL-SCNC: 18 MMOL/L (ref 22–29)
HDLC SERPL-MCNC: 66 MG/DL
LDLC SERPL CALC-MCNC: 130 MG/DL
NONHDLC SERPL-MCNC: 170 MG/DL
POTASSIUM SERPL-SCNC: 4.7 MMOL/L (ref 3.4–5.3)
SODIUM SERPL-SCNC: 142 MMOL/L (ref 135–145)
TRIGL SERPL-MCNC: 198 MG/DL

## 2025-01-16 RX ORDER — LABETALOL HYDROCHLORIDE 400 MG/1
400 TABLET, FILM COATED ORAL 2 TIMES DAILY
Qty: 60 TABLET | Refills: 2 | Status: SHIPPED | OUTPATIENT
Start: 2025-01-16 | End: 2025-01-19 | Stop reason: ALTCHOICE

## 2025-01-16 NOTE — PATIENT INSTRUCTIONS
Increase the labetelol to 400 mg AM and PM    Continue taking lisinopril 40 mg daily and diltiazem 240 mg daily.    Keep f/up in early Feb.    Collect 24 hour urine test

## 2025-01-16 NOTE — TELEPHONE ENCOUNTER
labetalol 400 MG TABS is not available in the pharmacy's system. Can the provider send over a new script for 200 mg qid or 300mg and 100mg? Please advise.

## 2025-01-19 RX ORDER — LABETALOL 200 MG/1
400 TABLET, FILM COATED ORAL 2 TIMES DAILY
Qty: 120 TABLET | Refills: 5 | Status: SHIPPED | OUTPATIENT
Start: 2025-01-19

## 2025-01-19 NOTE — RESULT ENCOUNTER NOTE
Brady  Please call pt 1/20.  Her kidney tests are better.  Her cholesterol is higher than expected.  Can you confirm that she is taking atorvastatin 10 mg every day?    Also, I've modified her labetelol Rx:  she should take labetelol 200 mg tablets, taking 2 in the morning and 2 at night (400 mg po bid)    F/up as planned.  Please remind her to take her meds before she comes.  CF

## 2025-01-19 NOTE — PROGRESS NOTES
ASSESSMENT AND PLAN     (E11.42) Type 2 diabetes mellitus with diabetic polyneuropathy (H)  (primary encounter diagnosis)  Comment: Most recent A1c reviewed.  She is really not even taking insulin and is under good control.  Plan: CANCELED: Lipid panel reflex to direct LDL         Non-fasting        Continue current regimen which is Jardiance 25 mg daily, semaglutide 2 mg weekly    (E11.42,  Z79.4) Type 2 diabetes mellitus with diabetic polyneuropathy, with long-term current use of insulin (H)      (I10) Benign essential hypertension  Comment: Not at goals.  Her tachycardia surprising.  It remains even though she is no longer on appetite suppressing stimulants.  Will continue lisinopril 40, diltiazem 240, and increase labetalol from 200 mg twice daily to 400 mg twice daily.  Given her tachycardia and resistant hypertension I am going to get 24-hour urine for metanephrines.  Plan: labetalol 400 MG TABS, Metanephrine random or         24 hr urine            (R60.9) Edema, unspecified type  Comment:   Improving on torsemide 20 mg daily.  No changes.    BMP ordered today and it is improved.  Her hyperkalemia has resolved.  Her acidosis is better.  Recheck when she returns in early February.             SUBJECTIVE      Ruthy Álvarez is a 59 year old  female with a PMH significant for:     Patient Active Problem List    Diagnosis Date Noted    CKD stage G3a/A3, GFR 45-59 and albumin creatinine ratio >300 mg/g (H) 12/31/2020     Priority: High     Persistent protienuria      Copper deficiency 04/05/2019     Priority: High     Identified in Aug, 2018--see discharge summary from [facility] in [place].      Chronic pain disorder 10/07/2016     Priority: High     Chronic Pain Diagnosis:  neuropathy  DIRE Total Score(s):14     ORT: 5    4 - 7 =  Moderate Risk   of future problems with Opioids  FAQ5: 50 November 20, 2020  Behavioral Health Consultation: 5/12/17 with Dr. Daisy Quezada  Personal Care Plan for Chronic Pain:  5/12/17  Opioid medication:oxycodone   Dose:10mg  Number of pills per month:60  Patient is being prescribed 10mg of oxycodone IR (Percocet) per day this is 15 mg Morphine Equivalents  Benzodiazepines Prescribed? No  Naloxone prescribed? YES February 16, 2023  TESSASORD 16:  15% risk      Clinic visit frequency required: Q 1 month Next visit due:   Controlled Substance/Opioid Treatment agreement on file (dated <12 months ago)?:    Date(s): pending  Last [place] verification: last visit  Items in red to be updated at each visit  Patient is followed by Data Unavailable for ongoing prescription of pain medication.  All refills should be approved by this provider, or covering partner.Chronic Pain     Chronic Pain Diagnosis:  neuropathy  DIRE Total Score(s):14     ORT: 5    4 - 7 =  Moderate Risk   of future problems with Opioids  FAQ5: 45/100 on 9/9/16, 40/100 on 11/15/16  55 October 29, 2020    Behavioral Health Consultation: 5/12/17 with Dr. Daisy Quezada  Personal Care Plan for Chronic Pain: 5/12/17  Opioid medication:oxycodone   Dose:10mg  Number of pills per month:90  Patient is being prescribed 45 MME  Benzodiazepines Prescribed? No  Naloxone prescribed? yes          Microalbuminuria 10/12/2015     Priority: High     Severely increased albuminuria      Type 2 diabetes mellitus with diabetic polyneuropathy (H) 10/12/2015     Priority: High    Essential hypertension 08/26/2013     Priority: High    Chronic, continuous use of opioids 11/14/2024     Priority: Medium    Class 2 severe obesity due to excess calories with serious comorbidity in adult (H) 09/16/2024     Priority: Medium    Altered mental status, unspecified altered mental status type 06/28/2024     Priority: Medium    Arthritis of both acromioclavicular joints 04/08/2021     Priority: Medium     xray proven       Neck mass 10/11/2019     Priority: Medium     Added automatically from request for surgery [id number]      Metabolic acidosis 07/26/2019     Priority:  Medium     reolved w/ discontinuation of ASA      Obesity 01/03/2019     Priority: Medium    Hernia of anterior abdominal wall 01/28/2017     Priority: Medium    Osteoarthritis of both knees 10/31/2016     Priority: Medium    Bilateral ankle joint pain 01/15/2016     Priority: Medium    Esophageal reflux 04/07/2015     Priority: Medium    Urinary incontinence 02/19/2015     Priority: Medium    History of cholecystectomy 08/04/2013     Priority: Medium    Hyperlipidemia 06/21/2013     Priority: Medium    Status post bariatric surgery 06/21/2013     Priority: Medium     Kim-en-Y in 2006      Severe episode of recurrent major depressive disorder, with psychotic features (H) 01/24/2013     Priority: Medium    Moderate persistent asthma without complication 11/15/2012     Priority: Medium    Abnormal Pap smear of cervix 04/22/2019     Priority: Low     3-12-15: Pap/HPV neg.  Plan: Given hx (mildly abn pap, hyster), recheck in 5 yrs.  10-11-11: Pap/HPV neg.  Plan: Given ASUS pap in 2010, recommend repeating pap in 1 year.  8/3/2010 ASCUS Pap with +BV- pt treated with Metrogel.  Pt needs repeat Pap in 1yr.  8/4/2008 Pt had total hyst for menorrhagia, pelvic pain, scar tissue, and fibroids.    3-12-15: Pap/HPV neg.  Plan: Given hx (mildly abn pap, hyster), recheck in 5 yrs.  10-11-11: Pap/HPV neg.  Plan: Given ASUS pap in 2010, recommend repeating pap in 1 year.  8/3/2010 ASCUS Pap with +BV- pt treated with Metrogel.  Pt needs repeat Pap in 1yr.  8/4/2008 Pt had total hyst for menorrhagia, pelvic pain, scar tissue, and fibroids.      Hernia 01/27/2017     Priority: Low    H/O necrotizing fasciitis 08/04/2013     Priority: Low    S/P hysterectomy 06/21/2013     Priority: Low     2008, For fibroids.  Cervix removed, but still has ovaries.      History of hysterectomy for benign disease 06/21/2013     Priority: Low     2008, For fibroids.  Cervix removed, but still has ovaries.      Carpal tunnel syndrome 11/15/2012      Priority: Low    Chronic pain of both shoulders 12/23/2005     Priority: Low     Current Outpatient Medications   Medication Sig Dispense Refill    labetalol 400 MG TABS Take 400 mg by mouth 2 times daily. 60 tablet 2    albuterol (PROVENTIL) (2.5 MG/3ML) 0.083% neb solution Take 1 vial (2.5 mg) by nebulization every 6 hours as needed for shortness of breath. 60 mL 0    Alcohol Swabs (PRO COMFORT ALCOHOL) 70 % PADS 1 pad 4 times daily as needed (blood sugar testing) Use QID for blood sugar monitoring 400 each 11    atorvastatin (LIPITOR) 10 MG tablet Take 1 tablet (10 mg) by mouth daily. 100 tablet 2    blood glucose (NO BRAND SPECIFIED) lancets standard Check fasting glucose 4 times daily.  For Accu check monitor 360 each 11    blood glucose calibration (ONETOUCH ULTRA CONTROL) solution See Admin Instructions      blood glucose monitoring (ONE TOUCH ULTRA 2) meter device kit See Admin Instructions      budesonide-formoterol (SYMBICORT) 80-4.5 MCG/ACT Inhaler Inhale 2 puffs once daily as needed, and may repeat every hour, maximum 12 puffs in 24 hours 20.4 g 11    capsaicin (ZOSTRIX) 0.025 % external cream Apply 3-4 times daily to knees 118 mL 11    Continuous Glucose Sensor (FREESTYLE ANIKET 2 SENSOR) MISC Use every 2 weeks as directed 2 each 11    Continuous Glucose Sensor (FREESTYLE ANIKET 2 SENSOR) MISC Change every 14 days. 2 each 5    CONTOUR NEXT TEST test strip USE TO TEST BLOOD GLUCOSE FOUR TIMES DAILY 400 strip 11    diltiazem ER (DILT-XR) 240 MG 24 hr ER beaded capsule Take 1 capsule (240 mg) by mouth daily New dose 30 capsule 11    empagliflozin (JARDIANCE) 25 MG TABS tablet Take 1 tablet (25 mg) by mouth daily. 90 tablet 3    insulin pen needle (BD ULTRA-FINE) 29G X 12.7MM miscellaneous USE ONCE DAILY 100 each 4    Lancet Devices (EASY MINI EJECT LANCING DEVICE) MISC See Admin Instructions      Lancets 30G MISC USE TO TEST BLOOD GLUCOSE FOUR TIMES DAILY 400 each 11    lidocaine (LMX4) 4 % external cream  Apply topically once as needed for mild pain (left knee). 45 g 11    lisinopril (ZESTRIL) 40 MG tablet Take 1 tablet (40 mg) by mouth at bedtime. 100 tablet 1    naloxone (NARCAN) 4 MG/0.1ML nasal spray Spray 1 spray (4 mg) into one nostril alternating nostrils once as needed for opioid reversal. Every 2-3 minutes until patient responsive or EMS arrives 0.2 mL 5    ondansetron (ZOFRAN ODT) 4 MG ODT tab Take 1 tablet (4 mg) by mouth every 8 hours as needed for nausea or vomiting 10 tablet 0    order for DME Equipment being ordered: wheeled walker with seat 1 Device 0    oxyCODONE-acetaminophen (PERCOCET)  MG per tablet Take 2 tablets by mouth at bedtime. May also take 1 tablet every 6 hours as needed for severe pain. 120 tablet 0    OZEMPIC, 2 MG/DOSE, 8 MG/3ML pen Inject 2 mg subcutaneously every 7 days. 3 mL 11    pregabalin (LYRICA) 20 MG/ML solution Take 1.25 mLs (25 mg) by mouth 2 times daily. 75 mL 2    senna-docusate (SENOKOT-S/PERICOLACE) 8.6-50 MG tablet Take 1 tablet by mouth 2 times daily 60 tablet 11    sodium citrate-citric acid (BICITRA) 500-334 MG/5ML solution Take 10 mLs by mouth 2 times daily (with meals) 473 mL 11    SUMAtriptan (IMITREX STATDOSE) 6 MG/0.5ML pen injector kit Inject 6 mg Subcutaneous at onset of headache for migraine      torsemide (DEMADEX) 20 MG tablet Take 2 tablets (40 mg) by mouth daily. New dose 180 tablet 3    vitamin D2 (ERGOCALCIFEROL) 90592 units (1250 mcg) capsule TAKE 1 CAPSULE BY MOUTH 1 TIME A WEEK 13 capsule 0     Current Facility-Administered Medications   Medication Dose Route Frequency Provider Last Rate Last Admin    cyanocobalamin injection 1,000 mcg  1,000 mcg Intramuscular Q30 Days Jose Angel Meyer MD   1,000 mcg at 01/09/25 1403     Chief Complaint   Patient presents with    Other     Bp check       She presents with for follow-up of her hypertension and chronic kidney disease.  Her current antihypertensives include lisinopril 40 mg daily, diltiazem  extended release 240 mg daily, and labetalol 200 mg twice daily.    I asked her to discontinue the aspirin she was taking last visit, and she has done so.  Her headaches have been manageable.    She states that she took her medications today.    I reviewed her most recent labs from 1 9 that showed surprising metabolic acidosis with a bicarb of 13.  Hyperkalemia with a potassium of 5.6.  Stable creatinine clearance at 4.0.  An A1c of 6.5.  I asked her to return today to repeat those..    PMH, Medications and Allergies were reviewed and updated as needed.        REVIEW OF SYSTEMS     General: No fevers, chills, sweats, unexplained weight loss  Neck: No masses, dysphagia  Endo:  Polydypsia, polyuria, hypoglycemia, heat or cold intolerance  CV: No chest pain, palpitations, orthopnea or pnd  Resp: No cough or shortness of breath.    GI:  No abdominal pain, nausea or vomiting. No constipation or diarrhea.  No blood in stool.  : No nocturia, frequency, urgency,   Neuro:  No headache, weakness, or numbness          OBJECTIVE     Vitals:    01/16/25 1119 01/16/25 1120   BP: (!) 177/103 (!) 171/117   Pulse: 119    Resp: 16    Temp: 98.5  F (36.9  C)    TempSrc: Oral    SpO2: 95%    Weight: 82.3 kg (181 lb 6.4 oz)      Body mass index is 36.64 kg/m .  Repeat blood pressure manually was an average of 166/99.  Pulse remained at about 110.  Her pulse is regular.  Gen:  Well nourished and in NAD  CV:  RRR  - no murmurs, rubs, or gallups,   Pulm:  CTAB, no wheezes/rales/rhonchi   ABD: Non-distended.  Normal BS.  Soft, nontender, no masses or HSM   Extrem: no cyanosis, 1 + edema to mid tibia  Psych: alert, affect appropriate.    Neuro:  Non-focal    Wt Readings from Last 10 Encounters:   01/16/25 82.3 kg (181 lb 6.4 oz)   01/09/25 81.8 kg (180 lb 6.4 oz)   11/14/24 82.4 kg (181 lb 9.6 oz)   10/17/24 82.1 kg (181 lb)   09/16/24 89.7 kg (197 lb 12.8 oz)   08/15/24 85.7 kg (189 lb)   07/15/24 91 kg (200 lb 9.6 oz)   06/28/24 91.6 kg  (201 lb 15.1 oz)   06/20/24 88.5 kg (195 lb)   06/07/24 81.6 kg (180 lb)        No results found. However, due to the size of the patient record, not all encounters were searched. Please check Results Review for a complete set of results.    Jose Angel Meyer MD

## 2025-02-09 LAB
CREAT 24H UR-MRATE: 578 MG/D
CREAT UR-MCNC: 30 MG/DL
METANEPH 24H UR-MCNC: 19 UG/L
METANEPH 24H UR-MRATE: 37 UG/D
METANEPH+NORMETANEPH UR-IMP: ABNORMAL
METANEPH/CREAT 24H UR: 63 UG/G CRT
NORMETANEPHRINE 24H UR-MCNC: 49 UG/L
NORMETANEPHRINE 24H UR-MRATE: 94 UG/D
NORMETANEPHRINE/CREAT 24H UR: 163 UG/G CRT

## 2025-02-10 DIAGNOSIS — I10 ESSENTIAL HYPERTENSION: ICD-10-CM

## 2025-02-10 RX ORDER — DILTIAZEM HYDROCHLORIDE 240 MG/1
CAPSULE, COATED, EXTENDED RELEASE ORAL
Qty: 90 CAPSULE | Refills: 3 | Status: SHIPPED | OUTPATIENT
Start: 2025-02-10

## 2025-02-19 ENCOUNTER — DOCUMENTATION ONLY (OUTPATIENT)
Dept: FAMILY MEDICINE | Facility: CLINIC | Age: 60
End: 2025-02-19
Payer: COMMERCIAL

## 2025-02-19 NOTE — PROGRESS NOTES
To be completed in Nursing note:    Please reference list for forms that require a visit for completion.  Please remind patients that providers are given 3-5 business days to complete and return forms.      Form type:[employer] Pads/liners- 300    Date form received: 02/19/2025    Date form completed by Physician:    How was form returned to patient (mailed, faxed, or at  for patient to ):[phone]    Date form mailed/faxed/left at  for patient and sent to HIM for scanning:      Once form is left for patient, faxed, or mailed PCS will then close the documentation only encounter.

## 2025-02-27 ENCOUNTER — MEDICAL CORRESPONDENCE (OUTPATIENT)
Dept: HEALTH INFORMATION MANAGEMENT | Facility: CLINIC | Age: 60
End: 2025-02-27
Payer: COMMERCIAL

## 2025-03-03 ENCOUNTER — OFFICE VISIT (OUTPATIENT)
Dept: FAMILY MEDICINE | Facility: CLINIC | Age: 60
End: 2025-03-03
Payer: COMMERCIAL

## 2025-03-03 ENCOUNTER — TELEPHONE (OUTPATIENT)
Dept: FAMILY MEDICINE | Facility: CLINIC | Age: 60
End: 2025-03-03

## 2025-03-03 VITALS
OXYGEN SATURATION: 97 % | BODY MASS INDEX: 36.56 KG/M2 | DIASTOLIC BLOOD PRESSURE: 77 MMHG | RESPIRATION RATE: 18 BRPM | WEIGHT: 181 LBS | HEART RATE: 117 BPM | SYSTOLIC BLOOD PRESSURE: 128 MMHG

## 2025-03-03 DIAGNOSIS — F33.3 SEVERE EPISODE OF RECURRENT MAJOR DEPRESSIVE DISORDER, WITH PSYCHOTIC FEATURES (H): ICD-10-CM

## 2025-03-03 DIAGNOSIS — Z00.00 MEDICARE ANNUAL WELLNESS VISIT, SUBSEQUENT: ICD-10-CM

## 2025-03-03 DIAGNOSIS — Z79.899 MEDICATION MANAGEMENT: Primary | ICD-10-CM

## 2025-03-03 DIAGNOSIS — G89.4 CHRONIC PAIN DISORDER: ICD-10-CM

## 2025-03-03 DIAGNOSIS — Z00.00 ENCOUNTER FOR MEDICARE ANNUAL WELLNESS EXAM: ICD-10-CM

## 2025-03-03 DIAGNOSIS — N18.32 CHRONIC KIDNEY DISEASE, STAGE 3B (H): ICD-10-CM

## 2025-03-03 DIAGNOSIS — I10 ESSENTIAL HYPERTENSION: ICD-10-CM

## 2025-03-03 RX ORDER — CARVEDILOL 25 MG/1
25 TABLET ORAL 2 TIMES DAILY WITH MEALS
Qty: 180 TABLET | Refills: 3 | Status: SHIPPED | OUTPATIENT
Start: 2025-03-03

## 2025-03-03 RX ORDER — PREGABALIN 20 MG/ML
50 SOLUTION ORAL 2 TIMES DAILY
Qty: 450 ML | Refills: 0 | Status: SHIPPED | OUTPATIENT
Start: 2025-03-03 | End: 2025-06-01

## 2025-03-03 RX ADMIN — CYANOCOBALAMIN 1000 MCG: 1000 INJECTION, SOLUTION INTRAMUSCULAR; SUBCUTANEOUS at 15:24

## 2025-03-03 SDOH — HEALTH STABILITY: PHYSICAL HEALTH: ON AVERAGE, HOW MANY DAYS PER WEEK DO YOU ENGAGE IN MODERATE TO STRENUOUS EXERCISE (LIKE A BRISK WALK)?: 7 DAYS

## 2025-03-03 ASSESSMENT — ANXIETY QUESTIONNAIRES
2. NOT BEING ABLE TO STOP OR CONTROL WORRYING: MORE THAN HALF THE DAYS
7. FEELING AFRAID AS IF SOMETHING AWFUL MIGHT HAPPEN: NOT AT ALL
6. BECOMING EASILY ANNOYED OR IRRITABLE: MORE THAN HALF THE DAYS
GAD7 TOTAL SCORE: 13
4. TROUBLE RELAXING: NEARLY EVERY DAY
7. FEELING AFRAID AS IF SOMETHING AWFUL MIGHT HAPPEN: NOT AT ALL
GAD7 TOTAL SCORE: 13
3. WORRYING TOO MUCH ABOUT DIFFERENT THINGS: MORE THAN HALF THE DAYS
IF YOU CHECKED OFF ANY PROBLEMS ON THIS QUESTIONNAIRE, HOW DIFFICULT HAVE THESE PROBLEMS MADE IT FOR YOU TO DO YOUR WORK, TAKE CARE OF THINGS AT HOME, OR GET ALONG WITH OTHER PEOPLE: SOMEWHAT DIFFICULT
5. BEING SO RESTLESS THAT IT IS HARD TO SIT STILL: MORE THAN HALF THE DAYS
8. IF YOU CHECKED OFF ANY PROBLEMS, HOW DIFFICULT HAVE THESE MADE IT FOR YOU TO DO YOUR WORK, TAKE CARE OF THINGS AT HOME, OR GET ALONG WITH OTHER PEOPLE?: SOMEWHAT DIFFICULT
GAD7 TOTAL SCORE: 13
1. FEELING NERVOUS, ANXIOUS, OR ON EDGE: MORE THAN HALF THE DAYS

## 2025-03-03 ASSESSMENT — ASTHMA QUESTIONNAIRES
ACT_TOTALSCORE: 23
QUESTION_3 LAST FOUR WEEKS HOW OFTEN DID YOUR ASTHMA SYMPTOMS (WHEEZING, COUGHING, SHORTNESS OF BREATH, CHEST TIGHTNESS OR PAIN) WAKE YOU UP AT NIGHT OR EARLIER THAN USUAL IN THE MORNING: NOT AT ALL
QUESTION_5 LAST FOUR WEEKS HOW WOULD YOU RATE YOUR ASTHMA CONTROL: COMPLETELY CONTROLLED
QUESTION_4 LAST FOUR WEEKS HOW OFTEN HAVE YOU USED YOUR RESCUE INHALER OR NEBULIZER MEDICATION (SUCH AS ALBUTEROL): ONCE A WEEK OR LESS
QUESTION_1 LAST FOUR WEEKS HOW MUCH OF THE TIME DID YOUR ASTHMA KEEP YOU FROM GETTING AS MUCH DONE AT WORK, SCHOOL OR AT HOME: NONE OF THE TIME
ACT_TOTALSCORE: 23
QUESTION_2 LAST FOUR WEEKS HOW OFTEN HAVE YOU HAD SHORTNESS OF BREATH: ONCE OR TWICE A WEEK

## 2025-03-03 ASSESSMENT — SOCIAL DETERMINANTS OF HEALTH (SDOH): HOW OFTEN DO YOU GET TOGETHER WITH FRIENDS OR RELATIVES?: ONCE A WEEK

## 2025-03-03 ASSESSMENT — PATIENT HEALTH QUESTIONNAIRE - PHQ9
SUM OF ALL RESPONSES TO PHQ QUESTIONS 1-9: 14
SUM OF ALL RESPONSES TO PHQ QUESTIONS 1-9: 14
10. IF YOU CHECKED OFF ANY PROBLEMS, HOW DIFFICULT HAVE THESE PROBLEMS MADE IT FOR YOU TO DO YOUR WORK, TAKE CARE OF THINGS AT HOME, OR GET ALONG WITH OTHER PEOPLE: SOMEWHAT DIFFICULT

## 2025-03-03 NOTE — PATIENT INSTRUCTIONS
Only change    Carvedilol increase to 25 mg AM and 25 mg PM    Increase pregabalin (for toe pain) to 2.5 ml AM and 2.5 ml Pm  Patient Education   Preventive Care Advice   This is general advice given by our system to help you stay healthy. However, your care team may have specific advice just for you. Please talk to your care team about your preventive care needs.  Nutrition  Eat 5 or more servings of fruits and vegetables each day.  Try wheat bread, brown rice and whole grain pasta (instead of white bread, rice, and pasta).  Get enough calcium and vitamin D. Check the label on foods and aim for 100% of the RDA (recommended daily allowance).  Lifestyle  Exercise at least 150 minutes each week  (30 minutes a day, 5 days a week).  Do muscle strengthening activities 2 days a week. These help control your weight and prevent disease.  No smoking.  Wear sunscreen to prevent skin cancer.  Have a dental exam and cleaning every 6 months.  Yearly exams  See your health care team every year to talk about:  Any changes in your health.  Any medicines your care team has prescribed.  Preventive care, family planning, and ways to prevent chronic diseases.  Shots (vaccines)   HPV shots (up to age 26), if you've never had them before.  Hepatitis B shots (up to age 59), if you've never had them before.  COVID-19 shot: Get this shot when it's due.  Flu shot: Get a flu shot every year.  Tetanus shot: Get a tetanus shot every 10 years.  Pneumococcal, hepatitis A, and RSV shots: Ask your care team if you need these based on your risk.  Shingles shot (for age 50 and up)  General health tests  Diabetes screening:  Starting at age 35, Get screened for diabetes at least every 3 years.  If you are younger than age 35, ask your care team if you should be screened for diabetes.  Cholesterol test: At age 39, start having a cholesterol test every 5 years, or more often if advised.  Bone density scan (DEXA): At age 50, ask your care team if you  should have this scan for osteoporosis (brittle bones).  Hepatitis C: Get tested at least once in your life.  STIs (sexually transmitted infections)  Before age 24: Ask your care team if you should be screened for STIs.  After age 24: Get screened for STIs if you're at risk. You are at risk for STIs (including HIV) if:  You are sexually active with more than one person.  You don't use condoms every time.  You or a partner was diagnosed with a sexually transmitted infection.  If you are at risk for HIV, ask about PrEP medicine to prevent HIV.  Get tested for HIV at least once in your life, whether you are at risk for HIV or not.  Cancer screening tests  Cervical cancer screening: If you have a cervix, begin getting regular cervical cancer screening tests starting at age 21.  Breast cancer scan (mammogram): If you've ever had breasts, begin having regular mammograms starting at age 40. This is a scan to check for breast cancer.  Colon cancer screening: It is important to start screening for colon cancer at age 45.  Have a colonoscopy test every 10 years (or more often if you're at risk) Or, ask your provider about stool tests like a FIT test every year or Cologuard test every 3 years.  To learn more about your testing options, visit:   .  For help making a decision, visit:   https://bit.ly/wy72718.  Prostate cancer screening test: If you have a prostate, ask your care team if a prostate cancer screening test (PSA) at age 55 is right for you.  Lung cancer screening: If you are a current or former smoker ages 50 to 80, ask your care team if ongoing lung cancer screenings are right for you.  For informational purposes only. Not to replace the advice of your health care provider. Copyright   2023 [employer]. All rights reserved. Clinically reviewed by the [facility]. [employer]ip 200077 - REV 01/24.  Learning About Activities of Daily Living  What are activities of daily living?      Activities of daily living (ADLs) are the basic self-care tasks you do every day. These include eating, bathing, dressing, and moving around.  As you age, and if you have health problems, you may find that it's harder to do some of these tasks. If so, your doctor can suggest ideas that may help.  To measure what kind of help you may need, your doctor will ask how well you are able to do ADLs. Let your doctor know if there are any tasks that you are having trouble doing. This is an important first step to getting help. And when you have the help you need, you can stay as independent as possible.  How will a doctor assess your ADLs?  Asking about ADLs is part of a routine health checkup your doctor will likely do as you age. Your health check might be done in a doctor's office, in your home, or at a hospital. The goal is to find out if you are having any problems that could make it hard to care for yourself or that make it unsafe for you to be on your own.  To measure your ADLs, your doctor will ask how hard it is for you to do routine tasks. Your doctor may also want to know if you have changed the way you do a task because of a health problem. Your doctor may watch how you:  Walk back and forth.  Keep your balance while you stand or walk.  Move from sitting to standing or from a bed to a chair.  Button or unbutton a shirt or sweater.  Remove and put on your shoes.  It's common to feel a little worried or anxious if you find you can't do all the things you used to be able to do. Talking with your doctor about ADLs is a way to make sure you're as safe as possible and able to care for yourself as well as you can. You may want to bring a caregiver, friend, or family member to your checkup. They can help you talk to your doctor.  Follow-up care is a key part of your treatment and safety. Be sure to make and go to all appointments, and call your doctor if you are having problems. It's also a good idea to know your test  results and keep a list of the medicines you take.  Current as of: October 24, 2023  Content Version: 14.3    2024 [employer].   Care instructions adapted under license by your healthcare professional. If you have questions about a medical condition or this instruction, always ask your healthcare professional. [employer] disclaims any warranty or liability for your use of this information.    Preventing Falls: Care Instructions  Injuries and health problems such as trouble walking or poor eyesight can increase your risk of falling. So can some medicines. But there are things you can do to help prevent falls. You can exercise to get stronger. You can also arrange your home to make it safer.    Talk to your doctor about the medicines you take. Ask if any of them increase the risk of falls and whether they can be changed or stopped.   Try to exercise regularly. It can help improve your strength and balance. This can help lower your risk of falling.         Practice fall safety and prevention.   Wear low-heeled shoes that fit well and give your feet good support. Talk to your doctor if you have foot problems that make this hard.  Carry a cellphone or wear a medical alert device that you can use to call for help.  Use stepladders instead of chairs to reach high objects. Don't climb if you're at risk for falls. Ask for help, if needed.  Wear the correct eyeglasses, if you need them.        Make your home safer.   Remove rugs, cords, clutter, and furniture from walkways.  Keep your house well lit. Use night-lights in hallways and bathrooms.  Install and use sturdy handrails on stairways.  Wear nonskid footwear, even inside. Don't walk barefoot or in socks without shoes.        Be safe outside.   Use handrails, curb cuts, and ramps whenever possible.  Keep your hands free by using a shoulder bag or backpack.  Try to walk in well-lit areas. Watch out for uneven ground, changes in pavement, and  "debris.  Be careful in the winter. Walk on the grass or gravel when sidewalks are slippery. Use de-icer on steps and walkways. Add non-slip devices to shoes.    Put grab bars and nonskid mats in your shower or tub and near the toilet. Try to use a shower chair or bath bench when bathing.   Get into a tub or shower by putting in your weaker leg first. Get out with your strong side first. Have a phone or medical alert device in the bathroom with you.   Where can you learn more?  Go to https://www.[employer].net/patiented  Enter G117 in the search box to learn more about \"Preventing Falls: Care Instructions.\"  Current as of: July 31, 2024  Content Version: 14.3    2024 [employer].   Care instructions adapted under license by your healthcare professional. If you have questions about a medical condition or this instruction, always ask your healthcare professional. [employer] disclaims any warranty or liability for your use of this information.    Hearing Loss: Care Instructions  Overview     Hearing loss is a sudden or slow decrease in how well you hear. It can range from slight to profound. Permanent hearing loss can occur with aging. It also can happen when you are exposed long-term to loud noise. Examples include listening to loud music, riding motorcycles, or being around other loud machines.  Hearing loss can affect your work and home life. It can make you feel lonely or depressed. You may feel that you have lost your independence. But hearing aids and other devices can help you hear better and feel connected to others.  Follow-up care is a key part of your treatment and safety. Be sure to make and go to all appointments, and call your doctor if you are having problems. It's also a good idea to know your test results and keep a list of the medicines you take.  How can you care for yourself at home?  Avoid loud noises whenever possible. This helps keep your hearing from getting worse.  Always " "wear hearing protection around loud noises.  Wear a hearing aid as directed.  A professional can help you pick a hearing aid that will work best for you.  You can also get hearing aids over the counter for mild to moderate hearing loss.  Have hearing tests as your doctor suggests. They can show whether your hearing has changed. Your hearing aid may need to be adjusted.  Use other devices as needed. These may include:  Telephone amplifiers and hearing aids that can connect to a television, stereo, radio, or microphone.  Devices that use lights or vibrations. These alert you to the doorbell, a ringing telephone, or a baby monitor.  Television closed-captioning. This shows the words at the bottom of the screen. Most new TVs can do this.  TTY (text telephone). This lets you type messages back and forth on the telephone instead of talking or listening. These devices are also called TDD. When messages are typed on the keyboard, they are sent over the phone line to a receiving TTY. The message is shown on a monitor.  Use text messaging, social media, and email if it is hard for you to communicate by telephone.  Try to learn a listening technique called speechreading. It is not lipreading. You pay attention to people's gestures, expressions, posture, and tone of voice. These clues can help you understand what a person is saying. Face the person you are talking to, and have them face you. Make sure the lighting is good. You need to see the other person's face clearly.  Think about counseling if you need help to adjust to your hearing loss.  When should you call for help?  Watch closely for changes in your health, and be sure to contact your doctor if:    You think your hearing is getting worse.     You have new symptoms, such as dizziness or nausea.   Where can you learn more?  Go to https://www.healthwise.net/patiented  Enter R798 in the search box to learn more about \"Hearing Loss: Care Instructions.\"  Current as of: " September 27, 2023  Content Version: 14.3    2024 [employer].   Care instructions adapted under license by your healthcare professional. If you have questions about a medical condition or this instruction, always ask your healthcare professional. [employer] disclaims any warranty or liability for your use of this information.    Learning About Stress  What is stress?     Stress is your body's response to a hard situation. Your body can have a physical, emotional, or mental response. Stress is a fact of life for most people, and it affects everyone differently. What causes stress for you may not be stressful for someone else.  A lot of things can cause stress. You may feel stress when you go on a job interview, take a test, or run a race. This kind of short-term stress is normal and even useful. It can help you if you need to work hard or react quickly. For example, stress can help you finish an important job on time.  Long-term stress is caused by ongoing stressful situations or events. Examples of long-term stress include long-term health problems, ongoing problems at work, or conflicts in your family. Long-term stress can harm your health.  How does stress affect your health?  When you are stressed, your body responds as though you are in danger. It makes hormones that speed up your heart, make you breathe faster, and give you a burst of energy. This is called the fight-or-flight stress response. If the stress is over quickly, your body goes back to normal and no harm is done.  But if stress happens too often or lasts too long, it can have bad effects. Long-term stress can make you more likely to get sick, and it can make symptoms of some diseases worse. If you tense up when you are stressed, you may develop neck, shoulder, or low back pain. Stress is linked to high blood pressure and heart disease.  Stress also harms your emotional health. It can make you chiu, tense, or depressed. Your  relationships may suffer, and you may not do well at work or school.  What can you do to manage stress?  You can try these things to help manage stress:   Do something active. Exercise or activity can help reduce stress. Walking is a great way to get started. Even everyday activities such as housecleaning or yard work can help.  Try yoga or oralia chi. These techniques combine exercise and meditation. You may need some training at first to learn them.  Do something you enjoy. For example, listen to music or go to a movie. Practice your hobby or do volunteer work.  Meditate. This can help you relax, because you are not worrying about what happened before or what may happen in the future.  Do guided imagery. Imagine yourself in any setting that helps you feel calm. You can use online videos, books, or a teacher to guide you.  Do breathing exercises. For example:  From a standing position, bend forward from the waist with your knees slightly bent. Let your arms dangle close to the floor.  Breathe in slowly and deeply as you return to a standing position. Roll up slowly and lift your head last.  Hold your breath for just a few seconds in the standing position.  Breathe out slowly and bend forward from the waist.  Let your feelings out. Talk, laugh, cry, and express anger when you need to. Talking with supportive friends or family, a counselor, or a deena leader about your feelings is a healthy way to relieve stress. Avoid discussing your feelings with people who make you feel worse.  Write. It may help to write about things that are bothering you. This helps you find out how much stress you feel and what is causing it. When you know this, you can find better ways to cope.  What can you do to prevent stress?  You might try some of these things to help prevent stress:  Manage your time. This helps you find time to do the things you want and need to do.  Get enough sleep. Your body recovers from the stresses of the day while  "you are sleeping.  Get support. Your family, friends, and community can make a difference in how you experience stress.  Limit your news feed. Avoid or limit time on social media or news that may make you feel stressed.  Do something active. Exercise or activity can help reduce stress. Walking is a great way to get started.  Where can you learn more?  Go to https://www.QuickBlox.net/patiented  Enter N032 in the search box to learn more about \"Learning About Stress.\"  Current as of: October 24, 2023  Content Version: 14.3    2024 [employer].   Care instructions adapted under license by your healthcare professional. If you have questions about a medical condition or this instruction, always ask your healthcare professional. [employer] disclaims any warranty or liability for your use of this information.    Learning About Sleeping Well  What does sleeping well mean?     Sleeping well means getting enough sleep to feel good and stay healthy. How much sleep is enough varies among people.  The number of hours you sleep and how you feel when you wake up are both important. If you do not feel refreshed, you probably need more sleep. Another sign of not getting enough sleep is feeling tired during the day.  Experts recommend that adults get at least 7 or more hours of sleep per day. Children and older adults need more sleep.  Why is getting enough sleep important?  Getting enough quality sleep is a basic part of good health. When your sleep suffers, your physical health, mood, and your thoughts can suffer too. You may find yourself feeling more grumpy or stressed. Not getting enough sleep also can lead to serious problems, including injury, accidents, anxiety, and depression.  What might cause poor sleeping?  Many things can cause sleep problems, including:  Changes to your sleep schedule.  Stress. Stress can be caused by fear about a single event, such as giving a speech. Or you may have ongoing " "stress, such as worry about work or school.  Depression, anxiety, and other mental or emotional conditions.  Changes in your sleep habits or surroundings. This includes changes that happen where you sleep, such as noise, light, or sleeping in a different bed. It also includes changes in your sleep pattern, such as having jet lag or working a late shift.  Health problems, such as pain, breathing problems, and restless legs syndrome.  Lack of regular exercise.  Using alcohol, nicotine, or caffeine before bed.  How can you help yourself?  Here are some tips that may help you sleep more soundly and wake up feeling more refreshed.  Your sleeping area   Use your bedroom only for sleeping and sex. A bit of light reading may help you fall asleep. But if it doesn't, do your reading elsewhere in the house. Try not to use your TV, computer, smartphone, or tablet while you are in bed.  Be sure your bed is big enough to stretch out comfortably, especially if you have a sleep partner.  Keep your bedroom quiet, dark, and cool. Use curtains, blinds, or a sleep mask to block out light. To block out noise, use earplugs, soothing music, or a \"white noise\" machine.  Your evening and bedtime routine   Create a relaxing bedtime routine. You might want to take a warm shower or bath, or listen to soothing music.  Go to bed at the same time every night. And get up at the same time every morning, even if you feel tired.  What to avoid   Limit caffeine (coffee, tea, caffeinated sodas) during the day, and don't have any for at least 6 hours before bedtime.  Avoid drinking alcohol before bedtime. Alcohol can cause you to wake up more often during the night.  Try not to smoke or use tobacco, especially in the evening. Nicotine can keep you awake.  Limit naps during the day, especially close to bedtime.  Avoid lying in bed awake for too long. If you can't fall asleep or if you wake up in the middle of the night and can't get back to sleep within " "about 20 minutes, get out of bed and go to another room until you feel sleepy.  Avoid taking medicine right before bed that may keep you awake or make you feel hyper or energized. Your doctor can tell you if your medicine may do this and if you can take it earlier in the day.  If you can't sleep   Imagine yourself in a peaceful, pleasant scene. Focus on the details and feelings of being in a place that is relaxing.  Get up and do a quiet or boring activity until you feel sleepy.  Avoid drinking any liquids before going to bed to help prevent waking up often to use the bathroom.  Where can you learn more?  Go to https://www.[employer].net/patiented  Enter J942 in the search box to learn more about \"Learning About Sleeping Well.\"  Current as of: July 31, 2024  Content Version: 14.3    2024 [employer].   Care instructions adapted under license by your healthcare professional. If you have questions about a medical condition or this instruction, always ask your healthcare professional. [employer] disclaims any warranty or liability for your use of this information.    Bladder Training: Care Instructions  Your Care Instructions     Bladder training is used to treat urge incontinence and stress incontinence. Urge incontinence means that the need to urinate comes on so fast that you can't get to a toilet in time. Stress incontinence means that you leak urine because of pressure on your bladder. For example, it may happen when you laugh, cough, or lift something heavy.  Bladder training can increase how long you can wait before you have to urinate. It can also help your bladder hold more urine. And it can give you better control over the urge to urinate.  It is important to remember that bladder training takes a few weeks to a few months to make a difference. You may not see results right away, but don't give up.  Follow-up care is a key part of your treatment and safety. Be sure to make and go to all " appointments, and call your doctor if you are having problems. It's also a good idea to know your test results and keep a list of the medicines you take.  How can you care for yourself at home?  Work with your doctor to come up with a bladder training program that is right for you. You may use one or more of the following methods.  Delayed urination  In the beginning, try to keep from urinating for 5 minutes after you first feel the need to go.  While you wait, take deep, slow breaths to relax. Kegel exercises can also help you delay the need to go to the bathroom.  After some practice, when you can easily wait 5 minutes to urinate, try to wait 10 minutes before you urinate.  Slowly increase the waiting period until you are able to control when you have to urinate.  Scheduled urination  Empty your bladder when you first wake up in the morning.  Schedule times throughout the day when you will urinate.  Start by going to the bathroom every hour, even if you don't need to go.  Slowly increase the time between trips to the bathroom.  When you have found a schedule that works well for you, keep doing it.  If you wake up during the night and have to urinate, do it. Apply your schedule to waking hours only.  Kegel exercises  These tighten and strengthen pelvic muscles, which can help you control the flow of urine. (If doing these exercises causes pain, stop doing them and talk with your doctor.) To do Kegel exercises:  Squeeze your muscles as if you were trying not to pass gas. Or squeeze your muscles as if you were stopping the flow of urine. Your belly, legs, and buttocks shouldn't move.  Hold the squeeze for 3 seconds, then relax for 5 to 10 seconds.  Start with 3 seconds, then add 1 second each week until you are able to squeeze for 10 seconds.  Repeat the exercise 10 times a session. Do 3 to 8 sessions a day.  When should you call for help?  Watch closely for changes in your health, and be sure to contact your doctor  "if:    Your incontinence is getting worse.     You do not get better as expected.   Where can you learn more?  Go to https://www.[employer]Sage.net/patiented  Enter V684 in the search box to learn more about \"Bladder Training: Care Instructions.\"  Current as of: April 30, 2024  Content Version: 14.3    2024 [employer].   Care instructions adapted under license by your healthcare professional. If you have questions about a medical condition or this instruction, always ask your healthcare professional. [employer] disclaims any warranty or liability for your use of this information.    Learning About Depression Screening  What is depression screening?  Depression screening is a way to see if you have depression symptoms. It may be done by a doctor or counselor. It's often part of a routine checkup. That's because your mental health is just as important as your physical health.  Depression is a mental health condition that affects how you feel, think, and act. You may:  Have less energy.  Lose interest in your daily activities.  Feel sad and grouchy for a long time.  Depression is very common. It affects people of all ages.  Many things can lead to depression. Some people become depressed after they have a stroke or find out they have a major illness like cancer or heart disease. The death of a loved one or a breakup may lead to depression. It can run in families. Most experts believe that a combination of inherited genes and stressful life events can cause it.  What happens during screening?  You may be asked to fill out a form about your depression symptoms. You and the doctor will discuss your answers. The doctor may ask you more questions to learn more about how you think, act, and feel.  What happens after screening?  If you have symptoms of depression, your doctor will talk to you about your options.  Doctors usually treat depression with medicines or counseling. Often, combining the two works " "best. Many people don't get help because they think that they'll get over the depression on their own. But people with depression may not get better unless they get treatment.  The cause of depression is not well understood. There may be many factors involved. But if you have depression, it's not your fault.  A serious symptom of depression is thinking about death or suicide. If you or someone you care about talks about this or about feeling hopeless, get help right away.  It's important to know that depression can be treated. Medicine, counseling, and self-care may help.  Where can you learn more?  Go to https://www.[employer]irtual/patiented  Enter T185 in the search box to learn more about \"Learning About Depression Screening.\"  Current as of: July 31, 2024  Content Version: 14.3    2024 [employer].   Care instructions adapted under license by your healthcare professional. If you have questions about a medical condition or this instruction, always ask your healthcare professional. [employer] disclaims any warranty or liability for your use of this information.    Chronic Pain: Care Instructions  Your Care Instructions     Chronic pain is pain that lasts a long time (months or even years) and may or may not have a clear cause. It is different from acute pain, which usually does have a clear cause--like an injury or illness--and gets better over time. Chronic pain:  Lasts over time but may vary from day to day.  Does not go away despite efforts to end it.  May disrupt your sleep and lead to fatigue.  May cause depression or anxiety.  May make your muscles tense, causing more pain.  Can disrupt your work, hobbies, home life, and relationships with friends and family.  Chronic pain is a very real condition. It is not just in your head. Treatment can help and usually includes several methods used together, such as medicines, physical therapy, exercise, and other treatments. Learning how to relax " and changing negative thought patterns can also help you cope.  Chronic pain is complex. Taking an active role in your treatment will help you better manage your pain. Tell your doctor if you have trouble dealing with your pain. You may have to try several things before you find what works best for you.  Follow-up care is a key part of your treatment and safety. Be sure to make and go to all appointments, and call your doctor if you are having problems. It's also a good idea to know your test results and keep a list of the medicines you take.  How can you care for yourself at home?  Pace yourself. Break up large jobs into smaller tasks. Save harder tasks for days when you have less pain, or go back and forth between hard tasks and easier ones. Take rest breaks.  Relax, and reduce stress. Relaxation techniques such as deep breathing or meditation can help.  Keep moving. Gentle, daily exercise can help reduce pain over the long run. Try low- or no-impact exercises such as walking, swimming, and stationary biking. Do stretches to stay flexible.  Try heat, cold packs, and massage.  Get enough sleep. Chronic pain can make you tired and drain your energy. Talk with your doctor if you have trouble sleeping because of pain.  Think positive. Your thoughts can affect your pain level. Do things that you enjoy to distract yourself when you have pain instead of focusing on the pain. See a movie, read a book, listen to music, or spend time with a friend.  If you think you are depressed, talk to your doctor about treatment.  Keep a daily pain diary. Record how your moods, thoughts, sleep patterns, activities, and medicine affect your pain. You may find that your pain is worse during or after certain activities or when you are feeling a certain emotion. Having a record of your pain can help you and your doctor find the best ways to treat your pain.  Take pain medicines exactly as directed.  If the doctor gave you a prescription  "medicine for pain, take it as prescribed.  If you are not taking a prescription pain medicine, ask your doctor if you can take an over-the-counter medicine.  Reducing constipation caused by pain medicine  Talk to your doctor about a laxative. If a laxative doesn't work, your doctor may suggest a prescription medicine.  Include fruits, vegetables, beans, and whole grains in your diet each day. These foods are high in fiber.  If your doctor recommends it, get more exercise. Walking is a good choice. Bit by bit, increase the amount you walk every day. Try for at least 30 minutes on most days of the week.  Schedule time each day for a bowel movement. A daily routine may help. Take your time and do not strain when having a bowel movement.  When should you call for help?   Call your doctor now or seek immediate medical care if:    Your pain gets worse or is out of control.     You feel down or blue, or you do not enjoy things like you once did. You may be depressed, which is common in people with chronic pain. Depression can be treated.     You have vomiting or cramps for more than 2 hours.   Watch closely for changes in your health, and be sure to contact your doctor if:    You cannot sleep because of pain.     You are very worried or anxious about your pain.     You have trouble taking your pain medicine.     You have any concerns about your pain medicine.     You have trouble with bowel movements, such as:  No bowel movement in 3 days.  Blood in the anal area, in your stool, or on the toilet paper.  Diarrhea for more than 24 hours.   Where can you learn more?  Go to https://www.[employer].net/patiented  Enter N004 in the search box to learn more about \"Chronic Pain: Care Instructions.\"  Current as of: July 31, 2024  Content Version: 14.3    2024 [employer].   Care instructions adapted under license by your healthcare professional. If you have questions about a medical condition or this instruction, always " ask your healthcare professional. [employer] disclaims any warranty or liability for your use of this information.      Jose Angel Meyer MD

## 2025-03-03 NOTE — PROGRESS NOTES
Preventive Care Visit  [facility]  Jose Angel Meyer MD, Family Medicine  Mar 3, 2025      Assessment & Plan   Medicare wellness.  She is up-to-date with most recommendations.  She is at risk of falls due to her poor balance.  No intervention for now.  Hypertension much better control.  Will try increasing her carvedilol to 25 mg twice daily and attempt to control her heart rate little bit more.  Continue maximum dose lisinopril and torsemide to manage edema.  Recheck in 1 month  Type 2 diabetes.  Her last A1c was excellent.  Continue semaglutide which is also treating her obesity.  Hemoglobin A1C   Date Value Ref Range Status   01/09/2025 6.5 (H) 0.0 - 5.6 % Final     Comment:     Normal <5.7%   Prediabetes 5.7-6.4%    Diabetes 6.5% or higher     Note: Adopted from ADA consensus guidelines.   10/17/2024 8.8 (H) 0.0 - 5.6 % Final     Comment:     Normal <5.7%   Prediabetes 5.7-6.4%    Diabetes 6.5% or higher     Note: Adopted from ADA consensus guidelines.   06/27/2024 8.6 (H) <5.7 % Final     Comment:     Normal <5.7%   Prediabetes 5.7-6.4%    Diabetes 6.5% or higher     Note: Adopted from ADA consensus guidelines.   11/20/2020 6.9 (H) 4.1 - 5.7 % Final   06/26/2020 8.8 (H) 4.1 - 5.7 % Final   06/02/2020 14.5 (H) 4.1 - 5.7 % Final   Chronic pain: Stable.  Refilled oxycodone  Peripheral neuropathy: Not controlled.  Increase pregabalin to 50 mg twice daily watching for worsening edema.      Counseling  Appropriate preventive services were addressed with this patient via screening, questionnaire, or discussion as appropriate for fall prevention, nutrition, physical activity, Tobacco-use cessation, social engagement, weight loss and cognition.  Checklist reviewing preventive services available has been given to the patient.  Reviewed patient's diet, addressing concerns and/or questions.   The patient was instructed to see the dentist every 6 months.   She is at risk for psychosocial distress  and has been provided with information to reduce risk.   Discussed possible causes of fatigue. Updated plan of care.  Patient reported difficulty with activities of daily living were addressed today.Addressed any concerns about safety while driving.  The patient was provided with written information regarding signs of hearing loss.   Information on urinary incontinence and treatment options given to patient.   The patient's PHQ-9 score is consistent with moderate depression. She was provided with information regarding depression.   I have reviewed Opioid Use Disorder and Substance Use Disorder risk factors and made any needed referrals.       I did recommend a Medicare pharmacy review for med reconciliation as this has been challenging.  We can hopefully get this scheduled in the next week or 2.    Return in about 1 month (around 4/3/2025).    Genesis Bliss is a 59 year old, presenting for the following:  Medicare Visit and Wellness Visit        3/3/2025     2:12 PM   Additional Questions   Roomed by Brady   Accompanied by self           HPI     Here for a wellness visit primarily.  She also needs follow-up of her hypertension and her edema.  She is clearly not taking a calcium channel blocker at present she is taking carvedilol 12.5 mg twice daily.  She takes lisinopril 40 mg daily and torsemide 40 mg daily.  She is not taking any stimulant therapy.    With respect to her chronic pain it is relatively stable with the exception of burning in her feet which is bothersome.  She tolerates a low-dose pregabalin liquid and she like to try a higher strength if at all possible        Advance Care Planning  Patient does not have a Health Care Directive: Discussed advance care planning with patient; information given to patient to review.      3/3/2025   General Health   How would you rate your overall physical health? (!) POOR   Feel stress (tense, anxious, or unable to sleep) To some extent   (!) STRESS  CONCERN      3/3/2025   Nutrition   Diet: Low fat/cholesterol    Diabetic    Vegetarian/vegan       Multiple values from one day are sorted in reverse-chronological order         3/3/2025   Exercise   Days per week of moderate/strenous exercise 7 days         3/3/2025   Social Factors   Frequency of gathering with friends or relatives Once a week   Worry food won't last until get money to buy more Yes   Food not last or not have enough money for food? Yes   Do you have housing? (Housing is defined as stable permanent housing and does not include staying ouside in a car, in a tent, in an abandoned building, in an overnight shelter, or couch-surfing.) No   Are you worried about losing your housing? Yes   Lack of transportation? No   Unable to get utilities (heat,electricity)? Yes   Want help with housing or utility concern? (!) YES   (!) FOOD SECURITY CONCERN PRESENT(!) HOUSING CONCERN PRESENT(!) FINANCIAL RESOURCE STRAIN CONCERN      3/3/2025   Fall Risk   Fallen 2 or more times in the past year? Yes   Trouble with walking or balance? Yes           3/3/2025   Activities of Daily Living- Home Safety   Needs help with the following daily activites Telephone use    Transportation    Shopping    Preparing meals    Housework    Bathing    Laundry    Medication administration    Money management    Toileting    Feeding    Dressing   Safety concerns in the home None of the above       Multiple values from one day are sorted in reverse-chronological order         3/3/2025   Dental   Dentist two times every year? (!) NO         3/3/2025   Hearing Screening   Hearing concerns? (!) I NEED TO ASK PEOPLE TO SPEAK UP OR REPEAT THEMSELVES.         3/3/2025   Driving Risk Screening   Patient/family members have concerns about driving (!) YES          3/3/2025   General Alertness/Fatigue Screening   Have you been more tired than usual lately? (!) YES         3/3/2025   Urinary Incontinence Screening   Bothered by leaking urine in  past 6 months Yes          Today's PHQ-9 Score:       3/3/2025     2:21 PM   PHQ-9 SCORE   PHQ-9 Total Score MyChart 14 (Moderate depression)   PHQ-9 Total Score 14        Patient-reported         3/3/2025   Substance Use   Alcohol more than 3/day or more than 7/wk No   Do you have a current opioid prescription? (!) YES   How severe/bad is pain from 1 to 10? 10/10   Do you use any other substances recreationally? No          No data to display              Low Risk (0-3)  Moderate Risk (4-7)  High Risk (>8)  Social History     Tobacco Use    Smoking status: Never    Smokeless tobacco: Never   Vaping Use    Vaping status: Never Used   Substance Use Topics    Alcohol use: No    Drug use: No         7/7/2022   LAST FHS-7 RESULTS   1st degree relative breast or ovarian cancer No   Any relative bilateral breast cancer No   Any male have breast cancer No   Any ONE woman have BOTH breast AND ovarian cancer No   Any woman with breast cancer before 50yrs No   2 or more relatives with breast AND/OR ovarian cancer No   2 or more relatives with breast AND/OR bowel cancer No       Family history reviewed.    History of abnormal Pap smear:         3/12/2015    12:05 PM   PAP / HPV   PAP Negative for squamous intraepithelial lesion or malignancy  Electronically signed by Julia Dallas CT (ASCP) on 3/20/2015 at  9:31 AM        ASCVD Risk   The 10-year ASCVD risk score (Tala DK, et al., 2019) is: 15.1%    Values used to calculate the score:      Age: 59 years      Sex: Female      Is Non- : Yes      Diabetic: Yes      Tobacco smoker: No      Systolic Blood Pressure: 128 mmHg      Is BP treated: Yes      HDL Cholesterol: 66 mg/dL      Total Cholesterol: 236 mg/dL    Reviewed and updated as needed this visit by Provider                      Current providers sharing in care for this patient include:  Patient Care Team:  Jose Angel Meyer MD as PCP - General  Dr Lyndon Obando as  Psychologist  [facility] Jose Angel Tavares MD as Assigned PCP  [facility] as Pharmacist (Pharmacist)  [facility]na [facility] as MD (Pharmacist)  Jose Angel Meyer MD as Assigned Pain Medication Provider  [facility] as Pharmacist (Pharmacist)  Leatha [facility] as Assigned MTM Pharmacist  Paulette Smiley PA-C as Physician Assistant (Plastic Surgery)    The following health maintenance items are reviewed in Epic and correct as of today:  Health Maintenance   Topic Date Due    DEPRESSION ACTION PLAN  Never done    DIABETIC FOOT EXAM  09/09/2015    ASTHMA ACTION PLAN  06/16/2017    MEDICARE ANNUAL WELLNESS VISIT  12/15/2023    EYE EXAM  07/31/2024    Medicare Annual MTM Pharmacist Visit (once per calendar year)  01/01/2025    CONTROLLED SUBSTANCE AGREEMENT FOR CHRONIC PAIN MANAGEMENT  03/21/2025    PHQ-9  06/03/2025    HEMOGLOBIN  06/29/2025    A1C  07/09/2025    MICROALBUMIN  08/15/2025    ASTHMA CONTROL TEST  09/03/2025    URINE DRUG SCREEN  01/09/2026    BMP  01/16/2026    LIPID  01/16/2026    JUAN ASSESSMENT  03/03/2026    COLORECTAL CANCER SCREENING  03/16/2027    DTAP/TDAP/TD IMMUNIZATION (3 - Td or Tdap) 01/02/2030    ADVANCE CARE PLANNING  03/03/2030    HEPATITIS C SCREENING  Completed    HIV SCREENING  Completed    INFLUENZA VACCINE  Completed    Pneumococcal Vaccine: 50+ Years  Completed    URINALYSIS  Completed    ZOSTER IMMUNIZATION  Completed    HEPATITIS B IMMUNIZATION  Completed    COVID-19 Vaccine  Completed    HPV IMMUNIZATION  Aged Out    MENINGITIS IMMUNIZATION  Aged Out    HPV TEST  Discontinued    MAMMO SCREENING  Discontinued    PAP  Discontinued            Objective    Exam  /77 (BP Location: Left arm, Patient Position: Sitting, Cuff Size: Adult Large)   Pulse 117   Resp 18   Wt 82.1 kg (181 lb)   LMP  (LMP Unknown)   SpO2 97%   BMI 36.56 kg/m     Estimated body mass index is 36.56 kg/m  as calculated from the following:    Height as of  "8/15/24: 1.499 m (4' 11\").    Weight as of this encounter: 82.1 kg (181 lb).  Repeat pulse today is 104.  Physical Exam  Chest clear to auscultation.  Heart regular rate and rhythm no murmur.  Extremities have 2+ edema to the mid tibia.        3/3/2025   Mini Cog   Clock Draw Score 2 Normal   3 Item Recall 3 objects recalled   Mini Cog Total Score 5              Signed Electronically by: Jose Angel Meyer MD    Answers submitted by the patient for this visit:  Patient Health Questionnaire (Submitted on 3/3/2025)  If you checked off any problems, how difficult have these problems made it for you to do your work, take care of things at home, or get along with other people?: Somewhat difficult  PHQ9 TOTAL SCORE: 14  Patient Health Questionnaire (G7) (Submitted on 3/3/2025)  JUAN 7 TOTAL SCORE: 13    "

## 2025-03-03 NOTE — PROGRESS NOTES
Clinic Administered Medication Documentation      Injectable Medication Documentation    Is there an active order (written within the past 365 days, with administrations remaining, not ) in the chart? Yes.     Patient was given Cyanocobalamin (B-12). Prior to medication administration, verified patient's identity using patient s name and date of birth. Please see MAR and medication order for additional information. Patient instructed to remain in clinic for 15 minutes and report any adverse reaction to staff immediately.    Vial/Syringe: Single dose vial. Was entire vial of medication used? Yes  Was this medication supplied by the patient? No  Is this a medication the patient will need to receive again? Yes. Verified that the patient has refills remaining in their prescription.    Answers submitted by the patient for this visit:  Patient Health Questionnaire (Submitted on 3/3/2025)  If you checked off any problems, how difficult have these problems made it for you to do your work, take care of things at home, or get along with other people?: Somewhat difficult  PHQ9 TOTAL SCORE: 14  Patient Health Questionnaire (G7) (Submitted on 3/3/2025)  JUAN 7 TOTAL SCORE: 13

## 2025-03-04 DIAGNOSIS — G89.4 CHRONIC PAIN SYNDROME: ICD-10-CM

## 2025-03-04 RX ORDER — OXYCODONE AND ACETAMINOPHEN 10; 325 MG/1; MG/1
TABLET ORAL
Qty: 120 TABLET | Refills: 0 | Status: SHIPPED | OUTPATIENT
Start: 2025-03-04

## 2025-03-04 NOTE — TELEPHONE ENCOUNTER
Medication Question or Refill        What medication are you calling about (include dose and sig)?: oxyCODONE-acetaminophen (PERCOCET)  MG per tablet - Take 2 tablets by mouth at bedtime. May also take 1 tablet every 6 hours as needed for severe pain. - Oral     Preferred Pharmacy:   [id number] [address] & [address] [address]  Phone: [phone] Fax: [phone]     Controlled Substance Agreement on file:   CSA -- Patient Level:     [Media Unavailable] Controlled Substance Agreement - Opioid - Scan on 3/22/2024 10:15 AM   [Media Unavailable] Controlled Substance Agreement - Opioid - Scan on 12/14/2021  8:56 AM       Who prescribed the medication?: IRIS    Do you need a refill? Yes    When did you use the medication last? YESTERDAY AFTERNOON    Patient offered an appointment? No PATIENT WAS SEEN YESTERDAY AND DR SIMMONS FORGOT TO REFILL THIS PRESCRIPTION    Do you have any questions or concerns?  Yes: SEE ABOVE      Okay to leave a detailed message?: Yes at Home number on file [phone] (home)      Does this patient currently have active insurance coverage?  Yes, Pt has active insurance coverage.     Does patient or caller know when to expect a call? Yes, PCS will return call within 24 business hours.     Aristides Bueno on 3/4/2025 at 8:53 AM

## 2025-03-04 NOTE — TELEPHONE ENCOUNTER
"[facility] Answering Service Pager Note    I received an answering service page at 6:16pm regarding \"meds not called in after visit\".    I called Ruthy and we spoke on the phone. I had called her pharmacy ahead and confirmed receipt of pregabalin and coreg. When I called patient she stated the med she was concerned about was her percocet. I informed patient that it is against clinical policy to refill controlled substances after hours by phone line, she was very pleasant when she received this news. I will forward message to her PCP and asked her to call clinic during tomorrow's business hours if still not resolved. She states she is out of medication but not experiencing withdrawal.     Patient stated understanding and agreement with the plan.    Ashlee Lugo DO, PGY-3  [facility] Medicine   "

## 2025-04-08 PROBLEM — N18.4 CHRONIC KIDNEY DISEASE, STAGE 4 (SEVERE) (H): Status: ACTIVE | Noted: 2020-12-31

## 2025-04-10 DIAGNOSIS — E87.20 METABOLIC ACIDOSIS: ICD-10-CM

## 2025-04-13 RX ORDER — CITRIC ACID/SODIUM CITRATE 334-500MG
SOLUTION, ORAL ORAL
Qty: 473 ML | Refills: 11 | Status: SHIPPED | OUTPATIENT
Start: 2025-04-13

## 2025-04-28 NOTE — TELEPHONE ENCOUNTER
FUTURE VISIT INFORMATION      FUTURE VISIT INFORMATION:  Date: 8/18/25  Time: 1:45pm  Location: [facility]  REFERRAL INFORMATION:  Referring provider:  Jose Angel Meyer MD   Referring providers clinic:  [facility]  Reason for visit/diagnosis  Panniculectomy and arm lift     RECORDS REQUESTED FROM:       Clinic name Comments Records Status Imaging Status   [facility] 2/6/25 - OV FP w/Jose Angel Meyer MD  EPIC      EPIC 6/27/24 - CT Abdomen Pelvis contrast

## 2025-05-01 ENCOUNTER — OFFICE VISIT (OUTPATIENT)
Dept: FAMILY MEDICINE | Facility: CLINIC | Age: 60
End: 2025-05-01
Payer: COMMERCIAL

## 2025-05-01 VITALS
RESPIRATION RATE: 16 BRPM | TEMPERATURE: 97.6 F | WEIGHT: 180.6 LBS | BODY MASS INDEX: 36.48 KG/M2 | DIASTOLIC BLOOD PRESSURE: 88 MMHG | OXYGEN SATURATION: 98 % | SYSTOLIC BLOOD PRESSURE: 168 MMHG | HEART RATE: 104 BPM

## 2025-05-01 DIAGNOSIS — E11.42 TYPE 2 DIABETES MELLITUS WITH DIABETIC POLYNEUROPATHY, WITH LONG-TERM CURRENT USE OF INSULIN (H): ICD-10-CM

## 2025-05-01 DIAGNOSIS — N18.4 CHRONIC KIDNEY DISEASE, STAGE 4 (SEVERE) (H): ICD-10-CM

## 2025-05-01 DIAGNOSIS — Z79.4 TYPE 2 DIABETES MELLITUS WITH DIABETIC POLYNEUROPATHY, WITH LONG-TERM CURRENT USE OF INSULIN (H): ICD-10-CM

## 2025-05-01 DIAGNOSIS — G89.4 CHRONIC PAIN SYNDROME: Primary | ICD-10-CM

## 2025-05-01 LAB
AMPHETAMINES UR QL: NOT DETECTED
BARBITURATES UR QL SCN: NOT DETECTED
BENZODIAZ UR QL SCN: NOT DETECTED
BUPRENORPHINE UR QL: NOT DETECTED
CANNABINOIDS UR QL: NOT DETECTED
COCAINE UR QL SCN: NOT DETECTED
D-METHAMPHET UR QL: NOT DETECTED
EST. AVERAGE GLUCOSE BLD GHB EST-MCNC: 154 MG/DL
HBA1C MFR BLD: 7 % (ref 0–5.6)
METHADONE UR QL SCN: NOT DETECTED
OPIATES UR QL SCN: NOT DETECTED
OXYCODONE UR QL SCN: DETECTED
PCP UR QL SCN: NOT DETECTED
TRICYCLICS UR QL SCN: NOT DETECTED

## 2025-05-01 PROCEDURE — 3078F DIAST BP <80 MM HG: CPT | Performed by: FAMILY MEDICINE

## 2025-05-01 PROCEDURE — 80306 DRUG TEST PRSMV INSTRMNT: CPT | Performed by: FAMILY MEDICINE

## 2025-05-01 PROCEDURE — 36416 COLLJ CAPILLARY BLOOD SPEC: CPT | Performed by: FAMILY MEDICINE

## 2025-05-01 PROCEDURE — 83036 HEMOGLOBIN GLYCOSYLATED A1C: CPT | Performed by: FAMILY MEDICINE

## 2025-05-01 PROCEDURE — 96372 THER/PROPH/DIAG INJ SC/IM: CPT | Performed by: FAMILY MEDICINE

## 2025-05-01 PROCEDURE — 3077F SYST BP >= 140 MM HG: CPT | Performed by: FAMILY MEDICINE

## 2025-05-01 PROCEDURE — 99214 OFFICE O/P EST MOD 30 MIN: CPT | Mod: 25 | Performed by: FAMILY MEDICINE

## 2025-05-01 RX ORDER — OXYCODONE AND ACETAMINOPHEN 10; 325 MG/1; MG/1
TABLET ORAL
Qty: 125 TABLET | Refills: 0 | Status: SHIPPED | OUTPATIENT
Start: 2025-05-01

## 2025-05-01 RX ADMIN — CYANOCOBALAMIN 1000 MCG: 1000 INJECTION, SOLUTION INTRAMUSCULAR; SUBCUTANEOUS at 14:55

## 2025-05-01 NOTE — PROGRESS NOTES
{PROVIDER CHARTING PREFERENCE:696665}    Genesis Bliss is a 59 year old, presenting for the following health issues:  Pain Management (CPM/Right knee and leg has been getting worse pain )      5/1/2025     2:22 PM   Additional Questions   Roomed by Viridiana   Accompanied by self         5/1/2025    Information    services provided? No     HPI      {MA/LPN/RN Pre-Provider Visit Orders- hCG/UA/Strep (Optional):047077}  Pain History:  When did you first notice your pain? ***   Have you seen this provider for your pain in the past? Yes   Where in your body do you have pain? ***  Are you seeing anyone else for your pain? { :082059}        6/20/2024     2:12 PM 9/16/2024    11:00 AM 3/3/2025     2:21 PM   PHQ-9 SCORE   PHQ-9 Total Score MyChart  13 (Moderate depression) 14 (Moderate depression)   PHQ-9 Total Score 17 13 14        Patient-reported           5/6/2021    11:47 AM 10/26/2023     5:54 PM 3/3/2025     2:24 PM   JUAN-7 SCORE   Total Score   13 (moderate anxiety)   Total Score 16 12 13        Patient-reported           2/6/2025     1:32 PM 4/3/2025     2:11 PM 5/1/2025     2:20 PM   PEG Score   PEG Total Score 10 8 8           6/20/2024     2:12 PM 9/16/2024    11:00 AM 3/3/2025     2:21 PM   PHQ-9 SCORE   PHQ-9 Total Score MyChart  13 (Moderate depression) 14 (Moderate depression)   PHQ-9 Total Score 17 13 14        Patient-reported           5/6/2021    11:47 AM 10/26/2023     5:54 PM 3/3/2025     2:24 PM   JUAN-7 SCORE   Total Score   13 (moderate anxiety)   Total Score 16 12 13        Patient-reported           2/6/2025     1:32 PM 4/3/2025     2:11 PM 5/1/2025     2:20 PM   PEG Score   PEG Total Score 10 8 8       Chronic Pain Follow Up:    Location of pain: ***  Analgesia/pain control:    - Recent changes:  ***    - Overall control: {Pain control level :178814}    - Current treatments: ***   Adherence:     - Do you ever take more pain medicine than prescribed? {Yes ***/No  :099352}    - When did you take your last dose of pain medicine?  ***   Adverse effects: {Yes ***/No :142468}   PDMP Review         Value Time User    State PDMP site checked  Yes 3/4/2025 10:27 AM Jose Angel Meyer MD          Last CSA Agreement:   CSA -- Patient Level:     [Media Unavailable] Controlled Substance Agreement - Opioid - Scan on 3/22/2024 10:15 AM   [Media Unavailable] Controlled Substance Agreement - Opioid - Scan on 12/14/2021  8:56 AM       Last UDS: 4/3/2025  {If newly prescribing or considering opioid therapy, the Opioid Risk Tool must be completed :196291}  {Pull in ORT results (Optional):388262}  {RIOSORD needs to be completed annually :083461}  {Pull in RIOSORD results (Optional):809635}    {Provider  Link to Pain Management SmartSet  Includes non-opioid pharmacological medications and referrals  :406023}  {additonal problems for provider to add (Optional):366344}    {ROS Picklists (Optional):494600}      Objective    BP (!) 168/88   Pulse 104   Temp 97.6  F (36.4  C) (Oral)   Resp 16   Wt 81.9 kg (180 lb 9.6 oz)   LMP  (LMP Unknown)   SpO2 98%   BMI 36.48 kg/m    Body mass index is 36.48 kg/m .  Physical Exam   {Exam List (Optional):419295}    {Diagnostic Test Results (Optional):348756}        Signed Electronically by: Jose Angel Meyer MD  {Email feedback regarding this note to [email or web address]   :944952}

## 2025-05-01 NOTE — PROGRESS NOTES
Clinic Administered Medication Documentation      Injectable Medication Documentation    Is there an active order (written within the past 365 days, with administrations remaining, not ) in the chart? Yes.     Patient was given Cyanocobalamin (B-12). Prior to medication administration, verified patient's identity using patient s name and date of birth. Please see MAR and medication order for additional information. Patient instructed to remain in clinic for 15 minutes and report any adverse reaction to staff immediately.    Vial/Syringe: Single dose vial. Was entire vial of medication used? Yes  Was this medication supplied by the patient? No  Is this a medication the patient will need to receive again? Yes

## 2025-05-02 NOTE — RESULT ENCOUNTER NOTE
Brady  Please call Monday  Her diabetes test shows good control.  So, as we discussed, I don't think she needs the Kika continuous monitor at this time.  JIGNESH Meyer

## 2025-05-05 NOTE — PROGRESS NOTES
Assessment & Plan     Chronic pain this is stable.  No changes made.  Refilled oxycodone.  Follows up monthly due to her high risk.    Type 2 diabetes: Controlled on Ozempic.  A1c is at goal.  I do not think she needs a CGM.  I explained the rationale.    CKD 4.  Try to get a BMP today but could not.  Will have to repeat it next month if possible.  She has nephrology referral and the fall.    Genesis Bliss is a 59 year old, presenting for the following health issues:  She has chronic pain on oxycodone.  The source of her pain is polyarticular arthritis and peripheral neuropathy.  She is stable and content with her pain control although sometimes her neuropathy affects her ability to sleep..  She has severe resistant hypertension and is now on clonidine.  I asked her to come in to see our clinical pharmacist today but she did not bring her meds with which is disappointing.  She does not know her meds very well, unfortunately and seems a bit inconsistent, however she is wearing the clonidine patch today and tolerates it well.  Her blood pressure earlier today was in the 150 systolic which is a marked improvement.  She has follow-up with nephrology in the near future.  I recently added sodium bicarbonate to treat her metabolic acidosis.  She is a very tough trauma and we are unable to get BMP today.  She would like a new glucometer/CGM.  Interestingly, on her current Ozempic at 2 mg/week, she has no GI adverse effects and her last A1c was 6.5.  She is not taking insulin.  I discussed the possibility of not continuing her CGM as she does not need to modify her lifestyle and is not on insulin.  PDMP reviewed-no red flags.  Urine drug screen is appropriate.  A1c noted.    Her edema is perhaps a little bit better it goes to her proximal tibia only.  She has compression stockings but they do not fit very well and she is really not interested in wearing them.  Pain Management (CPM/Right knee and leg has been  getting worse pain )        Objective    BP (!) 168/88   Pulse 104   Temp 97.6  F (36.4  C) (Oral)   Resp 16   Wt 81.9 kg (180 lb 9.6 oz)   LMP  (LMP Unknown)   SpO2 98%   BMI 36.48 kg/m    Body mass index is 36.48 kg/m .  Physical Exam   Chest clear.  Heart regular to and rhythm normal S1-S2 no murmur.  Abdomen soft and nontender.  Extremities she has multiple scars on her legs more prominent on the right than on the left.  She has 1+ edema to the proximal tibia.    Hemoglobin A1C   Date Value Ref Range Status   05/01/2025 7.0 (H) 0.0 - 5.6 % Final     Comment:     Normal <5.7%   Prediabetes 5.7-6.4%    Diabetes 6.5% or higher     Note: Adopted from ADA consensus guidelines.   01/09/2025 6.5 (H) 0.0 - 5.6 % Final     Comment:     Normal <5.7%   Prediabetes 5.7-6.4%    Diabetes 6.5% or higher     Note: Adopted from ADA consensus guidelines.   10/17/2024 8.8 (H) 0.0 - 5.6 % Final     Comment:     Normal <5.7%   Prediabetes 5.7-6.4%    Diabetes 6.5% or higher     Note: Adopted from ADA consensus guidelines.   11/20/2020 6.9 (H) 4.1 - 5.7 % Final   06/26/2020 8.8 (H) 4.1 - 5.7 % Final   06/02/2020 14.5 (H) 4.1 - 5.7 % Final              Signed Electronically by: Jose Angel Meyer MD

## 2025-05-15 ENCOUNTER — MEDICAL CORRESPONDENCE (OUTPATIENT)
Dept: HEALTH INFORMATION MANAGEMENT | Facility: CLINIC | Age: 60
End: 2025-05-15
Payer: COMMERCIAL

## 2025-06-01 DIAGNOSIS — G89.4 CHRONIC PAIN DISORDER: ICD-10-CM

## 2025-06-03 RX ORDER — PREGABALIN 20 MG/ML
SOLUTION ORAL
Qty: 450 ML | Refills: 2 | Status: SHIPPED | OUTPATIENT
Start: 2025-06-03 | End: 2025-06-05

## 2025-06-05 ENCOUNTER — OFFICE VISIT (OUTPATIENT)
Dept: FAMILY MEDICINE | Facility: CLINIC | Age: 60
End: 2025-06-05
Payer: COMMERCIAL

## 2025-06-05 VITALS
HEART RATE: 114 BPM | BODY MASS INDEX: 34.9 KG/M2 | SYSTOLIC BLOOD PRESSURE: 128 MMHG | DIASTOLIC BLOOD PRESSURE: 63 MMHG | OXYGEN SATURATION: 97 % | WEIGHT: 172.8 LBS | TEMPERATURE: 98.9 F | RESPIRATION RATE: 16 BRPM

## 2025-06-05 DIAGNOSIS — Z79.4 TYPE 2 DIABETES MELLITUS WITH DIABETIC POLYNEUROPATHY, WITH LONG-TERM CURRENT USE OF INSULIN (H): ICD-10-CM

## 2025-06-05 DIAGNOSIS — R60.9 EDEMA, UNSPECIFIED TYPE: ICD-10-CM

## 2025-06-05 DIAGNOSIS — G89.4 CHRONIC PAIN DISORDER: Primary | ICD-10-CM

## 2025-06-05 DIAGNOSIS — N18.4 CHRONIC KIDNEY DISEASE, STAGE 4 (SEVERE) (H): ICD-10-CM

## 2025-06-05 DIAGNOSIS — I10 BENIGN ESSENTIAL HYPERTENSION: ICD-10-CM

## 2025-06-05 DIAGNOSIS — E11.42 TYPE 2 DIABETES MELLITUS WITH DIABETIC POLYNEUROPATHY, WITH LONG-TERM CURRENT USE OF INSULIN (H): ICD-10-CM

## 2025-06-05 DIAGNOSIS — I10 ESSENTIAL HYPERTENSION: ICD-10-CM

## 2025-06-05 DIAGNOSIS — E11.42 TYPE 2 DIABETES MELLITUS WITH DIABETIC POLYNEUROPATHY, WITHOUT LONG-TERM CURRENT USE OF INSULIN (H): ICD-10-CM

## 2025-06-05 DIAGNOSIS — E87.20 METABOLIC ACIDOSIS: ICD-10-CM

## 2025-06-05 DIAGNOSIS — G89.4 CHRONIC PAIN SYNDROME: ICD-10-CM

## 2025-06-05 PROCEDURE — 80306 DRUG TEST PRSMV INSTRMNT: CPT | Performed by: FAMILY MEDICINE

## 2025-06-05 PROCEDURE — 3078F DIAST BP <80 MM HG: CPT | Performed by: FAMILY MEDICINE

## 2025-06-05 PROCEDURE — 3074F SYST BP LT 130 MM HG: CPT | Performed by: FAMILY MEDICINE

## 2025-06-05 PROCEDURE — 96372 THER/PROPH/DIAG INJ SC/IM: CPT | Performed by: FAMILY MEDICINE

## 2025-06-05 PROCEDURE — 36415 COLL VENOUS BLD VENIPUNCTURE: CPT | Performed by: FAMILY MEDICINE

## 2025-06-05 PROCEDURE — 99214 OFFICE O/P EST MOD 30 MIN: CPT | Mod: 25 | Performed by: FAMILY MEDICINE

## 2025-06-05 PROCEDURE — 80048 BASIC METABOLIC PNL TOTAL CA: CPT | Performed by: FAMILY MEDICINE

## 2025-06-05 RX ORDER — ATORVASTATIN CALCIUM 10 MG/1
10 TABLET, FILM COATED ORAL DAILY
Qty: 90 TABLET | Refills: 3 | Status: SHIPPED | OUTPATIENT
Start: 2025-06-05 | End: 2025-06-05

## 2025-06-05 RX ORDER — CARVEDILOL 25 MG/1
25 TABLET ORAL 2 TIMES DAILY WITH MEALS
Qty: 180 TABLET | Refills: 3 | Status: SHIPPED | OUTPATIENT
Start: 2025-06-05

## 2025-06-05 RX ORDER — CLONIDINE 0.1 MG/24H
1 PATCH, EXTENDED RELEASE TRANSDERMAL WEEKLY
Qty: 4 PATCH | Refills: 11 | Status: SHIPPED | OUTPATIENT
Start: 2025-06-05 | End: 2025-06-05

## 2025-06-05 RX ORDER — ATORVASTATIN CALCIUM 10 MG/1
10 TABLET, FILM COATED ORAL DAILY
Qty: 90 TABLET | Refills: 3 | Status: SHIPPED | OUTPATIENT
Start: 2025-06-05

## 2025-06-05 RX ORDER — CLONIDINE 0.1 MG/24H
1 PATCH, EXTENDED RELEASE TRANSDERMAL WEEKLY
Qty: 4 PATCH | Refills: 11 | Status: SHIPPED | OUTPATIENT
Start: 2025-06-05

## 2025-06-05 RX ORDER — CITRIC ACID/SODIUM CITRATE 334-500MG
10 SOLUTION, ORAL ORAL 2 TIMES DAILY
Qty: 473 ML | Refills: 11 | Status: SHIPPED | OUTPATIENT
Start: 2025-06-05 | End: 2025-06-05

## 2025-06-05 RX ORDER — OXYCODONE AND ACETAMINOPHEN 10; 325 MG/1; MG/1
TABLET ORAL
Qty: 125 TABLET | Refills: 0 | Status: SHIPPED | OUTPATIENT
Start: 2025-06-05 | End: 2025-06-05

## 2025-06-05 RX ORDER — TORSEMIDE 20 MG/1
40 TABLET ORAL DAILY
Qty: 180 TABLET | Refills: 3 | Status: SHIPPED | OUTPATIENT
Start: 2025-06-05

## 2025-06-05 RX ORDER — LISINOPRIL 10 MG/1
40 TABLET ORAL DAILY
Qty: 1200 ML | Refills: 11 | Status: SHIPPED | OUTPATIENT
Start: 2025-06-05

## 2025-06-05 RX ORDER — CITRIC ACID/SODIUM CITRATE 334-500MG
10 SOLUTION, ORAL ORAL 2 TIMES DAILY
Qty: 473 ML | Refills: 11 | Status: SHIPPED | OUTPATIENT
Start: 2025-06-05

## 2025-06-05 RX ORDER — SEMAGLUTIDE 2.68 MG/ML
2 INJECTION, SOLUTION SUBCUTANEOUS
Qty: 9 ML | Refills: 3 | Status: SHIPPED | OUTPATIENT
Start: 2025-06-05

## 2025-06-05 RX ORDER — OXYCODONE AND ACETAMINOPHEN 10; 325 MG/1; MG/1
TABLET ORAL
Qty: 125 TABLET | Refills: 0 | Status: SHIPPED | OUTPATIENT
Start: 2025-06-05

## 2025-06-05 RX ORDER — SEMAGLUTIDE 2.68 MG/ML
2 INJECTION, SOLUTION SUBCUTANEOUS
Qty: 9 ML | Refills: 3 | Status: SHIPPED | OUTPATIENT
Start: 2025-06-05 | End: 2025-06-05

## 2025-06-05 RX ORDER — PREGABALIN 20 MG/ML
50 SOLUTION ORAL 2 TIMES DAILY
Qty: 450 ML | Refills: 2 | Status: SHIPPED | OUTPATIENT
Start: 2025-06-05

## 2025-06-05 RX ADMIN — CYANOCOBALAMIN 1000 MCG: 1000 INJECTION, SOLUTION INTRAMUSCULAR; SUBCUTANEOUS at 14:23

## 2025-06-05 NOTE — PROGRESS NOTES
Patient Active Problem List    Diagnosis Date Noted    Chronic kidney disease, stage 4 (severe) (H) 12/31/2020     Priority: High     Persistent protienuria      Copper deficiency 04/05/2019     Priority: High     Identified in Aug, 2018--see discharge summary from [facility] in [place].      Chronic pain disorder 10/07/2016     Priority: High     Chronic Pain Diagnosis:  neuropathy  DIRE Total Score(s):14     ORT: 5    4 - 7 =  Moderate Risk   of future problems with Opioids  FAQ5: 50 November 20, 2020  Behavioral Health Consultation: 5/12/17 with Dr. Daisy Quezada  Personal Care Plan for Chronic Pain: 5/12/17  Opioid medication:oxycodone   Dose:10mg  Number of pills per month:60  Patient is being prescribed 10mg of oxycodone IR (Percocet) per day this is 15 mg Morphine Equivalents  Benzodiazepines Prescribed? No  Naloxone prescribed? YES February 16, 2023  RIOSORD 16:  15% risk      Clinic visit frequency required: Q 1 month Next visit due:   Controlled Substance/Opioid Treatment agreement on file (dated <12 months ago)?:    Date(s): pending  Last [place] verification: last visit  Items in red to be updated at each visit  Patient is followed by Data Unavailable for ongoing prescription of pain medication.  All refills should be approved by this provider, or covering partner.Chronic Pain     Chronic Pain Diagnosis:  neuropathy  DIRE Total Score(s):14     ORT: 5    4 - 7 =  Moderate Risk   of future problems with Opioids  FAQ5: 45/100 on 9/9/16, 40/100 on 11/15/16  55 October 29, 2020    Behavioral Health Consultation: 5/12/17 with Dr. Daisy Quezada  Personal Care Plan for Chronic Pain: 5/12/17  Opioid medication:oxycodone   Dose:10mg  Number of pills per month:90  Patient is being prescribed 45 MME  Benzodiazepines Prescribed? No  Naloxone prescribed? yes          Microalbuminuria 10/12/2015     Priority: High     Severely increased albuminuria      Type 2 diabetes mellitus with diabetic polyneuropathy (H)  10/12/2015     Priority: High    Essential hypertension 08/26/2013     Priority: High    Chronic, continuous use of opioids 11/14/2024     Priority: Medium    Class 2 severe obesity due to excess calories with serious comorbidity in adult (H) 09/16/2024     Priority: Medium    Altered mental status, unspecified altered mental status type 06/28/2024     Priority: Medium    Arthritis of both acromioclavicular joints 04/08/2021     Priority: Medium     xray proven       Neck mass 10/11/2019     Priority: Medium     Added automatically from request for surgery [id number]      Metabolic acidosis 07/26/2019     Priority: Medium     reolved w/ discontinuation of ASA      Obesity 01/03/2019     Priority: Medium    Hernia of anterior abdominal wall 01/28/2017     Priority: Medium    Osteoarthritis of both knees 10/31/2016     Priority: Medium    Bilateral ankle joint pain 01/15/2016     Priority: Medium    Esophageal reflux 04/07/2015     Priority: Medium    Urinary incontinence 02/19/2015     Priority: Medium    History of cholecystectomy 08/04/2013     Priority: Medium    Hyperlipidemia 06/21/2013     Priority: Medium    Status post bariatric surgery 06/21/2013     Priority: Medium     Kim-en-Y in 2006      Severe episode of recurrent major depressive disorder, with psychotic features (H) 01/24/2013     Priority: Medium    Moderate persistent asthma without complication 11/15/2012     Priority: Medium    Abnormal Pap smear of cervix 04/22/2019     Priority: Low     3-12-15: Pap/HPV neg.  Plan: Given hx (mildly abn pap, hyster), recheck in 5 yrs.  10-11-11: Pap/HPV neg.  Plan: Given ASUS pap in 2010, recommend repeating pap in 1 year.  8/3/2010 ASCUS Pap with +BV- pt treated with Metrogel.  Pt needs repeat Pap in 1yr.  8/4/2008 Pt had total hyst for menorrhagia, pelvic pain, scar tissue, and fibroids.    3-12-15: Pap/HPV neg.  Plan: Given hx (mildly abn pap, hyster), recheck in 5 yrs.  10-11-11: Pap/HPV neg.  Plan: Given ASUS  pap in 2010, recommend repeating pap in 1 year.  8/3/2010 ASCUS Pap with +BV- pt treated with Metrogel.  Pt needs repeat Pap in 1yr.  8/4/2008 Pt had total hyst for menorrhagia, pelvic pain, scar tissue, and fibroids.      Hernia 01/27/2017     Priority: Low    H/O necrotizing fasciitis 08/04/2013     Priority: Low    S/P hysterectomy 06/21/2013     Priority: Low     2008, For fibroids.  Cervix removed, but still has ovaries.      History of hysterectomy for benign disease 06/21/2013     Priority: Low     2008, For fibroids.  Cervix removed, but still has ovaries.      Carpal tunnel syndrome 11/15/2012     Priority: Low    Chronic pain of both shoulders 12/23/2005     Priority: Low   Soke up this morning with severe right ankle pain.  No trauma.  Tried hot water--didn't help.  Pain meds help, but not now.    Needs reflls of pain meds, pregabalin.  Generally, pain control is adequate on current regimen and she tolerates it well.  No sedation or constipation.    She needs follow-up of her hypertension and is currently tolerating her medications well.  Adherence is always hard to know if she does not bring her medications consistently    She is taking her torsemide more regularly and her edema is much better subjectively.  She has questions about her chronic kidney disease.  She has an appointment with nephrology in November, which is the soonest we can get her in.  She needs refills of the Bicitra.    She currently does not trust her pharmacy and [facility] as they seem to be inconsistent in giving out her pain pills.  She like to transfer those prescriptions to the [facility] pharmacy.      There are no exam notes on file for this visit.  Chief Complaint   Patient presents with    Pain Management     CPM     Blood pressure 128/63, pulse 114, temperature 98.9  F (37.2  C), temperature source Oral, resp. rate 16, weight 78.4 kg (172 lb 12.8 oz), SpO2 97%, not currently breastfeeding.  No results found for  any visits on 06/05/25.  Alert pleasant no acute distress  Chest clear to auscultation  Heart regular rate and rhythm.  Pulse is around 100 when I repeat her examination  Extremities now have just trace edema in both ankles.  The right ankle, where she is having pain.  Is nontender over the joint itself and there is no effusion.  There is tenderness over the Achilles tendon on the right.    Assessment/plan  1.  Chronic pain syndrome she has polyarticular osteoarthritis which is the primary source of her pain.  She has been on stable doses of oxycodone and has no aberrant behavior on evident on her PDMP or her urine drug screen.  Refilled her oxycodone today.  I also refilled her pregabalin.  2.  Regarding her hypertension her regimen should include lisinopril 40 mg daily, carvedilol 25 mg twice daily and Catapres patch 0.1 mg weekly.  Blood pressure well-controlled today.  She is on higher doses of torsemide and repeated a BMP today which shows a stable creatinine.  She still has a metabolic acidosis and will make sure she has her Bicitra.  Recheck BMP next time when she returns in 1 month.    3.  Type 2 diabetes her regimen should be Ozempic 2 mg weekly and Jardiance 25 mg daily.  Her blood sugar is high today but her A1c 1 month ago was satisfactory.  No changes for now.    Will contact her w/ lab results      Hemoglobin A1C   Date Value Ref Range Status   05/01/2025 7.0 (H) 0.0 - 5.6 % Final     Comment:     Normal <5.7%   Prediabetes 5.7-6.4%    Diabetes 6.5% or higher     Note: Adopted from ADA consensus guidelines.   01/09/2025 6.5 (H) 0.0 - 5.6 % Final     Comment:     Normal <5.7%   Prediabetes 5.7-6.4%    Diabetes 6.5% or higher     Note: Adopted from ADA consensus guidelines.   10/17/2024 8.8 (H) 0.0 - 5.6 % Final     Comment:     Normal <5.7%   Prediabetes 5.7-6.4%    Diabetes 6.5% or higher     Note: Adopted from ADA consensus guidelines.   11/20/2020 6.9 (H) 4.1 - 5.7 % Final   06/26/2020 8.8 (H) 4.1 -  5.7 % Final   06/02/2020 14.5 (H) 4.1 - 5.7 % Final

## 2025-06-05 NOTE — PROGRESS NOTES
Clinic Administered Medication Documentation        Patient was given B-12 . Prior to medication administration, verified patient's identity using patient s name and date of birth. Please see MAR and medication order for additional information. Patient instructed to remain in clinic for 15 minutes and report any adverse reaction to staff immediately.    Vial/Syringe: Single dose vial. Was entire vial of medication used? Yes

## 2025-06-06 LAB
ANION GAP SERPL CALCULATED.3IONS-SCNC: 14 MMOL/L (ref 7–15)
BUN SERPL-MCNC: 47.1 MG/DL (ref 8–23)
CALCIUM SERPL-MCNC: 6.7 MG/DL (ref 8.8–10.4)
CHLORIDE SERPL-SCNC: 108 MMOL/L (ref 98–107)
CREAT SERPL-MCNC: 2.13 MG/DL (ref 0.51–0.95)
EGFRCR SERPLBLD CKD-EPI 2021: 26 ML/MIN/1.73M2
GLUCOSE SERPL-MCNC: 389 MG/DL (ref 70–99)
HCO3 SERPL-SCNC: 15 MMOL/L (ref 22–29)
POTASSIUM SERPL-SCNC: 5.1 MMOL/L (ref 3.4–5.3)
SODIUM SERPL-SCNC: 137 MMOL/L (ref 135–145)

## 2025-06-07 NOTE — UTILIZATION REVIEW
Concurrent stay review; Secondary Review Determination     Under the authority of the Utilization Management Committee, the utilization review process indicated a secondary review on Ruthy Álvarez.  The review outcome is based on review of the medical records, discussions with staff, and applying clinical experience noted on the date of the review.        (x) Observation Status Appropriate - Concurrent stay review    RATIONALE FOR DETERMINATION   Ruthy Álvarez is a 58 yr old female with DM2, CKD, Chronic pain who presented 6/27 with chest pain, recent fall, dizziness and excessive somnolence with concern for acute encephalopathy.  Suspected related to pregabalin.  Medicines held overnight now improved in regard to sedation.  Continuing to monitor while other medication adjustments are made.  ECHO being obtained.  If findings concerning for need for ongoing cardiac evaluation or adjustment to IV diuretics, consider transition to inpatient level of care.    Patient is clinically improving and there is no clear indication to change patient's status to inpatient. The severity of illness, intensity of service provided, expected LOS and risk for adverse outcome make the care appropriate for observation.    The information on this document is developed by the utilization review team in order for the business office to ensure compliance.  This only denotes the appropriateness of proper admission status and does not reflect the quality of care rendered.         The definitions of Inpatient Status and Observation Status used in making the determination above are those provided in the CMS Coverage Manual, Chapter 1 and Chapter 6, section 70.4.      Sincerely,   Starr Rubio MD  Utilization Review  Physician Advisor  [facility]  
bed mobility training/gait training/transfer training

## 2025-06-12 ENCOUNTER — RESULTS FOLLOW-UP (OUTPATIENT)
Dept: FAMILY MEDICINE | Facility: CLINIC | Age: 60
End: 2025-06-12

## 2025-06-12 DIAGNOSIS — E83.50 UNSPECIFIED DISORDER OF CALCIUM METABOLISM: ICD-10-CM

## 2025-06-12 DIAGNOSIS — N18.4 CHRONIC KIDNEY DISEASE, STAGE 4 (SEVERE) (H): ICD-10-CM

## 2025-06-12 DIAGNOSIS — E83.51 HYPOCALCEMIA: Primary | ICD-10-CM

## 2025-07-24 ENCOUNTER — OFFICE VISIT (OUTPATIENT)
Dept: FAMILY MEDICINE | Facility: CLINIC | Age: 60
End: 2025-07-24
Payer: COMMERCIAL

## 2025-07-24 VITALS
HEART RATE: 105 BPM | BODY MASS INDEX: 36.49 KG/M2 | OXYGEN SATURATION: 100 % | SYSTOLIC BLOOD PRESSURE: 147 MMHG | TEMPERATURE: 98.2 F | HEIGHT: 59 IN | RESPIRATION RATE: 18 BRPM | DIASTOLIC BLOOD PRESSURE: 86 MMHG | WEIGHT: 181 LBS

## 2025-07-24 DIAGNOSIS — G89.4 CHRONIC PAIN SYNDROME: Primary | ICD-10-CM

## 2025-07-24 DIAGNOSIS — N18.4 CHRONIC KIDNEY DISEASE, STAGE 4 (SEVERE) (H): ICD-10-CM

## 2025-07-24 DIAGNOSIS — E83.50 UNSPECIFIED DISORDER OF CALCIUM METABOLISM: ICD-10-CM

## 2025-07-24 DIAGNOSIS — Z79.899 ENCOUNTER FOR LONG-TERM CURRENT USE OF MEDICATION: ICD-10-CM

## 2025-07-24 DIAGNOSIS — E83.51 HYPOCALCEMIA: ICD-10-CM

## 2025-07-24 LAB
1,25(OH)2D SERPL-MCNC: 41.9 PG/ML (ref 19.9–79.3)
AMPHETAMINES UR QL: NOT DETECTED
BARBITURATES UR QL SCN: NOT DETECTED
BENZODIAZ UR QL SCN: NOT DETECTED
BUPRENORPHINE UR QL: NOT DETECTED
CANNABINOIDS UR QL: NOT DETECTED
COCAINE UR QL SCN: NOT DETECTED
D-METHAMPHET UR QL: NOT DETECTED
METHADONE UR QL SCN: NOT DETECTED
OPIATES UR QL SCN: NOT DETECTED
OXYCODONE UR QL SCN: DETECTED
PCP UR QL SCN: NOT DETECTED
TRICYCLICS UR QL SCN: NOT DETECTED

## 2025-07-24 RX ORDER — OXYCODONE AND ACETAMINOPHEN 10; 325 MG/1; MG/1
TABLET ORAL
Qty: 125 TABLET | Refills: 0 | Status: SHIPPED | OUTPATIENT
Start: 2025-08-05

## 2025-07-24 RX ADMIN — CYANOCOBALAMIN 1000 MCG: 1000 INJECTION, SOLUTION INTRAMUSCULAR; SUBCUTANEOUS at 16:52

## 2025-07-24 ASSESSMENT — PATIENT HEALTH QUESTIONNAIRE - PHQ9: SUM OF ALL RESPONSES TO PHQ QUESTIONS 1-9: 13

## 2025-07-24 NOTE — PROGRESS NOTES
Assessment & Plan     Chronic pain.  This is stable.  PDMP reviewed and she is not due for refill again until early August.  I sent a prescription that she can fill on August 5 or later.  Continue to follow-up on a monthly basis given her medical comorbidities.    Hypertension.  Better than normal control.  Still not optimal but it has been extraordinarily labile.    CKD.  She has had both metabolic acidosis and hypocalcemia.  Repeat labs today including vitamin D levels and ionized calcium.  She has nephrology appointment in November which is perfect.  I showed the course of her kidney disease which is substantial decline from a GFR of around 60 in 2015 to now CKD 4.  She is aware that she may need dialysis in the next few years and is even considering starting to have conversations with her children about the possibility of a transplant.  I think this is a bit premature but applaud her for thinking in a forward manner.  She definitely should keep her appointment with nephrology in November.  She has substantial clinical proteinuria and is on maximum ACE inhibition, and an SGLT2 inhibitor.  Await today's test results.    For her volume overload is probably a combination of her CKD, obesity, pregabalin use.  Could consider increasing her torsemide to a total of 80 mg daily depending on what her renal function tests look like.  Will call her when we get these results back.    She has history of B12 deficiency and gets monthly injections here.  Given again today.  Genesis Bliss is a 59 year old, presenting for the following health issues:  Refill Request (CPM)      7/24/2025     4:04 PM   Additional Questions   Roomed by omayra   Accompanied by self         7/24/2025    Information    services provided? No     HPI    TB is here for follow-up of her CKD, chronic pain and hypertension her main concern is that her legs are not responding very well to the diuretic.  She takes torsemide a  "total of 60 mg daily as below  Main concerns:  swelling in legs is not getting better. Taking torsemide 20 mg am and 40 mg pm.    She is not complaining of orthopnea or PND.  She is having some mid upper back pain that she is attributing to her kidneys but has no dysuria, fever, nausea, vomiting, or rash.  She has tried some massage and devices that help sometimes but she lives alone making it difficult to use  She has been using her clonidine patch for her blood pressure but does not have it on today.  She applies it and changes it on every Thursday.    Overall, she is generally satisfied with her pain control and tolerates her medications well.  Constipation does not seem to be a problem.  She is never had any aberrant behavior.  She readily gives us a urine sample today          Objective    BP (!) 147/86   Pulse 105   Temp 98.2  F (36.8  C) (Oral)   Resp 18   Ht 1.499 m (4' 11\")   Wt 82.1 kg (181 lb)   LMP  (LMP Unknown)   SpO2 100%   BMI 36.56 kg/m    Body mass index is 36.56 kg/m .    Chest clear to auscultation.  Heart regular to and rhythm without murmur.  Extremities have 2+ edema to the proximal third of the tibia.  There is a large scar on her right leg.        Signed Electronically by: Jose Angel Meyer MD    "

## 2025-08-08 ENCOUNTER — TRANSFERRED RECORDS (OUTPATIENT)
Dept: MULTI SPECIALTY CLINIC | Facility: CLINIC | Age: 60
End: 2025-08-08
Payer: COMMERCIAL

## 2025-08-08 LAB — RETINOPATHY: NORMAL

## 2025-08-18 ENCOUNTER — OFFICE VISIT (OUTPATIENT)
Dept: PLASTIC SURGERY | Facility: CLINIC | Age: 60
End: 2025-08-18
Payer: COMMERCIAL

## 2025-08-18 ENCOUNTER — PRE VISIT (OUTPATIENT)
Dept: PLASTIC SURGERY | Facility: CLINIC | Age: 60
End: 2025-08-18

## 2025-08-18 VITALS
HEART RATE: 81 BPM | DIASTOLIC BLOOD PRESSURE: 85 MMHG | SYSTOLIC BLOOD PRESSURE: 144 MMHG | WEIGHT: 183 LBS | OXYGEN SATURATION: 98 % | BODY MASS INDEX: 36.89 KG/M2 | HEIGHT: 59 IN

## 2025-08-18 DIAGNOSIS — Z98.84 STATUS POST BARIATRIC SURGERY: ICD-10-CM

## 2025-08-18 DIAGNOSIS — L98.7 EXCESS SKIN OF ABDOMEN: Primary | ICD-10-CM

## 2025-08-18 DIAGNOSIS — L98.7 EXCESSIVE AND REDUNDANT SKIN AND SUBCUTANEOUS TISSUE: ICD-10-CM

## 2025-08-18 PROCEDURE — 3077F SYST BP >= 140 MM HG: CPT | Performed by: PHYSICIAN ASSISTANT

## 2025-08-18 PROCEDURE — 99204 OFFICE O/P NEW MOD 45 MIN: CPT | Performed by: PHYSICIAN ASSISTANT

## 2025-08-18 PROCEDURE — 3079F DIAST BP 80-89 MM HG: CPT | Performed by: PHYSICIAN ASSISTANT

## 2025-08-18 PROCEDURE — 1125F AMNT PAIN NOTED PAIN PRSNT: CPT | Performed by: PHYSICIAN ASSISTANT

## 2025-08-18 ASSESSMENT — PAIN SCALES - GENERAL: PAINLEVEL_OUTOF10: SEVERE PAIN (8)
